# Patient Record
Sex: FEMALE | Race: BLACK OR AFRICAN AMERICAN | Employment: UNEMPLOYED | URBAN - METROPOLITAN AREA
[De-identification: names, ages, dates, MRNs, and addresses within clinical notes are randomized per-mention and may not be internally consistent; named-entity substitution may affect disease eponyms.]

---

## 2022-03-03 ENCOUNTER — HOSPITAL ENCOUNTER (EMERGENCY)
Age: 47
Discharge: ANOTHER HOSPITAL | End: 2022-03-04
Attending: EMERGENCY MEDICINE
Payer: MEDICAID

## 2022-03-03 ENCOUNTER — APPOINTMENT (OUTPATIENT)
Dept: CT IMAGING | Age: 47
End: 2022-03-03
Payer: MEDICAID

## 2022-03-03 DIAGNOSIS — N30.01 ACUTE CYSTITIS WITH HEMATURIA: ICD-10-CM

## 2022-03-03 DIAGNOSIS — C79.9 METASTATIC MALIGNANT NEOPLASM, UNSPECIFIED SITE (HCC): ICD-10-CM

## 2022-03-03 DIAGNOSIS — C50.919 MALIGNANT NEOPLASM OF FEMALE BREAST, UNSPECIFIED ESTROGEN RECEPTOR STATUS, UNSPECIFIED LATERALITY, UNSPECIFIED SITE OF BREAST (HCC): Primary | ICD-10-CM

## 2022-03-03 LAB
A/G RATIO: 1 (ref 1.1–2.2)
ALBUMIN SERPL-MCNC: 4 G/DL (ref 3.4–5)
ALP BLD-CCNC: 61 U/L (ref 40–129)
ALT SERPL-CCNC: 14 U/L (ref 10–40)
ANION GAP SERPL CALCULATED.3IONS-SCNC: 14 MMOL/L (ref 3–16)
AST SERPL-CCNC: 17 U/L (ref 15–37)
BASOPHILS ABSOLUTE: 0 K/UL (ref 0–0.2)
BASOPHILS RELATIVE PERCENT: 0.5 %
BILIRUB SERPL-MCNC: 0.3 MG/DL (ref 0–1)
BUN BLDV-MCNC: 12 MG/DL (ref 7–20)
CALCIUM SERPL-MCNC: 9.3 MG/DL (ref 8.3–10.6)
CHLORIDE BLD-SCNC: 103 MMOL/L (ref 99–110)
CO2: 20 MMOL/L (ref 21–32)
CREAT SERPL-MCNC: 0.7 MG/DL (ref 0.6–1.1)
EOSINOPHILS ABSOLUTE: 0 K/UL (ref 0–0.6)
EOSINOPHILS RELATIVE PERCENT: 0.4 %
GFR AFRICAN AMERICAN: >60
GFR NON-AFRICAN AMERICAN: >60
GLUCOSE BLD-MCNC: 102 MG/DL (ref 70–99)
GLUCOSE BLD-MCNC: 97 MG/DL (ref 70–99)
HCG QUALITATIVE: NEGATIVE
HCT VFR BLD CALC: 37.9 % (ref 36–48)
HEMOGLOBIN: 12.5 G/DL (ref 12–16)
LACTIC ACID: 1.1 MMOL/L (ref 0.4–2)
LYMPHOCYTES ABSOLUTE: 1 K/UL (ref 1–5.1)
LYMPHOCYTES RELATIVE PERCENT: 13.1 %
MCH RBC QN AUTO: 27.3 PG (ref 26–34)
MCHC RBC AUTO-ENTMCNC: 32.9 G/DL (ref 31–36)
MCV RBC AUTO: 82.9 FL (ref 80–100)
MONOCYTES ABSOLUTE: 0.5 K/UL (ref 0–1.3)
MONOCYTES RELATIVE PERCENT: 7 %
NEUTROPHILS ABSOLUTE: 6.1 K/UL (ref 1.7–7.7)
NEUTROPHILS RELATIVE PERCENT: 79 %
PDW BLD-RTO: 14.3 % (ref 12.4–15.4)
PERFORMED ON: NORMAL
PLATELET # BLD: 409 K/UL (ref 135–450)
PMV BLD AUTO: 7.1 FL (ref 5–10.5)
POTASSIUM REFLEX MAGNESIUM: 3.9 MMOL/L (ref 3.5–5.1)
RBC # BLD: 4.58 M/UL (ref 4–5.2)
SODIUM BLD-SCNC: 137 MMOL/L (ref 136–145)
TOTAL PROTEIN: 8 G/DL (ref 6.4–8.2)
TROPONIN: <0.01 NG/ML
WBC # BLD: 7.7 K/UL (ref 4–11)

## 2022-03-03 PROCEDURE — 96376 TX/PRO/DX INJ SAME DRUG ADON: CPT

## 2022-03-03 PROCEDURE — 6360000004 HC RX CONTRAST MEDICATION: Performed by: EMERGENCY MEDICINE

## 2022-03-03 PROCEDURE — 80053 COMPREHEN METABOLIC PANEL: CPT

## 2022-03-03 PROCEDURE — 96367 TX/PROPH/DG ADDL SEQ IV INF: CPT

## 2022-03-03 PROCEDURE — 6360000002 HC RX W HCPCS: Performed by: EMERGENCY MEDICINE

## 2022-03-03 PROCEDURE — 96365 THER/PROPH/DIAG IV INF INIT: CPT

## 2022-03-03 PROCEDURE — 84484 ASSAY OF TROPONIN QUANT: CPT

## 2022-03-03 PROCEDURE — 84703 CHORIONIC GONADOTROPIN ASSAY: CPT

## 2022-03-03 PROCEDURE — 96375 TX/PRO/DX INJ NEW DRUG ADDON: CPT

## 2022-03-03 PROCEDURE — 2500000003 HC RX 250 WO HCPCS: Performed by: EMERGENCY MEDICINE

## 2022-03-03 PROCEDURE — 85025 COMPLETE CBC W/AUTO DIFF WBC: CPT

## 2022-03-03 PROCEDURE — 83605 ASSAY OF LACTIC ACID: CPT

## 2022-03-03 PROCEDURE — 96366 THER/PROPH/DIAG IV INF ADDON: CPT

## 2022-03-03 PROCEDURE — 70496 CT ANGIOGRAPHY HEAD: CPT

## 2022-03-03 PROCEDURE — 70450 CT HEAD/BRAIN W/O DYE: CPT

## 2022-03-03 PROCEDURE — 93005 ELECTROCARDIOGRAM TRACING: CPT | Performed by: PHYSICIAN ASSISTANT

## 2022-03-03 PROCEDURE — 99285 EMERGENCY DEPT VISIT HI MDM: CPT

## 2022-03-03 RX ORDER — LEVETIRACETAM 10 MG/ML
1000 INJECTION INTRAVASCULAR EVERY 12 HOURS
Status: DISCONTINUED | OUTPATIENT
Start: 2022-03-04 | End: 2022-03-04 | Stop reason: HOSPADM

## 2022-03-03 RX ORDER — DEXAMETHASONE SODIUM PHOSPHATE 10 MG/ML
10 INJECTION, SOLUTION INTRAMUSCULAR; INTRAVENOUS ONCE
Status: COMPLETED | OUTPATIENT
Start: 2022-03-03 | End: 2022-03-03

## 2022-03-03 RX ORDER — DEXAMETHASONE SODIUM PHOSPHATE 10 MG/ML
6 INJECTION, SOLUTION INTRAMUSCULAR; INTRAVENOUS EVERY 6 HOURS
Status: DISCONTINUED | OUTPATIENT
Start: 2022-03-04 | End: 2022-03-04 | Stop reason: HOSPADM

## 2022-03-03 RX ORDER — MANNITOL 20 G/100ML
25 INJECTION, SOLUTION INTRAVENOUS ONCE
Status: COMPLETED | OUTPATIENT
Start: 2022-03-03 | End: 2022-03-04

## 2022-03-03 RX ADMIN — DEXAMETHASONE SODIUM PHOSPHATE 10 MG: 10 INJECTION INTRAMUSCULAR; INTRAVENOUS at 21:50

## 2022-03-03 RX ADMIN — MANNITOL 25 G: 20 INJECTION, SOLUTION INTRAVENOUS at 22:00

## 2022-03-03 RX ADMIN — IOPAMIDOL 75 ML: 755 INJECTION, SOLUTION INTRAVENOUS at 20:44

## 2022-03-03 ASSESSMENT — PAIN DESCRIPTION - DESCRIPTORS: DESCRIPTORS: ACHING

## 2022-03-03 ASSESSMENT — PAIN DESCRIPTION - FREQUENCY: FREQUENCY: CONTINUOUS

## 2022-03-03 ASSESSMENT — PAIN DESCRIPTION - PAIN TYPE: TYPE: ACUTE PAIN

## 2022-03-03 ASSESSMENT — PAIN DESCRIPTION - ONSET: ONSET: GRADUAL

## 2022-03-03 ASSESSMENT — PAIN - FUNCTIONAL ASSESSMENT: PAIN_FUNCTIONAL_ASSESSMENT: 0-10

## 2022-03-03 ASSESSMENT — PAIN DESCRIPTION - LOCATION: LOCATION: HEAD

## 2022-03-03 ASSESSMENT — PAIN SCALES - GENERAL: PAINLEVEL_OUTOF10: 10

## 2022-03-04 ENCOUNTER — HOSPITAL ENCOUNTER (INPATIENT)
Age: 47
LOS: 13 days | Discharge: NURSING, REHAB OR LONG-TERM CARE | DRG: 021 | End: 2022-03-17
Attending: RADIOLOGY | Admitting: INTERNAL MEDICINE
Payer: MEDICAID

## 2022-03-04 ENCOUNTER — APPOINTMENT (OUTPATIENT)
Dept: CT IMAGING | Age: 47
DRG: 021 | End: 2022-03-04
Attending: RADIOLOGY
Payer: MEDICAID

## 2022-03-04 VITALS
HEART RATE: 104 BPM | SYSTOLIC BLOOD PRESSURE: 113 MMHG | RESPIRATION RATE: 18 BRPM | OXYGEN SATURATION: 96 % | TEMPERATURE: 98.5 F | BODY MASS INDEX: 33.63 KG/M2 | WEIGHT: 189.82 LBS | HEIGHT: 63 IN | DIASTOLIC BLOOD PRESSURE: 76 MMHG

## 2022-03-04 DIAGNOSIS — C79.31 BRAIN METASTASES (HCC): Primary | ICD-10-CM

## 2022-03-04 DIAGNOSIS — G93.89 BRAIN MASS: ICD-10-CM

## 2022-03-04 LAB
BILIRUBIN URINE: NEGATIVE
BLOOD, URINE: ABNORMAL
CLARITY: ABNORMAL
COLOR: ABNORMAL
EKG ATRIAL RATE: 88 BPM
EKG DIAGNOSIS: NORMAL
EKG P AXIS: 30 DEGREES
EKG P-R INTERVAL: 144 MS
EKG Q-T INTERVAL: 348 MS
EKG QRS DURATION: 74 MS
EKG QTC CALCULATION (BAZETT): 421 MS
EKG R AXIS: 12 DEGREES
EKG T AXIS: 4 DEGREES
EKG VENTRICULAR RATE: 88 BPM
EPITHELIAL CELLS, UA: 4 /HPF (ref 0–5)
GLUCOSE BLD-MCNC: 129 MG/DL (ref 70–99)
GLUCOSE BLD-MCNC: 146 MG/DL (ref 70–99)
GLUCOSE BLD-MCNC: 172 MG/DL (ref 70–99)
GLUCOSE URINE: NEGATIVE MG/DL
HYALINE CASTS: 4 /LPF (ref 0–8)
KETONES, URINE: ABNORMAL MG/DL
LEUKOCYTE ESTERASE, URINE: NEGATIVE
MICROSCOPIC EXAMINATION: YES
NITRITE, URINE: NEGATIVE
PERFORMED ON: ABNORMAL
PH UA: 5.5 (ref 5–8)
PROTEIN UA: NEGATIVE MG/DL
RBC UA: >900 /HPF (ref 0–4)
SPECIFIC GRAVITY UA: <=1.005 (ref 1–1.03)
URINE CULTURE, ROUTINE: NORMAL
URINE REFLEX TO CULTURE: YES
URINE TYPE: ABNORMAL
UROBILINOGEN, URINE: 0.2 E.U./DL
WBC UA: 18 /HPF (ref 0–5)

## 2022-03-04 PROCEDURE — 6360000002 HC RX W HCPCS: Performed by: PHYSICIAN ASSISTANT

## 2022-03-04 PROCEDURE — 81003 URINALYSIS AUTO W/O SCOPE: CPT

## 2022-03-04 PROCEDURE — 2000000000 HC ICU R&B

## 2022-03-04 PROCEDURE — 2580000003 HC RX 258: Performed by: STUDENT IN AN ORGANIZED HEALTH CARE EDUCATION/TRAINING PROGRAM

## 2022-03-04 PROCEDURE — 71260 CT THORAX DX C+: CPT

## 2022-03-04 PROCEDURE — 2580000003 HC RX 258: Performed by: PHYSICIAN ASSISTANT

## 2022-03-04 PROCEDURE — 6360000004 HC RX CONTRAST MEDICATION: Performed by: INTERNAL MEDICINE

## 2022-03-04 PROCEDURE — 99222 1ST HOSP IP/OBS MODERATE 55: CPT | Performed by: INTERNAL MEDICINE

## 2022-03-04 PROCEDURE — 99222 1ST HOSP IP/OBS MODERATE 55: CPT | Performed by: NURSE PRACTITIONER

## 2022-03-04 PROCEDURE — 87086 URINE CULTURE/COLONY COUNT: CPT

## 2022-03-04 PROCEDURE — 6370000000 HC RX 637 (ALT 250 FOR IP): Performed by: STUDENT IN AN ORGANIZED HEALTH CARE EDUCATION/TRAINING PROGRAM

## 2022-03-04 PROCEDURE — 6360000002 HC RX W HCPCS: Performed by: STUDENT IN AN ORGANIZED HEALTH CARE EDUCATION/TRAINING PROGRAM

## 2022-03-04 PROCEDURE — 93010 ELECTROCARDIOGRAM REPORT: CPT | Performed by: INTERNAL MEDICINE

## 2022-03-04 RX ORDER — ACETAMINOPHEN 325 MG/1
650 TABLET ORAL EVERY 6 HOURS PRN
Status: DISCONTINUED | OUTPATIENT
Start: 2022-03-04 | End: 2022-03-12

## 2022-03-04 RX ORDER — ONDANSETRON 2 MG/ML
4 INJECTION INTRAMUSCULAR; INTRAVENOUS EVERY 6 HOURS PRN
Status: DISCONTINUED | OUTPATIENT
Start: 2022-03-04 | End: 2022-03-12

## 2022-03-04 RX ORDER — ONDANSETRON 4 MG/1
4 TABLET, ORALLY DISINTEGRATING ORAL EVERY 8 HOURS PRN
Status: DISCONTINUED | OUTPATIENT
Start: 2022-03-04 | End: 2022-03-12

## 2022-03-04 RX ORDER — ACETAMINOPHEN 650 MG/1
650 SUPPOSITORY RECTAL EVERY 6 HOURS PRN
Status: DISCONTINUED | OUTPATIENT
Start: 2022-03-04 | End: 2022-03-12

## 2022-03-04 RX ORDER — SODIUM CHLORIDE 0.9 % (FLUSH) 0.9 %
5-40 SYRINGE (ML) INJECTION PRN
Status: DISCONTINUED | OUTPATIENT
Start: 2022-03-04 | End: 2022-03-12

## 2022-03-04 RX ORDER — POLYETHYLENE GLYCOL 3350 17 G/17G
17 POWDER, FOR SOLUTION ORAL DAILY PRN
Status: DISCONTINUED | OUTPATIENT
Start: 2022-03-04 | End: 2022-03-12

## 2022-03-04 RX ORDER — SODIUM CHLORIDE 9 MG/ML
25 INJECTION, SOLUTION INTRAVENOUS PRN
Status: DISCONTINUED | OUTPATIENT
Start: 2022-03-04 | End: 2022-03-12

## 2022-03-04 RX ORDER — SODIUM CHLORIDE 0.9 % (FLUSH) 0.9 %
5-40 SYRINGE (ML) INJECTION EVERY 12 HOURS SCHEDULED
Status: DISCONTINUED | OUTPATIENT
Start: 2022-03-04 | End: 2022-03-12

## 2022-03-04 RX ORDER — DEXAMETHASONE SODIUM PHOSPHATE 4 MG/ML
6 INJECTION, SOLUTION INTRA-ARTICULAR; INTRALESIONAL; INTRAMUSCULAR; INTRAVENOUS; SOFT TISSUE EVERY 6 HOURS
Status: DISCONTINUED | OUTPATIENT
Start: 2022-03-04 | End: 2022-03-12

## 2022-03-04 RX ORDER — PANTOPRAZOLE SODIUM 40 MG/1
40 TABLET, DELAYED RELEASE ORAL
Status: DISCONTINUED | OUTPATIENT
Start: 2022-03-05 | End: 2022-03-17 | Stop reason: HOSPADM

## 2022-03-04 RX ORDER — FAMOTIDINE 20 MG/1
20 TABLET, FILM COATED ORAL 2 TIMES DAILY
Status: DISCONTINUED | OUTPATIENT
Start: 2022-03-04 | End: 2022-03-04

## 2022-03-04 RX ADMIN — CEFTRIAXONE 1000 MG: 1 INJECTION, POWDER, FOR SOLUTION INTRAMUSCULAR; INTRAVENOUS at 04:00

## 2022-03-04 RX ADMIN — FAMOTIDINE 20 MG: 20 TABLET ORAL at 09:58

## 2022-03-04 RX ADMIN — ACETAMINOPHEN 650 MG: 325 TABLET ORAL at 10:07

## 2022-03-04 RX ADMIN — LEVETIRACETAM 1000 MG: 100 INJECTION, SOLUTION INTRAVENOUS at 12:23

## 2022-03-04 RX ADMIN — LEVETIRACETAM 1000 MG: 1000 INJECTION, SOLUTION INTRAVENOUS at 00:25

## 2022-03-04 RX ADMIN — SODIUM CHLORIDE, PRESERVATIVE FREE 10 ML: 5 INJECTION INTRAVENOUS at 10:00

## 2022-03-04 RX ADMIN — IOPAMIDOL 70 ML: 755 INJECTION, SOLUTION INTRAVENOUS at 08:46

## 2022-03-04 RX ADMIN — SODIUM CHLORIDE, PRESERVATIVE FREE 10 ML: 5 INJECTION INTRAVENOUS at 20:27

## 2022-03-04 RX ADMIN — ENOXAPARIN SODIUM 40 MG: 100 INJECTION SUBCUTANEOUS at 09:59

## 2022-03-04 RX ADMIN — DEXAMETHASONE SODIUM PHOSPHATE 6 MG: 4 INJECTION, SOLUTION INTRA-ARTICULAR; INTRALESIONAL; INTRAMUSCULAR; INTRAVENOUS; SOFT TISSUE at 23:58

## 2022-03-04 RX ADMIN — DEXAMETHASONE SODIUM PHOSPHATE 6 MG: 10 INJECTION INTRAMUSCULAR; INTRAVENOUS at 00:22

## 2022-03-04 RX ADMIN — DEXAMETHASONE SODIUM PHOSPHATE 6 MG: 4 INJECTION, SOLUTION INTRA-ARTICULAR; INTRALESIONAL; INTRAMUSCULAR; INTRAVENOUS; SOFT TISSUE at 12:18

## 2022-03-04 RX ADMIN — DEXAMETHASONE SODIUM PHOSPHATE 6 MG: 4 INJECTION, SOLUTION INTRA-ARTICULAR; INTRALESIONAL; INTRAMUSCULAR; INTRAVENOUS; SOFT TISSUE at 17:44

## 2022-03-04 ASSESSMENT — PAIN SCALES - GENERAL
PAINLEVEL_OUTOF10: 0
PAINLEVEL_OUTOF10: 5

## 2022-03-04 ASSESSMENT — PAIN DESCRIPTION - ONSET: ONSET: GRADUAL

## 2022-03-04 ASSESSMENT — PAIN DESCRIPTION - PAIN TYPE: TYPE: CHRONIC PAIN

## 2022-03-04 ASSESSMENT — PAIN DESCRIPTION - LOCATION
LOCATION: BREAST
LOCATION: BREAST

## 2022-03-04 ASSESSMENT — ENCOUNTER SYMPTOMS
GASTROINTESTINAL NEGATIVE: 1
RESPIRATORY NEGATIVE: 1

## 2022-03-04 ASSESSMENT — PAIN DESCRIPTION - DESCRIPTORS: DESCRIPTORS: ACHING

## 2022-03-04 ASSESSMENT — PAIN DESCRIPTION - FREQUENCY: FREQUENCY: CONTINUOUS

## 2022-03-04 NOTE — ED NOTES
Pt resting in bed with no signs of acute distress. Blankets applied, call light in reach, bed in lowest position, updated on plan of care, all questions answered.           Suzette Post RN  03/04/22 1891

## 2022-03-04 NOTE — ED NOTES
Report given to transport team.  Pt to be transported to [facility], [facility] no signs of acute distress noted at this time.       Suzette Maddox RN  03/04/22 0259

## 2022-03-04 NOTE — ED PROVIDER NOTES
[address] [facility]        Pt Name: Misty Forrest  MRN: [id number]  [place] 1975  Date of evaluation: 3/3/2022  Provider: Will Viramontes PA-C  PCP: JUDITH Green - CNP  Note Started: 7:38 PM EST       I have seen and evaluated this patient with my supervising physician Kayleigh Eddy DO. Triage CHIEF COMPLAINT       Chief Complaint   Patient presents with    Memory Loss     pt family states memory issues for over a week. pt states she does not know name         HISTORY OF PRESENT ILLNESS   (Location/Symptom, Timing/Onset, Context/Setting, Quality, Duration, Modifying Factors, Severity)  Note limiting factors. Chief Complaint: difficulty speaking/thinking    Misty Forrest is a 48 y.o. female who presents to the ED with complaints of 1 week of altered mental status. Her mother is with her and gives most of her history as patient is having difficulty with thought process, including being unable to tell me her name. Mother states that about a week ago there was sitting reading the Bible when patient states that she felt as if she got hit in the back of the head, but the symptoms somewhat resolved. Later in the day, patient started to have difficulty reading, recognizing words and difficulty writing. Persisted for the past week, but they did not seek medical attention at that time. Patient does have a known breast cancer, that was recently diagnosed within the last year. However, patient refused treatment at the time per patient and mom. At this time patient is having difficulty with explaining if she is in any pain or having any other symptoms other than she is having difficulty with thought process. Nursing Notes were all reviewed and agreed with or any disagreements were addressed in the HPI.     REVIEW OF SYSTEMS    (2-9 systems for level 4, 10 or more for level 5)     Review of Systems Abused: Not on file    Physically Abused: Not on file    Sexually Abused: Not on file   Housing Stability:     Unable to Pay for Housing in the Last Year: Not on file    Number of Places Lived in the Last Year: Not on file    Unstable Housing in the Last Year: Not on file       SCREENINGS   NIH Stroke Scale  Interval: Baseline  Level of Consciousness (1a. ): Alert  LOC Questions (1b. ): Answers neither question correctly  LOC Commands (1c. ): Performs both tasks correctly  Best Gaze (2. ): Normal  Visual (3. ): No visual loss  Facial Palsy (4. ): Normal symmetrical movement  Motor Arm, Left (5a. ): No drift  Motor Arm, Right (5b. ): No drift  Motor Leg, Left (6a. ): No drift  Motor Leg, Right (6b. ): No drift  Limb Ataxia (7. ): Absent  Sensory (8. ): Normal  Best Language (9. ): Mild to moderate aphasia  Dysarthria (10. ): Normal  Extinction and Inattention (11): No abnormality  Total: 3Glasgow Coma Scale  Eye Opening: Spontaneous  Best Verbal Response: Oriented  Best Motor Response: Obeys commands  [place] Coma Scale Score: 15        PHYSICAL EXAM    (up to 7 for level 4, 8 or more for level 5)     ED Triage Vitals [03/03/22 1850]   BP Temp Temp Source Pulse Resp SpO2 Height Weight   (!) 212/78 97.8 °F (36.6 °C) Tympanic 99 16 100 % 5' 3\" (1.6 m) 189 lb 13.1 oz (86.1 kg)       Physical Exam  Constitutional:       General: She is not in acute distress. Appearance: Normal appearance. She is not ill-appearing, toxic-appearing or diaphoretic. HENT:      Head: Normocephalic and atraumatic. Right Ear: External ear normal.      Left Ear: External ear normal.      Nose: Nose normal.   Eyes:      General:         Right eye: No discharge. Left eye: No discharge. Pulmonary:      Effort: Pulmonary effort is normal. No respiratory distress. Musculoskeletal:         General: Normal range of motion. Cervical back: Normal range of motion. Skin:     General: Skin is warm and dry.    Neurological: Mental Status: She is alert. Cranial Nerves: No facial asymmetry. Comments: Cranial nerves intact  Patient has hard time with extraocular movement horizontally  PERRLA  Full active range of motion and strength against resistance in all 4 extremities  Patient denies visual change  Normal finger-to-nose test  Sensation in all 4 extremities intact  Normal truncal stability, normal gait  Patient is alert but she cannot tell me her name, where she is at, but she seems as if she knows these things, but she cannot say them  Patient is able to tell me the year, 2022, but unable to tell the month or the day         DIAGNOSTIC RESULTS   LABS:    Labs Reviewed   COMPREHENSIVE METABOLIC PANEL W/ REFLEX TO MG FOR LOW K - Abnormal; Notable for the following components:       Result Value    CO2 20 (*)     Glucose 102 (*)     Albumin/Globulin Ratio 1.0 (*)     All other components within normal limits   URINALYSIS WITH REFLEX TO CULTURE - Abnormal; Notable for the following components:    Color, UA RED (*)     Clarity, UA CLOUDY (*)     Ketones, Urine TRACE (*)     Blood, Urine LARGE (*)     All other components within normal limits   MICROSCOPIC URINALYSIS - Abnormal; Notable for the following components:    WBC, UA 18 (*)     RBC, UA >900 (*)     All other components within normal limits   CULTURE, URINE   CBC WITH AUTO DIFFERENTIAL   TROPONIN   HCG, SERUM, QUALITATIVE   LACTIC ACID   POCT GLUCOSE       When ordered, only abnormal lab results are displayed. All other labs were within normal range or not returned as of this dictation. EKG: When ordered, EKG's are interpreted by the Emergency Department Physician in the absence of a cardiologist.  Please see their note for interpretation of EKG.       RADIOLOGY:   Non-plain film images such as CT, Ultrasound and MRI are read by the radiologist. Plain radiographic images are visualized andpreliminarily interpreted by the  ED Provider with the below administration in time range)   iopamidol (ISOVUE-370) 76 % injection 75 mL (75 mLs IntraVENous Given 3/3/22 2044)   dexamethasone (PF) (DECADRON) injection 10 mg (10 mg IntraVENous Given 3/3/22 2150)   mannitol 20 % IVPB 25 g (25 g IntraVENous New Bag 3/3/22 2200)             This is a 18-year-old female with PMH of untreated breast cancer who presents ED with altered mental status for the past week. Symptoms started Thursday 02/24, and progressively worsened throughout the day. Vitals upon arrival show the patient is hypertensive, but otherwise within normal limits. Blood work was done and is within normal limits. Urinalysis shows red cloudy urine and positive leukocytes. At this time I did start her on Rocephin. CT head was reviewed and read by radiologist as above, and I personally discussed with Dr. Gregory Cummins. At this time I did consult neurosurgery, and he recommended that patient be started on Keppra as well as Decadron while here in [facility] ED, and this was initiated. I did speak with patient and patient's mom about further treatment, giving them options of being transferred to [facility] for Neurosurgery follow up or staying here at [facility] where we could make her [facility] and give her palliative care. Patient states that she is unsure of her next steps, as she is having some difficulty with thought process at this time. However, she agreed that she would like some sort of treatment--however, unsure of how much. At this time, I consulted [place] [facility] team, I spoke with Dr. Evangelina Betancourt. And she agreed to admit patient to [facility] ICU for further evaluation by neurosurgery team..   Patient and patient's mom are agreeable to transfer to [facility], [facility]. for further evaluation. FINAL IMPRESSION      1. Malignant neoplasm of female breast, unspecified estrogen receptor status, unspecified laterality, unspecified site of breast ([facility] [address]    2.  Metastatic malignant neoplasm, unspecified site ([facility] [address] 3. Acute cystitis with hematuria          DISPOSITION/PLAN   DISPOSITION Decision To Transfer 03/03/2022 11:44:13 PM      PATIENT REFERREDTO:  No follow-up provider specified.     DISCHARGE MEDICATIONS:  New Prescriptions    No medications on file       DISCONTINUED MEDICATIONS:  Discontinued Medications    No medications on file              (Please note that portions ofthis note were completed with a voice recognition program.  Efforts were made to edit the dictations but occasionally words are mis-transcribed.)    Zain Rico PA-C (electronically signed)             Zain Rico PA-C  03/04/22 [address], [place]  03/04/22 1053

## 2022-03-04 NOTE — CONSULTS
ICU HISTORY AND PHYSICAL       Hospital Day:   ICU Day:                                                          Code:Full Code  Admit Date: 3/4/2022  PCP: JUDITH Nelson - AMA                                  CC: AMS    HISTORY OF PRESENT ILLNESS:   Sharmin Coto is a 47 yo F with PMH stage IIB invasive ductal carcinoma diagnosed 3/30/21 who presented to [facility] with AMS for the past week. She ws reading a week ago and experienced sudden onset occipital HA which then resolved however pt then experienced difficulty reading, writing and memory issues which persisted. ROS positive for right breast mass. Of note, pt has a family history of breast cancer in her sister who  of this illness after receiving chemotherapy. Pt's last mammogram was 2018. She then noted a right breast mass/thickening approx 10/2020 on self-exam and thought it was likely a cyst/fibroadenoma thus did not pursue evaluation until it became bigger. was diagnosed with right invasive ductal carcinoma with extensive necrosis (Grade 3. ER-, OR-, HER2-) on 3/23/21. Pt and her mother went to see Dr. Jaren Green on 21 and despite their conversation, mother and pt did not feel like the lump was cancer and were not interested in treatments including chemotherapy at that time. On presentation to the ED, CTH with abnormal edema in left posterior cerebral hemisphere possibly related to underlying metastases vs less likely primary glioma with 1.4 cm of left to right midline shift with cisternal effacement and downward transtentorial herniation and findings of developing hydrocephalus. CTA head/neck negative. She was admitted to ICU for further management. PAST HISTORY:     Past Medical History:   Diagnosis Date    Thyroid nodule        No past surgical history on file.     SocialHistory:   The patient lives at    Alcohol:  Illicit drugs: no use  Tobacco:      Family History:  Family History   Problem Relation Age of Onset        BMP:   Recent Labs     03/03/22 1941      K 3.9      CO2 20*   BUN 12   CREATININE 0.7   GLUCOSE 102*     LFT's:   Recent Labs     03/03/22 1941   AST 17   ALT 14   BILITOT 0.3   ALKPHOS 61     Troponin:   Recent Labs     03/03/22 1941   TROPONINI <0.01     BNP:No results for input(s): BNP in the last 72 hours. ABGs: No results for input(s): PHART, TEY7LKX, PO2ART in the last 72 hours. INR: No results for input(s): INR in the last 72 hours. U/A:  Recent Labs     03/04/22  0015   COLORU RED*   PHUR 5.5   WBCUA 18*   RBCUA >900*   CLARITYU CLOUDY*   SPECGRAV <=1.005   LEUKOCYTESUR Negative   UROBILINOGEN 0.2   BILIRUBINUR Negative   BLOODU LARGE*   GLUCOSEU Negative       CT CHEST ABDOMEN PELVIS W CONTRAST   Final Result      Large, necrotic right breast mass. Right axillary lymphadenopathy. Uterine fibroids. High density urine in the bladder. MRI BRAIN W WO CONTRAST    (Results Pending)       EKG:   Echo:  Micro:     ASSESSMENT AND PLAN:   Becca Young is a 48 y.o. female, with PMH untreated invasive ductal carcinoma who presented with AMS and was found to have abnormal edema of left posterior cerebral hemisphere concerning for brain metastases. Altered mental status  Hx of breast cancer, now with edema of left posterior cerebral hemisphere concerning for brain metastases  - CT chest/abd/pelvis, MRI brain  - Decadron, keppra   - Oncology consulted  - NSGY consulted: no acute intervention  - Palliative care consulted  - q1h neuro checks  - PT/OT/SLP    Hematuria  Red urine, >900 RBCs, no infection. - CT abd/pelvis      Code Status:Full Code  FEN: Diet,   PPX: Lovenox, PPI  DISPO: ICU    This patient has been staffed and discussed with Dr. Chester Tena.   -----------------------------  Merlinda Mowers, MD, PGY-3  3/4/2022  9:54 AM    Patient seen, examined and discussed with the resident and I agree with the assessment and plan.   Briefly, this is a 48 y.o. female with untreated stage IIB  Breast cancer who didn't pursue treatment and presents with symptoms consistent stage IV disease. Patient having memory and ambulation issues and has a fungating mass in her right breast.  It's unclear why she didn't pursue a treatment for her breast cancer, she says she \"just wasn't going to worry about it\". This is very unfortunate as she's now gone from potentially curable to incurable. She's on decadron and keppra. Will await verdict from neurosurgery on whether she will need intracranial surgery or just radiation. oncology and palliative are also consulted.         Marco Monet MD

## 2022-03-04 NOTE — CONSULTS
spiritual care, which they were agreeable to. D/w Dr. John Chao, neurosurgery APRNs, and Portia Platt. 1. Goals of Care/Advanced Care planning/Code status: Full Code, discussed very briefly today. Moy Reyna and her mother would like to hear input from neurosurgery and oncology after her MRI. They are unsure whether Moy Reyna would want to seek aggressive treatment for her cancer. 2. Pain: pt c/o pain in right breast 5/10. RN gave her tylenol. She has not been taking pain meds at home prior to admission. Her mother says she's just been dealing with the pain and it hasn't been too bad. Will monitor. 3. SOB: pt denies sob and is breathing comfortably on room air. 4. Altered mental status: pt had a lot of trouble stating her name and , and was aware of her limitations. She laughed and said she can't believe she can't remember those things. She did have basic insight into her cancer and likely brain involvement, though her limited insight may have more to do with coping mechanisms than her altered mental status. Pt/mother report that she developed altered mental status including having trouble reading and writing about a week ago. Head CT showed: Abnormal edema left posterior cerebral hemisphere possibly related to underlying metastases versus less likely primary glioma. Causes 1.4 cm of left-to-right midline shift w/ cisternal effacement and downward transtentorial herniation and findings of developing hydropcephalus. Neurosurgery is on board and planning MRI today. 5. Breast mass: pt has had a breast mass for approximately a year and a half. Biopsy and work up 3/30/2021 revealed stage IIB invasive ductal carcinoma (ER-, WV-, and HER2-) diagnosed 3/30/2021. Pt declined treatment at that time. Haiele reports that her breast has starting hurting and \"weeping\" recently. Oncology consult is pending.    6. Disposition: TBD    Reason for Consult:         [x]  Goals of Care  []  Code Status Discussion/Advanced Care Planning   [x]  Psychosocial/Family Support  []  Symptom Management  []  Other (Specify)    Requesting Physician: Dr. Rodrigues Letters: Altered mental status    History Obtained From:  patient, mother, electronic medical record    History of Present Illness:         Betzy Sow is a 48 y.o. female with PMH of breast cancer who presented with altered mental status for the past 1 week. On presentation, the pt was unable to state her name. Her mother gave the history, and reported that she was having difficulty reading, writing, and recognizing words. She has known breast cancer diagnosed within the past year, which has not been treated due to pt's refusal. Her urinalysis showed red cloudy urine and positive leukocytes, and she was started on rocephin. Head CT showed edema possibly related to underlying metastases versus likely primary glioma, causing 1.4 cm midline shift with cisternal effacement and downward transtentorial herniation and findings of developing hydrocephalus. Neurosurgery was consulted and recommended Keppra and decadron. She was [facility] to [facility] for further neurosurgery evaluation. Subjective:         Past Medical History:        Diagnosis Date    Thyroid nodule        Past Surgical History:    No past surgical history on file. Current Medications:    No medications prior to admission. Allergies:  Patient has no known allergies. Social History:    · TOBACCO: reports that she has never smoked. She has never used smokeless tobacco.  · ETOH:   reports no history of alcohol use. · Patient currently lives with family - mother    Review of Systems -   Review of Systems   Constitutional: Negative. HENT: Negative. Respiratory: Negative. Cardiovascular: Negative. Gastrointestinal: Negative. Genitourinary: Negative. Musculoskeletal:        R breast mass   Neurological: Positive for speech difficulty.    Psychiatric/Behavioral: Positive for confusion.   :     Objective:        Physical Exam  Constitutional:       General: She is not in acute distress. HENT:      Head: Normocephalic and atraumatic. Cardiovascular:      Rate and Rhythm: Normal rate and regular rhythm. Pulmonary:      Effort: Pulmonary effort is normal.      Breath sounds: Normal breath sounds. Abdominal:      General: Bowel sounds are normal.      Palpations: Abdomen is soft. Musculoskeletal:         General: No swelling. Comments: Right breast mass   Skin:     General: Skin is warm and dry. Neurological:      Mental Status: She is alert and oriented to person, place, and time. Comments: Pt had a lot of difficulty recalling her name and . She was unable to tell me her mother's first name but could identify her as \"mom. \"           Palliative Performance Scale:  [] 60% Ambulation reduced; Significant disease; Can't do hobbies/housework; intake normal or reduced; occasional assist; LOC full/confusion  [] 50% Mainly sit/lie; Extensive disease; Can't do any work; Considerable assist; intake normal  Or reduced; LOC full/confusion  [] 40% Mainly in bed; Extensive disease; Mainly assist; intake normal or reduced; occasional assist; LOC full/confusion  [] 30% Bed Bound; Extensive disease; Total care; intake reduced; LOC full/confusion  [] 20% Bed Bound; Extensive disease; Total care; intake minimal; Drowsy/coma  [] 10% Bed Bound; Extensive disease;  Total care; Mouth care only; Drowsy/coma  [] 0% Death    PPS: 100%    Vitals:    BP (!) 125/90   Pulse 105   Temp 98.5 °F (36.9 °C) (Oral)   Resp 20   SpO2 98%     Labs:    BMP:   Recent Labs     22      K 3.9      CO2 20*   BUN 12   CREATININE 0.7   GLUCOSE 102*     CBC:   Recent Labs     22   WBC 7.7   HGB 12.5   HCT 37.9          LFT's:   Recent Labs     22   AST 17   ALT 14   BILITOT 0.3   ALKPHOS 61     Troponin:   Recent Labs     22   TROPONINI <0.01 BNP: No results for input(s): BNP in the last 72 hours. ABGs: No results for input(s): PHART, JLB3VDO, PO2ART in the last 72 hours. INR: No results for input(s): INR in the last 72 hours. U/A:  Recent Labs     03/04/22  0015   COLORU RED*   PHUR 5.5   WBCUA 18*   RBCUA >900*   CLARITYU CLOUDY*   SPECGRAV <=1.005   LEUKOCYTESUR Negative   UROBILINOGEN 0.2   BILIRUBINUR Negative   BLOODU LARGE*   GLUCOSEU Negative       MRI BRAIN W WO CONTRAST    (Results Pending)   CT CHEST ABDOMEN PELVIS W CONTRAST    (Results Pending)         Conclusion/Time spent:         Recommendations see above    Pt [id number]  Time spent with patient and/or family: 60 min  Time reviewing records: 10 min   Time communicating with staff: 10 min     A total of 80 minutes spent with the patient and family on unit greater than 50% in counseling regarding palliative care and in goals of care for the patient. Thank you to Dr. Sherie Bruce for this consultation. We will continue to follow Ms. Ramírez's care as needed.     Abdifatah Washington, LEONIE  [address]

## 2022-03-04 NOTE — FLOWSHEET NOTE
03/04/22 7180   Encounter Summary   Services provided to:   (Patient & her mother were sleeping when I tried to visit.)   Referral/Consult From: Palliative Care

## 2022-03-04 NOTE — H&P
ICU HISTORY AND PHYSICAL       Hospital Day: 1  ICU Day: 1   Code:Full Code  Admit Date: 3/4/2022  PCP: Carlotta ZAMARRIPA, APRN - AMA                                  CC: AMS, Headache    HISTORY OF PRESENT ILLNESS:   Mingo Beckett is a 47 y/o F w/ PMH of Stage IIB invasive ductal carcinoma (ER-, GA-, HER2 -) diagnosed 3/30 who presented to [facility] w/ one week of altered mental status. At presentation at [facility] patient was having trouble giving hx and presented w/ mother who helped providing hx. About one week ago, the patient was reading and had sudden occipital headache which which resolved. Subsequently, patient started having difficulty reading, writing, and with her memory. These symptoms persisted which caused the patient to present to ED. Of note the patient found a breast mass on self exam in 2021. Biopsy on 3/23 found Stage IIB invasive ductal carcinoma (Grade 3, ER-, GA-,HER2-). Patient was seen on  at [facility] by Dr. Dirk Badillo and denied that mass was cancer despite positive biopsy. She denied systemic therapy at that time. The patient's sister had recently  of breast cancer after treatment w/ chemotherapy. On presentation to [facility] patient was hemodynamically stable. CT head showed abnormal edema in left posterior cerebral hemisphere possibly related to underlying metastases versus less likely primary glioma w/ 1.4 cm of left to right midline shift w/ cisternal effacement and downward transtentorial herniation and findings of developing hydrocephalus. CTA negative for large vessel occlusion or hemodynamic stenosis. Patient denies chest pain, SOB, abdominal pain, fevers, chills, nausea, and vomiting. Patient reported bloody discharge from breast tumor and difficulty w/ memory and reading. Patient was transferred to [facility] for further workup and care. Patient was transferred w/ intention to seek further treatment.     PAST HISTORY:     Past Medical History:   Diagnosis Date  Thyroid nodule        No past surgical history on file. SocialHistory:   The patient lives at home    Alcohol: denies use  Illicit drugs: no use  Tobacco: denies use    Family History:  Family History   Problem Relation Age of Onset    Cancer Father        MEDICATIONS:     No current facility-administered medications on file prior to encounter. No current outpatient medications on file prior to encounter. Scheduled Meds:   dexamethasone  6 mg IntraVENous Q6H    levetiracetam  1,000 mg IntraVENous Q12H    sodium chloride flush  5-40 mL IntraVENous 2 times per day    enoxaparin  40 mg SubCUTAneous Daily    famotidine  20 mg Oral BID      Continuous Infusions:   sodium chloride       PRN Meds:sodium chloride flush, sodium chloride, ondansetron **OR** ondansetron, polyethylene glycol, acetaminophen **OR** acetaminophen    Allergies: No Known Allergies    REVIEW OF SYSTEMS:       ROS: Pertinent ROS in PMH    PHYSICAL EXAM:       Vitals: BP (!) 125/90   Pulse 105   Temp 98.5 °F (36.9 °C) (Oral)   Resp 20   SpO2 98%     I/O:  No intake or output data in the 24 hours ending 03/04/22 0928  No intake/output data recorded. No intake/output data recorded. Physical Examination:     Physical Exam  Constitutional: Alert, normal appearance, no acute distress  HENT: head normocephalic/atraumatic, no congestion or rhinorrhea, PERRLA, EOM intact  Cardio: normal rate, regular rhythm. No murmurs heard on auscultation  Pulmonary: clear to auscultation bilaterally, no wheezes, rales, or stridor  GI: abdomen soft, flat, and non-distended. No tenderness or guarding. Musculoskeletal: no deformity, tenderness, or injury. No lower extremity edema. Strength 5/5 in bilateral upper and lower extremities. Large mass extending from the R breast.   Skin: no lesion, rash, or erythema  Neuro: Difficulty w/ finger to nose. A&O X 4. Strength +5 in bilateral upper and lower extremities.  Sensation intact in bilateral upper and lower extremities. Difficulty w/ memory. Psych: mood normal, behavior normal    Access:                         -Peripheral Access Day#:1    No results for input(s): PHART, BBD1FNY, PO2ART in the last 72 hours. DATA:       Labs:  CBC:   Recent Labs     03/03/22 1942   WBC 7.7   HGB 12.5   HCT 37.9          BMP:   Recent Labs     03/03/22 1941      K 3.9      CO2 20*   BUN 12   CREATININE 0.7   GLUCOSE 102*     LFT's:   Recent Labs     03/03/22 1941   AST 17   ALT 14   BILITOT 0.3   ALKPHOS 61     Troponin:   Recent Labs     03/03/22 1941   TROPONINI <0.01     BNP:No results for input(s): BNP in the last 72 hours. ABGs: No results for input(s): PHART, EYR3EOM, PO2ART in the last 72 hours. INR: No results for input(s): INR in the last 72 hours. U/A:  Recent Labs     03/04/22  0015   COLORU RED*   PHUR 5.5   WBCUA 18*   RBCUA >900*   CLARITYU CLOUDY*   SPECGRAV <=1.005   LEUKOCYTESUR Negative   UROBILINOGEN 0.2   BILIRUBINUR Negative   BLOODU LARGE*   GLUCOSEU Negative       MRI BRAIN W WO CONTRAST    (Results Pending)   CT CHEST ABDOMEN PELVIS W CONTRAST    (Results Pending)       ASSESSMENT AND PLAN:   Jenny Jj is a 48 y.o. female w/ PMH of Stage IIB invasive ductal carcinoma (ER-, MD-, HER2 -) diagnosed 3/30/2021 presented to [facility] w/ one week of altered mental status. Encephalopathy  Edema left posterior cerebral hemisphere   Likely due to underlying metastasis. Hx of Stage IIB invasive ductal carcinoma (ER-, MD-, and HER2-) diagnosed 3/30/2021. Refused treatment at time. Presented w/ headache 1 wks ago w/ persistent memory, reading, and writing difficulties. No hx of seizures. -(3/4) CT Head w/o contrast: Abnormal edema left posterior cerebral hemisphere possibly related to underlying metastases versus less likely primary glioma.  Causes 1.4 cm of left-to-right midline shift w/ cisternal effacement and downward transtentorial herniation and findings of developing hydropcephalus. -(3/4) CTA: Negative for large vessel occlusion or hemodynamic stenosis. -F/U CT chest/abdomen/pelvis  -F/U MRI w/and w/o  -q1 hr neurochecks  -Decadron 6 mg q 6hrs  -Keppra 1 g BID  -No indication for NSGY at this time  -PT/OT/SLP  -Consults: NSGY, Oncology, Palliative Care    Hematuria  Red urine w/ greater than 900 Rbcs. No infection. Could be secondary to metastatic breast CA. Code Status:Full Code  FEN: ADULT DIET;  Regular  PPX: Lovenox, Pepcid  DISPO: ICU for now, depending of patient decisions may transfer to floor    This patient has been staffed and discussed with Dr. Palmer Rodriguez  -----------------------------  Shelly Knox MD, PGY-1  3/4/2022  9:28 AM

## 2022-03-04 NOTE — ED NOTES
Pt arrived to ED via private vehicle with c/o memory loss that began last week. Pt's mother states last Thursday the pt states she felt like something hit her in the head, but doesn't know what. States she isn't able to remember her name, important things she would usually remember, and she is also unable to read or write. Per pt's mom, she has a cancerous tumor in her breast that pt has not received treatment for.       Suzette Pérez RN  03/03/22 1921

## 2022-03-04 NOTE — ED NOTES
Large lump on R breast.  Pt states she has a tumor and has not received any treatment for it.       Suzette Levine RN  03/04/22 7259

## 2022-03-04 NOTE — PROGRESS NOTES
NEUROSURGERY CONSULT NOTE    Sonali Hill  [id number]   1975   3/4/2022    Requesting physician: Verito Buckner MD    Reason for consultation: Brain mass    History of present illness: Patient is a 48 y.o. female w/ PMH of invasive ductal cell carcinoma stage IIB (dx March 2021) who presented to [facility] on 3/3/22 with complaints of memory deficits and AMS over the last week. Patient mother was at the bedside who assisted in history taking. Last Thursday (2/24), the patient was sitting at the breakfast table with her mother when she experienced an abrupt onset of a severe, posterior headache, as if someone hit her in the back of the head. For the rest of the day, her mother states the patient was not acting herself. The patient went to bed that night and upon awakening he next morning, she was having difficulty reading, writing, speaking and recalling recent events. She did not experience any lateralizing weakness, gait disturbances, vision changes or falls. These symptoms progressed throughout the week, prompting her to finally seek medical evaluation. Upon arrival to the ED she was having significant difficulty explaining her symptoms. CT of the head exhibited abnormal edema in the left posterior cerebral hemisphere possibly indicating underlying metastases vs. primary glioma. There was a 1.4cm left to right midline shift with cisternal effacement and downward transtentorial herniation and findings suggestive of hydrocephalus. She was started on Keppra and Decadron and transferred to [place] [address] for further neurosurgical evaluation. Also started on IV rocephin for abnormal urinalysis. After arrival to [place] [address], CT of the chest abdomen and pelvis was obtained which was indicative of a large, necrotic right breast mass but no other acute findings. Currently, she is having difficulty recalling her name and recent events.  Otherwise she has no focal deficits on exam.     Of note, pt has infusion  25 mL IntraVENous PRN Tenzin Velázquez MD        enoxaparin (LOVENOX) injection 40 mg  40 mg SubCUTAneous Daily Tenzin Velázquez MD   40 mg at 03/04/22 0959    ondansetron (ZOFRAN-ODT) disintegrating tablet 4 mg  4 mg Oral Q8H PRN Tenzin Velázquez MD        Or    on[facility]ron [facility]) injection 4 mg  4 mg IntraVENous Q6H PRN Tenzin Velázquez MD        polyethylene glycol (GLYCOLAX) packet 17 g  17 g Oral Daily PRN Tenzin Velázquez MD        acetaminophen (TYLENOL) tablet 650 mg  650 mg Oral Q6H PRN Tenzin Velázquez MD   650 mg at 03/04/22 1007    Or    acetaminophen (TYLENOL) suppository 650 mg  650 mg Rectal Q6H PRN Tenzin Velázquez MD        famotidine (PEPCID) tablet 20 mg  20 mg Oral BID Tenzin Velázquez MD   20 mg at 03/04/22 0958        Objective:  BP (!) 125/91   Pulse 93   Temp 98.5 °F (36.9 °C) (Oral)   Resp 14   SpO2 96%     Physical Exam:   Patient seen and examined  GCS:  4 - Opens eyes on own  5 - Alert and oriented  6 - Follows simple motor commands  General: Well developed. Alert and cooperative in no acute distress. HENT: atraumatic, neck supple  Eyes: Optic discs: Not tested  Pulmonary: Unlabored respiratory effort  Cardiovascular:  Warm well perfused. No peripheral edema  Gastrointestinal: Abdomen soft, NT, ND     Neurological:  Mental Status: Awake, alert, speech clear. Difficulty recalling name and place. Oriented to time.    Attention: intact but with some short term memory loss   Language: Receptive aphasia   Sensation: Intact to all extremities to light touch  Coordination: Intact    Cranial Nerves:  II: Visual acuity not tested, denies new visual changes / diplopia  III, IV, VI: PERRL, 3 mm bilaterally, EOMI, no nystagmus noted  V: Facial sensation intact bilaterally to touch  VII: Face symmetric  VIII: Hearing intact bilaterally to spoken voice  IX: Palate movement equal bilaterally  XI: Shoulder shrug equal bilaterally  XII: Tongue midline    Musculoskeletal:   Gait: Not tested   Assist devices: None   Tone: strong in all four extremities   Motor strength:    Right  Left    Right  Left    Deltoid  5 5  Hip Flex  5 5   Biceps  5 5  Knee Extensors  5 5   Triceps  5 5  Knee Flexors  5 5   Wrist Ext  5 5  Ankle Dorsiflex. 5 5   Wrist Flex  5 5  Ankle Plantarflex. 5 5   Handgrip  5 5  Ext Jake Longus  5 5   Thumb Ext  5 5         Radiological Findings:  CT head without contrast: 03/03/2022; 0824  Abnormal edema left posterior cerebral hemisphere possibly related to   underlying metastases versus less likely primary glioma.  This causes 1.4 cm   of left-to-right midline shift with cisternal effacement and downward   transtentorial herniation and findings of developing hydrocephalus.       Hyperdensity left parasagittal occipital lobe could represent area of   calcification however small focus of blood products will not be totally   excluded. CTA head and neck with contrast: 03/03/2022; 0844  Negative for large vessel occlusion or hemodynamic stenosis. CT chest, abdomen and pelvis with contrast: 03/04/2022; 0847  Large, necrotic right breast mass. Right axillary lymphadenopathy.       Uterine fibroids.       High density urine in the bladder. Labs:  Recent Labs     03/03/22 1942   WBC 7.7   HGB 12.5   HCT 37.9          Recent Labs     03/03/22 1941      K 3.9      CO2 20*   BUN 12   CREATININE 0.7   GLUCOSE 102*   CALCIUM 9.3       No results for input(s): PROTIME, INR, APTT in the last 72 hours. Patient Active Problem List    Diagnosis Date Noted    Brain metastases ([facility] [address] 03/04/2022    Brain mass 03/04/2022    Thyroid nodule 06/23/2015       Assessment:  Patient is a 48 y.o. female w/ significant history of untreated invasive breast cancer who was found to have likely left brain metastases on CT scan. Further imaging required to determine plan of care going forward. Plan:  1. No emergent neurosurgical intervention indicated  2.  Neurologic exams frequency: Q1  3. For change in exam MUST contact neurosurgery team along with critical care or primary team  4. Brain Mass:  - MRI Brain w wo to assist with establish likely diagnosis  5. Cerebral edema:  - Keep Na within normal limits  - Decadron  6mg Q6H  - Keep HOB >30 degrees  - NO dextrose in IVF's or in IV drips  6. Seizure prophylaxis: Keppra 1000mg BID  7. GI Prophylaxis: PPI  8. DVT Prophylaxis: Lovenox, SCD's   9. Pain: Managed by medical team  10. Speech consulted for swallow eval, appreciate recs  11. PT/OT consulted, appreciate recs  12. Advance diet / activity per primary team  13. Palliative care  14. Appreciate critical care team assistance in management  15. Thank you for consult. Will follow inpatient. Please call with any questions or decline in neurological status    DISPO: Remain inpatient from neurosurgery standpoint. Patient was seen and examined with Dr. Neyda Liao who agrees with above assessment and plan. Electronically signed by:  Ashley Vernon, Nurse Practitioner Student, [facility], 3/4/2022 11:30 AM I have seen and personally examined the patient, I have verified all portions of the history and all objective data are accurately documented, and I agree with the assessment and plan documented by Will.  [phone]

## 2022-03-04 NOTE — ED NOTES
Report given to [facility] ICU RN Tanvir Briscoe to assume care. All questions answered, SBAR discussed.       Suzette Manzo RN  03/04/22 5214

## 2022-03-04 NOTE — ED PROVIDER NOTES
[address] [facility]      Pt Name: Francis Marina  MRN: [id number]  [place] 1975  Date of evaluation: 3/3/2022  Provider: Tania Maldonado DO    CHIEF COMPLAINT  Chief Complaint   Patient presents with    Memory Loss     pt family states memory issues for over a week. pt states she does not know name       I wore personal protective equipment when I was in the room the entire time. This includes gloves, N95 mask, face shield, and a glove over my stethoscope for protection. HPI  Francis Marina is a 48 y.o. female who presents with complaint of memory deficits from home. This started 1 week ago. She has had generalized weakness. She has a history of breast cancer and elected not to have it treated. Mother denies any lateralizing weakness and patient denies any paresthesias. Nothing makes it better or worse. She describes it as moderate. Patient can answer minimal questions because of her confusion and memory problems. She is unable to read or write due to her memory problems. ? REVIEW OF SYSTEMS  All systems negative except as noted in the HPI. Reviewed Nurses' notes and concur. History limited due to . No LMP recorded. PAST MEDICAL HISTORY  Past Medical History:   Diagnosis Date    Thyroid nodule        FAMILY HISTORY  Family History   Problem Relation Age of Onset    Cancer Father        SOCIAL HISTORY   reports that she has never smoked. She has never used smokeless tobacco. She reports that she does not drink alcohol and does not use drugs. SURGICAL HISTORY  History reviewed. No pertinent surgical history. CURRENT MEDICATIONS      ALLERGIES  No Known Allergies    Tetanus vaccination status reviewed: tetanus re-vaccination not indicated.     PHYSICAL EXAM  VITAL SIGNS: /76   Pulse 104   Temp 98.5 °F (36.9 °C) (Oral)   Resp 18   Ht 5' 3\" (1.6 m)   Wt 189 lb 13.1 oz (86.1 kg)   SpO2 96%   BMI 33.62 kg/m²   Constitutional: Well-developed, well-nourished, appears mildly confused but otherwise normal, nontoxic, activity: Resting comfortably on the cart, no obvious pain, appears confused and mildly angry. HENT: Normocephalic, Atraumatic, Bilateral external ears normal, TM's were normal, Mucus membranes are moist and oropharynx is patent and clear, No oral exudates, Nose normal, No lingual abrasions, no lingual lacerations, no lingual contusions. Eyes: PERRLA, EOMI, Conjunctiva normal, No discharge. No scleral icterus. Neck: Normal range of motion, No tenderness, Supple. Lymphatic: No lymphadenopathy noted. Cardiovascular: Normal heart rate, Normal rhythm, no murmurs, no gallops, no rubs. Thorax & Lungs: Normal breath sounds, no respiratory distress, no wheezing, no rales, no rhonchi  Abdomen: Soft, Nontender, No hepatosplenomegaly, No masses, No pulsatile masses, No distension, normal bowel sounds  Skin: Warm, Dry, No erythema, No rash. Extremities: No edema, No tenderness, No cyanosis, No clubbing. No amputations, capillary refill less than 2 seconds. Musculoskeletal:  No tenderness to palpation, no major deformities noted. Neurologic: Alert & oriented x 3, CN II through XII are intact, normal motor function, normal sensory function, no focal deficits noted, no clonus, no tremors.   Psychiatric: Affect normal, Mood normal.      LABORATORY  Labs Reviewed   COMPREHENSIVE METABOLIC PANEL W/ REFLEX TO MG FOR LOW K - Abnormal; Notable for the following components:       Result Value    CO2 20 (*)     Glucose 102 (*)     Albumin/Globulin Ratio 1.0 (*)     All other components within normal limits   URINALYSIS WITH REFLEX TO CULTURE - Abnormal; Notable for the following components:    Color, UA RED (*)     Clarity, UA CLOUDY (*)     Ketones, Urine TRACE (*)     Blood, Urine LARGE (*)     All other components within normal limits   MICROSCOPIC URINALYSIS - Abnormal; Notable for the following components: WBC, UA 18 (*)     RBC, UA >900 (*)     All other components within normal limits   CULTURE, URINE   CBC WITH AUTO DIFFERENTIAL   TROPONIN   HCG, SERUM, QUALITATIVE   LACTIC ACID   POCT GLUCOSE       RADIOLOGY/PROCEDURES  I personally reviewed the images for this case. CTA HEAD NECK W CONTRAST   Final Result   Negative for large vessel occlusion or hemodynamic stenosis. CT HEAD WO CONTRAST   Final Result   Abnormal edema left posterior cerebral hemisphere possibly related to   underlying metastases versus less likely primary glioma. This causes 1.4 cm   of left-to-right midline shift with cisternal effacement and downward   transtentorial herniation and findings of developing hydrocephalus. Hyperdensity left parasagittal occipital lobe could represent area of   calcification however small focus of blood products will not be totally   excluded. Neuro surgical consultation recommended      Critical results were called by Dr. Georges Cunningham to Latisha BOURNE on   3/3/2022 at 21:07. COURSE & MEDICAL DECISION MAKING  Pertinent Labs, EKG, & Imaging studies reviewed. (See chart for details)    Vitals:    03/04/22 0230 03/04/22 0300 03/04/22 0530 03/04/22 0600   BP: 131/75 (!) 128/95 99/66 113/76   Pulse: 95 102 94 104   Resp: 17 22 26 18   Temp:   98.5 °F (36.9 °C)    TempSrc:   Oral    SpO2: 96% 96% 95% 96%   Weight:       Height:           Medications   iopamidol (ISOVUE-370) 76 % injection 75 mL (75 mLs IntraVENous Given 3/3/22 2044)   dexamethasone (PF) (DECADRON) injection 10 mg (10 mg IntraVENous Given 3/3/22 2150)   mannitol 20 % IVPB 25 g (0 g IntraVENous Stopped 3/4/22 0636)   cefTRIAXone (ROCEPHIN) 1000 mg IVPB in 50 mL D5W minibag (0 mg IntraVENous Stopped 3/4/22 0602)       There are no discharge medications for this patient. SEP-1 CORE MEASURE DATA  Exclusion criteria: the patient is NOT to be included for sepsis due to:   Infection is not suspected    Patient remained stable in the ED. her CT scan revealed metastatic cancer to her brain with edema. Therefore, we spoke to the patient and family and they elected to be admitted and be treated. Therefore, patient was transferred to [facility] for further evaluation and treatment of her breast cancer and brain metastasis. Patient remained stable throughout her emergency department stay. She was able to ambulate to the restroom. The physician assistant notified the neurosurgeon and hospitalist at [facility].    The patient's blood pressure was found to be elevated according to CMS/Medicare and the Affordable Care Act/ObamaCare criteria. Elevated blood pressure could occur because of pain or anxiety or other reasons and does not mean that they need to have their blood pressure treated or medications otherwise adjusted. However, this could also be a sign that they will need to have their blood pressure treated or medications changed. The patient was instructed to follow up closely with their personal physician to have their blood pressure rechecked. The patient was instructed to take a list of recent blood pressure readings to their next visit with their personal physician. (This chart has been completed using [address]. Although attempts have been made to ensure accuracy, words and/or phrases may not be transcribed as intended.)    Patient refused pain medicines at the time of their exam.    IMPRESSION(S):  1. Malignant neoplasm of female breast, unspecified estrogen receptor status, unspecified laterality, unspecified site of breast ([facility] [address]    2. Metastatic malignant neoplasm, unspecified site ([facility] [address]    3.  Acute cystitis with hematuria          Diagnostic considerations include but are not limited to:    Hypoxemia/ischemic encephalopathy, hepatic encephalopathy   Seizure or postictal state   Alterations of glucose such as hypoglycemia and hyperglycemia    Alterations in perfusions such as hypotension and hypoperfusion    Alterations in electrolytes such as disturbances in sodium or calcium   Infectious processes such as sepsis from a pneumonia or urinary tract infection    Substance use or withdrawal, especially alcohol and drugs    Medication adverse event or interaction    Vitamin deficiencies such as Wernicke's encephalopathy    CNS lesion, injury, infection (CVA, subdural hematoma, meningitis, encephalitis)    Alterations in hormones such as thyroid or adrenal abnormalities    Alterations in cardiac functioning such as arrhythmia, MI or CHF    Alteration in temperature such as hyperthermia or hypothermia    Dehydration, sleep deprivation   Change in medical regimen    Alteration in lifestyle, environment, or personal relationships       Mignon Craft DO  03/04/22 0249

## 2022-03-04 NOTE — PROGRESS NOTES
Attempted to call patient's parent, Hailee Ramírez, unable to reach at this time.     Stephan Mendez MD PGY-1

## 2022-03-05 ENCOUNTER — APPOINTMENT (OUTPATIENT)
Dept: MRI IMAGING | Age: 47
DRG: 021 | End: 2022-03-05
Attending: RADIOLOGY
Payer: MEDICAID

## 2022-03-05 LAB
ANION GAP SERPL CALCULATED.3IONS-SCNC: 13 MMOL/L (ref 3–16)
BASOPHILS ABSOLUTE: 0.1 K/UL (ref 0–0.2)
BASOPHILS RELATIVE PERCENT: 0.4 %
BUN BLDV-MCNC: 16 MG/DL (ref 7–20)
CALCIUM SERPL-MCNC: 10.1 MG/DL (ref 8.3–10.6)
CHLORIDE BLD-SCNC: 103 MMOL/L (ref 99–110)
CO2: 20 MMOL/L (ref 21–32)
CREAT SERPL-MCNC: 0.7 MG/DL (ref 0.6–1.1)
EOSINOPHILS ABSOLUTE: 0 K/UL (ref 0–0.6)
EOSINOPHILS RELATIVE PERCENT: 0 %
GFR AFRICAN AMERICAN: >60
GFR NON-AFRICAN AMERICAN: >60
GLUCOSE BLD-MCNC: 114 MG/DL (ref 70–99)
GLUCOSE BLD-MCNC: 141 MG/DL (ref 70–99)
GLUCOSE BLD-MCNC: 144 MG/DL (ref 70–99)
GLUCOSE BLD-MCNC: 149 MG/DL (ref 70–99)
GLUCOSE BLD-MCNC: 156 MG/DL (ref 70–99)
HCT VFR BLD CALC: 36.2 % (ref 36–48)
HEMOGLOBIN: 12.3 G/DL (ref 12–16)
LYMPHOCYTES ABSOLUTE: 0.7 K/UL (ref 1–5.1)
LYMPHOCYTES RELATIVE PERCENT: 3.5 %
MCH RBC QN AUTO: 27.9 PG (ref 26–34)
MCHC RBC AUTO-ENTMCNC: 33.9 G/DL (ref 31–36)
MCV RBC AUTO: 82.5 FL (ref 80–100)
MONOCYTES ABSOLUTE: 0.4 K/UL (ref 0–1.3)
MONOCYTES RELATIVE PERCENT: 1.8 %
NEUTROPHILS ABSOLUTE: 19.3 K/UL (ref 1.7–7.7)
NEUTROPHILS RELATIVE PERCENT: 94.3 %
PDW BLD-RTO: 14.3 % (ref 12.4–15.4)
PERFORMED ON: ABNORMAL
PLATELET # BLD: 446 K/UL (ref 135–450)
PMV BLD AUTO: 7.6 FL (ref 5–10.5)
POTASSIUM REFLEX MAGNESIUM: 4.9 MMOL/L (ref 3.5–5.1)
RBC # BLD: 4.39 M/UL (ref 4–5.2)
SODIUM BLD-SCNC: 136 MMOL/L (ref 136–145)
WBC # BLD: 20.4 K/UL (ref 4–11)

## 2022-03-05 PROCEDURE — 2000000000 HC ICU R&B

## 2022-03-05 PROCEDURE — 36415 COLL VENOUS BLD VENIPUNCTURE: CPT

## 2022-03-05 PROCEDURE — 2580000003 HC RX 258: Performed by: STUDENT IN AN ORGANIZED HEALTH CARE EDUCATION/TRAINING PROGRAM

## 2022-03-05 PROCEDURE — 6360000002 HC RX W HCPCS: Performed by: STUDENT IN AN ORGANIZED HEALTH CARE EDUCATION/TRAINING PROGRAM

## 2022-03-05 PROCEDURE — 85025 COMPLETE CBC W/AUTO DIFF WBC: CPT

## 2022-03-05 PROCEDURE — 6370000000 HC RX 637 (ALT 250 FOR IP): Performed by: NURSE PRACTITIONER

## 2022-03-05 PROCEDURE — 80048 BASIC METABOLIC PNL TOTAL CA: CPT

## 2022-03-05 PROCEDURE — 6360000004 HC RX CONTRAST MEDICATION: Performed by: STUDENT IN AN ORGANIZED HEALTH CARE EDUCATION/TRAINING PROGRAM

## 2022-03-05 PROCEDURE — A9579 GAD-BASE MR CONTRAST NOS,1ML: HCPCS | Performed by: STUDENT IN AN ORGANIZED HEALTH CARE EDUCATION/TRAINING PROGRAM

## 2022-03-05 PROCEDURE — 6370000000 HC RX 637 (ALT 250 FOR IP): Performed by: STUDENT IN AN ORGANIZED HEALTH CARE EDUCATION/TRAINING PROGRAM

## 2022-03-05 PROCEDURE — 70553 MRI BRAIN STEM W/O & W/DYE: CPT

## 2022-03-05 PROCEDURE — 99233 SBSQ HOSP IP/OBS HIGH 50: CPT | Performed by: INTERNAL MEDICINE

## 2022-03-05 RX ADMIN — LEVETIRACETAM 1000 MG: 100 INJECTION, SOLUTION INTRAVENOUS at 12:44

## 2022-03-05 RX ADMIN — SODIUM CHLORIDE, PRESERVATIVE FREE 10 ML: 5 INJECTION INTRAVENOUS at 21:30

## 2022-03-05 RX ADMIN — SODIUM CHLORIDE, PRESERVATIVE FREE 10 ML: 5 INJECTION INTRAVENOUS at 08:19

## 2022-03-05 RX ADMIN — ENOXAPARIN SODIUM 40 MG: 100 INJECTION SUBCUTANEOUS at 08:20

## 2022-03-05 RX ADMIN — ACETAMINOPHEN 650 MG: 325 TABLET ORAL at 05:49

## 2022-03-05 RX ADMIN — GADOTERIDOL 18 ML: 279.3 INJECTION, SOLUTION INTRAVENOUS at 00:41

## 2022-03-05 RX ADMIN — DEXAMETHASONE SODIUM PHOSPHATE 6 MG: 4 INJECTION, SOLUTION INTRA-ARTICULAR; INTRALESIONAL; INTRAMUSCULAR; INTRAVENOUS; SOFT TISSUE at 17:27

## 2022-03-05 RX ADMIN — DEXAMETHASONE SODIUM PHOSPHATE 6 MG: 4 INJECTION, SOLUTION INTRA-ARTICULAR; INTRALESIONAL; INTRAMUSCULAR; INTRAVENOUS; SOFT TISSUE at 12:39

## 2022-03-05 RX ADMIN — LEVETIRACETAM 1000 MG: 100 INJECTION, SOLUTION INTRAVENOUS at 00:10

## 2022-03-05 RX ADMIN — PANTOPRAZOLE SODIUM 40 MG: 40 TABLET, DELAYED RELEASE ORAL at 06:45

## 2022-03-05 RX ADMIN — DEXAMETHASONE SODIUM PHOSPHATE 6 MG: 4 INJECTION, SOLUTION INTRA-ARTICULAR; INTRALESIONAL; INTRAMUSCULAR; INTRAVENOUS; SOFT TISSUE at 05:50

## 2022-03-05 ASSESSMENT — PAIN SCALES - GENERAL
PAINLEVEL_OUTOF10: 0
PAINLEVEL_OUTOF10: 3
PAINLEVEL_OUTOF10: 0

## 2022-03-05 NOTE — PROGRESS NOTES
ICU Progress Note    Admit Date: 3/4/2022  Day: 1  Vent Day: None  IV Access:Peripheral  IV Fluids:None  Vasopressors:None                Antibiotics: None  Diet: ADULT DIET; Regular    CC: AMS, Headache    Interval history:   NAONE. Patient was seen and examined this am. Afebrile, hemodynamically stable, on RA. AOx2. Reports she still feels confused. Denies any HA, weakness, numbness, CP, SOB, palpitations, N/V or urinary symptoms. HPI:   Ambrosio Ho is a 47 y/o F w/ PMH of Stage IIB invasive ductal carcinoma (ER-, OR-, HER2 -) diagnosed 3/30 who presented to [facility] w/ one week of altered mental status. At presentation at [facility] patient was having trouble giving hx and presented w/ mother who helped providing hx. About one week ago, the patient was reading and had sudden occipital headache which which resolved. Subsequently, patient started having difficulty reading, writing, and with her memory. These symptoms persisted which caused the patient to present to ED. Of note the patient found a breast mass on self exam in 2021. Biopsy on 3/23 found Stage IIB invasive ductal carcinoma (Grade 3, ER-, OR-,HER2-). [facility] by Dr. Praful Ridley and denied that mass was cancer despite positive biopsy. She denied systemic therapy at that time. The patient's sister had recently  of breast cancer after treatment w/ chemotherapy. On presentation to [facility] patient was hemodynamically stable. CT head showed abnormal edema in left posterior cerebral hemisphere possibly related to underlying metastases versus less likely primary glioma w/ 1.4 cm of left to right midline shift w/ cisternal effacement and downward transtentorial herniation and findings of developing hydrocephalus. CTA negative for large vessel occlusion or hemodynamic stenosis. Patient denies chest pain, SOB, abdominal pain, fevers, chills, nausea, and vomiting.  Patient reported bloody discharge from breast tumor and difficulty w/ memory and reading. Patient was transferred to [facility] for further workup and care. Patient was transferred w/ intention to seek further treatment. Medications:     Scheduled Meds:   dexamethasone  6 mg IntraVENous Q6H    levetiracetam  1,000 mg IntraVENous Q12H    sodium chloride flush  5-40 mL IntraVENous 2 times per day    enoxaparin  40 mg SubCUTAneous Daily    pantoprazole  40 mg Oral QAM AC     Continuous Infusions:   sodium chloride       PRN Meds:sodium chloride flush, sodium chloride, ondansetron **OR** ondansetron, polyethylene glycol, acetaminophen **OR** acetaminophen    Objective:   Vitals:   T-max:  Patient Vitals for the past 8 hrs:   BP Temp Temp src Pulse Resp SpO2 Weight   03/05/22 0600 110/77 -- -- 83 18 100 % --   03/05/22 0545 -- -- -- -- -- -- 188 lb 7.9 oz (85.5 kg)   03/05/22 0500 108/67 -- -- 88 16 95 % --   03/05/22 0400 98/66 98.7 °F (37.1 °C) Oral 81 16 97 % --   03/05/22 0300 99/61 -- -- 81 17 96 % --   03/05/22 0200 103/61 -- -- 86 18 95 % --   03/05/22 0000 -- -- -- 95 16 96 % --   03/04/22 2344 -- 98.3 °F (36.8 °C) Oral -- -- 98 % --       Intake/Output Summary (Last 24 hours) at 3/5/2022 0731  Last data filed at 3/4/2022 2000  Gross per 24 hour   Intake 580 ml   Output 0 ml   Net 580 ml       Review of Systems - as above    Physical Exam  Constitutional:       General: She is not in acute distress. HENT:      Head: Normocephalic and atraumatic. Cardiovascular:      Rate and Rhythm: Normal rate and regular rhythm. Heart sounds: No murmur heard. No gallop. Pulmonary:      Effort: No respiratory distress. Breath sounds: No wheezing or rales. Chest:      Comments: Large breast cancer mass  Abdominal:      General: There is no distension. Tenderness: There is no abdominal tenderness. There is no guarding. Musculoskeletal:      Right lower leg: No edema. Left lower leg: No edema.    Neurological:      General: No focal deficit present. Comments: AOx2. Follows simple commands. LABS:    CBC:   Recent Labs     03/03/22 1942 03/05/22 0519   WBC 7.7 20.4*   HGB 12.5 12.3   HCT 37.9 36.2    446   MCV 82.9 82.5     Renal:    Recent Labs     03/03/22 1941 03/05/22 0519    136   K 3.9 4.9    103   CO2 20* 20*   BUN 12 16   CREATININE 0.7 0.7   GLUCOSE 102* 144*   CALCIUM 9.3 10.1   ANIONGAP 14 13     Hepatic:   Recent Labs     03/03/22 1941   AST 17   ALT 14   BILITOT 0.3   PROT 8.0   LABALBU 4.0   ALKPHOS 61     Troponin:   Recent Labs     03/03/22 1941   TROPONINI <0.01     BNP: No results for input(s): BNP in the last 72 hours. Lipids: No results for input(s): CHOL, HDL in the last 72 hours. Invalid input(s): LDLCALCU, TRIGLYCERIDE  ABGs:  No results for input(s): PHART, QOS9AQM, PO2ART, LIH9LWN, BEART, THGBART, S1YDEMKH, FLJ1YBM in the last 72 hours. INR: No results for input(s): INR in the last 72 hours. Lactate: No results for input(s): LACTATE in the last 72 hours. Cultures:  -----------------------------------------------------------------  RAD:   CT CHEST ABDOMEN PELVIS W CONTRAST   Final Result      Large, necrotic right breast mass. Right axillary lymphadenopathy. Uterine fibroids. High density urine in the bladder. MRI BRAIN W WO CONTRAST    (Results Pending)         Assessment/Plan:     Zenobia Richardson is a 48 y.o. female w/ PMH of Stage IIB invasive ductal carcinoma (ER-, AR-, HER2 -) diagnosed 3/30/2021 presented to [facility] w/ one week of altered mental status.     Encephalopathy  Edema left posterior cerebral hemisphere   Likely due to underlying metastasis. Hx of Stage IIB invasive ductal carcinoma (ER-, AR-, and HER2-) diagnosed 3/30/2021. Refused treatment at time. Presented w/ headache 1 wks ago w/ persistent memory, reading, and writing difficulties.  No hx of seizures.  (3/4) CT Head w/o contrast: Abnormal edema left posterior cerebral hemisphere possibly related to underlying metastases versus less likely primary glioma. Causes 1.4 cm of left-to-right midline shift w/ cisternal effacement and downward transtentorial herniation and findings of developing hydropcephalus.   (3/4) CTA: Negative for large vessel occlusion or hemodynamic stenosis. CT chest/abd/pelvis with large, necrotic right breast mass. Right axillary lymphadenopathy. -MRI read pending  -q1 hr neurochecks  -Decadron 6 mg q 6hrs  -Keppra 1 g BID  -No indication for NSGY at this time  -PT/OT/SLP  -Consults: NSGY, Oncology, Palliative Care     Hematuria  Red urine w/ greater than 900 Rbcs. No infection. Could be secondary to metastatic breast CA. Code Status: Full Code  FEN: ADULT DIET; Regular  PPX: Lovenox, Pepcid  DISPO: ICU    Aziza Reynolds MD, PGY-1  03/05/22  7:31 AM    This patient has been staffed and discussed with Dr. Blanca Gaitan MD.     Patient seen, examined and discussed with the resident and I agree with the assessment and plan as edited above. Patient still having memory issues but is overall improved neurologically on the decadron. She remains remarkably blase about having terminal breast cancer with metastases to the brain. She has a giant fungating tumor on her right breast that has been getting gradually larger for the last year and did nothing about it, even it was diagnosed. She has no explanation as to why. Now she has two large tumors in her brain with significant edema and midline shift and she is undecided and seemingly ambivalent about whether to treat it or not. She did identify her mother as her surrogate decision maker. From my perspective I had a long talk with her about life support and whether or not she would want it if/when things go poorly. She wants to speak to her mother about it, but also indicated that she doesn't feel like her life is about to end. It's not that she can't believe it, she just doesn't believe it.   And either way, she's not afraid that it's true. She did say she wasn't interested in going to hospice. Some decisions need to be made by she and/or her mother. She shouldn't refuse treatment and remain full code. In truth it would make the most sense to accept at least palliative treatment and be a no code.        Keri Saavedra MD

## 2022-03-05 NOTE — PLAN OF CARE
Problem: Falls - Risk of:  Goal: Will remain free from falls  Description: Will remain free from falls  Outcome: Ongoing  Note: Pt is a fall risk. Fall risk protocol in place. [facility]. Pt bed is in low position, bed alarm is on, side rails up, fall risk bracelet applied. , non-skid footwear in use. Patient/family educated on fall risk protocol, instructed to call for assistance when needed and belongings are in reach. assistance. Will continue with hourly rounds for po intake, pain needs, toileting and repositioning as needed. Will continue to monitor for needs.      Problem: Falls - Risk of:  Goal: Absence of physical injury  Description: Absence of physical injury  Outcome: Ongoing

## 2022-03-05 NOTE — CONSULTS
NEUROSURGERY CONSULT NOTE    Pily Bae  [id number]   1975   3/4/2022    Requesting physician: Beba Orta MD    Reason for consultation: Brain mass    History of present illness: Patient is a 48 y.o. female w/ PMH of invasive ductal cell carcinoma stage IIB (dx March 2021) who presented to [facility] on 3/3/22 with complaints of memory deficits and AMS over the last week. Patient mother was at the bedside who assisted in history taking. Last Thursday (2/24), the patient was sitting at the breakfast table with her mother when she experienced an abrupt onset of a severe, posterior headache, as if someone hit her in the back of the head. For the rest of the day, her mother states the patient was not acting herself. The patient went to bed that night and upon awakening he next morning, she was having difficulty reading, writing, speaking and recalling recent events. She did not experience any lateralizing weakness, gait disturbances, vision changes or falls. These symptoms progressed throughout the week, prompting her to finally seek medical evaluation. Upon arrival to the ED she was having significant difficulty explaining her symptoms. CT of the head exhibited abnormal edema in the left posterior cerebral hemisphere possibly indicating underlying metastases vs. primary glioma. There was a 1.4cm left to right midline shift with cisternal effacement and downward transtentorial herniation and findings suggestive of hydrocephalus. She was started on Keppra and Decadron and transferred to [facility] for further neurosurgical evaluation. Also started on IV rocephin for abnormal urinalysis. After arrival to [facility], CT of the chest abdomen and pelvis was obtained which was indicative of a large, necrotic right breast mass but no other acute findings. Currently, she is having difficulty recalling her name and recent events.  Otherwise she has no focal deficits on exam.     Of note, pt has a family history of breast cancer in her sister who  of this illness after receiving chemotherapy at the age of 46. Patient has known about her own breast cancer diagnosis since march of last year and elected not to pursue treatment. ROS:   GENERAL:  Denies fever or recent illness. Denies weight changes +poor appetite   EYES:  Denies vision change or diplopia  EARS:  Denies hearing loss  CARDIAC:  Denies chest pain  RESPIRATORY:  Denies shortness of breath  SKIN:  + large, protruding right breast mass   HEM:  Denies excessive bruising  PSYCH:  Denies anxiety or depression  NEURO:  + headache. Denies numbness or tingling or lateralizing weakness   :  Denies urinary difficulty   GI: Denies nausea, vomiting, diarrhea. + constipation  MUSCULOSKELETAL:  No arthralgias    No Known Allergies    Past Medical History:   Diagnosis Date    Thyroid nodule         No past surgical history on file. Social History     Occupational History    Not on file   Tobacco Use    Smoking status: Never Smoker    Smokeless tobacco: Never Used   Substance and Sexual Activity    Alcohol use: Never     Alcohol/week: 0.0 standard drinks    Drug use: Never    Sexual activity: Not on file        Family History   Problem Relation Age of Onset    Cancer Father         No outpatient medications have been marked as taking for the 3/4/22 encounter [facility] Encounter).         Current Facility-Administered Medications   Medication Dose Route Frequency Provider Last Rate Last Admin    dexamethasone (DECADRON) injection 6 mg  6 mg IntraVENous Q6H Jelly Garcia MD   6 mg at 22 1744    levETIRAcetam (KEPPRA) 1,000 mg in sodium chloride 0.9 % 100 mL IVPB  1,000 mg IntraVENous Q12H Jelly Garcia MD   Stopped at 22 1252    sodium chloride flush 0.9 % injection 5-40 mL  5-40 mL IntraVENous 2 times per day Jelly Garcia MD   10 mL at 22 1000    sodium chloride flush 0.9 % injection 5-40 mL  5-40 mL IntraVENous PRN Negra Garcia Levon Tinajero MD        0.9 % sodium chloride infusion  25 mL IntraVENous PRN Salma Palencia MD        enoxaparin (LOVENOX) injection 40 mg  40 mg SubCUTAneous Daily Salma Palencia MD   40 mg at 03/04/22 0959    ondansetron (ZOFRAN-ODT) disintegrating tablet 4 mg  4 mg Oral Q8H PRN Salma Palencia MD        Or    ondansetron [facility]) injection 4 mg  4 mg IntraVENous Q6H PRN Salma Palencia MD        polyethylene glycol (GLYCOLAX) packet 17 g  17 g Oral Daily PRN Salma Palencia MD        acetaminophen (TYLENOL) tablet 650 mg  650 mg Oral Q6H PRN Salma Palencia MD   650 mg at 03/04/22 1007    Or    acetaminophen (TYLENOL) suppository 650 mg  650 mg Rectal Q6H PRN Salma Palencia MD        [START ON 3/5/2022] pantoprazole (PROTONIX) tablet 40 mg  40 mg Oral QAM AC Pooja Swan APRN - CNP            Objective:  /86   Pulse 106   Temp 98.4 °F (36.9 °C) (Oral)   Resp 24   SpO2 96%     Physical Exam:   Patient seen and examined  GCS:  4 - Opens eyes on own  5 - Alert and oriented  6 - Follows simple motor commands  General: Well developed. Alert and cooperative in no acute distress. HENT: atraumatic, neck supple  Eyes: Optic discs: Not tested  Pulmonary: Unlabored respiratory effort  Cardiovascular:  Warm well perfused. No peripheral edema  Gastrointestinal: Abdomen soft, NT, ND     Neurological:  Mental Status: Awake, alert, speech clear. Difficulty recalling name and place. Oriented to time.    Attention: intact but with some short term memory loss   Language: Receptive aphasia   Sensation: Intact to all extremities to light touch  Coordination: Intact    Cranial Nerves:  II: Visual acuity not tested, denies new visual changes / diplopia  III, IV, VI: PERRL, 3 mm bilaterally, EOMI, no nystagmus noted  V: Facial sensation intact bilaterally to touch  VII: Face symmetric  VIII: Hearing intact bilaterally to spoken voice  IX: Palate movement equal bilaterally  XI: Shoulder shrug equal bilaterally  XII: Tongue midline    Musculoskeletal:   Gait: Not tested   Assist devices: None   Tone: strong in all four extremities   Motor strength:    Right  Left    Right  Left    Deltoid  5 5  Hip Flex  5 5   Biceps  5 5  Knee Extensors  5 5   Triceps  5 5  Knee Flexors  5 5   Wrist Ext  5 5  Ankle Dorsiflex. 5 5   Wrist Flex  5 5  Ankle Plantarflex. 5 5   Handgrip  5 5  Ext Jake Longus  5 5   Thumb Ext  5 5         Radiological Findings:    I personally reviewed the patient's imaging which consisted of a CT of the head dated 3/3/2022. This demonstrated extensive cerebral edema and left hemisphere with multiple lesions in the parietal and occipital region causing significant midline shift and early transtentorial herniation. Labs:  Recent Labs     03/03/22 1942   WBC 7.7   HGB 12.5   HCT 37.9          Recent Labs     03/03/22 1941      K 3.9      CO2 20*   BUN 12   CREATININE 0.7   GLUCOSE 102*   CALCIUM 9.3       No results for input(s): PROTIME, INR, APTT in the last 72 hours. Patient Active Problem List    Diagnosis Date Noted    Brain metastases ([facility] [address] 03/04/2022    Brain mass 03/04/2022    Thyroid nodule 06/23/2015       Assessment:  Patient is a 48 y.o. female w/ significant history of untreated invasive breast cancer who was found to have likely left brain metastases on CT scan. Further imaging required to determine plan of care going forward. Plan:  1. Neurologic exams frequency: Q1  2. For change in exam MUST contact neurosurgery team along with critical care or primary team  3. Brain Mass:  - MRI Brain w wo to assist with establish likely diagnosis  - CT chest/abdomen/pelvis  4. Cerebral edema:  - Keep Na within normal limits  - Decadron  6mg Q6H  - PPI / SSI while on Decadron  - Keep HOB >30 degrees  - NO dextrose in IVF's or in IV drips  5. Seizure prophylaxis: Keppra 1000mg BID  6. DVT Prophylaxis: Lovenox, SCD's   7. PT/OT consulted, appreciate recs  8. Palliative care  9.  Thank you for consult. Will follow inpatient.   Please call with any questions or decline in neurological status    Francisco Manriquez MD, PhD  [address], [address], [address], [address] [phone] (c), [phone] (o)

## 2022-03-05 NOTE — CONSULTS
[facility] [address], [address]                                  CONSULTATION    PATIENT NAME: Chago Gardner                :        1975  MED REC NO:   [id number]                          ROOM:       4508  ACCOUNT NO:   [de-identified]                           ADMIT DATE: 2022  PROVIDER:     Oliverio Dickson MD    CONSULT DATE:  2022    ONCOLOGY CONSULTATION    REASON FOR CONSULTATION:  Metastatic breast cancer. CONSULTING PROVIDER:  Corby Sehriff MD    HISTORY OF PRESENT ILLNESS:  The patient is a 57-year-old female with  history of stage IIB triple negative right-sided breast cancer diagnosed  in 2021 at [facility] INGR[facility] GARC[facility], presented to the hospital with an one-week  history of altered mental status. She had been having intermittent  headaches, difficulty writing and reading, and memory issues and was  brought to the emergency room. She does not remember much of this. She  was seen at the [facility] ER and a CT of the head showed one midline  shift and left-sided cerebral edema. She was therefore transferred to  [facility] for neurosurgical support. An MRI done at [facility]. showed a left parietotemporal mass measuring 4.3 cm with  vasogenic edema. There was 10 mm of left-to-right midline shift in the  left lateral ventricle. There was also a separate left parietal  occipital mass measuring 4.2 cm. This has also had vasogenic edema with  mass effect. She is on steroids. Her mental status is improving. CT  scans done to look for metastatic disease also showed a large 10.2 cm  right-sided breast mass with associated axillary lymphadenopathy. There  was a 4 mm lesion on the liver that was undetermined. Oncology was  consulted for further workup and management.   Incidentally, she had a  breast biopsy last 2021 at [facility] INGR[facility] GARCIA and was recommended to start  systemic treatment followed by surgery. She was lost a followup. Her  sister went through a surgery and multiple treatments for her breast  cancer and ultimately  and she decided she did not want to go  through that and never followed up with her oncologist.    PAST MEDICAL HISTORY:  1. Triple negative breast cancer as above. 2.  Thyroid nodule. PAST SURGICAL HISTORY:  None. ALLERGIES:  SHE HAS NO KNOWN DRUG ALLERGIES. CURRENT MEDICATIONS:  She is on no home medications. SOCIAL HISTORY:  She is single. She does not drink or smoke. She has  no children. FAMILY HISTORY:  Her sister  of breast cancer. Her father had  prostate cancer. REVIEW OF SYSTEMS:  She denies any recent fever, chills, sweats, nausea,  vomiting, chest pain, dysphagia, odynophagia, diarrhea, constipation,  hemoptysis, hematemesis, change in vision/hearing/smell/taste, weakness,  neuropathy, skin rashes, productive cough, urinary or bowel prolapse or  incontinence, petechiae, purpura, skin rashes, vaginal bleeding,  pruritus, hallucinations, nasal congestion or drainage, seizures,  strokes, syncope, depression, anxiety, suicidal ideations, melena, or  hematochezia. She has mild amount of fatigue. Her confusion has  improved. Her 10-system review of systems is otherwise negative. PHYSICAL EXAMINATION:  VITAL SIGNS:  She is afebrile with normal vital signs. GENERAL:  She is in no acute distress. HEENT:  Her pupils are round and reactive to light and accommodation. Extraocular muscles are intact. NECK:  She has no jugular venous distention. No thyromegaly. Her  oropharynx is clear. She has no carotid bruits. She has no palpable  cervical, supraclavicular, or axillary lymphadenopathy. HEART:  Regular rate and rhythm. LUNGS:  Clear to auscultation bilaterally. ABDOMEN:  Nondistended, nontender with bowel sounds x4. No  hepatosplenomegaly. EXTREMITIES:  She has no peripheral clubbing, cyanosis, or edema.   NEUROLOGIC: Nonfocal.    LABORATORY DATA:  Her white blood cell count is 20.4, hemoglobin is  12.3, platelets of 612. ASSESSMENT AND PLAN:  History of triple-negative breast cancer, now with  locally advancing disease and two new brain lesions. The brain lesions  are likely metastatic breast cancer. I had a very long discussion with  the patient regarding options. In terms of brain disease, options  include surgical resection versus various radiation techniques. She  would then need systemic therapy for her other disease. I do not see a  significant chance of long-term survival since she likely has  micrometastatic disease elsewhere. However, there is a remote chance  of dealing with the brain disease followed by neoadjuvant chemotherapy  followed by surgery for the right-sided breast cancer. There would be a remote chance of long-term survival from that approach. However, she  could certainly live for potentially several months to years on palliative  chemotherapy after we deal with the brain lesions. She ultimately  decided that she likely will not go through any treatments because she  saw what her sister went through and just does not want to do that. She  is still waiting to make a final decision, however. Thank you for the consultation. We will follow closely.         Louis Reyes MD    D: 03/05/2022 11:36:29       T: 03/05/2022 12:35:55     [email or web address]  Job#: [id number]     Doc#: [id number]    CC:

## 2022-03-05 NOTE — PROGRESS NOTES
Neurosurgery Progress Note    Patient seen and examined on 03/05/22. No acute events overnight. Neurologically stable on exam. MRI with two large left posterior temporal and occipital lesions. A/P: 49 yo woman with triple negative breast ca and intracranial metastases    -Neuro stable  -HOB elevated  -Frequent neuro checks  -Decadron, PPI, SSI  -Keppra for seizure ppx  -Oncology/Rad onc following  -Discussed large intracranial lesions with patient. Management includes 1) observation, 2) surgical resection with adjuvant radiation, or 3) radiation. Given the large size of the lesion and the extent of MLS, recommend proceeding with left temporoparieto-occipital craniotomy for resection of both lesions. Explained to patient that without intervention, would recommend hospice care. Moreover, intervention would only make sense if she proceeded with chemoradiation treatment to breast mass and aimed for systemic control. The patient is aware of the potential benefits of the operation as well as the possible risks including (but not limited to): infection, bleeding, seizures, wound-healing problems, worsened neurologic outcome, need for additional procedures, incomplete resection of the mass, aphasia, hemiparesis, stroke, coma, or even death. The patient understands that untoward events during or after surgery could result in a permanent worsening of quality of life. She will consider her options but surgical resection currently planned for Tuesday 3/8.   -Will follow.     Muna Leon MD, PhD  [address], [address] [place], [place], [address] [phone] (c), [phone] (o)

## 2022-03-05 NOTE — PROGRESS NOTES
Hospitalist Progress Note      PCP: SHAHEED ZAMARRIPA, APRN - CNP    Date of Admission: 3/4/2022    Chief Complaint: short term memory loss    Hospital Course: Came to ED with memory loss. Diagnosed with metastatic breast cancer. Patient has not yet made her decision about treatment of the malignancy. Subjective: Short-term memory loss. No active complaints      Medications:  Reviewed    Infusion Medications    sodium chloride       Scheduled Medications    dexamethasone  6 mg IntraVENous Q6H    levetiracetam  1,000 mg IntraVENous Q12H    sodium chloride flush  5-40 mL IntraVENous 2 times per day    enoxaparin  40 mg SubCUTAneous Daily    pantoprazole  40 mg Oral QAM AC     PRN Meds: sodium chloride flush, sodium chloride, ondansetron **OR** ondansetron, polyethylene glycol, acetaminophen **OR** acetaminophen      Intake/Output Summary (Last 24 hours) at 3/5/2022 1200  Last data filed at 3/5/2022 1026  Gross per 24 hour   Intake 940 ml   Output 0 ml   Net 940 ml       Physical Exam Performed:    /62   Pulse 90   Temp 98.4 °F (36.9 °C) (Oral)   Resp 19   Wt 188 lb 7.9 oz (85.5 kg)   SpO2 99%   BMI 33.39 kg/m²     General appearance: No apparent distress, appears stated age and cooperative. HEENT: Pupils equal, round, and reactive to light. Conjunctivae/corneas clear. Neck: Supple, with full range of motion. No jugular venous distention. Trachea midline. Respiratory:  Normal respiratory effort. Clear to auscultation, bilaterally without Rales/Wheezes/Rhonchi. Cardiovascular: Regular rate and rhythm with normal S1/S2 without murmurs, rubs or gallops. Abdomen: Soft, non-tender, non-distended with normal bowel sounds. Musculoskeletal: No clubbing, cyanosis or edema bilaterally. Full range of motion without deformity. Skin: Right breast mass is known but not visualized today  Neurologic:  Neurovascularly intact without any focal sensory/motor deficits.  Cranial nerves: II-XII intact, grossly non-focal.  Psychiatric: Alert and oriented, thought content appropriate, normal insight  Capillary Refill: Brisk,3 seconds, normal   Peripheral Pulses: +2 palpable, equal bilaterally       Labs:   Recent Labs     03/03/22 1942 03/05/22 0519   WBC 7.7 20.4*   HGB 12.5 12.3   HCT 37.9 36.2    446     Recent Labs     03/03/22 1941 03/05/22 0519    136   K 3.9 4.9    103   CO2 20* 20*   BUN 12 16   CREATININE 0.7 0.7   CALCIUM 9.3 10.1     Recent Labs     03/03/22 1941   AST 17   ALT 14   BILITOT 0.3   ALKPHOS 61     No results for input(s): INR in the last 72 hours. Recent Labs     03/03/22 1941   TROPONINI <0.01       Urinalysis:      Lab Results   Component Value Date    NITRU Negative 03/04/2022    WBCUA 18 03/04/2022    RBCUA >900 03/04/2022    BLOODU LARGE 03/04/2022    SPECGRAV <=1.005 03/04/2022    GLUCOSEU Negative 03/04/2022       Radiology:  MRI BRAIN W WO CONTRAST   Final Result      Two large hemorrhagic partially necrotic heterogeneously enhancing mass in the left parietotemporal and left parieto-occipital region with surrounding vasogenic edema and mass effect. These are indeterminate, most likely relate to multicentric    glioblastoma. However, possibility of metastasis cannot be excluded given the history of breast cancer. 10 mm midline shift to right. CT CHEST ABDOMEN PELVIS W CONTRAST   Final Result      Large, necrotic right breast mass. Right axillary lymphadenopathy. Uterine fibroids. High density urine in the bladder. Assessment/Plan:    Active Hospital Problems    Diagnosis     Brain metastases ([facility] [address] [C79.31]     Brain mass [G93.89]      PLAN    Metastatic breast cancer  Previously diagnosed and treatment declined by the patient.   Oncology following  Palliative care consulted  Patient has not made her decision yet, but tends to prefer comfort care as she is saw her sister receiving aggressive care for breast cancer which did not help. Intracranial vasogenic edema  Secondary to metastatic masses. Symptomatic with short-term memory impairment. Continue prophylactic antiepileptic as well as steroids  Clinically stable    Intracranial metastases from breast cancer  Patient discussed with neurosurgery and oncology. Has not made her decision yet. DVT Prophylaxis: Lovenox  Diet: ADULT DIET; Regular  Code Status: Full Code    PT/OT Eval Status: Consider when patient makes her decision regarding treatment    Dispo -inpatient stay, currently in the ICU.     Oscar Monaco MD

## 2022-03-05 NOTE — CONSULTS
Oncology Consult    See dictation    A/P:  1. Triple negative breast cancer with locally advancing disease and new brain lesions. I reviewed options which would include first dealing with brain disease (surgery or radiation) and then chemotherapy. She is not sure that she wants to do anything at this point. Her sister  of breast cancer despite going through aggressive care and does not want to go through that. She has not made a final decision, however. Thanks for the consult. Will follow closely.     Virginia Reddy MD

## 2022-03-05 NOTE — PROGRESS NOTES
Patient night unremarkable. No base line changes in mentation per Q1 checks and protocol. Will continue to monitor.

## 2022-03-05 NOTE — PROGRESS NOTES
Spoke with patient and mother at bedside to discuss the plan moving forward. Patient continues to be ambivalent as to whether she wants treatment at this time. Mother states she would like to speak with the neurosurgery and oncology teams about treatment options/outcomes before making any decisions. Also discussed code status, patient states she thinks code status discussion is \"a bit dramatic\" at this time. She states she does not understand why the treatment team has mentioned hospice care as an option since she \"feels fine\". She reports she is not interested in going hospice. Decision was ultimately made to continue as full code, and I mentioned that they could change code status at any time.      Pierre Faustin MD, PGY-1

## 2022-03-05 NOTE — PROGRESS NOTES
Patient arterial line partially dislodged, unable to preserve patency, Removed without incident, Dr Davison Serum bedside and made aware, Verbal order to hold potasium IV for now, titrate pressors to cuff pressure, Will continue to monitor.

## 2022-03-06 LAB
ANION GAP SERPL CALCULATED.3IONS-SCNC: 13 MMOL/L (ref 3–16)
BASOPHILS ABSOLUTE: 0 K/UL (ref 0–0.2)
BASOPHILS RELATIVE PERCENT: 0.2 %
BUN BLDV-MCNC: 15 MG/DL (ref 7–20)
CALCIUM SERPL-MCNC: 9.5 MG/DL (ref 8.3–10.6)
CHLORIDE BLD-SCNC: 105 MMOL/L (ref 99–110)
CO2: 22 MMOL/L (ref 21–32)
CREAT SERPL-MCNC: 0.7 MG/DL (ref 0.6–1.1)
EOSINOPHILS ABSOLUTE: 0 K/UL (ref 0–0.6)
EOSINOPHILS RELATIVE PERCENT: 0 %
GFR AFRICAN AMERICAN: >60
GFR NON-AFRICAN AMERICAN: >60
GLUCOSE BLD-MCNC: 132 MG/DL (ref 70–99)
GLUCOSE BLD-MCNC: 139 MG/DL (ref 70–99)
GLUCOSE BLD-MCNC: 143 MG/DL (ref 70–99)
GLUCOSE BLD-MCNC: 152 MG/DL (ref 70–99)
GLUCOSE BLD-MCNC: 157 MG/DL (ref 70–99)
GLUCOSE BLD-MCNC: 175 MG/DL (ref 70–99)
GLUCOSE BLD-MCNC: 243 MG/DL (ref 70–99)
HCT VFR BLD CALC: 34.7 % (ref 36–48)
HEMOGLOBIN: 11.4 G/DL (ref 12–16)
LYMPHOCYTES ABSOLUTE: 0.7 K/UL (ref 1–5.1)
LYMPHOCYTES RELATIVE PERCENT: 3.6 %
MCH RBC QN AUTO: 27.1 PG (ref 26–34)
MCHC RBC AUTO-ENTMCNC: 32.7 G/DL (ref 31–36)
MCV RBC AUTO: 82.8 FL (ref 80–100)
MONOCYTES ABSOLUTE: 0.7 K/UL (ref 0–1.3)
MONOCYTES RELATIVE PERCENT: 3.3 %
NEUTROPHILS ABSOLUTE: 18.5 K/UL (ref 1.7–7.7)
NEUTROPHILS RELATIVE PERCENT: 92.9 %
PDW BLD-RTO: 14.5 % (ref 12.4–15.4)
PERFORMED ON: ABNORMAL
PLATELET # BLD: 465 K/UL (ref 135–450)
PMV BLD AUTO: 8 FL (ref 5–10.5)
POTASSIUM REFLEX MAGNESIUM: 4.3 MMOL/L (ref 3.5–5.1)
RBC # BLD: 4.2 M/UL (ref 4–5.2)
SODIUM BLD-SCNC: 140 MMOL/L (ref 136–145)
WBC # BLD: 19.9 K/UL (ref 4–11)

## 2022-03-06 PROCEDURE — 85025 COMPLETE CBC W/AUTO DIFF WBC: CPT

## 2022-03-06 PROCEDURE — 97161 PT EVAL LOW COMPLEX 20 MIN: CPT

## 2022-03-06 PROCEDURE — 36415 COLL VENOUS BLD VENIPUNCTURE: CPT

## 2022-03-06 PROCEDURE — 97165 OT EVAL LOW COMPLEX 30 MIN: CPT

## 2022-03-06 PROCEDURE — 80048 BASIC METABOLIC PNL TOTAL CA: CPT

## 2022-03-06 PROCEDURE — 2000000000 HC ICU R&B

## 2022-03-06 PROCEDURE — 6360000002 HC RX W HCPCS: Performed by: STUDENT IN AN ORGANIZED HEALTH CARE EDUCATION/TRAINING PROGRAM

## 2022-03-06 PROCEDURE — 6370000000 HC RX 637 (ALT 250 FOR IP): Performed by: NURSE PRACTITIONER

## 2022-03-06 PROCEDURE — 99232 SBSQ HOSP IP/OBS MODERATE 35: CPT | Performed by: INTERNAL MEDICINE

## 2022-03-06 PROCEDURE — 97535 SELF CARE MNGMENT TRAINING: CPT

## 2022-03-06 PROCEDURE — 2580000003 HC RX 258: Performed by: STUDENT IN AN ORGANIZED HEALTH CARE EDUCATION/TRAINING PROGRAM

## 2022-03-06 PROCEDURE — 97116 GAIT TRAINING THERAPY: CPT

## 2022-03-06 RX ADMIN — DEXAMETHASONE SODIUM PHOSPHATE 6 MG: 4 INJECTION, SOLUTION INTRA-ARTICULAR; INTRALESIONAL; INTRAMUSCULAR; INTRAVENOUS; SOFT TISSUE at 18:43

## 2022-03-06 RX ADMIN — DEXAMETHASONE SODIUM PHOSPHATE 6 MG: 4 INJECTION, SOLUTION INTRA-ARTICULAR; INTRALESIONAL; INTRAMUSCULAR; INTRAVENOUS; SOFT TISSUE at 06:41

## 2022-03-06 RX ADMIN — LEVETIRACETAM 1000 MG: 100 INJECTION, SOLUTION INTRAVENOUS at 13:29

## 2022-03-06 RX ADMIN — DEXAMETHASONE SODIUM PHOSPHATE 6 MG: 4 INJECTION, SOLUTION INTRA-ARTICULAR; INTRALESIONAL; INTRAMUSCULAR; INTRAVENOUS; SOFT TISSUE at 13:25

## 2022-03-06 RX ADMIN — LEVETIRACETAM 1000 MG: 100 INJECTION, SOLUTION INTRAVENOUS at 00:37

## 2022-03-06 RX ADMIN — PANTOPRAZOLE SODIUM 40 MG: 40 TABLET, DELAYED RELEASE ORAL at 06:41

## 2022-03-06 RX ADMIN — ENOXAPARIN SODIUM 40 MG: 100 INJECTION SUBCUTANEOUS at 08:29

## 2022-03-06 RX ADMIN — SODIUM CHLORIDE, PRESERVATIVE FREE 10 ML: 5 INJECTION INTRAVENOUS at 20:14

## 2022-03-06 RX ADMIN — SODIUM CHLORIDE, PRESERVATIVE FREE 10 ML: 5 INJECTION INTRAVENOUS at 08:29

## 2022-03-06 RX ADMIN — DEXAMETHASONE SODIUM PHOSPHATE 6 MG: 4 INJECTION, SOLUTION INTRA-ARTICULAR; INTRALESIONAL; INTRAMUSCULAR; INTRAVENOUS; SOFT TISSUE at 00:27

## 2022-03-06 ASSESSMENT — PAIN SCALES - GENERAL
PAINLEVEL_OUTOF10: 0

## 2022-03-06 NOTE — PROGRESS NOTES
Occupational Therapy   Occupational Therapy Initial Assessment/Treatment and Discharge  Date: 3/6/2022   Patient Name: Mckay Russo  MRN: [id number]     : 1975    Date of Service: 3/6/2022    Discharge Recommendations:    Mckay Russo scored a 20/24 on the AM-[facility] ADL Inpatient form. At this time, no further OT is recommended upon discharge. Recommend patient returns to prior setting with prior services. OT Equipment Recommendations  Equipment Needed: No    Assessment   Performance deficits / Impairments: Decreased cognition  Assessment: Pt presents near her baseline for ADLs and mobility. Pt has decreased cognition with decreased problem solving, attention and insight into deficits. Pt requires min VC for sequencing of ADLs. No other neurological deficits noted. Pt will have 24hr assist from mother at d/c. Will sign off OT at this time. Pt may have resection of brain mass during this admission- please re-order OT if indicated  Decision Making: Low Complexity  REQUIRES OT FOLLOW UP: No  Activity Tolerance  Activity Tolerance: Patient Tolerated treatment well  Safety Devices  Safety Devices in place: Yes  Type of devices: Nurse notified; Left in chair;Call light within reach; Chair alarm in place           Patient Diagnosis(es): There were no encounter diagnoses. has a past medical history of Thyroid nodule.   has no past surgical history on file. Restrictions  Position Activity Restriction  Other position/activity restrictions: up with assistance    Subjective   General  Chart Reviewed: Yes  Patient assessed for rehabilitation services?: Yes  Additional Pertinent Hx: Patient is a 49 y/o female admitted 3/3 with altered mental status. CT chest and abdomen (+) for Large, necrotic right breast mass. Right axillary lymphadenopathy.   CT head (+) for Two large hemorrhagic partially necrotic heterogeneously enhancing mass in the left parietotemporal and left parieto-occipital Coordinated: Yes        Bed mobility  Supine to Sit: Modified independent     Transfers  Sit to stand: Independent  Stand to sit: Independent        Cognition  Overall Cognitive Status: Exceptions  Arousal/Alertness: Appropriate responses to stimuli  Following Commands: Follows one step commands with increased time; Follows one step commands with repetition  Attention Span: Attends with cues to redirect  Safety Judgement: Decreased awareness of need for assistance;Decreased awareness of need for safety  Problem Solving: Decreased awareness of errors  Insights: Decreased awareness of deficits  Initiation: Requires cues for some  Sequencing: Requires cues for some  Cognition Comment: decreased working memory and attention to tasks- requires VC for sequencing and task completion during ADLs           Sensation  Overall Sensation Status: WFL         R UE AROM (R hand dominant): WFL  R UE Strength: WFL    L UE AROM: WFL  L UE Strength: WFL                  Treatment consisted of:  ADLs, transfer training, education on activity promotion and fall precautions.         Plan   Plan  Times per week: d/c      AM-PAC Score  AM-[facility] Inpatient Daily Activity Raw Score: 20 (03/06/22 1344)  AM-PAC Inpatient ADL T-Scale Score : 42.03 (03/06/22 1344)  ADL Inpatient CMS 0-100% Score: 38.32 (03/06/22 1344)  ADL Inpatient CMS G-Code Modifier : CJ (03/06/22 1344)      Therapy Time   Individual Concurrent Group Co-treatment   Time In 1135         Time Out 1205         Minutes 30         Timed Code Treatment Minutes: 15 Minutes +15 min katherine Sierra OT

## 2022-03-06 NOTE — PROGRESS NOTES
ICU Progress Note    Admit Date: 3/4/2022  Day: 2  Vent Day: None  IV Access:Peripheral  IV Fluids:None  Vasopressors:None                Antibiotics: None  Diet: ADULT DIET; Regular    CC: AMS, Headache    Interval history:   NAONE. Mother should be coming in today to speak with the patient. Patient was seen and examined this am. Afebrile, hemodynamically stable, on RA. AOx2. Reports she still feels confused. Denies any HA, weakness, numbness, CP, SOB, palpitations, N/V or urinary symptoms. HPI:   Kathleen Slater is a 47 y/o F w/ PMH of Stage IIB invasive ductal carcinoma (ER-, [place]-, HER2 -) diagnosed 3/30 who presented to [facility] w/ one week of altered mental status. At presentation at [facility] patient was having trouble giving hx and presented w/ mother who helped providing hx. About one week ago, the patient was reading and had sudden occipital headache which which resolved. Subsequently, patient started having difficulty reading, writing, and with her memory. These symptoms persisted which caused the patient to present to ED. Of note the patient found a breast mass on self exam in 2021. Biopsy on 3/23 found Stage IIB invasive ductal carcinoma (Grade 3, ER-, CO-,HER2-). Patient was seen on  at [facility] by Dr. Noman Cantu and denied that mass was cancer despite positive biopsy. She denied systemic therapy at that time. The patient's sister had recently  of breast cancer after treatment w/ chemotherapy. [facility] patient was hemodynamically stable. CT head showed abnormal edema in left posterior cerebral hemisphere possibly related to underlying metastases versus less likely primary glioma w/ 1.4 cm of left to right midline shift w/ cisternal effacement and downward transtentorial herniation and findings of developing hydrocephalus. CTA negative for large vessel occlusion or hemodynamic stenosis.  Patient denies chest pain, SOB, abdominal pain, fevers, chills, nausea, and vomiting. Patient reported bloody discharge from breast tumor and difficulty w/ memory and reading. Patient was transferred to [facility] for further workup and care. Patient was transferred w/ intention to seek further treatment. Medications:     Scheduled Meds:   dexamethasone  6 mg IntraVENous Q6H    levetiracetam  1,000 mg IntraVENous Q12H    sodium chloride flush  5-40 mL IntraVENous 2 times per day    enoxaparin  40 mg SubCUTAneous Daily    pantoprazole  40 mg Oral QAM AC     Continuous Infusions:   sodium chloride       PRN Meds:sodium chloride flush, sodium chloride, ondansetron **OR** ondansetron, polyethylene glycol, acetaminophen **OR** acetaminophen    Objective:   Vitals:   T-max:  Patient Vitals for the past 8 hrs:   BP Temp Temp src Pulse Resp SpO2   03/06/22 0700 (!) 106/59 -- -- 73 21 91 %   03/06/22 0600 105/64 -- -- 70 19 92 %   03/06/22 0500 113/71 -- -- 77 19 93 %   03/06/22 0400 107/73 98.4 °F (36.9 °C) Oral 73 19 91 %   03/06/22 0300 108/70 -- -- 78 20 91 %   03/06/22 0200 -- -- -- 76 20 95 %   03/06/22 0100 121/84 -- -- 84 18 100 %       Intake/Output Summary (Last 24 hours) at 3/6/2022 0824  Last data filed at 3/5/2022 2200  Gross per 24 hour   Intake 790 ml   Output 0 ml   Net 790 ml       Review of Systems - as above    Physical Exam  Constitutional:       General: She is not in acute distress. HENT:      Head: Normocephalic and atraumatic. Cardiovascular:      Rate and Rhythm: Normal rate and regular rhythm. Heart sounds: No murmur heard. No gallop. Pulmonary:      Effort: No respiratory distress. Breath sounds: No wheezing or rales. Chest:      Comments: Large breast cancer mass  Abdominal:      General: There is no distension. Tenderness: There is no abdominal tenderness. There is no guarding. Musculoskeletal:      Right lower leg: No edema. Left lower leg: No edema. Neurological:      General: No focal deficit present.       Comments: AOx2. Follows simple commands. LABS:    CBC:   Recent Labs     03/03/22 1942 03/05/22 0519 03/06/22 0625   WBC 7.7 20.4* 19.9*   HGB 12.5 12.3 11.4*   HCT 37.9 36.2 34.7*    446 465*   MCV 82.9 82.5 82.8     Renal:    Recent Labs     03/03/22 1941 03/05/22 0519 03/06/22 0625    136 140   K 3.9 4.9 4.3    103 105   CO2 20* 20* 22   BUN 12 16 15   CREATININE 0.7 0.7 0.7   GLUCOSE 102* 144* 143*   CALCIUM 9.3 10.1 9.5   ANIONGAP 14 13 13     Hepatic:   Recent Labs     03/03/22 1941   AST 17   ALT 14   BILITOT 0.3   PROT 8.0   LABALBU 4.0   ALKPHOS 61     Troponin:   Recent Labs     03/03/22 1941   TROPONINI <0.01     BNP: No results for input(s): BNP in the last 72 hours. Lipids: No results for input(s): CHOL, HDL in the last 72 hours. Invalid input(s): LDLCALCU, TRIGLYCERIDE  ABGs:  No results for input(s): PHART, WBV9BQL, PO2ART, RJN3VOO, BEART, THGBART, V0WJNBDK, FBQ6SJQ in the last 72 hours. INR: No results for input(s): INR in the last 72 hours. Lactate: No results for input(s): LACTATE in the last 72 hours. Cultures:  -----------------------------------------------------------------  RAD:   MRI BRAIN W WO CONTRAST   Final Result      Two large hemorrhagic partially necrotic heterogeneously enhancing mass in the left parietotemporal and left parieto-occipital region with surrounding vasogenic edema and mass effect. These are indeterminate, most likely relate to multicentric    glioblastoma. However, possibility of metastasis cannot be excluded given the history of breast cancer. 10 mm midline shift to right. CT CHEST ABDOMEN PELVIS W CONTRAST   Final Result      Large, necrotic right breast mass. Right axillary lymphadenopathy. Uterine fibroids. High density urine in the bladder.             Assessment/Plan:     Misty Forrest is a 48 y.o. female w/ PMH of Stage IIB invasive ductal carcinoma (ER-, [place]-, HER2 -) diagnosed 3/30/2021 presented to [facility] w/ one week of altered mental status.     Encephalopathy  Edema left posterior cerebral hemisphere   Likely due to underlying metastasis. Hx of Stage IIB invasive ductal carcinoma (ER-, [place]-, and HER2-) diagnosed 3/30/2021. Refused treatment at time. Presented w/ headache 1 wks ago w/ persistent memory, reading, and writing difficulties. No hx of seizures.  (3/4) CT Head w/o contrast: Abnormal edema left posterior cerebral hemisphere possibly related to underlying metastases versus less likely primary glioma. Causes 1.4 cm of left-to-right midline shift w/ cisternal effacement and downward transtentorial herniation and findings of developing hydropcephalus.   (3/4) CTA: Negative for large vessel occlusion or hemodynamic stenosis. CT chest/abd/pelvis with large, necrotic right breast mass. Right axillary lymphadenopathy. -MRI Brain, 2 large hemorragic partially necrotic heterogenously enhancing mass in the Left parietal lob.  -q2 hr neurochecks  -Decadron 6 mg q 6hrs  -Keppra 1 g BID  -No indication for NSGY at this time  -PT/OT/SLP  -Consults: NSGY, Oncology, Palliative Care     Hematuria  Red urine w/ greater than 900 Rbcs. No infection. Could be secondary to metastatic breast CA. Code Status: Full Code  FEN: ADULT DIET; Regular  PPX: Lovenox, Pepcid  DISPO: ICU    Val Mullen MD, PGY-1  03/06/22  8:24 AM    Patient seen, examined and discussed with the resident and I agree with the assessment and plan as edited above. Patient remains a bit disengaged in decision on whether to treat her stage IV breast cancer with brain mets or not. Clinically unchanged. Tentatively scheduled for resection early this week.     Deedee Rock MD

## 2022-03-06 NOTE — PROGRESS NOTES
Hematology Oncology Daily Progress Note    Admit Date: 3/4/2022  Hospital day several    Subjective:     Patient has complaints of improved MS---denies sob/cp/HA. Medication side effects: none    Scheduled Meds:   dexamethasone  6 mg IntraVENous Q6H    levetiracetam  1,000 mg IntraVENous Q12H    sodium chloride flush  5-40 mL IntraVENous 2 times per day    enoxaparin  40 mg SubCUTAneous Daily    pantoprazole  40 mg Oral QAM AC     Continuous Infusions:   sodium chloride       PRN Meds:sodium chloride flush, sodium chloride, ondansetron **OR** ondansetron, polyethylene glycol, acetaminophen **OR** acetaminophen    Review of Systems  Pertinent items are noted in HPI. REVIEW OF SYSTEMS:         · Constitutional: Denies fever, sweats, weight loss     · Eyes: No visual changes or diplopia. No scleral icterus. · ENT: No Headaches, hearing loss or vertigo. No mouth sores or sore throat. · Cardiovascular: No chest pain, dyspnea on exertion, palpitations or loss of consciousness. · Respiratory: No cough or wheezing, no sputum production. No hemoptysis. .    · Gastrointestinal: No abdominal pain, appetite loss, blood in stools. No change in bowel habits. · Genitourinary: No dysuria, trouble voiding, or hematuria. · Musculoskeletal:  Generalized weakness. No joint complaints. · Integumentary: No rash or pruritis. · Neurological: No headache, diplopia. No change in gait, balance, or coordination. No paresthesias. · Endocrine: No temperature intolerance. No excessive thirst, fluid intake, or urination. · Hematologic/Lymphatic: No abnormal bruising or ecchymoses, blood clots or swollen lymph nodes. · Allergic/Immunologic: No nasal congestion or hives.    ·     Objective:     Patient Vitals for the past 8 hrs:   BP Temp Temp src Pulse Resp SpO2   03/06/22 1100 110/68 -- -- 93 24 92 %   03/06/22 1045 -- -- -- -- -- 92 %   03/06/22 1000 116/78 -- -- 105 19 --   03/06/22 0900 126/75 -- -- 96 21 --   03/06/22 0800 120/81 98.1 °F (36.7 °C) Oral 91 19 94 %   03/06/22 0700 (!) 106/59 -- -- 73 21 91 %   03/06/22 0600 105/64 -- -- 70 19 92 %   03/06/22 0500 113/71 -- -- 77 19 93 %     I/O last 3 completed shifts: In: 790 [P.O.:780; I.V.:10]  Out: 0   I/O this shift:  In: 240 [P.O.:240]  Out: -     /68   Pulse 93   Temp 98.1 °F (36.7 °C) (Oral)   Resp 24   Wt 188 lb 7.9 oz (85.5 kg)   SpO2 92%   BMI 33.39 kg/m²     General Appearance:    Alert, cooperative, no distress, appears stated age   Head:    Normocephalic, without obvious abnormality, atraumatic   Eyes:    PERRL, conjunctiva/corneas clear, EOM's intact, fundi     benign, both eyes        Ears:    Normal TM's and external ear canals, both ears   Nose:   Nares normal, septum midline, mucosa normal, no drainage    or sinus tenderness   Throat:   Lips, mucosa, and tongue normal; teeth and gums normal   Neck:   Supple, symmetrical, trachea midline, no adenopathy;        thyroid:  No enlargement/tenderness/nodules; no carotid    bruit or JVD   Back:     Symmetric, no curvature, ROM normal, no CVA tenderness   Lungs:     Clear to auscultation bilaterally, respirations unlabored   Chest wall:    No tenderness or deformity   Heart:    Regular rate and rhythm, S1 and S2 normal, no murmur, rub   or gallop   Abdomen:     Soft, non-tender, bowel sounds active all four quadrants,     no masses, no organomegaly           Extremities:   Extremities normal, atraumatic, no cyanosis or edema   Pulses:   2+ and symmetric all extremities   Skin:   Skin color, texture, turgor normal, no rashes or lesions   Lymph nodes:   Cervical, supraclavicular, and axillary nodes normal   Neurologic:   Stable       Data Review  CBC:   Lab Results   Component Value Date    WBC 19.9 03/06/2022    RBC 4.20 03/06/2022       Assessment:     Principal Problem:    Brain metastases (HCC)  Active Problems:    Brain mass  Resolved Problems:    * No resolved hospital problems. *      Plan:     1.  Mets triple negative breast cancer/new brain mets. I agree with surgery followed by adjuvant radiation if she wants to be aggressive for brain disease. She will then need systemic chemotherapy. Pt/family still deciding what to do. I had long discussion with the patient and her mother today.         Electronically signed by Tevin Johnson MD on 3/6/2022 at 12:36 PM

## 2022-03-06 NOTE — PROGRESS NOTES
Hospitalist Progress Note      PCP: SHAHEED ZAMARRIPA, APRN - CNP    Date of Admission: 3/4/2022    Chief Complaint: short term memory loss    Hospital Course: Came to ED with memory loss. Diagnosed with metastatic breast cancer. Patient has not yet made her decision about treatment of the malignancy. Seen by neurosurgery. Surgical resection recommended. Patient and mother, who is at bedside, which to discuss with neurosurgery again. Subjective: Short-term memory loss. No active complaints. No chest pain, no shortness of breath, no nausea or vomiting      Medications:  Reviewed    Infusion Medications    sodium chloride       Scheduled Medications    dexamethasone  6 mg IntraVENous Q6H    levetiracetam  1,000 mg IntraVENous Q12H    sodium chloride flush  5-40 mL IntraVENous 2 times per day    enoxaparin  40 mg SubCUTAneous Daily    pantoprazole  40 mg Oral QAM AC     PRN Meds: sodium chloride flush, sodium chloride, ondansetron **OR** ondansetron, polyethylene glycol, acetaminophen **OR** acetaminophen      Intake/Output Summary (Last 24 hours) at 3/6/2022 1113  Last data filed at 3/6/2022 0923  Gross per 24 hour   Intake 670 ml   Output 0 ml   Net 670 ml       Physical Exam Performed:    /75   Pulse 96   Temp 98.1 °F (36.7 °C) (Oral)   Resp 21   Wt 188 lb 7.9 oz (85.5 kg)   SpO2 94%   BMI 33.39 kg/m²     General appearance: No apparent distress, appears stated age and cooperative. HEENT: Pupils equal, round, and reactive to light. Conjunctivae/corneas clear. Neck: Supple, with full range of motion. No jugular venous distention. Trachea midline. Respiratory:  Normal respiratory effort. Clear to auscultation, bilaterally without Rales/Wheezes/Rhonchi. Cardiovascular: Regular rate and rhythm with normal S1/S2 without murmurs, rubs or gallops. Abdomen: Soft, non-tender, non-distended with normal bowel sounds. Musculoskeletal: No clubbing, cyanosis or edema bilaterally.   Full range of motion without deformity. Skin: Right breast mass is known but not visualized today  Neurologic:  Neurovascularly intact without any focal sensory/motor deficits. Cranial nerves: II-XII intact, grossly non-focal.  Psychiatric: Alert and oriented, thought content appropriate, normal insight  Capillary Refill: Brisk,3 seconds, normal   Peripheral Pulses: +2 palpable, equal bilaterally     I examined the patient today (03/06/22). Physical exam is similar to yesterday (3/5)    Labs:   Recent Labs     03/03/22 1942 03/05/22 0519 03/06/22 0625   WBC 7.7 20.4* 19.9*   HGB 12.5 12.3 11.4*   HCT 37.9 36.2 34.7*    446 465*     Recent Labs     03/03/22 1941 03/05/22 0519 03/06/22 0625    136 140   K 3.9 4.9 4.3    103 105   CO2 20* 20* 22   BUN 12 16 15   CREATININE 0.7 0.7 0.7   CALCIUM 9.3 10.1 9.5     Recent Labs     03/03/22 1941   AST 17   ALT 14   BILITOT 0.3   ALKPHOS 61     No results for input(s): INR in the last 72 hours. Recent Labs     03/03/22 1941   TROPONINI <0.01       Urinalysis:      Lab Results   Component Value Date    NITRU Negative 03/04/2022    WBCUA 18 03/04/2022    RBCUA >900 03/04/2022    BLOODU LARGE 03/04/2022    SPECGRAV <=1.005 03/04/2022    GLUCOSEU Negative 03/04/2022       Radiology:  MRI BRAIN W WO CONTRAST   Final Result      Two large hemorrhagic partially necrotic heterogeneously enhancing mass in the left parietotemporal and left parieto-occipital region with surrounding vasogenic edema and mass effect. These are indeterminate, most likely relate to multicentric    glioblastoma. However, possibility of metastasis cannot be excluded given the history of breast cancer. 10 mm midline shift to right. CT CHEST ABDOMEN PELVIS W CONTRAST   Final Result      Large, necrotic right breast mass. Right axillary lymphadenopathy. Uterine fibroids. High density urine in the bladder.               Assessment/Plan:    Active Hospital Problems Diagnosis     Brain metastases ([facility] [address] [C79.31]     Brain mass [G93.89]      PLAN    Metastatic breast cancer  Previously diagnosed and treatment declined by the patient. Oncology following  Palliative care consulted  No clear decision regarding intracranial metastasis yet    Intracranial vasogenic edema  Secondary to metastatic masses. Symptomatic with short-term memory impairment. Continue prophylactic antiepileptic as well as steroids  Clinically stable  Today patient feels much better. Intracranial metastases from breast cancer  Patient discussed with neurosurgery and oncology. Has not made her decision yet. Surgical resection recommended by neurosurgery. Discussed with patient and her mother at bedside. DVT Prophylaxis: Lovenox  Diet: ADULT DIET; Regular  Code Status: Full Code    PT/OT Eval Status: Consider when patient makes her decision regarding treatment    Dispo -inpatient stay, currently in the ICU.     Thuy Mccormick MD

## 2022-03-06 NOTE — PROGRESS NOTES
Physical Therapy    Facility/Department: [facility] ICU  Initial Assessment and treatment/discharge    NAME: Melanie Chapa  : 1975  MRN: [id number]    Date of Service: 3/6/2022    Discharge Recommendations:    Melanie Chapa scored a 24/24 on the AM-PAC short mobility form. At this time, no further PT is recommended upon discharge due to patient independent with functional mobility. Recommend patient returns to prior setting with prior services. PT Equipment Recommendations  Equipment Needed: No    Assessment   Assessment: Patient tolerated session well, transferring and ambulating in room and hallways as above with steady gait without an assistive device. Patient with occasional expressive aphasia and word finding difficulties but appears generally oriented to conversation. Patient continues to express disbelief of current medical status. She reports plan to d/c home with assistance from mom; denies concerns regarding mobility. Patient with no acute PT needs; will sign off at this time. Treatment Diagnosis: impaired mobility associated with brain metastases  Prognosis: Guarded  Decision Making: Low Complexity  PT Education: PT Role;Functional Mobility Training;Plan of Care;General Safety;Gait Training  Patient Education: stair training; patient verbalized understanding. No Skilled PT: Independent with functional mobility   REQUIRES PT FOLLOW UP: No  Activity Tolerance  Activity Tolerance: Patient Tolerated treatment well       Patient Diagnosis(es): There were no encounter diagnoses. has a past medical history of Thyroid nodule.   has no past surgical history on file.     Restrictions  Position Activity Restriction  Other position/activity restrictions: up with assistance  Vision/Hearing  Vision: Within Functional Limits  Hearing: Within functional limits     Subjective  General  Chart Reviewed: Yes  Patient assessed for rehabilitation services?: Yes  Additional Pertinent Hx: Patient is a 47 y/o female admitted 3/3 with altered mental status. CT chest and abdomen (+) for Large, necrotic right breast mass. Right axillary lymphadenopathy. CT head (+) for Two large hemorrhagic partially necrotic heterogeneously enhancing mass in the left parietotemporal and left parieto-occipital region with surrounding vasogenic edema and mass effect. Neurosurgery recommending resection. PMH significant for thyroid nodule and breast cancer. Response To Previous Treatment: Not applicable  Family / Caregiver Present: Yes (multiple family members present)  Referring Practitioner: David Hammonds MD  Referral Date : 03/05/22  Diagnosis: brain metastases  Follows Commands: Within Functional Limits  General Comment  Comments: Patient supine in bed upon arrival.  Subjective  Subjective: Patient agreeable to PT evaluation. \"I still can't believe this was going on. I felt fine. \"  Pain Screening  Patient Currently in Pain: Denies  Vital Signs  Patient Currently in Pain: Denies       Orientation  Orientation  Overall Orientation Status: Within Functional Limits (to conversation)  Social/Functional History  Social/Functional History  Lives With: Family (mom)  Type of Home: House  Home Layout: Two level,Bed/Bath upstairs  Home Access: Stairs to enter with rails  Entrance Stairs - Number of Steps: 3  Entrance Stairs - Rails: Right  Bathroom Shower/Tub: Tub/Shower unit  Bathroom Toilet: Standard  Bathroom Equipment:  (none)  Home Equipment: Rolling walker  ADL Assistance: Independent  Homemaking Assistance: Independent  Homemaking Responsibilities: Yes  Ambulation Assistance: Independent  Transfer Assistance: Independent  Active : Yes  Occupation: Works at home  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, working out  Additional Comments: patient denies history of falls at home  Cognition   Cognition  Overall Cognitive Status: Exceptions  Arousal/Alertness: Appropriate responses to stimuli  Following Commands: Follows one step commands with increased time; Follows one step commands with repetition  Attention Span: Attends with cues to redirect  Safety Judgement: Decreased awareness of need for assistance;Decreased awareness of need for safety  Problem Solving: Decreased awareness of errors  Insights: Decreased awareness of deficits  Initiation: Requires cues for some  Sequencing: Requires cues for some    Objective          AROM RLE (degrees)  RLE AROM: WFL  AROM LLE (degrees)  LLE AROM : WFL  Strength RLE  Strength RLE: WFL  Strength LLE  Strength LLE: WFL        Bed mobility  Supine to Sit: Modified independent  Comment: patient sitting up in bedside chair at end of session  Transfers  Sit to Stand: Independent (from EOB and from toilet)  Stand to sit:  Independent (to toilet and to bedside chair)  Ambulation  Ambulation?: Yes  Ambulation 1  Surface: level tile  Device: No Device  Assistance: Independent  Quality of Gait: steady gait with no loss of balance noted, mildly decreased leelee  Distance: 10' into bathroom, 350' in room and hallways returning to bedside chair  Stairs/Curb  Stairs?: Yes  Stairs  # Steps : 11  Stairs Height: 6\"  Rails: Left ascending  Assistance: Modified independent   Comment: reciprocal pattern ascending and descending; steady gait on stairs     Balance  Sitting - Static: Good  Standing - Static: Good  Standing - Dynamic: Good        Plan   Plan  Times per week: d/c from acute care PT  Safety Devices  Type of devices: Chair alarm in place,Left in chair,Call light within reach,Nurse notified,Gait belt    OutComes Score                                                  AM-PAC Score  AM-PAC Inpatient Mobility Raw Score : 24 (03/06/22 1303)  AM-PAC Inpatient T-Scale Score : 61.14 (03/06/22 1303)  Mobility Inpatient CMS 0-100% Score: 0 (03/06/22 1303)  Mobility Inpatient CMS G-Code Modifier : CH (03/06/22 1303)          Goals  Short term goals  Time Frame for Short term goals: none; patient will be discharged from acute care PT  Patient Goals   Patient goals : to go home       Therapy Time   Individual Concurrent Group Co-treatment   Time In 1135         Time Out 1205         Minutes 30         Timed Code Treatment Minutes: 15 Minutes    Timed Code Treatment Minutes:  15 Minutes    Total Treatment Minutes:    15 minutes treatment + 15 minutes evaluation = 30 total treatment minutes    ROD, [place] [id number]

## 2022-03-06 NOTE — PROGRESS NOTES
Neurosurgery Progress Note    Patient seen and examined on 03/06/22. No acute events overnight. Neurologically stable on exam.     A/P: 47 yo woman with triple negative breast ca and intracranial metastases    -Neuro stable  -HOB elevated  -Frequent neuro checks  -Decadron, PPI, SSI  -Keppra for seizure ppx  -Oncology/Rad onc following  -Plan to OR on Tuesday for resection of intracranial masses. Discussed with patient and mother who agree to proceed. -Will follow.     Pranav Rodriguez MD, PhD  [address], [address] [facility], [address] [phone] (c), [phone] (o)

## 2022-03-07 LAB
ANION GAP SERPL CALCULATED.3IONS-SCNC: 11 MMOL/L (ref 3–16)
APTT: 31.5 SEC (ref 26.2–38.6)
BANDED NEUTROPHILS RELATIVE PERCENT: 2 % (ref 0–7)
BASOPHILS ABSOLUTE: 0 K/UL (ref 0–0.2)
BASOPHILS ABSOLUTE: 0.1 K/UL (ref 0–0.2)
BASOPHILS RELATIVE PERCENT: 0 %
BASOPHILS RELATIVE PERCENT: 0.4 %
BUN BLDV-MCNC: 14 MG/DL (ref 7–20)
CALCIUM SERPL-MCNC: 9.7 MG/DL (ref 8.3–10.6)
CHLORIDE BLD-SCNC: 105 MMOL/L (ref 99–110)
CO2: 22 MMOL/L (ref 21–32)
CREAT SERPL-MCNC: 0.7 MG/DL (ref 0.6–1.1)
EOSINOPHILS ABSOLUTE: 0 K/UL (ref 0–0.6)
EOSINOPHILS ABSOLUTE: 0 K/UL (ref 0–0.6)
EOSINOPHILS RELATIVE PERCENT: 0 %
EOSINOPHILS RELATIVE PERCENT: 0 %
GFR AFRICAN AMERICAN: >60
GFR NON-AFRICAN AMERICAN: >60
GLUCOSE BLD-MCNC: 191 MG/DL (ref 70–99)
HCG(URINE) PREGNANCY TEST: NEGATIVE
HCT VFR BLD CALC: 37.5 % (ref 36–48)
HCT VFR BLD CALC: 37.6 % (ref 36–48)
HEMOGLOBIN: 12.1 G/DL (ref 12–16)
HEMOGLOBIN: 12.7 G/DL (ref 12–16)
HYPOCHROMIA: ABNORMAL
INR BLD: 1.08 (ref 0.88–1.12)
LYMPHOCYTES ABSOLUTE: 0.7 K/UL (ref 1–5.1)
LYMPHOCYTES ABSOLUTE: 0.9 K/UL (ref 1–5.1)
LYMPHOCYTES RELATIVE PERCENT: 3.8 %
LYMPHOCYTES RELATIVE PERCENT: 5 %
MCH RBC QN AUTO: 27.2 PG (ref 26–34)
MCH RBC QN AUTO: 28.1 PG (ref 26–34)
MCHC RBC AUTO-ENTMCNC: 32.1 G/DL (ref 31–36)
MCHC RBC AUTO-ENTMCNC: 33.7 G/DL (ref 31–36)
MCV RBC AUTO: 83.2 FL (ref 80–100)
MCV RBC AUTO: 84.8 FL (ref 80–100)
MONOCYTES ABSOLUTE: 0.5 K/UL (ref 0–1.3)
MONOCYTES ABSOLUTE: 0.7 K/UL (ref 0–1.3)
MONOCYTES RELATIVE PERCENT: 3 %
MONOCYTES RELATIVE PERCENT: 3.4 %
NEUTROPHILS ABSOLUTE: 16.6 K/UL (ref 1.7–7.7)
NEUTROPHILS ABSOLUTE: 17.8 K/UL (ref 1.7–7.7)
NEUTROPHILS RELATIVE PERCENT: 90 %
NEUTROPHILS RELATIVE PERCENT: 92.4 %
PDW BLD-RTO: 14.6 % (ref 12.4–15.4)
PDW BLD-RTO: 14.7 % (ref 12.4–15.4)
PLATELET # BLD: 488 K/UL (ref 135–450)
PLATELET # BLD: 500 K/UL (ref 135–450)
PLATELET SLIDE REVIEW: ABNORMAL
PMV BLD AUTO: 7.6 FL (ref 5–10.5)
PMV BLD AUTO: 7.7 FL (ref 5–10.5)
POTASSIUM REFLEX MAGNESIUM: 4.5 MMOL/L (ref 3.5–5.1)
PROTHROMBIN TIME: 12.3 SEC (ref 9.9–12.7)
RBC # BLD: 4.43 M/UL (ref 4–5.2)
RBC # BLD: 4.51 M/UL (ref 4–5.2)
SLIDE REVIEW: ABNORMAL
SODIUM BLD-SCNC: 138 MMOL/L (ref 136–145)
TOXIC GRANULATION: PRESENT
WBC # BLD: 18 K/UL (ref 4–11)
WBC # BLD: 19.3 K/UL (ref 4–11)

## 2022-03-07 PROCEDURE — 2000000000 HC ICU R&B

## 2022-03-07 PROCEDURE — 85610 PROTHROMBIN TIME: CPT

## 2022-03-07 PROCEDURE — 85730 THROMBOPLASTIN TIME PARTIAL: CPT

## 2022-03-07 PROCEDURE — 2580000003 HC RX 258: Performed by: STUDENT IN AN ORGANIZED HEALTH CARE EDUCATION/TRAINING PROGRAM

## 2022-03-07 PROCEDURE — 6360000002 HC RX W HCPCS: Performed by: STUDENT IN AN ORGANIZED HEALTH CARE EDUCATION/TRAINING PROGRAM

## 2022-03-07 PROCEDURE — 80048 BASIC METABOLIC PNL TOTAL CA: CPT

## 2022-03-07 PROCEDURE — 85025 COMPLETE CBC W/AUTO DIFF WBC: CPT

## 2022-03-07 PROCEDURE — 84703 CHORIONIC GONADOTROPIN ASSAY: CPT

## 2022-03-07 PROCEDURE — 6370000000 HC RX 637 (ALT 250 FOR IP): Performed by: NURSE PRACTITIONER

## 2022-03-07 PROCEDURE — 36415 COLL VENOUS BLD VENIPUNCTURE: CPT

## 2022-03-07 PROCEDURE — 99232 SBSQ HOSP IP/OBS MODERATE 35: CPT | Performed by: INTERNAL MEDICINE

## 2022-03-07 PROCEDURE — 6370000000 HC RX 637 (ALT 250 FOR IP): Performed by: STUDENT IN AN ORGANIZED HEALTH CARE EDUCATION/TRAINING PROGRAM

## 2022-03-07 RX ORDER — SODIUM CHLORIDE 9 MG/ML
INJECTION, SOLUTION INTRAVENOUS CONTINUOUS
Status: DISCONTINUED | OUTPATIENT
Start: 2022-03-08 | End: 2022-03-10

## 2022-03-07 RX ADMIN — DEXAMETHASONE SODIUM PHOSPHATE 6 MG: 4 INJECTION, SOLUTION INTRA-ARTICULAR; INTRALESIONAL; INTRAMUSCULAR; INTRAVENOUS; SOFT TISSUE at 05:52

## 2022-03-07 RX ADMIN — LEVETIRACETAM 1000 MG: 100 INJECTION, SOLUTION INTRAVENOUS at 12:40

## 2022-03-07 RX ADMIN — LEVETIRACETAM 1000 MG: 100 INJECTION, SOLUTION INTRAVENOUS at 00:12

## 2022-03-07 RX ADMIN — DEXAMETHASONE SODIUM PHOSPHATE 6 MG: 4 INJECTION, SOLUTION INTRA-ARTICULAR; INTRALESIONAL; INTRAMUSCULAR; INTRAVENOUS; SOFT TISSUE at 00:11

## 2022-03-07 RX ADMIN — SODIUM CHLORIDE, PRESERVATIVE FREE 10 ML: 5 INJECTION INTRAVENOUS at 11:51

## 2022-03-07 RX ADMIN — ENOXAPARIN SODIUM 40 MG: 100 INJECTION SUBCUTANEOUS at 08:49

## 2022-03-07 RX ADMIN — DEXAMETHASONE SODIUM PHOSPHATE 6 MG: 4 INJECTION, SOLUTION INTRA-ARTICULAR; INTRALESIONAL; INTRAMUSCULAR; INTRAVENOUS; SOFT TISSUE at 12:40

## 2022-03-07 RX ADMIN — POLYETHYLENE GLYCOL 3350 17 G: 17 POWDER, FOR SOLUTION ORAL at 08:49

## 2022-03-07 RX ADMIN — SODIUM CHLORIDE, PRESERVATIVE FREE 10 ML: 5 INJECTION INTRAVENOUS at 21:23

## 2022-03-07 RX ADMIN — PANTOPRAZOLE SODIUM 40 MG: 40 TABLET, DELAYED RELEASE ORAL at 05:52

## 2022-03-07 ASSESSMENT — ENCOUNTER SYMPTOMS
RESPIRATORY NEGATIVE: 1
GASTROINTESTINAL NEGATIVE: 1

## 2022-03-07 ASSESSMENT — PAIN SCALES - GENERAL
PAINLEVEL_OUTOF10: 0
PAINLEVEL_OUTOF10: 4
PAINLEVEL_OUTOF10: 0

## 2022-03-07 NOTE — PROGRESS NOTES
ICU Progress Note    Admit Date: 3/4/2022  ICU admit 3/4/2022 Day#3  Vent Day: None  IV Access:Peripheral  IV Fluids:None   Vasopressors:None                Antibiotics: None  Diet: ADULT DIET; Regular    CC: AMS, Headache    Interval history:   Patient seen and examined overnight. No acute events overnight. Patient afebrile, hemodynamically stable, on RA. Alert and oriented X 2. Patient denies headache, weakness, numbness, chest pain, SOB, and abdominal pain. Medications:     Scheduled Meds:   dexamethasone  6 mg IntraVENous Q6H    levetiracetam  1,000 mg IntraVENous Q12H    sodium chloride flush  5-40 mL IntraVENous 2 times per day    enoxaparin  40 mg SubCUTAneous Daily    pantoprazole  40 mg Oral QAM AC     Continuous Infusions:   sodium chloride       PRN Meds:sodium chloride flush, sodium chloride, ondansetron **OR** ondansetron, polyethylene glycol, acetaminophen **OR** acetaminophen    Objective:   Vitals:   T-max:  Patient Vitals for the past 8 hrs:   BP Temp Temp src Pulse Resp SpO2   03/07/22 0400 94/77 98.4 °F (36.9 °C) Oral 94 18 95 %   03/07/22 0300 100/70 -- -- 94 -- 93 %   03/07/22 0200 109/69 -- -- 90 16 93 %   03/07/22 0100 110/67 -- -- 84 -- 92 %   03/07/22 0000 127/87 98.3 °F (36.8 °C) Oral 103 18 92 %   03/06/22 2300 114/80 -- -- 87 -- --       Intake/Output Summary (Last 24 hours) at 3/7/2022 2726  Last data filed at 3/6/2022 2000  Gross per 24 hour   Intake 730 ml   Output --   Net 730 ml     ROS: ROS in interval Hx    Physical Exam  Constitutional: Alert, normal appearance, no acute distress  HENT: head normocephalic/atraumatic, no congestion or rhinorrhea, PERRLA, EOM intact  Cardio: normal rate, regular rhythm. No murmurs heard on auscultation  Pulmonary: clear to auscultation bilaterally, no wheezes, rales, or stridor  GI: abdomen soft, flat, and non-distended. No tenderness or guarding. Musculoskeletal: no deformity, tenderness, or injury. No lower extremity edema.  Strength High density urine in the bladder. Assessment/Plan:   Becky Ramírez is a 48 y. o. female w/ PMH of Stage IIB invasive ductal carcinoma (ER-, NC-, HER2 -) diagnosed 3/30/2021 presented to [facility] w/ one week of altered mental status. Encephalopathy  Edema left posterior cerebral hemisphere   Likely due to underlying metastasis. Hx of Stage IIB invasive ductal carcinoma (ER-, NC-, and HER2-) diagnosed 3/30/2021. Refused treatment at time. Presented w/ headache 1 wks ago w/ persistent memory, reading, and writing difficulties. No hx of seizures.  (3/4) CT Head w/o contrast: Abnormal edema left posterior cerebral hemisphere possibly related to underlying metastases versus less likely primary glioma. Causes 1.4 cm of left-to-right midline shift w/ cisternal effacement and downward transtentorial herniation and findings of developing hydropcephalus.   (3/4) CTA: Negative for large vessel occlusion or hemodynamic stenosis. CT chest/abd/pelvis with large, necrotic right breast mass. Right axillary lymphadenopathy. -(3/5) MRI Brain: Two large hemorrhagic partially necrotic heterogeneously enhancing mass in the left parietotemporal and left parieto-occipital region with surrounding vasogenic edema and mass effect. These are indeterminate, most likely relate to multicentric glioblastoma. However, possibility of metastasis cannot be excluded given the history of breast cancer. 10 mm midline shift to right.   -q2 hr neurochecks  -Decadron 6 mg q 6hrs  -Keppra 1 g BID  -PT/OT/SLP  -Plan: Neurosurgery Tuesday 3/8  -Will require post-op Radiation and systemic therapy as outpatient  -Consults: NSGY, Oncology, Palliative Care     Hematuria  Red urine w/ greater than 900 Rbcs. No infection. Could be secondary to metastatic breast CA.     Code Status: Full Code  FEN: ADULT DIET;  Regular  PPX: Lovenox, pepcid  DISPO: ICU    Taj Mathews MD, PGY-1  03/07/22  6:08 AM    This patient has been staffed and discussed with Dr. Vel Nunez    Patient reviewed, findings as discussed with Dr Varsha Beaver. Agree with assessment and plan. Brain lesions best treated with resection, scheduled for 3/8.

## 2022-03-07 NOTE — PROGRESS NOTES
Alert and oriented to person. Vital signs stable, sinus rhythm. Denies pain. Significant expressive aphasia, but able to follow all commands appropriately. Denies SOB, chest pain, nausea, vomiting. Is constipated, miralax given. Skin intact aside from large protruding right breast mass. Mass is leaking, dry dressing applied. Up and ambulating without issue.

## 2022-03-07 NOTE — PROGRESS NOTES
NEUROSURGERY PROGRESS NOTE    Patient Name: Sharmin Coto YOB: 1975   Sex: Female Age: 48 yrs     Medical Record Number: [id number] Acct Number: [de-identified]   Room Number: 9641/0146-48 Hospital Day: Hospital Day: 4     Subjective:No acute events over night  Objective:    VITAL SIGNS   /84   Pulse 94   Temp 98.1 °F (36.7 °C) (Oral)   Resp 16   Ht 5' 2.99\" (1.6 m)   Wt 188 lb 7.9 oz (85.5 kg)   SpO2 91%   BMI 33.40 kg/m²    Height Height: 5' 2.99\" (160 cm)   Weight Weight: 188 lb 7.9 oz (85.5 kg)        Allergies No Known Allergies   NPO Status ADULT DIET; Regular   Isolation No active isolations     LABS   Basic Metabolic Profile Recent Labs     03/05/22  0519 03/06/22  0625 03/07/22  0444    140 138    105 105   CO2 20* 22 22   BUN 16 15 14   CREATININE 0.7 0.7 0.7   GLUCOSE 144* 143* 191*      Complete Blood Count Recent Labs     03/05/22  0519 03/06/22  0625 03/07/22  0444   WBC 20.4* 19.9* 19.3*   RBC 4.39 4.20 4.51      Coagulation Studies No results for input(s): PTT, INR in the last 72 hours. Invalid input(s): PLATELETS, PROA, PT, PTTA     MEDICATIONS   Inpatient Medications     dexamethasone, 6 mg, IntraVENous, Q6H    levetiracetam, 1,000 mg, IntraVENous, Q12H    sodium chloride flush, 5-40 mL, IntraVENous, 2 times per day    enoxaparin, 40 mg, SubCUTAneous, Daily    pantoprazole, 40 mg, Oral, QAM AC   Infusions    sodium chloride        Antibiotics   Recent Abx Admin      No antibiotic orders with administrations found.                  Neurologic Exam:  Mental status: awake and alert and oriented x 1, expressive aphasia but will follow commands    Cranial Nerves:  II: Visual acuity not tested, visual fields full, denies new visual changes / diplopia  III, IV, VI: PERRL, 3 mm bilaterally, EOMI, no nystagmus noted  V: Facial sensation intact bilaterally to touch  VII: Face symmetric  VIII: Hearing intact bilaterally to spoken voice  IX: Palate movement equal bilaterally  XI: Shoulder shrug equal bilaterally  XII: Tongue midline      Musculoskeletal:   Gait: Not tested   Tone: normal  Sensory: intact to all extremities  Motor strength:    Right  Left    Right  Left    Deltoid  5 5  Hip Flex  5 5   Biceps  5 5  Knee Extensors  5 5   Triceps  5 5  Knee Flexors  5 5   Wrist Ext  5 5  Ankle Dorsiflex. 5 5   Wrist Flex  5 5  Ankle Plantarflex. 5 5   Handgrip  5 5  Ext Jake Longus  5 5   Thumb Ext  5 5         Respiratory:  Unlabored respiratory pattern    Abdomen:   Soft, ND     Cardiovascular:  Warm, well perfused    Assessment   49 yo woman with triple negative breast ca and intracranial metastases     -Neuro stable  -HOB elevated  -Frequent neuro checks  -Decadron, PPI, SSI  -Keppra for seizure ppx  -Oncology/Rad onc following  -Plan to OR on Tuesday for resection of intracranial masses. -NPO tonight    Patient was seen with Dr. Eliceo Montalvo who agrees with above assessment and plan. Electronically signed by:  JUDITH Dia - CNP, 3/7/2022 9:34 AM   Neurosurgery Nurse Practitioner  [phone]

## 2022-03-07 NOTE — PLAN OF CARE
Problem: Falls - Risk of:  Goal: Will remain free from falls  Description: Will remain free from falls  Note: No attempts to get out of bed without assistance. Pt reminded to call for assist before ambulating. Nonskid socks on. Bed locked and in lowest position. Side rails up x3. Exit alarm in use. Call light in reach. Remains free from injury. Will cont to monitor safety.

## 2022-03-07 NOTE — PROGRESS NOTES
Hospitalist Progress Note      PCP: SHAHEED ZAMARRIPA, APRN - CNP    Date of Admission: 3/4/2022    Chief Complaint: short term memory loss    Hospital Course: Came to ED with memory loss. Diagnosed with metastatic breast cancer with 2 brain metastasis    Subjective: reports feeling better. No headache. Tolerating PO. Decided on mass resection and treatment. Medications:  Reviewed    Infusion Medications    [START ON 3/8/2022] sodium chloride      sodium chloride       Scheduled Medications    [START ON 3/8/2022] ceFAZolin (ANCEF) IVPB  2,000 mg IntraVENous On Call to OR    dexamethasone  6 mg IntraVENous Q6H    levetiracetam  1,000 mg IntraVENous Q12H    sodium chloride flush  5-40 mL IntraVENous 2 times per day    [Held by provider] enoxaparin  40 mg SubCUTAneous Daily    pantoprazole  40 mg Oral QAM AC     PRN Meds: sodium chloride flush, sodium chloride, ondansetron **OR** ondansetron, polyethylene glycol, acetaminophen **OR** acetaminophen      Intake/Output Summary (Last 24 hours) at 3/7/2022 1454  Last data filed at 3/6/2022 2000  Gross per 24 hour   Intake 250 ml   Output --   Net 250 ml       Physical Exam Performed:    /72   Pulse 99   Temp 97.5 °F (36.4 °C) (Oral)   Resp (!) 31   Ht 5' 2.99\" (1.6 m)   Wt 188 lb 7.9 oz (85.5 kg)   SpO2 91%   BMI 33.40 kg/m²     General appearance: No apparent distress, appears stated age and cooperative. Respiratory:  Normal respiratory effort. Clear to auscultation, bilaterally without Rales/Wheezes/Rhonchi. Chest wall:  Right breast mass covered in dressing  Cardiovascular: Regular rate and rhythm with normal S1/S2 without murmurs, rubs or gallops. Abdomen: Soft, non-tender, non-distended with normal bowel sounds. Musculoskeletal: No clubbing, cyanosis or edema bilaterally.     Skin: Right breast mass is known   Neurologic:  Neurovascularly intact  grossly non-focal.  Psychiatric: Alert and oriented, thought content appropriate, normal insight    Labs:   Recent Labs     03/06/22  0625 03/07/22  0444 03/07/22  1225   WBC 19.9* 19.3* 18.0*   HGB 11.4* 12.7 12.1   HCT 34.7* 37.5 37.6   * 500* 488*     Recent Labs     03/05/22  0519 03/06/22  0625 03/07/22  0444    140 138   K 4.9 4.3 4.5    105 105   CO2 20* 22 22   BUN 16 15 14   CREATININE 0.7 0.7 0.7   CALCIUM 10.1 9.5 9.7     No results for input(s): AST, ALT, BILIDIR, BILITOT, ALKPHOS in the last 72 hours. Recent Labs     03/07/22  1225   INR 1.08     No results for input(s): Christi Lim in the last 72 hours. Urinalysis:      Lab Results   Component Value Date    NITRU Negative 03/04/2022    WBCUA 18 03/04/2022    RBCUA >900 03/04/2022    BLOODU LARGE 03/04/2022    SPECGRAV <=1.005 03/04/2022    GLUCOSEU Negative 03/04/2022       Radiology:  MRI BRAIN W WO CONTRAST   Final Result      Two large hemorrhagic partially necrotic heterogeneously enhancing mass in the left parietotemporal and left parieto-occipital region with surrounding vasogenic edema and mass effect. These are indeterminate, most likely relate to multicentric    glioblastoma. However, possibility of metastasis cannot be excluded given the history of breast cancer. 10 mm midline shift to right. CT CHEST ABDOMEN PELVIS W CONTRAST   Final Result      Large, necrotic right breast mass. Right axillary lymphadenopathy. Uterine fibroids. High density urine in the bladder. Assessment/Plan:    Active Hospital Problems    Diagnosis     Brain metastases ([facility] [address] [C79.31]     Brain mass [G93.89]      PLAN    Metastatic breast cancer  Previously diagnosed in March 2021 and treatment declined by the patient. Oncology following  Patient decided on treatment    Intracranial vasogenic edema  Secondary to metastatic masses. Symptomatic with short-term memory impairment.   Continue prophylactic antiepileptic as well as steroids    Intracranial metastases from breast cancer  Decided on surgery and plan for crani with mass resection tomorrow    DVT Prophylaxis: Lovenox  Diet: ADULT DIET; Regular  Diet NPO  Diet NPO Exceptions are: Sips of Water with Meds  Code Status: Full Code    PT/OT Eval Status: Consider when patient makes her decision regarding treatment    Dispo -inpatient stay, currently in the ICU.     Brianna Elder MD

## 2022-03-07 NOTE — PROGRESS NOTES
Alert and oriented to person. Has had visitors most of the day. VS stable, sinus rhythm. Pain 0/10. Neuro exam unchanged. No SOB, no chest pain, denies N/V. Skin intact besides mass on R breast. Had bowel movement.

## 2022-03-07 NOTE — PLAN OF CARE
Problem: Falls - Risk of:  Goal: Will remain free from falls  Description: Will remain free from falls  Outcome: Ongoing  Note: Call light within reach, bed alarm on.      Problem: Verbal Communication - Impaired:  Goal: Functional communication will improve  Description: Functional communication will improve  Outcome: Ongoing  Note: Function communication will improve     Problem: Urinary Elimination:  Goal: Ability to reestablish a normal urinary elimination pattern will improve - after catheter removal  Description: Ability to reestablish a normal urinary elimination pattern will improve  Outcome: Ongoing

## 2022-03-07 NOTE — PROGRESS NOTES
[facility]  Progress Note    Subjective:     Denies headache or other neurologic symptoms this morning    Review of Systems:     Review of Systems   Constitutional: Positive for fatigue. Negative for fever. HENT: Negative. Respiratory: Negative. Cardiovascular: Negative. Gastrointestinal: Negative. Genitourinary: Negative. Musculoskeletal: Negative. Neurological: Negative. Objective:     Medications    Current Facility-Administered Medications: dexamethasone (DECADRON) injection 6 mg, 6 mg, IntraVENous, Q6H  levETIRAcetam (KEPPRA) 1,000 mg in sodium chloride 0.9 % 100 mL IVPB, 1,000 mg, IntraVENous, Q12H  sodium chloride flush 0.9 % injection 5-40 mL, 5-40 mL, IntraVENous, 2 times per day  sodium chloride flush 0.9 % injection 5-40 mL, 5-40 mL, IntraVENous, PRN  0.9 % sodium chloride infusion, 25 mL, IntraVENous, PRN  enoxaparin (LOVENOX) injection 40 mg, 40 mg, SubCUTAneous, Daily  ondansetron (ZOFRAN-ODT) disintegrating tablet 4 mg, 4 mg, Oral, Q8H PRN **OR** ondansetron (ZOFRAN) injection 4 mg, 4 mg, IntraVENous, Q6H PRN  polyethylene glycol (GLYCOLAX) packet 17 g, 17 g, Oral, Daily PRN  acetaminophen (TYLENOL) tablet 650 mg, 650 mg, Oral, Q6H PRN **OR** acetaminophen (TYLENOL) suppository 650 mg, 650 mg, Rectal, Q6H PRN  pantoprazole (PROTONIX) tablet 40 mg, 40 mg, Oral, QAM AC    Allergies  No Known Allergies    Physical Exam  VITALS:  /73   Pulse 92   Temp 98.4 °F (36.9 °C) (Oral)   Resp 18   Ht 5' 2.99\" (1.6 m)   Wt 188 lb 7.9 oz (85.5 kg)   SpO2 91%   BMI 33.40 kg/m²   TEMPERATURE:  Current - Temp: 98.4 °F (36.9 °C);  Max - Temp  Av.3 °F (36.8 °C)  Min: 98.1 °F (36.7 °C)  Max: 98.4 °F (36.9 °C)  PULSE OXIMETRY RANGE: SpO2  Av.5 %  Min: 91 %  Max: 100 %  24HR INTAKE/OUTPUT:    Intake/Output Summary (Last 24 hours) at 3/7/2022 0718  Last data filed at 3/6/2022 2000  Gross per 24 hour   Intake 730 ml   Output --   Net 730 ml       Physical Exam  Constitutional:       Appearance: She is not ill-appearing. Eyes:      General: No scleral icterus. Cardiovascular:      Rate and Rhythm: Normal rate and regular rhythm. Heart sounds: No murmur heard. No gallop. Comments: Dressing overlying right breast  Pulmonary:      Effort: Pulmonary effort is normal.      Breath sounds: Normal breath sounds. No wheezing, rhonchi or rales. Abdominal:      Palpations: Abdomen is soft. Tenderness: There is no abdominal tenderness. Musculoskeletal:         General: No swelling. Lymphadenopathy:      Cervical: No cervical adenopathy. Skin:     General: Skin is warm and dry. Findings: No rash. Neurological:      General: No focal deficit present. Mental Status: She is alert and oriented to person, place, and time. Labs  Recent Labs     03/05/22 0519 03/06/22 0625 03/07/22  0444   WBC 20.4* 19.9* 19.3*   HGB 12.3 11.4* 12.7   HCT 36.2 34.7* 37.5    465* 500*   MCV 82.5 82.8 83.2       Recent Labs     03/05/22 0519 03/06/22 0625 03/07/22  0444    140 138   K 4.9 4.3 4.5    105 105   CO2 20* 22 22   BUN 16 15 14   CREATININE 0.7 0.7 0.7       No results for input(s): AST, ALT, ALB, BILIDIR, BILITOT, ALKPHOS in the last 72 hours. No results for input(s): MG in the last 72 hours. Radiology  CT HEAD WO CONTRAST    Result Date: 3/3/2022  EXAMINATION: CT OF THE HEAD WITHOUT CONTRAST  3/3/2022 8:17 pm TECHNIQUE: CT of the head was performed without the administration of intravenous contrast. Dose modulation, iterative reconstruction, and/or weight based adjustment of the mA/kV was utilized to reduce the radiation dose to as low as reasonably achievable. COMPARISON: None. HISTORY: ORDERING SYSTEM PROVIDED HISTORY: ams TECHNOLOGIST PROVIDED HISTORY: Reason for exam:->ams Has a \"code stroke\" or \"stroke alert\" been called? ->No Decision Support Exception - unselect if not a suspected or confirmed emergency medical condition->Emergency Medical Condition (MA) Is the patient pregnant?->No Reason for Exam: c/o memory loss that began last week. Pt's mother states last Thursday the pt states she felt like something hit her in the head, but doesn't know what. States she isn't able to remember her name, important things she would usually remember, and she is also unable to read or write. FINDINGS: BRAIN/VENTRICLES: There is diffuse hypoattenuation involving much of the left temporoparietal and posterior frontal lobe. There is hypoattenuation involving the white matter involving left posterior hemisphere likely represent areas of vasogenic edema. There is at least 2 discrete of mass is identified measuring approximate 4 cm in size 1 which is central hypoattenuation which may be related to necrosis. These left-to-right midline shift 1.4 cm in size. There is entrapment of the right lateral ventricle and both temporal horns of both lateral ventricles suggestive areas of developing hydrocephalus. There is downward transtentorial herniation as well. With mass effect on the brainstem and the partial effacement of the suprasellar and ambient cisterns. Left uncal herniation. .  Focal density identified left parasagittal occipital lobe likely represents petechial calcification of the blood products. ORBITS: The visualized portion of the orbits demonstrate no acute abnormality. SINUSES: The visualized paranasal sinuses and mastoid air cells demonstrate no acute abnormality. SOFT TISSUES/SKULL:  No acute abnormality of the visualized skull or soft tissues. Abnormal edema left posterior cerebral hemisphere possibly related to underlying metastases versus less likely primary glioma. This causes 1.4 cm of left-to-right midline shift with cisternal effacement and downward transtentorial herniation and findings of developing hydrocephalus.  Hyperdensity left parasagittal occipital lobe could represent area of calcification however small focus of blood products will not be totally excluded. Neuro surgical consultation recommended Critical results were called by Dr. Airam Odell to Brandon BOURNE on 3/3/2022 at 21:07. CTA HEAD NECK W CONTRAST    Result Date: 3/3/2022  EXAMINATION: CTA OF THE HEAD AND NECK WITH CONTRAST 3/3/2022 8:17 pm: TECHNIQUE: CTA of the head and neck was performed with the administration of intravenous contrast. Multiplanar reformatted images are provided for review. MIP images are provided for review. Stenosis of the internal carotid arteries measured using NASCET criteria. Dose modulation, iterative reconstruction, and/or weight based adjustment of the mA/kV was utilized to reduce the radiation dose to as low as reasonably achievable. COMPARISON: CT head 03/03/2022 HISTORY: ORDERING SYSTEM PROVIDED HISTORY: ams x 1 week TECHNOLOGIST PROVIDED HISTORY: Reason for exam:->ams x 1 week Has a \"code stroke\" or \"stroke alert\" been called? ->No Decision Support Exception - unselect if not a suspected or confirmed emergency medical condition->Emergency Medical Condition (MA) Reason for Exam: ams x 1 week FINDINGS: CTA NECK: AORTIC ARCH/ARCH VESSELS: No dissection or arterial injury. No significant stenosis of the brachiocephalic or subclavian arteries. CAROTID ARTERIES: No dissection, arterial injury, or hemodynamically significant stenosis by NASCET criteria. VERTEBRAL ARTERIES: No dissection, arterial injury, or significant stenosis. SOFT TISSUES: The lung apices are clear. No cervical or superior mediastinal lymphadenopathy. The larynx and pharynx are unremarkable. No acute abnormality of the salivary and thyroid glands. Partial visualization of a large exophytic right breast mass. BONES: .  Vertebral heights are maintained. CTA HEAD: ANTERIOR CIRCULATION: No significant stenosis of the intracranial internal carotid, anterior cerebral, or middle cerebral arteries. No aneurysm.  POSTERIOR CIRCULATION: No significant stenosis of the vertebral, basilar, or posterior cerebral arteries. No aneurysm. OTHER: No dural venous sinus thrombosis on this non-dedicated study. BRAIN: Left-sided masses and edema with associated midline shift. . Ventriculomegaly worrisome for areas developing hydrocephalus related to the mass effect and downward transfer herniation. Negative for large vessel occlusion or hemodynamic stenosis. CT CHEST ABDOMEN PELVIS W CONTRAST    Result Date: 3/4/2022  CT of chest abdomen and pelvis with contrast HISTORY: Brain mass. Evaluate for primary neoplasm or metastatic disease. Staging. COMPARISON: none CONTRAST:  70 mL Isovue-370 intravenous  TECHNIQUE: Individualized dose optimization technique was used in order to meet ALARA standards for radiation dose reduction. In addition to vendor specific dose reduction algorithms, the dose reduction techniques vary based on the specific scanner utilized but frequently include automated exposure control, adjustment of the mA and/or kV according to patient size, and use of iterative reconstruction technique. COMMENTS: Chest: Mild degenerative changes in the spine. Scoliosis. There is a large, necrotic, peripherally enhancing right breast mass which tenths the anterior skin surface and measures 10.2 x 8.5 cm. There is right axillary lymphadenopathy; for example 2.1 x 1.5 cm on image [id number]. Small hiatal hernia. Mediastinal structures are unremarkable without lymphadenopathy. Mild linear subpleural atelectasis in the lungs. 2 mm perifissural nodule on the left image 601-51 is most likely benign. No evidence of pulmonary metastatic disease. Abdomen and pelvis: Degenerative changes in the spine. No lymphadenopathy. Gallbladder is within normal limits. Bowel is unremarkable. 4 mm hypodensity in the right lobe of the liver image 601-83 is too small to definitively characterize and most likely benign. Solid abdominal organs are otherwise unremarkable.  Multiple uterine fibroids are seen. Bladder is collapsed. There is high density fluid in the bladder which may represent prior contrast administration or hematuria. Large, necrotic right breast mass. Right axillary lymphadenopathy. Uterine fibroids. High density urine in the bladder. MRI BRAIN W WO CONTRAST    Result Date: 3/5/2022  EXAM: MRI BRAIN W WO CONTRAST INDICATION: Brain metastasis COMPARISON: None TECHNIQUE: Multiplanar, multisequence MR imaging of the head obtained without and with IV. IV contrast: 12 mL IV ProHance. FINDINGS: There is no diffusion restriction to suggest an acute infarct. There is a hemorrhagic partially necrotic heterogeneously enhancing mass in the left parietal occipital region likely intra-axial measuring approximately 3.5 x 4.2 cm (image 90 of series 13). Medially it abuts posterior falx. There is surrounding vasogenic edema with mass effect. There is another hemorrhagic partially necrotic heterogeneously enhancing mass in the left parietotemporal region measuring approximately 4.3 x 4.2 cm (image 85). It abuts the adjacent dura on the lateral aspect. There is surrounding vasogenic edema mass  effect. There is extensive surrounding vasogenic edema anteriorly extending into the left thalamus, left basal ganglia. There is mass effect with near complete effacement of left lateral ventricle and 10 mm midline shift to right. Major vascular flow voids are present. Sellar suprasellar region is unremarkable. Cervicomedullary junction is unremarkable. Visualized paranasal sinuses and mastoid air cells are clear. No suspicious calvarial abnormality. Two large hemorrhagic partially necrotic heterogeneously enhancing mass in the left parietotemporal and left parieto-occipital region with surrounding vasogenic edema and mass effect. These are indeterminate, most likely relate to multicentric glioblastoma. However, possibility of metastasis cannot be excluded given the history of breast cancer.  10 mm midline shift to right. Pathology  pend    Problem List  Patient Active Problem List   Diagnosis    Thyroid nodule    Brain metastases ([address]    Brain mass       Assessment and Plan:     Metastatic triple negative breast cancer  -Neglected Right breast primary  -Intracranial metastasis  - The patient and her mother have agreed to surgery to resect her intracranial metastasis and this  is planned for Tuesday.  -Subsequent to this, she will require radiation therapy. Typically this is administered 2 to 4 weeks postoperatively.  -Subsequent to radiation therapy she will require systemic chemotherapy. This will be in the outpatient setting.  -Recommend she follow-up with medical oncology at the [facility] office after this hospitalization to coordinate and implement systemic palliative chemotherapy once she completes radiation therapy.       Lili Leam MD  3/7/2022

## 2022-03-08 ENCOUNTER — ANESTHESIA (OUTPATIENT)
Dept: OPERATING ROOM | Age: 47
DRG: 021 | End: 2022-03-08
Payer: MEDICAID

## 2022-03-08 ENCOUNTER — ANESTHESIA EVENT (OUTPATIENT)
Dept: OPERATING ROOM | Age: 47
DRG: 021 | End: 2022-03-08
Payer: MEDICAID

## 2022-03-08 ENCOUNTER — APPOINTMENT (OUTPATIENT)
Dept: CT IMAGING | Age: 47
DRG: 021 | End: 2022-03-08
Attending: RADIOLOGY
Payer: MEDICAID

## 2022-03-08 VITALS — DIASTOLIC BLOOD PRESSURE: 51 MMHG | OXYGEN SATURATION: 97 % | SYSTOLIC BLOOD PRESSURE: 86 MMHG

## 2022-03-08 LAB
ANION GAP SERPL CALCULATED.3IONS-SCNC: 9 MMOL/L (ref 3–16)
BANDED NEUTROPHILS RELATIVE PERCENT: 2 % (ref 0–7)
BASOPHILS ABSOLUTE: 0 K/UL (ref 0–0.2)
BASOPHILS RELATIVE PERCENT: 0 %
BUN BLDV-MCNC: 18 MG/DL (ref 7–20)
CALCIUM SERPL-MCNC: 9 MG/DL (ref 8.3–10.6)
CHLORIDE BLD-SCNC: 104 MMOL/L (ref 99–110)
CO2: 25 MMOL/L (ref 21–32)
CREAT SERPL-MCNC: 0.7 MG/DL (ref 0.6–1.1)
EOSINOPHILS ABSOLUTE: 0 K/UL (ref 0–0.6)
EOSINOPHILS RELATIVE PERCENT: 0 %
GFR AFRICAN AMERICAN: >60
GFR NON-AFRICAN AMERICAN: >60
GLUCOSE BLD-MCNC: 150 MG/DL (ref 70–99)
HCT VFR BLD CALC: 36.4 % (ref 36–48)
HEMOGLOBIN: 12.1 G/DL (ref 12–16)
LYMPHOCYTES ABSOLUTE: 1.1 K/UL (ref 1–5.1)
LYMPHOCYTES RELATIVE PERCENT: 6 %
MCH RBC QN AUTO: 27.8 PG (ref 26–34)
MCHC RBC AUTO-ENTMCNC: 33.1 G/DL (ref 31–36)
MCV RBC AUTO: 83.8 FL (ref 80–100)
METAMYELOCYTES RELATIVE PERCENT: 1 %
MONOCYTES ABSOLUTE: 0.7 K/UL (ref 0–1.3)
MONOCYTES RELATIVE PERCENT: 4 %
MYELOCYTE PERCENT: 1 %
NEUTROPHILS ABSOLUTE: 16.7 K/UL (ref 1.7–7.7)
NEUTROPHILS RELATIVE PERCENT: 86 %
PDW BLD-RTO: 14.8 % (ref 12.4–15.4)
PLATELET # BLD: 448 K/UL (ref 135–450)
PLATELET SLIDE REVIEW: ADEQUATE
PMV BLD AUTO: 7.6 FL (ref 5–10.5)
POTASSIUM REFLEX MAGNESIUM: 4.5 MMOL/L (ref 3.5–5.1)
RBC # BLD: 4.34 M/UL (ref 4–5.2)
RBC # BLD: NORMAL 10*6/UL
SODIUM BLD-SCNC: 138 MMOL/L (ref 136–145)
WBC # BLD: 18.5 K/UL (ref 4–11)

## 2022-03-08 PROCEDURE — 2000000000 HC ICU R&B

## 2022-03-08 PROCEDURE — 6360000002 HC RX W HCPCS: Performed by: STUDENT IN AN ORGANIZED HEALTH CARE EDUCATION/TRAINING PROGRAM

## 2022-03-08 PROCEDURE — 3700000000 HC ANESTHESIA ATTENDED CARE: Performed by: NEUROLOGICAL SURGERY

## 2022-03-08 PROCEDURE — 3600000014 HC SURGERY LEVEL 4 ADDTL 15MIN: Performed by: NEUROLOGICAL SURGERY

## 2022-03-08 PROCEDURE — 6360000002 HC RX W HCPCS: Performed by: NEUROLOGICAL SURGERY

## 2022-03-08 PROCEDURE — 2700000000 HC OXYGEN THERAPY PER DAY

## 2022-03-08 PROCEDURE — 2580000003 HC RX 258: Performed by: NURSE PRACTITIONER

## 2022-03-08 PROCEDURE — 2720000010 HC SURG SUPPLY STERILE: Performed by: NEUROLOGICAL SURGERY

## 2022-03-08 PROCEDURE — 70450 CT HEAD/BRAIN W/O DYE: CPT

## 2022-03-08 PROCEDURE — 6360000002 HC RX W HCPCS: Performed by: NURSE PRACTITIONER

## 2022-03-08 PROCEDURE — C9254 INJECTION, LACOSAMIDE: HCPCS | Performed by: NURSE PRACTITIONER

## 2022-03-08 PROCEDURE — 3700000001 HC ADD 15 MINUTES (ANESTHESIA): Performed by: NEUROLOGICAL SURGERY

## 2022-03-08 PROCEDURE — 2580000003 HC RX 258: Performed by: STUDENT IN AN ORGANIZED HEALTH CARE EDUCATION/TRAINING PROGRAM

## 2022-03-08 PROCEDURE — 2580000003 HC RX 258: Performed by: NEUROLOGICAL SURGERY

## 2022-03-08 PROCEDURE — 6360000002 HC RX W HCPCS: Performed by: FAMILY MEDICINE

## 2022-03-08 PROCEDURE — 6370000000 HC RX 637 (ALT 250 FOR IP): Performed by: NURSE PRACTITIONER

## 2022-03-08 PROCEDURE — 95813 EEG EXTND MNTR 61-119 MIN: CPT

## 2022-03-08 PROCEDURE — 85025 COMPLETE CBC W/AUTO DIFF WBC: CPT

## 2022-03-08 PROCEDURE — 2709999900 HC NON-CHARGEABLE SUPPLY: Performed by: NEUROLOGICAL SURGERY

## 2022-03-08 PROCEDURE — 51702 INSERT TEMP BLADDER CATH: CPT

## 2022-03-08 PROCEDURE — 94002 VENT MGMT INPAT INIT DAY: CPT

## 2022-03-08 PROCEDURE — 94761 N-INVAS EAR/PLS OXIMETRY MLT: CPT

## 2022-03-08 PROCEDURE — 3600000004 HC SURGERY LEVEL 4 BASE: Performed by: NEUROLOGICAL SURGERY

## 2022-03-08 PROCEDURE — 80048 BASIC METABOLIC PNL TOTAL CA: CPT

## 2022-03-08 PROCEDURE — 2580000003 HC RX 258: Performed by: NURSE ANESTHETIST, CERTIFIED REGISTERED

## 2022-03-08 PROCEDURE — 6360000002 HC RX W HCPCS: Performed by: NURSE ANESTHETIST, CERTIFIED REGISTERED

## 2022-03-08 PROCEDURE — 36415 COLL VENOUS BLD VENIPUNCTURE: CPT

## 2022-03-08 RX ORDER — LABETALOL HYDROCHLORIDE 5 MG/ML
10 INJECTION, SOLUTION INTRAVENOUS
Status: DISCONTINUED | OUTPATIENT
Start: 2022-03-08 | End: 2022-03-08 | Stop reason: HOSPADM

## 2022-03-08 RX ORDER — PROPOFOL 10 MG/ML
INJECTION, EMULSION INTRAVENOUS PRN
Status: DISCONTINUED | OUTPATIENT
Start: 2022-03-08 | End: 2022-03-08 | Stop reason: SDUPTHER

## 2022-03-08 RX ORDER — LACOSAMIDE 10 MG/ML
200 INJECTION, SOLUTION INTRAVENOUS EVERY 12 HOURS
Status: DISCONTINUED | OUTPATIENT
Start: 2022-03-08 | End: 2022-03-08

## 2022-03-08 RX ORDER — SODIUM CHLORIDE 9 MG/ML
INJECTION, SOLUTION INTRAVENOUS CONTINUOUS PRN
Status: DISCONTINUED | OUTPATIENT
Start: 2022-03-08 | End: 2022-03-08 | Stop reason: SDUPTHER

## 2022-03-08 RX ORDER — ONDANSETRON 2 MG/ML
4 INJECTION INTRAMUSCULAR; INTRAVENOUS
Status: DISCONTINUED | OUTPATIENT
Start: 2022-03-08 | End: 2022-03-08 | Stop reason: HOSPADM

## 2022-03-08 RX ORDER — LORAZEPAM 2 MG/ML
0.5 INJECTION INTRAMUSCULAR
Status: DISCONTINUED | OUTPATIENT
Start: 2022-03-08 | End: 2022-03-08 | Stop reason: HOSPADM

## 2022-03-08 RX ORDER — REMIFENTANIL HYDROCHLORIDE 1 MG/ML
INJECTION, POWDER, LYOPHILIZED, FOR SOLUTION INTRAVENOUS PRN
Status: DISCONTINUED | OUTPATIENT
Start: 2022-03-08 | End: 2022-03-08 | Stop reason: SDUPTHER

## 2022-03-08 RX ORDER — SODIUM CHLORIDE 0.9 % (FLUSH) 0.9 %
5-40 SYRINGE (ML) INJECTION EVERY 12 HOURS SCHEDULED
Status: DISCONTINUED | OUTPATIENT
Start: 2022-03-08 | End: 2022-03-08 | Stop reason: HOSPADM

## 2022-03-08 RX ORDER — SODIUM CHLORIDE, SODIUM LACTATE, POTASSIUM CHLORIDE, CALCIUM CHLORIDE 600; 310; 30; 20 MG/100ML; MG/100ML; MG/100ML; MG/100ML
INJECTION, SOLUTION INTRAVENOUS CONTINUOUS PRN
Status: DISCONTINUED | OUTPATIENT
Start: 2022-03-08 | End: 2022-03-08 | Stop reason: SDUPTHER

## 2022-03-08 RX ORDER — LORAZEPAM 2 MG/ML
3 INJECTION INTRAMUSCULAR ONCE
Status: COMPLETED | OUTPATIENT
Start: 2022-03-08 | End: 2022-03-08

## 2022-03-08 RX ORDER — PROPOFOL 10 MG/ML
5-50 INJECTION, EMULSION INTRAVENOUS
Status: DISCONTINUED | OUTPATIENT
Start: 2022-03-08 | End: 2022-03-09

## 2022-03-08 RX ORDER — MIDAZOLAM HYDROCHLORIDE 1 MG/ML
INJECTION INTRAMUSCULAR; INTRAVENOUS PRN
Status: DISCONTINUED | OUTPATIENT
Start: 2022-03-08 | End: 2022-03-08 | Stop reason: SDUPTHER

## 2022-03-08 RX ORDER — OXYCODONE HYDROCHLORIDE 5 MG/1
10 TABLET ORAL PRN
Status: DISCONTINUED | OUTPATIENT
Start: 2022-03-08 | End: 2022-03-08 | Stop reason: HOSPADM

## 2022-03-08 RX ORDER — SODIUM CHLORIDE 9 MG/ML
25 INJECTION, SOLUTION INTRAVENOUS PRN
Status: DISCONTINUED | OUTPATIENT
Start: 2022-03-08 | End: 2022-03-08 | Stop reason: HOSPADM

## 2022-03-08 RX ORDER — DIPHENHYDRAMINE HYDROCHLORIDE 50 MG/ML
12.5 INJECTION INTRAMUSCULAR; INTRAVENOUS
Status: DISCONTINUED | OUTPATIENT
Start: 2022-03-08 | End: 2022-03-08 | Stop reason: HOSPADM

## 2022-03-08 RX ORDER — FENTANYL CITRATE 50 UG/ML
25 INJECTION, SOLUTION INTRAMUSCULAR; INTRAVENOUS EVERY 5 MIN PRN
Status: DISCONTINUED | OUTPATIENT
Start: 2022-03-08 | End: 2022-03-08 | Stop reason: HOSPADM

## 2022-03-08 RX ORDER — 0.9 % SODIUM CHLORIDE 0.9 %
1000 INTRAVENOUS SOLUTION INTRAVENOUS ONCE
Status: COMPLETED | OUTPATIENT
Start: 2022-03-08 | End: 2022-03-08

## 2022-03-08 RX ORDER — OXYCODONE HYDROCHLORIDE 5 MG/1
5 TABLET ORAL PRN
Status: DISCONTINUED | OUTPATIENT
Start: 2022-03-08 | End: 2022-03-08 | Stop reason: HOSPADM

## 2022-03-08 RX ORDER — MEPERIDINE HYDROCHLORIDE 25 MG/ML
12.5 INJECTION INTRAMUSCULAR; INTRAVENOUS; SUBCUTANEOUS EVERY 5 MIN PRN
Status: DISCONTINUED | OUTPATIENT
Start: 2022-03-08 | End: 2022-03-08 | Stop reason: HOSPADM

## 2022-03-08 RX ORDER — SODIUM CHLORIDE 0.9 % (FLUSH) 0.9 %
5-40 SYRINGE (ML) INJECTION PRN
Status: DISCONTINUED | OUTPATIENT
Start: 2022-03-08 | End: 2022-03-08 | Stop reason: HOSPADM

## 2022-03-08 RX ORDER — FENTANYL CITRATE 50 UG/ML
INJECTION, SOLUTION INTRAMUSCULAR; INTRAVENOUS PRN
Status: DISCONTINUED | OUTPATIENT
Start: 2022-03-08 | End: 2022-03-08 | Stop reason: SDUPTHER

## 2022-03-08 RX ORDER — SODIUM CHLORIDE 9 MG/ML
INJECTION, SOLUTION INTRAVENOUS CONTINUOUS
Status: DISCONTINUED | OUTPATIENT
Start: 2022-03-08 | End: 2022-03-09

## 2022-03-08 RX ORDER — PROCHLORPERAZINE EDISYLATE 5 MG/ML
5 INJECTION INTRAMUSCULAR; INTRAVENOUS
Status: DISCONTINUED | OUTPATIENT
Start: 2022-03-08 | End: 2022-03-08 | Stop reason: HOSPADM

## 2022-03-08 RX ADMIN — DEXAMETHASONE SODIUM PHOSPHATE 6 MG: 4 INJECTION, SOLUTION INTRA-ARTICULAR; INTRALESIONAL; INTRAMUSCULAR; INTRAVENOUS; SOFT TISSUE at 12:09

## 2022-03-08 RX ADMIN — SODIUM CHLORIDE: 9 INJECTION, SOLUTION INTRAVENOUS at 10:08

## 2022-03-08 RX ADMIN — DEXAMETHASONE SODIUM PHOSPHATE 6 MG: 4 INJECTION, SOLUTION INTRA-ARTICULAR; INTRALESIONAL; INTRAMUSCULAR; INTRAVENOUS; SOFT TISSUE at 23:48

## 2022-03-08 RX ADMIN — LORAZEPAM 1 MG: 2 INJECTION INTRAMUSCULAR; INTRAVENOUS at 15:40

## 2022-03-08 RX ADMIN — SODIUM CHLORIDE 1000 ML: 0.9 INJECTION, SOLUTION INTRAVENOUS at 17:05

## 2022-03-08 RX ADMIN — PROPOFOL 50 MCG/KG/MIN: 10 INJECTION, EMULSION INTRAVENOUS at 17:39

## 2022-03-08 RX ADMIN — FENTANYL CITRATE 100 MCG: 50 INJECTION, SOLUTION INTRAMUSCULAR; INTRAVENOUS at 14:03

## 2022-03-08 RX ADMIN — LEVETIRACETAM 1000 MG: 100 INJECTION, SOLUTION INTRAVENOUS at 23:46

## 2022-03-08 RX ADMIN — LEVETIRACETAM 1000 MG: 100 INJECTION, SOLUTION INTRAVENOUS at 00:12

## 2022-03-08 RX ADMIN — PANTOPRAZOLE SODIUM 40 MG: 40 TABLET, DELAYED RELEASE ORAL at 05:57

## 2022-03-08 RX ADMIN — REMIFENTANIL HYDROCHLORIDE 160 MCG: 1 INJECTION, POWDER, LYOPHILIZED, FOR SOLUTION INTRAVENOUS at 14:09

## 2022-03-08 RX ADMIN — DEXTROSE MONOHYDRATE 1330 MG PE: 50 INJECTION, SOLUTION INTRAVENOUS at 16:00

## 2022-03-08 RX ADMIN — SODIUM CHLORIDE, PRESERVATIVE FREE 10 ML: 5 INJECTION INTRAVENOUS at 22:07

## 2022-03-08 RX ADMIN — MIDAZOLAM HYDROCHLORIDE 2 MG: 2 INJECTION, SOLUTION INTRAMUSCULAR; INTRAVENOUS at 15:29

## 2022-03-08 RX ADMIN — SODIUM CHLORIDE: 9 INJECTION, SOLUTION INTRAVENOUS at 00:32

## 2022-03-08 RX ADMIN — DEXAMETHASONE SODIUM PHOSPHATE 6 MG: 4 INJECTION, SOLUTION INTRA-ARTICULAR; INTRALESIONAL; INTRAMUSCULAR; INTRAVENOUS; SOFT TISSUE at 05:57

## 2022-03-08 RX ADMIN — MIDAZOLAM HYDROCHLORIDE 2 MG: 2 INJECTION, SOLUTION INTRAMUSCULAR; INTRAVENOUS at 14:03

## 2022-03-08 RX ADMIN — PROPOFOL 100 MG: 10 INJECTION, EMULSION INTRAVENOUS at 15:36

## 2022-03-08 RX ADMIN — LORAZEPAM 1 MG: 2 INJECTION INTRAMUSCULAR; INTRAVENOUS at 15:51

## 2022-03-08 RX ADMIN — LORAZEPAM 1 MG: 2 INJECTION INTRAMUSCULAR; INTRAVENOUS at 16:05

## 2022-03-08 RX ADMIN — REMIFENTANIL HYDROCHLORIDE 0.12 MCG/KG/MIN: 1 INJECTION, POWDER, LYOPHILIZED, FOR SOLUTION INTRAVENOUS at 14:12

## 2022-03-08 RX ADMIN — CEFAZOLIN SODIUM 2000 MG: 10 INJECTION, POWDER, FOR SOLUTION INTRAVENOUS at 14:12

## 2022-03-08 RX ADMIN — DEXAMETHASONE SODIUM PHOSPHATE 6 MG: 4 INJECTION, SOLUTION INTRA-ARTICULAR; INTRALESIONAL; INTRAMUSCULAR; INTRAVENOUS; SOFT TISSUE at 00:11

## 2022-03-08 RX ADMIN — MIDAZOLAM 1 MG/HR: 5 INJECTION INTRAMUSCULAR; INTRAVENOUS at 17:03

## 2022-03-08 RX ADMIN — SODIUM CHLORIDE, SODIUM LACTATE, POTASSIUM CHLORIDE, AND CALCIUM CHLORIDE: .6; .31; .03; .02 INJECTION, SOLUTION INTRAVENOUS at 13:57

## 2022-03-08 RX ADMIN — DEXAMETHASONE SODIUM PHOSPHATE 6 MG: 4 INJECTION, SOLUTION INTRA-ARTICULAR; INTRALESIONAL; INTRAMUSCULAR; INTRAVENOUS; SOFT TISSUE at 17:52

## 2022-03-08 RX ADMIN — PROPOFOL 125 MCG/KG/MIN: 10 INJECTION, EMULSION INTRAVENOUS at 14:12

## 2022-03-08 RX ADMIN — SODIUM CHLORIDE: 900 INJECTION, SOLUTION INTRAVENOUS at 13:57

## 2022-03-08 RX ADMIN — LEVETIRACETAM 1000 MG: 100 INJECTION, SOLUTION INTRAVENOUS at 12:17

## 2022-03-08 RX ADMIN — PROPOFOL 10 MCG/KG/MIN: 10 INJECTION, EMULSION INTRAVENOUS at 22:11

## 2022-03-08 RX ADMIN — DEXTROSE MONOHYDRATE 200 MG: 50 INJECTION, SOLUTION INTRAVENOUS at 17:36

## 2022-03-08 RX ADMIN — LORAZEPAM 1 MG: 2 INJECTION INTRAMUSCULAR; INTRAVENOUS at 15:33

## 2022-03-08 ASSESSMENT — PULMONARY FUNCTION TESTS
PIF_VALUE: 20
PIF_VALUE: 18
PIF_VALUE: 19
PIF_VALUE: 20
PIF_VALUE: 25
PIF_VALUE: 18
PIF_VALUE: 20
PIF_VALUE: 18
PIF_VALUE: 20
PIF_VALUE: 21
PIF_VALUE: 18
PIF_VALUE: 17
PIF_VALUE: 17
PIF_VALUE: 18
PIF_VALUE: 19
PIF_VALUE: 19
PIF_VALUE: 18
PIF_VALUE: 20
PIF_VALUE: 25
PIF_VALUE: 21
PIF_VALUE: 16
PIF_VALUE: 17
PIF_VALUE: 18
PIF_VALUE: 9
PIF_VALUE: 17
PIF_VALUE: 19
PIF_VALUE: 18
PIF_VALUE: 20
PIF_VALUE: 17
PIF_VALUE: 25
PIF_VALUE: 31
PIF_VALUE: 23
PIF_VALUE: 18
PIF_VALUE: 18
PIF_VALUE: 17
PIF_VALUE: 18
PIF_VALUE: 27
PIF_VALUE: 19
PIF_VALUE: 20
PIF_VALUE: 20
PIF_VALUE: 18
PIF_VALUE: 21
PIF_VALUE: 6
PIF_VALUE: 25
PIF_VALUE: 20
PIF_VALUE: 19
PIF_VALUE: 17
PIF_VALUE: 18
PIF_VALUE: 19
PIF_VALUE: 16
PIF_VALUE: 1
PIF_VALUE: 17
PIF_VALUE: 13
PIF_VALUE: 22
PIF_VALUE: 18
PIF_VALUE: 21
PIF_VALUE: 18
PIF_VALUE: 18
PIF_VALUE: 17
PIF_VALUE: 19
PIF_VALUE: 18
PIF_VALUE: 17
PIF_VALUE: 24
PIF_VALUE: 18
PIF_VALUE: 19
PIF_VALUE: 18
PIF_VALUE: 20
PIF_VALUE: 17
PIF_VALUE: 18
PIF_VALUE: 17
PIF_VALUE: 20
PIF_VALUE: 19
PIF_VALUE: 20
PIF_VALUE: 16
PIF_VALUE: 17
PIF_VALUE: 13
PIF_VALUE: 18
PIF_VALUE: 1
PIF_VALUE: 18
PIF_VALUE: 20
PIF_VALUE: 18
PIF_VALUE: 18
PIF_VALUE: 24
PIF_VALUE: 18
PIF_VALUE: 21
PIF_VALUE: 20
PIF_VALUE: 25
PIF_VALUE: 18
PIF_VALUE: 20
PIF_VALUE: 20
PIF_VALUE: 18
PIF_VALUE: 17
PIF_VALUE: 17
PIF_VALUE: 19
PIF_VALUE: 20
PIF_VALUE: 21
PIF_VALUE: 17
PIF_VALUE: 1
PIF_VALUE: 25
PIF_VALUE: 19
PIF_VALUE: 19
PIF_VALUE: 20
PIF_VALUE: 20
PIF_VALUE: 17
PIF_VALUE: 17
PIF_VALUE: 23
PIF_VALUE: 1
PIF_VALUE: 19
PIF_VALUE: 18
PIF_VALUE: 22
PIF_VALUE: 18
PIF_VALUE: 18
PIF_VALUE: 22
PIF_VALUE: 18
PIF_VALUE: 21
PIF_VALUE: 18
PIF_VALUE: 22
PIF_VALUE: 20
PIF_VALUE: 18
PIF_VALUE: 1
PIF_VALUE: 17
PIF_VALUE: 18
PIF_VALUE: 25
PIF_VALUE: 17
PIF_VALUE: 20
PIF_VALUE: 17
PIF_VALUE: 18
PIF_VALUE: 25
PIF_VALUE: 1
PIF_VALUE: 17
PIF_VALUE: 18
PIF_VALUE: 20
PIF_VALUE: 22
PIF_VALUE: 18
PIF_VALUE: 20
PIF_VALUE: 22
PIF_VALUE: 18
PIF_VALUE: 18
PIF_VALUE: 26
PIF_VALUE: 20
PIF_VALUE: 17
PIF_VALUE: 17
PIF_VALUE: 20
PIF_VALUE: 22
PIF_VALUE: 17
PIF_VALUE: 18
PIF_VALUE: 18
PIF_VALUE: 2
PIF_VALUE: 18
PIF_VALUE: 17
PIF_VALUE: 33
PIF_VALUE: 18
PIF_VALUE: 2
PIF_VALUE: 18
PIF_VALUE: 18
PIF_VALUE: 22
PIF_VALUE: 19
PIF_VALUE: 20
PIF_VALUE: 18
PIF_VALUE: 18
PIF_VALUE: 20
PIF_VALUE: 17
PIF_VALUE: 17
PIF_VALUE: 20
PIF_VALUE: 19

## 2022-03-08 ASSESSMENT — PAIN SCALES - GENERAL
PAINLEVEL_OUTOF10: 0

## 2022-03-08 NOTE — PROGRESS NOTES
Patient returned from OR and CT scan. She is intubated and sedated on propofol. RT and neurosurgery NP at bedside. Patient hooked back up to monitor.

## 2022-03-08 NOTE — ANESTHESIA PRE PROCEDURE
Department of Anesthesiology  Preprocedure Note       Name:  Mckay Russo   Age:  48 y.o.  :  1975                                          MRN:  [id number]         Date:  3/8/2022      Surgeon: Yasmani Mackay):  Neel Singh MD    Procedure: Procedure(s):  LEFT TEMPORAL PARIETAL OCCIPITAL CRANIOTOMY FOR TUMOR RESECTION    Medications prior to admission:   Prior to Admission medications    Not on File       Current medications:    Current Facility-Administered Medications   Medication Dose Route Frequency Provider Last Rate Last Admin    0.9 % sodium chloride infusion   IntraVENous Continuous Agueda Edge MD        sodium chloride flush 0.9 % injection 5-40 mL  5-40 mL IntraVENous 2 times per day Agueda Edge MD        sodium chloride flush 0.9 % injection 5-40 mL  5-40 mL IntraVENous PRN Agueda Edge MD        0.9 % sodium chloride infusion  25 mL IntraVENous PRN Agueda Edge MD        meperidine (DEMEROL) injection 12.5 mg  12.5 mg IntraVENous Q5 Min PRN Agueda Edge MD        fentaNYL (SUBLIMAZE) injection 25 mcg  25 mcg IntraVENous Q5 Min PRN Agueda Edge MD        HYDROmorphone (DILAUDID) injection 0.5 mg  0.5 mg IntraVENous Q5 Min PRN Agueda Edge MD        oxyCODONE (ROXICODONE) immediate release tablet 5 mg  5 mg Oral PRN Agueda Edge MD        Or    oxyCODONE (ROXICODONE) immediate release tablet 10 mg  10 mg Oral PRN Agueda Edge MD        [facility] injection 4 mg  4 mg IntraVENous Once PRN Agueda Edge MD        prochlorperazine (COMPAZINE) injection 5 mg  5 mg IntraVENous Once PRN Agueda Edge MD        LORazepam (ATIVAN) injection 0.5 mg  0.5 mg IntraVENous Once PRN Agueda Edge MD        diphenhydrAMINE (BENADRYL) injection 12.5 mg  12.5 mg IntraVENous Once PRN Agueda Edge MD        labetalol (NORMODYNE;TRANDATE) injection 10 mg  10 mg IntraVENous Q15 Min PRN Agueda Edge MD        ceFAZolin (ANCEF) 2000 mg in dextrose 5 % 50 mL IVPB  2,000 mg IntraVENous On Call to [facility], APRN - CNP        0.9 % sodium chloride infusion   IntraVENous Continuous Merril Marus, APRN -  mL/hr at 03/08/22 1008 New Bag at 03/08/22 1008    dexamethasone (DECADRON) injection 6 mg  6 mg IntraVENous Q6H Dez Camargo MD   6 mg at 03/08/22 1209    levETIRAcetam (KEPPRA) 1,000 mg in sodium chloride 0.9 % 100 mL IVPB  1,000 mg IntraVENous Q12H Dez Camargo  mL/hr at 03/08/22 1217 1,000 mg at 03/08/22 1217    sodium chloride flush 0.9 % injection 5-40 mL  5-40 mL IntraVENous 2 times per day Dez Camargo MD   10 mL at 03/07/22 2123    sodium chloride flush 0.9 % injection 5-40 mL  5-40 mL IntraVENous PRN Dez Camargo MD        0.9 % sodium chloride infusion  25 mL IntraVENous PRN Dez Camargo MD       [facility] [place] by provider] enoxaparin (LOVENOX) injection 40 mg  40 mg SubCUTAneous Daily Dez Camargo MD   40 mg at 03/07/22 0849    ondansetron (ZOFRAN-ODT) disintegrating tablet 4 mg  4 mg Oral Q8H PRN Dez Camargo MD        Or    ondansetron [facility]) injection 4 mg  4 mg IntraVENous Q6H PRN Dez Camargo MD        polyethylene glycol (GLYCOLAX) packet 17 g  17 g Oral Daily PRN Dez Camargo MD   17 g at 03/07/22 0849    acetaminophen (TYLENOL) tablet 650 mg  650 mg Oral Q6H PRN Dez Camargo MD   650 mg at 03/05/22 0549    Or    acetaminophen (TYLENOL) suppository 650 mg  650 mg Rectal Q6H PRN Dez Camargo MD        pantoprazole (PROTONIX) tablet 40 mg  40 mg Oral QAM AC Merril Marus, APRN - CNP   40 mg at 03/08/22 6322       Allergies:  No Known Allergies    Problem List:    Patient Active Problem List   Diagnosis Code    Thyroid nodule E04.1    Brain metastases (HCC) C79.31    Brain mass G93.89       Past Medical History:        Diagnosis Date    Thyroid nodule        Past Surgical History:  History reviewed. No pertinent surgical history.     Social History:    Social History     Tobacco Use    Smoking status: Never Smoker    Smokeless tobacco: Never Used   Substance Use Topics    Alcohol use: Never     Alcohol/week: 0.0 standard drinks                                Counseling given: Not Answered      Vital Signs (Current):   Vitals:    03/08/22 0600 03/08/22 0620 03/08/22 0800 03/08/22 1200   BP:  108/72 123/85 121/89   Pulse:  91 82 82   Resp:  18 19 12   Temp:   97.5 °F (36.4 °C) 97.7 °F (36.5 °C)   TempSrc:   Oral Oral   SpO2:  93% 93%    Weight: 195 lb 8.8 oz (88.7 kg)      Height:                                                  BP Readings from Last 3 Encounters:   03/08/22 121/89   03/04/22 113/76   06/23/15 100/66       NPO Status: Time of last liquid consumption: 2200                        Time of last solid consumption: 2200                        Date of last liquid consumption: 03/07/22                        Date of last solid food consumption: 03/07/22    BMI:   Wt Readings from Last 3 Encounters:   03/08/22 195 lb 8.8 oz (88.7 kg)   03/03/22 189 lb 13.1 oz (86.1 kg)   06/23/15 169 lb (76.7 kg)     Body mass index is 34.65 kg/m².     CBC:   Lab Results   Component Value Date    WBC 18.5 03/08/2022    RBC 4.34 03/08/2022    HGB 12.1 03/08/2022    HCT 36.4 03/08/2022    MCV 83.8 03/08/2022    RDW 14.8 03/08/2022     03/08/2022       CMP:   Lab Results   Component Value Date     03/08/2022    K 4.5 03/08/2022     03/08/2022    CO2 25 03/08/2022    BUN 18 03/08/2022    CREATININE 0.7 03/08/2022    GFRAA >60 03/08/2022    AGRATIO 1.0 03/03/2022    LABGLOM >60 03/08/2022    GLUCOSE 150 03/08/2022    PROT 8.0 03/03/2022    CALCIUM 9.0 03/08/2022    BILITOT 0.3 03/03/2022    ALKPHOS 61 03/03/2022    AST 17 03/03/2022    ALT 14 03/03/2022       POC Tests:   Recent Labs     03/06/22 2030   POCGLU 243*       Coags:   Lab Results   Component Value Date    PROTIME 12.3 03/07/2022    INR 1.08 03/07/2022    APTT 31.5 03/07/2022       HCG (If Applicable):   Lab Results   Component Value Date PREGTESTUR Negative 03/07/2022        ABGs: No results found for: PHART, PO2ART, KIX5ILF, EQR0ESQ, BEART, S9OAMQKA     Type & Screen (If Applicable):  No results found for: LABABO, LABRH    Drug/Infectious Status (If Applicable):  No results found for: HIV, HEPCAB    COVID-19 Screening (If Applicable): No results found for: COVID19        Anesthesia Evaluation    Airway: Mallampati: II  TM distance: >3 FB   Neck ROM: full  Mouth opening: > = 3 FB Dental:          Pulmonary:                              Cardiovascular:            Rhythm: regular  Rate: normal                    Neuro/Psych:               GI/Hepatic/Renal:             Endo/Other:                     Abdominal:             Vascular: Other Findings:             Anesthesia Plan      general     ASA 4       Induction: intravenous. arterial line    Anesthetic plan and risks discussed with patient. Plan discussed with CRNA.                   Alek Vila MD   3/8/2022

## 2022-03-08 NOTE — PROGRESS NOTES
Versed drip started, propofol drip being titrated down. Currently, patient is unresponsive, being hooked up to cvEEG. Blood pressure are on the soft side(80's/70's), saline bolus started as ordered.

## 2022-03-08 NOTE — PLAN OF CARE
Problem: Sensory:  Goal: General experience of comfort will improve  Description: General experience of comfort will improve  3/8/2022 1058 by Court Sanchez RN  Note: Up to restroom as needed and repositioned as requested. Problem: Urinary Elimination:  Goal: Ability to reestablish a normal urinary elimination pattern will improve - after catheter removal  Description: Ability to reestablish a normal urinary elimination pattern will improve  3/8/2022 1058 by Court Sanchez RN  Note: Up to bathroom regularly to maintain normal elimination pattern     Problem: Falls - Risk of:  Goal: Will remain free from falls  Description: Will remain free from falls  3/8/2022 1058 by Court Sanchez RN  Note: Patient will remain free of falls throughout the duration of the shift. Bed locked in lowest position. Non skid socks on. Bed and chair alarm activated. Call light and belongings within reach. Patient calls out approprietly. Room door open at all times. Patient rounded on frequently. DISPLAY PLAN FREE TEXT

## 2022-03-08 NOTE — PLAN OF CARE
Problem: Falls - Risk of:  Goal: Will remain free from falls  Description: Will remain free from falls  Outcome: Ongoing  Goal: Absence of physical injury  Description: Absence of physical injury  Outcome: Ongoing     Problem: Verbal Communication - Impaired:  Goal: Functional communication will improve  Description: Functional communication will improve  Outcome: Ongoing  Goal: Ability to interact with others will improve  Description: Ability to interact with others will improve  Outcome: Ongoing  Goal: Ability to establish a method of communication will improve  Description: Ability to establish a method of communication will improve  Outcome: Ongoing     Problem: Sensory:  Goal: General experience of comfort will improve  Description: General experience of comfort will improve  Outcome: Ongoing

## 2022-03-08 NOTE — PROGRESS NOTES
ICU Progress Note    Admit Date: 3/4/2022  ICU admit 3/4/2022   Day#4  Vent Day: None  IV Access:Peripheral  IV Fluids:None   Vasopressors:None                Antibiotics: None  Diet: Diet NPO Exceptions are: Sips of Water with Meds    CC: AMS, Headache    Interval history:   Patient seen at bedside this morning. No acute events noted overnight. Patient is NPO for surgery planned for this afternoon at 2 PM. She is alert and oriented x2. She denies any nausea, vomiting, abdominal pain, SOB and is resting comfortably.      Medications:     Scheduled Meds:   ceFAZolin (ANCEF) IVPB  2,000 mg IntraVENous On Call to OR    dexamethasone  6 mg IntraVENous Q6H    levetiracetam  1,000 mg IntraVENous Q12H    sodium chloride flush  5-40 mL IntraVENous 2 times per day    [Held by provider] enoxaparin  40 mg SubCUTAneous Daily    pantoprazole  40 mg Oral QAM AC     Continuous Infusions:   sodium chloride 100 mL/hr at 03/08/22 0620    sodium chloride       PRN Meds:sodium chloride flush, sodium chloride, ondansetron **OR** ondansetron, polyethylene glycol, acetaminophen **OR** acetaminophen    Objective:   Vitals:   T-max:  Patient Vitals for the past 8 hrs:   BP Temp Temp src Pulse Resp SpO2 Weight   03/08/22 0620 108/72 -- -- 91 18 93 % --   03/08/22 0600 -- -- -- -- -- -- 195 lb 8.8 oz (88.7 kg)   03/08/22 0500 -- -- -- 102 -- 96 % --   03/08/22 0400 110/70 97.4 °F (36.3 °C) Oral 84 18 96 % --   03/08/22 0300 114/76 -- -- 78 -- 96 % --   03/08/22 0200 105/72 -- -- 82 18 95 % --   03/08/22 0100 124/71 -- -- 79 -- 97 % --   03/08/22 0000 128/82 97.3 °F (36.3 °C) Oral 82 20 95 % --       Intake/Output Summary (Last 24 hours) at 3/8/2022 0750  Last data filed at 3/8/2022 0741  Gross per 24 hour   Intake 674.47 ml   Output 400 ml   Net 274.47 ml     ROS: ROS in interval Hx    Physical Exam  Constitutional: Alert, normal appearance, no acute distress  HENT: head normocephalic/atraumatic, no congestion or rhinorrhea, PERRLA, EOM intact  Cardio: normal rate, regular rhythm. No murmurs heard on auscultation  Pulmonary: clear to auscultation bilaterally, no wheezes, rales, or stridor  GI: abdomen soft, flat, and non-distended. No tenderness or guarding. Musculoskeletal: no deformity, tenderness, or injury. No lower extremity edema. Strength 5/5 in bilateral upper and lower extremities  Skin: no lesion, rash, or erythema. Large breast cancer mass on R breast.  Neuro: no focal neurological deficits, alert and oriented X 2. Follows simple commands. Psych: mood normal, behavior normal    LABS:    CBC:   Recent Labs     03/07/22  0444 03/07/22  1225 03/08/22  0550   WBC 19.3* 18.0* 18.5*   HGB 12.7 12.1 12.1   HCT 37.5 37.6 36.4   * 488* 448   MCV 83.2 84.8 83.8     Renal:    Recent Labs     03/06/22  0625 03/07/22  0444 03/08/22  0550    138 138   K 4.3 4.5 4.5    105 104   CO2 22 22 25   BUN 15 14 18   CREATININE 0.7 0.7 0.7   GLUCOSE 143* 191* 150*   CALCIUM 9.5 9.7 9.0   ANIONGAP 13 11 9     Hepatic: No results for input(s): AST, ALT, BILITOT, BILIDIR, PROT, LABALBU, ALKPHOS in the last 72 hours. Troponin: No results for input(s): TROPONINI in the last 72 hours. BNP: No results for input(s): BNP in the last 72 hours. Lipids: No results for input(s): CHOL, HDL in the last 72 hours. Invalid input(s): LDLCALCU, TRIGLYCERIDE  ABGs:  No results for input(s): PHART, YUO2KHL, PO2ART, SDI7MZS, BEART, THGBART, S1BFMBGP, VXJ4FDD in the last 72 hours. INR:   Recent Labs     03/07/22  1225   INR 1.08     Lactate: No results for input(s): LACTATE in the last 72 hours. Cultures:  -----------------------------------------------------------------  RAD:   MRI BRAIN W WO CONTRAST   Final Result      Two large hemorrhagic partially necrotic heterogeneously enhancing mass in the left parietotemporal and left parieto-occipital region with surrounding vasogenic edema and mass effect.  These are indeterminate, most likely relate to Oncology, Palliative Care     Hematuria  Red urine w/ greater than 900 Rbcs. No infection.    Could be secondary to metastatic breast CA.     Code Status: Full Code  FEN: Diet NPO Exceptions are: Sips of Water with Meds  PPX: Lovenox, pepcid  DISPO: ICU    Debby Sapp MD, PGY-1  03/08/22  7:50 AM    This patient has been staffed and discussed with Dr. Fisher Null

## 2022-03-08 NOTE — PROGRESS NOTES
NEUROSURGERY POST-OP PROGRESS NOTE    Patient Name: Kishor Shea YOB: 1975   Sex: Female Age: 48 yrs     Medical Record Number: [id number] Acct Number: [de-identified]   Room Number: 0860/8489-75 Hospital Day: Hospital Day: 5     Subjective: No acute events overnight. Pt still with expressive aphasia. Objective:    VITAL SIGNS   /85   Pulse 82   Temp 97.5 °F (36.4 °C) (Oral)   Resp 19   Ht 5' 2.99\" (1.6 m)   Wt 195 lb 8.8 oz (88.7 kg)   SpO2 93%   BMI 34.65 kg/m²    Height Height: 5' 2.99\" (160 cm)   Weight Weight: 195 lb 8.8 oz (88.7 kg)        Allergies No Known Allergies   NPO Status Diet NPO Exceptions are: Sips of Water with Meds   Isolation No active isolations     LABS   Basic Metabolic Profile Recent Labs     03/06/22  0625 03/07/22  0444 03/08/22  0550    138 138    105 104   CO2 22 22 25   BUN 15 14 18   CREATININE 0.7 0.7 0.7   GLUCOSE 143* 191* 150*      Complete Blood Count Recent Labs     03/07/22  0444 03/07/22  1225 03/08/22  0550   WBC 19.3* 18.0* 18.5*   RBC 4.51 4.43 4.34      Coagulation Studies Recent Labs     03/07/22  1225   INR 1.08        MEDICATIONS   Inpatient Medications     ceFAZolin (ANCEF) IVPB, 2,000 mg, IntraVENous, On Call to OR    dexamethasone, 6 mg, IntraVENous, Q6H    levetiracetam, 1,000 mg, IntraVENous, Q12H    sodium chloride flush, 5-40 mL, IntraVENous, 2 times per day    [Held by provider] enoxaparin, 40 mg, SubCUTAneous, Daily    pantoprazole, 40 mg, Oral, QAM AC   Infusions    sodium chloride 100 mL/hr at 03/08/22 0620    sodium chloride        Antibiotics   Recent Abx Admin      No antibiotic orders with administrations found.                  Neurologic Exam:  Mental status: awake and alert and oriented x 2, pt has expressive aphasia    Cranial Nerves:  II: Visual acuity not tested, visual fields full, denies new visual changes / diplopia  III, IV, VI: PERRL, 3 mm bilaterally, EOMI, no nystagmus noted  V: Facial sensation intact bilaterally to touch  VII: Face symmetric  VIII: Hearing intact bilaterally to spoken voice  IX: Palate movement equal bilaterally  XI: Shoulder shrug equal bilaterally  XII: Tongue midline      Musculoskeletal:   Gait: Not tested   Tone: normal  Sensory: intact to all extremities  Motor strength:    Right  Left    Right  Left    Deltoid  5 5  Hip Flex  5 5   Biceps  5 5  Knee Extensors  5 5   Triceps  5 5  Knee Flexors  5 5   Wrist Ext  5 5  Ankle Dorsiflex. 5 5   Wrist Flex  5 5  Ankle Plantarflex. 5 5   Handgrip  5 5  Ext Jake Longus  5 5   Thumb Ext  5 5             Respiratory:  Unlabored respiratory pattern    Abdomen:   Soft, ND     Cardiovascular:  Warm, well perfused    Assessment 47 yo woman with triple negative breast ca and intracranial metastases. Educated family and patient about tumor using your guide to neurosurgery, p 5-7    Plan:  -Neuro stable  -HOB elevated  -Frequent neuro checks  -Decadron, PPI, SSI  -Keppra for seizure ppx  -Oncology/Rad onc following  -Plan to OR today for resection of intracranial masses. Patient was seen with Dr. Cesar Kramer who agrees with above assessment and plan. Electronically signed by:  JUDITH Vaz - CNP, 3/8/2022 9:36 AM   Neurosurgery Nurse Practitioner  [phone]

## 2022-03-08 NOTE — PROGRESS NOTES
Neurosurgery Progress Note    Patient seen and examined on 03/07/22. No acute events overnight. Neurologically stable on exam.     A/P: 47 yo woman with triple negative breast ca and intracranial metastases    -Neuro stable  -HOB elevated  -Frequent neuro checks  -Decadron, PPI, SSI  -Keppra for seizure ppx  -Oncology/Rad onc following  -Plan to OR on tomorrow for resection of intracranial masses.   -Will follow.     Tala Wilburn MD, PhD  [address], [address] [place] [address], [address] [phone] (c), [phone] (o)

## 2022-03-08 NOTE — PLAN OF CARE
Patient is being wheeled out of the operating room for craniotomy of the brain mass resection. We will follow up tomorrow.   Continue postoperative care as per surgical team.   Naeem Villatoro MD

## 2022-03-08 NOTE — PROGRESS NOTES
Alert and oriented to person and place. Understands she is having surgery on brain today. Vital signs stable, sinus rhythm. No SOB, chest pain, denies N/V. Skin intact besides R breast mass. Up to restroom and has been resting.

## 2022-03-09 PROBLEM — G93.6 CEREBRAL EDEMA (HCC): Status: ACTIVE | Noted: 2022-03-09

## 2022-03-09 PROBLEM — C80.1: Status: ACTIVE | Noted: 2022-03-09

## 2022-03-09 PROBLEM — G40.901 STATUS EPILEPTICUS (HCC): Status: ACTIVE | Noted: 2022-03-09

## 2022-03-09 LAB
ANION GAP SERPL CALCULATED.3IONS-SCNC: 9 MMOL/L (ref 3–16)
BASOPHILS ABSOLUTE: 0 K/UL (ref 0–0.2)
BASOPHILS RELATIVE PERCENT: 0 %
BUN BLDV-MCNC: 17 MG/DL (ref 7–20)
CALCIUM SERPL-MCNC: 8.9 MG/DL (ref 8.3–10.6)
CHLORIDE BLD-SCNC: 105 MMOL/L (ref 99–110)
CO2: 23 MMOL/L (ref 21–32)
CREAT SERPL-MCNC: 0.7 MG/DL (ref 0.6–1.1)
EOSINOPHILS ABSOLUTE: 0 K/UL (ref 0–0.6)
EOSINOPHILS RELATIVE PERCENT: 0 %
GFR AFRICAN AMERICAN: >60
GFR NON-AFRICAN AMERICAN: >60
GLUCOSE BLD-MCNC: 106 MG/DL (ref 70–99)
GLUCOSE BLD-MCNC: 111 MG/DL (ref 70–99)
GLUCOSE BLD-MCNC: 132 MG/DL (ref 70–99)
GLUCOSE BLD-MCNC: 159 MG/DL (ref 70–99)
HCT VFR BLD CALC: 33.9 % (ref 36–48)
HEMOGLOBIN: 11.3 G/DL (ref 12–16)
LYMPHOCYTES ABSOLUTE: 0.5 K/UL (ref 1–5.1)
LYMPHOCYTES RELATIVE PERCENT: 3.4 %
MCH RBC QN AUTO: 27.6 PG (ref 26–34)
MCHC RBC AUTO-ENTMCNC: 33.2 G/DL (ref 31–36)
MCV RBC AUTO: 83 FL (ref 80–100)
MONOCYTES ABSOLUTE: 0.9 K/UL (ref 0–1.3)
MONOCYTES RELATIVE PERCENT: 6.2 %
NEUTROPHILS ABSOLUTE: 13.1 K/UL (ref 1.7–7.7)
NEUTROPHILS RELATIVE PERCENT: 90.4 %
PDW BLD-RTO: 14.5 % (ref 12.4–15.4)
PERFORMED ON: ABNORMAL
PLATELET # BLD: 413 K/UL (ref 135–450)
PMV BLD AUTO: 7.6 FL (ref 5–10.5)
POTASSIUM REFLEX MAGNESIUM: 4.5 MMOL/L (ref 3.5–5.1)
RBC # BLD: 4.08 M/UL (ref 4–5.2)
SODIUM BLD-SCNC: 137 MMOL/L (ref 136–145)
WBC # BLD: 14.5 K/UL (ref 4–11)

## 2022-03-09 PROCEDURE — 2000000000 HC ICU R&B

## 2022-03-09 PROCEDURE — 6360000002 HC RX W HCPCS: Performed by: STUDENT IN AN ORGANIZED HEALTH CARE EDUCATION/TRAINING PROGRAM

## 2022-03-09 PROCEDURE — 36415 COLL VENOUS BLD VENIPUNCTURE: CPT

## 2022-03-09 PROCEDURE — 80048 BASIC METABOLIC PNL TOTAL CA: CPT

## 2022-03-09 PROCEDURE — 2700000000 HC OXYGEN THERAPY PER DAY

## 2022-03-09 PROCEDURE — 2580000003 HC RX 258: Performed by: NURSE PRACTITIONER

## 2022-03-09 PROCEDURE — C9254 INJECTION, LACOSAMIDE: HCPCS | Performed by: NURSE PRACTITIONER

## 2022-03-09 PROCEDURE — 6360000002 HC RX W HCPCS: Performed by: NURSE PRACTITIONER

## 2022-03-09 PROCEDURE — 94003 VENT MGMT INPAT SUBQ DAY: CPT

## 2022-03-09 PROCEDURE — APPNB60 APP NON BILLABLE TIME 46-60 MINS

## 2022-03-09 PROCEDURE — 99233 SBSQ HOSP IP/OBS HIGH 50: CPT | Performed by: INTERNAL MEDICINE

## 2022-03-09 PROCEDURE — 94761 N-INVAS EAR/PLS OXIMETRY MLT: CPT

## 2022-03-09 PROCEDURE — 99223 1ST HOSP IP/OBS HIGH 75: CPT | Performed by: PSYCHIATRY & NEUROLOGY

## 2022-03-09 PROCEDURE — 2580000003 HC RX 258: Performed by: STUDENT IN AN ORGANIZED HEALTH CARE EDUCATION/TRAINING PROGRAM

## 2022-03-09 PROCEDURE — 95813 EEG EXTND MNTR 61-119 MIN: CPT

## 2022-03-09 PROCEDURE — 6360000002 HC RX W HCPCS

## 2022-03-09 PROCEDURE — 85025 COMPLETE CBC W/AUTO DIFF WBC: CPT

## 2022-03-09 PROCEDURE — 2580000003 HC RX 258

## 2022-03-09 RX ORDER — HEPARIN SODIUM 5000 [USP'U]/ML
5000 INJECTION, SOLUTION INTRAVENOUS; SUBCUTANEOUS EVERY 8 HOURS SCHEDULED
Status: DISCONTINUED | OUTPATIENT
Start: 2022-03-09 | End: 2022-03-11

## 2022-03-09 RX ADMIN — SODIUM CHLORIDE, PRESERVATIVE FREE 20 ML: 5 INJECTION INTRAVENOUS at 08:51

## 2022-03-09 RX ADMIN — LEVETIRACETAM 2000 MG: 100 INJECTION, SOLUTION INTRAVENOUS at 23:38

## 2022-03-09 RX ADMIN — HEPARIN SODIUM 5000 UNITS: 5000 INJECTION INTRAVENOUS; SUBCUTANEOUS at 15:18

## 2022-03-09 RX ADMIN — SODIUM CHLORIDE, PRESERVATIVE FREE 10 ML: 5 INJECTION INTRAVENOUS at 21:08

## 2022-03-09 RX ADMIN — DEXAMETHASONE SODIUM PHOSPHATE 6 MG: 4 INJECTION, SOLUTION INTRA-ARTICULAR; INTRALESIONAL; INTRAMUSCULAR; INTRAVENOUS; SOFT TISSUE at 18:38

## 2022-03-09 RX ADMIN — DEXTROSE MONOHYDRATE 200 MG: 50 INJECTION, SOLUTION INTRAVENOUS at 16:54

## 2022-03-09 RX ADMIN — HEPARIN SODIUM 5000 UNITS: 5000 INJECTION INTRAVENOUS; SUBCUTANEOUS at 21:53

## 2022-03-09 RX ADMIN — DEXTROSE MONOHYDRATE 200 MG: 50 INJECTION, SOLUTION INTRAVENOUS at 05:31

## 2022-03-09 RX ADMIN — DEXAMETHASONE SODIUM PHOSPHATE 6 MG: 4 INJECTION, SOLUTION INTRA-ARTICULAR; INTRALESIONAL; INTRAMUSCULAR; INTRAVENOUS; SOFT TISSUE at 05:51

## 2022-03-09 RX ADMIN — SODIUM CHLORIDE: 9 INJECTION, SOLUTION INTRAVENOUS at 04:31

## 2022-03-09 RX ADMIN — DEXAMETHASONE SODIUM PHOSPHATE 6 MG: 4 INJECTION, SOLUTION INTRA-ARTICULAR; INTRALESIONAL; INTRAMUSCULAR; INTRAVENOUS; SOFT TISSUE at 12:36

## 2022-03-09 RX ADMIN — LEVETIRACETAM 2000 MG: 100 INJECTION, SOLUTION INTRAVENOUS at 11:53

## 2022-03-09 ASSESSMENT — PULMONARY FUNCTION TESTS
PIF_VALUE: 14
PIF_VALUE: 20
PIF_VALUE: 19
PIF_VALUE: 20
PIF_VALUE: 13
PIF_VALUE: 19
PIF_VALUE: 20
PIF_VALUE: 23
PIF_VALUE: 16
PIF_VALUE: 19
PIF_VALUE: 20
PIF_VALUE: 22
PIF_VALUE: 25
PIF_VALUE: 20
PIF_VALUE: 17
PIF_VALUE: 19
PIF_VALUE: 20
PIF_VALUE: 20
PIF_VALUE: 21
PIF_VALUE: 14
PIF_VALUE: 17
PIF_VALUE: 19
PIF_VALUE: 32
PIF_VALUE: 19
PIF_VALUE: 15
PIF_VALUE: 20
PIF_VALUE: 17
PIF_VALUE: 20
PIF_VALUE: 23
PIF_VALUE: 23
PIF_VALUE: 20
PIF_VALUE: 14
PIF_VALUE: 15
PIF_VALUE: 19
PIF_VALUE: 22
PIF_VALUE: 16
PIF_VALUE: 18
PIF_VALUE: 20
PIF_VALUE: 20
PIF_VALUE: 28
PIF_VALUE: 20
PIF_VALUE: 16
PIF_VALUE: 25
PIF_VALUE: 23
PIF_VALUE: 18

## 2022-03-09 ASSESSMENT — ENCOUNTER SYMPTOMS
RESPIRATORY NEGATIVE: 1
GASTROINTESTINAL NEGATIVE: 1

## 2022-03-09 ASSESSMENT — PAIN SCALES - GENERAL
PAINLEVEL_OUTOF10: 0

## 2022-03-09 NOTE — PROGRESS NOTES
CONTINUOUS EEG    Name:  Cora Duran Record Number:  [id number]  Age: 48 y.o. Gender: female  : 1975  Today's Date:  3/9/2022  Room:  [address] [place] [address]  Vital Signs   BP (!) 143/92   Pulse 110   Temp 98 °F (36.7 °C) (Temporal)   Resp 18   Ht 5' 2.99\" (1.6 m)   Wt 189 lb 9.5 oz (86 kg)   SpO2 100%   BMI 33.60 kg/m²       Patient currently on continuous EEG monitoring. All EEG leads are currently in place with no current issues. Verified [facility] connection via team viewer. Checked in with patient RN for current plan of care. Comments: Continue monitoring. All leads within 10K limit.     Electronically signed by Darshan Mariscal on 3/9/2022 at 10:04 AM

## 2022-03-09 NOTE — PROGRESS NOTES
[address] [facility]   [id number]   1975   3/9/2022    Interval History:  Hospital Day #5    Subjective: Denies pain. Wants the the endotracheal tube to be removed. Objective: She is awake on the vent, appears mildly uncomfortable in relation to the ventilator. /81   Pulse 93   Temp 98.4 °F (36.9 °C) (Axillary)   Resp 18   Ht 5' 2.99\" (1.6 m)   Wt 189 lb 9.5 oz (86 kg)   SpO2 97%   BMI 33.60 kg/m²     Labs:  Recent Labs     03/07/22  0444 03/08/22  0550 03/09/22  0500    138 137    104 105   CO2 22 25 23   BUN 14 18 17   CREATININE 0.7 0.7 0.7   GLUCOSE 191* 150* 159*     Recent Labs     03/07/22  1225 03/08/22  0550 03/09/22  0500   WBC 18.0* 18.5* 14.5*   RBC 4.43 4.34 4.08   INR 1.08  --   --        Neurologic Exam:  GCS:  4 - Opens eyes on own  1 - Makes no noise - ETT in place   6 - Follows simple motor commands    Mental Status: Intubated and sedated but awake and able to follow commands  Language: ETT in place  Sensation: Intact to all extremities to light touch  Coordination: Not tested     Cranial Nerves:  II: Visual acuity not tested, denies new visual changes / diplopia  III, IV, VI: PERRL, 3 mm bilaterally, EOMI, no nystagmus noted  V: Facial sensation intact bilaterally to touch  VII: Face symmetric  VIII: Hearing intact bilaterally to spoken voice  IX: Palate movement equal bilaterally  XI: Shoulder shrug weak on right side  XII: Tongue midline    Musculoskeletal:   Gait: Not tested   Tone: normal   Motor strength:    Right  Left    Right  Left    Deltoid  1 5  Hip Flex  1 5   Biceps  1 5  Knee Extensors  1 5   Triceps  1 5  Knee Flexors  1 5   Wrist Ext  1 5  Ankle Dorsiflex. 1 5   Wrist Flex  1 5  Ankle Plantarflex. 1 5   Handgrip  1 5  Ext Jake Longus  1 5   Thumb Ext  1 5         Assessment   49 yo woman with triple negative breast ca and intracranial metastases.  She was taken to the OR yesterday for mass resection however, procedure was aborted before incision was made due to new onset seizure and status epilepticus. 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 Plan:  -Neurologic exams Q2H until otherwise directed  -Continue to wean propofol and versed drip if no seizing on cvEEG   -Okay to extubate today from neurosurgery standpoint   -CT head stable. No further imaging at this time.   -Consult neuro critical care for assistance with seizure mangement   -IV Decadron 6mg q6 hours, SSI  -Keep HOB >30 degrees    -SubQ Heparin for DVT prophylaxis. SCD's. -GI Prophylaxis: PPI  -Oncology following    -Return to OR later this week, likely Saturday, for second attempt at mass resection    DISPO- Remain in ICU     Will FRANCIS HaN, RN, Nurse Practitioner Student, [address] [place] [address] of [address] [place] [address]  I have seen and personally examined the patient, I have verified all portions of the history and all objective data are accurately documented, and I agree with the assessment and plan documented byRyan. Patient was seen and examined with Dr. Nayely Salinas who agrees with above assessment and plan. Electronically signed by:  Gemma Wing, 3/9/2022 7:22 AM

## 2022-03-09 NOTE — PROGRESS NOTES
ICU Progress Note    Admit Date: 3/4/2022  ICU admit 3/4/2022   Day# 5  Vent Day: None  IV Access:Peripheral  IV Fluids:None   Vasopressors:None                Antibiotics: None  Diet: No diet orders on file    CC: AMS, Headache    Interval history:   Patient was taken to the OR yesterday, she started having seizure activity due to which operation was cancelled and was transferred to the ICU after having a detailed discussion with the family. She was continued on versed drip and propofol. She is intubated with vent settings of FiO2 30, rate 16, Vt 500 and PEEP 5. Vitals are stable and she remained afebrile overnight. Patient was seen at bedside this morning, she was put on continuous EEG yesterday which shows generalized background abnormalities indicative of diffuse encephalopathy with no seizures noted.      Medications:     Scheduled Meds:   lacosamide (VIMPAT) IVPB  200 mg IntraVENous Q12H    dexamethasone  6 mg IntraVENous Q6H    levetiracetam  1,000 mg IntraVENous Q12H    sodium chloride flush  5-40 mL IntraVENous 2 times per day    [Held by provider] enoxaparin  40 mg SubCUTAneous Daily    pantoprazole  40 mg Oral QAM AC     Continuous Infusions:   sodium chloride      midazolam 1 mg/hr (03/09/22 0600)    propofol 10 mcg/kg/min (03/09/22 0600)    sodium chloride 100 mL/hr at 03/09/22 0600    sodium chloride       PRN Meds:sodium chloride flush, sodium chloride, ondansetron **OR** ondansetron, polyethylene glycol, acetaminophen **OR** acetaminophen    Objective:   Vitals:   T-max:  Patient Vitals for the past 8 hrs:   BP Temp Temp src Pulse Resp SpO2 Height Weight   03/09/22 0615 -- -- -- 93 18 97 % -- --   03/09/22 0600 123/81 -- -- 101 19 98 % -- 189 lb 9.5 oz (86 kg)   03/09/22 0545 133/86 -- -- 108 20 98 % -- --   03/09/22 0530 (!) 127/96 -- -- 117 19 97 % -- --   03/09/22 0515 (!) 155/103 -- -- 121 27 100 % -- --   03/09/22 0500 (!) 137/92 -- -- 115 24 98 % -- --   03/09/22 0445 120/76 -- -- 93 18 97 % -- --   03/09/22 0430 123/81 -- -- 104 20 97 % -- --   03/09/22 0415 122/81 -- -- 98 21 98 % -- --   03/09/22 0404 -- -- -- 100 -- -- 5' 2.99\" (1.6 m) --   03/09/22 0400 120/87 98.4 °F (36.9 °C) Axillary 103 20 98 % -- --   03/09/22 0345 124/82 -- -- 101 19 97 % -- --   03/09/22 0330 126/85 -- -- 101 21 98 % -- --   03/09/22 0315 124/82 -- -- 104 21 99 % -- --   03/09/22 0300 122/81 -- -- 105 22 98 % -- --   03/09/22 0245 127/78 -- -- 97 19 98 % -- --   03/09/22 0230 115/69 -- -- 89 18 98 % -- --   03/09/22 0215 116/69 -- -- 89 18 97 % -- --   03/09/22 0200 111/70 98.3 °F (36.8 °C) Axillary 90 18 98 % -- --   03/09/22 0145 118/76 -- -- 98 19 99 % -- --   03/09/22 0130 115/74 -- -- 90 17 97 % -- --   03/09/22 0115 114/74 -- -- 92 19 98 % -- --   03/09/22 0100 111/72 -- -- 92 19 97 % -- --   03/09/22 0045 108/72 -- -- 92 18 98 % -- --   03/09/22 0030 109/80 -- -- 92 19 97 % -- --   03/09/22 0025 -- -- -- 94 16 -- 5' 2.99\" (1.6 m) --   03/09/22 0015 106/73 -- -- 92 19 97 % -- --   03/09/22 0000 120/76 97.8 °F (36.6 °C) Axillary 96 20 98 % -- --   03/08/22 2345 113/77 -- -- 93 21 98 % -- --   03/08/22 2330 (!) 139/96 97.8 °F (36.6 °C) Axillary 101 17 98 % -- --   03/08/22 2315 119/74 -- -- 93 21 96 % -- --   03/08/22 2300 115/78 -- -- 91 21 96 % -- --       Intake/Output Summary (Last 24 hours) at 3/9/2022 0659  Last data filed at 3/9/2022 0600  Gross per 24 hour   Intake 5685.86 ml   Output 2125 ml   Net 3560.86 ml     ROS: ROS in interval Hx    Physical Exam  Constitutional: Alert, normal appearance, no acute distress  HENT: head normocephalic/atraumatic, no congestion or rhinorrhea, PERRLA, EOM intact  Cardio: normal rate, regular rhythm. No murmurs heard on auscultation  Pulmonary: clear to auscultation bilaterally, no wheezes, rales, or stridor  GI: abdomen soft, flat, and non-distended. No tenderness or guarding. Musculoskeletal: no deformity, tenderness, or injury. No lower extremity edema.  Strength 5/5 in bilateral upper and lower extremities  Skin: no lesion, rash, or erythema. Large breast cancer mass on R breast.  Neuro: intubated and sedated, opens her eyes and tracks   Psych: mood normal, behavior normal    LABS:    CBC:   Recent Labs     03/07/22  1225 03/08/22  0550 03/09/22  0500   WBC 18.0* 18.5* 14.5*   HGB 12.1 12.1 11.3*   HCT 37.6 36.4 33.9*   * 448 413   MCV 84.8 83.8 83.0     Renal:    Recent Labs     03/07/22  0444 03/08/22  0550 03/09/22  0500    138 137   K 4.5 4.5 4.5    104 105   CO2 22 25 23   BUN 14 18 17   CREATININE 0.7 0.7 0.7   GLUCOSE 191* 150* 159*   CALCIUM 9.7 9.0 8.9   ANIONGAP 11 9 9     Hepatic: No results for input(s): AST, ALT, BILITOT, BILIDIR, PROT, LABALBU, ALKPHOS in the last 72 hours. Troponin: No results for input(s): TROPONINI in the last 72 hours. BNP: No results for input(s): BNP in the last 72 hours. Lipids: No results for input(s): CHOL, HDL in the last 72 hours. Invalid input(s): LDLCALCU, TRIGLYCERIDE  ABGs:  No results for input(s): PHART, RNN6PCY, PO2ART, PMR6VJX, BEART, THGBART, C2FQWBUJ, AQC6TIO in the last 72 hours. INR:   Recent Labs     03/07/22  1225   INR 1.08     Lactate: No results for input(s): LACTATE in the last 72 hours. Cultures:  -----------------------------------------------------------------  RAD:   CT HEAD WO CONTRAST   Final Result      1.  2 large hemorrhagic partially necrotic masses in the left parieto-temporal and left parieto-occipital regions with marked surrounding edema and mass effect. These demonstrate little change when compared to previous MRI study of 3/5/2022. Associated    prominent mass effect and surrounding edema resulting 11 mm midline shift to the right. MRI BRAIN W WO CONTRAST   Final Result      Two large hemorrhagic partially necrotic heterogeneously enhancing mass in the left parietotemporal and left parieto-occipital region with surrounding vasogenic edema and mass effect.  These are indeterminate, most likely relate to multicentric    glioblastoma. However, possibility of metastasis cannot be excluded given the history of breast cancer. 10 mm midline shift to right. CT CHEST ABDOMEN PELVIS W CONTRAST   Final Result      Large, necrotic right breast mass. Right axillary lymphadenopathy. Uterine fibroids. High density urine in the bladder. Assessment/Plan:   Breanna Maury Ramírez is a 48 y. o. female w/ PMH of Stage IIB invasive ductal carcinoma (ER-, MI-, HER2 -) diagnosed 3/30/2021 presented to [facility] w/ one week of altered mental status. Encephalopathy  Edema left posterior cerebral hemisphere   Likely due to underlying metastasis. Hx of Stage IIB invasive ductal carcinoma (ER-, MI-, and HER2-) diagnosed 3/30/2021. Refused treatment at time. Presented w/ headache 1 wks ago w/ persistent memory, reading, and writing difficulties. No hx of seizures.  (3/4) CT Head w/o contrast: Abnormal edema left posterior cerebral hemisphere possibly related to underlying metastases versus less likely primary glioma. Causes 1.4 cm of left-to-right midline shift w/ cisternal effacement and downward transtentorial herniation and findings of developing hydropcephalus.   (3/4) CTA: Negative for large vessel occlusion or hemodynamic stenosis. CT chest/abd/pelvis with large, necrotic right breast mass. Right axillary lymphadenopathy. -(3/5) MRI Brain: Two large hemorrhagic partially necrotic heterogeneously enhancing mass in the left parietotemporal and left parieto-occipital region with surrounding vasogenic edema and mass effect. These are indeterminate, most likely relate to multicentric glioblastoma. However, possibility of metastasis cannot be excluded given the history of breast cancer.  10 mm midline shift to right.   -q2 hr neurochecks  -Decadron 6 mg q 6hrs  -Keppra 1 g BID  -PT/OT/SLP  -on I/V fluids @100ml/hr   -Plan: take OR after the patient seizures are better controlled   -Will require post-op Radiation and systemic therapy as outpatient  -Consults: NSGY, Oncology, Palliative Care    Status epilepticus   Patient was taken to the OR yesterday, she started having seizure activity s/p 2 mg versed x2, 500 mg keppra, 1 mg Ativan x4, 15 mg/kg fosphenytoin, vimpat and versed gtt was given and operation was cancelled and was transferred to the ICU after having a detailed discussion with the family  -remains on continous EEG, which showed generalized background abnormalities indicative of diffuse encephalopathy with no seizures noted  -on Keppra 1g BID   -Vimpat 200 mg I/V BID   -on Versed gtt   Plan: will stop versed and propofol and try SBT followed by extubation if tolerated      Hematuria  Red urine w/ greater than 900 Rbcs. No infection. Could be secondary to metastatic breast CA.     Code Status: Full Code  FEN: No diet orders on file  PPX: Lovenox, pepcid  DISPO: ICU    Linette Moeller MD, PGY-1  03/09/22  6:59 AM    This patient has been staffed and discussed with Dr. Víctor Conn    Patient examined, findings as discussed with Dr. Jovanni Elkins. Agree with assessment and plan. Craniotomy to excise intracranial tumors delayed because of onset of seizures. Seizure activity now controlled, Versed infusion discontinued. She has excellent ventilatory drive and is easily extubated this morning. Continue current ACT, plan for return to the OR in 3 days. Discussed with Dr. Peter Wise.

## 2022-03-09 NOTE — FLOWSHEET NOTE
03/09/22 1610   Encounter Summary   Services provided to: Patient and family together   Referral/Consult From: [address] Family members   Continue Visiting   (3/9/22, [place]. )   Complexity of Encounter Moderate   Length of Encounter 15 minutes   Routine   Type Initial   Spiritual/Pentecostalism   Type Spiritual support  (The patient was aware intermittently. )   Assessment Calm; Approachable  (Ministered to the patient's mother & godmother. )   Intervention Active listening;Nurtured hope;Prayer   Outcome Expressed gratitude;Engaged in conversation

## 2022-03-09 NOTE — PLAN OF CARE
Problem: Falls - Risk of:  Goal: Will remain free from falls  Description: Will remain free from falls  Outcome: Ongoing  Goal: Absence of physical injury  Description: Absence of physical injury  Outcome: Ongoing     Problem: Verbal Communication - Impaired:  Goal: Functional communication will improve  Description: Functional communication will improve  Outcome: Ongoing  Goal: Ability to interact with others will improve  Description: Ability to interact with others will improve  Outcome: Ongoing  Goal: Ability to establish a method of communication will improve  Description: Ability to establish a method of communication will improve  Outcome: Ongoing     Problem: Sensory:  Goal: General experience of comfort will improve  Description: General experience of comfort will improve  Outcome: Ongoing     Problem: Urinary Elimination:  Goal: Signs and symptoms of infection will decrease  Description: Signs and symptoms of infection will decrease  Outcome: Ongoing  Goal: Ability to reestablish a normal urinary elimination pattern will improve - after catheter removal  Description: Ability to reestablish a normal urinary elimination pattern will improve  Outcome: Ongoing  Goal: Complications related to the disease process, condition or treatment will be avoided or minimized  Description: Complications related to the disease process, condition or treatment will be avoided or minimized  Outcome: Ongoing     Problem: Skin Integrity:  Goal: Will show no infection signs and symptoms  Description: Will show no infection signs and symptoms  Outcome: Ongoing  Goal: Absence of new skin breakdown  Description: Absence of new skin breakdown  Outcome: Ongoing

## 2022-03-09 NOTE — PROGRESS NOTES
[facility]  Progress Note    Subjective:     Craniotomy canceled yesterday due to seizure and status epilepticus. Intubated. On continuous EEG. Review of Systems:     Review of Systems   Constitutional: Positive for fatigue. Negative for fever. HENT: Negative. Respiratory: Negative. Cardiovascular: Negative. Gastrointestinal: Negative. Genitourinary: Negative. Musculoskeletal: Negative. Neurological: Negative.         Objective:     Medications    Current Facility-Administered Medications: 0.9 % sodium chloride infusion, , IntraVENous, Continuous  midazolam (VERSED) 100 mg in dextrose 5 % 100 mL infusion, 1-10 mg/hr, IntraVENous, Continuous  lacosamide (VIMPAT) 200 mg in dextrose 5 % 70 mL IVPB, 200 mg, IntraVENous, Q12H  propofol injection, 5-50 mcg/kg/min, IntraVENous, Titrated  0.9 % sodium chloride infusion, , IntraVENous, Continuous  dexamethasone (DECADRON) injection 6 mg, 6 mg, IntraVENous, Q6H  levETIRAcetam (KEPPRA) 1,000 mg in sodium chloride 0.9 % 100 mL IVPB, 1,000 mg, IntraVENous, Q12H  sodium chloride flush 0.9 % injection 5-40 mL, 5-40 mL, IntraVENous, 2 times per day  sodium chloride flush 0.9 % injection 5-40 mL, 5-40 mL, IntraVENous, PRN  0.9 % sodium chloride infusion, 25 mL, IntraVENous, PRN  [Held by provider] enoxaparin (LOVENOX) injection 40 mg, 40 mg, SubCUTAneous, Daily  ondansetron (ZOFRAN-ODT) disintegrating tablet 4 mg, 4 mg, Oral, Q8H PRN **OR** ondansetron (ZOFRAN) injection 4 mg, 4 mg, IntraVENous, Q6H PRN  polyethylene glycol (GLYCOLAX) packet 17 g, 17 g, Oral, Daily PRN  acetaminophen (TYLENOL) tablet 650 mg, 650 mg, Oral, Q6H PRN **OR** acetaminophen (TYLENOL) suppository 650 mg, 650 mg, Rectal, Q6H PRN  pantoprazole (PROTONIX) tablet 40 mg, 40 mg, Oral, QAM AC    Allergies  No Known Allergies    Physical Exam  VITALS:  /81   Pulse 93   Temp 98.4 °F (36.9 °C) (Axillary)   Resp 18   Ht 5' 2.99\" (1.6 m)   Wt 189 lb 9.5 oz (86 kg) SpO2 97%   BMI 33.60 kg/m²   TEMPERATURE:  Current - Temp: 98.4 °F (36.9 °C); Max - Temp  Av.5 °F (36.4 °C)  Min: 96 °F (35.6 °C)  Max: 98.4 °F (36.9 °C)  PULSE OXIMETRY RANGE: SpO2  Av.3 %  Min: 86 %  Max: 100 %  24HR INTAKE/OUTPUT:      Intake/Output Summary (Last 24 hours) at 3/9/2022 0710  Last data filed at 3/9/2022 0600  Gross per 24 hour   Intake 5685.86 ml   Output 2125 ml   Net 3560.86 ml       Physical Exam  Constitutional:       Appearance: She is not ill-appearing. HENT:      Head:      Comments: Dressing in place  Eyes:      General: No scleral icterus. Cardiovascular:      Rate and Rhythm: Normal rate and regular rhythm. Heart sounds: No murmur heard. No gallop. Comments: Dressing overlying right breast  Pulmonary:      Effort: Pulmonary effort is normal.      Breath sounds: Normal breath sounds. No wheezing, rhonchi or rales. Abdominal:      Palpations: Abdomen is soft. Tenderness: There is no abdominal tenderness. Musculoskeletal:         General: No swelling. Lymphadenopathy:      Cervical: No cervical adenopathy. Skin:     General: Skin is warm and dry. Findings: No rash. Neurological:      General: No focal deficit present. Mental Status: She is alert and oriented to person, place, and time. Labs  Recent Labs     22  1225 22  0550 22  0500   WBC 18.0* 18.5* 14.5*   HGB 12.1 12.1 11.3*   HCT 37.6 36.4 33.9*   * 448 413   MCV 84.8 83.8 83.0       Recent Labs     22  0444 22  0550 22  0500    138 137   K 4.5 4.5 4.5    104 105   CO2 22 25 23   BUN 14 18 17   CREATININE 0.7 0.7 0.7       No results for input(s): AST, ALT, ALB, BILIDIR, BILITOT, ALKPHOS in the last 72 hours. No results for input(s): MG in the last 72 hours.     Radiology  CT HEAD WO CONTRAST    Result Date: 3/3/2022  EXAMINATION: CT OF THE HEAD WITHOUT CONTRAST  3/3/2022 8:17 pm TECHNIQUE: CT of the head was performed without the administration of intravenous contrast. Dose modulation, iterative reconstruction, and/or weight based adjustment of the mA/kV was utilized to reduce the radiation dose to as low as reasonably achievable. COMPARISON: None. HISTORY: ORDERING SYSTEM PROVIDED HISTORY: ams TECHNOLOGIST PROVIDED HISTORY: Reason for exam:->ams Has a \"code stroke\" or \"stroke alert\" been called? ->No Decision Support Exception - unselect if not a suspected or confirmed emergency medical condition->Emergency Medical Condition (MA) Is the patient pregnant?->No Reason for Exam: c/o memory loss that began last week. Pt's mother states last Thursday the pt states she felt like something hit her in the head, but doesn't know what. States she isn't able to remember her name, important things she would usually remember, and she is also unable to read or write. FINDINGS: BRAIN/VENTRICLES: There is diffuse hypoattenuation involving much of the left temporoparietal and posterior frontal lobe. There is hypoattenuation involving the white matter involving left posterior hemisphere likely represent areas of vasogenic edema. There is at least 2 discrete of mass is identified measuring approximate 4 cm in size 1 which is central hypoattenuation which may be related to necrosis. These left-to-right midline shift 1.4 cm in size. There is entrapment of the right lateral ventricle and both temporal horns of both lateral ventricles suggestive areas of developing hydrocephalus. There is downward transtentorial herniation as well. With mass effect on the brainstem and the partial effacement of the suprasellar and ambient cisterns. Left uncal herniation. .  Focal density identified left parasagittal occipital lobe likely represents petechial calcification of the blood products. ORBITS: The visualized portion of the orbits demonstrate no acute abnormality.  SINUSES: The visualized paranasal sinuses and mastoid air cells demonstrate no acute abnormality. SOFT TISSUES/SKULL:  No acute abnormality of the visualized skull or soft tissues. Abnormal edema left posterior cerebral hemisphere possibly related to underlying metastases versus less likely primary glioma. This causes 1.4 cm of left-to-right midline shift with cisternal effacement and downward transtentorial herniation and findings of developing hydrocephalus. Hyperdensity left parasagittal occipital lobe could represent area of calcification however small focus of blood products will not be totally excluded. Neuro surgical consultation recommended Critical results were called by Dr. Lenny Kramer to Geoff BOURNE on 3/3/2022 at 21:07. CTA HEAD NECK W CONTRAST    Result Date: 3/3/2022  EXAMINATION: CTA OF THE HEAD AND NECK WITH CONTRAST 3/3/2022 8:17 pm: TECHNIQUE: CTA of the head and neck was performed with the administration of intravenous contrast. Multiplanar reformatted images are provided for review. MIP images are provided for review. Stenosis of the internal carotid arteries measured using NASCET criteria. Dose modulation, iterative reconstruction, and/or weight based adjustment of the mA/kV was utilized to reduce the radiation dose to as low as reasonably achievable. COMPARISON: CT head 03/03/2022 HISTORY: ORDERING SYSTEM PROVIDED HISTORY: ams x 1 week TECHNOLOGIST PROVIDED HISTORY: Reason for exam:->ams x 1 week Has a \"code stroke\" or \"stroke alert\" been called? ->No Decision Support Exception - unselect if not a suspected or confirmed emergency medical condition->Emergency Medical Condition (MA) Reason for Exam: ams x 1 week FINDINGS: CTA NECK: AORTIC ARCH/ARCH VESSELS: No dissection or arterial injury. No significant stenosis of the brachiocephalic or subclavian arteries. CAROTID ARTERIES: No dissection, arterial injury, or hemodynamically significant stenosis by NASCET criteria. VERTEBRAL ARTERIES: No dissection, arterial injury, or significant stenosis.  SOFT TISSUES: The lung apices are clear. No cervical or superior mediastinal lymphadenopathy. The larynx and pharynx are unremarkable. No acute abnormality of the salivary and thyroid glands. Partial visualization of a large exophytic right breast mass. BONES: .  Vertebral heights are maintained. CTA HEAD: ANTERIOR CIRCULATION: No significant stenosis of the intracranial internal carotid, anterior cerebral, or middle cerebral arteries. No aneurysm. POSTERIOR CIRCULATION: No significant stenosis of the vertebral, basilar, or posterior cerebral arteries. No aneurysm. OTHER: No dural venous sinus thrombosis on this non-dedicated study. BRAIN: Left-sided masses and edema with associated midline shift. . Ventriculomegaly worrisome for areas developing hydrocephalus related to the mass effect and downward transfer herniation. Negative for large vessel occlusion or hemodynamic stenosis. CT CHEST ABDOMEN PELVIS W CONTRAST    Result Date: 3/4/2022  CT of chest abdomen and pelvis with contrast HISTORY: Brain mass. Evaluate for primary neoplasm or metastatic disease. Staging. COMPARISON: none CONTRAST:  70 mL Isovue-370 intravenous  TECHNIQUE: Individualized dose optimization technique was used in order to meet ALARA standards for radiation dose reduction. In addition to vendor specific dose reduction algorithms, the dose reduction techniques vary based on the specific scanner utilized but frequently include automated exposure control, adjustment of the mA and/or kV according to patient size, and use of iterative reconstruction technique. COMMENTS: Chest: Mild degenerative changes in the spine. Scoliosis. There is a large, necrotic, peripherally enhancing right breast mass which tenths the anterior skin surface and measures 10.2 x 8.5 cm. There is right axillary lymphadenopathy; for example 2.1 x 1.5 cm on image 601-22. Small hiatal hernia. Mediastinal structures are unremarkable without lymphadenopathy.  Mild linear subpleural atelectasis in the lungs. 2 mm perifissural nodule on the left image 601-51 is most likely benign. No evidence of pulmonary metastatic disease. Abdomen and pelvis: Degenerative changes in the spine. No lymphadenopathy. Gallbladder is within normal limits. Bowel is unremarkable. 4 mm hypodensity in the right lobe of the liver image 601-83 is too small to definitively characterize and most likely benign. Solid abdominal organs are otherwise unremarkable. Multiple uterine fibroids are seen. Bladder is collapsed. There is high density fluid in the bladder which may represent prior contrast administration or hematuria. Large, necrotic right breast mass. Right axillary lymphadenopathy. Uterine fibroids. High density urine in the bladder. MRI BRAIN W WO CONTRAST    Result Date: 3/5/2022  EXAM: MRI BRAIN W WO CONTRAST INDICATION: Brain metastasis COMPARISON: None TECHNIQUE: Multiplanar, multisequence MR imaging of the head obtained without and with IV. IV contrast: 12 mL IV ProHance. FINDINGS: There is no diffusion restriction to suggest an acute infarct. There is a hemorrhagic partially necrotic heterogeneously enhancing mass in the left parietal occipital region likely intra-axial measuring approximately 3.5 x 4.2 cm (image 90 of series 13). Medially it abuts posterior falx. There is surrounding vasogenic edema with mass effect. There is another hemorrhagic partially necrotic heterogeneously enhancing mass in the left parietotemporal region measuring approximately 4.3 x 4.2 cm (image 85). It abuts the adjacent dura on the lateral aspect. There is surrounding vasogenic edema mass  effect. There is extensive surrounding vasogenic edema anteriorly extending into the left thalamus, left basal ganglia. There is mass effect with near complete effacement of left lateral ventricle and 10 mm midline shift to right. Major vascular flow voids are present. Sellar suprasellar region is unremarkable.  Cervicomedullary junction is unremarkable. Visualized paranasal sinuses and mastoid air cells are clear. No suspicious calvarial abnormality. Two large hemorrhagic partially necrotic heterogeneously enhancing mass in the left parietotemporal and left parieto-occipital region with surrounding vasogenic edema and mass effect. These are indeterminate, most likely relate to multicentric glioblastoma. However, possibility of metastasis cannot be excluded given the history of breast cancer. 10 mm midline shift to right. Pathology  pend    Problem List  Patient Active Problem List   Diagnosis    Thyroid nodule    Brain metastases ([address]    Brain mass       Assessment and Plan:     Metastatic triple negative breast cancer  - Neglected Right breast primary  - Intracranial metastasis  - The patient and her mother have agreed to surgery to resect her intracranial metastasis. - Craniotomy had to be canceled due to seizures & status epilepticus.  - Plan is to return to the OR for craniotomy and resection once seizures controled. - Subsequent to this, she will require radiation therapy. Typically this is administered 2 to 4 weeks postoperatively. - Subsequent to radiation therapy she will require systemic chemotherapy. This will be in the outpatient setting.  - Recommend she follow-up with medical oncology at the [facility] office after this hospitalization to coordinate and implement systemic palliative chemotherapy once she completes radiation therapy.       Joanna Dunn MD, PhD  3/9/2022

## 2022-03-09 NOTE — ANESTHESIA POSTPROCEDURE EVALUATION
Department of Anesthesiology  Postprocedure Note    Patient: Otto Ch  MRN: [id number]  YOB: 1975  Date of evaluation: 3/8/2022  Time:  7:43 PM     Procedure Summary     Date: 03/08/22 Room / Location: [facility]    Anesthesia Start: 9305 Anesthesia Stop: 2070    Procedure: LEFT TEMPORAL PARIETAL OCCIPITAL CRANIOTOMY FOR TUMOR RESECTION (Left ) Diagnosis:       Brain mass      (Brain mass [G93.89])    Surgeons: Harini Rice MD Responsible Provider: Hansa Pulliam MD    Anesthesia Type: general ASA Status: 4          Anesthesia Type: general    Laura Phase I:      Laura Phase II:      Last vitals: Reviewed and per EMR flowsheets.        Anesthesia Post Evaluation    Patient location during evaluation: PACU  Level of consciousness: responsive to verbal stimuli  Complications: no  Multimodal analgesia pain management approach

## 2022-03-09 NOTE — PROGRESS NOTES
CONTINUOUS VIDEO EEG MONITORING    DATE OF SERVICE: 3/9/2022 18:31 TO 3/10/2022 16:05    NAME: Becca Young   YOB: 1975  SEX: female  MEDICAL RECORD [id number]    EEG-CCTV study:   The patient is a 48 y.o.y old female monitored due to concern for seizures. EEG-VIDEO MONITORING METHODOLOGY:   Time-locked EEG-video monitoring was performed     Analyses of the monitoring data were performed using the following techniques:   1. Review of the relevant EEG-video data. 2. Review of events detected by the computer system in detail. 3. Review of clinical seizures, with both detailed review of EEG and video and playback using multiple montages. A variety of referential and bipolar montages were used. CLINICAL AND EEG ANALYSIS:  Video EEG recording was reviewed in real time by a technologist, and then reviewed at least twice a day. Results of the monitoring were relayed to the treating team as needed throughout the study, via verbal or written documentation on the patient chart. 3/9/2022-3/10/2022    Seizures - none  Push buttons - none  Background -  EEG background characterized by continuous low to moderate amplitude mixed frequencies. Intermittent generalized rhythmic delta, max bifrontal, with superimposed faster frequencies. CLINICAL INTERPRETATION: This is an abnormal video EEG study  1. Generalized background abnormalities indicative of an underlying diffuse encephalopathy of non-specific etiology, improving in severity over the course of this recording. 2. No focal or epileptiform abnormalities. No seizures. No push button events.

## 2022-03-09 NOTE — PROGRESS NOTES
Neurosurgery Progress Note    Patient seen and examined on 03/09/22. No seizures overnight. Awake and alert this AM. Remains intubated. Hemiparetic on right upper and lower extremities. A/P: 47 yo woman with triple negative breast ca and intracranial metastases. Was in status epilepticus yesterday which has broken. No further seizures. -HOB elevated  -Frequent neuro checks  -Decadron, PPI, SSI  -NCC for seizure management. On cvEEG. Vimpat and Keppra for seizure ppx.   -Oncology/Rad onc following  -Extubate when able  -Plan to OR Saturday for resection of intracranial masses assuming control of seizures prior. All questions answered.   -Will follow.     Angel Figueroa MD, PhD  [address], [address] [place], [place], [address] [phone] (c), [phone] (o)

## 2022-03-09 NOTE — PROGRESS NOTES
Palliative Care Chart Review  and Check in Note:     NAME:  [address] Date: 3/4/2022  Hospital Day:  Hospital Day: 6   Current Code status: Full Code    Palliative care is continuing to following Ms. Ramírez for symptom management,  and goals of care discussion as needed. Patient's chart reviewed today 3/9/22. Met with pt's mother prior to extubation today. Offered emotional support and asked whether Hailee is agreeable a  visit, which she was. D/w ICU resident and Maria E Andujar. The following are the currently established goals/code status, and Symptom management. Goals of care: Pt/family want to proceed with surgery and aggressive treatment of her cancer.     Code status: Full Code, did not discuss today    Discharge plan: VALERIA Gonzales NP  [address] [facility]  [phone]

## 2022-03-09 NOTE — PROGRESS NOTES
Patient has been successfully weaned from Mechanical Ventilation. RSBI before extubation was 50 with EtCO2 of 28 and SpO2 of 99 on 30% FiO2. Patient extubated and placed on 2 liters/min via nasal cannula. Post extubation SpO2 is 96% with HR  125 bpm and RR 30 breaths/min. Patient had strong cough that was non-productive. Extubation Well tolerated by patient. Mir Davilakaraceli

## 2022-03-09 NOTE — PROGRESS NOTES
Neurosurgery Progress Note    Patient seen and examined on 03/08/22. No acute events overnight. Neurologically stable on exam. Anxious about surgery. A/P: 49 yo woman with triple negative breast ca and intracranial metastases    -Neuro stable  -HOB elevated  -Frequent neuro checks  -Decadron, PPI, SSI  -Keppra for seizure ppx  -Oncology/Rad onc following  -NPO for procedure  -Plan to OR today for resection of intracranial masses. All questions answered.   -Will follow.     Gustabo Conteh MD, PhD  [address], [address] [place] [address], [address], [address] [phone] (c), [phone] (o)

## 2022-03-09 NOTE — PROGRESS NOTES
CONTINUOUS VIDEO EEG MONITORING    DATE OF SERVICE: 3/8/2022 18:31 TO 3/9/2022 18:31    NAME: Francis Marina   YOB: 1975  SEX: female  MEDICAL RECORD [id number]    EEG-CCTV study:   The patient is a 48 y.o.y old female monitored due to concern for seizures. EEG-VIDEO MONITORING METHODOLOGY:   Time-locked EEG-video monitoring was performed     Analyses of the monitoring data were performed using the following techniques:   1. Review of the relevant EEG-video data. 2. Review of events detected by the computer system in detail. 3. Review of clinical seizures, with both detailed review of EEG and video and playback using multiple montages. A variety of referential and bipolar montages were used. CLINICAL AND EEG ANALYSIS:  Video EEG recording was reviewed in real time by a technologist, and then reviewed at least twice a day. Results of the monitoring were relayed to the treating team as needed throughout the study, via verbal or written documentation on the patient chart. 3/8/2022-3/9/2022    Seizures - none  Push buttons - none  Background - Initially discontinuous with periods of background suppression 1-3 seconds. Periods of suppression  by bursts of up to 7 seconds of moderate amplitude fast frequencies, maximal in amplitude bifrontal. Improving background over the course of this recording with background becoming more continuous with low to moderate amplitude mixed frequencies. Intermittent generalized rhythmic delta, max bifrontal, with superimposed faster frequencies. CLINICAL INTERPRETATION: This is an abnormal video EEG study  1. Generalized background abnormalities indicative of an underlying diffuse encephalopathy of non-specific etiology, improving in severity over the course of this recording. 2. No focal or epileptiform abnormalities. No seizures. No push button events.

## 2022-03-09 NOTE — PROGRESS NOTES
Hospitalist Progress Note      PCP: SHAHEED ZAMARRIPA, APRN - CNP    Date of Admission: 3/4/2022    Chief Complaint: short term memory loss    Hospital Course: Came to ED with memory loss. Diagnosed with metastatic breast cancer with 2 brain metastasis  Planned for surgical resection of her left posterior temporal and left occipital masses 03/08 but prior to incision it was noted that the patient began to shake, concerning for a seizure, this was confirmed with EEG in the OR, Onset of the seizures may have coincided with obtaining baseline transcranial MEPs. Patient was immediately given 2mg Versed and an additional 500 mg of Keppra (in addition to the 1000 mg given during timeout). A second dose of Versed was then given due to persistent seizure activity, followed by 1mg Ativan x 4 in 5 min intervals. During this time, the patient was loaded with 15 mg/kg fosphenytoin and bolused several times with propofol to induce burst suppression. She continued to have persistent breakthrough non-convulsive seizure activity on EEG consistent with status epilepticus.    CT head did not demonstrate acute changes  Procedure was held and transferred to ICU for monitoring on cEEG    Subjective:   Extubated, follows commands, denies any specific complains    Medications:  Reviewed    Infusion Medications    midazolam 1 mg/hr (03/09/22 0600)    propofol 10 mcg/kg/min (03/09/22 0600)    sodium chloride 100 mL/hr at 03/09/22 0600    sodium chloride       Scheduled Medications    levetiracetam  2,000 mg IntraVENous Q12H    lacosamide (VIMPAT) IVPB  200 mg IntraVENous Q12H    dexamethasone  6 mg IntraVENous Q6H    sodium chloride flush  5-40 mL IntraVENous 2 times per day    [Held by provider] enoxaparin  40 mg SubCUTAneous Daily    pantoprazole  40 mg Oral QAM AC     PRN Meds: sodium chloride flush, sodium chloride, ondansetron **OR** ondansetron, polyethylene glycol, acetaminophen **OR** acetaminophen      Intake/Output Summary (Last 24 hours) at 3/9/2022 0829  Last data filed at 3/9/2022 0600  Gross per 24 hour   Intake 5685.86 ml   Output 1725 ml   Net 3960.86 ml       Physical Exam Performed:    BP (!) 139/91   Pulse 114   Temp 98.4 °F (36.9 °C) (Axillary)   Resp 23   Ht 5' 2.99\" (1.6 m)   Wt 189 lb 9.5 oz (86 kg)   SpO2 99%   BMI 33.60 kg/m²     General appearance: No apparent distress, appears stated age and cooperative. Respiratory:  Normal respiratory effort. Clear to auscultation, bilaterally without Rales/Wheezes/Rhonchi. Chest wall:  Right breast mass covered in dressing  Cardiovascular: Regular rate and rhythm with normal S1/S2 without murmurs, rubs or gallops. Abdomen: Soft, non-tender, non-distended with normal bowel sounds. Musculoskeletal: No clubbing, cyanosis or edema bilaterally. Skin: Right breast mass is known   Neurologic:  Neurovascularly intact  grossly non-focal.  Psychiatric: Alert and oriented to self  Labs:   Recent Labs     03/07/22  1225 03/08/22  0550 03/09/22  0500   WBC 18.0* 18.5* 14.5*   HGB 12.1 12.1 11.3*   HCT 37.6 36.4 33.9*   * 448 413     Recent Labs     03/07/22  0444 03/08/22  0550 03/09/22  0500    138 137   K 4.5 4.5 4.5    104 105   CO2 22 25 23   BUN 14 18 17   CREATININE 0.7 0.7 0.7   CALCIUM 9.7 9.0 8.9     No results for input(s): AST, ALT, BILIDIR, BILITOT, ALKPHOS in the last 72 hours. Recent Labs     03/07/22  1225   INR 1.08     No results for input(s): Armond Julius in the last 72 hours. Urinalysis:      Lab Results   Component Value Date    NITRU Negative 03/04/2022    WBCUA 18 03/04/2022    RBCUA >900 03/04/2022    BLOODU LARGE 03/04/2022    SPECGRAV <=1.005 03/04/2022    GLUCOSEU Negative 03/04/2022       Radiology:  CT HEAD WO CONTRAST   Final Result      1.  2 large hemorrhagic partially necrotic masses in the left parieto-temporal and left parieto-occipital regions with marked surrounding edema and mass effect.   These demonstrate little change when compared to previous MRI study of 3/5/2022. Associated    prominent mass effect and surrounding edema resulting 11 mm midline shift to the right. MRI BRAIN W WO CONTRAST   Final Result      Two large hemorrhagic partially necrotic heterogeneously enhancing mass in the left parietotemporal and left parieto-occipital region with surrounding vasogenic edema and mass effect. These are indeterminate, most likely relate to multicentric    glioblastoma. However, possibility of metastasis cannot be excluded given the history of breast cancer. 10 mm midline shift to right. CT CHEST ABDOMEN PELVIS W CONTRAST   Final Result      Large, necrotic right breast mass. Right axillary lymphadenopathy. Uterine fibroids. High density urine in the bladder. Assessment/Plan:    Active Hospital Problems    Diagnosis     Brain metastases ([facility] [address] [C79.31]     Brain mass [G93.89]      Metastatic triple negative breast cancer, now with Intracranial metastasis, previously diagnosed in March 2021 and treatment declined by the patient. Oncology following, patient decided on treatment    Intracranial vasogenic edema secondary to metastatic masses ; Symptomatic with short-term memory impairment. Continue decadron  Decided on surgery and plan for crani with mass resection once status controlled.  Return to OR later this week, likely Saturday, for second attempt at mass resection    Status epilepticus; Continue cEEG, Continue AEDs; being managed by neurology  Off propofol and versed    Acute resp failure due to Status epilepticus: unable to protect airway and was intubated, extubated to 2L NC    GI Px: protonix  DVT Prophylaxis: heparin sq  Diet: bedside swallow, ok for sips for now if passes swallow eval  Code Status: Full Code    PT/OT Eval Status: Consider when patient makes her decision regarding treatment    Dispo -inpatient stay, currently in the ICU  Will need surgical management later this week, possibly Saturday    Harry El MD

## 2022-03-09 NOTE — PROGRESS NOTES
Ween from vent started at 1237 and propofol and versed stopped 30 min prior. Possible extubation. Sinus Tachy 115 to 130s after sedation stopped, Resident MD aware. Neuro- Alert to voice, MAEx4 to commands. Right side weaker than left. BUE restraints until extubation. Family at bedside and educated on plan of care, all questions answered.

## 2022-03-09 NOTE — PLAN OF CARE
Neurosurgery Plan of Care    Patient taken to the operating room on 3/8/2022 for resection of her left posterior temporal and left occipital masses. Patient positioned, prepped, and draped in the usual manner. However, prior to incision it was noted that the patient began to shake, concerning for a seizure. This was confirmed via EEG provided by neuromonitoring technician. Onset of the seizures may have coincided with obtaining baseline transcranial MEPs. Patient was immediately given 2mg Versed and an additional 500 mg of Keppra (in addition to the 1000 mg given during timeout). A second dose of Versed was then given due to persistent seizure activity, followed by 1mg Ativan x 4 in 5 min intervals. During this time, the patient was loaded with 15 mg/kg fosphenytoin and bolused several times with propofol to induce burst suppression. She continued to have persistent breakthrough non-convulsive seizure activity on EEG consistent with status epilepticus. Patient was discussed with Neurocritical Care then treated with Vimpat and versed gtt to induce sustained burst suppression. At this point, the decision was made to cancel the operation to ensure patient's status epilepticus was controlled. Discussed with patient's family explaining that the plan was to return for surgical resection of the tumors once the seizures were better controlled or, if uncontrolled, to proceed in burst suppression. They were in agreement with this plan. Patient taken to CT which was personally reviewed and demonstrated no acute changes in comparison to previous imaging. Taken to ICU where she was started on cvEEG. Neurology consulted for seizure management.      Rivas Will MD, PhD  [address], [address] [place], [place], [address] [phone] (c), [phone] (o)

## 2022-03-09 NOTE — PROGRESS NOTES
MECHANICAL VENTILATION WEANING PROTOCOL    PRE-TRIAL PATIENT ASSESSMENT - COMPLETED AT 1230    Ventilatory Assessment:    PARAMETER CRITERIA FOR WEANING   Spontaneous Cough:  Yes    Sputum Characteristics:  · Sputum Amount: None  · Tenacity: None  · Sputum Color: None SPONTANEOUS COUGH With small to moderate  Amount of secretions   FiO2 : 30 % FIO2 less than or equal to 50%     PEEP less than or equal to 8   Progressive Mobility Protocol  Yes     ABG:No results found for: PHART, MUH3JTS, PO2ART, M9VEZTHX, CNK2FBK, BEART  HGB/WBC:  Lab Results   Component Value Date    HGB 11.3 03/09/2022    WBC 14.5 03/09/2022        Vital Signs:    PARAMETER CRITERIA FOR WEANING Meets Criteria   Pulse: 120 Within patient's normal limits / stable Yes   Resp: 22 Less than or equal to 30 Yes   BP: (!) 138/99 Within patient's normal limits / minimal pressors (Hemodynamically Stable) Yes   SpO2: 98 % Greater than or equal to 90% Yes   End Tidal CO2: 28 (%) Within patient's normal limits Yes   Temp: 98.7 °F (37.1 °C) Less than 38. 5oC / 101. 3oF Yes     [x]    Based on this assessment and the [facility] Ventilator Weaning Protocol, this patient  IS being placed on a Spontaneous Breathing Trial (SBT) at this time.  []    Based on this assessment and the [facility] Ventilator Weaning Protocol, this patient  IS NOT being placed on a Spontaneous Breathing Trial (SBT) at this time. []    Patient  IS NOT being placed on a Spontaneous Breathing Trial (SBT) at this time because of factors not previously addressed.   Those factors include          SBT - Initiated at  1230    Ventilator Settings:  CPAP - 5 cmH2O, PS - 8 cmH2O(if using settings other than CPAP 5/PS 8, please explain reasons for settings here):       1 Minute SBT Respiratory Parameters:   VE: 9.5 L   RR: 22 b/m   VT: 431 mL (average VT = VE/RR)   RSBI: 51 (RR/VT in liters)   ETCO2: 28 cmH2O   SPO2: 100 %   If on sedation, amount and type none    [x]   RR is less than 35, RSBI is less than 100,

## 2022-03-09 NOTE — PROGRESS NOTES
CONTINUOUS EEG    Name:  Cora Duran Record Number:  [id number]  Age: 48 y.o. Gender: female  : 1975  Today's Date:  3/8/2022  Room:  [address] [place] [address]  Vital Signs   BP 90/63   Pulse 62   Temp 97.7 °F (36.5 °C) (Oral)   Resp 16   Ht 5' 2.99\" (1.6 m)   Wt 195 lb 8.8 oz (88.7 kg)   SpO2 100%   BMI 34.65 kg/m²           Continuous EEG Testing Start Time:      Continuous EEG Testing End Time:       Comments: Kyle Villalba at [facility] contacted 1915 to begin monitoring. Impedence on all leads are within normal limits. Plan of Care: Begin monitoring.     Electronically signed by Elena Doss on 3/8/2022 at 7:23 PM

## 2022-03-09 NOTE — PROGRESS NOTES
Patient weaned from Mechanical Ventilation. Post extubation SpO2 of 96%,  on 2L via nasal cannula. Patient calm and cooperative, and restraints removed post extubation.   HR now 98 to 115 bpm.

## 2022-03-09 NOTE — CONSULTS
Neurology / Neurocritical Care Consult Note    Jace Rowland MD is requesting this consult. Reason for Consult: status epilepticus  Admission Chief Complaint: altered mental status    History of Present Illness     Zenobia Richardson is a 48 y.o. y/o female with PMH significant for invasive ductal cell carcinoma. Patient intubated, hx obtained per chart review. She presented to [facility] on 3/3/22 with complaints of memory deficits and AMS over the last week. On 2/24, she was sitting at the table with her mother and reported an abrupt onset of severe posterior headache as if someone hit her in the back of the head. She went to bed that night and when she woke up in the morning she realized she was having difficulty reading, writing, speaking, and recalling recent events. Her symptoms progressed throughout the week, which prompted her to come to the hospital. She did not report any focal weakness, difficulty walking, vision changes, or falls. CT head completed in the ER demonstrated abnormal edema in the L posterior cerebral hemisphere concerning for underlying metastases vs primary glioma and  1.4 cm left to R midline shift with cisternal effacement and downward transtentorial herniation and findings suggestive of hydrocephalus. She was given Keppra and decadron and transferred to [facility]. for further management. CT of chest, abdomen, and pelvis revealed a larger, necrotic R breast mass. She had been diagnosed with breast cancer in March of 2021 but had elected not to pursue treatment. She was in the OR yesterday for resection of the mass, but prior to any incision she was noted to have convulsive seizure activity. She received Versed 2mg x 2, an extra 500 mg of Keppra, 4mg total of ativan (given 1mg every 5 minutes), a loading dose of 15mg/kg of PHT, and several doses of propofol.  Intraoperative EEG still showed intermittent epileptic discharges though she no longer had any apparent seizure activity. The procedure was aborted and she was taken back to the ICU, started on Vimpat 200 mg BID and versed gtt per Neurology recommendations. She had a repeat head CT that was unchanged. Overnight, she did no have any seizure activity on cvEEG. She had been weaned down on her sedation, and she was responding well though she did have some RUE weakness. She is on Keppra 1000 mg BID and Vimpat 200 mg BID. REVIEW OF SYSTEMS:   Patient intubated, sedated    Past Medical, Surgical, Family, and Social History   PAST MEDICAL HISTORY:  Past Medical History:   Diagnosis Date    Thyroid nodule      SURGICAL HISTORY:  Past Surgical History:   Procedure Laterality Date    CRANIOTOMY Left 3/8/2022    LEFT TEMPORAL PARIETAL OCCIPITAL CRANIOTOMY FOR TUMOR RESECTION performed by Harini Rice MD at [address]:  Family history non-contributory  Family History   Problem Relation Age of Onset    Cancer Father      Social History     Tobacco History     Smoking Status  Never Smoker    Smokeless Tobacco Use  Never Used          Alcohol History     Alcohol Use Status  Never          Drug Use     Drug Use Status  Never          Sexual Activity     Sexually Active  Not Asked                Allergies & Outpatient Medications   ALLERGIES:  No Known Allergies  HOME MEDICATIONS:  There are no discharge medications for this patient.         Physical Exam   PHYSICAL EXAM:  Vitals:    03/09/22 0645 03/09/22 0700 03/09/22 0715 03/09/22 0730   BP: (!) 142/87 134/89 (!) 139/91    Pulse: 117 109 115 114   Resp: 22 24 23 23   Temp:       TempSrc:       SpO2: 98% 98% 99% 99%   Weight:       Height:             General: drowsy, no distress, well-nourished  Neurologic  Mental status:   orientation eyes open to name being called, patient intubated and unable to answer orientation questions   Attention intact as able to attend well to the exam     Language Will occassionally nod head yes and no appropriately, but exam limited due to ETT   Comprehension intact; follows simple commands    Cranial nerves:   CN2: Blinks to visual threat bilaterally  CN 3,4,6: Pupils equal and reactive to light, extraocular muscles intact  CN5: Facial sensation symmetric   CN7: Face symmetric, but exam limited by ETT barbosa  CN8: Hearing symmetric to spoken voice  CN9: Exam limited by ETT  CN11: Traps full strength on shoulder shrug  CN12: Exam limited by ETT    Motor Exam:  RUE: Moving a bit less than the L, but will spontaneously lift antigravity for short periods,  to command, strength 4/5  LUE: Moving spontaneously an purposefully, reaching for ETT,  to command 5/5  RLE: Moving across bed, wiggles feet to command  LLE: Moving across bed, wiggles feet to command      Sensory: Nods head yes to feeling light touch in all extremities  Tone: normal in all 4 extremities  Gait: held for patient safety, intubated/sedated      OTHER SYSTEMS:  Cardiovascular: Warm, appears well perfused   Respiratory: Breathing over set rate on ventilator  Abdominal: Abdomen is without distention   Extremities: Upper and lower extremities are atraumatic in appearance without deformity. No swelling or erythema. Diagnostic Testing Results   IMAGES:  Images personally reviewed and agree w/ radiology interpretation. Head CT w/o Contrast 3/9:  Impression       1.  2 large hemorrhagic partially necrotic masses in the left parieto-temporal and left parieto-occipital regions with marked surrounding edema and mass effect.  These demonstrate little change when compared to previous MRI study of 3/5/2022.  Associated    prominent mass effect and surrounding edema resulting 11 mm midline shift to the right. CTA of Head / Neck w/ Contrast:  Impression   Negative for large vessel occlusion or hemodynamic stenosis.        MRI Brain w & w/o Contrast:  Impression       Two large hemorrhagic partially necrotic heterogeneously enhancing mass in the left parietotemporal and left parieto-occipital region with surrounding vasogenic edema and mass effect. These are indeterminate, most likely relate to multicentric    glioblastoma. However, possibility of metastasis cannot be excluded given the history of breast cancer.       10 mm midline shift to right. CVEEG:  3/8/2022-3/9/2022     Review 06:40     Seizures - none  Push buttons - none  Background - Discontinuous with periods of background suppression 1-3 seconds. Periods of suppression  by bursts of up to 7 seconds of moderate amplitude fast frequencies, maximal in amplitude bifrontal        CLINICAL INTERPRETATION: This is an abnormal video EEG study  1. Generalized background abnormalities indicative of an underlying severe, diffuse encephalopathy of non-specific etiology   2. No focal or epileptiform abnormalities. No seizures. No push button events. LABS:  All results below personally reviewed. Pertinent positives & negatives are addressed in Impression & Recommendations below. LABS   Metabolic Panel Recent Labs     03/07/22  0444 03/08/22  0550 03/09/22  0500    138 137   K 4.5 4.5 4.5    104 105   CO2 22 25 23   BUN 14 18 17   CREATININE 0.7 0.7 0.7   GLUCOSE 191* 150* 159*   CALCIUM 9.7 9.0 8.9      CBC / Coags Recent Labs     03/07/22  1225 03/08/22  0550 03/09/22  0500   WBC 18.0* 18.5* 14.5*   RBC 4.43 4.34 4.08   HGB 12.1 12.1 11.3*   HCT 37.6 36.4 33.9*   * 448 413   INR 1.08  --   --       Other No results for input(s): LABA1C, LDLCALC, TRIG, TSH, TUTRBVSX36, FOLATE, LABSALI, COVID19 in the last 72 hours. No results for input(s): PHENYTOIN, KEPPRA, LACOSA, LAMO, VALPROATE, LACTSEPSIS, LACTA in the last 72 hours.        CURRENT SCHEDULED MEDICATIONS   Inpatient Medications     levetiracetam, 2,000 mg, IntraVENous, Q12H    lacosamide (VIMPAT) IVPB, 200 mg, IntraVENous, Q12H    dexamethasone, 6 mg, IntraVENous, Q6H    sodium chloride flush, 5-40 mL, IntraVENous, 2 times per day    [Held by provider] enoxaparin, 40 mg, SubCUTAneous, Daily    pantoprazole, 40 mg, Oral, QAM AC   Infusions    midazolam 1 mg/hr (03/09/22 0600)    propofol 10 mcg/kg/min (03/09/22 0600)    sodium chloride 100 mL/hr at 03/09/22 0600    sodium chloride        Antibiotics   Recent Abx Admin                   ceFAZolin (ANCEF) 2000 mg in dextrose 5 % 50 mL IVPB (mg) 2,000 mg Given 03/08/22 1412                   IMPRESSION & RECOMMENDATIONS     IMPRESSION:  Hugo Weiss is a 48 y.o. y/o female with PMH significant for invasive ductal cell carcinoma who presented after having increasing confusion for a week at home and was found to have intracranial metastases. Had seizure activity in the OR yesterday. Shes had some RUE weakness noted today, but this has been improving thorughout the morning and could be consistent with a Que's paralysis. RECOMMENDATIONS:  - Q2 hour neurologic exams  - Ok to wean propofol and versed and extubate, given improved neuro exam and no seizures on cvEEG  - Continue cvEEG for now, if no seizures seen on cvEEG will plan to discontinue tomorrow AM  - Continue Vimpat 200 mg BID  - Keppra increased to 2000 mg BID  - Neurosurgery following appreciate recs, decadron per neurosurgery  - Keep HOB > 30 degress  - Seizure precautions  - Please call neurocritical care with any seizures or exam changes      JUDITH Rosenbaum - CNP   Neurology & Neurocritical Care   Neurology Line: [phone]  [employer]: [facility] Neurology & Neuro Critical Care NPs  3/9/2022 8:39 AM    I spent 50 minutes in the care of this patient. Over 50% of that time was in face-to-face counseling regarding disease process, diagnostic testing, preventative measures, and answering patient and family questions.

## 2022-03-10 ENCOUNTER — APPOINTMENT (OUTPATIENT)
Dept: VASCULAR LAB | Age: 47
DRG: 021 | End: 2022-03-10
Attending: RADIOLOGY
Payer: MEDICAID

## 2022-03-10 LAB
ANION GAP SERPL CALCULATED.3IONS-SCNC: 9 MMOL/L (ref 3–16)
BASOPHILS ABSOLUTE: 0.1 K/UL (ref 0–0.2)
BASOPHILS RELATIVE PERCENT: 0.5 %
BUN BLDV-MCNC: 17 MG/DL (ref 7–20)
CALCIUM SERPL-MCNC: 8.9 MG/DL (ref 8.3–10.6)
CHLORIDE BLD-SCNC: 100 MMOL/L (ref 99–110)
CO2: 26 MMOL/L (ref 21–32)
CREAT SERPL-MCNC: 0.7 MG/DL (ref 0.6–1.1)
EOSINOPHILS ABSOLUTE: 0.1 K/UL (ref 0–0.6)
EOSINOPHILS RELATIVE PERCENT: 0.7 %
GFR AFRICAN AMERICAN: >60
GFR NON-AFRICAN AMERICAN: >60
GLUCOSE BLD-MCNC: 128 MG/DL (ref 70–99)
GLUCOSE BLD-MCNC: 133 MG/DL (ref 70–99)
GLUCOSE BLD-MCNC: 168 MG/DL (ref 70–99)
GLUCOSE BLD-MCNC: 88 MG/DL (ref 70–99)
HCT VFR BLD CALC: 36 % (ref 36–48)
HEMOGLOBIN: 11.9 G/DL (ref 12–16)
LYMPHOCYTES ABSOLUTE: 0.8 K/UL (ref 1–5.1)
LYMPHOCYTES RELATIVE PERCENT: 4.8 %
MCH RBC QN AUTO: 26.9 PG (ref 26–34)
MCHC RBC AUTO-ENTMCNC: 33 G/DL (ref 31–36)
MCV RBC AUTO: 81.7 FL (ref 80–100)
MONOCYTES ABSOLUTE: 1.2 K/UL (ref 0–1.3)
MONOCYTES RELATIVE PERCENT: 7 %
NEUTROPHILS ABSOLUTE: 14.9 K/UL (ref 1.7–7.7)
NEUTROPHILS RELATIVE PERCENT: 87 %
PDW BLD-RTO: 14.3 % (ref 12.4–15.4)
PERFORMED ON: ABNORMAL
PERFORMED ON: ABNORMAL
PERFORMED ON: NORMAL
PLATELET # BLD: 373 K/UL (ref 135–450)
PMV BLD AUTO: 7.3 FL (ref 5–10.5)
POTASSIUM REFLEX MAGNESIUM: 4.3 MMOL/L (ref 3.5–5.1)
RBC # BLD: 4.41 M/UL (ref 4–5.2)
SODIUM BLD-SCNC: 135 MMOL/L (ref 136–145)
WBC # BLD: 17.1 K/UL (ref 4–11)

## 2022-03-10 PROCEDURE — 2000000000 HC ICU R&B

## 2022-03-10 PROCEDURE — 99232 SBSQ HOSP IP/OBS MODERATE 35: CPT

## 2022-03-10 PROCEDURE — 6360000002 HC RX W HCPCS: Performed by: STUDENT IN AN ORGANIZED HEALTH CARE EDUCATION/TRAINING PROGRAM

## 2022-03-10 PROCEDURE — 6360000002 HC RX W HCPCS

## 2022-03-10 PROCEDURE — 2580000003 HC RX 258

## 2022-03-10 PROCEDURE — 2580000003 HC RX 258: Performed by: STUDENT IN AN ORGANIZED HEALTH CARE EDUCATION/TRAINING PROGRAM

## 2022-03-10 PROCEDURE — 80048 BASIC METABOLIC PNL TOTAL CA: CPT

## 2022-03-10 PROCEDURE — 6360000002 HC RX W HCPCS: Performed by: NURSE PRACTITIONER

## 2022-03-10 PROCEDURE — 93970 EXTREMITY STUDY: CPT

## 2022-03-10 PROCEDURE — 2580000003 HC RX 258: Performed by: NURSE PRACTITIONER

## 2022-03-10 PROCEDURE — 95813 EEG EXTND MNTR 61-119 MIN: CPT

## 2022-03-10 PROCEDURE — 36415 COLL VENOUS BLD VENIPUNCTURE: CPT

## 2022-03-10 PROCEDURE — 6370000000 HC RX 637 (ALT 250 FOR IP): Performed by: STUDENT IN AN ORGANIZED HEALTH CARE EDUCATION/TRAINING PROGRAM

## 2022-03-10 PROCEDURE — C9254 INJECTION, LACOSAMIDE: HCPCS | Performed by: NURSE PRACTITIONER

## 2022-03-10 PROCEDURE — 92610 EVALUATE SWALLOWING FUNCTION: CPT

## 2022-03-10 PROCEDURE — 85025 COMPLETE CBC W/AUTO DIFF WBC: CPT

## 2022-03-10 RX ADMIN — LEVETIRACETAM 2000 MG: 100 INJECTION, SOLUTION INTRAVENOUS at 12:13

## 2022-03-10 RX ADMIN — DEXTROSE MONOHYDRATE 200 MG: 50 INJECTION, SOLUTION INTRAVENOUS at 05:33

## 2022-03-10 RX ADMIN — HEPARIN SODIUM 5000 UNITS: 5000 INJECTION INTRAVENOUS; SUBCUTANEOUS at 05:38

## 2022-03-10 RX ADMIN — DEXAMETHASONE SODIUM PHOSPHATE 6 MG: 4 INJECTION, SOLUTION INTRA-ARTICULAR; INTRALESIONAL; INTRAMUSCULAR; INTRAVENOUS; SOFT TISSUE at 05:40

## 2022-03-10 RX ADMIN — DEXAMETHASONE SODIUM PHOSPHATE 6 MG: 4 INJECTION, SOLUTION INTRA-ARTICULAR; INTRALESIONAL; INTRAMUSCULAR; INTRAVENOUS; SOFT TISSUE at 12:11

## 2022-03-10 RX ADMIN — HEPARIN SODIUM 5000 UNITS: 5000 INJECTION INTRAVENOUS; SUBCUTANEOUS at 14:16

## 2022-03-10 RX ADMIN — HEPARIN SODIUM 5000 UNITS: 5000 INJECTION INTRAVENOUS; SUBCUTANEOUS at 21:31

## 2022-03-10 RX ADMIN — DEXAMETHASONE SODIUM PHOSPHATE 6 MG: 4 INJECTION, SOLUTION INTRA-ARTICULAR; INTRALESIONAL; INTRAMUSCULAR; INTRAVENOUS; SOFT TISSUE at 17:18

## 2022-03-10 RX ADMIN — DEXAMETHASONE SODIUM PHOSPHATE 6 MG: 4 INJECTION, SOLUTION INTRA-ARTICULAR; INTRALESIONAL; INTRAMUSCULAR; INTRAVENOUS; SOFT TISSUE at 00:14

## 2022-03-10 RX ADMIN — DEXTROSE MONOHYDRATE 200 MG: 50 INJECTION, SOLUTION INTRAVENOUS at 17:18

## 2022-03-10 RX ADMIN — SODIUM CHLORIDE: 9 INJECTION, SOLUTION INTRAVENOUS at 00:21

## 2022-03-10 RX ADMIN — ACETAMINOPHEN 650 MG: 325 TABLET ORAL at 10:21

## 2022-03-10 RX ADMIN — ACETAMINOPHEN 650 MG: 325 TABLET ORAL at 17:01

## 2022-03-10 RX ADMIN — SODIUM CHLORIDE, PRESERVATIVE FREE 10 ML: 5 INJECTION INTRAVENOUS at 21:28

## 2022-03-10 ASSESSMENT — PAIN SCALES - GENERAL
PAINLEVEL_OUTOF10: 0
PAINLEVEL_OUTOF10: 3
PAINLEVEL_OUTOF10: 0
PAINLEVEL_OUTOF10: 0
PAINLEVEL_OUTOF10: 3

## 2022-03-10 NOTE — PROGRESS NOTES
NEUROSURGERY PROGRESS NOTE    Patient Name: Ryan Xiao YOB: 1975   Sex: Female Age: 48 yrs     Medical Record Number: [id number] Acct Number: [de-identified]   Room Number: 7600/2990-68 Hospital Day: Hospital Day: 7   Subjective: Pt is awake but having trouble answering questions. She knows it is hospital when given choices. She does not know the date. R arm weakness    Objective:    VITAL SIGNS   BP (!) 129/90   Pulse 98   Temp 98.1 °F (36.7 °C)   Resp 19   Ht 5' 2.99\" (1.6 m)   Wt 196 lb 6.9 oz (89.1 kg)   SpO2 96%   BMI 34.81 kg/m²    Height Height: 5' 2.99\" (160 cm)   Weight Weight: 196 lb 6.9 oz (89.1 kg)        Allergies No Known Allergies   NPO Status No diet orders on file   Isolation No active isolations     LABS   Basic Metabolic Profile Recent Labs     03/08/22  0550 03/09/22  0500 03/10/22  0446    137 135*    105 100   CO2 25 23 26   BUN 18 17 17   CREATININE 0.7 0.7 0.7   GLUCOSE 150* 159* 128*      Complete Blood Count Recent Labs     03/08/22  0550 03/09/22  0500 03/10/22  0446   WBC 18.5* 14.5* 17.1*   RBC 4.34 4.08 4.41      Coagulation Studies Recent Labs     03/07/22  1225   INR 1.08        MEDICATIONS   Inpatient Medications     levetiracetam, 2,000 mg, IntraVENous, Q12H    heparin (porcine), 5,000 Units, SubCUTAneous, 3 times per day    lacosamide (VIMPAT) IVPB, 200 mg, IntraVENous, Q12H    dexamethasone, 6 mg, IntraVENous, Q6H    sodium chloride flush, 5-40 mL, IntraVENous, 2 times per day    pantoprazole, 40 mg, Oral, QAM AC   Infusions    sodium chloride 100 mL/hr at 03/10/22 0655    sodium chloride        Antibiotics   Recent Abx Admin      No antibiotic orders with administrations found.                  Neurologic Exam:  Mental status: awake and alert     Cranial Nerves:  II: Blinks to threat bilat  III, IV, VI: PERRL, 3 mm bilaterally, EOMI, no nystagmus noted  V: Facial sensation intact bilaterally to touch  VII: Face symmetric at rest  VIII: Hearing intact bilaterally to spoken voice  IX: Palate movement equal bilaterally  XI: Shoulder shrug equal bilaterally  XII: Will not stick out tongue    Musculoskeletal:   Gait: Not tested   Tone: normal  Sensory: intact to all extremities  Motor strength:    Right  Left    Right  Left    Deltoid  3 5  Hip Flex  5 5   Biceps  4 5  Knee Extensors  5 5   Triceps  3 5  Knee Flexors  5 5   Wrist Ext  4 5  Ankle Dorsiflex. 5 5   Wrist Flex  4 5  Ankle Plantarflex. 5 5   Handgrip  4 5  Ext Jake Longus  5 5   Thumb Ext  4 5           Respiratory:  Unlabored respiratory pattern    Abdomen:   Soft, ND     Cardiovascular:  Warm, well perfused    Assessment      Pt is a 47 yo woman with triple negative breast ca and intracranial metastases. Educated family about tumor using your guide to neurosurgery, p 5-7. No further seizures. Plan:  -HOB elevated  -neuro checks q2  -Decadron, PPI, SSI  -NCC for seizure management. On cvEEG. Vimpat and Keppra for seizure ppx.   -Oncology/Rad onc following  LE dopplers today bilat  -Plan to OR Saturday for resection of intracranial masses assuming control of seizures prior. Patient was seen with Dr. Rose Harry who agrees with above assessment and plan. Electronically signed by:  JUDITH Mcfarland - CNP, 3/10/2022 8:42 AM   Neurosurgery Nurse Practitioner  [phone]

## 2022-03-10 NOTE — PROGRESS NOTES
ICU Progress Note    Admit Date: 3/4/2022  ICU admit 3/4/2022   Day# 6  Vent Day: None  IV Access:Peripheral  IV Fluids:None   Vasopressors:None                Antibiotics: None  Diet: No diet orders on file    CC: AMS, Headache    Interval history:   Versed drip and propofol was stopped yesterday. She was extubated, which she tolerated well. Vitals are stable and she remained afebrile overnight. Patient was seen at bedside this morning, she remains on continuous EEG which shows generalized background abnormalities indicative of diffuse encephalopathy with no seizures noted.      Medications:     Scheduled Meds:   levetiracetam  2,000 mg IntraVENous Q12H    heparin (porcine)  5,000 Units SubCUTAneous 3 times per day    lacosamide (VIMPAT) IVPB  200 mg IntraVENous Q12H    dexamethasone  6 mg IntraVENous Q6H    sodium chloride flush  5-40 mL IntraVENous 2 times per day    pantoprazole  40 mg Oral QAM AC     Continuous Infusions:   sodium chloride 100 mL/hr at 03/10/22 0655    sodium chloride       PRN Meds:sodium chloride flush, sodium chloride, ondansetron **OR** ondansetron, polyethylene glycol, acetaminophen **OR** acetaminophen    Objective:   Vitals:   T-max:  Patient Vitals for the past 8 hrs:   BP Temp Temp src Pulse Resp SpO2 Weight   03/10/22 0600 (!) 124/91 -- -- 78 18 -- 196 lb 6.9 oz (89.1 kg)   03/10/22 0500 (!) 128/93 -- -- 80 18 -- --   03/10/22 0400 (!) 125/92 98.8 °F (37.1 °C) Axillary 92 24 96 % --   03/10/22 0300 119/82 -- -- 84 17 -- --   03/10/22 0200 124/83 -- -- 78 18 -- --   03/10/22 0100 114/86 -- -- 82 17 -- --   03/10/22 0000 122/89 98.5 °F (36.9 °C) Axillary 86 19 96 % --       Intake/Output Summary (Last 24 hours) at 3/10/2022 0718  Last data filed at 3/10/2022 1123  Gross per 24 hour   Intake 2985.28 ml   Output 6000 ml   Net -3014.72 ml     ROS: ROS in interval Hx    Physical Exam  Constitutional: Alert, normal appearance, no acute distress  HENT: head normocephalic/atraumatic, no congestion or rhinorrhea, PERRLA, EOM intact  Cardio: normal rate, regular rhythm. No murmurs heard on auscultation  Pulmonary: clear to auscultation bilaterally, no wheezes, rales, or stridor  GI: abdomen soft, flat, and non-distended. No tenderness or guarding. Musculoskeletal: no deformity, tenderness, or injury. No lower extremity edema. Strength 5/5 in bilateral upper and lower extremities  Skin: no lesion, rash, or erythema. Large breast cancer mass on R breast.  Neuro: intubated and sedated, opens her eyes and tracks   Psych: mood normal, behavior normal    LABS:    CBC:   Recent Labs     03/08/22  0550 03/09/22  0500 03/10/22  0446   WBC 18.5* 14.5* 17.1*   HGB 12.1 11.3* 11.9*   HCT 36.4 33.9* 36.0    413 373   MCV 83.8 83.0 81.7     Renal:    Recent Labs     03/08/22  0550 03/09/22  0500 03/10/22  0446    137 135*   K 4.5 4.5 4.3    105 100   CO2 25 23 26   BUN 18 17 17   CREATININE 0.7 0.7 0.7   GLUCOSE 150* 159* 128*   CALCIUM 9.0 8.9 8.9   ANIONGAP 9 9 9     Hepatic: No results for input(s): AST, ALT, BILITOT, BILIDIR, PROT, LABALBU, ALKPHOS in the last 72 hours. Troponin: No results for input(s): TROPONINI in the last 72 hours. BNP: No results for input(s): BNP in the last 72 hours. Lipids: No results for input(s): CHOL, HDL in the last 72 hours. Invalid input(s): LDLCALCU, TRIGLYCERIDE  ABGs:  No results for input(s): PHART, JPK8DRD, PO2ART, YEY6KAM, BEART, THGBART, T8EHGSMA, VPD0WFB in the last 72 hours. INR:   Recent Labs     03/07/22  1225   INR 1.08     Lactate: No results for input(s): LACTATE in the last 72 hours. Cultures:  -----------------------------------------------------------------  RAD:   CT HEAD WO CONTRAST   Final Result      1.  2 large hemorrhagic partially necrotic masses in the left parieto-temporal and left parieto-occipital regions with marked surrounding edema and mass effect.   These demonstrate little change when compared to previous MRI study of 3/5/2022. Associated    prominent mass effect and surrounding edema resulting 11 mm midline shift to the right. MRI BRAIN W WO CONTRAST   Final Result      Two large hemorrhagic partially necrotic heterogeneously enhancing mass in the left parietotemporal and left parieto-occipital region with surrounding vasogenic edema and mass effect. These are indeterminate, most likely relate to multicentric    glioblastoma. However, possibility of metastasis cannot be excluded given the history of breast cancer. 10 mm midline shift to right. CT CHEST ABDOMEN PELVIS W CONTRAST   Final Result      Large, necrotic right breast mass. Right axillary lymphadenopathy. Uterine fibroids. High density urine in the bladder. Assessment/Plan:   Tong Ramírez is a 48 y. o. female w/ PMH of Stage IIB invasive ductal carcinoma (ER-, NV-, HER2 -) diagnosed 3/30/2021 presented to [facility] w/ one week of altered mental status. Encephalopathy  Edema left posterior cerebral hemisphere   Likely due to underlying metastasis. Hx of Stage IIB invasive ductal carcinoma (ER-, NV-, and HER2-) diagnosed 3/30/2021. Refused treatment at time. Presented w/ headache 1 wks ago w/ persistent memory, reading, and writing difficulties. No hx of seizures.  (3/4) CT Head w/o contrast: Abnormal edema left posterior cerebral hemisphere possibly related to underlying metastases versus less likely primary glioma. Causes 1.4 cm of left-to-right midline shift w/ cisternal effacement and downward transtentorial herniation and findings of developing hydropcephalus.   (3/4) CTA: Negative for large vessel occlusion or hemodynamic stenosis. CT chest/abd/pelvis with large, necrotic right breast mass. Right axillary lymphadenopathy.   -(3/5) MRI Brain: Two large hemorrhagic partially necrotic heterogeneously enhancing mass in the left parietotemporal and left parieto-occipital region with surrounding vasogenic edema and mass effect. These are indeterminate, most likely relate to multicentric glioblastoma. However, possibility of metastasis cannot be excluded given the history of breast cancer. 10 mm midline shift to right.   -q2 hr neurochecks  -Decadron 6 mg q 6hrs  -Keppra 1 g BID  -PT/OT/SLP  -on I/V fluids @100ml/hr   -Plan: Plan to go to the OR likely on saturday   -Will require post-op Radiation and systemic therapy as outpatient  -Consults: NSGY, Oncology, Palliative Care    Status epilepticus   Patient was taken to the OR yesterday, she started having seizure activity s/p 2 mg versed x2, 500 mg keppra, 1 mg Ativan x4, 15 mg/kg fosphenytoin, vimpat and versed gtt was given and operation was cancelled and was transferred to the ICU after having a detailed discussion with the family  -remains on continous EEG, which showed generalized background abnormalities indicative of diffuse encephalopathy with no seizures noted  -on Keppra 1g BID   -Vimpat 200 mg I/V BID   -s/p Versed gtt and propofol  -Neurology following, appreciate recs       Hematuria  Red urine w/ greater than 900 Rbcs. No infection.    Could be secondary to metastatic breast CA.     Code Status: Full Code  FEN: No diet orders on file  PPX: Lovenox, pepcid  DISPO: ICU    Latrell Malone MD, PGY-1  03/10/22  7:18 AM    This patient has been staffed and discussed with Dr. Tammy Garcia

## 2022-03-10 NOTE — PROGRESS NOTES
NEUROLOGY / NEUROCRITICAL CARE PROGRESS NOTE       Patient Name: Sharmin Coto YOB: 1975   Sex: Female Age: 48 yrs     CC / Reason for Consult: status epilepticus    Interval Hx / Changes over last 24 hours:   Extubated. She is drowsy this morning and having some difficulty answering orientation questions. Reports she is having difficulty moving her R arm. ROS: Endorses headache 4/10 pain. RUE weakness. HISTORY   Admission HPI:   Sharmin Coto is a 48 y.o. y/o female with PMH significant for invasive ductal cell carcinoma. Patient intubated, hx obtained per chart review. She presented to [facility] on 3/3/22 with complaints of memory deficits and AMS over the last week. On 2/24, she was sitting at the table with her mother and reported an abrupt onset of severe posterior headache as if someone hit her in the back of the head. She went to bed that night and when she woke up in the morning she realized she was having difficulty reading, writing, speaking, and recalling recent events. Her symptoms progressed throughout the week, which prompted her to come to the hospital. She did not report any focal weakness, difficulty walking, vision changes, or falls. CT head completed in the ER demonstrated abnormal edema in the L posterior cerebral hemisphere concerning for underlying metastases vs primary glioma and  1.4 cm left to R midline shift with cisternal effacement and downward transtentorial herniation and findings suggestive of hydrocephalus. She was given Keppra and decadron and transferred to [facility]. for further management. CT of chest, abdomen, and pelvis revealed a larger, necrotic R breast mass. She had been diagnosed with breast cancer in March of 2021 but had elected not to pursue treatment. She was in the OR yesterday for resection of the mass, but prior to any incision she was noted to have convulsive seizure activity.  She received Versed 2mg x 2, an extra 500 mg of Keppra, 4mg total of ativan (given 1mg every 5 minutes), a loading dose of 15mg/kg of PHT, and several doses of propofol. Intraoperative EEG still showed intermittent epileptic discharges though she no longer had any apparent seizure activity. The procedure was aborted and she was taken back to the ICU, started on Vimpat 200 mg BID and versed gtt per Neurology recommendations. She had a repeat head CT that was unchanged. Overnight, she did no have any seizure activity on cvEEG. She had been weaned down on her sedation, and she was responding well though she did have some RUE weakness. She is on Keppra 1000 mg BID and Vimpat 200 mg BID. PMH Past Medical History:   Diagnosis Date    Thyroid nodule       Allergies No Known Allergies   Diet No diet orders on file   Isolation No active isolations     LABS   Metabolic Panel Recent Labs     03/08/22  0550 03/09/22  0500 03/10/22  0446    137 135*   K 4.5 4.5 4.3    105 100   CO2 25 23 26   BUN 18 17 17   CREATININE 0.7 0.7 0.7   GLUCOSE 150* 159* 128*   CALCIUM 9.0 8.9 8.9      CBC / Coags Recent Labs     03/07/22  1225 03/07/22  1225 03/08/22  0550 03/09/22  0500 03/10/22  0446   WBC 18.0*   < > 18.5* 14.5* 17.1*   RBC 4.43   < > 4.34 4.08 4.41   HGB 12.1   < > 12.1 11.3* 11.9*   HCT 37.6   < > 36.4 33.9* 36.0   *   < > 448 413 373   INR 1.08  --   --   --   --     < > = values in this interval not displayed. Other No results for input(s): LABA1C, LDLCALC, TRIG, TSH, [id number], FOLATE, LABSALI, COVID19 in the last 72 hours. No results for input(s): PHENYTOIN, KEPPRA, LACOSA, LAMO, VALPROATE, LACTSEPSIS, LACTA in the last 72 hours.        CURRENT SCHEDULED MEDICATIONS   Inpatient Medications     levetiracetam, 2,000 mg, IntraVENous, Q12H    heparin (porcine), 5,000 Units, SubCUTAneous, 3 times per day    lacosamide (VIMPAT) IVPB, 200 mg, IntraVENous, Q12H    dexamethasone, 6 mg, IntraVENous, Q6H    sodium chloride flush, 5-40 mL, IntraVENous, 2 times per day    pantoprazole, 40 mg, Oral, QAM AC   Infusions    sodium chloride 100 mL/hr at 03/10/22 0655    sodium chloride        Antibiotics   Recent Abx Admin      No antibiotic orders with administrations found. DIAGNOSTICS   IMAGES:  Images personally reviewed and agree w/ radiology interpretation. CVEEG:  3/9/2022-3/10/2022     Review 18:50     Seizures - none  Push buttons - none  Background -  EEG background characterized by continuous low to moderate amplitude mixed frequencies. Intermittent generalized rhythmic delta, max bifrontal, with superimposed faster frequencies.         CLINICAL INTERPRETATION: This is an abnormal video EEG study  1. Generalized background abnormalities indicative of an underlying diffuse encephalopathy of non-specific etiology, improving in severity over the course of this recording. 2. No focal or epileptiform abnormalities. No seizures. No push button events. Previous Imaging:  Head CT w/o Contrast 3/9:  Impression       1.  2 large hemorrhagic partially necrotic masses in the left parieto-temporal and left parieto-occipital regions with marked surrounding edema and mass effect.  These demonstrate little change when compared to previous MRI study of 3/5/2022.  Associated    prominent mass effect and surrounding edema resulting 11 mm midline shift to the right.      CTA of Head / Neck w/ Contrast:  Impression   Negative for large vessel occlusion or hemodynamic stenosis.         MRI Brain w & w/o Contrast:  Impression       Two large hemorrhagic partially necrotic heterogeneously enhancing mass in the left parietotemporal and left parieto-occipital region with surrounding vasogenic edema and mass effect. These are indeterminate, most likely relate to multicentric    glioblastoma. However, possibility of metastasis cannot be excluded given the history of breast cancer.       10 mm midline shift to right. PHYSICAL EXAMINATION     PHYSICAL EXAM:  Vitals:    03/10/22 0745 03/10/22 0800 03/10/22 0830 03/10/22 0900   BP:  128/86  124/86   Pulse:  93 87 85   Resp:  23 19 16   Temp: 98.1 °F (36.7 °C) 98.1 °F (36.7 °C)     TempSrc:  Temporal     SpO2:  98%     Weight:       Height:           General: drowsy, no distress, well-nourished  Neurologic  Mental status:   orientation states her name and that she is [place]. When given options she says yes to being in the hospital. She states she does not know the month and states the year is 2021              Attention intact as able to attend well to the exam                Language Minimal speech, speaks softly, identifies objects              Comprehension follows some simple commands, but has trouble with a few commands     Cranial nerves:   CN2: Blinks to visual threat bilaterally  CN 3,4,6: Pupils equal and reactive to light, extraocular muscles intact  CN5: Facial sensation symmetric   CN7: Face symmetric at rest and activation  CN8: Hearing symmetric to spoken voice  CN9: Palate elevates symmetrically  CN11: Traps full strength on shoulder shrug  CN12: Does no stick out tongue to command     Motor Exam: Had difficulty understanding command to bend knees   R  L    Deltoid -3  5   Biceps 4 5   Triceps +3 5   Wrist extension  4 5   Interossei 4 5      R  L    Hip flexion  5  5   Hip extension  5 5   Knee flexion  AKASH AKASH   Knee extension  AKASH AKASH   Ankle dorsiflexion  5 5   Ankle plantar flexion  5 5         Sensory: Light touch intact and equal in all extremities. Tone: normal in all 4 extremities  Gait: held for patient safety        OTHER SYSTEMS:  Cardiovascular: Warm, appears well perfused   Respiratory: Breathing over set rate on ventilator  Abdominal: Abdomen is without distention   Extremities: Upper and lower extremities are atraumatic in appearance without deformity. No swelling or erythema.          ASSESSMENT & RECOMMENDATIONS   Assessment:  Marce OSORIO Kade Pena is a 48 y.o. y/o female with PMH significant for invasive ductal cell carcinoma who presented after having increasing confusion for a week at home and was found to have intracranial metastases. Had seizure activity in the OR yesterday. Shes had some RUE weakness noted 3/9, but this has been improving thorughout the morning and could be consistent with a Que's paralysis. She continues to have RUE weakness. Plan:  - Continue Keppra 2000 mg BID and Vimpat 200 mg BID  - Neurosurgery following appreciate recs, decadron per neurosurgery  - Discussed with Neurosurgery about the possibility of giving the patient fosphenytoin prior to her procedure, this is reasonable and will order loading dose to be given prior to procedure on Saturday  - Fosphenytoin 20mg/kg loading dose ordered for Saturday morning, to be given prior to procedure at 0800.  - Q2 hour neurologic exams  - Ok to discontinue cvEEG  - Keep HOB > 30 degress  - Seizure precautions  - Please call neurocritical care with any seizures or exam changes      JUDITH Muse - CNP   Neurology & Neurocritical Care   Neurology Line: [phone]  [employer]: [facility] NPs  3/10/2022 9:24 AM    I spent 25 minutes in the care of this patient. Over 50% of that time was in face-to-face counseling regarding disease process, diagnostic testing, preventative measures, and answering patient and family questions.

## 2022-03-10 NOTE — PROGRESS NOTES
Hospitalist Progress Note      PCP: SHAHEED ZAMARRIPA, APRN - CNP    Date of Admission: 3/4/2022    Chief Complaint: short term memory loss    Hospital Course: Came to ED with memory loss. Diagnosed with metastatic breast cancer with 2 brain metastasis  Planned for surgical resection of her left posterior temporal and left occipital masses 03/08 but prior to incision it was noted that the patient began to shake, concerning for a seizure, this was confirmed with EEG in the OR, Onset of the seizures may have coincided with obtaining baseline transcranial MEPs. Patient was immediately given 2mg Versed and an additional 500 mg of Keppra (in addition to the 1000 mg given during timeout). A second dose of Versed was then given due to persistent seizure activity, followed by 1mg Ativan x 4 in 5 min intervals. During this time, the patient was loaded with 15 mg/kg fosphenytoin and bolused several times with propofol to induce burst suppression. She continued to have persistent breakthrough non-convulsive seizure activity on EEG consistent with status epilepticus.    CT head did not demonstrate acute changes  Procedure was held and transferred to ICU for monitoring on cEEG    Subjective:   Awake, slow to verbally responds, says she has no complains  Mother present at bedside wants to be there early morning on Saturday prior to surgery    Medications:  Reviewed    Infusion Medications    sodium chloride 100 mL/hr at 03/10/22 0655    sodium chloride       Scheduled Medications    levetiracetam  2,000 mg IntraVENous Q12H    heparin (porcine)  5,000 Units SubCUTAneous 3 times per day    lacosamide (VIMPAT) IVPB  200 mg IntraVENous Q12H    dexamethasone  6 mg IntraVENous Q6H    sodium chloride flush  5-40 mL IntraVENous 2 times per day    pantoprazole  40 mg Oral QAM AC     PRN Meds: sodium chloride flush, sodium chloride, ondansetron **OR** ondansetron, polyethylene glycol, acetaminophen **OR** acetaminophen      Intake/Output Summary (Last 24 hours) at 3/10/2022 0815  Last data filed at 3/10/2022 0655  Gross per 24 hour   Intake 2985.28 ml   Output 6000 ml   Net -3014.72 ml       Physical Exam Performed:    BP (!) 129/90   Pulse 98   Temp 98.1 °F (36.7 °C)   Resp 19   Ht 5' 2.99\" (1.6 m)   Wt 196 lb 6.9 oz (89.1 kg)   SpO2 96%   BMI 34.81 kg/m²     General appearance: No apparent distress, appears stated age and cooperative. Respiratory:  Normal respiratory effort. Clear to auscultation, bilaterally without Rales/Wheezes/Rhonchi. Chest wall:  Right breast mass covered in dressing  Cardiovascular: Regular rate and rhythm with normal S1/S2 without murmurs, rubs or gallops. Abdomen: Soft, non-tender, non-distended with normal bowel sounds. Musculoskeletal: No clubbing, cyanosis or edema bilaterally. Skin: Right breast mass is known   Neurologic:  Neurovascularly intact  grossly non-focal.  Psychiatric: Alert and oriented, slow to verbally respond    Labs:   Recent Labs     03/08/22  0550 03/09/22  0500 03/10/22  0446   WBC 18.5* 14.5* 17.1*   HGB 12.1 11.3* 11.9*   HCT 36.4 33.9* 36.0    413 373     Recent Labs     03/08/22  0550 03/09/22  0500 03/10/22  0446    137 135*   K 4.5 4.5 4.3    105 100   CO2 25 23 26   BUN 18 17 17   CREATININE 0.7 0.7 0.7   CALCIUM 9.0 8.9 8.9     No results for input(s): AST, ALT, BILIDIR, BILITOT, ALKPHOS in the last 72 hours. Recent Labs     03/07/22  1225   INR 1.08     No results for input(s): Skippy Gault in the last 72 hours.     Urinalysis:      Lab Results   Component Value Date    NITRU Negative 03/04/2022    WBCUA 18 03/04/2022    RBCUA >900 03/04/2022    BLOODU LARGE 03/04/2022    SPECGRAV <=1.005 03/04/2022    GLUCOSEU Negative 03/04/2022       Radiology:  CT HEAD WO CONTRAST   Final Result      1.  2 large hemorrhagic partially necrotic masses in the left parieto-temporal and left parieto-occipital regions with marked surrounding edema and mass effect. These demonstrate little change when compared to previous MRI study of 3/5/2022. Associated    prominent mass effect and surrounding edema resulting 11 mm midline shift to the right. MRI BRAIN W WO CONTRAST   Final Result      Two large hemorrhagic partially necrotic heterogeneously enhancing mass in the left parietotemporal and left parieto-occipital region with surrounding vasogenic edema and mass effect. These are indeterminate, most likely relate to multicentric    glioblastoma. However, possibility of metastasis cannot be excluded given the history of breast cancer. 10 mm midline shift to right. CT CHEST ABDOMEN PELVIS W CONTRAST   Final Result      Large, necrotic right breast mass. Right axillary lymphadenopathy. Uterine fibroids. High density urine in the bladder. Assessment/Plan:    Active Hospital Problems    Diagnosis     Status epilepticus ([address] [G40.901]     Duct cell carcinoma ([address] [C80.1]     Cerebral edema (HCC) [G93.6]     Brain metastases (HCC) [C79.31]     Brain mass [G93.89]      Metastatic triple negative breast cancer, now with Intracranial metastasis, previously diagnosed in March 2021 and treatment declined by the patient. Oncology following, patient decided on treatment    Intracranial vasogenic edema secondary to metastatic masses ; Symptomatic with short-term memory impairment. Continue decadron  Decided on surgery and plan for crani with mass resection once status controlled. Return to OR later this week, likely Saturday, for second attempt at mass resection    Status epilepticus; Continue cEEG, Continue AEDs; being managed by neurology    Acute resp failure due to Status epilepticus: unable to protect airway and was intubated, extubated to 2L NC    GI Px: protonix  DVT Prophylaxis: heparin sq  Diet: ADULT DIET;  Dysphagia - Soft and Bite Sized   Make NPO if concern for aspiration  Code Status: Full Code    PT/OT Eval Status: Consider when patient makes her decision regarding treatment    Dispo -inpatient stay, currently in the ICU  Will need surgical management later this week, possibly Saturday    Ashwini Reynolds MD

## 2022-03-10 NOTE — PROGRESS NOTES
Speech Language Pathology  Facility/Department: [facility]   CLINICAL BEDSIDE SWALLOW EVALUATION    NAME: Atlas Epley  : 1975  MRN: [id number]    ADMISSION DATE: 3/4/2022  ADMITTING DIAGNOSIS: has Thyroid nodule; Brain metastases ([address] Brain mass; Status epilepticus ([address] Duct cell carcinoma ([address] and Cerebral edema ([address] on their problem list.  ONSET DATE: 3/4/22    Recent Chest Xray not completed    CT of head 3/5/22  1.  2 large hemorrhagic partially necrotic masses in the left parieto-temporal and left parieto-occipital regions with marked surrounding edema and mass effect.  These demonstrate little change when compared to previous MRI study of 3/5/2022.  Associated    prominent mass effect and surrounding edema resulting 11 mm midline shift to the right.         MRI brain 3/5/22  Two large hemorrhagic partially necrotic heterogeneously enhancing mass in the left parietotemporal and left parieto-occipital region with surrounding vasogenic edema and mass effect. These are indeterminate, most likely relate to multicentric    glioblastoma. However, possibility of metastasis cannot be excluded given the history of breast cancer.       10 mm midline shift to right. Date of Eval: 3/10/2022  Evaluating Therapist: PASQUALE Estevez    Current Diet level:  Current Diet : NPO  Current Liquid Diet : NPO    Primary Complaint  Patient Complaint: pt indicates she is thirsty    Pain:  Pain Assessment  Pain Assessment: moaning at times, but not able to indicate if related to pain    Reason for Referral  Atlas Epley was referred for a bedside swallow evaluation to assess the efficiency of her swallow function, identify signs and symptoms of aspiration and make recommendations regarding safe dietary consistencies, effective compensatory strategies, and safe eating environment. Hospital Course:  Per MD notes:  \" Came to ED with memory loss.  Diagnosed with metastatic breast cancer with 2 brain metastasis  Planned for surgical resection of her left posterior temporal and left occipital masses 03/08 but prior to incision it was noted that the patient began to shake, concerning for a seizure, this was confirmed with EEG in the OR, Onset of the seizures may have coincided with obtaining baseline transcranial MEPs. Patient was immediately given 2mg Versed and an additional 500 mg of Keppra (in addition to the 1000 mg given during timeout). A second dose of Versed was then given due to persistent seizure activity, followed by 1mg Ativan x 4 in 5 min intervals. During this time, the patient was loaded with 15 mg/kg fosphenytoin and bolused several times with propofol to induce burst suppression. She continued to have persistent breakthrough non-convulsive seizure activity on EEG consistent with status epilepticus. CT head did not demonstrate acute changes  Procedure was held and transferred to ICU for monitoring on cEEG\"    Impression  RN states okay to assess pt. Pt was intubated 3/8-3/9, voice is weak, not able to produce volitional cough. Pt followed directions inconsistently. Intelligibility of speech is reduced partially due to very low volume, however appears to exhibit aphasia as well. Oral motor skills grossly intact, no asymmetry noted. Pt with full dentition, good oral /dental hygiene. Pt accepting of PO trials, including water via tsp/cup and straw, puree and solid texture. Pt demonstrated adequate mastication with solid texture, no anterior loss or pocketing noted. No cough/throat clear or change in voice with liquids, including sequential swallows (did not follow direction for 3 ounce water test). Swallow movement felt upon palpation of anterior neck.     Recommend soft and bite sized (due to intermittent lethargy and overall weakness) /thin liquids  Dysphagia Diagnosis: Swallow function appears grossly intact  Dysphagia Outcome Severity Scale: Level 6: Within functional limits/Modified independence     Treatment Plan  Requires SLP Intervention: Yes  Duration/Frequency of Treatment: 2-3 x  D/C Recommendations:  (pt will benefit from Sp/cog eval when able to fully participate)     Recommended Diet and Intervention  Diet Solids Recommendation: Dysphagia Soft and Bite-Sized (Dysphagia III)  Liquid Consistency Recommendation: Thin - if any s/s of aspiration emerge, or there is respiratory decline,  make NPO until further evaluated by SLP    Recommended Form of Meds: Whole with water (if difficulty place in puree)  Therapeutic Interventions: Diet tolerance monitoring;Oral care; Patient/Family education    Compensatory Swallowing Strategies  Assist with feeding  Upright as possible for all oral intake  Eat/Feed slowly  Check for pocketing of food/oral residue  Small bites/sips    Treatment/Goals  1-The patient will tolerate recommended diet without observed clinical signs of aspiration  2-The patient/caregiver will demonstrate understanding of compensatory strategies for improved swallowing safety. 3/10: Educated pt to purpose of visit, s/s of aspiration, concern if aspiration occurs, rationale for diet recommendation/strategies to reduce risk for aspiration and instruction to notify staff if any signs emerge. Pt will benefit from cont education  Cont goal    General  Chart Reviewed: Yes  Behavior/Cognition: Cooperative  Respiratory Status: Room air  Communication Observation:  (intelligibility reduced, vocal quality very weak)  Follows Directions:  (inconsistent)  Dentition: Adequate  Patient Positioning: Upright in bed  Baseline Vocal Quality: Weak  Volitional Cough:  (not able to produce volitional cough)  Prior Dysphagia History: none  Consistencies Administered: Reg solid; Dysphagia Pureed (Dysphagia I); Thin - cup; Thin - straw; Thin - teaspoon; Ice Chips    Vision/Hearing  Vision  Vision: Within Functional Limits  Hearing  Hearing: Within functional limits    Oral Motor Deficits  Oral/Motor  Oral Motor:  (pt exhibited difficulty following instructions for formal assessment)    Prognosis  Prognosis  Prognosis for safe diet advancement: good  Individuals consulted  Consulted and agree with results and recommendations: Patient;RN    Education  Patient Education: Pt educated to purpose of visit  Patient Education Response: Needs reinforcement  Safety Devices in place: Yes  Type of devices: Call light within reach       Therapy Time  SLP Individual Minutes  Time In: 0820  Time Out: 2776  Minutes: 15     Plan:  Recommended diet: soft and bite sized/thin liquids - if any s/s of aspiration emerge, or there is respiratory decline,  make NPO until further evaluated by SLP  Dc recommendation: will complete speech/langage eval as pt able to participate fully and schedule allows  Pt therapy goal: not able to state  Pt dc goal: not able to state  Darin Tello M.S./[facility]SLP #[id number]  Pg.  # [phone]  Needs met prior to leaving room, call light within reach, d/w SILVIA Wilson  This document will serve as a dc summary if pt dc prior to next visit  3/10/2022 9:07 AM

## 2022-03-10 NOTE — PLAN OF CARE
Problem: Falls - Risk of:  Goal: Will remain free from falls  Description: Will remain free from falls  Outcome: Ongoing  Note: Pt is free of falls at this time. Bed wheels are locked, bed alarm is on, bed is in lowest position. Call light is within reach. Pt is aware of the risk for falls. Will continue hourly rounding to monitor. Problem: Urinary Elimination:  Goal: Complications related to the disease process, condition or treatment will be avoided or minimized  Description: Complications related to the disease process, condition or treatment will be avoided or minimized  Outcome: Ongoing  Note: No signs of infection at this time. Urine is clear, yellow. Figueroa care is completed Qshift. Will cont to monitor. Problem: Skin Integrity:  Goal: Absence of new skin breakdown  Description: Absence of new skin breakdown  Outcome: Ongoing  Note: Pt at risk for skin breakdown. See Ike score. Pt remains on bedrest. Unable to reposition self in bed. Heels elevated off bed. Sacral heart mepilex intact to protect,  site inspected and intact underneath. Will continue to turn and reposition patient every two hours and as needed. Will continue to keep patient clean and dry, applying skin care cream as needed. Pillows used for repositioning q2hs. Will continue to monitor and assess for skin breakdown.        Problem: Non-Violent Restraints  Goal: Removal from restraints as soon as assessed to be safe  Outcome: Completed  Goal: No harm/injury to patient while restraints in use  Outcome: Completed  Goal: Patient's dignity will be maintained  Outcome: Completed

## 2022-03-10 NOTE — PROGRESS NOTES
Pt bathed and linens were changed. Figueroa care completed with soap and water. Pt's mother was able to feed her some lunch, but pt has minimal appetite. Neuro surgery at bedside to evaluate the pt. She remains slightly weaker on the R side and minimal verbal responses when asked direct questions. Will cont to monitor.

## 2022-03-10 NOTE — PROGRESS NOTES
CONTINUOUS EEG    Name:  Cora Duran Record Number:  [id number]  Age: 48 y.o. Gender: female  : 1975  Today's Date:  3/10/2022  Room:  [address] [place] [address]  Vital Signs   /79   Pulse 110   Temp 98.6 °F (37 °C) (Temporal)   Resp 28   Ht 5' 2.99\" (1.6 m)   Wt 196 lb 6.9 oz (89.1 kg)   SpO2 98%   BMI 34.81 kg/m²           Continuous EEG Testing Start Time:      Continuous EEG Testing End Time:   14:02PM    Comments: Per Yasmani Day pt's test has completed. [facility]. Plan of Care: Removed all leads from pt's scalp and cleansed.     Electronically signed by Dennis Ramos on 3/10/2022 at 4:31 PM

## 2022-03-11 ENCOUNTER — ANESTHESIA EVENT (OUTPATIENT)
Dept: OPERATING ROOM | Age: 47
DRG: 021 | End: 2022-03-11
Payer: MEDICAID

## 2022-03-11 LAB
ANION GAP SERPL CALCULATED.3IONS-SCNC: 9 MMOL/L (ref 3–16)
BANDED NEUTROPHILS RELATIVE PERCENT: 1 % (ref 0–7)
BASOPHILS ABSOLUTE: 0 K/UL (ref 0–0.2)
BASOPHILS RELATIVE PERCENT: 0 %
BUN BLDV-MCNC: 18 MG/DL (ref 7–20)
CALCIUM SERPL-MCNC: 8.7 MG/DL (ref 8.3–10.6)
CHLORIDE BLD-SCNC: 102 MMOL/L (ref 99–110)
CO2: 23 MMOL/L (ref 21–32)
CREAT SERPL-MCNC: 0.5 MG/DL (ref 0.6–1.1)
EOSINOPHILS ABSOLUTE: 0 K/UL (ref 0–0.6)
EOSINOPHILS RELATIVE PERCENT: 0 %
GFR AFRICAN AMERICAN: >60
GFR NON-AFRICAN AMERICAN: >60
GLUCOSE BLD-MCNC: 114 MG/DL (ref 70–99)
GLUCOSE BLD-MCNC: 132 MG/DL (ref 70–99)
GLUCOSE BLD-MCNC: 138 MG/DL (ref 70–99)
GLUCOSE BLD-MCNC: 161 MG/DL (ref 70–99)
HCG(URINE) PREGNANCY TEST: NEGATIVE
HCT VFR BLD CALC: 35.7 % (ref 36–48)
HEMOGLOBIN: 11.7 G/DL (ref 12–16)
INR BLD: 1.05 (ref 0.88–1.12)
LYMPHOCYTES ABSOLUTE: 0.9 K/UL (ref 1–5.1)
LYMPHOCYTES RELATIVE PERCENT: 6 %
MCH RBC QN AUTO: 27 PG (ref 26–34)
MCHC RBC AUTO-ENTMCNC: 32.8 G/DL (ref 31–36)
MCV RBC AUTO: 82.3 FL (ref 80–100)
METAMYELOCYTES RELATIVE PERCENT: 1 %
MONOCYTES ABSOLUTE: 0.9 K/UL (ref 0–1.3)
MONOCYTES RELATIVE PERCENT: 6 %
NEUTROPHILS ABSOLUTE: 13.9 K/UL (ref 1.7–7.7)
NEUTROPHILS RELATIVE PERCENT: 86 %
PDW BLD-RTO: 14.4 % (ref 12.4–15.4)
PERFORMED ON: ABNORMAL
PLATELET # BLD: 349 K/UL (ref 135–450)
PMV BLD AUTO: 7.3 FL (ref 5–10.5)
POTASSIUM REFLEX MAGNESIUM: 4.5 MMOL/L (ref 3.5–5.1)
PROTHROMBIN TIME: 11.9 SEC (ref 9.9–12.7)
RBC # BLD: 4.34 M/UL (ref 4–5.2)
SODIUM BLD-SCNC: 134 MMOL/L (ref 136–145)
WBC # BLD: 15.8 K/UL (ref 4–11)

## 2022-03-11 PROCEDURE — 51702 INSERT TEMP BLADDER CATH: CPT

## 2022-03-11 PROCEDURE — 84703 CHORIONIC GONADOTROPIN ASSAY: CPT

## 2022-03-11 PROCEDURE — 6360000002 HC RX W HCPCS: Performed by: NURSE PRACTITIONER

## 2022-03-11 PROCEDURE — 2580000003 HC RX 258

## 2022-03-11 PROCEDURE — 99232 SBSQ HOSP IP/OBS MODERATE 35: CPT | Performed by: NURSE PRACTITIONER

## 2022-03-11 PROCEDURE — 6370000000 HC RX 637 (ALT 250 FOR IP): Performed by: NURSE PRACTITIONER

## 2022-03-11 PROCEDURE — 2580000003 HC RX 258: Performed by: STUDENT IN AN ORGANIZED HEALTH CARE EDUCATION/TRAINING PROGRAM

## 2022-03-11 PROCEDURE — 2000000000 HC ICU R&B

## 2022-03-11 PROCEDURE — 92526 ORAL FUNCTION THERAPY: CPT

## 2022-03-11 PROCEDURE — C9254 INJECTION, LACOSAMIDE: HCPCS | Performed by: NURSE PRACTITIONER

## 2022-03-11 PROCEDURE — 6370000000 HC RX 637 (ALT 250 FOR IP)

## 2022-03-11 PROCEDURE — 6370000000 HC RX 637 (ALT 250 FOR IP): Performed by: STUDENT IN AN ORGANIZED HEALTH CARE EDUCATION/TRAINING PROGRAM

## 2022-03-11 PROCEDURE — 85025 COMPLETE CBC W/AUTO DIFF WBC: CPT

## 2022-03-11 PROCEDURE — 6360000002 HC RX W HCPCS: Performed by: STUDENT IN AN ORGANIZED HEALTH CARE EDUCATION/TRAINING PROGRAM

## 2022-03-11 PROCEDURE — 6360000002 HC RX W HCPCS

## 2022-03-11 PROCEDURE — 80048 BASIC METABOLIC PNL TOTAL CA: CPT

## 2022-03-11 PROCEDURE — 94761 N-INVAS EAR/PLS OXIMETRY MLT: CPT

## 2022-03-11 PROCEDURE — 2580000003 HC RX 258: Performed by: NURSE PRACTITIONER

## 2022-03-11 PROCEDURE — 85610 PROTHROMBIN TIME: CPT

## 2022-03-11 PROCEDURE — 36415 COLL VENOUS BLD VENIPUNCTURE: CPT

## 2022-03-11 RX ORDER — HEPARIN SODIUM 5000 [USP'U]/ML
5000 INJECTION, SOLUTION INTRAVENOUS; SUBCUTANEOUS EVERY 8 HOURS SCHEDULED
Status: DISCONTINUED | OUTPATIENT
Start: 2022-03-11 | End: 2022-03-12

## 2022-03-11 RX ORDER — OXYCODONE HYDROCHLORIDE AND ACETAMINOPHEN 5; 325 MG/1; MG/1
1 TABLET ORAL EVERY 6 HOURS PRN
Status: DISCONTINUED | OUTPATIENT
Start: 2022-03-11 | End: 2022-03-12

## 2022-03-11 RX ORDER — SODIUM CHLORIDE 9 MG/ML
INJECTION, SOLUTION INTRAVENOUS CONTINUOUS
Status: DISCONTINUED | OUTPATIENT
Start: 2022-03-12 | End: 2022-03-12

## 2022-03-11 RX ADMIN — DEXAMETHASONE SODIUM PHOSPHATE 6 MG: 4 INJECTION, SOLUTION INTRA-ARTICULAR; INTRALESIONAL; INTRAMUSCULAR; INTRAVENOUS; SOFT TISSUE at 13:14

## 2022-03-11 RX ADMIN — ACETAMINOPHEN 650 MG: 325 TABLET ORAL at 18:15

## 2022-03-11 RX ADMIN — DEXAMETHASONE SODIUM PHOSPHATE 6 MG: 4 INJECTION, SOLUTION INTRA-ARTICULAR; INTRALESIONAL; INTRAMUSCULAR; INTRAVENOUS; SOFT TISSUE at 06:22

## 2022-03-11 RX ADMIN — LEVETIRACETAM 2000 MG: 100 INJECTION, SOLUTION INTRAVENOUS at 13:23

## 2022-03-11 RX ADMIN — LEVETIRACETAM 2000 MG: 100 INJECTION, SOLUTION INTRAVENOUS at 00:22

## 2022-03-11 RX ADMIN — DEXTROSE MONOHYDRATE 200 MG: 50 INJECTION, SOLUTION INTRAVENOUS at 17:41

## 2022-03-11 RX ADMIN — HEPARIN SODIUM 5000 UNITS: 5000 INJECTION INTRAVENOUS; SUBCUTANEOUS at 15:10

## 2022-03-11 RX ADMIN — POLYETHYLENE GLYCOL 3350 17 G: 17 POWDER, FOR SOLUTION ORAL at 17:45

## 2022-03-11 RX ADMIN — DEXTROSE MONOHYDRATE 200 MG: 50 INJECTION, SOLUTION INTRAVENOUS at 04:47

## 2022-03-11 RX ADMIN — PANTOPRAZOLE SODIUM 40 MG: 40 TABLET, DELAYED RELEASE ORAL at 06:22

## 2022-03-11 RX ADMIN — OXYCODONE HYDROCHLORIDE AND ACETAMINOPHEN 1 TABLET: 5; 325 TABLET ORAL at 16:00

## 2022-03-11 RX ADMIN — SODIUM CHLORIDE, PRESERVATIVE FREE 10 ML: 5 INJECTION INTRAVENOUS at 18:18

## 2022-03-11 RX ADMIN — SODIUM CHLORIDE, PRESERVATIVE FREE 10 ML: 5 INJECTION INTRAVENOUS at 13:29

## 2022-03-11 RX ADMIN — HEPARIN SODIUM 5000 UNITS: 5000 INJECTION INTRAVENOUS; SUBCUTANEOUS at 06:22

## 2022-03-11 RX ADMIN — SODIUM CHLORIDE, PRESERVATIVE FREE 10 ML: 5 INJECTION INTRAVENOUS at 21:28

## 2022-03-11 RX ADMIN — DEXAMETHASONE SODIUM PHOSPHATE 6 MG: 4 INJECTION, SOLUTION INTRA-ARTICULAR; INTRALESIONAL; INTRAMUSCULAR; INTRAVENOUS; SOFT TISSUE at 18:15

## 2022-03-11 RX ADMIN — DEXAMETHASONE SODIUM PHOSPHATE 6 MG: 4 INJECTION, SOLUTION INTRA-ARTICULAR; INTRALESIONAL; INTRAMUSCULAR; INTRAVENOUS; SOFT TISSUE at 00:24

## 2022-03-11 RX ADMIN — ACETAMINOPHEN 650 MG: 325 TABLET ORAL at 09:32

## 2022-03-11 ASSESSMENT — PAIN DESCRIPTION - LOCATION
LOCATION: BREAST

## 2022-03-11 ASSESSMENT — PAIN SCALES - GENERAL
PAINLEVEL_OUTOF10: 2
PAINLEVEL_OUTOF10: 2
PAINLEVEL_OUTOF10: 3
PAINLEVEL_OUTOF10: 0
PAINLEVEL_OUTOF10: 3
PAINLEVEL_OUTOF10: 0
PAINLEVEL_OUTOF10: 2
PAINLEVEL_OUTOF10: 5
PAINLEVEL_OUTOF10: 2

## 2022-03-11 ASSESSMENT — PAIN DESCRIPTION - ORIENTATION
ORIENTATION: RIGHT
ORIENTATION: RIGHT

## 2022-03-11 ASSESSMENT — PAIN DESCRIPTION - DESCRIPTORS
DESCRIPTORS: CONSTANT
DESCRIPTORS: CONSTANT
DESCRIPTORS: ACHING
DESCRIPTORS: ACHING

## 2022-03-11 ASSESSMENT — PAIN DESCRIPTION - PAIN TYPE
TYPE: CHRONIC PAIN

## 2022-03-11 ASSESSMENT — PAIN DESCRIPTION - FREQUENCY
FREQUENCY: CONTINUOUS

## 2022-03-11 ASSESSMENT — ENCOUNTER SYMPTOMS
GASTROINTESTINAL NEGATIVE: 1
RESPIRATORY NEGATIVE: 1

## 2022-03-11 ASSESSMENT — PAIN DESCRIPTION - PROGRESSION
CLINICAL_PROGRESSION: NOT CHANGED

## 2022-03-11 ASSESSMENT — PAIN DESCRIPTION - ONSET
ONSET: ON-GOING

## 2022-03-11 NOTE — PROGRESS NOTES
Physician Progress Note      Lisa Gil  CSN #:                  [id number]  :                       1975  ADMIT DATE:       3/4/2022 7:23 AM  [address] [place] DATE:  RESPONDING  PROVIDER #:        Jaclyn Rodrigez          QUERY TEXT:    Patient admitted with brain mets. Noted documentation of airway protection and   acute respiratory failure in PN 3/9. Please indicate one of the following and   document in the medical record: The medical record reflects the following:  Risk Factors: 49 yo w metastases to the brain, seizures. Sent to OR but   started seizing so surgery was cancelled with no incision. Clinical Indicators: Per PN 3/9: Acute resp failure due to Status epilepticus:   unable to protect airway and was intubated, extubated to 2L NC. Treatment: As Above  Options provided:  -- Intubated for airway protection only, Acute Respiratory Failure ruled out   after study  -- Intubated for Acute Respiratory Failure as evidenced by, Please document   evidence. -- Other - I will add my own diagnosis  -- Disagree - Not applicable / Not valid  -- Disagree - Clinically unable to determine / Unknown  -- Refer to Clinical Documentation Reviewer    PROVIDER RESPONSE TEXT:    This patient was intubated for airway protection only, Acute Respiratory   Failure has been ruled out after study.     Query created by: Damion Bautista on 3/11/2022 5:26 AM      Electronically signed by:  Jaclyn Rodrigez 3/11/2022 8:52 AM

## 2022-03-11 NOTE — PROGRESS NOTES
Patient having increased pain this afternoon and states that the tylenol did not work this morning. ICU residents made aware. Oxycodone ordered PRN. Will monitor.

## 2022-03-11 NOTE — CARE COORDINATION
[address] [place] [address] has accepted and will pull docs for DC when DC is noted.            Xavier Hicks  Work mobile: [phone]  [facility] office: [phone]

## 2022-03-11 NOTE — PROGRESS NOTES
Neurosurgery Progress Note    Patient seen and examined on 03/10/22. No seizures overnight. Awake and alert this AM. Extubated yesterday. GCS E-4, V-1, M-6. Hemiparetic on right upper extremities. Follows briskly but no verbal output. A/P: 47 yo woman with triple negative breast ca and intracranial metastases. Was in status epilepticus yesterday which has broken. No further seizures. -HOB elevated  -Frequent neuro checks  -Decadron, PPI, SSI  -NCC for seizure management. On cvEEG. Vimpat and Keppra for seizure ppx. Will discuss regimen for surgery with team.   -Oncology/Rad onc following  -Extubate when able  -Plan to OR Saturday for resection of intracranial masses assuming control of seizures prior. All questions answered.   -Will follow.     Breezy Mo MD, PhD  [address], [address], [address] [phone] (c), [phone] (o)

## 2022-03-11 NOTE — PROGRESS NOTES
Speech Language Pathology  Facility/Department: [facility] ICU  Dysphagia Daily Treatment Note  Late entry for ~ 915    NAME: Melanie Chapa  : 1975  MRN: [id number]    Patient Diagnosis(es):   Patient Active Problem List    Diagnosis Date Noted    Status epilepticus ([facility] 2022    Duct cell carcinoma ([facility] 2022    Cerebral edema ([facility] 2022    Brain metastases ([facility] 2022    Brain mass 2022    Thyroid nodule 2015     Allergies: No Known Allergies    Recent Chest Xray not completed     CT of head 3/5/22  1.  2 large hemorrhagic partially necrotic masses in the left parieto-temporal and left parieto-occipital regions with marked surrounding edema and mass effect.  These demonstrate little change when compared to previous MRI study of 3/5/2022.  Associated    prominent mass effect and surrounding edema resulting 11 mm midline shift to the right.          MRI brain 3/5/22  Two large hemorrhagic partially necrotic heterogeneously enhancing mass in the left parietotemporal and left parieto-occipital region with surrounding vasogenic edema and mass effect. These are indeterminate, most likely relate to multicentric    glioblastoma. However, possibility of metastasis cannot be excluded given the history of breast cancer.       10 mm midline shift to right. Previous MBS - n/a    Chart reviewed. Medical Diagnosis: Brain metastases ([facility] [C79.31]  Brain mass [G93.89]   Treatment Diagnosis: dysphagia    BSE Impression 3/10/22  RN states okay to assess pt. Pt was intubated 3/8-3/9, voice is weak, not able to produce volitional cough. Pt followed directions inconsistently. Intelligibility of speech is reduced partially due to very low volume, however appears to exhibit aphasia as well. Oral motor skills grossly intact, no asymmetry noted. Pt with full dentition, good oral /dental hygiene.  Pt accepting of PO trials, including water via tsp/cup and straw, puree and solid texture. Pt demonstrated adequate mastication with solid texture, no anterior loss or pocketing noted. No cough/throat clear or change in voice with liquids, including sequential swallows (did not follow direction for 3 ounce water test). Swallow movement felt upon palpation of anterior neck. Recommend soft and bite sized (due to intermittent lethargy and overall weakness) /thin liquids  Dysphagia Diagnosis: Swallow function appears grossly intact    MBS results - not indicated at this time, will cont to monitor for indication s/p surgery    Pain: none    Current Diet : ADULT DIET; Dysphagia - Soft and Bite Sized  Diet NPO Exceptions are: Sips of Water with Meds   Recommended Form of Meds: Whole with water (if difficulty place in puree)     Treatment:  Pt seen bedside to address the following goals:  1-The patient will tolerate recommended diet without observed clinical signs of aspiration  3/11: pt more alert today, sitting up in bed, assisted with breakfast by RN. Vocal quality somewhat stronger, verbalizations were minimal.   Pt consuming pancakes and sausage with prolonged but adequate mastication- cleared oral cavity completely. No cough/throat clear or change in voice with liquids, swallow movement noted upon visualization of anterior neck. No respiratory decline per chart review  Pt scheduled to go to OR tomorrow for resection of intracranial masses. Recommend cont current diet and will plan to reassess s/p surgery  Cont goal     2-The patient/caregiver will demonstrate understanding of compensatory strategies for improved swallowing safety. 3/10: Educated pt to purpose of visit, s/s of aspiration, concern if aspiration occurs, rationale for diet recommendation/strategies to reduce risk for aspiration and instruction to notify staff if any signs emerge. Pt will benefit from cont education  Cont goal  3/11: pt educated to purpose of visit.   Explained plan to cont current diet and plan to re-assess swallowing after surgery and to evaluate speech/language. Pt shook head in comprehension  Cont goal    Patient/Family/Caregiver Education:  As above    Compensatory Strategies:  Upright as possible for all oral intake  Eat/Feed slowly  Assist feed  Check for pocketing of food   Small bites/sips      Plan:  Continue dysphagia treatment with goals per plan of care. Diet recommendations: cont Dysphagia III soft and bite sized/thin liquids  Re-assessment of swallowing s/p surgery. Assessment of speech/language s/p surgery  DC recommendation: ongoing treatment indicated  Treatment: 15  D/W nursing  Needs met prior to leaving room, call button in reach. Lee Roe M.S./[facility]-SLP #[id number]  Pg.  # [phone]  If patient is discharged prior to next treatment, this note will serve as the discharge summary

## 2022-03-11 NOTE — CARE COORDINATION
Case Management Assessment           Initial Evaluation                Date / Time of Evaluation: 3/11/2022 3:33 PM                 Assessment Completed by: Marni Lugo RN    Patient Name: Clair Ferguson     YOB: 1975  Diagnosis: Brain metastases ([facility] [C79.31]  Brain mass [G93.89]     Date / Time: 3/4/2022  7:23 AM    Patient Admission Status: Inpatient    If patient is discharged prior to next notation, then this note serves as note for discharge by case management. Current PCP: SHAHEED ZAMARRIPA, JUDITH - CNP  Clinic Patient: No    Chart Reviewed: Yes  Patient/ Family Interviewed: Yes    Initial assessment completed at bedside with: Patient    Hospitalization in the last 30 days: No    Emergency Contacts:  Extended Emergency Contact Information  Primary Emergency Contact: Hailee Ramírez  Address: [address] [place] [address] Phone: [phone]  Relation: Parent    Advance Directives:   Code Status: Full [address]: No  Agent: NA  Contact Number: NA    Copy present: No     In paper Chart: No    Scanned into EMR No    Financial  Payor: Asad Melton / Plan: Asad Melton / Product Type: *No Product type* /     Pre-cert required for SNF: Yes    Pharmacy    [place] [phone] [facility] [phone] [address] [address]  Phone: [phone] Fax: [phone]      Potential assistance Purchasing Medications: Potential Assistance Purchasing Medications: No  Does Patient want to participate in local refill/ meds to beds program?:      Meds To Beds General Rules:  1. Can ONLY be done Monday- Friday between 8:30am-5pm  2. Prescription(s) must be in pharmacy by 3pm to be filled same day  3. Copy of patient's insurance/ prescription drug card and patient face sheet must be sent along with the with basic dialogue that supports the patient's individualized plan of care/goals and shares the quality data associated with the providers.  Yes    Care Transition patient: No    Jt Palacio RN  [facility]  Case Management Department  Ph: [phone]

## 2022-03-11 NOTE — PLAN OF CARE
Problem: Falls - Risk of:  Goal: Will remain free from falls  Description: Will remain free from falls  Outcome: Ongoing  Goal: Absence of physical injury  Description: Absence of physical injury  Outcome: Ongoing     Problem: Verbal Communication - Impaired:  Goal: Functional communication will improve  Description: Functional communication will improve  Outcome: Ongoing  Goal: Ability to interact with others will improve  Description: Ability to interact with others will improve  Outcome: Ongoing  Goal: Ability to establish a method of communication will improve  Description: Ability to establish a method of communication will improve  Outcome: Ongoing     Problem: Sensory:  Goal: General experience of comfort will improve  Description: General experience of comfort will improve  Outcome: Ongoing     Problem: Urinary Elimination:  Goal: Signs and symptoms of infection will decrease  Description: Signs and symptoms of infection will decrease  Outcome: Ongoing  Goal: Ability to reestablish a normal urinary elimination pattern will improve - after catheter removal  Description: Ability to reestablish a normal urinary elimination pattern will improve  Outcome: Ongoing  Goal: Complications related to the disease process, condition or treatment will be avoided or minimized  Description: Complications related to the disease process, condition or treatment will be avoided or minimized  Outcome: Ongoing     Problem: Skin Integrity:  Goal: Will show no infection signs and symptoms  Description: Will show no infection signs and symptoms  Outcome: Ongoing  Goal: Absence of new skin breakdown  Description: Absence of new skin breakdown  Outcome: Ongoing     Problem: Pain:  Goal: Pain level will decrease  Description: Pain level will decrease  Outcome: Ongoing  Goal: Control of acute pain  Description: Control of acute pain  Outcome: Ongoing  Goal: Control of chronic pain  Description: Control of chronic pain  Outcome: Ongoing

## 2022-03-11 NOTE — PROGRESS NOTES
ICU Progress Note    Admit Date: 3/4/2022  ICU admit 3/4/2022   Day# 7  Vent Day: None  IV Access:Peripheral  IV Fluids:None   Vasopressors:None                Antibiotics: None  Diet: ADULT DIET; Dysphagia - Soft and Bite Sized    CC: AMS, Headache    Interval history:   Patient seen at bedside this morning, vitals are stable and she remained afebrile overnight. Patient was seen at bedside this morning, she is off continuous EEG. She is more alert today. Medications:     Scheduled Meds:   [START ON 3/12/2022] fosphenytoin (CEREBYX) loading dose IVPB  20 mg PE/kg IntraVENous Once    levetiracetam  2,000 mg IntraVENous Q12H    heparin (porcine)  5,000 Units SubCUTAneous 3 times per day    lacosamide (VIMPAT) IVPB  200 mg IntraVENous Q12H    dexamethasone  6 mg IntraVENous Q6H    sodium chloride flush  5-40 mL IntraVENous 2 times per day    pantoprazole  40 mg Oral QAM AC     Continuous Infusions:   sodium chloride       PRN Meds:sodium chloride flush, sodium chloride, ondansetron **OR** ondansetron, polyethylene glycol, acetaminophen **OR** acetaminophen    Objective:   Vitals:   T-max:  Patient Vitals for the past 8 hrs:   BP Temp Temp src Pulse Resp SpO2 Weight   03/11/22 0600 107/88 -- -- 90 15 -- 189 lb 2.5 oz (85.8 kg)   03/11/22 0500 112/86 -- -- 74 14 -- --   03/11/22 0400 106/80 98.1 °F (36.7 °C) Temporal 78 14 97 % --   03/11/22 0300 117/86 -- -- 84 19 -- --   03/11/22 0200 116/87 -- -- 76 16 -- --   03/11/22 0100 108/78 -- -- 84 19 -- --   03/11/22 0000 123/78 97.2 °F (36.2 °C) Temporal 88 16 95 % --       Intake/Output Summary (Last 24 hours) at 3/11/2022 0711  Last data filed at 3/11/2022 6785  Gross per 24 hour   Intake 1419.32 ml   Output 3075 ml   Net -1655.68 ml     ROS: ROS in interval Hx    Physical Exam  Constitutional: Alert, normal appearance, no acute distress  HENT: head normocephalic/atraumatic, no congestion or rhinorrhea, PERRLA, EOM intact  Cardio: normal rate, regular rhythm. No murmurs heard on auscultation  Pulmonary: clear to auscultation bilaterally, no wheezes, rales, or stridor  GI: abdomen soft, flat, and non-distended. No tenderness or guarding. Musculoskeletal: no deformity, tenderness, or injury. No lower extremity edema. Strength 5/5 in bilateral upper and lower extremities  Skin: no lesion, rash, or erythema. Large breast cancer mass on R breast.  Neuro: intubated and sedated, opens her eyes and tracks   Psych: mood normal, behavior normal    LABS:    CBC:   Recent Labs     03/09/22  0500 03/10/22  0446 03/11/22  0440   WBC 14.5* 17.1* 15.8*   HGB 11.3* 11.9* 11.7*   HCT 33.9* 36.0 35.7*    373 349   MCV 83.0 81.7 82.3     Renal:    Recent Labs     03/09/22  0500 03/10/22  0446 03/11/22  0440    135* 134*   K 4.5 4.3 4.5    100 102   CO2 23 26 23   BUN 17 17 18   CREATININE 0.7 0.7 0.5*   GLUCOSE 159* 128* 138*   CALCIUM 8.9 8.9 8.7   ANIONGAP 9 9 9     Hepatic: No results for input(s): AST, ALT, BILITOT, BILIDIR, PROT, LABALBU, ALKPHOS in the last 72 hours. Troponin: No results for input(s): TROPONINI in the last 72 hours. BNP: No results for input(s): BNP in the last 72 hours. Lipids: No results for input(s): CHOL, HDL in the last 72 hours. Invalid input(s): LDLCALCU, TRIGLYCERIDE  ABGs:  No results for input(s): PHART, LNL9QCV, PO2ART, QFU8XVX, BEART, THGBART, O9MFDAVE, ORZ4EDT in the last 72 hours. INR:   No results for input(s): INR in the last 72 hours. Lactate: No results for input(s): LACTATE in the last 72 hours. Cultures:  -----------------------------------------------------------------  RAD:   VL Extremity Venous Bilateral         CT HEAD WO CONTRAST   Final Result      1.  2 large hemorrhagic partially necrotic masses in the left parieto-temporal and left parieto-occipital regions with marked surrounding edema and mass effect. These demonstrate little change when compared to previous MRI study of 3/5/2022.   Associated    prominent mass effect and surrounding edema resulting 11 mm midline shift to the right. MRI BRAIN W WO CONTRAST   Final Result      Two large hemorrhagic partially necrotic heterogeneously enhancing mass in the left parietotemporal and left parieto-occipital region with surrounding vasogenic edema and mass effect. These are indeterminate, most likely relate to multicentric    glioblastoma. However, possibility of metastasis cannot be excluded given the history of breast cancer. 10 mm midline shift to right. CT CHEST ABDOMEN PELVIS W CONTRAST   Final Result      Large, necrotic right breast mass. Right axillary lymphadenopathy. Uterine fibroids. High density urine in the bladder. Assessment/Plan:   Geovani Ramírez is a 48 y. o. female w/ PMH of Stage IIB invasive ductal carcinoma (ER-, NH-, HER2 -) diagnosed 3/30/2021 presented to [facility] w/ one week of altered mental status. Encephalopathy  Edema left posterior cerebral hemisphere   Likely due to underlying metastasis. Hx of Stage IIB invasive ductal carcinoma (ER-, NH-, and HER2-) diagnosed 3/30/2021. Refused treatment at time. Presented w/ headache 1 wks ago w/ persistent memory, reading, and writing difficulties. No hx of seizures.  (3/4) CT Head w/o contrast: Abnormal edema left posterior cerebral hemisphere possibly related to underlying metastases versus less likely primary glioma. Causes 1.4 cm of left-to-right midline shift w/ cisternal effacement and downward transtentorial herniation and findings of developing hydropcephalus.   (3/4) CTA: Negative for large vessel occlusion or hemodynamic stenosis. CT chest/abd/pelvis with large, necrotic right breast mass. Right axillary lymphadenopathy. -(3/5) MRI Brain: Two large hemorrhagic partially necrotic heterogeneously enhancing mass in the left parietotemporal and left parieto-occipital region with surrounding vasogenic edema and mass effect.  These are indeterminate, most likely relate to multicentric glioblastoma. However, possibility of metastasis cannot be excluded given the history of breast cancer. 10 mm midline shift to right.   -q2 hr neurochecks  -Decadron 6 mg q 6hrs  -Keppra 1 g BID  -PT/OT/SLP  -s/p I/V fluids   -Plan: Plan to go to the OR likely on saturday   -Will require post-op Radiation and systemic therapy as outpatient  -Consults: NSGY, Oncology, Palliative Care    Status epilepticus   Patient was taken to the OR yesterday, she started having seizure activity s/p 2 mg versed x2, 500 mg keppra, 1 mg Ativan x4, 15 mg/kg fosphenytoin, vimpat and versed gtt was given and operation was cancelled and was transferred to the ICU after having a detailed discussion with the family  -remains on continous EEG, which showed generalized background abnormalities indicative of diffuse encephalopathy with no seizures noted  -on Keppra 1g BID   -Vimpat 200 mg I/V BID   -s/p Versed gtt and propofol  -will load with fosphenytoin prior to surgery as per neurology  -Neurology following, appreciate recs       Hematuria  Red urine w/ greater than 900 Rbcs. No infection. Could be secondary to metastatic breast CA.     Code Status: Full Code  FEN: ADULT DIET;  Dysphagia - Soft and Bite Sized  PPX: Lovenox, pepcid  DISPO: ICU    Raffy Meeks MD, PGY-1  03/11/22  7:11 AM    This patient has been staffed and discussed with Dr. Jesse Damian

## 2022-03-11 NOTE — PROGRESS NOTES
Comprehensive Nutrition Assessment    RECOMMENDATIONS:  1. PO Diet: Continue dysphagia soft & bite sized diet w/ thin liquids per SLP recs  2. ONS: Start high calorie, high protein supplement QD    NUTRITION ASSESSMENT:   Nutritional summary & status: Nutrition eval for LOS. Pt reports okay appetite, consuming good oral intakes >76% PO per meal documentation. Tolerating soft & bite sized diet well. Pt unsure of any significant wt changes, limited wt hx per EMR. Noted pt w/ metastatic breast cancer with brain metastasis; plans for surgical management this week per MD. Will send ONS to help promote additional nutrient intakes noting increased needs. Continue to encourage pt to consume adequate PO intakes. Will monitor nutritional status throughout adm    Admission/PMH: Admit for memory loss, metastatic breast cancer with 2 brain metastasis; PMH: thyroid nodule, Metastatic triple negative breast cancer previously diagnosed in March 2021 and treatment originally declined by the patient    MALNUTRITION ASSESSMENT  Context of Malnutrition: Acute Illness   Malnutrition Status: Insufficient data    NUTRITION DIAGNOSIS   Increased nutrient needs related to catabolic illness as evidenced by wounds (metastatic breast cancer)    [address] and/or Nutrient Delivery:  Continue Current Diet,Start Oral Nutrition Supplement  Nutrition Education/Counseling:  No recommendation at this time   Goals:  pt will consistently consume >50% PO intake through adm       Nutrition Monitoring and Evaluation:   Food/Nutrient Intake Outcomes:  Food and Nutrient Intake  Physical Signs/Symptoms Outcomes:  Biochemical Data,Weight     OBJECTIVE DATA: Significant to nutrition assessment  · Nutrition-Focused Physical Findings: lbm 3/7, trace RLE/LLE edema  · Labs: Reviewed;   · Meds: Reviewed; decadron  · Wounds:  (breast wound)       CURRENT NUTRITION THERAPIES  ADULT DIET;  Dysphagia - Soft and Bite Sized  Diet NPO Exceptions are: Sips of Water with Meds     PO Intake: %   PO Supplement Intake:None Ordered  Additional Sources of Calories/IVF:NS @ 100ml/hr     ANTHROPOMETRICS  Current Height: 5' 2.99\" (160 cm)  Current Weight: 189 lb 2.5 oz (85.8 kg)    Admission weight: 188 lb 7.9 oz (85.5 kg)  Ideal Body Weight (IBW): 115 lbs  (52 kg)    Usual Bodyweight  (AKASH-no wt hx)   Weight Changes: AKASH-limited wt hx      BMI: 33.6    Wt Readings from Last 50 Encounters:   03/11/22 189 lb 2.5 oz (85.8 kg)   03/03/22 189 lb 13.1 oz (86.1 kg)     COMPARATIVE STANDARDS  Energy (kcal):  7960-9985     Protein (g):  62-73       Fluid (ml/day):  >1500ml min    The patient will still be monitored per nutrition standards of care. Consult dietitian if nutrition interventions essential to patient care is needed.      Ana Tan, [address] [place] [address]  Terry:  [phone]  Office:  [phone]

## 2022-03-11 NOTE — PROGRESS NOTES
[facility]  Progress Note    Subjective:     Craniotomy cancelled Wednesday due to seizure and status epilepticus. No longer having seizures  Plan for craniotomy tomorrow    Review of Systems:     Review of Systems   Constitutional: Positive for fatigue. Negative for fever. HENT: Negative. Respiratory: Negative. Cardiovascular: Negative. Gastrointestinal: Negative. Genitourinary: Negative. Musculoskeletal: Negative. Neurological: Negative. Objective:     Medications    Current Facility-Administered Medications: [START ON 3/12/2022] fosphenytoin (CEREBYX) 1,780 mg PE in dextrose 5 % 100 mL IVPB (loading dose), 20 mg PE/kg, IntraVENous, Once  levETIRAcetam (KEPPRA) 2,000 mg in sodium chloride 0.9 % 250 mL IVPB, 2,000 mg, IntraVENous, Q12H  heparin (porcine) injection 5,000 Units, 5,000 Units, SubCUTAneous, 3 times per day  lacosamide (VIMPAT) 200 mg in dextrose 5 % 70 mL IVPB, 200 mg, IntraVENous, Q12H  dexamethasone (DECADRON) injection 6 mg, 6 mg, IntraVENous, Q6H  sodium chloride flush 0.9 % injection 5-40 mL, 5-40 mL, IntraVENous, 2 times per day  sodium chloride flush 0.9 % injection 5-40 mL, 5-40 mL, IntraVENous, PRN  0.9 % sodium chloride infusion, 25 mL, IntraVENous, PRN  ondansetron (ZOFRAN-ODT) disintegrating tablet 4 mg, 4 mg, Oral, Q8H PRN **OR** ondansetron (ZOFRAN) injection 4 mg, 4 mg, IntraVENous, Q6H PRN  polyethylene glycol (GLYCOLAX) packet 17 g, 17 g, Oral, Daily PRN  acetaminophen (TYLENOL) tablet 650 mg, 650 mg, Oral, Q6H PRN **OR** acetaminophen (TYLENOL) suppository 650 mg, 650 mg, Rectal, Q6H PRN  pantoprazole (PROTONIX) tablet 40 mg, 40 mg, Oral, QAM AC    Allergies  No Known Allergies    Physical Exam  VITALS:  /88   Pulse 90   Temp 98.1 °F (36.7 °C) (Temporal)   Resp 15   Ht 5' 2.99\" (1.6 m)   Wt 189 lb 2.5 oz (85.8 kg)   SpO2 97%   BMI 33.52 kg/m²   TEMPERATURE:  Current - Temp: 98.1 °F (36.7 °C);  Max - Temp  Av.1 °F (36.7 °C) Min: 97.2 °F (36.2 °C)  Max: 98.6 °F (37 °C)  PULSE OXIMETRY RANGE: SpO2  Av.2 %  Min: 95 %  Max: 98 %  24HR INTAKE/OUTPUT:      Intake/Output Summary (Last 24 hours) at 3/11/2022 0838  Last data filed at 3/11/2022 7377  Gross per 24 hour   Intake 1419.32 ml   Output 3075 ml   Net -1655.68 ml       Physical Exam  Constitutional:       Appearance: She is not ill-appearing. HENT:      Head:      Comments: Dressing in place  Eyes:      General: No scleral icterus. Cardiovascular:      Rate and Rhythm: Normal rate and regular rhythm. Heart sounds: No murmur heard. No gallop. Comments: Dressing overlying right breast  Pulmonary:      Effort: Pulmonary effort is normal.      Breath sounds: Normal breath sounds. No wheezing, rhonchi or rales. Abdominal:      Palpations: Abdomen is soft. Tenderness: There is no abdominal tenderness. Musculoskeletal:         General: No swelling. Lymphadenopathy:      Cervical: No cervical adenopathy. Skin:     General: Skin is warm and dry. Findings: No rash. Neurological:      General: No focal deficit present. Mental Status: She is alert and oriented to person, place, and time. Labs  Recent Labs     22  0500 03/10/22  0446 22  0440   WBC 14.5* 17.1* 15.8*   HGB 11.3* 11.9* 11.7*   HCT 33.9* 36.0 35.7*    373 349   MCV 83.0 81.7 82.3       Recent Labs     22  0500 03/10/22  0446 22  0440    135* 134*   K 4.5 4.3 4.5    100 102   CO2 23 26 23   BUN 17 17 18   CREATININE 0.7 0.7 0.5*       No results for input(s): AST, ALT, ALB, BILIDIR, BILITOT, ALKPHOS in the last 72 hours. No results for input(s): MG in the last 72 hours.     Radiology  CT HEAD WO CONTRAST    Result Date: 3/3/2022  EXAMINATION: CT OF THE HEAD WITHOUT CONTRAST  3/3/2022 8:17 pm TECHNIQUE: CT of the head was performed without the administration of intravenous contrast. Dose modulation, iterative reconstruction, and/or weight based adjustment of the mA/kV was utilized to reduce the radiation dose to as low as reasonably achievable. COMPARISON: None. HISTORY: ORDERING SYSTEM PROVIDED HISTORY: ams TECHNOLOGIST PROVIDED HISTORY: Reason for exam:->ams Has a \"code stroke\" or \"stroke alert\" been called? ->No Decision Support Exception - unselect if not a suspected or confirmed emergency medical condition->Emergency Medical Condition (MA) Is the patient pregnant?->No Reason for Exam: c/o memory loss that began last week. Pt's mother states last Thursday the pt states she felt like something hit her in the head, but doesn't know what. States she isn't able to remember her name, important things she would usually remember, and she is also unable to read or write. FINDINGS: BRAIN/VENTRICLES: There is diffuse hypoattenuation involving much of the left temporoparietal and posterior frontal lobe. There is hypoattenuation involving the white matter involving left posterior hemisphere likely represent areas of vasogenic edema. There is at least 2 discrete of mass is identified measuring approximate 4 cm in size 1 which is central hypoattenuation which may be related to necrosis. These left-to-right midline shift 1.4 cm in size. There is entrapment of the right lateral ventricle and both temporal horns of both lateral ventricles suggestive areas of developing hydrocephalus. There is downward transtentorial herniation as well. With mass effect on the brainstem and the partial effacement of the suprasellar and ambient cisterns. Left uncal herniation. .  Focal density identified left parasagittal occipital lobe likely represents petechial calcification of the blood products. ORBITS: The visualized portion of the orbits demonstrate no acute abnormality. SINUSES: The visualized paranasal sinuses and mastoid air cells demonstrate no acute abnormality. SOFT TISSUES/SKULL:  No acute abnormality of the visualized skull or soft tissues.      Abnormal edema left posterior cerebral hemisphere possibly related to underlying metastases versus less likely primary glioma. This causes 1.4 cm of left-to-right midline shift with cisternal effacement and downward transtentorial herniation and findings of developing hydrocephalus. Hyperdensity left parasagittal occipital lobe could represent area of calcification however small focus of blood products will not be totally excluded. Neuro surgical consultation recommended Critical results were called by Dr. Kae Short to Cristiana Naranjo PA on 3/3/2022 at 21:07. CTA HEAD NECK W CONTRAST    Result Date: 3/3/2022  EXAMINATION: CTA OF THE HEAD AND NECK WITH CONTRAST 3/3/2022 8:17 pm: TECHNIQUE: CTA of the head and neck was performed with the administration of intravenous contrast. Multiplanar reformatted images are provided for review. MIP images are provided for review. Stenosis of the internal carotid arteries measured using NASCET criteria. Dose modulation, iterative reconstruction, and/or weight based adjustment of the mA/kV was utilized to reduce the radiation dose to as low as reasonably achievable. COMPARISON: CT head 03/03/2022 HISTORY: ORDERING SYSTEM PROVIDED HISTORY: ams x 1 week TECHNOLOGIST PROVIDED HISTORY: Reason for exam:->ams x 1 week Has a \"code stroke\" or \"stroke alert\" been called? ->No Decision Support Exception - unselect if not a suspected or confirmed emergency medical condition->Emergency Medical Condition (MA) Reason for Exam: ams x 1 week FINDINGS: CTA NECK: AORTIC ARCH/ARCH VESSELS: No dissection or arterial injury. No significant stenosis of the brachiocephalic or subclavian arteries. CAROTID ARTERIES: No dissection, arterial injury, or hemodynamically significant stenosis by NASCET criteria. VERTEBRAL ARTERIES: No dissection, arterial injury, or significant stenosis. SOFT TISSUES: The lung apices are clear. No cervical or superior mediastinal lymphadenopathy.   The larynx and pharynx are unremarkable. No acute abnormality of the salivary and thyroid glands. Partial visualization of a large exophytic right breast mass. BONES: .  Vertebral heights are maintained. CTA HEAD: ANTERIOR CIRCULATION: No significant stenosis of the intracranial internal carotid, anterior cerebral, or middle cerebral arteries. No aneurysm. POSTERIOR CIRCULATION: No significant stenosis of the vertebral, basilar, or posterior cerebral arteries. No aneurysm. OTHER: No dural venous sinus thrombosis on this non-dedicated study. BRAIN: Left-sided masses and edema with associated midline shift. . Ventriculomegaly worrisome for areas developing hydrocephalus related to the mass effect and downward transfer herniation. Negative for large vessel occlusion or hemodynamic stenosis. CT CHEST ABDOMEN PELVIS W CONTRAST    Result Date: 3/4/2022  CT of chest abdomen and pelvis with contrast HISTORY: Brain mass. Evaluate for primary neoplasm or metastatic disease. Staging. COMPARISON: none CONTRAST:  70 mL Isovue-370 intravenous  TECHNIQUE: Individualized dose optimization technique was used in order to meet ALARA standards for radiation dose reduction. In addition to vendor specific dose reduction algorithms, the dose reduction techniques vary based on the specific scanner utilized but frequently include automated exposure control, adjustment of the mA and/or kV according to patient size, and use of iterative reconstruction technique. COMMENTS: Chest: Mild degenerative changes in the spine. Scoliosis. There is a large, necrotic, peripherally enhancing right breast mass which tenths the anterior skin surface and measures 10.2 x 8.5 cm. There is right axillary lymphadenopathy; for example 2.1 x 1.5 cm on image [id number]. Small hiatal hernia. Mediastinal structures are unremarkable without lymphadenopathy. Mild linear subpleural atelectasis in the lungs. 2 mm perifissural nodule on the left image 601-51 is most likely benign.  No evidence of pulmonary metastatic disease. Abdomen and pelvis: Degenerative changes in the spine. No lymphadenopathy. Gallbladder is within normal limits. Bowel is unremarkable. 4 mm hypodensity in the right lobe of the liver image [id number] is too small to definitively characterize and most likely benign. Solid abdominal organs are otherwise unremarkable. Multiple uterine fibroids are seen. Bladder is collapsed. There is high density fluid in the bladder which may represent prior contrast administration or hematuria. Large, necrotic right breast mass. Right axillary lymphadenopathy. Uterine fibroids. High density urine in the bladder. MRI BRAIN W WO CONTRAST    Result Date: 3/5/2022  EXAM: MRI BRAIN W WO CONTRAST INDICATION: Brain metastasis COMPARISON: None TECHNIQUE: Multiplanar, multisequence MR imaging of the head obtained without and with IV. IV contrast: 12 mL IV ProHance. FINDINGS: There is no diffusion restriction to suggest an acute infarct. There is a hemorrhagic partially necrotic heterogeneously enhancing mass in the left parietal occipital region likely intra-axial measuring approximately 3.5 x 4.2 cm (image 90 of series 13). Medially it abuts posterior falx. There is surrounding vasogenic edema with mass effect. There is another hemorrhagic partially necrotic heterogeneously enhancing mass in the left parietotemporal region measuring approximately 4.3 x 4.2 cm (image 85). It abuts the adjacent dura on the lateral aspect. There is surrounding vasogenic edema mass  effect. There is extensive surrounding vasogenic edema anteriorly extending into the left thalamus, left basal ganglia. There is mass effect with near complete effacement of left lateral ventricle and 10 mm midline shift to right. Major vascular flow voids are present. Sellar suprasellar region is unremarkable. Cervicomedullary junction is unremarkable. Visualized paranasal sinuses and mastoid air cells are clear.  No suspicious calvarial abnormality. Two large hemorrhagic partially necrotic heterogeneously enhancing mass in the left parietotemporal and left parieto-occipital region with surrounding vasogenic edema and mass effect. These are indeterminate, most likely relate to multicentric glioblastoma. However, possibility of metastasis cannot be excluded given the history of breast cancer. 10 mm midline shift to right. Pathology  pend    Problem List  Patient Active Problem List   Diagnosis    Thyroid nodule    Brain metastases ([address]    Brain mass    Status epilepticus ([address]    Duct cell carcinoma (HCC)    Cerebral edema (HCC)       Assessment and Plan:     Metastatic triple negative breast cancer  - Neglected Right breast primary  - Intracranial metastasis  - The patient and her mother have agreed to surgery to resect her intracranial metastasis. - Craniotomy had to be canceled due to seizures & status epilepticus.  - Plan is to return to the OR Saturday for craniotomy and tumor resection now that seizures controlled. - Subsequent to this, she will require radiation therapy. Typically this is administered 2 to 4 weeks postoperatively. - Subsequent to radiation therapy she will require systemic chemotherapy. This will be in the outpatient setting.  - Recommend she follow-up with medical oncology at the [facility] office after this hospitalization to coordinate and implement systemic palliative chemotherapy once she completes radiation therapy.       Freddie Palomino MD, PhD  3/11/2022

## 2022-03-11 NOTE — PROGRESS NOTES
NEUROLOGY / NEUROCRITICAL CARE PROGRESS NOTE       Patient Name: Teagan Bower YOB: 1975   Sex: Female Age: 48 yrs     CC / Reason for Consult: status epilepticus    Interval Hx / Changes over last 24 hours:   No new changes overnight, planning for OR tomorrow w/ Dr. Flo Mauricio     ROS: Endorses headache 4/10 pain. RUE weakness. HISTORY   Admission HPI:   Teagan Bower is a 48 y.o. y/o female with PMH significant for invasive ductal cell carcinoma. Patient intubated, hx obtained per chart review. She presented to [facility] on 3/3/22 with complaints of memory deficits and AMS over the last week. On 2/24, she was sitting at the table with her mother and reported an abrupt onset of severe posterior headache as if someone hit her in the back of the head. She went to bed that night and when she woke up in the morning she realized she was having difficulty reading, writing, speaking, and recalling recent events. Her symptoms progressed throughout the week, which prompted her to come to the hospital. She did not report any focal weakness, difficulty walking, vision changes, or falls. CT head completed in the ER demonstrated abnormal edema in the L posterior cerebral hemisphere concerning for underlying metastases vs primary glioma and  1.4 cm left to R midline shift with cisternal effacement and downward transtentorial herniation and findings suggestive of hydrocephalus. She was given Keppra and decadron and transferred to [facility]. for further management. CT of chest, abdomen, and pelvis revealed a larger, necrotic R breast mass. She had been diagnosed with breast cancer in March of 2021 but had elected not to pursue treatment. She was in the OR yesterday for resection of the mass, but prior to any incision she was noted to have convulsive seizure activity.  She received Versed 2mg x 2, an extra 500 mg of Keppra, 4mg total of ativan (given 1mg every 5 minutes), a loading dose of 15mg/kg of PHT, and several doses of propofol. Intraoperative EEG still showed intermittent epileptic discharges though she no longer had any apparent seizure activity. The procedure was aborted and she was taken back to the ICU, started on Vimpat 200 mg BID and versed gtt per Neurology recommendations. She had a repeat head CT that was unchanged. Overnight, she did no have any seizure activity on cvEEG. She had been weaned down on her sedation, and she was responding well though she did have some RUE weakness. She is on Keppra 1000 mg BID and Vimpat 200 mg BID. PMH Past Medical History:   Diagnosis Date    Thyroid nodule       Allergies No Known Allergies   Diet ADULT DIET; Dysphagia - Soft and Bite Sized   Isolation No active isolations     LABS   Metabolic Panel Recent Labs     03/09/22  0500 03/10/22  0446 03/11/22  0440    135* 134*   K 4.5 4.3 4.5    100 102   CO2 23 26 23   BUN 17 17 18   CREATININE 0.7 0.7 0.5*   GLUCOSE 159* 128* 138*   CALCIUM 8.9 8.9 8.7      CBC / Coags Recent Labs     03/09/22  0500 03/10/22  0446 03/11/22  0440   WBC 14.5* 17.1* 15.8*   RBC 4.08 4.41 4.34   HGB 11.3* 11.9* 11.7*   HCT 33.9* 36.0 35.7*    373 349      Other No results for input(s): LABA1C, LDLCALC, TRIG, TSH, CMYTYROD94, FOLATE, LABSALI, COVID19 in the last 72 hours. No results for input(s): PHENYTOIN, KEPPRA, LACOSA, LAMO, VALPROATE, LACTSEPSIS, LACTA in the last 72 hours.        CURRENT SCHEDULED MEDICATIONS   Inpatient Medications     [START ON 3/12/2022] fosphenytoin (CEREBYX) loading dose IVPB, 20 mg PE/kg, IntraVENous, Once    levetiracetam, 2,000 mg, IntraVENous, Q12H    heparin (porcine), 5,000 Units, SubCUTAneous, 3 times per day    lacosamide (VIMPAT) IVPB, 200 mg, IntraVENous, Q12H    dexamethasone, 6 mg, IntraVENous, Q6H    sodium chloride flush, 5-40 mL, IntraVENous, 2 times per day    pantoprazole, 40 mg, Oral, QAM AC   Infusions    sodium chloride Antibiotics   Recent Abx Admin      No antibiotic orders with administrations found. DIAGNOSTICS   IMAGES:  No new imaging today     PHYSICAL EXAMINATION     PHYSICAL EXAM:  Vitals:    03/11/22 0300 03/11/22 0400 03/11/22 0500 03/11/22 0600   BP: 117/86 106/80 112/86 107/88   Pulse: 84 78 74 90   Resp: 19 14 14 15   Temp:  98.1 °F (36.7 °C)     TempSrc:  Temporal     SpO2:  97%     Weight:    189 lb 2.5 oz (85.8 kg)   Height:           General: alert, no distress, well-nourished  Neurologic  Mental status:   orientation Did not attempt to speak today, nods appropriately, follows commands               Attention intact as able to attend well to the exam                Language Minimal speech, speaks softly, identifies objects              Comprehension follows some simple commands, but has trouble with a few commands     Cranial nerves:   CN2: Blinks to visual threat bilaterally  CN 3,4,6: Pupils equal and reactive to light, extraocular muscles intact  CN5: Facial sensation symmetric   CN7: Face symmetric at rest and activation  CN8: Hearing symmetric to spoken voice  CN9: Palate elevates symmetrically  CN11: Traps full strength on shoulder shrug  CN12: Does no stick out tongue to command     Motor Exam: Had difficulty understanding command to bend knees   R  L    Deltoid -3  5   Biceps 4 5   Triceps +3 5   Wrist extension  4 5   Interossei 4 5      R  L    Hip flexion  5  5   Hip extension  5 5   Knee flexion  AKASH AKASH   Knee extension  AKASH AKASH   Ankle dorsiflexion  5 5   Ankle plantar flexion  5 5         Sensory: Light touch intact and equal in all extremities. Tone: normal in all 4 extremities  Gait: held for patient safety        OTHER SYSTEMS:  Cardiovascular: Warm, appears well perfused   Respiratory: Breathing over set rate on ventilator  Abdominal: Abdomen is without distention   Extremities: Upper and lower extremities are atraumatic in appearance without deformity.  No swelling or erythema. ASSESSMENT & RECOMMENDATIONS   Assessment:  Vin Wilson is a 48 y.o. y/o female with PMH significant for invasive ductal cell carcinoma who presented after having increasing confusion for a week at home and was found to have intracranial metastases. Had seizure activity in the OR yesterday. Shes had some RUE weakness noted 3/9, but this has been improving thorughout the morning and could be consistent with a Que's paralysis. She continues to have RUE weakness.     Plan:  - Continue Keppra 2000 mg BID and Vimpat 200 mg BID  - Neurosurgery following appreciate recs, decadron per neurosurgery  - Fosphenytoin 20mg/kg loading dose ordered for Saturday morning, to be given prior to procedure at 0800.  - Q2 hour neurologic exams  - Keep HOB > 30 degress  - Seizure precautions  - Please call neurocritical care with any seizures or exam changes      JUDITH Castro - CNP   Neurology & Neurocritical Care   Neurology Line: [phone]  [employer]: [facility] Neurology & Neuro Critical Care NPs  3/11/2022 8:49 AM

## 2022-03-11 NOTE — PROGRESS NOTES
NEUROSURGERY  PROGRESS NOTE    Patient Name: Mckay Russo YOB: 1975   Sex: Female Age: 48 yrs     Medical Record Number: [id number] Acct Number: [de-identified]   Room Number: 4150/8675-72 Hospital Day: Hospital Day: 8         Subjective: Pt nods approp to questions and follows commands. But unable to get any words out this am. She nods approp when asked if she is in hospital  Objective:    VITAL SIGNS   /88   Pulse 90   Temp 98.1 °F (36.7 °C) (Temporal)   Resp 15   Ht 5' 2.99\" (1.6 m)   Wt 189 lb 2.5 oz (85.8 kg)   SpO2 97%   BMI 33.52 kg/m²    Height Height: 5' 2.99\" (160 cm)   Weight Weight: 189 lb 2.5 oz (85.8 kg)        Allergies No Known Allergies   NPO Status ADULT DIET; Dysphagia - Soft and Bite Sized   Isolation No active isolations     LABS   Basic Metabolic Profile Recent Labs     03/09/22  0500 03/10/22  0446 03/11/22  0440    135* 134*    100 102   CO2 23 26 23   BUN 17 17 18   CREATININE 0.7 0.7 0.5*   GLUCOSE 159* 128* 138*      Complete Blood Count Recent Labs     03/09/22  0500 03/10/22  0446 03/11/22  0440   WBC 14.5* 17.1* 15.8*   RBC 4.08 4.41 4.34      Coagulation Studies No results for input(s): PTT, INR in the last 72 hours.     Invalid input(s): PLATELETS, PROA, PT, PTTA     MEDICATIONS   Inpatient Medications     [START ON 3/12/2022] fosphenytoin (CEREBYX) loading dose IVPB, 20 mg PE/kg, IntraVENous, Once    levetiracetam, 2,000 mg, IntraVENous, Q12H    heparin (porcine), 5,000 Units, SubCUTAneous, 3 times per day    lacosamide (VIMPAT) IVPB, 200 mg, IntraVENous, Q12H    dexamethasone, 6 mg, IntraVENous, Q6H    sodium chloride flush, 5-40 mL, IntraVENous, 2 times per day    pantoprazole, 40 mg, Oral, QAM AC   Infusions    sodium chloride        Antibiotics   Recent Abx Admin      No antibiotic orders with administrations found. Neurologic Exam:  Mental status: awake and alert     Cranial Nerves:  II: Visual acuity not tested, blinks to threat bilat  III, IV, VI: PERRL, 3 mm bilaterally, EOMI, no nystagmus noted  V: Facial sensation intact bilaterally to touch  VII: Face symmetric  VIII: Hearing intact bilaterally to spoken voice  IX: Palate movement equal bilaterally  XI: Shoulder shrug equal bilaterally  XII: Tongue midline      Musculoskeletal:   Gait: Not tested   Tone: normal  Sensory: intact to all extremities  Motor strength:    Right  Left    Right  Left    Deltoid  3 5  Hip Flex  5 5   Biceps  4 5  Knee Extensors  5 5   Triceps  3 5  Knee Flexors  5 5   Wrist Ext  5 5  Ankle Dorsiflex. 5 5   Wrist Flex  5 5  Ankle Plantarflex. 5 5   Handgrip  5 5  Ext Jake Longus  5 5   Thumb Ext  5 5           Respiratory:  Unlabored respiratory pattern    Abdomen:   Soft, ND       Cardiovascular:  Warm, well perfused    Assessment    Pt is a 49 yo woman with triple negative breast ca and intracranial metastases. Educated family about tumor using your guide to neurosurgery, p 5-7. No further seizures.     Plan:  -HOB elevated  -neuro checks q2  -Decadron, PPI, SSI  -NCC for seizure management. On cvEEG. Vimpat and Keppra for seizure ppx.   -NCC ordered dose of fosphytoin prior to OR on Sat   -Oncology/Rad onc following  LE dopplers negative  -Plan to [facility] resection of intracranial masses assuming control of seizures prior. -NPO after MN      Patient was seen with Dr. Bernardo Finnegan who agrees with above assessment and plan. Electronically signed by:  JUDITH Braun - CNP, 3/11/2022 6:55 AM   Neurosurgery Nurse Practitioner  [phone]

## 2022-03-11 NOTE — PROGRESS NOTES
Hospitalist Progress Note      PCP: SHAHEED ZAMARRIPA, APRN - CNP    Date of Admission: 3/4/2022    Chief Complaint: short term memory loss    Hospital Course: Came to ED with memory loss. Diagnosed with metastatic breast cancer with 2 brain metastasis  Planned for surgical resection of her left posterior temporal and left occipital masses 03/08 but prior to incision it was noted that the patient began to shake, concerning for a seizure, this was confirmed with EEG in the OR, Onset of the seizures may have coincided with obtaining baseline transcranial MEPs. Patient was immediately given 2mg Versed and an additional 500 mg of Keppra (in addition to the 1000 mg given during timeout). A second dose of Versed was then given due to persistent seizure activity, followed by 1mg Ativan x 4 in 5 min intervals. During this time, the patient was loaded with 15 mg/kg fosphenytoin and bolused several times with propofol to induce burst suppression. She continued to have persistent breakthrough non-convulsive seizure activity on EEG consistent with status epilepticus.    CT head did not demonstrate acute changes  Procedure was held and transferred to ICU for monitoring on cEEG    Subjective:   More awake and alert, no specific complains    Medications:  Reviewed    Infusion Medications    sodium chloride       Scheduled Medications    [START ON 3/12/2022] fosphenytoin (CEREBYX) loading dose IVPB  20 mg PE/kg IntraVENous Once    levetiracetam  2,000 mg IntraVENous Q12H    heparin (porcine)  5,000 Units SubCUTAneous 3 times per day    lacosamide (VIMPAT) IVPB  200 mg IntraVENous Q12H    dexamethasone  6 mg IntraVENous Q6H    sodium chloride flush  5-40 mL IntraVENous 2 times per day    pantoprazole  40 mg Oral QAM AC     PRN Meds: sodium chloride flush, sodium chloride, ondansetron **OR** ondansetron, polyethylene glycol, acetaminophen **OR** acetaminophen      Intake/Output Summary (Last 24 hours) at 3/11/2022 8557  Last data filed at 3/11/2022 9911  Gross per 24 hour   Intake 1419.32 ml   Output 3075 ml   Net -1655.68 ml       Physical Exam Performed:    /88   Pulse 90   Temp 98.1 °F (36.7 °C) (Temporal)   Resp 15   Ht 5' 2.99\" (1.6 m)   Wt 189 lb 2.5 oz (85.8 kg)   SpO2 97%   BMI 33.52 kg/m²     General appearance: No apparent distress, appears stated age and cooperative. Respiratory:  Normal respiratory effort. Clear to auscultation, bilaterally without Rales/Wheezes/Rhonchi. Chest wall:  Right breast mass covered in dressing  Cardiovascular: Regular rate and rhythm with normal S1/S2 without murmurs, rubs or gallops. Abdomen: Soft, non-tender, non-distended with normal bowel sounds. Musculoskeletal: No clubbing, cyanosis or edema bilaterally. Skin: Right breast mass is known   Neurologic:  Neurovascularly intact  grossly non-focal.  Psychiatric: Alert and oriented, slow to verbally respond    Labs:   Recent Labs     03/09/22  0500 03/10/22  0446 03/11/22 0440   WBC 14.5* 17.1* 15.8*   HGB 11.3* 11.9* 11.7*   HCT 33.9* 36.0 35.7*    373 349     Recent Labs     03/09/22  0500 03/10/22  0446 03/11/22  0440    135* 134*   K 4.5 4.3 4.5    100 102   CO2 23 26 23   BUN 17 17 18   CREATININE 0.7 0.7 0.5*   CALCIUM 8.9 8.9 8.7     No results for input(s): AST, ALT, BILIDIR, BILITOT, ALKPHOS in the last 72 hours. No results for input(s): INR in the last 72 hours. No results for input(s): [place] Snuffer in the last 72 hours.     Urinalysis:      Lab Results   Component Value Date    NITRU Negative 03/04/2022    WBCUA 18 03/04/2022    RBCUA >900 03/04/2022    BLOODU LARGE 03/04/2022    SPECGRAV <=1.005 03/04/2022    GLUCOSEU Negative 03/04/2022       Radiology:  VL Extremity Venous Bilateral         CT HEAD WO CONTRAST   Final Result      1.  2 large hemorrhagic partially necrotic masses in the left parieto-temporal and left parieto-occipital regions with marked surrounding edema and mass effect. These demonstrate little change when compared to previous MRI study of 3/5/2022. Associated    prominent mass effect and surrounding edema resulting 11 mm midline shift to the right. MRI BRAIN W WO CONTRAST   Final Result      Two large hemorrhagic partially necrotic heterogeneously enhancing mass in the left parietotemporal and left parieto-occipital region with surrounding vasogenic edema and mass effect. These are indeterminate, most likely relate to multicentric    glioblastoma. However, possibility of metastasis cannot be excluded given the history of breast cancer. 10 mm midline shift to right. CT CHEST ABDOMEN PELVIS W CONTRAST   Final Result      Large, necrotic right breast mass. Right axillary lymphadenopathy. Uterine fibroids. High density urine in the bladder. Assessment/Plan:    Active Hospital Problems    Diagnosis     Status epilepticus ([address] [G40.901]     Duct cell carcinoma ([address] [C80.1]     Cerebral edema (HCC) [G93.6]     Brain metastases (HCC) [C79.31]     Brain mass [G93.89]      Metastatic triple negative breast cancer, now with Intracranial metastasis, previously diagnosed in March 2021 and treatment declined by the patient. Oncology following, patient decided on treatment    Intracranial vasogenic edema secondary to metastatic masses ; Symptomatic with short-term memory impairment. Continue decadron  Decided on surgery and plan for crani with mass resection once status controlled.  Return to OR later this week, likely Saturday, for second attempt at mass resection    Status epilepticus; Continue cEEG, Continue AEDs; being managed by neurology  -will load with fosphenytoin prior to surgery as per neurology  - continue keppra, vimpat    Acute resp failure due to Status epilepticus: unable to protect airway and was intubated, extubated to 2L NC    GI Px: protonix  DVT Prophylaxis: heparin sq; hold after tonight's dose  Diet: ADULT DIET;  Dysphagia - Soft and Bite Sized  Diet NPO Exceptions are: Sips of Water with Meds  ADULT ORAL NUTRITION SUPPLEMENT; Dinner; Standard High Calorie/High Protein Oral Supplement   Code Status: Full Code    PT/OT Eval Status: Consider when patient makes her decision regarding treatment    Dispo -inpatient stay, currently in the ICU  Will need surgical management later this week, possibly Saturday    Chuy Kevin MD

## 2022-03-12 ENCOUNTER — ANESTHESIA (OUTPATIENT)
Dept: OPERATING ROOM | Age: 47
DRG: 021 | End: 2022-03-12
Payer: MEDICAID

## 2022-03-12 ENCOUNTER — APPOINTMENT (OUTPATIENT)
Dept: CT IMAGING | Age: 47
DRG: 021 | End: 2022-03-12
Attending: RADIOLOGY
Payer: MEDICAID

## 2022-03-12 VITALS — DIASTOLIC BLOOD PRESSURE: 59 MMHG | OXYGEN SATURATION: 100 % | TEMPERATURE: 99.1 F | SYSTOLIC BLOOD PRESSURE: 110 MMHG

## 2022-03-12 LAB
ANION GAP SERPL CALCULATED.3IONS-SCNC: 8 MMOL/L (ref 3–16)
BASOPHILS ABSOLUTE: 0 K/UL (ref 0–0.2)
BASOPHILS RELATIVE PERCENT: 0 %
BUN BLDV-MCNC: 16 MG/DL (ref 7–20)
CALCIUM SERPL-MCNC: 9 MG/DL (ref 8.3–10.6)
CHLORIDE BLD-SCNC: 103 MMOL/L (ref 99–110)
CO2: 24 MMOL/L (ref 21–32)
CREAT SERPL-MCNC: 0.6 MG/DL (ref 0.6–1.1)
EOSINOPHILS ABSOLUTE: 0 K/UL (ref 0–0.6)
EOSINOPHILS RELATIVE PERCENT: 0 %
GFR AFRICAN AMERICAN: >60
GFR NON-AFRICAN AMERICAN: >60
GLUCOSE BLD-MCNC: 113 MG/DL (ref 70–99)
GLUCOSE BLD-MCNC: 137 MG/DL (ref 70–99)
GLUCOSE BLD-MCNC: 158 MG/DL (ref 70–99)
HCT VFR BLD CALC: 34.8 % (ref 36–48)
HEMOGLOBIN: 11.5 G/DL (ref 12–16)
LYMPHOCYTES ABSOLUTE: 1.7 K/UL (ref 1–5.1)
LYMPHOCYTES RELATIVE PERCENT: 11 %
MCH RBC QN AUTO: 27.3 PG (ref 26–34)
MCHC RBC AUTO-ENTMCNC: 33.2 G/DL (ref 31–36)
MCV RBC AUTO: 82.3 FL (ref 80–100)
METAMYELOCYTES RELATIVE PERCENT: 2 %
MONOCYTES ABSOLUTE: 0.6 K/UL (ref 0–1.3)
MONOCYTES RELATIVE PERCENT: 4 %
NEUTROPHILS ABSOLUTE: 13.3 K/UL (ref 1.7–7.7)
NEUTROPHILS RELATIVE PERCENT: 83 %
PDW BLD-RTO: 14.7 % (ref 12.4–15.4)
PERFORMED ON: ABNORMAL
PERFORMED ON: ABNORMAL
PLATELET # BLD: 345 K/UL (ref 135–450)
PMV BLD AUTO: 7.3 FL (ref 5–10.5)
POTASSIUM REFLEX MAGNESIUM: 4.6 MMOL/L (ref 3.5–5.1)
RBC # BLD: 4.22 M/UL (ref 4–5.2)
RBC # BLD: NORMAL 10*6/UL
SODIUM BLD-SCNC: 135 MMOL/L (ref 136–145)
WBC # BLD: 15.7 K/UL (ref 4–11)

## 2022-03-12 PROCEDURE — 2580000003 HC RX 258: Performed by: NEUROLOGICAL SURGERY

## 2022-03-12 PROCEDURE — 85025 COMPLETE CBC W/AUTO DIFF WBC: CPT

## 2022-03-12 PROCEDURE — 00B70ZZ EXCISION OF CEREBRAL HEMISPHERE, OPEN APPROACH: ICD-10-PCS | Performed by: NEUROLOGICAL SURGERY

## 2022-03-12 PROCEDURE — 2000000000 HC ICU R&B

## 2022-03-12 PROCEDURE — 2580000003 HC RX 258: Performed by: ANESTHESIOLOGY

## 2022-03-12 PROCEDURE — 2720000010 HC SURG SUPPLY STERILE: Performed by: NEUROLOGICAL SURGERY

## 2022-03-12 PROCEDURE — 94761 N-INVAS EAR/PLS OXIMETRY MLT: CPT

## 2022-03-12 PROCEDURE — 3700000000 HC ANESTHESIA ATTENDED CARE: Performed by: NEUROLOGICAL SURGERY

## 2022-03-12 PROCEDURE — 2709999900 HC NON-CHARGEABLE SUPPLY: Performed by: NEUROLOGICAL SURGERY

## 2022-03-12 PROCEDURE — 6370000000 HC RX 637 (ALT 250 FOR IP): Performed by: NEUROLOGICAL SURGERY

## 2022-03-12 PROCEDURE — 2780000010 HC IMPLANT OTHER: Performed by: NEUROLOGICAL SURGERY

## 2022-03-12 PROCEDURE — C9254 INJECTION, LACOSAMIDE: HCPCS | Performed by: NEUROLOGICAL SURGERY

## 2022-03-12 PROCEDURE — 2700000000 HC OXYGEN THERAPY PER DAY

## 2022-03-12 PROCEDURE — 2580000003 HC RX 258: Performed by: NURSE PRACTITIONER

## 2022-03-12 PROCEDURE — 3700000001 HC ADD 15 MINUTES (ANESTHESIA): Performed by: NEUROLOGICAL SURGERY

## 2022-03-12 PROCEDURE — 88341 IMHCHEM/IMCYTCHM EA ADD ANTB: CPT

## 2022-03-12 PROCEDURE — 70450 CT HEAD/BRAIN W/O DYE: CPT

## 2022-03-12 PROCEDURE — 88360 TUMOR IMMUNOHISTOCHEM/MANUAL: CPT

## 2022-03-12 PROCEDURE — C9254 INJECTION, LACOSAMIDE: HCPCS | Performed by: NURSE PRACTITIONER

## 2022-03-12 PROCEDURE — 6360000002 HC RX W HCPCS: Performed by: NURSE PRACTITIONER

## 2022-03-12 PROCEDURE — 6360000002 HC RX W HCPCS: Performed by: NEUROLOGICAL SURGERY

## 2022-03-12 PROCEDURE — 2500000003 HC RX 250 WO HCPCS: Performed by: NEUROLOGICAL SURGERY

## 2022-03-12 PROCEDURE — 80048 BASIC METABOLIC PNL TOTAL CA: CPT

## 2022-03-12 PROCEDURE — 2580000003 HC RX 258

## 2022-03-12 PROCEDURE — 36415 COLL VENOUS BLD VENIPUNCTURE: CPT

## 2022-03-12 PROCEDURE — 3600000004 HC SURGERY LEVEL 4 BASE: Performed by: NEUROLOGICAL SURGERY

## 2022-03-12 PROCEDURE — 6360000002 HC RX W HCPCS: Performed by: STUDENT IN AN ORGANIZED HEALTH CARE EDUCATION/TRAINING PROGRAM

## 2022-03-12 PROCEDURE — C1713 ANCHOR/SCREW BN/BN,TIS/BN: HCPCS | Performed by: NEUROLOGICAL SURGERY

## 2022-03-12 PROCEDURE — 88342 IMHCHEM/IMCYTCHM 1ST ANTB: CPT

## 2022-03-12 PROCEDURE — 6360000002 HC RX W HCPCS: Performed by: ANESTHESIOLOGY

## 2022-03-12 PROCEDURE — 3600000014 HC SURGERY LEVEL 4 ADDTL 15MIN: Performed by: NEUROLOGICAL SURGERY

## 2022-03-12 PROCEDURE — 6360000002 HC RX W HCPCS

## 2022-03-12 PROCEDURE — 2500000003 HC RX 250 WO HCPCS: Performed by: ANESTHESIOLOGY

## 2022-03-12 PROCEDURE — 88307 TISSUE EXAM BY PATHOLOGIST: CPT

## 2022-03-12 DEVICE — DURA 62110 SUBSTITUTE DUREPAIR 4X5IN NCE
Type: IMPLANTABLE DEVICE | Site: BRAIN | Status: FUNCTIONAL
Brand: DUREPAIR®

## 2022-03-12 DEVICE — LOW PROFILE BURR HOLE COVER, W/TAB, 14MM
Type: IMPLANTABLE DEVICE | Site: CRANIAL | Status: FUNCTIONAL
Brand: UNIVERSAL NEURO 2

## 2022-03-12 DEVICE — STRAIGHT PLATE, 12MM BAR, WITH TAB
Type: IMPLANTABLE DEVICE | Site: CRANIAL | Status: FUNCTIONAL
Brand: UNIVERSAL NEURO 3

## 2022-03-12 DEVICE — SCREW UN3 SLFTP 1.5X4MM: Type: IMPLANTABLE DEVICE | Site: CRANIAL | Status: FUNCTIONAL

## 2022-03-12 DEVICE — LOW PROFILE BURR HOLE COVER, W/TAB, 20MM
Type: IMPLANTABLE DEVICE | Site: CRANIAL | Status: FUNCTIONAL
Brand: UNIVERSAL NEURO 2

## 2022-03-12 RX ORDER — LEVETIRACETAM 500 MG/5ML
INJECTION, SOLUTION, CONCENTRATE INTRAVENOUS PRN
Status: DISCONTINUED | OUTPATIENT
Start: 2022-03-12 | End: 2022-03-12 | Stop reason: SDUPTHER

## 2022-03-12 RX ORDER — MAGNESIUM HYDROXIDE/ALUMINUM HYDROXICE/SIMETHICONE 120; 1200; 1200 MG/30ML; MG/30ML; MG/30ML
15 SUSPENSION ORAL EVERY 6 HOURS PRN
Status: DISCONTINUED | OUTPATIENT
Start: 2022-03-12 | End: 2022-03-17 | Stop reason: HOSPADM

## 2022-03-12 RX ORDER — LIDOCAINE HYDROCHLORIDE 20 MG/ML
INJECTION, SOLUTION EPIDURAL; INFILTRATION; INTRACAUDAL; PERINEURAL CONTINUOUS PRN
Status: DISCONTINUED | OUTPATIENT
Start: 2022-03-12 | End: 2022-03-12 | Stop reason: SDUPTHER

## 2022-03-12 RX ORDER — SENNA AND DOCUSATE SODIUM 50; 8.6 MG/1; MG/1
1 TABLET, FILM COATED ORAL 2 TIMES DAILY
Status: DISCONTINUED | OUTPATIENT
Start: 2022-03-12 | End: 2022-03-17 | Stop reason: HOSPADM

## 2022-03-12 RX ORDER — DEXAMETHASONE 4 MG/1
4 TABLET ORAL EVERY 12 HOURS SCHEDULED
Status: DISCONTINUED | OUTPATIENT
Start: 2022-03-18 | End: 2022-03-17 | Stop reason: HOSPADM

## 2022-03-12 RX ORDER — DEXTROSE MONOHYDRATE 50 MG/ML
100 INJECTION, SOLUTION INTRAVENOUS PRN
Status: DISCONTINUED | OUTPATIENT
Start: 2022-03-12 | End: 2022-03-17 | Stop reason: HOSPADM

## 2022-03-12 RX ORDER — OXYCODONE HYDROCHLORIDE 5 MG/1
5 TABLET ORAL EVERY 4 HOURS PRN
Status: DISCONTINUED | OUTPATIENT
Start: 2022-03-12 | End: 2022-03-17 | Stop reason: HOSPADM

## 2022-03-12 RX ORDER — DEXAMETHASONE 4 MG/1
4 TABLET ORAL EVERY 6 HOURS SCHEDULED
Status: DISPENSED | OUTPATIENT
Start: 2022-03-12 | End: 2022-03-15

## 2022-03-12 RX ORDER — SODIUM CHLORIDE 9 MG/ML
INJECTION, SOLUTION INTRAVENOUS CONTINUOUS PRN
Status: DISCONTINUED | OUTPATIENT
Start: 2022-03-12 | End: 2022-03-12 | Stop reason: SDUPTHER

## 2022-03-12 RX ORDER — MIDAZOLAM HYDROCHLORIDE 1 MG/ML
INJECTION INTRAMUSCULAR; INTRAVENOUS PRN
Status: DISCONTINUED | OUTPATIENT
Start: 2022-03-12 | End: 2022-03-12 | Stop reason: SDUPTHER

## 2022-03-12 RX ORDER — BACITRACIN ZINC AND POLYMYXIN B SULFATE 500; 1000 [USP'U]/G; [USP'U]/G
OINTMENT TOPICAL PRN
Status: DISCONTINUED | OUTPATIENT
Start: 2022-03-12 | End: 2022-03-12 | Stop reason: ALTCHOICE

## 2022-03-12 RX ORDER — SODIUM CHLORIDE 9 MG/ML
INJECTION, SOLUTION INTRAVENOUS CONTINUOUS
Status: DISCONTINUED | OUTPATIENT
Start: 2022-03-12 | End: 2022-03-14

## 2022-03-12 RX ORDER — REMIFENTANIL HYDROCHLORIDE 1 MG/ML
INJECTION, POWDER, LYOPHILIZED, FOR SOLUTION INTRAVENOUS CONTINUOUS PRN
Status: DISCONTINUED | OUTPATIENT
Start: 2022-03-12 | End: 2022-03-12 | Stop reason: SDUPTHER

## 2022-03-12 RX ORDER — LIDOCAINE HYDROCHLORIDE 20 MG/ML
INJECTION, SOLUTION EPIDURAL; INFILTRATION; INTRACAUDAL; PERINEURAL PRN
Status: DISCONTINUED | OUTPATIENT
Start: 2022-03-12 | End: 2022-03-12 | Stop reason: SDUPTHER

## 2022-03-12 RX ORDER — ACETAMINOPHEN 325 MG/1
650 TABLET ORAL EVERY 4 HOURS PRN
Status: DISCONTINUED | OUTPATIENT
Start: 2022-03-12 | End: 2022-03-17 | Stop reason: HOSPADM

## 2022-03-12 RX ORDER — PROPOFOL 10 MG/ML
INJECTION, EMULSION INTRAVENOUS PRN
Status: DISCONTINUED | OUTPATIENT
Start: 2022-03-12 | End: 2022-03-12 | Stop reason: SDUPTHER

## 2022-03-12 RX ORDER — DEXAMETHASONE SODIUM PHOSPHATE 10 MG/ML
INJECTION, SOLUTION INTRAMUSCULAR; INTRAVENOUS PRN
Status: DISCONTINUED | OUTPATIENT
Start: 2022-03-12 | End: 2022-03-12 | Stop reason: SDUPTHER

## 2022-03-12 RX ORDER — SODIUM CHLORIDE, SODIUM LACTATE, POTASSIUM CHLORIDE, AND CALCIUM CHLORIDE .6; .31; .03; .02 G/100ML; G/100ML; G/100ML; G/100ML
IRRIGANT IRRIGATION PRN
Status: DISCONTINUED | OUTPATIENT
Start: 2022-03-12 | End: 2022-03-12 | Stop reason: ALTCHOICE

## 2022-03-12 RX ORDER — SUCCINYLCHOLINE/SOD CL,ISO/PF 100 MG/5ML
SYRINGE (ML) INTRAVENOUS PRN
Status: DISCONTINUED | OUTPATIENT
Start: 2022-03-12 | End: 2022-03-12 | Stop reason: SDUPTHER

## 2022-03-12 RX ORDER — SODIUM CHLORIDE 9 MG/ML
25 INJECTION, SOLUTION INTRAVENOUS PRN
Status: DISCONTINUED | OUTPATIENT
Start: 2022-03-12 | End: 2022-03-17 | Stop reason: HOSPADM

## 2022-03-12 RX ORDER — FENTANYL CITRATE 50 UG/ML
INJECTION, SOLUTION INTRAMUSCULAR; INTRAVENOUS PRN
Status: DISCONTINUED | OUTPATIENT
Start: 2022-03-12 | End: 2022-03-12 | Stop reason: SDUPTHER

## 2022-03-12 RX ORDER — ONDANSETRON 2 MG/ML
4 INJECTION INTRAMUSCULAR; INTRAVENOUS EVERY 6 HOURS PRN
Status: DISCONTINUED | OUTPATIENT
Start: 2022-03-12 | End: 2022-03-17 | Stop reason: HOSPADM

## 2022-03-12 RX ORDER — BISACODYL 10 MG
10 SUPPOSITORY, RECTAL RECTAL DAILY PRN
Status: DISCONTINUED | OUTPATIENT
Start: 2022-03-12 | End: 2022-03-17 | Stop reason: HOSPADM

## 2022-03-12 RX ORDER — BUPIVACAINE HYDROCHLORIDE 5 MG/ML
INJECTION, SOLUTION EPIDURAL; INTRACAUDAL PRN
Status: DISCONTINUED | OUTPATIENT
Start: 2022-03-12 | End: 2022-03-12 | Stop reason: ALTCHOICE

## 2022-03-12 RX ORDER — MANNITOL 20 G/100ML
INJECTION, SOLUTION INTRAVENOUS PRN
Status: DISCONTINUED | OUTPATIENT
Start: 2022-03-12 | End: 2022-03-12 | Stop reason: SDUPTHER

## 2022-03-12 RX ORDER — OXYCODONE HYDROCHLORIDE 5 MG/1
10 TABLET ORAL EVERY 4 HOURS PRN
Status: DISCONTINUED | OUTPATIENT
Start: 2022-03-12 | End: 2022-03-17 | Stop reason: HOSPADM

## 2022-03-12 RX ORDER — INSULIN LISPRO 100 [IU]/ML
0-6 INJECTION, SOLUTION INTRAVENOUS; SUBCUTANEOUS NIGHTLY
Status: DISCONTINUED | OUTPATIENT
Start: 2022-03-12 | End: 2022-03-17 | Stop reason: HOSPADM

## 2022-03-12 RX ORDER — NALOXONE HYDROCHLORIDE 0.4 MG/ML
0.2 INJECTION, SOLUTION INTRAMUSCULAR; INTRAVENOUS; SUBCUTANEOUS PRN
Status: DISCONTINUED | OUTPATIENT
Start: 2022-03-12 | End: 2022-03-17 | Stop reason: HOSPADM

## 2022-03-12 RX ORDER — DEXAMETHASONE 4 MG/1
4 TABLET ORAL EVERY 8 HOURS SCHEDULED
Status: DISCONTINUED | OUTPATIENT
Start: 2022-03-15 | End: 2022-03-17 | Stop reason: HOSPADM

## 2022-03-12 RX ORDER — POLYETHYLENE GLYCOL 3350 17 G/17G
17 POWDER, FOR SOLUTION ORAL DAILY
Status: DISCONTINUED | OUTPATIENT
Start: 2022-03-12 | End: 2022-03-16

## 2022-03-12 RX ORDER — SODIUM CHLORIDE 0.9 % (FLUSH) 0.9 %
10 SYRINGE (ML) INJECTION EVERY 12 HOURS SCHEDULED
Status: DISCONTINUED | OUTPATIENT
Start: 2022-03-12 | End: 2022-03-17 | Stop reason: HOSPADM

## 2022-03-12 RX ORDER — PROMETHAZINE HYDROCHLORIDE 25 MG/1
12.5 TABLET ORAL EVERY 6 HOURS PRN
Status: DISCONTINUED | OUTPATIENT
Start: 2022-03-12 | End: 2022-03-17 | Stop reason: HOSPADM

## 2022-03-12 RX ORDER — DEXAMETHASONE 4 MG/1
4 TABLET ORAL DAILY
Status: DISCONTINUED | OUTPATIENT
Start: 2022-03-22 | End: 2022-03-17 | Stop reason: HOSPADM

## 2022-03-12 RX ORDER — SODIUM CHLORIDE 0.9 % (FLUSH) 0.9 %
10 SYRINGE (ML) INJECTION PRN
Status: DISCONTINUED | OUTPATIENT
Start: 2022-03-12 | End: 2022-03-17 | Stop reason: HOSPADM

## 2022-03-12 RX ORDER — LIDOCAINE HYDROCHLORIDE AND EPINEPHRINE BITARTRATE 20; .01 MG/ML; MG/ML
INJECTION, SOLUTION SUBCUTANEOUS PRN
Status: DISCONTINUED | OUTPATIENT
Start: 2022-03-12 | End: 2022-03-12 | Stop reason: ALTCHOICE

## 2022-03-12 RX ORDER — INSULIN LISPRO 100 [IU]/ML
0-12 INJECTION, SOLUTION INTRAVENOUS; SUBCUTANEOUS
Status: DISCONTINUED | OUTPATIENT
Start: 2022-03-12 | End: 2022-03-17 | Stop reason: HOSPADM

## 2022-03-12 RX ORDER — HEPARIN SODIUM 5000 [USP'U]/ML
5000 INJECTION, SOLUTION INTRAVENOUS; SUBCUTANEOUS EVERY 8 HOURS
Status: DISCONTINUED | OUTPATIENT
Start: 2022-03-13 | End: 2022-03-15

## 2022-03-12 RX ADMIN — PROPOFOL 140 MG: 10 INJECTION, EMULSION INTRAVENOUS at 08:40

## 2022-03-12 RX ADMIN — SODIUM CHLORIDE: 9 INJECTION, SOLUTION INTRAVENOUS at 08:18

## 2022-03-12 RX ADMIN — FENTANYL CITRATE 50 MCG: 50 INJECTION, SOLUTION INTRAMUSCULAR; INTRAVENOUS at 08:37

## 2022-03-12 RX ADMIN — FENTANYL CITRATE 50 MCG: 50 INJECTION, SOLUTION INTRAMUSCULAR; INTRAVENOUS at 08:40

## 2022-03-12 RX ADMIN — LIDOCAINE HYDROCHLORIDE 30 ML: 20 INJECTION, SOLUTION EPIDURAL; INFILTRATION; INTRACAUDAL; PERINEURAL at 08:45

## 2022-03-12 RX ADMIN — DEXTROSE MONOHYDRATE 1780 MG PE: 50 INJECTION, SOLUTION INTRAVENOUS at 07:15

## 2022-03-12 RX ADMIN — HYDROMORPHONE HYDROCHLORIDE 1 MG: 1 INJECTION, SOLUTION INTRAMUSCULAR; INTRAVENOUS; SUBCUTANEOUS at 19:54

## 2022-03-12 RX ADMIN — MANNITOL 50 G: 20 INJECTION, SOLUTION INTRAVENOUS at 09:50

## 2022-03-12 RX ADMIN — SODIUM CHLORIDE: 9 INJECTION, SOLUTION INTRAVENOUS at 00:18

## 2022-03-12 RX ADMIN — LEVETIRACETAM 1000 MG: 100 INJECTION, SOLUTION INTRAVENOUS at 09:25

## 2022-03-12 RX ADMIN — REMIFENTANIL HYDROCHLORIDE 0.15 MCG/KG/MIN: 1 INJECTION, POWDER, LYOPHILIZED, FOR SOLUTION INTRAVENOUS at 08:43

## 2022-03-12 RX ADMIN — LEVETIRACETAM 2000 MG: 100 INJECTION, SOLUTION INTRAVENOUS at 00:24

## 2022-03-12 RX ADMIN — PHENYLEPHRINE HYDROCHLORIDE 15 MCG/MIN: 10 INJECTION, SOLUTION INTRAMUSCULAR; INTRAVENOUS; SUBCUTANEOUS at 10:55

## 2022-03-12 RX ADMIN — DEXAMETHASONE SODIUM PHOSPHATE 6 MG: 4 INJECTION, SOLUTION INTRA-ARTICULAR; INTRALESIONAL; INTRAMUSCULAR; INTRAVENOUS; SOFT TISSUE at 06:39

## 2022-03-12 RX ADMIN — LIDOCAINE HYDROCHLORIDE 40 MG: 20 INJECTION, SOLUTION EPIDURAL; INFILTRATION; INTRACAUDAL; PERINEURAL at 08:40

## 2022-03-12 RX ADMIN — CEFAZOLIN 2000 MG: 10 INJECTION, POWDER, FOR SOLUTION INTRAVENOUS at 08:33

## 2022-03-12 RX ADMIN — CEFAZOLIN 2000 MG: 10 INJECTION, POWDER, FOR SOLUTION INTRAVENOUS at 12:33

## 2022-03-12 RX ADMIN — DEXAMETHASONE SODIUM PHOSPHATE 10 MG: 10 INJECTION, SOLUTION INTRAMUSCULAR; INTRAVENOUS at 09:24

## 2022-03-12 RX ADMIN — PHENYLEPHRINE HYDROCHLORIDE 100 MCG: 10 INJECTION, SOLUTION INTRAMUSCULAR; INTRAVENOUS; SUBCUTANEOUS at 08:55

## 2022-03-12 RX ADMIN — HYDROMORPHONE HYDROCHLORIDE 1 MG: 1 INJECTION, SOLUTION INTRAMUSCULAR; INTRAVENOUS; SUBCUTANEOUS at 16:50

## 2022-03-12 RX ADMIN — Medication 120 MG: at 08:40

## 2022-03-12 RX ADMIN — DEXAMETHASONE SODIUM PHOSPHATE 6 MG: 4 INJECTION, SOLUTION INTRA-ARTICULAR; INTRALESIONAL; INTRAMUSCULAR; INTRAVENOUS; SOFT TISSUE at 00:26

## 2022-03-12 RX ADMIN — FENTANYL CITRATE 50 MCG: 50 INJECTION, SOLUTION INTRAMUSCULAR; INTRAVENOUS at 16:21

## 2022-03-12 RX ADMIN — SODIUM CHLORIDE: 9 INJECTION, SOLUTION INTRAVENOUS at 16:59

## 2022-03-12 RX ADMIN — CEFAZOLIN SODIUM 2000 MG: 10 INJECTION, POWDER, FOR SOLUTION INTRAVENOUS at 20:24

## 2022-03-12 RX ADMIN — PROPOFOL 150 MCG/KG/MIN: 10 INJECTION, EMULSION INTRAVENOUS at 08:45

## 2022-03-12 RX ADMIN — DEXTROSE MONOHYDRATE 200 MG: 50 INJECTION, SOLUTION INTRAVENOUS at 05:03

## 2022-03-12 RX ADMIN — FENTANYL CITRATE 50 MCG: 50 INJECTION, SOLUTION INTRAMUSCULAR; INTRAVENOUS at 16:10

## 2022-03-12 RX ADMIN — OXYCODONE 10 MG: 5 TABLET ORAL at 22:18

## 2022-03-12 RX ADMIN — MIDAZOLAM HYDROCHLORIDE 2 MG: 2 INJECTION, SOLUTION INTRAMUSCULAR; INTRAVENOUS at 08:22

## 2022-03-12 RX ADMIN — DEXTROSE MONOHYDRATE 200 MG: 50 INJECTION, SOLUTION INTRAVENOUS at 17:34

## 2022-03-12 ASSESSMENT — PULMONARY FUNCTION TESTS
PIF_VALUE: 4
PIF_VALUE: 19
PIF_VALUE: 19
PIF_VALUE: 3
PIF_VALUE: 19
PIF_VALUE: 19
PIF_VALUE: 17
PIF_VALUE: 19
PIF_VALUE: 6
PIF_VALUE: 2
PIF_VALUE: 19
PIF_VALUE: 19
PIF_VALUE: 18
PIF_VALUE: 19
PIF_VALUE: 18
PIF_VALUE: 19
PIF_VALUE: 16
PIF_VALUE: 19
PIF_VALUE: 18
PIF_VALUE: 19
PIF_VALUE: 4
PIF_VALUE: 18
PIF_VALUE: 19
PIF_VALUE: 17
PIF_VALUE: 19
PIF_VALUE: 18
PIF_VALUE: 19
PIF_VALUE: 18
PIF_VALUE: 19
PIF_VALUE: 17
PIF_VALUE: 19
PIF_VALUE: 17
PIF_VALUE: 19
PIF_VALUE: 17
PIF_VALUE: 1
PIF_VALUE: 19
PIF_VALUE: 19
PIF_VALUE: 9
PIF_VALUE: 19
PIF_VALUE: 18
PIF_VALUE: 17
PIF_VALUE: 19
PIF_VALUE: 16
PIF_VALUE: 19
PIF_VALUE: 18
PIF_VALUE: 19
PIF_VALUE: 19
PIF_VALUE: 20
PIF_VALUE: 19
PIF_VALUE: 19
PIF_VALUE: 4
PIF_VALUE: 19
PIF_VALUE: 19
PIF_VALUE: 2
PIF_VALUE: 9
PIF_VALUE: 19
PIF_VALUE: 2
PIF_VALUE: 21
PIF_VALUE: 17
PIF_VALUE: 19
PIF_VALUE: 1
PIF_VALUE: 19
PIF_VALUE: 18
PIF_VALUE: 19
PIF_VALUE: 18
PIF_VALUE: 19
PIF_VALUE: 21
PIF_VALUE: 19
PIF_VALUE: 18
PIF_VALUE: 1
PIF_VALUE: 31
PIF_VALUE: 1
PIF_VALUE: 19
PIF_VALUE: 18
PIF_VALUE: 18
PIF_VALUE: 19
PIF_VALUE: 17
PIF_VALUE: 19
PIF_VALUE: 19
PIF_VALUE: 17
PIF_VALUE: 19
PIF_VALUE: 19
PIF_VALUE: 1
PIF_VALUE: 3
PIF_VALUE: 19
PIF_VALUE: 1
PIF_VALUE: 20
PIF_VALUE: 3
PIF_VALUE: 19
PIF_VALUE: 1
PIF_VALUE: 19
PIF_VALUE: 18
PIF_VALUE: 19
PIF_VALUE: 1
PIF_VALUE: 17
PIF_VALUE: 19
PIF_VALUE: 18
PIF_VALUE: 17
PIF_VALUE: 19
PIF_VALUE: 20
PIF_VALUE: 3
PIF_VALUE: 19
PIF_VALUE: 9
PIF_VALUE: 19
PIF_VALUE: 19
PIF_VALUE: 1
PIF_VALUE: 19
PIF_VALUE: 19
PIF_VALUE: 4
PIF_VALUE: 18
PIF_VALUE: 19
PIF_VALUE: 17
PIF_VALUE: 1
PIF_VALUE: 19
PIF_VALUE: 17
PIF_VALUE: 19
PIF_VALUE: 20
PIF_VALUE: 20
PIF_VALUE: 19
PIF_VALUE: 17
PIF_VALUE: 19
PIF_VALUE: 18
PIF_VALUE: 19
PIF_VALUE: 19
PIF_VALUE: 18
PIF_VALUE: 19
PIF_VALUE: 2
PIF_VALUE: 16
PIF_VALUE: 16
PIF_VALUE: 18
PIF_VALUE: 1
PIF_VALUE: 16
PIF_VALUE: 19
PIF_VALUE: 19
PIF_VALUE: 1
PIF_VALUE: 19
PIF_VALUE: 16
PIF_VALUE: 3
PIF_VALUE: 19
PIF_VALUE: 18
PIF_VALUE: 19
PIF_VALUE: 1
PIF_VALUE: 19
PIF_VALUE: 18
PIF_VALUE: 19
PIF_VALUE: 18
PIF_VALUE: 16
PIF_VALUE: 19
PIF_VALUE: 19
PIF_VALUE: 20
PIF_VALUE: 19
PIF_VALUE: 19
PIF_VALUE: 17
PIF_VALUE: 19
PIF_VALUE: 18
PIF_VALUE: 18
PIF_VALUE: 19
PIF_VALUE: 17
PIF_VALUE: 19
PIF_VALUE: 19
PIF_VALUE: 1
PIF_VALUE: 17
PIF_VALUE: 19
PIF_VALUE: 19
PIF_VALUE: 18
PIF_VALUE: 17
PIF_VALUE: 17
PIF_VALUE: 19
PIF_VALUE: 19
PIF_VALUE: 3
PIF_VALUE: 19
PIF_VALUE: 18
PIF_VALUE: 19
PIF_VALUE: 3
PIF_VALUE: 18
PIF_VALUE: 19
PIF_VALUE: 18
PIF_VALUE: 19
PIF_VALUE: 19
PIF_VALUE: 18
PIF_VALUE: 19
PIF_VALUE: 20
PIF_VALUE: 3
PIF_VALUE: 19
PIF_VALUE: 18
PIF_VALUE: 19
PIF_VALUE: 3
PIF_VALUE: 19
PIF_VALUE: 19
PIF_VALUE: 16
PIF_VALUE: 19
PIF_VALUE: 19
PIF_VALUE: 18
PIF_VALUE: 19
PIF_VALUE: 18
PIF_VALUE: 19
PIF_VALUE: 16
PIF_VALUE: 1
PIF_VALUE: 19
PIF_VALUE: 19
PIF_VALUE: 18
PIF_VALUE: 19
PIF_VALUE: 19
PIF_VALUE: 17
PIF_VALUE: 17
PIF_VALUE: 19
PIF_VALUE: 18
PIF_VALUE: 19
PIF_VALUE: 5
PIF_VALUE: 19
PIF_VALUE: 18
PIF_VALUE: 19
PIF_VALUE: 18
PIF_VALUE: 19
PIF_VALUE: 19
PIF_VALUE: 2
PIF_VALUE: 19
PIF_VALUE: 17
PIF_VALUE: 18
PIF_VALUE: 19
PIF_VALUE: 18
PIF_VALUE: 19
PIF_VALUE: 3
PIF_VALUE: 19
PIF_VALUE: 7
PIF_VALUE: 16
PIF_VALUE: 19
PIF_VALUE: 20
PIF_VALUE: 19
PIF_VALUE: 19
PIF_VALUE: 4
PIF_VALUE: 19
PIF_VALUE: 17
PIF_VALUE: 19
PIF_VALUE: 19
PIF_VALUE: 4
PIF_VALUE: 18
PIF_VALUE: 18
PIF_VALUE: 19
PIF_VALUE: 18
PIF_VALUE: 19
PIF_VALUE: 19
PIF_VALUE: 20
PIF_VALUE: 19
PIF_VALUE: 17
PIF_VALUE: 19
PIF_VALUE: 16
PIF_VALUE: 17
PIF_VALUE: 18
PIF_VALUE: 19
PIF_VALUE: 18
PIF_VALUE: 19
PIF_VALUE: 1
PIF_VALUE: 19
PIF_VALUE: 19
PIF_VALUE: 18
PIF_VALUE: 19

## 2022-03-12 ASSESSMENT — PAIN DESCRIPTION - PAIN TYPE
TYPE: CHRONIC PAIN
TYPE: CHRONIC PAIN

## 2022-03-12 ASSESSMENT — PAIN DESCRIPTION - DESCRIPTORS: DESCRIPTORS: CONSTANT

## 2022-03-12 ASSESSMENT — VISUAL ACUITY: OU: 1

## 2022-03-12 ASSESSMENT — PAIN SCALES - GENERAL
PAINLEVEL_OUTOF10: 10
PAINLEVEL_OUTOF10: 0
PAINLEVEL_OUTOF10: 10
PAINLEVEL_OUTOF10: 0
PAINLEVEL_OUTOF10: 8
PAINLEVEL_OUTOF10: 0

## 2022-03-12 ASSESSMENT — PAIN DESCRIPTION - ORIENTATION
ORIENTATION: LEFT;RIGHT
ORIENTATION: LEFT

## 2022-03-12 ASSESSMENT — ENCOUNTER SYMPTOMS
BACK PAIN: 0
COLOR CHANGE: 0
DIARRHEA: 0
COUGH: 0
EYE PAIN: 0
SHORTNESS OF BREATH: 0
CONSTIPATION: 0
ABDOMINAL PAIN: 0
SINUS PAIN: 0

## 2022-03-12 ASSESSMENT — PAIN DESCRIPTION - LOCATION
LOCATION: HEAD;BREAST
LOCATION: HEAD

## 2022-03-12 ASSESSMENT — PAIN DESCRIPTION - PROGRESSION: CLINICAL_PROGRESSION: GRADUALLY IMPROVING

## 2022-03-12 ASSESSMENT — PAIN DESCRIPTION - FREQUENCY
FREQUENCY: CONTINUOUS
FREQUENCY: CONTINUOUS

## 2022-03-12 ASSESSMENT — LIFESTYLE VARIABLES: SMOKING_STATUS: 0

## 2022-03-12 ASSESSMENT — PAIN DESCRIPTION - ONSET
ONSET: ON-GOING
ONSET: ON-GOING

## 2022-03-12 NOTE — BRIEF OP NOTE
Brief Postoperative Note      Patient: Alejandro Orr  YOB: 1975  MRN: [id number]    Date of Procedure: 3/12/2022    Pre-Op Diagnosis: Brain mass [G93.89]    Post-Op Diagnosis: Same       Procedure(s):  LEFT TEMPORAL PARIETAL OCCIPITAL CRANIOTOMY FOR TUMOR RESECTION    Surgeon(s):  Sulaiman Adames MD    Assistant:  Surgical Assistant: Ludin Munoz RN    Anesthesia: General    Estimated Blood Loss (mL): 489    Complications: None    Specimens:   ID Type Source Tests Collected by Time Destination   A : LEFT TEMPORAL MASS Tissue Tissue SURGICAL PATHOLOGY Sulaiman Adames MD 3/12/2022 1108    B : B) Left temporal dura Tissue Tissue SURGICAL PATHOLOGY Sulaiman Adames MD 3/12/2022 1147    C : LEFT OCCIPITAL MASS Tissue Tissue SURGICAL PATHOLOGY Sulaiman Adames MD 3/12/2022 1506        Implants:  Implant Name Type Inv.  Item Serial No.  Lot No. LRB No. Used Action   GRAFT DURA SUB [id number] CLLGN REGEN MTRX OFF THE SHELL - [id number]  GRAFT DURA SUB [id number] CLLGN REGEN MTRX OFF THE SHELL  [facility]- [id number] Left 1 Implanted         Drains:   Closed/Suction Drain Left Scalp Bulb  (Active)       Urethral Catheter (Active)   $ Urethral catheter insertion $ Not inserted for procedure 03/11/22 0800   Catheter Indications Need for fluid volume management of the critically ill patient in a critical care setting 03/12/22 0400   Site Assessment No urethral drainage 03/12/22 0400   Urine Color Yellow 03/12/22 0400   Urine Appearance Clear 03/12/22 0400   Output (mL) 435 mL 03/11/22 2052       Findings: Large hemorrhagic masses    Electronically signed by Sulaiman Adames MD on 3/12/2022 at 3:49 PM

## 2022-03-12 NOTE — PROGRESS NOTES
Patient received from OR. Crying out in pain. Anesthesia at the bedside Fentanyl 25 mcg given per Anesthesia. Report received from Anesthesia. Dr. Zeny Zhang  Updated family, Mother  At the bedside.  Patient able to follow commands

## 2022-03-12 NOTE — PROGRESS NOTES
ICU Progress Note    Admit Date: 3/4/2022  Vent Day: None  IV Access:Peripheral  IV Fluids:None  Vasopressors:None                Antibiotics: None  Diet: Diet NPO Exceptions are: Sips of Water with Meds    CC: AMS, Headache    Interval history: No overnight events. Vitals stable and WNL. 1.4 L urine output in last 24 hours. WBC elevated (15.7) on decadron. She is oriented to person, place and time although is not able to state her age. Denies pain. Plan for OR today for mass resection. Medications:     Scheduled Meds:   levetiracetam  2,000 mg IntraVENous Q12H    lacosamide (VIMPAT) IVPB  200 mg IntraVENous Q12H    dexamethasone  6 mg IntraVENous Q6H    sodium chloride flush  5-40 mL IntraVENous 2 times per day    pantoprazole  40 mg Oral QAM AC     Continuous Infusions:   sodium chloride 100 mL/hr at 03/12/22 0018    sodium chloride       PRN Meds:lactated ringers, thrombin, gelatin adsorbable, oxidized cellulose, surgiflo hemostatic matrix, bacitracin-polymyxin b, lidocaine-EPINEPHrine, oxyCODONE-acetaminophen, sodium chloride flush, sodium chloride, ondansetron **OR** ondansetron, polyethylene glycol, acetaminophen **OR** acetaminophen    Objective:   Vitals:   T-max:  No data found. Intake/Output Summary (Last 24 hours) at 3/12/2022 1321  Last data filed at 3/12/2022 1229  Gross per 24 hour   Intake 415 ml   Output 3915 ml   Net -3500 ml     Review of Systems   Constitutional: Negative for fatigue and fever. HENT: Negative for ear pain and sinus pain. Eyes: Negative for pain. Respiratory: Negative for cough and shortness of breath. Cardiovascular: Negative for chest pain. Gastrointestinal: Negative for abdominal pain, constipation and diarrhea. Endocrine: Negative for polyuria. Genitourinary: Negative for flank pain and pelvic pain. Musculoskeletal: Negative for back pain. Skin: Negative for color change. Neurological: Negative for dizziness and headaches. Psychiatric/Behavioral: Negative for agitation, behavioral problems and confusion. Physical Exam  Constitutional:       General: She is awake. She is not in acute distress. Appearance: Normal appearance. HENT:      Head: Normocephalic and atraumatic. Nose: Nose normal.   Eyes:      General: Vision grossly intact. Gaze aligned appropriately. Right eye: No discharge. Left eye: No discharge. Extraocular Movements: Extraocular movements intact. Conjunctiva/sclera: Conjunctivae normal.   Cardiovascular:      Rate and Rhythm: Normal rate and regular rhythm. Pulses: Normal pulses. Heart sounds: Normal heart sounds. Pulmonary:      Effort: Pulmonary effort is normal.      Breath sounds: Normal breath sounds. Abdominal:      General: There is no distension. There are no signs of injury. Palpations: Abdomen is soft. Tenderness: There is no abdominal tenderness. Musculoskeletal:         General: No deformity or signs of injury. Normal range of motion. Cervical back: Full passive range of motion without pain, normal range of motion and neck supple. Skin:     General: Skin is warm. Neurological:      General: No focal deficit present. Mental Status: She is alert, oriented to person, place, and time and easily aroused. Mental status is at baseline. Motor: Motor function is intact. Coordination: Coordination is intact. Gait: Gait is intact. Comments: She is oriented to person, place and time although is not able to state her age. Psychiatric:         Attention and Perception: Attention and perception normal.         Mood and Affect: Mood and affect normal.         Speech: Speech normal.         Behavior: Behavior normal. Behavior is cooperative. Thought Content:  Thought content normal.         Cognition and Memory: Cognition normal.         Judgment: Judgment normal.       LABS:    CBC:   Recent Labs     03/10/22  0446 03/11/22 0440 03/12/22  0352   WBC 17.1* 15.8* 15.7*   HGB 11.9* 11.7* 11.5*   HCT 36.0 35.7* 34.8*    349 345   MCV 81.7 82.3 82.3     Renal:    Recent Labs     03/10/22  0446 03/11/22  0440 03/12/22  0352   * 134* 135*   K 4.3 4.5 4.6    102 103   CO2 26 23 24   BUN 17 18 16   CREATININE 0.7 0.5* 0.6   GLUCOSE 128* 138* 137*   CALCIUM 8.9 8.7 9.0   ANIONGAP 9 9 8     INR:   Recent Labs     03/11/22  1405   INR 1.05     -----------------------------------------------------------------  RAD:   VL Extremity Venous Bilateral   Final Result      CT HEAD WO CONTRAST   Final Result      1.  2 large hemorrhagic partially necrotic masses in the left parieto-temporal and left parieto-occipital regions with marked surrounding edema and mass effect. These demonstrate little change when compared to previous MRI study of 3/5/2022. Associated    prominent mass effect and surrounding edema resulting 11 mm midline shift to the right. MRI BRAIN W WO CONTRAST   Final Result      Two large hemorrhagic partially necrotic heterogeneously enhancing mass in the left parietotemporal and left parieto-occipital region with surrounding vasogenic edema and mass effect. These are indeterminate, most likely relate to multicentric    glioblastoma. However, possibility of metastasis cannot be excluded given the history of breast cancer. 10 mm midline shift to right. CT CHEST ABDOMEN PELVIS W CONTRAST   Final Result      Large, necrotic right breast mass. Right axillary lymphadenopathy. Uterine fibroids. High density urine in the bladder. Assessment/Plan:     47F PMH ICD breast cancer, thyroid nodule, fibroids presented 03/04/22 w/ AMS x 1 week. 1. Brain mass  - Suspect metastasis (vs glioblastoma primary). - MRI-brain (03/05/22): Two large masses (left parietotemporal/parieto-occipital regions). 10 mm midline shift.  - CT-head (03/08/22): Masses stable.  11 mm midline shift.  - Plan: Patient will go to OR today (Dr. Luigi Dominguez, mass resection). Decadron 6 mg q6h. HOB elevated. Neurochecks q2h.     2. Status epilepticus  - Likely 2/2 to brain mass. - Prior mass resection cancelled due to seizure in OR (03/09). - Plan: Keppra 2000 mg BID. Vimpat 200 mg BID. 3. 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 - CT-chest (03/04/22): Rt breast mass (10.2 x 8.5 cm). Rt axillary lymphadenopathy.  - Plan: Will follow-up with Oncology as outpatient. Will need post-op radiation (2-4 weeks post-op) followed by systemic chemotherapy. 4. GI PPX  - Protonix 40 mg QD. 5. DVT PPX  - Holding (surgery today). 6. Diet  - NPO. 7. Disposition  - Pre-admit: Lives at home w/ mother.  - Plan for home health care at discharge. - Remain in ICU pending OR today, stability, mobilization. Gideon Youssef DO, PGY-3  03/12/22  1:21 PM    This patient has been staffed and discussed with Dr. Mickie Aj.

## 2022-03-12 NOTE — ANESTHESIA POSTPROCEDURE EVALUATION
Department of Anesthesiology  Postprocedure Note    Patient: Melanie Chapa  MRN: [id number]  YOB: 1975  Date of evaluation: 3/12/2022  Time:  4:38 PM     Procedure Summary     Date: 03/12/22 Room / Location: [facility]    Anesthesia Start: 0818 Anesthesia Stop: 1628    Procedure: LEFT TEMPORAL PARIETAL OCCIPITAL CRANIOTOMY FOR TUMOR RESECTION (Left Head) Diagnosis:       Brain mass      (Brain mass [G93.89])    Surgeons: Leif Briones MD Responsible Provider: Theresa Tristan DO    Anesthesia Type: General ASA Status: 4 - Emergent          Anesthesia Type: General    Laura Phase I:      Laura Phase II:      Last vitals: Reviewed and per EMR flowsheets.        Anesthesia Post Evaluation    Patient location during evaluation: ICU  Patient participation: complete - patient cannot participate (not comprehending   opens eyes   )  Level of consciousness: confused  Pain score: 2  Airway patency: patent  Nausea & Vomiting: no nausea and no vomiting  Complications: no  Cardiovascular status: hemodynamically stable  Respiratory status: acceptable  Hydration status: stable

## 2022-03-12 NOTE — ANESTHESIA PRE PROCEDURE
Department of Anesthesiology  Preprocedure Note       Name:  Teagan Bower   Age:  48 y.o.  :  1975                                          MRN:  [id number]         Date:  3/12/2022      Surgeon: Segundo Mckeon):  Ezequiel Marsh MD    Procedure: Procedure(s):  LEFT TEMPORAL PARIETAL OCCIPITAL CRANIOTOMY FOR TUMOR RESECTION    Medications prior to admission:   Prior to Admission medications    Not on File       Current medications:    Current Facility-Administered Medications   Medication Dose Route Frequency Provider Last Rate Last Admin    lactated ringers irrigation solution    PRN Ezequiel Marsh MD   2,000 mL at 22 0948    thrombin spray    PRN Ezequiel Marsh MD   10,000 Units at 22 0951    gelatin adsorbable (GELFOAM) sponge    PRN Ezequiel Marsh MD   1 each at 22 0952    oxidized cellulose (SURGICEL ORIGINAL)    PRN Ezequiel Marsh MD   1 each at 22 0952    surgiflo hemostatic matrix    PRN Ezequiel Marsh MD   1 each at 22 0952    0.9 % sodium chloride infusion   IntraVENous Continuous JUDITH Mcfarland -  mL/hr at 22 0018 New Bag at 22 0018    oxyCODONE-acetaminophen (PERCOCET) 5-325 MG per tablet 1 tablet  1 tablet Oral Q6H PRN Estrada Delcid MD   1 tablet at 22 1600    levETIRAcetam (KEPPRA) 2,000 mg in sodium chloride 0.9 % 250 mL IVPB  2,000 mg IntraVENous Q12H JUDITH Abdul CNP   Stopped at 22 0039    lacosamide (VIMPAT) 200 mg in dextrose 5 % 70 mL IVPB  200 mg IntraVENous Q12H JUDITH Trejo - CNP   Stopped at 22 0533    dexamethasone (DECADRON) injection 6 mg  6 mg IntraVENous Q6H Luis Fernando Hinkle MD   6 mg at 22 0926    sodium chloride flush 0.9 % injection 5-40 mL  5-40 mL IntraVENous 2 times per day Luis Fernando Hinkle MD   10 mL at 22 2128    sodium chloride flush 0.9 % injection 5-40 mL  5-40 mL IntraVENous PRN Luis Fernando Hinkle MD   10 mL at 22 1818    0.9 % sodium chloride infusion  25 mL IntraVENous PRN Kaycee Phelps MD        ondansetron (ZOFRAN-ODT) disintegrating tablet 4 mg  4 mg Oral Q8H PRN Kaycee Phelps MD        Or    [facility]dansetron [facility]) injection 4 mg  4 mg IntraVENous Q6H PRN Kaycee Phelps MD        polyethylene glycol (GLYCOLAX) packet 17 g  17 g Oral Daily PRN Kaycee Phelps MD   17 g at 03/11/22 1745    acetaminophen (TYLENOL) tablet 650 mg  650 mg Oral Q6H PRN Kaycee Phelps MD   650 mg at 03/11/22 1815    Or    acetaminophen (TYLENOL) suppository 650 mg  650 mg Rectal Q6H PRN Kaycee Phelps MD        pantoprazole (PROTONIX) tablet 40 mg  40 mg Oral QAM AC Jackie Adas, APRN - CNP   40 mg at 03/11/22 3843     Facility-Administered Medications Ordered in Other Encounters   Medication Dose Route Frequency Provider Last Rate Last Admin    dexamethasone (PF) (DECADRON) injection   IntraVENous PRN Ping Kirk, DO   10 mg at 03/12/22 0692    levETIRAcetam (KEPPRA) injection   Buccal PRN Ping Kirk, DO   1,000 mg at 03/12/22 5608    midazolam (VERSED) injection   IntraVENous PRN Ping Kirk, DO   2 mg at 03/12/22 9459    fentaNYL (SUBLIMAZE) injection   IntraVENous PRN Ping Kirk, DO   50 mcg at 03/12/22 0840    remifentanil (ULTIVA) injection   IntraVENous Continuous PRN Ping Kirk, DO   0.15 mcg/kg/min at 03/12/22 9605    propofol injection   IntraVENous PRN Ping Kirk, DO 77.22 mL/hr at 03/12/22 0845 150 mcg/kg/min at 03/12/22 0845    lidocaine PF 2 % injection   IntraVENous PRN Ping Kirk, DO   40 mg at 03/12/22 0840    propofol injection   IntraVENous PRN Ping Kirk, DO   140 mg at 03/12/22 0840    succinylcholine chloride (ANECTINE) injection   IntraVENous PRN Ping Kirk, DO   120 mg at 03/12/22 0840    lidocaine PF 2 % injection   IntraVENous Continuous PRN Ping Kirk, DO   30 mL at 03/12/22 0845    mannitol 20 % IVPB   IntraVENous PRN Ping Kirk DO   50 g at 03/12/22 0950    phenylephrine (EAGLE-SYNEPHRINE) injection   IntraVENous PRN Laith Distance, DO   100 mcg at 03/12/22 0855    0.9 % sodium chloride infusion   IntraVENous Continuous PRN Robert Distance, DO   New Bag at 03/12/22 0818       Allergies:  No Known Allergies    Problem List:    Patient Active Problem List   Diagnosis Code    Thyroid nodule E04.1    Brain metastases (HCC) C79.31    Brain mass G93.89    Status epilepticus (HCC) G40.901    Duct cell carcinoma (HCC) C80.1    Cerebral edema (HCC) G93.6       Past Medical History:        Diagnosis Date    Thyroid nodule        Past Surgical History:  History reviewed. No pertinent surgical history. Social History:    Social History     Tobacco Use    Smoking status: Never Smoker    Smokeless tobacco: Never Used   Substance Use Topics    Alcohol use: Never     Alcohol/week: 0.0 standard drinks                                Counseling given: Not Answered      Vital Signs (Current):   Vitals:    03/11/22 2300 03/12/22 0000 03/12/22 0100 03/12/22 0400   BP: 111/76 108/71 117/78    Pulse: 74 73 74    Resp: 15 18 15    Temp:    97.9 °F (36.6 °C)   TempSrc:    Axillary   SpO2:    96%   Weight:       Height:                                                  BP Readings from Last 3 Encounters:   03/12/22 117/78   03/08/22 (!) 86/51   03/04/22 113/76       NPO Status: Time of last liquid consumption: 2200                        Time of last solid consumption: 2200                        Date of last liquid consumption: 03/07/22                        Date of last solid food consumption: 03/07/22    BMI:   Wt Readings from Last 3 Encounters:   03/11/22 189 lb 2.5 oz (85.8 kg)   03/03/22 189 lb 13.1 oz (86.1 kg)   06/23/15 169 lb (76.7 kg)     Body mass index is 33.52 kg/m².     CBC:   Lab Results   Component Value Date    WBC 15.7 03/12/2022    RBC 4.22 03/12/2022    HGB 11.5 03/12/2022    HCT 34.8 03/12/2022    MCV 82.3 03/12/2022    RDW 14.7 03/12/2022     03/12/2022       CMP: Lab Results   Component Value Date     03/12/2022    K 4.6 03/12/2022     03/12/2022    CO2 24 03/12/2022    BUN 16 03/12/2022    CREATININE 0.6 03/12/2022    GFRAA >60 03/12/2022    AGRATIO 1.0 03/03/2022    LABGLOM >60 03/12/2022    GLUCOSE 137 03/12/2022    PROT 8.0 03/03/2022    CALCIUM 9.0 03/12/2022    BILITOT 0.3 03/03/2022    ALKPHOS 61 03/03/2022    AST 17 03/03/2022    ALT 14 03/03/2022       POC Tests:   Recent Labs     03/11/22  1728   POCGLU 161*       Coags:   Lab Results   Component Value Date    PROTIME 11.9 03/11/2022    INR 1.05 03/11/2022    APTT 31.5 03/07/2022       HCG (If Applicable):   Lab Results   Component Value Date    PREGTESTUR Negative 03/11/2022        ABGs: No results found for: PHART, PO2ART, VCP7RVP, BEV2OFZ, BEART, C0OIQOPI     Type & Screen (If Applicable):  No results found for: LABABO, LABRH    Drug/Infectious Status (If Applicable):  No results found for: HIV, HEPCAB    COVID-19 Screening (If Applicable): No results found for: COVID19        Anesthesia Evaluation  Patient summary reviewed and Nursing notes reviewed no history of anesthetic complications:   Airway: Mallampati: II  TM distance: >3 FB   Neck ROM: full  Mouth opening: > = 3 FB Dental:      Comment: PROMINENT UPPER INCISORS     Pulmonary: breath sounds clear to auscultation      (-) not a current smoker                           Cardiovascular:  Exercise tolerance: good (>4 METS),       (-) past MI    NYHA Classification: I    Rhythm: regular  Rate: normal           Beta Blocker:  Not on Beta Blocker         Neuro/Psych:   (+) seizures (3/6/22 UNDERWENT PREP FOR CRAINIOTOMY AND DEVELOPED SZ BEFORE INCISION :  CASE CX    TODAY SHE HAS BEEN SZ FREE X 24 HOURS   SHE HAS SOME SPEECH BACK ):,             GI/Hepatic/Renal:        (-) GERD       Endo/Other:    (+) malignancy/cancer (BREAST CA RIGHT ULCERATING:  WITH BRAIN METS SEVERAL SPOTS ).                  Abdominal:             Vascular: negative vascular ROS. Other Findings:             Anesthesia Plan      general     ASA 4 - emergent       Induction: intravenous. MIPS: Prophylactic antiemetics administered. Anesthetic plan and risks discussed with mother and healthcare power of . Plan discussed with CRNA.     Attending anesthesiologist reviewed and agrees with Preprocedure content              Vik Tompkins DO   3/12/2022

## 2022-03-12 NOTE — PROGRESS NOTES
Attempted to see patient, patient in order for procedure  Nonbillable rounding  Please call with questions or concerns

## 2022-03-12 NOTE — PROGRESS NOTES
Neurosurgery Progress Note    Patient seen and examined on 03/11/22. No seizures overnight. Awake and alert this AM. GCS E-4, V-1, M-6. Hemiparetic on right upper extremity but able to lift arm, squeeze hand. Follows briskly but no verbal output. A/P: 47 yo woman with triple negative breast ca and intracranial metastases. Was in status epilepticus yesterday which has broken. No further seizures. -HOB elevated  -Frequent neuro checks  -Decadron, PPI, SSI  -NCC for seizure management. On cvEEG. Vimpat and Keppra for seizure ppx. Plan for preoperative loading with fosphenytoin  -Oncology/Rad onc following  -Plan to OR tomorrow for resection of intracranial masses  -NPO at midnight  -Will follow.     Ese Eubanks MD, PhD  [address], [address], [address], [address] [phone] (c), [phone] (o)

## 2022-03-12 NOTE — PROGRESS NOTES
Neurosurgery Progress Note    Patient seen and examined on 03/12/22. No seizures overnight. Awake and alert this AM. GCS E-4, V-4 M-6. States name. Hemiparesis improved 4-/5. Follows briskly. A/P: 47 yo woman with triple negative breast ca and intracranial metastases. Was in status epilepticus yesterday which has broken. No further seizures. -HOB elevated  -Frequent neuro checks  -Decadron, PPI, SSI  -NCC for seizure management.   -Oncology/Rad onc following  -Plan to OR today for resection of intracranial masses  -NPO for procedure  -Will follow.     Hortencia Mcmullen MD, PhD  [facility]  [address] [place] [address], [address], [address] [phone] (c), [phone] (o)

## 2022-03-12 NOTE — OP NOTE
OPERATIVE REPORT    Patient Name: Keila Troy  MRN: [id number]  : 1975  DOS: 2022    PRE-OPERATIVE DIAGNOSIS: left parieto-occipital and posterior temporal breast metastases    POST-OPERATIVE DIAGNOSIS: left parieto-occipital and posterior temporal breast metastases    OPERATIVE PROCEDURES PERFORMED:  1. Stereotactic left temporal craniotomy for resection of tumor  2. Stereotactic left parieto-occipital craniotomy for resection of tumor  3. Intraoperative neuromonitoring (MEP, SSEP, EEG)  4. Intraoperative Brainlab neuronavigation    SURGEON: Can Jones M.D., Ph.D.    [place]:     ANESTHESIA: General endotracheal    ESTIMATED BLOOD LOSS:  400 mL. DRAINS:   1. 7 Nigerien flat SANTIAGO drain    IMPLANTS:   1. Sobia cranial plating system    SPECIMEN:  1. Left temporal tumor for permanent histology  2. Left temporal dura for permanent histology  3. Left occipital tumor for permanent histology       DISPOSITION:  ICU in stable condition. INDICATIONS:  Ms Tien Hodgson is a 48year old woman with known breast cancer who presented with confusion, memory impairment, and headaches. Imaging demonstrated two large left-sided metastatic lesions with extensive midline shift and surrounding vasogenic edema. She was taken to the operating room on 3/8/2022 but developed status epilepticus while under anesthesia and the case was canceled. After gaining control of her seizures, the patient now returned to the operating room for left temporal and parieto-occipital craniotomies for resection of the two lesions. The patient and her mother understand the risks and benefits of the procedure including but not limited to bleeding, infection, loss of vision, aphasia, cognitive dysfunction, worsened neurologic injury, vascular injury, cerebral spinal fluid leak, inability to completely remove the mass, anesthesia risks, and death.  Notably, the patient was not a candidate for awake resection due to baseline aphasia and hemiparesis. PROCEDURES IN DETAIL:      Under universal protocol and informed consent, the patient was brought to the operating room. General anesthesia was induced and the patient was intubated. She was positioned in a lateral position with the left side up. A medium axillary roll was placed under the right side and all pressure points were appropriately padded. The left shoulder was pulled down and away using silk tape and the left arm was positioned on pillows in front of the patient. The patient was then placed in [address] fixation with the head turned to the right. Brainlab Image Guidance was registered and verified. A horseshoe shaped scalp flap extending from the midline over the inion to the vertex and down in front of the left ear was planned and shaved using neuronavigation to establish the optimum trajectories to the lesions. A Figueroa catheter and a radial arterial line were  placed. Neuromonitoring leads for SSEPs, MEPs, and EEG monitoring were placed. Intravenous antibiotics (Ancef), Decadron, Keppra, Fosphenytoin, and Mannitol were given by anesthesia. A time out was completed and the surgical site was prepped and draped in the usual sterile fashion. After infiltration of the skin with 0.5% Lidocaine with 1:100,000 Epinephrine, the cranial incision was made with a #15 scalpel. Bovie electrocautery was then used to complete the dissection down to the temporal, occipital, and parietal bones. The scalp flap was reflected inferiorly and secured with Lonestar hooks. After verifying the position of the tumors as well as the transverse and sagittal sinuses, a 4 mm cutter ball bit on a [employer] drill was used to drill two bur holes to approach each tumor. For the temporal craniotomy, bur holes were drilled at the anteroinferior and posteroinferior aspects of the tumor.  For the occipital craniotomy, bur holes were drilled at the inferomedial and inferolateral aspects of the self-tapping 4mm screws. The wound was copiously irrigated with antibiotic solution. A 7 [place] SANTIAGO drain was then laid overlying the skull. It was tunneled away from the incision line and secured to the skin with a stitch. The incision was then closed in layers using 2-0 Vicryl sutures in the temporalis muscle, galeal layer, and subcutaneous tissues. The skin was closed with staples. The wound was then dressed with antibiotic ointment, Telfa, and Medipore tape. All needle, sponge, and instrument counts were correct. Notably, there was no change in neuromonitoring for the duration of the case. The patient was taken out of [place] 3-pin fixation, turned onto a hospital bed, extubated, and taken to the postoperative care unit in stable condition. Given the size of the masses, their location, the need for a large exposure and two separate craniotomies, and the involvement of numerous critical surrounding structures, about 100% more time was used to perform this procedure. Usually the procedure requires 3 hours to complete, but due to the above, the procedure required 7 hours. I affirm in accordance with CMS regulations that I was present and scrubbed for all critical portions of the case.     COMPLICATIONS: None

## 2022-03-13 ENCOUNTER — APPOINTMENT (OUTPATIENT)
Dept: MRI IMAGING | Age: 47
DRG: 021 | End: 2022-03-13
Attending: RADIOLOGY
Payer: MEDICAID

## 2022-03-13 LAB
ANION GAP SERPL CALCULATED.3IONS-SCNC: 8 MMOL/L (ref 3–16)
BUN BLDV-MCNC: 19 MG/DL (ref 7–20)
CALCIUM SERPL-MCNC: 8.8 MG/DL (ref 8.3–10.6)
CHLORIDE BLD-SCNC: 105 MMOL/L (ref 99–110)
CO2: 24 MMOL/L (ref 21–32)
CREAT SERPL-MCNC: 0.7 MG/DL (ref 0.6–1.1)
GFR AFRICAN AMERICAN: >60
GFR NON-AFRICAN AMERICAN: >60
GLUCOSE BLD-MCNC: 101 MG/DL (ref 70–99)
GLUCOSE BLD-MCNC: 116 MG/DL (ref 70–99)
GLUCOSE BLD-MCNC: 139 MG/DL (ref 70–99)
GLUCOSE BLD-MCNC: 83 MG/DL (ref 70–99)
GLUCOSE BLD-MCNC: 98 MG/DL (ref 70–99)
HCT VFR BLD CALC: 34.7 % (ref 36–48)
HEMOGLOBIN: 11.4 G/DL (ref 12–16)
MCH RBC QN AUTO: 27.2 PG (ref 26–34)
MCHC RBC AUTO-ENTMCNC: 32.9 G/DL (ref 31–36)
MCV RBC AUTO: 82.6 FL (ref 80–100)
PDW BLD-RTO: 14.5 % (ref 12.4–15.4)
PERFORMED ON: ABNORMAL
PERFORMED ON: ABNORMAL
PERFORMED ON: NORMAL
PERFORMED ON: NORMAL
PLATELET # BLD: 316 K/UL (ref 135–450)
PMV BLD AUTO: 7.4 FL (ref 5–10.5)
POTASSIUM SERPL-SCNC: 4.6 MMOL/L (ref 3.5–5.1)
RBC # BLD: 4.2 M/UL (ref 4–5.2)
SODIUM BLD-SCNC: 137 MMOL/L (ref 136–145)
WBC # BLD: 18.5 K/UL (ref 4–11)

## 2022-03-13 PROCEDURE — 2000000000 HC ICU R&B

## 2022-03-13 PROCEDURE — 97168 OT RE-EVAL EST PLAN CARE: CPT

## 2022-03-13 PROCEDURE — 94150 VITAL CAPACITY TEST: CPT

## 2022-03-13 PROCEDURE — 70553 MRI BRAIN STEM W/O & W/DYE: CPT

## 2022-03-13 PROCEDURE — 6370000000 HC RX 637 (ALT 250 FOR IP): Performed by: NEUROLOGICAL SURGERY

## 2022-03-13 PROCEDURE — 2700000000 HC OXYGEN THERAPY PER DAY

## 2022-03-13 PROCEDURE — 6360000002 HC RX W HCPCS: Performed by: NEUROLOGICAL SURGERY

## 2022-03-13 PROCEDURE — 97530 THERAPEUTIC ACTIVITIES: CPT

## 2022-03-13 PROCEDURE — 2580000003 HC RX 258: Performed by: NEUROLOGICAL SURGERY

## 2022-03-13 PROCEDURE — 36415 COLL VENOUS BLD VENIPUNCTURE: CPT

## 2022-03-13 PROCEDURE — 80048 BASIC METABOLIC PNL TOTAL CA: CPT

## 2022-03-13 PROCEDURE — A9576 INJ PROHANCE MULTIPACK: HCPCS | Performed by: NEUROLOGICAL SURGERY

## 2022-03-13 PROCEDURE — 97112 NEUROMUSCULAR REEDUCATION: CPT

## 2022-03-13 PROCEDURE — 97535 SELF CARE MNGMENT TRAINING: CPT

## 2022-03-13 PROCEDURE — 85027 COMPLETE CBC AUTOMATED: CPT

## 2022-03-13 PROCEDURE — 6360000004 HC RX CONTRAST MEDICATION: Performed by: NEUROLOGICAL SURGERY

## 2022-03-13 PROCEDURE — 99232 SBSQ HOSP IP/OBS MODERATE 35: CPT | Performed by: NURSE PRACTITIONER

## 2022-03-13 PROCEDURE — 97164 PT RE-EVAL EST PLAN CARE: CPT

## 2022-03-13 PROCEDURE — 94761 N-INVAS EAR/PLS OXIMETRY MLT: CPT

## 2022-03-13 PROCEDURE — C9254 INJECTION, LACOSAMIDE: HCPCS | Performed by: NEUROLOGICAL SURGERY

## 2022-03-13 RX ADMIN — CEFAZOLIN SODIUM 2000 MG: 10 INJECTION, POWDER, FOR SOLUTION INTRAVENOUS at 20:14

## 2022-03-13 RX ADMIN — SODIUM CHLORIDE, PRESERVATIVE FREE 10 ML: 5 INJECTION INTRAVENOUS at 20:13

## 2022-03-13 RX ADMIN — OXYCODONE HYDROCHLORIDE 5 MG: 5 TABLET ORAL at 10:31

## 2022-03-13 RX ADMIN — DEXAMETHASONE 4 MG: 4 TABLET ORAL at 00:35

## 2022-03-13 RX ADMIN — DEXAMETHASONE 4 MG: 4 TABLET ORAL at 18:29

## 2022-03-13 RX ADMIN — GADOTERIDOL 17 ML: 279.3 INJECTION, SOLUTION INTRAVENOUS at 11:24

## 2022-03-13 RX ADMIN — HEPARIN SODIUM 5000 UNITS: 5000 INJECTION INTRAVENOUS; SUBCUTANEOUS at 20:11

## 2022-03-13 RX ADMIN — DEXAMETHASONE 4 MG: 4 TABLET ORAL at 06:58

## 2022-03-13 RX ADMIN — DEXTROSE MONOHYDRATE 200 MG: 50 INJECTION, SOLUTION INTRAVENOUS at 17:10

## 2022-03-13 RX ADMIN — CEFAZOLIN SODIUM 2000 MG: 10 INJECTION, POWDER, FOR SOLUTION INTRAVENOUS at 12:37

## 2022-03-13 RX ADMIN — LEVETIRACETAM 2000 MG: 100 INJECTION, SOLUTION INTRAVENOUS at 00:34

## 2022-03-13 RX ADMIN — PANTOPRAZOLE SODIUM 40 MG: 40 TABLET, DELAYED RELEASE ORAL at 10:29

## 2022-03-13 RX ADMIN — DEXTROSE MONOHYDRATE 200 MG: 50 INJECTION, SOLUTION INTRAVENOUS at 06:30

## 2022-03-13 RX ADMIN — OXYCODONE HYDROCHLORIDE 5 MG: 5 TABLET ORAL at 18:28

## 2022-03-13 RX ADMIN — POLYETHYLENE GLYCOL 3350 17 G: 17 POWDER, FOR SOLUTION ORAL at 12:32

## 2022-03-13 RX ADMIN — CEFAZOLIN SODIUM 2000 MG: 10 INJECTION, POWDER, FOR SOLUTION INTRAVENOUS at 05:49

## 2022-03-13 RX ADMIN — LEVETIRACETAM 2000 MG: 100 INJECTION, SOLUTION INTRAVENOUS at 13:52

## 2022-03-13 RX ADMIN — SENNOSIDES AND DOCUSATE SODIUM 1 TABLET: 50; 8.6 TABLET ORAL at 10:29

## 2022-03-13 RX ADMIN — SENNOSIDES AND DOCUSATE SODIUM 1 TABLET: 50; 8.6 TABLET ORAL at 20:11

## 2022-03-13 RX ADMIN — OXYCODONE HYDROCHLORIDE 5 MG: 5 TABLET ORAL at 14:27

## 2022-03-13 RX ADMIN — DEXAMETHASONE 4 MG: 4 TABLET ORAL at 14:26

## 2022-03-13 ASSESSMENT — PAIN DESCRIPTION - FREQUENCY: FREQUENCY: INTERMITTENT

## 2022-03-13 ASSESSMENT — PAIN - FUNCTIONAL ASSESSMENT: PAIN_FUNCTIONAL_ASSESSMENT: PREVENTS OR INTERFERES SOME ACTIVE ACTIVITIES AND ADLS

## 2022-03-13 ASSESSMENT — PAIN DESCRIPTION - LOCATION: LOCATION: HEAD

## 2022-03-13 ASSESSMENT — PAIN DESCRIPTION - ONSET: ONSET: ON-GOING

## 2022-03-13 ASSESSMENT — PAIN DESCRIPTION - PAIN TYPE: TYPE: SURGICAL PAIN

## 2022-03-13 ASSESSMENT — PAIN SCALES - GENERAL
PAINLEVEL_OUTOF10: 5
PAINLEVEL_OUTOF10: 0
PAINLEVEL_OUTOF10: 5

## 2022-03-13 ASSESSMENT — VISUAL ACUITY: OU: 1

## 2022-03-13 ASSESSMENT — PAIN DESCRIPTION - DESCRIPTORS: DESCRIPTORS: HEADACHE

## 2022-03-13 ASSESSMENT — PAIN DESCRIPTION - ORIENTATION: ORIENTATION: DISTAL

## 2022-03-13 ASSESSMENT — PAIN DESCRIPTION - PROGRESSION: CLINICAL_PROGRESSION: GRADUALLY WORSENING

## 2022-03-13 NOTE — PROGRESS NOTES
ICU Progress Note    Admit Date: 3/4/2022  Vent Day: None  IV Access:Peripheral  IV Fluids: NS @ 75cc/h  Vasopressors:None                Antibiotics: Ancef  Diet: No diet orders on file    CC: AMS, Headache    Interval history: No overnight events. Vitals stable and WNL. Saturating 97% on 2L NC. 7 L urine output in last 24 hours. Net -4.5L. WBC elevated (18.5) on decadron, afebrile overnight. She responds mainly with \"uh-huh\" to questions, can follow commands and move her extremities freely. Appears fatigued.     Medications:     Scheduled Meds:   insulin lispro  0-12 Units SubCUTAneous TID WC    insulin lispro  0-6 Units SubCUTAneous Nightly    sodium chloride flush  10 mL IntraVENous 2 times per day    polyethylene glycol  17 g Oral Daily    sennosides-docusate sodium  1 tablet Oral BID    dexamethasone  4 mg Oral 4 times per day    Followed by   Nadege Lewis ON 3/15/2022] dexamethasone  4 mg Oral 3 times per day    Followed by   Nadege Lewis ON 3/18/2022] dexamethasone  4 mg Oral 2 times per day    Followed by   Nadege Lewis ON 3/22/2022] dexamethasone  4 mg Oral Daily    heparin (porcine)  5,000 Units SubCUTAneous Q8H    ceFAZolin  2,000 mg IntraVENous Q8H    levetiracetam  2,000 mg IntraVENous Q12H    lacosamide (VIMPAT) IVPB  200 mg IntraVENous Q12H    pantoprazole  40 mg Oral QAM AC     Continuous Infusions:   sodium chloride 75 mL/hr at 03/12/22 1659    sodium chloride      dextrose       PRN Meds:sodium chloride flush, sodium chloride, acetaminophen, oxyCODONE **OR** oxyCODONE, HYDROmorphone **OR** HYDROmorphone, naloxone, promethazine **OR** ondansetron, aluminum & magnesium hydroxide-simethicone, bisacodyl, glucose, dextrose bolus (hypoglycemia), glucagon (rDNA), dextrose    Objective:   Vitals:   T-max:  Patient Vitals for the past 8 hrs:   BP Temp Temp src Pulse Resp SpO2   03/13/22 0600 120/85 -- -- 96 19 --   03/13/22 0500 119/80 -- -- 96 19 97 %   03/13/22 0400 114/81 98 °F (36.7 °C) Oral 95 17 96 %   03/13/22 0300 115/84 -- -- 96 22 97 %   03/13/22 0100 125/88 -- -- 101 16 97 %   03/13/22 0000 116/82 98.5 °F (36.9 °C) Oral 95 15 97 %       Intake/Output Summary (Last 24 hours) at 3/13/2022 0748  Last data filed at 3/13/2022 0700  Gross per 24 hour   Intake 2200 ml   Output 7950 ml   Net -5750 ml     Review of Systems   Unable to perform ROS: Acuity of condition     Physical Exam  Constitutional:       General: She is awake. She is not in acute distress. Appearance: Normal appearance. HENT:      Head: Normocephalic and atraumatic. Nose: Nose normal.   Eyes:      General: Vision grossly intact. Gaze aligned appropriately. Right eye: No discharge. Left eye: No discharge. Extraocular Movements: Extraocular movements intact. Conjunctiva/sclera: Conjunctivae normal.   Cardiovascular:      Rate and Rhythm: Normal rate and regular rhythm. Pulses: Normal pulses. Heart sounds: Normal heart sounds. Pulmonary:      Effort: Pulmonary effort is normal.      Breath sounds: Normal breath sounds. Abdominal:      General: There is no distension. There are no signs of injury. Palpations: Abdomen is soft. Tenderness: There is no abdominal tenderness. Musculoskeletal:         General: No deformity or signs of injury. Normal range of motion. Cervical back: Full passive range of motion without pain, normal range of motion and neck supple. Skin:     General: Skin is warm. Neurological:      General: No focal deficit present. Mental Status: She is alert, oriented to person, place, and time and easily aroused. Mental status is at baseline. Motor: Motor function is intact. Coordination: Coordination is intact. Gait: Gait is intact. Comments: Global weakness present, follows commands, responds mainly with \"uh-huh\" to questions.    Psychiatric:         Attention and Perception: Attention and perception normal.         Mood and Affect: Affect normal.         Speech: Speech normal.         Behavior: Behavior is cooperative. Cognition and Memory: Cognition normal.      Comments: Full assessment not possible given acuity of condition. LABS:    CBC:   Recent Labs     03/11/22 0440 03/12/22 0352 03/13/22 0513   WBC 15.8* 15.7* 18.5*   HGB 11.7* 11.5* 11.4*   HCT 35.7* 34.8* 34.7*    345 316   MCV 82.3 82.3 82.6     Renal:    Recent Labs     03/11/22 0440 03/12/22 0352 03/13/22 0513   * 135* 137   K 4.5 4.6 4.6    103 105   CO2 23 24 24   BUN 18 16 19   CREATININE 0.5* 0.6 0.7   GLUCOSE 138* 137* 139*   CALCIUM 8.7 9.0 8.8   ANIONGAP 9 8 8     INR:   Recent Labs     03/11/22  1405   INR 1.05     -----------------------------------------------------------------  RAD:   CT Head wo Contrast   Final Result   1. Resection cavity is at the locations of the previous masses with persistent adjacent vasogenic edema and persistent midline shift. No progressive mass effect or acute surgical complication otherwise identified. VL Extremity Venous Bilateral   Final Result      CT HEAD WO CONTRAST   Final Result      1.  2 large hemorrhagic partially necrotic masses in the left parieto-temporal and left parieto-occipital regions with marked surrounding edema and mass effect. These demonstrate little change when compared to previous MRI study of 3/5/2022. Associated    prominent mass effect and surrounding edema resulting 11 mm midline shift to the right. MRI BRAIN W WO CONTRAST   Final Result      Two large hemorrhagic partially necrotic heterogeneously enhancing mass in the left parietotemporal and left parieto-occipital region with surrounding vasogenic edema and mass effect. These are indeterminate, most likely relate to multicentric    glioblastoma. However, possibility of metastasis cannot be excluded given the history of breast cancer. 10 mm midline shift to right.          CT CHEST ABDOMEN PELVIS W CONTRAST Final Result      Large, necrotic right breast mass. Right axillary lymphadenopathy. Uterine fibroids. High density urine in the bladder. MRI brain with and without contrast    (Results Pending)     Assessment/Plan:   46F PMH ICD breast cancer, thyroid nodule, fibroids presented 03/04/22 w/ AMS x 1 week. Brain mass  Suspect metastasis (vs glioblastoma primary). MRI-brain (03/05/22): Two large masses (left parietotemporal/parieto-occipital regions). 10 mm midline shift. CT-head (03/08/22): Masses stable. 11 mm midline shift. S/p craniotomy yesterday. - Decadron 6 mg q6h  - HOB elevated  - Neurochecks q2h  - MRI of the brain with and without contrast today    Status epilepticus  Likely 2/2 to brain mass. Prior mass resection cancelled due to seizure in OR (03/09). 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 CT-chest (03/04/22): Rt breast mass (10.2 x 8.5 cm). Rt axillary lymphadenopathy.  - Will follow-up with Oncology as outpatient. Will need post-op radiation (2-4 weeks post-op) followed by systemic chemotherapy.     GI PPX  - Protonix 40 mg QD    DVT PPX  - SQH    Diet  - NPO    Disposition  - Pre-admit: Lives at home w/ mother  - Plan for home health care at discharge  - Remain in ICU, stability, mobilization    Alen Mccarthy MD, PGY-1  03/13/22  7:48 AM    This patient has been staffed and discussed with Dr. Emilie Reilly MD.

## 2022-03-13 NOTE — PROGRESS NOTES
Occupational Therapy   Occupational Therapy Initial Assessment and Treatment    Date: 3/13/2022   Patient Name: Sharmin Coto  MRN: [id number]     : 1975    Date of Service: 3/13/2022    Discharge Recommendations:  Sharmin Coto scored a 9/24 on the AM-PAC ADL Inpatient form. Current research shows that an AM-PAC score of 17 or less is typically not associated with a discharge to the patient's home setting. Based on the patient's AM-PAC score and their current ADL deficits, it is recommended that the patient have 5-7 sessions per week of Occupational Therapy at d/c to increase the patient's independence. At this time, this patient demonstrates the endurance, and/or tolerance for 3 hours of therapy each day, with a treatment frequency of 5-7x/wk. Please see assessment section for further patient specific details. If patient discharges prior to next session this note will serve as a discharge summary. Please see below for the latest assessment towards goals. OT Equipment Recommendations  Equipment Needed:  (continue to assess for needs)    Assessment   Performance deficits / Impairments: Decreased cognition;Decreased functional mobility ; Decreased ADL status; Decreased ROM; Decreased strength;Decreased endurance;Decreased balance;Decreased coordination;Decreased high-level IADLs  Assessment: Seen POD#1 s/p craniotomy for tumor resection. Pt presenting as lethargic and presenting w/ R-sided weakness, ? vision deficits (vs. lethargy). Treatment limited to EOB sitting as pt was feeling lightheaded (BP WFL). Anticipate need for furhter IP OT upon discharge. Pt states her mother runs an in home  (can be there for her but not be readily available to assist her). Continue per POC and progress as tolerated.   Treatment Diagnosis: impaired ADLs/transfers s/p craniotomy surgery  OT Education: OT Role;Plan of Care  Patient Education: continue to teach and reinforce  REQUIRES OT FOLLOW UP: Yes  Activity Tolerance  Activity Tolerance: Patient limited by fatigue  Activity Tolerance: and c/o dizziness (BP WFL and O2 sats on RA 90s)  Safety Devices  Safety Devices in place: Yes  Type of devices: Nurse notified;Call light within reach; Left in bed;Bed alarm in place (positioned into L-sidelying position)           Patient Diagnosis(es): The encounter diagnosis was Brain mass. has a past medical history of Thyroid nodule.   has no past surgical history on file. Treatment Diagnosis: impaired ADLs/transfers s/p craniotomy surgery      Restrictions  Position Activity Restriction  Other position/activity restrictions: up with assistance    Subjective   General  Chart Reviewed: Yes  Patient assessed for rehabilitation services?: Yes  Additional Pertinent Hx: Patient is a 49 y/o female admitted 3/3 with altered mental status. CT chest and abdomen (+) for Large, necrotic right breast mass. Right axillary lymphadenopathy. CT head (+) for Two large hemorrhagic partially necrotic heterogeneously enhancing mass in the left parietotemporal and left parieto-occipital region with surrounding vasogenic edema and mass effect. taken to OR 3/8 but developed epilepticus - OR cancelled; intub; ext 3/9; OR 3/12 for LEFT TEMPORAL PARIETAL OCCIPITAL CRANIOTOMY FOR TUMOR RESECTION. Family / Caregiver Present: No  Referring Practitioner: Dr. Miles Palma  Diagnosis: brain mets    Subjective  Subjective: In bed on arrival. Agreeable to work with therapy. \"Am I going to get better? \" Very soft spoken.     Patient Currently in Pain: Yes (pt states that she is achy all over)      Social/Functional History  Social/Functional History  Lives With: Family (mom)  Type of Home: House  Home Layout: Two level,Bed/Bath upstairs  Home Access: Stairs to enter with rails  Entrance Stairs - Number of Steps: 3  Entrance Stairs - Rails: Right  Bathroom Shower/Tub: Tub/Shower unit  Bathroom Toilet: Standard  Bathroom Equipment:  (none)  Home commands with repetition; Follows one step commands with increased time  Attention Span: Attends with cues to redirect  Memory: Decreased recall of recent events  Safety Judgement: Decreased awareness of need for assistance;Decreased awareness of need for safety  Problem Solving: Decreased awareness of errors  Insights: Decreased awareness of deficits  Initiation: Requires cues for some  Sequencing: Requires cues for some  Cognition Comment: decreased working memory and attention to tasks; aware at hospital and aware she had brain surgery                    LUE AROM (degrees)  LUE General AROM: Shoulder flex ~75% of range AROM, elbow flex ~50% of full AROM  Left Hand AROM (degrees)  Left Hand AROM: WFL  RUE AROM (degrees)  RUE General AROM: AAROM WFL; grossly decreased AROM (shoulder 0 AROM, elbow ~50% of range)  Right Hand AROM (degrees)  Right Hand AROM: WFL  RUE Strength  RUE Strength Comment: grossly decreased <3/5        Hand Dominance  Hand Dominance: Right         Pt seen by OT for eval and treat.  Treatment included: bed mobility, unsupported sitting, ADL, pt education         Plan   Plan  Times per week: 5-7  Times per day: Daily  Current Treatment Recommendations: Strengthening,ROM,Balance Training,Functional Mobility Training,Endurance Training,Safety Education & Training,Cognitive Reorientation,Neuromuscular Re-education,Equipment Evaluation, Education, & procurement,Self-Care / ADL,Patient/Caregiver Education & Training                                                      AM-PAC Score        AM-[facility] Inpatient Daily Activity Raw Score: 9 (03/13/22 1014)  AM-PAC Inpatient ADL T-Scale Score : 25.33 (03/13/22 1014)  ADL Inpatient CMS 0-100% Score: 79.59 (03/13/22 1014)  ADL Inpatient CMS G-Code Modifier : CL (03/13/22 1014)    Goals  Short term goals  Time Frame for Short term goals: Discharge  Short term goal 1: grooming task w/ setup, Min A using R hand  Short term goal 2: tolerate 1 set of 10 A/AROM exercises RUE to increase strength/ROM for ADLs  Short term goal 3: unsupported sitting x 5 minutes w/ CGA  Patient Goals   Patient goals : to increase independence       Therapy Time   Individual Concurrent Group Co-treatment   Time In 0915         Time Out 0955         Minutes 40           Timed Code Treatment Minutes:  25  Total Treatment Minutes:  [place] OTR/L #[id number]

## 2022-03-13 NOTE — PROGRESS NOTES
Hospitalist Progress Note      PCP: SHAHEED ZAMARRIPA, APRN - CNP    Date of Admission: 3/4/2022    Chief Complaint: short term memory loss    Hospital Course: Came to ED with memory loss. Diagnosed with metastatic breast cancer with 2 brain metastasis  Planned for surgical resection of her left posterior temporal and left occipital masses 03/08 but prior to incision it was noted that the patient began to shake, concerning for a seizure, this was confirmed with EEG in the OR, Onset of the seizures may have coincided with obtaining baseline transcranial MEPs. Patient was immediately given 2mg Versed and an additional 500 mg of Keppra (in addition to the 1000 mg given during timeout). A second dose of Versed was then given due to persistent seizure activity, followed by 1mg Ativan x 4 in 5 min intervals. During this time, the patient was loaded with 15 mg/kg fosphenytoin and bolused several times with propofol to induce burst suppression. She continued to have persistent breakthrough non-convulsive seizure activity on EEG consistent with status epilepticus. CT head did not demonstrate acute changes  Procedure was held and transferred to ICU for monitoring on cEEG  S.p OPERATIVE PROCEDURES PERFORMED:  1. Stereotactic left temporal craniotomy for resection of tumor  2. Stereotactic left parieto-occipital craniotomy for resection of tumor  3.  Intraoperative neuromonitoring (MEP, SSEP, EEG)  4.   Intraoperative Brainlab neuronavigation    Subjective:   Sleeping but arrousable, no specific complains  Moves all extremities, follows commands    Medications:  Reviewed    Infusion Medications    sodium chloride 75 mL/hr at 03/12/22 1137    sodium chloride      dextrose       Scheduled Medications    insulin lispro  0-12 Units SubCUTAneous TID WC    insulin lispro  0-6 Units SubCUTAneous Nightly    sodium chloride flush  10 mL IntraVENous 2 times per day    polyethylene glycol  17 g Oral Daily    sennosides-docusate sodium  1 tablet Oral BID    dexamethasone  4 mg Oral 4 times per day    Followed by   Kelli Guest ON 3/15/2022] dexamethasone  4 mg Oral 3 times per day    Followed by   Kelli Guest ON 3/18/2022] dexamethasone  4 mg Oral 2 times per day    Followed by   Kelli Guest ON 3/22/2022] dexamethasone  4 mg Oral Daily    heparin (porcine)  5,000 Units SubCUTAneous Q8H    ceFAZolin  2,000 mg IntraVENous Q8H    levetiracetam  2,000 mg IntraVENous Q12H    lacosamide (VIMPAT) IVPB  200 mg IntraVENous Q12H    pantoprazole  40 mg Oral QAM AC     PRN Meds: sodium chloride flush, sodium chloride, acetaminophen, oxyCODONE **OR** oxyCODONE, HYDROmorphone **OR** HYDROmorphone, naloxone, promethazine **OR** ondansetron, aluminum & magnesium hydroxide-simethicone, bisacodyl, glucose, dextrose bolus (hypoglycemia), glucagon (rDNA), dextrose      Intake/Output Summary (Last 24 hours) at 3/13/2022 0952  Last data filed at 3/13/2022 0700  Gross per 24 hour   Intake 2200 ml   Output 7950 ml   Net -5750 ml       Physical Exam Performed:    /85   Pulse 96   Temp 98 °F (36.7 °C) (Oral)   Resp 19   Ht 5' 2.99\" (1.6 m)   Wt 189 lb 2.5 oz (85.8 kg)   SpO2 97%   BMI 33.52 kg/m²     General appearance: No apparent distress, appears stated age and cooperative. Respiratory:  Normal respiratory effort. Clear to auscultation, bilaterally without Rales/Wheezes/Rhonchi. Chest wall:  Right breast mass covered in dressing  Cardiovascular: Regular rate and rhythm with normal S1/S2 without murmurs, rubs or gallops. Abdomen: Soft, non-tender, non-distended with normal bowel sounds. Musculoskeletal: No clubbing, cyanosis or edema bilaterally.     Skin: Right breast mass is known   Neurologic:  Neurovascularly intact  grossly non-focal. Sleepy but follows all commands  Psychiatric: Alert and oriented, slow to verbally respond    Labs:   Recent Labs     03/11/22  0440 03/12/22  0352 03/13/22  0513   WBC 15.8* 15.7* 18.5*   HGB 11.7* 11.5* 11.4*   HCT 35.7* 34.8* 34.7*    345 316     Recent Labs     03/11/22  0440 03/12/22  0352 03/13/22  0513   * 135* 137   K 4.5 4.6 4.6    103 105   CO2 23 24 24   BUN 18 16 19   CREATININE 0.5* 0.6 0.7   CALCIUM 8.7 9.0 8.8     No results for input(s): AST, ALT, BILIDIR, BILITOT, ALKPHOS in the last 72 hours. Recent Labs     03/11/22  1405   INR 1.05     No results for input(s): [employer] in the last 72 hours. Urinalysis:      Lab Results   Component Value Date    NITRU Negative 03/04/2022    WBCUA 18 03/04/2022    RBCUA >900 03/04/2022    BLOODU LARGE 03/04/2022    SPECGRAV <=1.005 03/04/2022    GLUCOSEU Negative 03/04/2022       Radiology:  CT Head wo Contrast   Final Result   1. Resection cavity is at the locations of the previous masses with persistent adjacent vasogenic edema and persistent midline shift. No progressive mass effect or acute surgical complication otherwise identified. VL Extremity Venous Bilateral   Final Result      CT HEAD WO CONTRAST   Final Result      1.  2 large hemorrhagic partially necrotic masses in the left parieto-temporal and left parieto-occipital regions with marked surrounding edema and mass effect. These demonstrate little change when compared to previous MRI study of 3/5/2022. Associated    prominent mass effect and surrounding edema resulting 11 mm midline shift to the right. MRI BRAIN W WO CONTRAST   Final Result      Two large hemorrhagic partially necrotic heterogeneously enhancing mass in the left parietotemporal and left parieto-occipital region with surrounding vasogenic edema and mass effect. These are indeterminate, most likely relate to multicentric    glioblastoma. However, possibility of metastasis cannot be excluded given the history of breast cancer. 10 mm midline shift to right. CT CHEST ABDOMEN PELVIS W CONTRAST   Final Result      Large, necrotic right breast mass.  Right axillary lymphadenopathy. Uterine fibroids. High density urine in the bladder. MRI brain with and without contrast    (Results Pending)           Assessment/Plan:    Active Hospital Problems    Diagnosis     Status epilepticus ([address] [G40.901]     Duct cell carcinoma ([address] [C80.1]     Cerebral edema (HCC) [G93.6]     Brain metastases (HCC) [C79.31]     Brain mass [G93.89]      Metastatic triple negative breast cancer, now with Intracranial metastasis, previously diagnosed in March 2021 and treatment declined by the patient. Oncology following, patient decided on treatment    Intracranial vasogenic edema secondary to metastatic masses ; Symptomatic with short-term memory impairment. Continue decadron  S/p OPERATIVE PROCEDURES PERFORMED:  1. Stereotactic left temporal craniotomy for resection of tumor  2. Stereotactic left parieto-occipital craniotomy for resection of tumor  3.  Intraoperative neuromonitoring (MEP, SSEP, EEG)  4.   Intraoperative Brainlab neuronavigation    Status epilepticus; Continue cEEG, Continue AEDs; being managed by neurology  -will load with fosphenytoin prior to surgery as per neurology  - continue keppra, vimpat    Acute resp failure due to Status epilepticus: unable to protect airway and was intubated, extubated to 2L NC    GI Px: protonix  DVT Prophylaxis: heparin sq; hold after tonight's dose  Diet: No diet orders on file   Code Status: Full Code    PT/OT Eval Status: Consider when patient makes her decision regarding treatment    Dispo -inpatient stay, currently in the ICU    Segun Martinez MD   Internal Medicine hospitalist

## 2022-03-13 NOTE — PROGRESS NOTES
Physical Therapy    Facility/Department: [address] [place] [address] ICU  Initial Assessment and treatment    NAME: Shiv Ross  : 1975  MRN: [id number]    Date of Service: 3/13/2022    Discharge Recommendations:Nadja Ramírez scored a  on the AM-PAC short mobility form. Current research shows that an AM-PAC score of 17 or less is typically not associated with a discharge to the patient's home setting. Based on the patient's AM-PAC score and their current functional mobility deficits, it is recommended that the patient have 5-7 sessions per week of Physical Therapy at d/c to increase the patient's independence. At this time, this patient demonstrates the endurance, and/or tolerance for 3 hours of therapy each day, with a treatment frequency of 5-7x/wk. Please see assessment section for further patient specific details. If patient discharges prior to next session this note will serve as a discharge summary. Please see below for the latest assessment towards goals. PT Equipment Recommendations  Equipment Needed:  (continue to assess; defer at this time)    Assessment   Assessment: Pt lethargic during re-eval s/p crani on 3/12. Pt noting dizziness when sitting up and moving and required heavy assist x 2 for mobility. Pt only able to sit EOB ~5 min before requesting to lie down due to dizziness. Her vitals were stable. She was able to wipe her face some but otherwise needed assist for other ADL tasks. Pt very lethargic throughout eval and with notable R sided weakness with ROM testing. Pt cooperative with working with therapy and is far below her independent baseline at this time. She would benefit from intensive IP PT at discharge to improve her independence with functional mobility. PT to follow in the acute setting.   Treatment Diagnosis: impaired mobility associated with brain metastases  Prognosis: Guarded (due to medical status)  Decision Making: Medium Complexity  PT Education: PT Role;Functional Mobility Training;Plan of Care;General Safety;Goals  Patient Education: pt will require reinforcement  Barriers to Learning: lethargy  REQUIRES PT FOLLOW UP: Yes  Activity Tolerance  Activity Tolerance: Patient limited by fatigue;Patient limited by endurance; Patient limited by cognitive status;Treatment limited secondary to medical complications (free text)  Activity Tolerance: pt limited by lethargy, fatigue, dizziness       Patient Diagnosis(es): The encounter diagnosis was Brain mass. has a past medical history of Thyroid nodule.   has no past surgical history on file. Restrictions  Position Activity Restriction  Other position/activity restrictions: up with assistance  Vision/Hearing  Vision: Within Functional Limits  Hearing: Within functional limits     Subjective  General  Chart Reviewed: Yes  Patient assessed for rehabilitation services?: Yes  Additional Pertinent Hx: Patient is a 49 y/o female admitted 3/3 with altered mental status. CT chest and abdomen (+) for Large, necrotic right breast mass. Right axillary lymphadenopathy. CT head (+) for Two large hemorrhagic partially necrotic heterogeneously enhancing mass in the left parietotemporal and left parieto-occipital region with surrounding vasogenic edema and mass effect. Neurosurgery recommending resection. Taken to OR on 3/8 but pt having status epilepticus and case had to be cancelled. Taken to OR on 3/12 for crani to resect brain masses. PMH significant for thyroid nodule and breast cancer. Response To Previous Treatment: Not applicable  Family / Caregiver Present: No  Referring Practitioner: Marshall Cunha MD  Diagnosis: brain metastases  Follows Commands: Impaired (lethargic)  General Comment  Comments: Patient supine in bed upon arrival.  Subjective  Subjective: Patient agreeable to PT re-evaluation though very lethargic with delayed command following. Able to state name and year but not month; cueing to open eyes.   Pain Screening  Patient Currently in Pain: Yes (pt states that she is achy all over)  Vital Signs  Patient Currently in Pain: Yes (pt states that she is achy all over)       Orientation  Orientation  Overall Orientation Status: Impaired  Orientation Level: Oriented to situation;Oriented to person;Disoriented to time;Oriented to place  Social/Functional History  Social/Functional History  Lives With: Family (mom)  Type of Home: House  Home Layout: Two level,Bed/Bath upstairs  Home Access: Stairs to enter with rails  Entrance Stairs - Number of Steps: 3  Entrance Stairs - Rails: Right  Bathroom Shower/Tub: Tub/Shower unit  Bathroom Toilet: Standard  Bathroom Equipment:  (none)  Home Equipment: Rolling walker  ADL Assistance: Independent  Homemaking Assistance: Independent  Homemaking Responsibilities: Yes  Ambulation Assistance: Independent  Transfer Assistance: Independent  Active : Yes  Occupation: Works at home  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, working out  Additional Comments: patient denies history of falls at home  Cognition   Cognition  Overall Cognitive Status: Exceptions  Arousal/Alertness: Delayed responses to stimuli  Following Commands: Follows one step commands with repetition; Follows one step commands with increased time  Attention Span: Attends with cues to redirect  Memory: Decreased recall of recent events  Safety Judgement: Decreased awareness of need for assistance;Decreased awareness of need for safety  Problem Solving: Decreased awareness of errors  Insights: Decreased awareness of deficits  Initiation: Requires cues for some  Sequencing: Requires cues for some  Cognition Comment: decreased working memory and attention to tasks; aware at hospital and aware she had brain surgery    Objective        Strength RLE  Strength RLE: Exception  Comment: weakness ~3-/5 grossly.   Able to move limb through partial ROM against gravity in bed  Strength LLE  Strength LLE: WFL        Bed mobility  Rolling to Left: Maximum assistance  Rolling to Right: Maximum assistance  Supine to Sit: 2 Person assistance (max A x 2 with HOB elevated, use of rail and max cueing for sequencing; ot with little initiation)  Sit to Supine: 2 Person assistance  Scootin Person assistance (up in bed)  Transfers  Comment: pt dizzy sitting EOB and unable to transfer to chair; BP and vitals stable throughout  Ambulation  Ambulation?: No     Balance  Comments: mod A sitting balance posterior and to the R, flexed posture, head flexed down. Pt noting dizziness and when asked about vision she stated that the room was swimming. BP WNL and O2 93% on room air. Pt requested to lie back down after ~5 min sitting EOB. Assist for wiping down armpits EOB. Treatment:  Functional mobility training and pt education    Plan   Plan  Times per week: 5-7  Times per day: Daily  Current Treatment Recommendations: Chika Kate Re-education,Cognitive Reorientation,Patient/Caregiver Education & Training,Balance Training,Gait Training,Functional Mobility Training,Stair training,Safety Education & Training,Home Exercise Program  Safety Devices  Type of devices: Call light within reach,Nurse notified,Gait belt,Left in bed,Bed alarm in place    AM-PAC Score  AM-PAC Inpatient Mobility Raw Score : 7 (22 1028)  AM-PAC Inpatient T-Scale Score : 26.42 (22 102)  Mobility Inpatient CMS 0-100% Score: 92.36 (22 1028)  Mobility Inpatient CMS G-Code Modifier : CM (22)          Goals  Short term goals  Time Frame for Short term goals: By discharge  Short term goal 1: The pt will perform supine to sit with SBA  Short term goal 2: The pt will sit EOB x 5 min with SBA  Short term goal 3: The pt will perform sit to stand with CGA.   Short term goal 4: The pt will be able to ambulate with min A and LRAD x 15'  Patient Goals   Patient goals : to go home       Therapy Time   Individual Concurrent Group Co-treatment   Time In 0915         Time Out 0956         Minutes 41         Timed Code Treatment Minutes: 26 Minutes    Timed Code Treatment Minutes:  26 Minutes    Total Treatment Minutes:  41 minutes      Khushi Velasquez, PT

## 2022-03-13 NOTE — PROGRESS NOTES
NEUROLOGY / NEUROCRITICAL CARE PROGRESS NOTE       Patient Name: Mckay Russo YOB: 1975   Sex: Female Age: 48 yrs     CC / Reason for Consult: Metastatic brain mass w/ seizures prior to resection     Interval Hx / Changes over last 24 hours:   Now s/p craniotomy for resection yesterday by Dr. Nayely Salinas.     ROS: Endorses headache 4/10 pain. RUE weakness. HISTORY   Admission HPI:   Mckay Russo is a 48 y.o. y/o female with PMH significant for invasive ductal cell carcinoma. Patient intubated, hx obtained per chart review. She presented to [facility] on 3/3/22 with complaints of memory deficits and AMS over the last week. On 2/24, she was sitting at the table with her mother and reported an abrupt onset of severe posterior headache as if someone hit her in the back of the head. She went to bed that night and when she woke up in the morning she realized she was having difficulty reading, writing, speaking, and recalling recent events. Her symptoms progressed throughout the week, which prompted her to come to the hospital. She did not report any focal weakness, difficulty walking, vision changes, or falls. CT head completed in the ER demonstrated abnormal edema in the L posterior cerebral hemisphere concerning for underlying metastases vs primary glioma and  1.4 cm left to R midline shift with cisternal effacement and downward transtentorial herniation and findings suggestive of hydrocephalus. She was given Keppra and decadron and transferred to [facility]. for further management. CT of chest, abdomen, and pelvis revealed a larger, necrotic R breast mass. She had been diagnosed with breast cancer in March of 2021 but had elected not to pursue treatment. She was in the OR yesterday for resection of the mass, but prior to any incision she was noted to have convulsive seizure activity.  She received Versed 2mg x 2, an extra 500 mg of Keppra, 4mg total of ativan (given 1mg every 5 minutes), a loading dose of 15mg/kg of PHT, and several doses of propofol. Intraoperative EEG still showed intermittent epileptic discharges though she no longer had any apparent seizure activity. The procedure was aborted and she was taken back to the ICU, started on Vimpat 200 mg BID and versed gtt per Neurology recommendations. She had a repeat head CT that was unchanged. Overnight, she did no have any seizure activity on cvEEG. She had been weaned down on her sedation, and she was responding well though she did have some RUE weakness. She is on Keppra 1000 mg BID and Vimpat 200 mg BID. PMH Past Medical History:   Diagnosis Date    Thyroid nodule       Allergies No Known Allergies   Diet No diet orders on file   Isolation No active isolations     LABS   Metabolic Panel Recent Labs     03/11/22 0440 03/12/22 0352 03/13/22  0513   * 135* 137   K 4.5 4.6 4.6    103 105   CO2 23 24 24   BUN 18 16 19   CREATININE 0.5* 0.6 0.7   GLUCOSE 138* 137* 139*   CALCIUM 8.7 9.0 8.8      CBC / Coags Recent Labs     03/11/22  0440 03/11/22  1405 03/12/22  0352 03/13/22  0513   WBC 15.8*  --  15.7* 18.5*   RBC 4.34  --  4.22 4.20   HGB 11.7*  --  11.5* 11.4*   HCT 35.7*  --  34.8* 34.7*     --  345 316   INR  --  1.05  --   --       Other No results for input(s): LABA1C, LDLCALC, TRIG, TSH, IQONTZJO49, FOLATE, LABSALI, COVID19 in the last 72 hours. No results for input(s): PHENYTOIN, KEPPRA, LACOSA, LAMO, VALPROATE, LACTSEPSIS, LACTA in the last 72 hours.        CURRENT SCHEDULED MEDICATIONS   Inpatient Medications     insulin lispro, 0-12 Units, SubCUTAneous, TID WC    insulin lispro, 0-6 Units, SubCUTAneous, Nightly    sodium chloride flush, 10 mL, IntraVENous, 2 times per day    polyethylene glycol, 17 g, Oral, Daily    sennosides-docusate sodium, 1 tablet, Oral, BID    dexamethasone, 4 mg, Oral, 4 times per day **FOLLOWED BY** [START ON 3/15/2022] dexamethasone, 4 mg, Oral, 3 times per day **FOLLOWED BY** [START ON 3/18/2022] dexamethasone, 4 mg, Oral, 2 times per day **FOLLOWED BY** [START ON 3/22/2022] dexamethasone, 4 mg, Oral, Daily    heparin (porcine), 5,000 Units, SubCUTAneous, Q8H    ceFAZolin, 2,000 mg, IntraVENous, Q8H    levetiracetam, 2,000 mg, IntraVENous, Q12H    lacosamide (VIMPAT) IVPB, 200 mg, IntraVENous, Q12H    pantoprazole, 40 mg, Oral, QAM AC   Infusions    sodium chloride 75 mL/hr at 03/12/22 1659    sodium chloride      dextrose        Antibiotics   Recent Abx Admin                   ceFAZolin (ANCEF) 2000 mg in dextrose 5 % 50 mL IVPB (mg) 2,000 mg New Bag 03/13/22 0549     2,000 mg New Bag 03/12/22 2024    ceFAZolin (ANCEF) 2000 mg in dextrose 5 % 50 mL IVPB (mg) 2,000 mg Given 03/12/22 1233                  DIAGNOSTICS   IMAGES:  MRI pending for today      PHYSICAL EXAMINATION     PHYSICAL EXAM:  Vitals:    03/13/22 0500 03/13/22 0600 03/13/22 1000 03/13/22 1003   BP: 119/80 120/85     Pulse: 96 96     Resp: 19 19 20 21   Temp:       TempSrc:       SpO2: 97%  (!) 88% 94%   Weight:       Height:           General: alert, no distress, well-nourished  Neurologic  Mental status:   orientation Did not attempt to speak today, nods appropriately, follows commands               Attention intact as able to attend well to the exam                Language Minimal speech, speaks softly              Comprehension follows some simple commands, some expressive aphasia     Cranial nerves:   CN2: Blinks to visual threat bilaterally  CN 3,4,6: Pupils equal and reactive to light, extraocular muscles intact  CN5: Facial sensation symmetric   CN7: Face symmetric at rest and activation  CN8: Hearing symmetric to spoken voice  CN9: Palate elevates symmetrically  CN11: Traps full strength on shoulder shrug  CN12: Does no stick out tongue to command     Motor Exam: Had difficulty understanding command to bend knees   R  L    Deltoid -3  5   Biceps 4 5   Triceps +3 5   Wrist extension 4 5   Interossei 4 5      R  L    Hip flexion  5  5   Hip extension  5 5   Knee flexion  AKASH AKASH   Knee extension  AKASH AKASH   Ankle dorsiflexion  5 5   Ankle plantar flexion  5 5         Sensory: Light touch intact and equal in all extremities. Tone: normal in all 4 extremities  Gait: held for patient safety        OTHER SYSTEMS:  Cardiovascular: Warm, appears well perfused   Respiratory: Breathing over set rate on ventilator  Abdominal: Abdomen is without distention   Extremities: Upper and lower extremities are atraumatic in appearance without deformity. No swelling or erythema. ASSESSMENT & RECOMMENDATIONS   Assessment:  Soren Davila is a 48 y.o. y/o female with PMH significant for invasive ductal cell carcinoma who presented after having increasing confusion for a week at home and was found to have intracranial metastases. Had seizure activity in the OR & case was aborted, seizures controled with Bart Velásquez / William Gonzalez. She was taken back to OR 3/12 for resection.   Plan:  - Continue Keppra 2000 mg BID and Vimpat 200 mg BID  - Neurosurgery following - decadron per neurosurgery  - Neurochecks per neurosurgery orders  - Keep HOB > 30 degress  - Seizure precautions  - Please call neurocritical care with any seizures or exam changes      [place] JUDITH Ga - CNP   Neurology & Neurocritical Care   Neurology Line: [phone]  PerfectServe: [facility] Neurology & Neuro Critical Care NPs  3/13/2022 11:40 AM

## 2022-03-14 LAB
GLUCOSE BLD-MCNC: 122 MG/DL (ref 70–99)
GLUCOSE BLD-MCNC: 136 MG/DL (ref 70–99)
GLUCOSE BLD-MCNC: 173 MG/DL (ref 70–99)
GLUCOSE BLD-MCNC: 195 MG/DL (ref 70–99)
PERFORMED ON: ABNORMAL

## 2022-03-14 PROCEDURE — C9254 INJECTION, LACOSAMIDE: HCPCS | Performed by: STUDENT IN AN ORGANIZED HEALTH CARE EDUCATION/TRAINING PROGRAM

## 2022-03-14 PROCEDURE — 92526 ORAL FUNCTION THERAPY: CPT

## 2022-03-14 PROCEDURE — 2580000003 HC RX 258: Performed by: NEUROLOGICAL SURGERY

## 2022-03-14 PROCEDURE — 6370000000 HC RX 637 (ALT 250 FOR IP): Performed by: NEUROLOGICAL SURGERY

## 2022-03-14 PROCEDURE — 6360000002 HC RX W HCPCS: Performed by: NEUROLOGICAL SURGERY

## 2022-03-14 PROCEDURE — 99232 SBSQ HOSP IP/OBS MODERATE 35: CPT | Performed by: NURSE PRACTITIONER

## 2022-03-14 PROCEDURE — 92523 SPEECH SOUND LANG COMPREHEN: CPT

## 2022-03-14 PROCEDURE — 6360000002 HC RX W HCPCS: Performed by: STUDENT IN AN ORGANIZED HEALTH CARE EDUCATION/TRAINING PROGRAM

## 2022-03-14 PROCEDURE — 2060000000 HC ICU INTERMEDIATE R&B

## 2022-03-14 PROCEDURE — 51701 INSERT BLADDER CATHETER: CPT

## 2022-03-14 PROCEDURE — 99233 SBSQ HOSP IP/OBS HIGH 50: CPT | Performed by: INTERNAL MEDICINE

## 2022-03-14 PROCEDURE — 51798 US URINE CAPACITY MEASURE: CPT

## 2022-03-14 PROCEDURE — 2580000003 HC RX 258: Performed by: STUDENT IN AN ORGANIZED HEALTH CARE EDUCATION/TRAINING PROGRAM

## 2022-03-14 PROCEDURE — 6370000000 HC RX 637 (ALT 250 FOR IP): Performed by: STUDENT IN AN ORGANIZED HEALTH CARE EDUCATION/TRAINING PROGRAM

## 2022-03-14 PROCEDURE — C9254 INJECTION, LACOSAMIDE: HCPCS | Performed by: NEUROLOGICAL SURGERY

## 2022-03-14 RX ADMIN — ACETAMINOPHEN 650 MG: 325 TABLET ORAL at 09:49

## 2022-03-14 RX ADMIN — LEVETIRACETAM 2000 MG: 100 INJECTION, SOLUTION INTRAVENOUS at 12:46

## 2022-03-14 RX ADMIN — DEXAMETHASONE 4 MG: 4 TABLET ORAL at 17:48

## 2022-03-14 RX ADMIN — DEXAMETHASONE 4 MG: 4 TABLET ORAL at 00:31

## 2022-03-14 RX ADMIN — DEXAMETHASONE 4 MG: 4 TABLET ORAL at 05:07

## 2022-03-14 RX ADMIN — SODIUM CHLORIDE, PRESERVATIVE FREE 10 ML: 5 INJECTION INTRAVENOUS at 20:21

## 2022-03-14 RX ADMIN — ACETAMINOPHEN 650 MG: 325 TABLET ORAL at 18:32

## 2022-03-14 RX ADMIN — CEFAZOLIN SODIUM 2000 MG: 10 INJECTION, POWDER, FOR SOLUTION INTRAVENOUS at 04:24

## 2022-03-14 RX ADMIN — POLYETHYLENE GLYCOL 3350 17 G: 17 POWDER, FOR SOLUTION ORAL at 09:58

## 2022-03-14 RX ADMIN — HEPARIN SODIUM 5000 UNITS: 5000 INJECTION INTRAVENOUS; SUBCUTANEOUS at 12:16

## 2022-03-14 RX ADMIN — SENNOSIDES AND DOCUSATE SODIUM 1 TABLET: 50; 8.6 TABLET ORAL at 09:59

## 2022-03-14 RX ADMIN — DEXTROSE MONOHYDRATE 200 MG: 50 INJECTION, SOLUTION INTRAVENOUS at 18:36

## 2022-03-14 RX ADMIN — INSULIN LISPRO 2 UNITS: 100 INJECTION, SOLUTION INTRAVENOUS; SUBCUTANEOUS at 09:53

## 2022-03-14 RX ADMIN — LEVETIRACETAM 2000 MG: 100 INJECTION, SOLUTION INTRAVENOUS at 00:30

## 2022-03-14 RX ADMIN — PANTOPRAZOLE SODIUM 40 MG: 40 TABLET, DELAYED RELEASE ORAL at 05:06

## 2022-03-14 RX ADMIN — CEFAZOLIN SODIUM 2000 MG: 10 INJECTION, POWDER, FOR SOLUTION INTRAVENOUS at 12:11

## 2022-03-14 RX ADMIN — HEPARIN SODIUM 5000 UNITS: 5000 INJECTION INTRAVENOUS; SUBCUTANEOUS at 05:06

## 2022-03-14 RX ADMIN — INSULIN LISPRO 2 UNITS: 100 INJECTION, SOLUTION INTRAVENOUS; SUBCUTANEOUS at 18:10

## 2022-03-14 RX ADMIN — SENNOSIDES AND DOCUSATE SODIUM 1 TABLET: 50; 8.6 TABLET ORAL at 20:21

## 2022-03-14 RX ADMIN — DEXTROSE MONOHYDRATE 200 MG: 50 INJECTION, SOLUTION INTRAVENOUS at 05:06

## 2022-03-14 RX ADMIN — HEPARIN SODIUM 5000 UNITS: 5000 INJECTION INTRAVENOUS; SUBCUTANEOUS at 20:20

## 2022-03-14 RX ADMIN — DEXAMETHASONE 4 MG: 4 TABLET ORAL at 12:18

## 2022-03-14 ASSESSMENT — PAIN SCALES - GENERAL
PAINLEVEL_OUTOF10: 0
PAINLEVEL_OUTOF10: 3
PAINLEVEL_OUTOF10: 0
PAINLEVEL_OUTOF10: 2
PAINLEVEL_OUTOF10: 5
PAINLEVEL_OUTOF10: 0

## 2022-03-14 ASSESSMENT — PAIN DESCRIPTION - PAIN TYPE
TYPE: SURGICAL PAIN
TYPE: SURGICAL PAIN

## 2022-03-14 ASSESSMENT — PAIN DESCRIPTION - PROGRESSION: CLINICAL_PROGRESSION: GRADUALLY IMPROVING

## 2022-03-14 ASSESSMENT — PAIN DESCRIPTION - ONSET: ONSET: ON-GOING

## 2022-03-14 ASSESSMENT — PAIN DESCRIPTION - LOCATION
LOCATION: HEAD
LOCATION: HEAD

## 2022-03-14 ASSESSMENT — PAIN DESCRIPTION - DESCRIPTORS: DESCRIPTORS: DULL

## 2022-03-14 ASSESSMENT — VISUAL ACUITY: OU: 1

## 2022-03-14 NOTE — CARE COORDINATION
ADDENDUM: [facility] can accept Pt, they are checking if Pt will need pre-cert or not. [facility] denied Pt, SW sent referral to [address] [place] [address] [phone] and [facility] - spoke to [place] she will review. Case Management Assessment           Daily Note                 Date/ Time of Note: 3/14/2022 11:04 AM         Note completed by: Sarahi Sanabria, MSW, LSW    Patient Name: Belle Hall  YOB: 1975    Diagnosis:Brain metastases ([address] [C79.31]  Brain mass [G93.89]  Patient Admission Status: Inpatient    Date of Admission:3/4/2022  7:23 AM Length of Stay: 10 GLOS:      Current Plan of Care:  expected transfer to 5T, removed drain today, post-op chemo in 2-4weeks, keppra, diet, MRI today, advance diet  ________________________________________________________________________________________  PT AM-PAC: 7 / 24 per last evaluation on: 3/13    OT AM-PAC: 9 / 24 per last evaluation on: 3/13    DME Needs for discharge: defer  ________________________________________________________________________________________  Discharge Plan: To Be Determined DUE TO: home w/[facility] vs. ARU    Tentative discharge date: TBD    Current barriers to discharge: expected transfer to 5T, removed drain today, post-op chemo in 2-4weeks, keppra, diet, MRI today, advance diet,     Referrals completed: Not Applicable    Resources/ information provided: Not indicated at this time  ________________________________________________________________________________________  Case Management Notes:  SW met w/Pt and Pt's sister at bedside. SW discussed DCP options; home w/HHC vs. SNF vs. ARU. Pt doesn't want SNF , SW is in agreement that Pt may be more appropriate for ARU. SW explained what the ARU is, answered Pt's questions, and informed her the process of the assessment for acceptance by Dr. Sirena Kamara where they will also provide more information regarding ARU.  Pt and pt's sister are still undecided, but are open, and will make a decision based on what is recommended. Pt's ultimate goal is to return home w/mom and [facility] and would like to keep that as the plan if possible. Pt is expected to be transferred to [facility]. SW perfect served resident regarding ARU candidacy. CATHERINE following for DCP. Marie Neumann and her family were provided with choice of provider; she and her family are in agreement with the discharge plan.     Care Transition Patient: No Herlinda Buerger, MSW, [facility]  Case Management Department  Ph: [phone]  Fax: [phone]

## 2022-03-14 NOTE — PROGRESS NOTES
Patient transferred to [address] [place] [address] with all belongings. Mother at bedside. Report called to RN.

## 2022-03-14 NOTE — PROGRESS NOTES
NEUROLOGY / NEUROCRITICAL CARE PROGRESS NOTE       Patient Name: Krystal Vargas YOB: 1975   Sex: Female Age: 48 yrs     CC / Reason for Consult: Metastatic brain mass w/ seizures prior to resection     Interval Hx / Changes over last 24 hours:   Much more conversant today  Clinically stable to improved  No interval seizures    ROS: Endorses headache 4/10 pain. RUE weakness. HISTORY   Admission HPI:   Krystal Vargas is a 48 y.o. y/o female with PMH significant for invasive ductal cell carcinoma. Patient intubated, hx obtained per chart review. She presented to [facility] on 3/3/22 with complaints of memory deficits and AMS over the last week. On 2/24, she was sitting at the table with her mother and reported an abrupt onset of severe posterior headache as if someone hit her in the back of the head. She went to bed that night and when she woke up in the morning she realized she was having difficulty reading, writing, speaking, and recalling recent events. Her symptoms progressed throughout the week, which prompted her to come to the hospital. She did not report any focal weakness, difficulty walking, vision changes, or falls. CT head completed in the ER demonstrated abnormal edema in the L posterior cerebral hemisphere concerning for underlying metastases vs primary glioma and  1.4 cm left to R midline shift with cisternal effacement and downward transtentorial herniation and findings suggestive of hydrocephalus. She was given Keppra and decadron and transferred to [place] [address] for further management. CT of chest, abdomen, and pelvis revealed a larger, necrotic R breast mass. She had been diagnosed with breast cancer in March of 2021 but had elected not to pursue treatment. She was in the OR yesterday for resection of the mass, but prior to any incision she was noted to have convulsive seizure activity.  She received Versed 2mg x 2, an extra 500 mg of Keppra, 4mg total of ativan (given 1mg every 5 minutes), a loading dose of 15mg/kg of PHT, and several doses of propofol. Intraoperative EEG still showed intermittent epileptic discharges though she no longer had any apparent seizure activity. The procedure was aborted and she was taken back to the ICU, started on Vimpat 200 mg BID and versed gtt per Neurology recommendations. She had a repeat head CT that was unchanged. Overnight, she did no have any seizure activity on cvEEG. She had been weaned down on her sedation, and she was responding well though she did have some RUE weakness. She is on Keppra 1000 mg BID and Vimpat 200 mg BID. PMH Past Medical History:   Diagnosis Date    Thyroid nodule       Allergies No Known Allergies   Diet ADULT DIET;  Dysphagia - Soft and Bite Sized   Isolation No active isolations     LABS   Metabolic Panel Recent Labs     03/12/22  0352 03/13/22  0513   * 137   K 4.6 4.6    105   CO2 24 24   BUN 16 19   CREATININE 0.6 0.7   GLUCOSE 137* 139*   CALCIUM 9.0 8.8      CBC / Coags Recent Labs     03/11/22  1405 03/12/22  0352 03/13/22  0513   WBC  --  15.7* 18.5*   RBC  --  4.22 4.20   HGB  --  11.5* 11.4*   HCT  --  34.8* 34.7*   PLT  --  345 316   INR 1.05  --   --           CURRENT SCHEDULED MEDICATIONS   Inpatient Medications     insulin lispro, 0-12 Units, SubCUTAneous, TID WC    insulin lispro, 0-6 Units, SubCUTAneous, Nightly    sodium chloride flush, 10 mL, IntraVENous, 2 times per day    polyethylene glycol, 17 g, Oral, Daily    sennosides-docusate sodium, 1 tablet, Oral, BID    dexamethasone, 4 mg, Oral, 4 times per day **FOLLOWED BY** [START ON 3/15/2022] dexamethasone, 4 mg, Oral, 3 times per day **FOLLOWED BY** [START ON 3/18/2022] dexamethasone, 4 mg, Oral, 2 times per day **FOLLOWED BY** [START ON 3/22/2022] dexamethasone, 4 mg, Oral, Daily    heparin (porcine), 5,000 Units, SubCUTAneous, Q8H    ceFAZolin, 2,000 mg, IntraVENous, Q8H    levetiracetam, 2,000 mg, Hearing symmetric to spoken voice     Motor Exam:   Right upper limb at least antigravity for 10 seconds; LUE 5/5. Both lower limbs with 4/5 strength   Sensory: Light touch intact and equal in all extremities. Tone: normal in all 4 extremities  Gait: held for patient safety        OTHER SYSTEMS:  Cardiovascular: Warm, appears well perfused   Respiratory: Breathing over set rate on ventilator  Abdominal: Abdomen is without distention   Extremities: Upper and lower extremities are atraumatic in appearance without deformity. No swelling or erythema. ASSESSMENT & RECOMMENDATIONS   Assessment:  Alejandro Orr is a 48 y.o. y/o female with metastatic invasive ductal cell carcinoma who presented after having increasing confusion for a week at home and was found to have intracranial metastases. Neurology following for seizure in the OR which led to aborting procedure, seizures controlled with Nathan Bunting / Flo Bread. She was taken back to OR 3/12 for resection. Neurologically stable today.      Plan:  - Continue Keppra 2000 mg BID and Vimpat 200 mg BID  - Neurosurgery following - decadron per neurosurgery  - Q4h neuro checks   - Keep HOB > 30 degress  - Seizure precautions  - Please call neurocritical care with any seizures or exam changes    JUDITH Ferrer - CNP   Neurology & Neurocritical Care   Neurology Line: [phone]  PerfectServe: [facility] Neurology & Neurocritical Care NPs  3/14/2022 9:45 AM

## 2022-03-14 NOTE — PROGRESS NOTES
ICU Progress Note    Admit Date: 3/4/2022  Vent Day: None  IV Access:Peripheral  IV Fluids: NS @ 75cc/h  Vasopressors:None                Antibiotics: Ancef  Diet: ADULT DIET; Dysphagia - Soft and Bite Sized    CC: AMS, Headache    Interval history: No acute overnight events. Patient seen at bedside this morning, saturating well on room air. 3L of urine output in last 24 hrs. WBC elevated on decadron, afebrile overnight. Patient follows all commands and moves her extremities freely.     Medications:     Scheduled Meds:   insulin lispro  0-12 Units SubCUTAneous TID WC    insulin lispro  0-6 Units SubCUTAneous Nightly    sodium chloride flush  10 mL IntraVENous 2 times per day    polyethylene glycol  17 g Oral Daily    sennosides-docusate sodium  1 tablet Oral BID    dexamethasone  4 mg Oral 4 times per day    Followed by   Ryan Blanchard ON 3/15/2022] dexamethasone  4 mg Oral 3 times per day    Followed by   Ryan Blanchard ON 3/18/2022] dexamethasone  4 mg Oral 2 times per day    Followed by   Ryan Blanchard ON 3/22/2022] dexamethasone  4 mg Oral Daily    heparin (porcine)  5,000 Units SubCUTAneous Q8H    ceFAZolin  2,000 mg IntraVENous Q8H    levetiracetam  2,000 mg IntraVENous Q12H    lacosamide (VIMPAT) IVPB  200 mg IntraVENous Q12H    pantoprazole  40 mg Oral QAM AC     Continuous Infusions:   sodium chloride 75 mL/hr at 03/12/22 1659    sodium chloride      dextrose       PRN Meds:sodium chloride flush, sodium chloride, acetaminophen, oxyCODONE **OR** oxyCODONE, HYDROmorphone **OR** HYDROmorphone, naloxone, promethazine **OR** ondansetron, aluminum & magnesium hydroxide-simethicone, bisacodyl, glucose, dextrose bolus (hypoglycemia), glucagon (rDNA), dextrose    Objective:   Vitals:   T-max:  Patient Vitals for the past 8 hrs:   BP Temp Temp src Pulse Resp SpO2 Weight   03/14/22 0600 (!) 130/95 -- -- 109 23 94 % 183 lb 10.3 oz (83.3 kg)   03/14/22 0500 (!) 132/94 -- -- 113 21 94 % --   03/14/22 0400 (!) 148/90 99 °F (37.2 °C) Oral 125 21 96 % --   03/14/22 0300 (!) 131/90 -- -- 113 22 93 % --   03/14/22 0200 133/86 -- -- 112 12 93 % --   03/14/22 0100 137/85 -- -- 113 22 92 % --   03/14/22 0000 134/88 98.8 °F (37.1 °C) Oral 115 22 93 % --       Intake/Output Summary (Last 24 hours) at 3/14/2022 0029  Last data filed at 3/14/2022 0400  Gross per 24 hour   Intake 1000 ml   Output 2845 ml   Net -1845 ml     Review of Systems   Unable to perform ROS: Acuity of condition     Physical Exam  Constitutional:       General: She is awake. She is not in acute distress. Appearance: Normal appearance. HENT:      Head: Normocephalic and atraumatic. Nose: Nose normal.   Eyes:      General: Vision grossly intact. Gaze aligned appropriately. Right eye: No discharge. Left eye: No discharge. Extraocular Movements: Extraocular movements intact. Conjunctiva/sclera: Conjunctivae normal.   Cardiovascular:      Rate and Rhythm: Normal rate and regular rhythm. Pulses: Normal pulses. Heart sounds: Normal heart sounds. Pulmonary:      Effort: Pulmonary effort is normal.      Breath sounds: Normal breath sounds. Abdominal:      General: There is no distension. There are no signs of injury. Palpations: Abdomen is soft. Tenderness: There is no abdominal tenderness. Musculoskeletal:         General: No deformity or signs of injury. Normal range of motion. Cervical back: Full passive range of motion without pain, normal range of motion and neck supple. Skin:     General: Skin is warm. Neurological:      General: No focal deficit present. Mental Status: She is alert, oriented to person, place, and time and easily aroused. Mental status is at baseline. Motor: Motor function is intact. Coordination: Coordination is intact. Gait: Gait is intact. Comments: Global weakness present, follows commands, responds mainly with \"uh-huh\" to questions.    Psychiatric: Attention and Perception: Attention and perception normal.         Mood and Affect: Affect normal.         Speech: Speech normal.         Behavior: Behavior is cooperative. Cognition and Memory: Cognition normal.      Comments: Full assessment not possible given acuity of condition. LABS:    CBC:   Recent Labs     03/12/22 0352 03/13/22 0513   WBC 15.7* 18.5*   HGB 11.5* 11.4*   HCT 34.8* 34.7*    316   MCV 82.3 82.6     Renal:    Recent Labs     03/12/22 0352 03/13/22  0513   * 137   K 4.6 4.6    105   CO2 24 24   BUN 16 19   CREATININE 0.6 0.7   GLUCOSE 137* 139*   CALCIUM 9.0 8.8   ANIONGAP 8 8     INR:   Recent Labs     03/11/22  1405   INR 1.05     -----------------------------------------------------------------  RAD:   CT Head wo Contrast   Final Result   1. Resection cavity is at the locations of the previous masses with persistent adjacent vasogenic edema and persistent midline shift. No progressive mass effect or acute surgical complication otherwise identified. VL Extremity Venous Bilateral   Final Result      CT HEAD WO CONTRAST   Final Result      1.  2 large hemorrhagic partially necrotic masses in the left parieto-temporal and left parieto-occipital regions with marked surrounding edema and mass effect. These demonstrate little change when compared to previous MRI study of 3/5/2022. Associated    prominent mass effect and surrounding edema resulting 11 mm midline shift to the right. MRI BRAIN W WO CONTRAST   Final Result      Two large hemorrhagic partially necrotic heterogeneously enhancing mass in the left parietotemporal and left parieto-occipital region with surrounding vasogenic edema and mass effect. These are indeterminate, most likely relate to multicentric    glioblastoma. However, possibility of metastasis cannot be excluded given the history of breast cancer. 10 mm midline shift to right.          CT CHEST ABDOMEN PELVIS W CONTRAST Final Result      Large, necrotic right breast mass. Right axillary lymphadenopathy. Uterine fibroids. High density urine in the bladder. MRI brain with and without contrast    (Results Pending)     Assessment/Plan:   46F PMH ICD breast cancer, thyroid nodule, fibroids presented 03/04/22 w/ AMS x 1 week. Brain mass  Suspect metastasis (vs glioblastoma primary). MRI-brain (03/05/22): Two large masses (left parietotemporal/parieto-occipital regions). 10 mm midline shift. CT-head (03/08/22): Masses stable. 11 mm midline shift. S/p craniotomy yesterday. - Decadron 6 mg q6h  - HOB elevated  - Neurochecks q2h  - MRI of the brain with and without contrast today    Status epilepticus  Likely 2/2 to brain mass. Prior mass resection cancelled due to seizure in OR (03/09). 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 CT-chest (03/04/22): Rt breast mass (10.2 x 8.5 cm). Rt axillary lymphadenopathy.  - Will follow-up with Oncology as outpatient. Will need post-op radiation (2-4 weeks post-op) followed by systemic chemotherapy. GI PPX  - Protonix 40 mg QD    DVT PPX  - SQH    Diet  - NPO    Disposition  - Pre-admit: Lives at home w/ mother  - Plan for home health care at discharge  - Remain in ICU, stability, mobilization    Christy Shepherd MD, PGY-1  03/14/22  7:02 AM    This patient has been staffed and discussed with Dr. Tomas Coto MD.    Patient was seen, examined and discussed with Dr. Jack Jade. I agree with the interval history. My physical exam confirms the findings listed below  Chart was reviewed including labs and medical records confirm the findings noted    Breast cancer  Brain metastasis s/p resection   Seizure disorder. On keppra and vimpat     She is alert. F/u CT is stable. MRI reviewed  She has right sided weakness.     Poor insight   Agree on the A/P noted above

## 2022-03-14 NOTE — PROGRESS NOTES
4 Eyes Admission Assessment     I agree as the admission nurse that 2 RN's have performed a thorough Head to Toe Skin Assessment on the patient. ALL assessment sites listed below have been assessed on admission. Areas assessed by both nurses: yes  [x]   Head, Face, and Ears   [x]   Shoulders, Back, and Chest  [x]   Arms, Elbows, and Hands   [x]   Coccyx, Sacrum, and Ischium  [x]   Legs, Feet, and Heels        Does the Patient have Skin Breakdown?   Yes a wound was noted on the Admission Assessment and an LDA was Initiated documentation include the Lety-wound, Wound Assessment, Measurements, Dressing Treatment, Drainage, and Color\",       Pt has mass on right breast, Incision to head   Ike Prevention initiated:  No   Wound Care Orders initiated:  No      WOC nurse consulted for Pressure Injury (Stage 3,4, Unstageable, DTI, NWPT, and Complex wounds) or Ike score 18 or lower:  No      Nurse 1 eSignature: Electronically signed by Froy Arana RN on 3/14/22 at 7:17 PM EDT    **SHARE this note so that the co-signing nurse is able to place an eSignature**    Nurse 2 eSignature: Electronically signed by Alphonse Rios RN on 3/14/22 at 7:54 PM EDT

## 2022-03-14 NOTE — PROGRESS NOTES
Pt bladder scanned for 400 mL. Due to Pt inability to void Pt was bladder scanned for 425mL. Pt tolerated well. Urine was dark yellow in color. Pt has purewick back in place and hooked up to suction.

## 2022-03-14 NOTE — PROGRESS NOTES
TRANSFER ACCEPTANCE NOTE:  S: The patient was seen and examined. The patient's only acute complaint at this time is oral pain. Patient has improved alertness after being reportedly lethargic yesterday. Alert and oriented, with incision site pain well controlled on medications. Patient is hemodynamically stable without breakthrough seizures on modified anti-epileptic regimen (Keppra 2 g BID, Vimpat 200 mg BID). 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 Good urine output. WBC elevated on decadron. S/p craniotomy on 3/13. Follow-up brain MRI showed stable vasogenic edema around the resection cavities. Mass effect with effacement of left lateral ventricle and 9 mm midline shift to right, stable shift from prior imaging.     Vitals:    03/14/22 0800   BP: 123/76   Pulse: 106   Resp: 25   Temp: 99.2 °F (37.3 °C)   SpO2:      Scheduled Meds:   insulin lispro  0-12 Units SubCUTAneous TID WC    insulin lispro  0-6 Units SubCUTAneous Nightly    sodium chloride flush  10 mL IntraVENous 2 times per day    polyethylene glycol  17 g Oral Daily    sennosides-docusate sodium  1 tablet Oral BID    dexamethasone  4 mg Oral 4 times per day    Followed by   Ludy Conde ON 3/15/2022] dexamethasone  4 mg Oral 3 times per day    Followed by   Ludy Conde ON 3/18/2022] dexamethasone  4 mg Oral 2 times per day    Followed by   Ludy Conde ON 3/22/2022] dexamethasone  4 mg Oral Daily    heparin (porcine)  5,000 Units SubCUTAneous Q8H    levetiracetam  2,000 mg IntraVENous Q12H    lacosamide (VIMPAT) IVPB  200 mg IntraVENous Q12H    pantoprazole  40 mg Oral QAM AC     Continuous Infusions:   sodium chloride      dextrose       PRN Meds:phenol, sodium chloride flush, sodium chloride, acetaminophen, oxyCODONE **OR** oxyCODONE, naloxone, promethazine **OR** ondansetron, aluminum & magnesium hydroxide-simethicone, bisacodyl, glucose, dextrose bolus (hypoglycemia), glucagon (rDNA), dextrose    Gen: supine in bed; no apparent distress  HEENT: Cranial surgical resection site clean without obvious bleeding, erythema, or discharge. Drain in place draining bloody fluid. Cardiovascular: +S1, +S2; RRR; no murmurs, rubs or gallops  Lungs:CTAB  Abdomen: soft, non-tender, non-distended, bowel sounds present throughout  Extremities: warm, well-perfused; no cyanosis, no edema, no erythema  Neuro: AAO x3; no evidence of aphasia or neglect. Patient has reduced strength 4/5 in RUE and RLE. Responds well to questions. The objective and subjective findings as well as the ICU course of treatment have been reviewed with the ICU team. The treatment plan has been reviewed with the ICU team. The patient is being transferred to [address] [place] [address] in stable condition with the head drain in place. PT/OT will see and assess patient. Started phenol spray for oral pain after intubation for resection surgery.     Nathan Bradshaw MD  Internal Medicine Resident, PGY-1  3/14/2022, 1:28 PM

## 2022-03-14 NOTE — PROGRESS NOTES
Pt admitted to Rm [id number]. Pt alert but having difficulty answering questions due to expressive aphasia. Pt head incision is open to air and CDI. Pt able to follow commands, VSS. Pt mas was pulled this AM , Pt yet to void since. Pt was bladder scanned for 400mL. Purewick in place. Pt oriented to room.

## 2022-03-14 NOTE — PROGRESS NOTES
Vital signs stable, sinus tachy. Lung sounds clear. Surgical dressing on head removed. Complained of surgical head pain 5/10, tylenol given and pain went down to a 3. Patient ate breakfast and has transfer orders. Sister in room.

## 2022-03-14 NOTE — PROGRESS NOTES
NEUROSURGERY PROGRESS NOTE    3/14/2022 8:26 AM                               Nadja Ramírez                      LOS: 10 days   POD# 2 s/p Procedure(s) (LRB):  LEFT TEMPORAL PARIETAL OCCIPITAL CRANIOTOMY FOR TUMOR RESECTION (Left)    Subjective: Patient sitting up in bed upon entering the room. No acute events overnight. Patient continuing to have some right side neglect. Physical Exam:  Patient seen and examined    Vitals:    03/14/22 0800   BP: 123/76   Pulse: 106   Resp: 25   Temp: 99.2 °F (37.3 °C)   SpO2:      GCS:  4 - Opens eyes on own  4 - Seems confused, disoriented  6 - Follows simple motor commands    General: Well developed. Alert and cooperative in no acute distress. HENT: Left hemisphere surgical incision w/staples  Eyes: Optic discs: Not tested  Pulmonary: unlabored respiratory effort  Cardiovascular:  Warm well perfused. No peripheral edema  Gastrointestinal: abdomen soft, NT, ND    Neurological:  Mental Status: Awake, alert, oriented to person and place only, speech clear and appropriate  Attention: Intact  Language: No aphasia or dysarthria noted  Sensation: Intact to all extremities to light touch  Coordination: Intact    Cranial Nerves:  II: Visual acuity not tested, denies new visual changes / diplopia  III, IV, VI: PERRL, 3 mm bilaterally, EOMI, no nystagmus noted  V: Facial sensation intact bilaterally to touch  VII: Face symmetric  VIII: Hearing intact bilaterally to spoken voice  IX: Palate movement equal bilaterally  XI: Shoulder shrug equal bilaterally  XII: Tongue midline    Musculoskeletal:   Gait: Not tested   Assist devices: None   Tone: Normal  Motor strength: Exam limited 2/2 neglect of right side   Right  Left    Right  Left    Deltoid  2 5  Hip Flex  3 5   Biceps  3 5  Knee Extensors  3 5   Triceps  3 5  Knee Flexors  3 5   Wrist Ext  3 5  Ankle Dorsiflex. 3 5   Wrist Flex  3 5  Ankle Plantarflex.   3 5   Handgrip  3 5  Ext Jake Longus  3 5   Thumb Ext  3 5 Incision: CDI    Drain: 50 mL in past 24 hours    Radiological Findings:  CT Head wo contrast  Result Date: 3/12/2022  Resection cavity is at the locations of the previous masses with persistent adjacent vasogenic edema and persistent midline shift. No progressive mass effect or acute surgical complication otherwise identified. MRI brain with and without contrast  Result Date: 3/14/2022  Postoperative changes of left-sided craniotomy with interval resection of left temporal and occipital lobe masses. Small nodular areas of enhancement along the margins of the surgical cavity as detailed above are indeterminate. Stable extensive T2 hyperintense/vasogenic edema in the left parietotemporal occipital region surrounding the resection cavities. Mass effect with effacement of left lateral ventricle and 9 mm midline shift to right. Recommended continued follow-up. Labs:  Recent Labs     03/13/22  0513   WBC 18.5*   HGB 11.4*   HCT 34.7*          Recent Labs     03/13/22 0513      K 4.6      CO2 24   BUN 19   CREATININE 0.7   GLUCOSE 139*   CALCIUM 8.8       Recent Labs     03/11/22  1405   PROTIME 11.9   INR 1.05       Patient Active Problem List    Diagnosis Date Noted    Status epilepticus ([facility] [facility] 03/09/2022    Duct cell carcinoma ([facility] 03/09/2022    Cerebral edema ([facility] [facility] 03/09/2022    Brain metastases ([facility] [address] [facility] 03/04/2022    Brain mass 03/04/2022    Thyroid nodule 06/23/2015       Assessment:  Patient is a 48 y.o. female s/p Procedure(s) (LRB):  LEFT TEMPORAL PARIETAL OCCIPITAL CRANIOTOMY FOR TUMOR RESECTION 2/2 brain mets. Patient educated from [email or web address] to Neurosurgery\" book pp. 5-7, 25-27, 33-38    Plan:  1. Neurologically stable  2. Neurologic exams frequency: Q4H  3. For change in exam MUST contact neurosurgery team along with critical care or primary team  4.  Brain mets:  - s/p left temporoparietal occipital c/r of brain mets  - CT Head shows expected post-op changes  - MRI Brain shows expected post-op changes  - Antibiotics: Cefazolin x 6 doses post op  - Remove drain today  - AED's managed by Neurology, appreciate recs  - PRN Tylenol and Roxicodone for pain control  5. Cerebral edema:  - Keep Na within normal limits  - Decadron 4 mg taper; PPI & SSI while on steroids  - Keep HOB >30 degrees  - If central venous access is needed please use subclavian vs femoral - No IJ as this can decrease venous return and worsen cerebral edema  6. Bowel Regimen: Glycolax and Senokot-S  7. Mobility:  - Advance as tolerates  - PT/OT consulted, appreciate recs  8. Diet: Advance as tolerates  9. DVT Prophylaxis: SCD's & SQ Heparin in AM  10. Appreciate critical care team assistance in management  11. Will follow inpatient. Please call with any questions or decline in neurological status    DISPO: OK to transfer to . Remain inpatient until tolerating PO, pain controlled with PO meds and evaluated by PT/OT from neurosurgery standpoint. Dispo timing to be determined by primary team once patient is medically stable for discharge. Patient was discussed and images reviewed with Dr. Zeny Zhang who agrees with above assessment and plan.      Electronically signed by: JUDITH Morel CNP, APRN-CNP, 3/14/2022 8:26 AM  [phone]

## 2022-03-14 NOTE — PROGRESS NOTES
Hospitalist Progress Note      PCP: SHAHEED ZAMARRIPA, APRN - CNP    Date of Admission: 3/4/2022    Chief Complaint: short term memory loss    Hospital Course: Came to ED with memory loss. Diagnosed with metastatic breast cancer with 2 brain metastasis  Planned for surgical resection of her left posterior temporal and left occipital masses 03/08 but prior to incision it was noted that the patient began to shake, concerning for a seizure, this was confirmed with EEG in the OR, Onset of the seizures may have coincided with obtaining baseline transcranial MEPs. Patient was immediately given 2mg Versed and an additional 500 mg of Keppra (in addition to the 1000 mg given during timeout). A second dose of Versed was then given due to persistent seizure activity, followed by 1mg Ativan x 4 in 5 min intervals. During this time, the patient was loaded with 15 mg/kg fosphenytoin and bolused several times with propofol to induce burst suppression. She continued to have persistent breakthrough non-convulsive seizure activity on EEG consistent with status epilepticus. CT head did not demonstrate acute changes  Procedure was held and transferred to ICU for monitoring on cEEG  S.p OPERATIVE PROCEDURES PERFORMED:  1. Stereotactic left temporal craniotomy for resection of tumor  2. Stereotactic left parieto-occipital craniotomy for resection of tumor  3.  Intraoperative neuromonitoring (MEP, SSEP, EEG)  4.   Intraoperative Brainlab neuronavigation    Subjective:   More awake and alert today  Able to tolerate PO intake  Complains of soreness of mouth    Medications:  Reviewed    Infusion Medications    sodium chloride      dextrose       Scheduled Medications    insulin lispro  0-12 Units SubCUTAneous TID WC    insulin lispro  0-6 Units SubCUTAneous Nightly    sodium chloride flush  10 mL IntraVENous 2 times per day    polyethylene glycol  17 g Oral Daily    sennosides-docusate sodium  1 tablet Oral BID    dexamethasone  4 mg Oral 4 times per day    Followed by   Constance Cap ON 3/15/2022] dexamethasone  4 mg Oral 3 times per day    Followed by   Constance Cap ON 3/18/2022] dexamethasone  4 mg Oral 2 times per day    Followed by   Constance Cap ON 3/22/2022] dexamethasone  4 mg Oral Daily    heparin (porcine)  5,000 Units SubCUTAneous Q8H    levetiracetam  2,000 mg IntraVENous Q12H    lacosamide (VIMPAT) IVPB  200 mg IntraVENous Q12H    pantoprazole  40 mg Oral QAM AC     PRN Meds: sodium chloride flush, sodium chloride, acetaminophen, oxyCODONE **OR** oxyCODONE, naloxone, promethazine **OR** ondansetron, aluminum & magnesium hydroxide-simethicone, bisacodyl, glucose, dextrose bolus (hypoglycemia), glucagon (rDNA), dextrose      Intake/Output Summary (Last 24 hours) at 3/14/2022 1257  Last data filed at 3/14/2022 0400  Gross per 24 hour   Intake 700 ml   Output 2845 ml   Net -2145 ml       Physical Exam Performed:    /76   Pulse 106   Temp 99.2 °F (37.3 °C) (Oral)   Resp 25   Ht 5' 2.99\" (1.6 m)   Wt 183 lb 10.3 oz (83.3 kg)   SpO2 94%   BMI 32.54 kg/m²     General appearance: No apparent distress, appears stated age and cooperative. Drain present  Respiratory:  Normal respiratory effort. Clear to auscultation, bilaterally without Rales/Wheezes/Rhonchi. Chest wall:  Right breast mass covered in dressing  Cardiovascular: Regular rate and rhythm with normal S1/S2 without murmurs, rubs or gallops. Abdomen: Soft, non-tender, non-distended with normal bowel sounds. Musculoskeletal: No clubbing, cyanosis or edema bilaterally.     Skin: Right breast mass is known   Neurologic:  Neurovascularly intact  grossly non-focal. Sleepy but follows all commands  Psychiatric: Alert and oriented, slow to verbally respond    Labs:   Recent Labs     03/12/22 0352 03/13/22 0513   WBC 15.7* 18.5*   HGB 11.5* 11.4*   HCT 34.8* 34.7*    316     Recent Labs     03/12/22 0352 03/13/22  0513   * 137   K 4.6 4.6    105 CO2 24 24   BUN 16 19   CREATININE 0.6 0.7   CALCIUM 9.0 8.8     No results for input(s): AST, ALT, BILIDIR, BILITOT, ALKPHOS in the last 72 hours. Recent Labs     03/11/22  1405   INR 1.05     No results for input(s): Oma Dukes in the last 72 hours. Urinalysis:      Lab Results   Component Value Date    NITRU Negative 03/04/2022    WBCUA 18 03/04/2022    RBCUA >900 03/04/2022    BLOODU LARGE 03/04/2022    SPECGRAV <=1.005 03/04/2022    GLUCOSEU Negative 03/04/2022       Radiology:  MRI brain with and without contrast   Final Result      Postoperative changes of left-sided craniotomy with interval resection of left temporal and occipital lobe masses. Small nodular areas of enhancement along the margins of the surgical cavity as detailed above are indeterminate. Stable extensive T2    hyperintense/vasogenic edema in the left parietotemporal occipital region surrounding the resection cavities. Mass effect with effacement of left lateral ventricle and 9 mm midline shift to right. Recommended continued follow-up. CT Head wo Contrast   Final Result   1. Resection cavity is at the locations of the previous masses with persistent adjacent vasogenic edema and persistent midline shift. No progressive mass effect or acute surgical complication otherwise identified. VL Extremity Venous Bilateral   Final Result      CT HEAD WO CONTRAST   Final Result      1.  2 large hemorrhagic partially necrotic masses in the left parieto-temporal and left parieto-occipital regions with marked surrounding edema and mass effect. These demonstrate little change when compared to previous MRI study of 3/5/2022. Associated    prominent mass effect and surrounding edema resulting 11 mm midline shift to the right.             MRI BRAIN W WO CONTRAST   Final Result      Two large hemorrhagic partially necrotic heterogeneously enhancing mass in the left parietotemporal and left parieto-occipital region with surrounding vasogenic edema and mass effect. These are indeterminate, most likely relate to multicentric    glioblastoma. However, possibility of metastasis cannot be excluded given the history of breast cancer. 10 mm midline shift to right. CT CHEST ABDOMEN PELVIS W CONTRAST   Final Result      Large, necrotic right breast mass. Right axillary lymphadenopathy. Uterine fibroids. High density urine in the bladder. Assessment/Plan:    Active Hospital Problems    Diagnosis     Status epilepticus ([address] [G40.901]     Duct cell carcinoma ([address] [C80.1]     Cerebral edema (HCC) [G93.6]     Brain metastases (HCC) [C79.31]     Brain mass [G93.89]      Metastatic triple negative breast cancer, now with Intracranial metastasis, previously diagnosed in March 2021 and treatment declined by the patient. Oncology following, patient decided on treatment    Intracranial vasogenic edema secondary to metastatic masses ; Symptomatic with short-term memory impairment. Continue decadron  S/p OPERATIVE PROCEDURES PERFORMED:  1. Stereotactic left temporal craniotomy for resection of tumor  2. Stereotactic left parieto-occipital craniotomy for resection of tumor  3.  Intraoperative neuromonitoring (MEP, SSEP, EEG)  4. Intraoperative Brainlab neuro navigation.    - Continue decadron taper  - Drain management per NS  - Phenol spray for mouth soreness    Status epilepticus; Continue cEEG, Continue AEDs; being managed by neurology  - Received fosphenytoin load prior to surgery as per neurology  - continue keppra, vimpat    Acute resp failure due to Status epilepticus: unable to protect airway and was intubated, now on RA sating well. GI Px: protonix  DVT Prophylaxis: heparin sq; hold after tonight's dose  Diet: ADULT DIET;  Dysphagia - Soft and Bite Sized   Code Status: Full Code    PT/OT Eval Status: ongoing    Dispo -inpatient stay, currently in the ICU, ok to transfer to , ARU consulted for evaluation    [facility] Maribel Boyer MD   Internal Medicine hospitalist

## 2022-03-14 NOTE — PROGRESS NOTES
Speech Language Pathology  Facility/Department: [facility] 5T ORTHO/NEURO  Initial Speech/Language/Cognitive Assessment    NAME: Francis Marina  : 1975   MRN: [id number]  ADMISSION DATE: 3/4/2022  ADMITTING DIAGNOSIS: has Thyroid nodule; Brain metastases ([address] Brain mass; Status epilepticus ([address] Duct cell carcinoma ([address] and Cerebral edema ([address] on their problem list.  DATE ONSET: 3/4/22    Date of Eval: 3/14/2022   Evaluating Therapist: Sarah Reece SLP      MRI brain 3/5/22  Two large hemorrhagic partially necrotic heterogeneously enhancing mass in the left parietotemporal and left parieto-occipital region with surrounding vasogenic edema and mass effect. These are indeterminate, most likely relate to multicentric    glioblastoma. However, possibility of metastasis cannot be excluded given the history of breast cancer.       10 mm midline shift to right. Repeat MRI post op 3/13/22  Postoperative changes of left-sided craniotomy with interval resection of left temporal and occipital lobe masses. Small nodular areas of enhancement along the margins of the surgical cavity as detailed above are indeterminate. Stable extensive T2    hyperintense/vasogenic edema in the left parietotemporal occipital region surrounding the resection cavities. Mass effect with effacement of left lateral ventricle and 9 mm midline shift to right. Recommended continued follow-up. Primary Complaint:  \" I was always healthy, I can't believe this is happening to me. \"    Pain:  Pain Assessment  Pain Assessment: left facial pain only when chewing, rated 4, notified Rn    Assessment:  Pt exhibiting mod receptive/expressive language impairment accompanied by mild apraxia. Comprehension reduced for material of increased length and complexity. Speech characterized by paraphasias, perseveration especially in structured tasks. Pt does demonstrate error awareness, however not able to self correct.   Spontaneous speech was improved with decreased paraphasias/perseveration. Pt demonstrated significant difficulty when attempting to read single words that were large and bold. Pt was not able to  pen with right dominant hand for writing tasks. Cognitive pt was oriented to this being a hospital and maintained attention to tasks presented. Will cont to assess cognition during treatment sessions, as appropriate    Recommendations:  Requires SLP Intervention: Yes     Plan:   Goals:  1- Pt will answer 2 unit yes/no questions with 80% accuracy  2- Pt will follow 2 step commands with 80% accuracy  3- Pt will complete graded naming tasks with 50% accuracy and min cues  4- Pt will use strategies for word recall with mod cues provided in structured tasks  5- Pt will read single words with 50% accuracy      Patient/family involved in developing goals and treatment plan:yes    Subjective:  Previous level of function and limitations: independent  General  Chart Reviewed: Yes  Family / Caregiver Present: No  Social/Functional History  Lives With: Family  Type of Home: House  Type of occupation: [employer]  Vision  Vision: Impaired  Vision Exceptions: Visual field cut  Hearing  Hearing: Within functional limits     Objective:     Oral/Motor  Oral Motor: Within functional limits    Auditory Comprehension  Comprehension: Exceptions  Complex Questions: Moderate  Two Step Basic Commands: Moderate    Reading Comprehension  Reading Status: Exceptions to Buffer  Words Impairment Severity: Mild   Phrases Impairment Severity: Moderate    Verbal Expression  Verbal Expression: Exceptions to functional limits  Repetition: Moderate  Confrontation: Moderate  Divergent: Moderate  Responsive: Moderate  Conversation: Moderate  Interfering Components: Paraphasia; Perseverations    Written Expression  Dominant Hand: Right (right UE weakness)  Written Expression: Exceptions to [employer]    Motor Speech  Motor Speech:  Within Functional Limits    Pragmatics/Social Functioning  Pragmatics: Within functional limits    Cognition:   Oriented to place, maintained attention to tasks presented. Difficult to fully assess further at this time due to aphasia     Prognosis:  Speech Therapy Prognosis  Prognosis: Fair    Education:  Pt educated to purpose of visit and recommendations. Pt stated comprehension     Therapy Time:   Individual Concurrent Group Co-treatment   Time In  900         Time Out 920         Minutes 20            Plan:  Speech/language tx 2-4 x/week  Dc recommendation: ongoing treatment indicated  tx goal:  I am going to get better  Dc goal: to get better  Corina Connor M.S./[facility]-SLP #[id number]  Pg.  # [phone]  Needs met prior to leaving room, call light within reach, d/w RN  This document will serve as a dc summary if pt dc prior to next visit    3/14/2022 11:17 AM

## 2022-03-14 NOTE — PLAN OF CARE
Problem: Falls - Risk of:  Goal: Will remain free from falls  Description: Will remain free from falls  Note: Patient will remain free of falls throughout the duration of the shift. Bed locked in lowest position. Non skid socks on. Bed and chair alarm activated. Call light and belongings within reach. Patient calls out approprietly. Room door open at all times. Patient rounded on frequently. 3/4 side rails up. Problem: Skin Integrity:  Goal: Absence of new skin breakdown  Description: Absence of new skin breakdown  Note: Absence of new skin breakdown. Patient turned routinely with pillow support. Heels and arms elevated off of bed. Skin assessed. Prophylactic sacral heart in place. Patient on special air mattress. Patient educated on risks associated with prolonged bed rest. Patient checked routinely for incontinence, cleansed thoroughly in its presence. Lines off loaded from skin. Problem: Pain:  Goal: Pain level will decrease  Description: Pain level will decrease  Note: Patient's pain will remain at a manageable level throughout the duration of the shift. Patient understands prescribed pain medication regimen for its uses and side effects. Patient understands how to rate pain appropriately and when to call out if experiencing breakthrough pain. Patient understands how to utilize non pharmacological pain management techniques.

## 2022-03-14 NOTE — PROGRESS NOTES
Speech Language Pathology  Facility/Department: [facility]  Dysphagia Daily Treatment Note  Late entry for 9:14    NAME: Melanie Chapa  : 1975  MRN: [id number]    Patient Diagnosis(es):   Patient Active Problem List    Diagnosis Date Noted    Status epilepticus ([facility] 2022    Duct cell carcinoma ([facility] 2022    Cerebral edema ([facility] 2022    Brain metastases ([facility] 2022    Brain mass 2022    Thyroid nodule 2015     Allergies: No Known Allergies    Recent Chest Xray not completed     MRI brain 3/5/22  Two large hemorrhagic partially necrotic heterogeneously enhancing mass in the left parietotemporal and left parieto-occipital region with surrounding vasogenic edema and mass effect. These are indeterminate, most likely relate to multicentric    glioblastoma. However, possibility of metastasis cannot be excluded given the history of breast cancer.       10 mm midline shift to right. repeat MRI post op 3/13/22  Postoperative changes of left-sided craniotomy with interval resection of left temporal and occipital lobe masses. Small nodular areas of enhancement along the margins of the surgical cavity as detailed above are indeterminate. Stable extensive T2    hyperintense/vasogenic edema in the left parietotemporal occipital region surrounding the resection cavities. Mass effect with effacement of left lateral ventricle and 9 mm midline shift to right. Recommended continued follow-up. Previous MBS - n/a  Chart reviewed. Medical Diagnosis: Brain metastases ([facility] [C79.31]  Brain mass [G93.89]   Treatment Diagnosis: dysphagia    BSE Impression 3/10/22  RN states okay to assess pt. Pt was intubated 3/8-3/9, voice is weak, not able to produce volitional cough. Pt followed directions inconsistently. Intelligibility of speech is reduced partially due to very low volume, however appears to exhibit aphasia as well.  Oral motor skills grossly intact, no asymmetry noted. Pt with full dentition, good oral /dental hygiene. Pt accepting of PO trials, including water via tsp/cup and straw, puree and solid texture. Pt demonstrated adequate mastication with solid texture, no anterior loss or pocketing noted. No cough/throat clear or change in voice with liquids, including sequential swallows (did not follow direction for 3 ounce water test). Swallow movement felt upon palpation of anterior neck. Recommend soft and bite sized (due to intermittent lethargy and overall weakness) /thin liquids  Dysphagia Diagnosis: Swallow function appears grossly intact    MBS results - not indicated at this time, will cont to monitor for indication s/p surgery    Pain: none    Current Diet : ADULT DIET; Dysphagia - Soft and Bite Sized   Recommended Form of Meds: Whole with water (if difficulty place in puree)     Treatment:  Pt seen bedside to address the following goals:  1-The patient will tolerate recommended diet without observed clinical signs of aspiration  3/11: pt more alert today, sitting up in bed, assisted with breakfast by RN. Vocal quality somewhat stronger, verbalizations were minimal.   Pt consuming pancakes and sausage with prolonged but adequate mastication- cleared oral cavity completely. No cough/throat clear or change in voice with liquids, swallow movement noted upon visualization of anterior neck. No respiratory decline per chart review  Pt scheduled to go to OR tomorrow for resection of intracranial masses. Recommend cont current diet and will plan to reassess s/p surgery  Cont goal  3/14: POD# 2 s/p Procedure(s) (LRB):  LEFT TEMPORAL PARIETAL OCCIPITAL CRANIOTOMY FOR TUMOR RESECTION (Left)  Pt sitting up in bed, cleared for re-assessment per RN. Pt alert and accepting of PO trials. Pt consumed thin liquids with no overt signs of aspiration, no cough/throat clearing occurred, voice remained clear.  Pt c/o left facial pain when chewing, reports no pain at rest (RN notified). Pt cleared oral cavity completely, though mastication slow due to facial pain. RN notified of pain, rated \"4\"  Recommend cont current diet  Cont goal    2-The patient/caregiver will demonstrate understanding of compensatory strategies for improved swallowing safety. 3/10: Educated pt to purpose of visit, s/s of aspiration, concern if aspiration occurs, rationale for diet recommendation/strategies to reduce risk for aspiration and instruction to notify staff if any signs emerge. Pt will benefit from cont education  Cont goal  3/11: pt educated to purpose of visit. Explained plan to cont current diet and plan to re-assess swallowing after surgery and to evaluate speech/language. Pt shook head in comprehension  Cont goal  3/14:  Pt educated to purpose of visit, reviewed s/s of aspiration, and instructed to notify staff if any signs emerge. Also recommended selecting soft items at this time due to facial pain. Pt stated comprehension   Cont goal    Patient/Family/Caregiver Education:  As above    Compensatory Strategies:  · Upright as possible for all oral intake  · Eat/Feed slowly  · Assist feed  · Check for pocketing of food   · Small bites/sips     Plan:  Continue dysphagia treatment with goals per plan of care. Diet recommendations: cont Dysphagia III soft and bite sized/thin liquids  DC recommendation: ongoing treatment indicated  Treatment: 15  D/W nursing Will  Needs met prior to leaving room, call button in reach. Aram Rodrigues M.S./[facility]-SLP #[id number]  Pg.  # [phone]  If patient is discharged prior to next treatment, this note will serve as the discharge summary

## 2022-03-15 ENCOUNTER — APPOINTMENT (OUTPATIENT)
Dept: VASCULAR LAB | Age: 47
DRG: 021 | End: 2022-03-15
Attending: RADIOLOGY
Payer: MEDICAID

## 2022-03-15 LAB
GLUCOSE BLD-MCNC: 116 MG/DL (ref 70–99)
GLUCOSE BLD-MCNC: 136 MG/DL (ref 70–99)
GLUCOSE BLD-MCNC: 142 MG/DL (ref 70–99)
GLUCOSE BLD-MCNC: 154 MG/DL (ref 70–99)
PERFORMED ON: ABNORMAL

## 2022-03-15 PROCEDURE — 6370000000 HC RX 637 (ALT 250 FOR IP): Performed by: NURSE PRACTITIONER

## 2022-03-15 PROCEDURE — 6360000002 HC RX W HCPCS: Performed by: STUDENT IN AN ORGANIZED HEALTH CARE EDUCATION/TRAINING PROGRAM

## 2022-03-15 PROCEDURE — 99232 SBSQ HOSP IP/OBS MODERATE 35: CPT

## 2022-03-15 PROCEDURE — 6370000000 HC RX 637 (ALT 250 FOR IP): Performed by: STUDENT IN AN ORGANIZED HEALTH CARE EDUCATION/TRAINING PROGRAM

## 2022-03-15 PROCEDURE — 93970 EXTREMITY STUDY: CPT

## 2022-03-15 PROCEDURE — 97116 GAIT TRAINING THERAPY: CPT

## 2022-03-15 PROCEDURE — 2060000000 HC ICU INTERMEDIATE R&B

## 2022-03-15 PROCEDURE — 99254 IP/OBS CNSLTJ NEW/EST MOD 60: CPT | Performed by: PHYSICAL MEDICINE & REHABILITATION

## 2022-03-15 PROCEDURE — C9254 INJECTION, LACOSAMIDE: HCPCS | Performed by: STUDENT IN AN ORGANIZED HEALTH CARE EDUCATION/TRAINING PROGRAM

## 2022-03-15 PROCEDURE — 97530 THERAPEUTIC ACTIVITIES: CPT

## 2022-03-15 PROCEDURE — 2580000003 HC RX 258: Performed by: STUDENT IN AN ORGANIZED HEALTH CARE EDUCATION/TRAINING PROGRAM

## 2022-03-15 PROCEDURE — 97535 SELF CARE MNGMENT TRAINING: CPT

## 2022-03-15 RX ORDER — LACOSAMIDE 50 MG/1
200 TABLET ORAL 2 TIMES DAILY
Status: DISCONTINUED | OUTPATIENT
Start: 2022-03-15 | End: 2022-03-17 | Stop reason: HOSPADM

## 2022-03-15 RX ADMIN — HEPARIN SODIUM 5000 UNITS: 5000 INJECTION INTRAVENOUS; SUBCUTANEOUS at 12:20

## 2022-03-15 RX ADMIN — DEXAMETHASONE 4 MG: 4 TABLET ORAL at 06:14

## 2022-03-15 RX ADMIN — SODIUM CHLORIDE 25 ML: 9 INJECTION, SOLUTION INTRAVENOUS at 12:33

## 2022-03-15 RX ADMIN — ENOXAPARIN SODIUM 80 MG: 100 INJECTION SUBCUTANEOUS at 20:55

## 2022-03-15 RX ADMIN — SODIUM CHLORIDE, PRESERVATIVE FREE 10 ML: 5 INJECTION INTRAVENOUS at 08:31

## 2022-03-15 RX ADMIN — LEVETIRACETAM 2000 MG: 500 TABLET, FILM COATED ORAL at 13:24

## 2022-03-15 RX ADMIN — LEVETIRACETAM 2000 MG: 500 TABLET, FILM COATED ORAL at 20:54

## 2022-03-15 RX ADMIN — PANTOPRAZOLE SODIUM 40 MG: 40 TABLET, DELAYED RELEASE ORAL at 06:14

## 2022-03-15 RX ADMIN — LACOSAMIDE 200 MG: 50 TABLET, FILM COATED ORAL at 18:21

## 2022-03-15 RX ADMIN — SODIUM CHLORIDE, PRESERVATIVE FREE 10 ML: 5 INJECTION INTRAVENOUS at 21:07

## 2022-03-15 RX ADMIN — DEXAMETHASONE 4 MG: 4 TABLET ORAL at 18:22

## 2022-03-15 RX ADMIN — SENNOSIDES AND DOCUSATE SODIUM 1 TABLET: 50; 8.6 TABLET ORAL at 20:54

## 2022-03-15 RX ADMIN — DEXAMETHASONE 4 MG: 4 TABLET ORAL at 12:21

## 2022-03-15 RX ADMIN — DEXTROSE MONOHYDRATE 200 MG: 50 INJECTION, SOLUTION INTRAVENOUS at 06:17

## 2022-03-15 RX ADMIN — OXYCODONE HYDROCHLORIDE 5 MG: 5 TABLET ORAL at 18:52

## 2022-03-15 RX ADMIN — HEPARIN SODIUM 5000 UNITS: 5000 INJECTION INTRAVENOUS; SUBCUTANEOUS at 06:14

## 2022-03-15 RX ADMIN — INSULIN LISPRO 2 UNITS: 100 INJECTION, SOLUTION INTRAVENOUS; SUBCUTANEOUS at 17:53

## 2022-03-15 RX ADMIN — SENNOSIDES AND DOCUSATE SODIUM 1 TABLET: 50; 8.6 TABLET ORAL at 08:30

## 2022-03-15 RX ADMIN — OXYCODONE HYDROCHLORIDE 5 MG: 5 TABLET ORAL at 00:13

## 2022-03-15 RX ADMIN — INSULIN LISPRO 2 UNITS: 100 INJECTION, SOLUTION INTRAVENOUS; SUBCUTANEOUS at 12:16

## 2022-03-15 RX ADMIN — DEXAMETHASONE 4 MG: 4 TABLET ORAL at 00:13

## 2022-03-15 RX ADMIN — LEVETIRACETAM 2000 MG: 100 INJECTION, SOLUTION INTRAVENOUS at 00:14

## 2022-03-15 ASSESSMENT — PAIN DESCRIPTION - FREQUENCY
FREQUENCY: CONTINUOUS
FREQUENCY: INTERMITTENT

## 2022-03-15 ASSESSMENT — PAIN DESCRIPTION - ONSET
ONSET: ON-GOING
ONSET: GRADUAL

## 2022-03-15 ASSESSMENT — PAIN DESCRIPTION - LOCATION
LOCATION: HEAD
LOCATION: HEAD

## 2022-03-15 ASSESSMENT — PAIN SCALES - GENERAL
PAINLEVEL_OUTOF10: 5
PAINLEVEL_OUTOF10: 0
PAINLEVEL_OUTOF10: 0
PAINLEVEL_OUTOF10: 5

## 2022-03-15 ASSESSMENT — PAIN DESCRIPTION - PAIN TYPE
TYPE: SURGICAL PAIN
TYPE: ACUTE PAIN

## 2022-03-15 ASSESSMENT — PAIN DESCRIPTION - ORIENTATION: ORIENTATION: LEFT

## 2022-03-15 ASSESSMENT — PAIN DESCRIPTION - DESCRIPTORS
DESCRIPTORS: SORE
DESCRIPTORS: ACHING

## 2022-03-15 ASSESSMENT — PAIN - FUNCTIONAL ASSESSMENT: PAIN_FUNCTIONAL_ASSESSMENT: PREVENTS OR INTERFERES SOME ACTIVE ACTIVITIES AND ADLS

## 2022-03-15 ASSESSMENT — PAIN DESCRIPTION - PROGRESSION: CLINICAL_PROGRESSION: GRADUALLY IMPROVING

## 2022-03-15 NOTE — CARE COORDINATION
[facility] liaison came to see patient today to discuss options. Patient was off the floor for dopplers when I stopped by.      Electronically signed by Lynsey Wayne RN on 3/15/2022 at 5:01 PM  [phone]

## 2022-03-15 NOTE — PROGRESS NOTES
Occupational Therapy  Facility/Department: [facility] 5T ORTHO/NEURO  Daily Treatment Note  NAME: Jenny Jj  : 1975  MRN: [id number]    Date of Service: 3/15/2022    Discharge Recommendations: Jenny Jj scored a 14/24 on the AM-PAC ADL Inpatient form. Current research shows that an AM-PAC score of 17 or less is typically not associated with a discharge to the patient's home setting. Based on the patient's AM-PAC score and their current ADL deficits, it is recommended that the patient have 5-7 sessions per week of Occupational Therapy at d/c to increase the patient's independence. At this time, this patient demonstrates the endurance, and/or tolerance for 3 hours of therapy each day, with a treatment frequency of 5-7x/wk. Please see assessment section for further patient specific details. If patient discharges prior to next session this note will serve as a discharge summary. Please see below for the latest assessment towards goals. Assessment        Assessment: Pt limited by dizziness this session, but able to tolerate session well. Showing progress with needing less A for transfers and funct mob. Pt needing consistent cues for hand placements and safety with RW due to lethargy. Anticipate cont progress, but would benefit from cont OT to work on ADLs, funct mob and transfers to maximize independence. Cont with POC      OT Education: OT Role;Plan of Care;Transfer Training  Patient Education: continue to teach and reinforce  Activity Tolerance  Activity Tolerance: Treatment limited secondary to medical complications (free text)  Activity Tolerance: c/o of dizziness upon getting up (BP normal)  Safety Devices  Safety Devices in place: Yes  Type of devices: Left in chair;Nurse notified;Call light within reach; Chair alarm in place         Patient Diagnosis(es): The encounter diagnosis was Brain mass. has a past medical history of Thyroid nodule.    has a past surgical history that includes craniotomy (Left, 3/12/2022). Restrictions  Position Activity Restriction  Other position/activity restrictions: up with assistance  Subjective   General  Chart Reviewed: Yes  Patient assessed for rehabilitation services?: Yes  Additional Pertinent Hx: Patient is a 47 y/o female admitted 3/3 with altered mental status. CT chest and abdomen (+) for Large, necrotic right breast mass. Right axillary lymphadenopathy. CT head (+) for Two large hemorrhagic partially necrotic heterogeneously enhancing mass in the left parietotemporal and left parieto-occipital region with surrounding vasogenic edema and mass effect. taken to OR 3/8 but developed epilepticus - OR cancelled; intub; ext 3/9; OR 3/12 for LEFT TEMPORAL PARIETAL OCCIPITAL CRANIOTOMY FOR TUMOR RESECTION. Family / Caregiver Present: No  Referring Practitioner: Dr. Mamta Lehman  Diagnosis: brain mets  Subjective  Subjective: Pt in bed upon arrival. Pleasant and agreeable to OT/PT treat. Complaint of dizziness upon getting up, but BP is normal.  Vital Signs  Patient Currently in Pain: Denies   Orientation    Objective          Balance  Sitting Balance: Stand by assistance  Standing Balance: Contact guard assistance (CGA to SBA)        Functional Mobility  Functional - Mobility Device: Rolling Walker  Activity: To/from bathroom  Assist Level: Contact guard assistance  Functional Mobility Comments: Cues for safety and with walker mgmt        Toilet Transfers  Toilet - Technique: Ambulating  Equipment Used: Standard toilet  Toilet Transfer: Contact guard assistance  Toilet Transfers Comments: use of grab bar        Bed mobility  Supine to Sit: Stand by assistance         Transfers  Sit to stand: Contact guard assistance (but progressed to SBA)  Stand to sit: Contact guard assistance      Plan  If pt discharges prior to next treatment session, this note will serve as discharge summary.      Plan  Times per week: 5-7  Times per day: Daily  Current Treatment Recommendations: Strengthening,ROM,Balance Training,Functional Mobility Training,Endurance Training,Safety Education & Training,Cognitive Reorientation,Neuromuscular Re-education,Equipment Evaluation, Education, & procurement,Self-Care / ADL,Patient/Caregiver Education & Training    AM-PAC Score        AM-PAC Inpatient Daily Activity Raw Score: 14 (03/15/22 1440)  AM-PAC Inpatient ADL T-Scale Score : 33.39 (03/15/22 1440)  ADL Inpatient CMS 0-100% Score: 59.67 (03/15/22 1440)  ADL Inpatient CMS G-Code Modifier : CK (03/15/22 1440)    Goals (as determined and assessed by primary OT)    Short term goals  Time Frame for Short term goals: Discharge  Short term goal 1: grooming task w/ setup, Min A using R hand- not met  Short term goal 2: tolerate 1 set of 10 A/AROM exercises RUE to increase strength/ROM for ADLs- not met  Short term goal 3: unsupported sitting x 5 minutes w/ CGA- met for assist level 3/15  Patient Goals   Patient goals : to increase independence       Therapy Time   Individual Concurrent Group Co-treatment   Time In 1055         Time Out 1120         Minutes 25         Timed Code Treatment Minutes: 25 Minutes       STAN Monroe/BERE

## 2022-03-15 NOTE — CONSULTS
children: None    Years of education: None    Highest education level: None   Occupational History    None   Tobacco Use    Smoking status: Never Smoker    Smokeless tobacco: Never Used   Substance and Sexual Activity    Alcohol use: Never     Alcohol/week: 0.0 standard drinks    Drug use: Never    Sexual activity: None   Other Topics Concern    None   Social History Narrative    None     Social Determinants of Health     Financial Resource Strain:     Difficulty of Paying Living Expenses: Not on file   Food Insecurity:     Worried About Running Out of Food in the Last Year: Not on file    Rosa of Food in the Last Year: Not on file   Transportation Needs:     Lack of Transportation (Medical): Not on file    Lack of Transportation (Non-Medical):  Not on file   Physical Activity:     Days of Exercise per Week: Not on file    Minutes of Exercise per Session: Not on file   Stress:     Feeling of Stress : Not on file   Social Connections:     Frequency of Communication with Friends and Family: Not on file    Frequency of Social Gatherings with Friends and Family: Not on file    Attends Spiritism Services: Not on file    Active Member of [address] [place] [address] or Organizations: Not on file    Attends Club or Organization Meetings: Not on file    Marital Status: Not on file   Intimate Partner Violence:     Fear of Current or Ex-Partner: Not on file    Emotionally Abused: Not on file    Physically Abused: Not on file    Sexually Abused: Not on file   Housing Stability:     Unable to Pay for Housing in the Last Year: Not on file    Number of [place] in the Last Year: Not on file    Unstable Housing in the Last Year: Not on file           REVIEW OF SYSTEMS:   CONSTITUTIONAL: negative for fevers, chills, diaphoresis, appetite change, night sweats, unexpected weight change, fatigue  EYES: negative for blurred vision, eye discharge, visual disturbance and icterus  HEENT: negative for hearing loss, tinnitus, ear drainage, sinus pressure, nasal congestion, epistaxis and snoring  RESPIRATORY: Negative for hemoptysis, cough, sputum production  CARDIOVASCULAR: negative for chest pain, palpitations, exertional chest pressure/discomfort, syncope, edema  GASTROINTESTINAL: negative for nausea, vomiting, diarrhea, blood in stool, abdominal pain, constipation  GENITOURINARY: negative for frequency, dysuria, urinary incontinence, decreased urine volume, and hematuria  HEMATOLOGIC/LYMPHATIC: negative for easy bruising, bleeding and lymphadenopathy  ALLERGIC/IMMUNOLOGIC: negative for recurrent infections, angioedema, anaphylaxis and drug reactions  ENDOCRINE: negative for weight changes and diabetic symptoms including polyuria, polydipsia and polyphagia  MUSCULOSKELETAL: negative for pain, joint swelling, decreased range of motion  NEUROLOGICAL: negative for headaches, slurred speech, + unilateral weakness  PSYCHIATRIC/BEHAVIORAL: negative for hallucinations, behavioral problems, confusion and agitation. Physical Examination:  Vitals:   Patient Vitals for the past 24 hrs:   BP Temp Temp src Pulse Resp SpO2 Weight   03/15/22 0700 -- -- -- -- -- -- 186 lb 4.6 oz (84.5 kg)   03/15/22 0621 111/76 98.7 °F (37.1 °C) Oral 105 16 92 % --   03/15/22 0330 106/69 98.9 °F (37.2 °C) Oral 110 16 92 % --   03/15/22 0006 112/81 99.9 °F (37.7 °C) Oral 112 16 94 % --   03/14/22 1815 113/77 100.2 °F (37.9 °C) Oral 118 16 94 % --   03/14/22 1535 124/76 98.7 °F (37.1 °C) Oral 118 16 94 % --   03/14/22 1200 (!) 116/91 98.5 °F (36.9 °C) Oral 113 15 -- --     Const: Alert. WDWN. No distress  Eyes: Conjunctiva noninjected, no icterus noted; pupils equal, round, and reactive to light. HENT: post op surgical changes clean and dry, normocephalic; Oral mucosa moist  Neck: Trachea midline, neck supple. No thyromegaly noted.   CV: Regular rate and rhythm, no murmur rub or gallop noted  Resp: Lungs clear to auscultation bilaterally, no rales wheezes or ronchi, no retractions. Respirations unlabored. GI: Soft, nontender, nondistended. Normal bowel sounds. No palpable masses. Skin: Normal temperature and turgor. Large mass on R breast covered in bandage   Ext: No significant edema appreciated. No varicosities. MSK: No joint tenderness, erythema, warmth noted. AROM intact. Neuro: Alert, oriented, appropriate. No cranial nerve deficits appreciated. Sensation intact to light touch. RLE weakness +. No abnormalities with finger/nose or heel/shin noted. Reflexes normal and symmetric. Psych: Stable mood, normal judgement, normal affect, poor insight    Lab Results   Component Value Date    WBC 18.5 (H) 03/13/2022    HGB 11.4 (L) 03/13/2022    HCT 34.7 (L) 03/13/2022    MCV 82.6 03/13/2022     03/13/2022     Lab Results   Component Value Date    INR 1.05 03/11/2022    INR 1.08 03/07/2022    PROTIME 11.9 03/11/2022    PROTIME 12.3 03/07/2022     Lab Results   Component Value Date    CREATININE 0.7 03/13/2022    BUN 19 03/13/2022     03/13/2022    K 4.6 03/13/2022     03/13/2022    CO2 24 03/13/2022     Lab Results   Component Value Date    ALT 14 03/03/2022    AST 17 03/03/2022    ALKPHOS 61 03/03/2022    BILITOT 0.3 03/03/2022     Most recent EKG revealed:  NSR    Most recent imaging studies revealed:  3/14 Brain MRI  Postoperative changes of left-sided craniotomy with interval resection of left temporal and occipital lobe masses. Small nodular areas of enhancement along the margins of the surgical cavity as detailed above are indeterminate. Stable extensive T2   hyperintense/vasogenic edema in the left parietotemporal occipital region surrounding the resection cavities. Mass effect with effacement of left lateral ventricle and 9 mm midline shift to right. Recommended continued follow-up.        MRI brain with and without contrast   Final Result      Postoperative changes of left-sided craniotomy with interval resection of left temporal and occipital lobe masses. Small nodular areas of enhancement along the margins of the surgical cavity as detailed above are indeterminate. Stable extensive T2    hyperintense/vasogenic edema in the left parietotemporal occipital region surrounding the resection cavities. Mass effect with effacement of left lateral ventricle and 9 mm midline shift to right. Recommended continued follow-up. CT Head wo Contrast   Final Result   1. Resection cavity is at the locations of the previous masses with persistent adjacent vasogenic edema and persistent midline shift. No progressive mass effect or acute surgical complication otherwise identified. VL Extremity Venous Bilateral   Final Result      CT HEAD WO CONTRAST   Final Result      1.  2 large hemorrhagic partially necrotic masses in the left parieto-temporal and left parieto-occipital regions with marked surrounding edema and mass effect. These demonstrate little change when compared to previous MRI study of 3/5/2022. Associated    prominent mass effect and surrounding edema resulting 11 mm midline shift to the right. MRI BRAIN W WO CONTRAST   Final Result      Two large hemorrhagic partially necrotic heterogeneously enhancing mass in the left parietotemporal and left parieto-occipital region with surrounding vasogenic edema and mass effect. These are indeterminate, most likely relate to multicentric    glioblastoma. However, possibility of metastasis cannot be excluded given the history of breast cancer. 10 mm midline shift to right. CT CHEST ABDOMEN PELVIS W CONTRAST   Final Result      Large, necrotic right breast mass. Right axillary lymphadenopathy. Uterine fibroids. High density urine in the bladder. Assessment:  1.  Metastatic triple negative breast cancer, now with Intracranial metastasis  (s/p brain met resection)  - Will need post-op radiation (2-4 weeks post-op) followed by systemic chemotherapy.  - PT/OT  - poor insight      2. Intracranial vasogenic edema secondary to metastatic masses  - Continue decadron taper  - drain out  - PT/OT     3. Status epilepticus - resolved   - continue keppra, vimpat     4. Acute resp failure due to Status epilepticus - resolved   unable to protect airway and was intubated, now on RA sating well. Impairments- Decreased functional mobility, Decreased ADLs    PPI and SQH ppx    Recommendations:  Admit to [facility] with precert      Thank you for this consult. Please contact me with any questions or concerns. Alex Becerra MD PGY1  3/15/2022  8:04 AM      This patient has been seen, examined, and discussed with the resident. This note has been altered to reflect my own examination findings, impression, and recommendations.      Jayda San, OMAR.O. M.P.H  PM&R  3/15/2022  12:35 PM

## 2022-03-15 NOTE — PLAN OF CARE
Problem: Falls - Risk of:  Goal: Will remain free from falls  Description: Will remain free from falls  Outcome: Ongoing  Note: Bed in lowest position. Bed alarm and gripper socks on. Seizure precautions in place for safety. Call light in reach of pt. Problem: Pain:  Goal: Control of acute pain  Description: Control of acute pain  Outcome: Ongoing  Note: Assess pt's pain using 0-10 scale throughout shift. Administer pain medications per MAR. Assist pt in repositioning throughout shift as needed.

## 2022-03-15 NOTE — PLAN OF CARE
Problem: Falls - Risk of:  Goal: Will remain free from falls  Description: Will remain free from falls  3/15/2022 1254 by Enid Canseco RN  Outcome: Ongoing     Problem: Falls - Risk of:  Goal: Absence of physical injury  Description: Absence of physical injury  Outcome: Ongoing     Problem: Skin Integrity:  Goal: Will show no infection signs and symptoms  Description: Will show no infection signs and symptoms  Outcome: Ongoing     Problem: Skin Integrity:  Goal: Absence of new skin breakdown  Description: Absence of new skin breakdown  Outcome: Ongoing     Problem: Pain:  Description: Pain management should include both nonpharmacologic and pharmacologic interventions.   Goal: Control of chronic pain  Description: Control of chronic pain  Outcome: Ongoing     Problem: Verbal Communication - Impaired:  Goal: Functional communication will improve  Description: Functional communication will improve  Outcome: Completed  Goal: Ability to interact with others will improve  Description: Ability to interact with others will improve  Outcome: Completed  Goal: Ability to establish a method of communication will improve  Description: Ability to establish a method of communication will improve  Outcome: Completed     Problem: Sensory:  Goal: General experience of comfort will improve  Description: General experience of comfort will improve  Outcome: Completed     Problem: Urinary Elimination:  Goal: Signs and symptoms of infection will decrease  Description: Signs and symptoms of infection will decrease  Outcome: Completed  Goal: Ability to reestablish a normal urinary elimination pattern will improve - after catheter removal  Description: Ability to reestablish a normal urinary elimination pattern will improve  Outcome: Completed  Goal: Complications related to the disease process, condition or treatment will be avoided or minimized  Description: Complications related to the disease process, condition or treatment will be avoided or minimized  Outcome: Completed     Problem: Pain:  Description: Pain management should include both nonpharmacologic and pharmacologic interventions.   Goal: Pain level will decrease  Description: Pain level will decrease  Outcome: Completed  Goal: Control of acute pain  Description: Control of acute pain  3/15/2022 1254 by Mook Hussein RN  Outcome: Completed

## 2022-03-15 NOTE — DISCHARGE SUMMARY
INTERNAL MEDICINE DEPARTMENT AT [address] [place] [address]  DISCHARGE SUMMARY    Patient ID: Alejandro Orr                                             Discharge Date: 3/17/2022   Patient's PCP: JUDITH Spain - AMA                                          Discharge Physician: Sharath Machuca MD MD  Admit Date: 3/4/2022   Admitting Physician: Wanda Sandoval MD    PROBLEMS DURING HOSPITALIZATION:  Patient Active Problem List   Diagnosis    Thyroid nodule    Brain metastases ([address]    Brain mass    Status epile[facility]us [facility] [facility]    Duct cell carcinoma ([facility] [address] [facility]    Cerebral edema [facility])       Hospital Course:     HPI:  Vimal Mireles is a 49 y/o F w/ PMH of Stage IIB invasive ductal carcinoma (ER-, UT-, HER2 -) diagnosed 3/30 who presented to [facility] w/ one week of altered mental status. At presentation at [facility] patient was having trouble giving hx and presented w/ mother who helped providing hx. About one week ago, the patient was reading and had sudden occipital headache which which resolved. Subsequently, patient started having difficulty reading, writing, and with her memory. These symptoms persisted which caused the patient to present to ED. Of note the patient found a breast mass on self exam in 2021. Biopsy on 3/23 found Stage IIB invasive ductal carcinoma (Grade 3, ER-, UT-,HER2-). Patient was seen on  at [place] [id number] by Dr. Winifred Saleh and denied that mass was cancer despite positive biopsy. She denied systemic therapy at that time. The patient's sister had recently  of breast cancer after treatment w/ chemotherapy. On presentation to [facility] patient was hemodynamically stable. CT head showed abnormal edema in left posterior cerebral hemisphere possibly related to underlying metastases versus less likely primary glioma w/ 1.4 cm of left to right midline shift w/ cisternal effacement and downward transtentorial herniation and findings of developing hydrocephalus.  CTA negative for large vessel occlusion or hemodynamic stenosis. Patient denies chest pain, SOB, abdominal pain, fevers, chills, nausea, and vomiting. Patient reported bloody discharge from breast tumor and difficulty w/ memory and reading. Patient was transferred to [facility] for further workup and care. Patient was transferred w/ intention to seek further treatment. Problems addressed during this admission:    Intracranial vasogenic edema secondary to metastatic masses ; Symptomatic with short-term memory impairment. S/p OPERATIVE PROCEDURES PERFORMED:  1.  Stereotactic left temporal craniotomy for resection of tumor  2.  Stereotactic left parieto-occipital craniotomy for resection of tumor  3.  Intraoperative neuromonitoring (MEP, SSEP, EEG)  4.  Intraoperative Brainlab neuro navigation. Patient underwent a decadron taper with improvement in her neurological status and strength s/p craniotomy. She was initially lethargic with R sided weakness believed to be Que's paralysis after patient had a seizure prior to her initial neurosurgery slot. Likely 2/2 brain mets. R sided weakness improved but still present on discharge. ARU was consulted for rehab. Miralax and senna scheduled for post-op constipation. At time of discharge patient was HDS, good I/O, pain well controlled, and with no acute complaints. Status epilepticus; Continue cEEG, Continue AEDs; being managed by neurology. Likely 2/2 brain mets. - Started on Jeoffrey Her and will continue as outpatient as directed by neurology     Acute resp failure due to Status epilepticus: unable to protect airway and was intubated, now on RA sating well. DVT  R sided DVT post-op  S/p IVC filter  Patient can start taking anticoagulation medications starting 14 days post-op on March 26 (likely Eliquis), S/P IVC filter.  This can be retrieved once she in therapeutically anticoagulated    Physical Exam:  /66   Pulse 93   Temp 98.4 °F (36.9 °C) (Oral)   Resp 16   Ht 5' 2.99\" (1.6 m)   Wt 186 lb 4.6 oz (84.5 kg)   SpO2 93%   BMI 33.01 kg/m²   Constitutional:       General: She is not in acute distress. Appearance: She is well appearing  HENT:      Head: Normocephalic. Comments: Cranial surgical resection site clean without obvious bleeding, erythema, or discharge. Drain removed. IVC access site clean, dressed     Right Ear: External ear normal.      Left Ear: External ear normal.      Nose: Nose normal.      Mouth/Throat:      Mouth: Mucous membranes are moist.      Pharynx: Oropharynx is clear. Eyes:      Extraocular Movements: Extraocular movements intact. Pupils: Pupils are equal, round, and reactive to light. Cardiovascular:      Rate and Rhythm: Normal rate and regular rhythm. Pulses: Normal pulses. Heart sounds: Normal heart sounds. No murmur heard. No gallop. Pulmonary:      Effort: Pulmonary effort is normal. No respiratory distress. Breath sounds: Normal breath sounds. No stridor. No wheezing, rhonchi or rales. Abdominal:      General: Bowel sounds are normal. There is no distension. Palpations: Abdomen is soft. Tenderness: There is no abdominal tenderness. There is no guarding or rebound. Musculoskeletal:      Right lower leg: No edema. Left lower leg: No edema. Skin:     General: Skin is warm. Coloration: Skin is not jaundiced. Findings: No bruising. Neurological:      Mental Status: She is alert and oriented to person, place, and time. Cranial Nerves: No cranial nerve deficit. Motor: Weakness present. Comments: AAO x3; no evidence of aphasia or neglect. Patient has reduced strength 4/5 in RLE, otherwise 5/5. Responds well to questions.     Psychiatric:         Mood and Affect: Mood normal.         Behavior: Behavior normal.     Consults: Neurosurgery  Significant Diagnostic Studies: CT and MRI head, LE doppler  Treatments: Surgical resection, IVC filter placement, pain management  Disposition: ARU  Discharged Condition: Stable  Follow Up: Primary Care Physician in one week    DISCHARGE MEDICATION:     Medication List      START taking these medications    * dexamethasone 4 MG tablet  Commonly known as: DECADRON  Take 1 tablet by mouth every 8 hours for 1 day     * dexamethasone 4 MG tablet  Commonly known as: DECADRON  Take 1 tablet by mouth every 12 hours for 6 doses  Start taking on: March 18, 2022     * dexamethasone 4 MG tablet  Commonly known as: DECADRON  Take 1 tablet by mouth daily for 3 doses  Start taking on: March 22, 2022     lacosamide 200 MG tablet  Commonly known as: VIMPAT  Take 1 tablet by mouth 2 times daily for 30 days. levETIRAcetam 1000 MG tablet  Commonly known as: KEPPRA  Take 2 tablets by mouth 2 times daily     pantoprazole 40 MG tablet  Commonly known as: PROTONIX  Take 1 tablet by mouth every morning (before breakfast)  Start taking on: March 18, 2022         * This list has 3 medication(s) that are the same as other medications prescribed for you. Read the directions carefully, and ask your doctor or other care provider to review them with you.                Where to Get Your Medications      These medications were sent to [address] [phone] [facility] [phone]  [address]    Phone: [phone]   · dexamethasone 4 MG tablet  · dexamethasone 4 MG tablet  · dexamethasone 4 MG tablet  · lacosamide 200 MG tablet  · levETIRAcetam 1000 MG tablet  · pantoprazole 40 MG tablet       Activity: activity as tolerated  Diet: regular diet  Wound Care: as directed    Time Spent on discharge is more than 30 minutes    Signed:  Baljit Pena MD  3/17/2022

## 2022-03-15 NOTE — PROGRESS NOTES
NEUROSURGERY PROGRESS NOTE    3/15/2022 3:53 PM                               Nadja Ramírez                      LOS: 11 days   POD#3  s/p Procedure(s) (LRB):  LEFT TEMPORAL PARIETAL OCCIPITAL CRANIOTOMY FOR TUMOR RESECTION (Left)    Subjective:  No acute events overnight. Patient has no specific complaints this am. Sitting up in chair. Physical Exam:  Patient seen and examined    Vitals:    03/15/22 1515   BP: 105/64   Pulse: 113   Resp: 18   Temp: 98.3 °F (36.8 °C)   SpO2: 92%     GCS:  4 - Opens eyes on own  5 - Alert and oriented  6 - Follows simple motor commands  General: Well developed. Alert and cooperative in no acute distress. HENT: surgical incision left side of scalp, neck supple  Eyes: Optic discs: Not tested  Pulmonary: unlabored respiratory effort  Cardiovascular:  Warm well perfused. No peripheral edema  Gastrointestinal: abdomen soft, NT, ND    Neurological:  Mental Status: Awake, alert, oriented x 4, speech clear and appropriate  Attention: Intact  Language: Expressive aphasia present, no dysarthria   Sensation: Intact to all extremities to light touch  Coordination: Intact    Cranial Nerves:  II: Visual acuity not tested, denies new visual changes / diplopia, RLQ field cut present  III, IV, VI: PERRL, 3 mm bilaterally, EOMI, no nystagmus noted  V: Facial sensation intact bilaterally to touch  VII: Face symmetric  VIII: Hearing intact bilaterally to spoken voice  IX: Palate movement equal bilaterally  XI: Shoulder shrug equal bilaterally  XII: Tongue midline    Musculoskeletal:   Gait: Not tested   Assist devices: None   Tone: Normal  Motor strength:    Right  Left    Right  Left    Deltoid  4 5  Hip Flex  4 5   Biceps  4 5  Knee Extensors  4 5   Triceps  4 5  Knee Flexors  4 5   Wrist Ext  4 5  Ankle Dorsiflex. 4 5   Wrist Flex  4 5  Ankle Plantarflex.   4 5   Handgrip  4 5  Ext Jake Longus  4 5   Thumb Ext  4 5         Incision: CDI      Radiological Findings:  CT Head wo contrast  Result Date: 3/12/2022  Resection cavity is at the locations of the previous masses with persistent adjacent vasogenic edema and persistent midline shift. No progressive mass effect or acute surgical complication otherwise identified.     MRI brain with and without contrast  Result Date: 3/14/2022  Postoperative changes of left-sided craniotomy with interval resection of left temporal and occipital lobe masses. Small nodular areas of enhancement along the margins of the surgical cavity as detailed above are indeterminate. Stable extensive T2 hyperintense/vasogenic edema in the left parietotemporal occipital region surrounding the resection cavities. Mass effect with effacement of left lateral ventricle and 9 mm midline shift to right. Recommended continued follow-up. Labs:  Recent Labs     03/13/22 0513   WBC 18.5*   HGB 11.4*   HCT 34.7*          Recent Labs     03/13/22 0513      K 4.6      CO2 24   BUN 19   CREATININE 0.7   GLUCOSE 139*   CALCIUM 8.8       No results for input(s): PROTIME, INR, APTT in the last 72 hours. Patient Active Problem List    Diagnosis Date Noted    Status epilepticus ([facility] [address] 03/09/2022    Duct cell carcinoma ([facility] [address] 03/09/2022    Cerebral edema ([facility] [address] [facility] 03/09/2022    Brain metastases ([facility] [address] [facility] 03/04/2022    Brain mass 03/04/2022    Thyroid nodule 06/23/2015       Assessment:  Patient is a 48 y.o. female s/p Procedure(s) (LRB):  LEFT TEMPORAL PARIETAL OCCIPITAL CRANIOTOMY FOR TUMOR RESECTION 2/2 brain mets. Patient educated from [employer] Neurosurgery\" book pp. 5-7, 25-27, 33-38    Plan:  1. Neurologically stable  2. Neurologic exams frequency: Q4H  3. For change in exam MUST contact neurosurgery team along with critical care or primary team  4.  Brain mets:  - s/p left temporoparietal occipital c/r of brain mets  - CT Head shows expected post-op changes  - MRI Brain shows expected post-op changes  - Antibiotics: Cefazolin x 6 doses post op  - AED's managed by Neurology, appreciate recs  - PRN Tylenol and Roxicodone for pain control  - Oncology consulted, appreciate recs for primary tumor  5. Cerebral edema:  - Keep Na within normal limits  - Decadron 4 mg taper; PPI & SSI while on steroids  - Keep HOB >30 degrees  - If central venous access is needed please use subclavian vs femoral - No IJ as this can decrease venous return and worsen cerebral edema  6. Bowel Regimen: Glycolax and Senokot-S  7. Mobility:  - Advance as tolerates  - PT/OT consulted, appreciate recs  8. Diet: Advance as tolerates  9. DVT Prophylaxis: SCD's & SQ Heparin in AM  10. Will follow inpatient.  Please call with any questions or decline in neurological status    DISPO: Remain inpatient until tolerating PO, pain controlled with PO meds and evaluated by PT/OT from neurosurgery standpoint. Dispo timing to be determined by primary team once patient is medically stable for discharge. Patient was seen and examined with Dr. Christian Lennon who agrees with above assessment and plan. Electronically signed by: JUDITH Hurley - CNP, JUDITH-CNP, 3/15/2022 3:53 PM  [phone]    __________________________________________________________________    I saw and examined Mili Liriano on 03/15/22. I agree with the assessment and plan as detailed above.      Pranav Rodriguez MD, PhD  [address] [place] [address], Suite [facility] [address], [address] [phone] (c), [phone] (o)

## 2022-03-15 NOTE — PROGRESS NOTES
Physical Therapy  Facility/Department: [facility] 5T ORTHO/NEURO  Daily Treatment Note  NAME: Sandra Cunningham  : 1975  MRN: [id number]    Date of Service: 3/15/2022    Discharge Recommendations: Sandra Cunningham scored a 14/24 on the AM-PAC short mobility form. Current research shows that an AM-PAC score of 17 or less is typically not associated with a discharge to the patient's home setting. Based on the patient's AM-PAC score and their current functional mobility deficits, it is recommended that the patient have 5-7 sessions per week of Physical Therapy at d/c to increase the patient's independence.  At this time, this patient demonstrates the endurance, and/or tolerance for 3 hours of therapy each day, with a treatment frequency of 5-7x/wk. Please see assessment section for further patient specific details. PT Equipment Recommendations  Equipment Needed:  (defer)    Assessment   Assessment: Pt tolerated session well but limited by dizziness (non BP related). Presented with some short term memory issues and required regular navigational cues. Demonstrated fair trunk and LE control during bed mobility requiring extra time to perform. Transfers and gait training were mildly unsteady requiring sequencing cues throughout; no LOB noted. Treatment Diagnosis: impaired mobility associated with brain metastases  Prognosis: Guarded  Decision Making: Medium Complexity  PT Education: PT Role;Functional Mobility Training;Plan of Care;General Safety;Goals  Patient Education: pt will require reinforcement  Barriers to Learning: lethargy  No Skilled PT: Independent with functional mobility   REQUIRES PT FOLLOW UP: Yes     Patient Diagnosis(es): The encounter diagnosis was Brain mass. has a past medical history of Thyroid nodule. has a past surgical history that includes craniotomy (Left, 3/12/2022).     Restrictions  Position Activity Restriction  Other position/activity restrictions: up with assistance  Subjective   General  Chart Reviewed: Yes  Additional Pertinent Hx: Patient is a 47 y/o female admitted 3/3 with altered mental status. CT chest and abdomen (+) for Large, necrotic right breast mass. Right axillary lymphadenopathy. CT head (+) for Two large hemorrhagic partially necrotic heterogeneously enhancing mass in the left parietotemporal and left parieto-occipital region with surrounding vasogenic edema and mass effect. Neurosurgery recommending resection. Taken to OR on 3/8 but pt having status epilepticus and case had to be cancelled. Taken to OR on 3/12 for crani to resect brain masses. PMH significant for thyroid nodule and breast cancer. Subjective  Subjective: Pt was in bed willing to participate; c/o dizziness when up but not orthostatic. Vital Signs  BP: 120/80  Orientation  Orientation  Overall Orientation Status: Within Functional Limits  Objective   Bed mobility  Supine to Sit: Stand by assistance  Transfers  Sit to Stand: Stand by assistance  Stand to sit: Stand by assistance  Ambulation  Ambulation?: Yes  Ambulation 1  Device: Rolling Walker  Assistance: Contact guard assistance  Quality of Gait: slow unsteady reciprocal pattern with decreased stride length; stagger; cues for walker positioning,  Distance: 10' +25ft (distance limited by c/o dizziness).      Balance  Sitting - Static: Good  Standing - Static: Good  Standing - Dynamic: Fair;-  Exercises  Comments: static standing for approx 4 mins to assess orthostatic hypotension     AM-PAC Score  AM-PAC Inpatient Mobility Raw Score : 14 (03/15/22 1137)  AM-PAC Inpatient T-Scale Score : 38.1 (03/15/22 1137)  Mobility Inpatient CMS 0-100% Score: 61.29 (03/15/22 1137)  Mobility Inpatient CMS G-Code Modifier : CL (03/15/22 1137)    Goals  Short term goals  Time Frame for Short term goals: By discharge  Short term goal 1: The pt will perform supine to sit with SBA  Short term goal 2: The pt will sit EOB x 5 min with SBA  Short term goal 3: The pt will perform sit to stand with CGA.   Short term goal 4: The pt will be able to ambulate with min A and LRAD x 15'  Patient Goals   Patient goals : to go home    Plan    Plan  Times per week: 5-7  Times per day: Daily  Current Treatment Recommendations: Liliam Nelson Re-education,Cognitive Reorientation,Patient/Caregiver Education & Training,Balance Training,Gait Training,Functional Mobility Training,Stair training,Safety Education & Training,Home Exercise Program  Safety Devices  Type of devices: Call light within reach,Nurse notified,Gait belt,Left in bed,Chair alarm in place,Left in chair     Therapy Time   Individual Concurrent Group Co-treatment   Time In       1055   Time Out       1120   Minutes       25   Timed Code Treatment Minutes: [address], PTA

## 2022-03-15 NOTE — PROGRESS NOTES
NEUROLOGY / NEUROCRITICAL CARE PROGRESS NOTE       Patient Name: Pooja Esposito YOB: 1975   Sex: Female Age: 48 yrs     CC / Reason for Consult: Metastatic brain mass w/ seizures prior to resection    Interval Hx / Changes over last 24 hours:   Very conversant today, worked with therapy, states she feels like her R sided weakness has improved though she feels its not quite back to baseline      ROS: + R sided weakness. No sensory changes. No vision changes    HISTORY   Admission HPI:   Pooja Esposito is a 48 y.o. y/o female with PMH significant for invasive ductal cell carcinoma. Patient intubated, hx obtained per chart review. She presented to [facility] on 3/3/22 with complaints of memory deficits and AMS over the last week. On 2/24, she was sitting at the table with her mother and reported an abrupt onset of severe posterior headache as if someone hit her in the back of the head. She went to bed that night and when she woke up in the morning she realized she was having difficulty reading, writing, speaking, and recalling recent events. Her symptoms progressed throughout the week, which prompted her to come to the hospital. She did not report any focal weakness, difficulty walking, vision changes, or falls. CT head completed in the ER demonstrated abnormal edema in the L posterior cerebral hemisphere concerning for underlying metastases vs primary glioma and  1.4 cm left to R midline shift with cisternal effacement and downward transtentorial herniation and findings suggestive of hydrocephalus. She was given Keppra and decadron and transferred to [facility]. for further management. CT of chest, abdomen, and pelvis revealed a larger, necrotic R breast mass. She had been diagnosed with breast cancer in March of 2021 but had elected not to pursue treatment.       She was in the OR yesterday for resection of the mass, but prior to any incision she was noted to have convulsive Oral, 3 times per day **FOLLOWED BY** [START ON 3/18/2022] dexamethasone, 4 mg, Oral, 2 times per day **FOLLOWED BY** [START ON 3/22/2022] dexamethasone, 4 mg, Oral, Daily    heparin (porcine), 5,000 Units, SubCUTAneous, Q8H    levetiracetam, 2,000 mg, IntraVENous, Q12H    lacosamide (VIMPAT) IVPB, 200 mg, IntraVENous, Q12H    pantoprazole, 40 mg, Oral, QAM AC   Infusions    sodium chloride      dextrose        Antibiotics   Recent Abx Admin                   ceFAZolin (ANCEF) 2000 mg in dextrose 5 % 50 mL IVPB (mg) 2,000 mg New Bag 03/14/22 1211                  DIAGNOSTICS   IMAGES:  Images personally reviewed and agree w/ radiology interpretation. Prior Imaging:  MRI brain w/wo jairo 3/13/22  Postoperative changes of left-sided craniotomy with interval resection of left temporal and occipital lobe masses. Small nodular areas of enhancement along the margins of the surgical cavity as detailed above are indeterminate. Stable extensive T2    hyperintense/vasogenic edema in the left parietotemporal occipital region surrounding the resection cavities. Mass effect with effacement of left lateral ventricle and 9 mm midline shift to right. Recommended continued follow-up. PHYSICAL EXAMINATION     PHYSICAL EXAM:  Vitals:    03/15/22 0006 03/15/22 0330 03/15/22 0621 03/15/22 0700   BP: 112/81 106/69 111/76    Pulse: 112 110 105    Resp: 16 16 16    Temp: 99.9 °F (37.7 °C) 98.9 °F (37.2 °C) 98.7 °F (37.1 °C)    TempSrc: Oral Oral Oral    SpO2: 94% 92% 92%    Weight:    186 lb 4.6 oz (84.5 kg)   Height:             General: Alert, no distress, well-nourished  Neurologic  Mental status:   orientation to person, place, time, situation   Attention intact as able to attend well to the exam     Language says \"I can talk! \" much more conversant today   Comprehension intact; follows simple commands but difficulty with two step commands    Cranial nerves:   CN2: Blinks to visual threat bilaterally  CN 3,4,6: Pupils equal and reactive to light, extraocular muscles intact  CN5: Facial sensation symmetric   CN7: Face symmetric at rest and activation  CN8: Hearing symmetric to spoken voice  CN9: Palate elevated symmetrically  CN11: Traps full strength on shoulder shrug  CN12: Tongue midline with protrusion    Motor Exam:   R  L    Deltoid +4  5   Biceps 5 5   Triceps 5 5   Wrist extension  +4 5   Interossei +4 5      R  L    Hip flexion  +4  5   Hip extension  +4 5   Knee flexion  5 5   Knee extension  5 5   Ankle dorsiflexion  +4 5   Ankle plantar flexion  +4 5       Sensory: light touch intact and symmetric in all 4 extremities. No sensory extinction on bilateral simultaneous stimulation  Cerebellar/coordination: difficulty with finger to nose on both sides but slightly better on the L  Tone: normal in all 4 extremities  Gait: held for patient safety      OTHER SYSTEMS:  Cardiovascular: Warm, appears well perfused  Respiratory: Easy, non-labored respiratory pattern   Abdominal: Abdomen is without distention   Extremities: Upper and lower extremities are atraumatic in appearance without deformity. No swelling or erythema. ASSESSMENT & RECOMMENDATIONS   Assessment:  Atlas Epley is a 48 y.o. y/o female with metastatic invasive ductal cell carcinoma who presented after having increasing confusion for a week at home and was found to have intracranial metastases. Neurology following for seizure in the OR which led to aborting procedure, seizures controlled with Eleanora Fent / Lacinda Yousif. She was taken back to OR 3/12 for resection. Neurologically stable today. Plan:  - Continue Keppra 2000 mg BID and Vimpat 200 mg BID  - Neurosurgery following - decadron per neurosurgery  - Q4h neuro checks   - Keep HOB > 30 degress  - Seizure precautions  - Discussed safety data for spells Driving Risk: Having a spell while driving puts that patient at risk for injuring themselves, or others.  If a patient drives while having uncontrolled spells, they may be held liable for injuries to others. Do not drive until they have been spell free for at least 3 months. Injury Risk: Please avoid working from [employer], swimming alone or taking baths in a bathtub, use showers only. - Will need to follow up with Neurology as an outpatient for AED management  - Call Neurology with any exam changes, will follow peripherally while inpatient    JUDITH Magana - CNP   Neurology & Neurocritical Care   Neurology Line: [phone]  PerfectServe: [facility] Neurology & Neuro Critical Care NPs  3/15/2022 10:57 AM    I spent 25 minutes in the care of this patient. Over 50% of that time was in face-to-face counseling regarding disease process, diagnostic testing, preventative measures, and answering patient and family questions.

## 2022-03-15 NOTE — PROGRESS NOTES
Progress Note    Admit Date: 3/4/2022  Diet: ADULT DIET; Dysphagia - Soft and Bite Sized    CC: AMS, headache    Interval history:  NAEO  Seen and assessed at bedside  Patient transferred from ICU to  yesterday afternoon  Patient had some urinary retention with bladder scan showing 400 ml/  Some expressive aphasia, but largely able to answer questions. Noted later to be voiding adequately with Purewick  Post surgical drain removed yesterday  Pt still complains of R sided weakness but was able to ambulate to the bathroom, though did experience dizziness that improved when lying back down  Pt denies fevers, chills, SOB, diarrhea. She has not had a BM in several days but has also had decreased PO intake. She reports her headache is improved and well-controlled      HPI:  Madeline Portillo is a 49 y/o F w/ PMH of Stage IIB invasive ductal carcinoma (ER-, NJ-, HER2 -) diagnosed 3/30 who presented to [facility] w/ one week of altered mental status. At presentation at [facility] patient was having trouble giving hx and presented w/ mother who helped providing hx. About one week ago, the patient was reading and had sudden occipital headache which which resolved. Subsequently, patient started having difficulty reading, writing, and with her memory. These symptoms persisted which caused the patient to present to ED. Of note the patient found a breast mass on self exam in 2021. Biopsy on 3/23 found Stage IIB invasive ductal carcinoma (Grade 3, ER-, NJ-,HER2-). Patient was seen on  at [facility] [address] by Dr. Dang Solano and denied that mass was cancer despite positive biopsy. She denied systemic therapy at that time. The patient's sister had recently  of breast cancer after treatment w/ chemotherapy. On presentation to [facility] patient was hemodynamically stable.  CT head showed abnormal edema in left posterior cerebral hemisphere possibly related to underlying metastases versus less likely primary glioma w/ 1.4 cm of left to right midline shift w/ cisternal effacement and downward transtentorial herniation and findings of developing hydrocephalus. CTA negative for large vessel occlusion or hemodynamic stenosis. Patient denies chest pain, SOB, abdominal pain, fevers, chills, nausea, and vomiting. Patient reported bloody discharge from breast tumor and difficulty w/ memory and reading. Patient was transferred to [facility] for further workup and care. Patient was transferred w/ intention to seek further treatment.     Medications:     Scheduled Meds:   insulin lispro  0-12 Units SubCUTAneous TID WC    insulin lispro  0-6 Units SubCUTAneous Nightly    sodium chloride flush  10 mL IntraVENous 2 times per day    polyethylene glycol  17 g Oral Daily    sennosides-docusate sodium  1 tablet Oral BID    dexamethasone  4 mg Oral 4 times per day    Followed by    dexamethasone  4 mg Oral 3 times per day    Followed by   Yuinocenciae Friend ON 3/18/2022] dexamethasone  4 mg Oral 2 times per day    Followed by   Uzma Friend ON 3/22/2022] dexamethasone  4 mg Oral Daily    heparin (porcine)  5,000 Units SubCUTAneous Q8H    levetiracetam  2,000 mg IntraVENous Q12H    lacosamide (VIMPAT) IVPB  200 mg IntraVENous Q12H    pantoprazole  40 mg Oral QAM AC     Continuous Infusions:   sodium chloride      dextrose       PRN Meds:phenol, sodium chloride flush, sodium chloride, acetaminophen, oxyCODONE **OR** oxyCODONE, naloxone, promethazine **OR** ondansetron, aluminum & magnesium hydroxide-simethicone, bisacodyl, glucose, dextrose bolus (hypoglycemia), glucagon (rDNA), dextrose    Objective:   Vitals:   T-max:  Patient Vitals for the past 8 hrs:   BP Temp Temp src Pulse Resp SpO2   03/15/22 0621 111/76 98.7 °F (37.1 °C) Oral 105 16 92 %   03/15/22 0330 106/69 98.9 °F (37.2 °C) Oral 110 16 92 %   03/15/22 0006 112/81 99.9 °F (37.7 °C) Oral 112 16 94 %       Intake/Output Summary (Last 24 hours) at 3/15/2022 0640  Last data filed at 3/15/2022 0014  Gross per 24 hour   Intake 1011 ml   Output 525 ml   Net 486 ml       Review of Systems  A full 10 point ROS was performed with pertinent + and - listed above    Physical Exam  Constitutional:       General: She is not in acute distress. Appearance: She is ill-appearing. HENT:      Head: Normocephalic. Comments: Cranial surgical resection site clean without obvious bleeding, erythema, or discharge. Drain removed     Right Ear: External ear normal.      Left Ear: External ear normal.      Nose: Nose normal.      Mouth/Throat:      Mouth: Mucous membranes are moist.      Pharynx: Oropharynx is clear. Eyes:      Extraocular Movements: Extraocular movements intact. Pupils: Pupils are equal, round, and reactive to light. Cardiovascular:      Rate and Rhythm: Normal rate and regular rhythm. Pulses: Normal pulses. Heart sounds: Normal heart sounds. No murmur heard. No gallop. Pulmonary:      Effort: Pulmonary effort is normal. No respiratory distress. Breath sounds: Normal breath sounds. No stridor. No wheezing, rhonchi or rales. Abdominal:      General: Bowel sounds are normal. There is no distension. Palpations: Abdomen is soft. Tenderness: There is no abdominal tenderness. There is no guarding or rebound. Musculoskeletal:      Right lower leg: No edema. Left lower leg: No edema. Skin:     General: Skin is warm. Coloration: Skin is not jaundiced. Findings: No bruising. Neurological:      Mental Status: She is alert and oriented to person, place, and time. Cranial Nerves: No cranial nerve deficit. Motor: Weakness present. Comments: AAO x3; no evidence of aphasia or neglect. Patient has reduced strength 4/5 in RUE and RLE. Responds well to questions.     Psychiatric:         Mood and Affect: Mood normal.         Behavior: Behavior normal.         LABS:    CBC:   Recent Labs     03/13/22  0513   WBC 18.5*   HGB 11.4*   HCT 34.7*      MCV 82.6     Renal:    Recent Labs     03/13/22  0513      K 4.6      CO2 24   BUN 19   CREATININE 0.7   GLUCOSE 139*   CALCIUM 8.8   ANIONGAP 8     Hepatic: No results for input(s): AST, ALT, BILITOT, BILIDIR, PROT, LABALBU, ALKPHOS in the last 72 hours. Troponin: No results for input(s): TROPONINI in the last 72 hours. BNP: No results for input(s): BNP in the last 72 hours. Lipids: No results for input(s): CHOL, HDL in the last 72 hours. Invalid input(s): LDLCALCU, TRIGLYCERIDE  ABGs:  No results for input(s): PHART, MEV5MAG, PO2ART, RJE0QLE, BEART, THGBART, P3YBCJJO, CIS5SRE in the last 72 hours. INR: No results for input(s): INR in the last 72 hours. Lactate: No results for input(s): LACTATE in the last 72 hours. Cultures:  -----------------------------------------------------------------  RAD:   MRI brain with and without contrast   Final Result      Postoperative changes of left-sided craniotomy with interval resection of left temporal and occipital lobe masses. Small nodular areas of enhancement along the margins of the surgical cavity as detailed above are indeterminate. Stable extensive T2    hyperintense/vasogenic edema in the left parietotemporal occipital region surrounding the resection cavities. Mass effect with effacement of left lateral ventricle and 9 mm midline shift to right. Recommended continued follow-up. CT Head wo Contrast   Final Result   1. Resection cavity is at the locations of the previous masses with persistent adjacent vasogenic edema and persistent midline shift. No progressive mass effect or acute surgical complication otherwise identified. VL Extremity Venous Bilateral   Final Result      CT HEAD WO CONTRAST   Final Result      1.  2 large hemorrhagic partially necrotic masses in the left parieto-temporal and left parieto-occipital regions with marked surrounding edema and mass effect.   These demonstrate little change when compared to previous MRI study of 3/5/2022. Associated    prominent mass effect and surrounding edema resulting 11 mm midline shift to the right. MRI BRAIN W WO CONTRAST   Final Result      Two large hemorrhagic partially necrotic heterogeneously enhancing mass in the left parietotemporal and left parieto-occipital region with surrounding vasogenic edema and mass effect. These are indeterminate, most likely relate to multicentric    glioblastoma. However, possibility of metastasis cannot be excluded given the history of breast cancer. 10 mm midline shift to right. CT CHEST ABDOMEN PELVIS W CONTRAST   Final Result      Large, necrotic right breast mass. Right axillary lymphadenopathy. Uterine fibroids. High density urine in the bladder. Assessment/Plan:     Metastatic triple negative breast cancer, now with Intracranial metastasis, Previously diagnosed in March 2021 and treatment declined by the patient. Oncology following, patient decided on treatment. - Will need post-op radiation (2-4 weeks post-op) followed by systemic chemotherapy.     Intracranial vasogenic edema secondary to metastatic masses ; Symptomatic with short-term memory impairment. Continue decadron  S/p OPERATIVE PROCEDURES PERFORMED:  1.  Stereotactic left temporal craniotomy for resection of tumor  2.  Stereotactic left parieto-occipital craniotomy for resection of tumor  3.  Intraoperative neuromonitoring (MEP, SSEP, EEG)  4.  Intraoperative Brainlab neuro navigation.  - Continue decadron taper  - Dispo per neurosurgery  - Phenol spray for mouth soreness  - Patient wishes to go to ARU, PM&R consulted  - PT/OT will reassess  - Has miralax and senna scheduled for post-op constipation  - Pt may require IVF if no able to take good PO  - Neurochecks q4h     Status epilepticus; Continue cEEG, Continue AEDs; being managed by neurology. Likely 2/2 brain mets.   - continue keppra, vimpat     Acute resp failure due to Status epilepticus: unable to protect airway and was intubated, now on RA sating well. GI Px: protonix  DVT Prophylaxis: heparin sq  Diet: ADULT DIET;  Dysphagia - Soft and Bite Sized   Code Status: Full Code    Desmond Davenport MD, PGY-1  03/15/22  6:40 AM    This patient has been staffed and discussed with Dr. Marie Corbin

## 2022-03-16 ENCOUNTER — APPOINTMENT (OUTPATIENT)
Dept: INTERVENTIONAL RADIOLOGY/VASCULAR | Age: 47
DRG: 021 | End: 2022-03-16
Attending: RADIOLOGY
Payer: MEDICAID

## 2022-03-16 LAB
ALBUMIN SERPL-MCNC: 3.4 G/DL (ref 3.4–5)
ANION GAP SERPL CALCULATED.3IONS-SCNC: 10 MMOL/L (ref 3–16)
BUN BLDV-MCNC: 17 MG/DL (ref 7–20)
CALCIUM SERPL-MCNC: 9.1 MG/DL (ref 8.3–10.6)
CHLORIDE BLD-SCNC: 101 MMOL/L (ref 99–110)
CO2: 26 MMOL/L (ref 21–32)
CREAT SERPL-MCNC: 0.5 MG/DL (ref 0.6–1.1)
GFR AFRICAN AMERICAN: >60
GFR NON-AFRICAN AMERICAN: >60
GLUCOSE BLD-MCNC: 115 MG/DL (ref 70–99)
GLUCOSE BLD-MCNC: 127 MG/DL (ref 70–99)
GLUCOSE BLD-MCNC: 88 MG/DL (ref 70–99)
GLUCOSE BLD-MCNC: 92 MG/DL (ref 70–99)
GLUCOSE BLD-MCNC: 95 MG/DL (ref 70–99)
HCT VFR BLD CALC: 31.8 % (ref 36–48)
HEMOGLOBIN: 10.4 G/DL (ref 12–16)
MCH RBC QN AUTO: 27 PG (ref 26–34)
MCHC RBC AUTO-ENTMCNC: 32.7 G/DL (ref 31–36)
MCV RBC AUTO: 82.7 FL (ref 80–100)
PDW BLD-RTO: 15 % (ref 12.4–15.4)
PERFORMED ON: ABNORMAL
PERFORMED ON: NORMAL
PHOSPHORUS: 3.2 MG/DL (ref 2.5–4.9)
PLATELET # BLD: 314 K/UL (ref 135–450)
PMV BLD AUTO: 7.9 FL (ref 5–10.5)
POTASSIUM SERPL-SCNC: 4.2 MMOL/L (ref 3.5–5.1)
RBC # BLD: 3.85 M/UL (ref 4–5.2)
SODIUM BLD-SCNC: 137 MMOL/L (ref 136–145)
WBC # BLD: 17.2 K/UL (ref 4–11)

## 2022-03-16 PROCEDURE — 94761 N-INVAS EAR/PLS OXIMETRY MLT: CPT

## 2022-03-16 PROCEDURE — 2709999900 HC NON-CHARGEABLE SUPPLY

## 2022-03-16 PROCEDURE — 85027 COMPLETE CBC AUTOMATED: CPT

## 2022-03-16 PROCEDURE — 2500000003 HC RX 250 WO HCPCS

## 2022-03-16 PROCEDURE — 6360000002 HC RX W HCPCS: Performed by: STUDENT IN AN ORGANIZED HEALTH CARE EDUCATION/TRAINING PROGRAM

## 2022-03-16 PROCEDURE — 6370000000 HC RX 637 (ALT 250 FOR IP): Performed by: STUDENT IN AN ORGANIZED HEALTH CARE EDUCATION/TRAINING PROGRAM

## 2022-03-16 PROCEDURE — 2580000003 HC RX 258

## 2022-03-16 PROCEDURE — 37191 INS ENDOVAS VENA CAVA FILTR: CPT

## 2022-03-16 PROCEDURE — 06H03DZ INSERTION OF INTRALUMINAL DEVICE INTO INFERIOR VENA CAVA, PERCUTANEOUS APPROACH: ICD-10-PCS | Performed by: RADIOLOGY

## 2022-03-16 PROCEDURE — 6360000002 HC RX W HCPCS

## 2022-03-16 PROCEDURE — C1769 GUIDE WIRE: HCPCS

## 2022-03-16 PROCEDURE — C1894 INTRO/SHEATH, NON-LASER: HCPCS

## 2022-03-16 PROCEDURE — 6360000002 HC RX W HCPCS: Performed by: NURSE PRACTITIONER

## 2022-03-16 PROCEDURE — 92526 ORAL FUNCTION THERAPY: CPT

## 2022-03-16 PROCEDURE — 6360000004 HC RX CONTRAST MEDICATION: Performed by: RADIOLOGY

## 2022-03-16 PROCEDURE — 36415 COLL VENOUS BLD VENIPUNCTURE: CPT

## 2022-03-16 PROCEDURE — 92507 TX SP LANG VOICE COMM INDIV: CPT

## 2022-03-16 PROCEDURE — 2060000000 HC ICU INTERMEDIATE R&B

## 2022-03-16 PROCEDURE — 2580000003 HC RX 258: Performed by: STUDENT IN AN ORGANIZED HEALTH CARE EDUCATION/TRAINING PROGRAM

## 2022-03-16 PROCEDURE — 6370000000 HC RX 637 (ALT 250 FOR IP): Performed by: NURSE PRACTITIONER

## 2022-03-16 PROCEDURE — 80069 RENAL FUNCTION PANEL: CPT

## 2022-03-16 PROCEDURE — 99231 SBSQ HOSP IP/OBS SF/LOW 25: CPT | Performed by: PHYSICAL MEDICINE & REHABILITATION

## 2022-03-16 PROCEDURE — C1880 VENA CAVA FILTER: HCPCS

## 2022-03-16 PROCEDURE — 99152 MOD SED SAME PHYS/QHP 5/>YRS: CPT

## 2022-03-16 RX ORDER — HEPARIN SODIUM 5000 [USP'U]/ML
5000 INJECTION, SOLUTION INTRAVENOUS; SUBCUTANEOUS EVERY 8 HOURS SCHEDULED
Status: DISCONTINUED | OUTPATIENT
Start: 2022-03-16 | End: 2022-03-17 | Stop reason: HOSPADM

## 2022-03-16 RX ORDER — POLYETHYLENE GLYCOL 3350 17 G/17G
17 POWDER, FOR SOLUTION ORAL 2 TIMES DAILY
Status: DISCONTINUED | OUTPATIENT
Start: 2022-03-16 | End: 2022-03-17 | Stop reason: HOSPADM

## 2022-03-16 RX ADMIN — SODIUM CHLORIDE, PRESERVATIVE FREE 10 ML: 5 INJECTION INTRAVENOUS at 09:12

## 2022-03-16 RX ADMIN — DEXAMETHASONE 4 MG: 4 TABLET ORAL at 21:45

## 2022-03-16 RX ADMIN — POLYETHYLENE GLYCOL 3350 17 G: 17 POWDER, FOR SOLUTION ORAL at 21:42

## 2022-03-16 RX ADMIN — LACOSAMIDE 200 MG: 50 TABLET, FILM COATED ORAL at 09:22

## 2022-03-16 RX ADMIN — LEVETIRACETAM 2000 MG: 500 TABLET, FILM COATED ORAL at 09:12

## 2022-03-16 RX ADMIN — DEXAMETHASONE 4 MG: 4 TABLET ORAL at 06:21

## 2022-03-16 RX ADMIN — LEVETIRACETAM 2000 MG: 500 TABLET, FILM COATED ORAL at 21:40

## 2022-03-16 RX ADMIN — SENNOSIDES AND DOCUSATE SODIUM 1 TABLET: 50; 8.6 TABLET ORAL at 21:41

## 2022-03-16 RX ADMIN — HEPARIN SODIUM 5000 UNITS: 5000 INJECTION INTRAVENOUS; SUBCUTANEOUS at 21:40

## 2022-03-16 RX ADMIN — LACOSAMIDE 200 MG: 50 TABLET, FILM COATED ORAL at 21:39

## 2022-03-16 RX ADMIN — POLYETHYLENE GLYCOL 3350 17 G: 17 POWDER, FOR SOLUTION ORAL at 09:12

## 2022-03-16 RX ADMIN — DEXAMETHASONE 4 MG: 4 TABLET ORAL at 14:37

## 2022-03-16 RX ADMIN — SENNOSIDES AND DOCUSATE SODIUM 1 TABLET: 50; 8.6 TABLET ORAL at 09:12

## 2022-03-16 RX ADMIN — IOHEXOL 100 ML: 350 INJECTION, SOLUTION INTRAVENOUS at 08:24

## 2022-03-16 RX ADMIN — HEPARIN SODIUM 5000 UNITS: 5000 INJECTION INTRAVENOUS; SUBCUTANEOUS at 14:35

## 2022-03-16 RX ADMIN — PANTOPRAZOLE SODIUM 40 MG: 40 TABLET, DELAYED RELEASE ORAL at 06:21

## 2022-03-16 ASSESSMENT — PAIN DESCRIPTION - ORIENTATION: ORIENTATION: LEFT

## 2022-03-16 ASSESSMENT — ENCOUNTER SYMPTOMS
RESPIRATORY NEGATIVE: 1
GASTROINTESTINAL NEGATIVE: 1

## 2022-03-16 ASSESSMENT — PAIN SCALES - GENERAL
PAINLEVEL_OUTOF10: 0
PAINLEVEL_OUTOF10: 3
PAINLEVEL_OUTOF10: 0
PAINLEVEL_OUTOF10: 3

## 2022-03-16 ASSESSMENT — PAIN DESCRIPTION - LOCATION
LOCATION: ABDOMEN
LOCATION: HEAD

## 2022-03-16 ASSESSMENT — PAIN DESCRIPTION - PAIN TYPE: TYPE: ACUTE PAIN

## 2022-03-16 NOTE — PROGRESS NOTES
Pt is A&O, VSS, denies any pain. Incision site CDI. Pt self voided. All fall precaution is in place. Call light is within the reach.

## 2022-03-16 NOTE — PROGRESS NOTES
Pt A&O x4, VSS. No acute changes in neuro status thus far. Surgical incision CDI. Voiding adequately this shift. Tolerating diet and fluids with no issues. Fall precautions in place. Pt in bed with call light in reach.

## 2022-03-16 NOTE — PLAN OF CARE
Problem: Falls - Risk of:  Goal: Will remain free from falls  Description: Will remain free from falls  3/15/2022 2342 by Jovita Salomon RN  Outcome: Ongoing  Note: Bed in lowest position. Bed alarm and gripper socks on. Gait belt used for all transfers. Call light in reach of pt. Problem: Pain:  Goal: Control of chronic pain  Description: Control of chronic pain  3/15/2022 2342 by Jovita Salomon RN  Outcome: Ongoing  Note: Assess pt's pain using 0-10 scale throughout shift. Administer pain medications per MAR. Offer non-pharmacological interventions to help control pt's pain.

## 2022-03-16 NOTE — PROGRESS NOTES
Speech Language Pathology  Facility/Department: [facility] 5T ORTHO/NEURO  Daily Treatment Note  NAME: Merly Harris  : 1975  MRN: [id number]    Date of Eval: 3/16/2022  Evaluating Therapist: Bhavya Garcia, SLP    Patient Diagnosis(es): has Thyroid nodule; Brain metastases ([address] Brain mass; Status epilepticus ([address] Duct cell carcinoma ([address] and Cerebral edema ([address] on their problem list.  Onset Date: 3/14/2022    MRI brain 3/5/22  Two large hemorrhagic partially necrotic heterogeneously enhancing mass in the left parietotemporal and left parieto-occipital region with surrounding vasogenic edema and mass effect. These are indeterminate, most likely relate to multicentric    glioblastoma. However, possibility of metastasis cannot be excluded given the history of breast cancer.       10 mm midline shift to right.      Repeat MRI post op 3/13/22  Postoperative changes of left-sided craniotomy with interval resection of left temporal and occipital lobe masses. Small nodular areas of enhancement along the margins of the surgical cavity as detailed above are indeterminate. Stable extensive T2    hyperintense/vasogenic edema in the left parietotemporal occipital region surrounding the resection cavities. Mass effect with effacement of left lateral ventricle and 9 mm midline shift to right. Recommended continued follow-up.          Chart reviewed. Pain:  With jaw \"2\" when chewing. Initial Assessment: 3/14/2022  Pt exhibiting mod receptive/expressive language impairment accompanied by mild apraxia. Comprehension reduced for material of increased length and complexity. Speech characterized by paraphasias, perseveration especially in structured tasks. Pt does demonstrate error awareness, however not able to self correct. Spontaneous speech was improved with decreased paraphasias/perseveration. Pt demonstrated significant difficulty when attempting to read single words that were large and bold. Pt was not able to  pen with right dominant hand for writing tasks. Cognitive pt was oriented to this being a hospital and maintained attention to tasks presented. Will cont to assess cognition during treatment sessions, as appropriate   Diagnosis: Pt exhibiting mod receptive/expressive language impairment. Comprehension reduced for material of increased length and complexity. Speech characterized by paraphasias, perseveration especially in structured tasks. Pt does demonstrate error awareness, however not able to self correct. Medical diagnosis: Brain metastases ([facility] [address] [C79.31]  Brain mass [G93.89]  Treatment diagnosis: Expressive/receptive language impairment    Treatment:  Pt seen to address the following goals:  Goals:  1- Pt will answer 2 unit yes/no questions with 80% accuracy  3/16:  Pt answered without difficulty 2 unit yes/ no questions. Cont goal for consistency  2- Pt will follow 2 step commands with 80% accuracy  3/16: Pt followed 2 step limb and verbal commands, including before/after questions, 5/5 correct. Min-mod cues for 3 step commands. 3- Pt will complete graded naming tasks with 50% accuracy and min cues  3/16:  Pt able to name simple-moderate level pictures of objects 16/20 correct with some occasional delays noted. Cues needed for use of gesture as a strategy, to describe item in greater detail. Occasional apraxic/phonemic substitution error. Some perseverative type responses at times to questions/tasks. Pt did ask some good questions to doctor prior to session \"when can I wash my hair\". Pt able to recall doctor in room prior to session and that they discussed \"blood clot\" in leg. 4- Pt will use strategies for word recall with mod cues provided in structured tasks  3/16: Mod-max cues at times of anomia. Reviewed strategies (description, gesturing, association) with pt and family present.    5- Pt will read single words with 50% accuracy  3/16: Pt did not have glasses for reading. Held paper up close. Able to read most of 2 sentences with mild errors (either due vision/language deficit). Cont goal  Education: Pt and godmother educated to role of speech therapy, reviewed strategies for word recall (description, gesturing, association). Pt would benefit from reinforcement. Plan:  Continue speech/language therapy to address above goals, 3-5 x/week x LOS  DC recommendations: F/U speech therapy recommended at this time    D/W nursing, [place] and [place]  Needs met prior to leaving room, call button in reach.   Treatment time:15    Courtney Kelly MA CCC/SLP [id number]  Speech Language Pathologist  Pager [phone]  If patient is discharged prior to next treatment, this note will serve as the discharge summary

## 2022-03-16 NOTE — CARE COORDINATION
SW spoke to Kaylin Evangelista, Pt is able to come whenever she is medically ready, no pre-cert needed. SW spoke to Pt's mom, they are wanting the al locations for tom. SW following for DCP.     Marco Grimm, ISAIAH, LSW  Social Work/Case Management   ICU/PCU - [facility]   Ph: [phone]

## 2022-03-16 NOTE — PROGRESS NOTES
Speech Language Pathology  Facility/Department: [facility]  Dysphagia Daily Treatment Note      NAME: Wen Moreno  : 1975  MRN: [id number]    Patient Diagnosis(es):   Patient Active Problem List    Diagnosis Date Noted    Status epilepticus ([facility] 2022    Duct cell carcinoma ([facility] 2022    Cerebral edema ([facility] 2022    [facility]    Brain mass 2022    Thyroid nodule 2015     Allergies: No Known Allergies    Recent Chest Xray not completed     MRI brain 3/5/22  Two large hemorrhagic partially necrotic heterogeneously enhancing mass in the left parietotemporal and left parieto-occipital region with surrounding vasogenic edema and mass effect. These are indeterminate, most likely relate to multicentric    glioblastoma. However, possibility of metastasis cannot be excluded given the history of breast cancer.       10 mm midline shift to right. repeat MRI post op 3/13/22  Postoperative changes of left-sided craniotomy with interval resection of left temporal and occipital lobe masses. Small nodular areas of enhancement along the margins of the surgical cavity as detailed above are indeterminate. Stable extensive T2    hyperintense/vasogenic edema in the left parietotemporal occipital region surrounding the resection cavities. Mass effect with effacement of left lateral ventricle and 9 mm midline shift to right. Recommended continued follow-up. Previous MBS - n/a  Chart reviewed. Medical Diagnosis: Brain metastases ([facility] [C79.31]  Brain mass [G93.89]   Treatment Diagnosis: dysphagia    BSE Impression 3/10/22  RN states okay to assess pt. Pt was intubated 3/8-3/9, voice is weak, not able to produce volitional cough. Pt followed directions inconsistently. Intelligibility of speech is reduced partially due to very low volume, however appears to exhibit aphasia as well. Oral motor skills grossly intact, no asymmetry noted.   Pt with full dentition, good oral /dental hygiene. Pt accepting of PO trials, including water via tsp/cup and straw, puree and solid texture. Pt demonstrated adequate mastication with solid texture, no anterior loss or pocketing noted. No cough/throat clear or change in voice with liquids, including sequential swallows (did not follow direction for 3 ounce water test). Swallow movement felt upon palpation of anterior neck. Recommend soft and bite sized (due to intermittent lethargy and overall weakness) /thin liquids  Dysphagia Diagnosis: Swallow function appears grossly intact    MBS results - not indicated at this time, will cont to monitor for indication s/p surgery    Pain: \"2\" with jaw on left when chewing. Current Diet : ADULT DIET; Dysphagia - Soft and Bite Sized   Recommended Form of Meds: Whole with water (if difficulty place in puree)     Treatment:  Pt seen bedside to address the following goals:  1-The patient will tolerate recommended diet without observed clinical signs of aspiration  3/11: pt more alert today, sitting up in bed, assisted with breakfast by RN. Vocal quality somewhat stronger, verbalizations were minimal.   Pt consuming pancakes and sausage with prolonged but adequate mastication- cleared oral cavity completely. No cough/throat clear or change in voice with liquids, swallow movement noted upon visualization of anterior neck. No respiratory decline per chart review  Pt scheduled to go to OR tomorrow for resection of intracranial masses. Recommend cont current diet and will plan to reassess s/p surgery  Cont goal  3/14: POD# 2 s/p Procedure(s) (LRB):  LEFT TEMPORAL PARIETAL OCCIPITAL CRANIOTOMY FOR TUMOR RESECTION (Left)  Pt sitting up in bed, cleared for re-assessment per RN. Pt alert and accepting of PO trials. Pt consumed thin liquids with no overt signs of aspiration, no cough/throat clearing occurred, voice remained clear.  Pt c/o left facial pain when chewing, reports no pain at rest (RN notified). Pt cleared oral cavity completely, though mastication slow due to facial pain. RN notified of pain, rated \"4\"  Recommend cont current diet  Cont goal  3/16: Pt up in bed. [id number] [address] for po per nursing. Pt analyzed with thins via straw including consecutive swallows, cracker trials x4 with no cough. Pt indicated pain a \"2\" on the left when chewing with no anterior spillage or oral residue and no cough/wet vocal quality/throat clearing with any po trials. Lungs clear per physician. Pt would like to stay on current diet until jaw pain is reduced. Cont goal    2-The patient/caregiver will demonstrate understanding of compensatory strategies for improved swallowing safety. 3/10: Educated pt to purpose of visit, s/s of aspiration, concern if aspiration occurs, rationale for diet recommendation/strategies to reduce risk for aspiration and instruction to notify staff if any signs emerge. Pt will benefit from cont education  Cont goal  3/11: pt educated to purpose of visit. Explained plan to cont current diet and plan to re-assess swallowing after surgery and to evaluate speech/language. Pt shook head in comprehension  Cont goal  3/14:  Pt educated to purpose of visit, reviewed s/s of aspiration, and instructed to notify staff if any signs emerge. Also recommended selecting soft items at this time due to facial pain. Pt stated comprehension   Cont goal  3/16: Pt did not recall swallowing assessments from earlier this week. Pt and godmother educated to role of dysphagia, what aspiration is and the s/s of it and to notify nursing should it emerge, anatomy/physiology of swallow reviewed, and diet and strategy recommendations. Pt wishes to continue with soft diet until jaw pain improves. Both verbalized good understanding.   Cont goal    Patient/Family/Caregiver Education:  As above    Compensatory Strategies:  · Upright as possible for all oral intake  · Eat/Feed slowly  · Assist feed  · Check for pocketing of food   · Small bites/sips     Plan:  Continue dysphagia treatment with goals per plan of care. Diet recommendations: cont Dysphagia III soft and bite sized/thin liquids (consider upgrade when jaw pain improves)  DC recommendation: ongoing treatment indicated  Treatment: 10  D/W nursing  [place] [facility] [place]  Needs met prior to leaving room, call button in reach. Reena Rubio MA CCC/SLP [id number]  Speech Language Pathologist  Pager [phone]  Pg.  # [phone]  If patient is discharged prior to next treatment, this note will serve as the discharge summary

## 2022-03-16 NOTE — PROGRESS NOTES
Consult Note  Physical Medicine and Rehabilitation    Patient: Shiv Ross  [id number]  Date: 3/16/2022      Chief Complaint: Dylanlydia Meza tumor s/p resection    History of Present Illness/Hospital Course:  Shiv Ross is 48 y.o. female with pmhx Stage IIB invasive ductal carcinoma (ER-, CA-, HER2 -) diagnosed 3/30 who presented to [facility] w/ one week of altered mental status. Pt had persistent issues with reading and an occipital headache so went to ED. Pt not on systemic therapy for breast mass. CT head showed abnormal edema in left posterior cerebral hemisphere possibly related to underlying metastases versus less likely primary glioma w/ 1.4 cm of left to right midline shift w/ cisternal effacement and downward transtentorial herniation and findings of developing hydrocephalus. Pt was transferred to [facility] ICU for further management. Pt was subsequently taken to OR for L Temporal Parietal Occipital Craniotomy and tumor resection with NSGY on 3/12. After post op stability she was then transferred to the floor and we were consulted for ARU evaluation. Pt still endorsing RLE weakness without sensory loss. She is able to move her leg some. No headaches or vision changes. Also large breast mass on exam.     Interval Hx  Reports she got back from cath lab after IVC filter placement. Denies SOB, CP, Nausea, vomiting, diarrhea, dizziness, weakness. Prior Level of Function:  Independent for mobility, ADLs, and IADLs    Current Level of Function:  limited by dizziness (non BP related). Presented with some short term memory issues and required regular navigational cues. Demonstrated fair trunk and LE control during bed mobility requiring extra time to perform. Transfers and gait training were mildly unsteady requiring sequencing cues throughout; no LOB noted.      Pertinent Social History:  Support: her Mom lives at home with her  Home set-up: she does have 10 steps to get into her home    Past Medical History: Diagnosis Date    Thyroid nodule        Past Surgical History:   Procedure Laterality Date    CRANIOTOMY Left 3/12/2022    LEFT TEMPORAL PARIETAL OCCIPITAL CRANIOTOMY FOR TUMOR RESECTION performed by Parris Curling, MD at [address] [facility] IR IVC FILTER PLACEMENT W IMAGING N/A 03/16/2022    [facility]       Family History   Problem Relation Age of Onset    Cancer Father        Social History     Socioeconomic History    Marital status: Single     Spouse name: None    Number of children: None    Years of education: None    Highest education level: None   Occupational History    None   Tobacco Use    Smoking status: Never Smoker    Smokeless tobacco: Never Used   Substance and Sexual Activity    Alcohol use: Never     Alcohol/week: 0.0 standard drinks    Drug use: Never    Sexual activity: None   Other Topics Concern    None   Social History Narrative    None     Social Determinants of Health     Financial Resource Strain:     Difficulty of Paying Living Expenses: Not on file   Food Insecurity:     Worried About Running Out of Food in the Last Year: Not on file    Rosa of Food in the Last Year: Not on file   Transportation Needs:     Lack of Transportation (Medical): Not on file    Lack of Transportation (Non-Medical):  Not on file   Physical Activity:     Days of Exercise per Week: Not on file    Minutes of Exercise per Session: Not on file   Stress:     Feeling of Stress : Not on file   Social Connections:     Frequency of Communication with Friends and Family: Not on file    Frequency of Social Gatherings with Friends and Family: Not on file    Attends Congregational Services: Not on file    Active Member of Clubs or Organizations: Not on file    Attends Club or Organization Meetings: Not on file    Marital Status: Not on file   Intimate Partner Violence:     Fear of Current or Ex-Partner: Not on file    Emotionally Abused: Not on file    Physically Abused: Not on file likely relate to multicentric    glioblastoma. However, possibility of metastasis cannot be excluded given the history of breast cancer. 10 mm midline shift to right. CT CHEST ABDOMEN PELVIS W CONTRAST   Final Result      Large, necrotic right breast mass. Right axillary lymphadenopathy. Uterine fibroids. High density urine in the bladder. IR GUIDED IVC FILTER PLACEMENT    (Results Pending)         Assessment:  1. Metastatic triple negative breast cancer, now with Intracranial metastasis  (s/p brain met resection)  - Will need post-op radiation (2-4 weeks post-op) followed by systemic chemotherapy.  - PT/OT      2. Intracranial vasogenic edema secondary to metastatic masses  - Continue decadron taper  - drain out  - PT/OT     3. Status epilepticus - resolved   - continue keppra, vimpat     4. Acute resp failure due to Status epilepticus - resolved   unable to protect airway and was intubated, now on RA sating well. 5.RLE DVT  -s/p IVC filter placement    Impairments- Decreased functional mobility, Decreased ADLs    PPI and SQH ppx    Recommendations:  Discharge to [facility]. Thank you for this consult. Please contact me with any questions or concerns. Danii Meza MD PGY-3  3/16/2022  8:46 AM          This patient has been seen, examined, and discussed with the resident. This note has been altered to reflect my own examination findings, impression, and recommendations.      Nabor Montalvo D.O. M.P.H  PM&R  3/16/2022  11:59 AM

## 2022-03-16 NOTE — H&P
Patient:  Iveth Cisneros   :   1975      Relevant clinical history, particularly as it involves the pending procedure, was reviewed and discussed. The procedure including risks and benefits was discussed at length with the patient (or designated family member) and all questions were answered. Informed consent to proceed with the procedure was given. Vital signs were monitored and documented by the Radiology nurse. Targeted physical examination  Heart : regular rate and rhythm  Lungs : clear, breathing easily  Condition : stable    [facility] nurses notes reviewed and agreed. Past Medical History:        Diagnosis Date    Thyroid nodule        Past Surgical History:           Procedure Laterality Date    CRANIOTOMY Left 3/12/2022    LEFT TEMPORAL PARIETAL OCCIPITAL CRANIOTOMY FOR TUMOR RESECTION performed by Jody Rojas MD at [address]:  Patient has no known allergies. Medications:   Home Meds  No current facility-administered medications on file prior to encounter. No current outpatient medications on file prior to encounter.        Current Meds  lacosamide (VIMPAT) tablet 200 mg, BID  levETIRAcetam (KEPPRA) tablet 2,000 mg, BID  phenol 1.4 % mouth spray 1 spray, Q2H PRN  insulin lispro (1 Unit Dial) 0-12 Units, TID WC  insulin lispro (1 Unit Dial) 0-6 Units, Nightly  sodium chloride flush 0.9 % injection 10 mL, 2 times per day  sodium chloride flush 0.9 % injection 10 mL, PRN  0.9 % sodium chloride infusion, PRN  acetaminophen (TYLENOL) tablet 650 mg, Q4H PRN  oxyCODONE (ROXICODONE) immediate release tablet 5 mg, Q4H PRN   Or  oxyCODONE (ROXICODONE) immediate release tablet 10 mg, Q4H PRN  naloxone (NARCAN) injection 0.2 mg, PRN  promethazine (PHENERGAN) tablet 12.5 mg, Q6H PRN   Or  ondansetron (ZOFRAN) injection 4 mg, Q6H PRN  aluminum & magnesium hydroxide-simethicone (MAALOX) 200-200-20 MG/5ML suspension 15 mL, Q6H PRN  polyethylene glycol (GLYCOLAX) packet 17 g, Daily  sennosides-docusate sodium (SENOKOT-S) 8.6-50 MG tablet 1 tablet, BID  bisacodyl (DULCOLAX) suppository 10 mg, Daily PRN  dexamethasone (DECADRON) tablet 4 mg, 3 times per day   Followed by  [START ON 3/18/2022] dexamethasone (DECADRON) tablet 4 mg, 2 times per day   Followed by  Jose Dyson ON 3/22/2022] dexamethasone (DECADRON) tablet 4 mg, Daily  glucose chewable tablet 4 each, PRN  dextrose bolus (hypoglycemia) 10% 125 mL, PRN  glucagon (rDNA) injection 1 mg, PRN  dextrose 5 % solution, PRN  pantoprazole (PROTONIX) tablet 40 mg, QAM AC          ASA 2 - Patient with mild systemic disease with no functional limitations    II (soft palate, uvula, fauces visible)    Activity:  2 - Able to move 4 extremities voluntarily on command  Respiration:  2 - Able to breathe deeply and cough freely  Circulation:  2 - BP+/- 20mmHg of normal  Consciousness:  2 - Fully awake  Oxygen Saturation (color):  2 - Able to maintain oxygen saturation >92% on room air    Sedation : Moderate sedation planned

## 2022-03-16 NOTE — FLOWSHEET NOTE
03/16/22 1528   Encounter Summary   Services provided to: Patient  (busy with staff. )   Referral/Consult From: Rounding   Continue Visiting   (3/16, christiana )   Complexity of Encounter Low   Length of Encounter 15 minutes   Routine   Type Follow up   Staff Florin mo MA

## 2022-03-16 NOTE — PROCEDURES
IR Brief Postoperative Note    General Listen  YOB: 1975  [id number]    Pre-operative Diagnosis: dvt, recent crainotomy    Post-operative Diagnosis: Same    Procedure: IVC filter placement    Anesthesia: moderate    Surgeons/Assistants: kirk    Estimated Blood Loss: Minimal    Complications: none    Specimens: were not obtained    See full procedure dictation to follow      Ciarra Schmidt MD MD  3/16/2022

## 2022-03-16 NOTE — PROGRESS NOTES
Progress Note    Admit Date: 3/4/2022  Diet: ADULT DIET; Dysphagia - Soft and Bite Sized    CC: AMS, headache    Interval history:  NAEO  Seen and assessed at bedside  Doppler showed R sided DVT, patient undergoing IVC filter placement today in the context of hemorrhagic brain mets s/p resection  Pt still complains of R sided weakness on RLE, but RUE has improved  Pt denies fevers, chills, SOB, diarrhea, pain. She has not had a BM in several days but has also had decreased PO intake. She reports her headache is improved and well-controlled      HPI:  Ozzie Amado is a 47 y/o F w/ PMH of Stage IIB invasive ductal carcinoma (ER-, OK-, HER2 -) diagnosed 3/30 who presented to [facility] w/ one week of altered mental status. At presentation at [facility] patient was having trouble giving hx and presented w/ mother who helped providing hx. About one week ago, the patient was reading and had sudden occipital headache which which resolved. Subsequently, patient started having difficulty reading, writing, and with her memory. These symptoms persisted which caused the patient to present to ED. Of note the patient found a breast mass on self exam in 2021. Biopsy on 3/23 found Stage IIB invasive ductal carcinoma (Grade 3, ER-, OK-,HER2-). Patient was seen on  at [facility] by Dr. Keshawn Steward and denied that mass was cancer despite positive biopsy. She denied systemic therapy at that time. The patient's sister had recently  of breast cancer after treatment w/ chemotherapy. On presentation to [facility] patient was hemodynamically stable. CT head showed abnormal edema in left posterior cerebral hemisphere possibly related to underlying metastases versus less likely primary glioma w/ 1.4 cm of left to right midline shift w/ cisternal effacement and downward transtentorial herniation and findings of developing hydrocephalus. CTA negative for large vessel occlusion or hemodynamic stenosis.  Patient denies chest pain, SOB, abdominal pain, fevers, chills, nausea, and vomiting. Patient reported bloody discharge from breast tumor and difficulty w/ memory and reading. Patient was transferred to [facility] for further workup and care. Patient was transferred w/ intention to seek further treatment. Medications:     Scheduled Meds:   lacosamide  200 mg Oral BID    levETIRAcetam  2,000 mg Oral BID    enoxaparin  1 mg/kg SubCUTAneous BID    insulin lispro  0-12 Units SubCUTAneous TID WC    insulin lispro  0-6 Units SubCUTAneous Nightly    sodium chloride flush  10 mL IntraVENous 2 times per day    polyethylene glycol  17 g Oral Daily    sennosides-docusate sodium  1 tablet Oral BID    dexamethasone  4 mg Oral 3 times per day    Followed by   Kelli Guest ON 3/18/2022] dexamethasone  4 mg Oral 2 times per day    Followed by   Kelli Guest ON 3/22/2022] dexamethasone  4 mg Oral Daily    pantoprazole  40 mg Oral QAM AC     Continuous Infusions:   sodium chloride 25 mL (03/15/22 1233)    dextrose       PRN Meds:phenol, sodium chloride flush, sodium chloride, acetaminophen, oxyCODONE **OR** oxyCODONE, naloxone, promethazine **OR** ondansetron, aluminum & magnesium hydroxide-simethicone, bisacodyl, glucose, dextrose bolus (hypoglycemia), glucagon (rDNA), dextrose    Objective:   Vitals:   T-max:  Patient Vitals for the past 8 hrs:   BP Temp Temp src Pulse Resp SpO2   03/16/22 0620 108/73 97.9 °F (36.6 °C) Oral 95 16 93 %   03/16/22 0306 102/68 98.2 °F (36.8 °C) Oral 99 16 93 %   03/15/22 2336 104/71 98.2 °F (36.8 °C) Oral 108 18 93 %       Intake/Output Summary (Last 24 hours) at 3/16/2022 0644  Last data filed at 3/15/2022 0716  Gross per 24 hour   Intake --   Output 0 ml   Net 0 ml       Review of Systems  A full 10 point ROS was performed with pertinent + and - listed above    Physical Exam  Constitutional:       General: She is not in acute distress. Appearance: She is ill-appearing. HENT:      Head: Normocephalic. input(s): TROPONINI in the last 72 hours. BNP: No results for input(s): BNP in the last 72 hours. Lipids: No results for input(s): CHOL, HDL in the last 72 hours. Invalid input(s): LDLCALCU, TRIGLYCERIDE  ABGs:  No results for input(s): PHART, PGY8YYT, PO2ART, GDS7RTM, BEART, THGBART, F9HLVZUH, KAB7AWH in the last 72 hours. INR: No results for input(s): INR in the last 72 hours. Lactate: No results for input(s): LACTATE in the last 72 hours. -----------------------------------------------------------------  RAD:   VL Extremity Venous Bilateral   Final Result      MRI brain with and without contrast   Final Result      Postoperative changes of left-sided craniotomy with interval resection of left temporal and occipital lobe masses. Small nodular areas of enhancement along the margins of the surgical cavity as detailed above are indeterminate. Stable extensive T2    hyperintense/vasogenic edema in the left parietotemporal occipital region surrounding the resection cavities. Mass effect with effacement of left lateral ventricle and 9 mm midline shift to right. Recommended continued follow-up. CT Head wo Contrast   Final Result   1. Resection cavity is at the locations of the previous masses with persistent adjacent vasogenic edema and persistent midline shift. No progressive mass effect or acute surgical complication otherwise identified. VL Extremity Venous Bilateral   Final Result      CT HEAD WO CONTRAST   Final Result      1.  2 large hemorrhagic partially necrotic masses in the left parieto-temporal and left parieto-occipital regions with marked surrounding edema and mass effect. These demonstrate little change when compared to previous MRI study of 3/5/2022. Associated    prominent mass effect and surrounding edema resulting 11 mm midline shift to the right.             MRI BRAIN W WO CONTRAST   Final Result      Two large hemorrhagic partially necrotic heterogeneously enhancing mass in the left parietotemporal and left parieto-occipital region with surrounding vasogenic edema and mass effect. These are indeterminate, most likely relate to multicentric    glioblastoma. However, possibility of metastasis cannot be excluded given the history of breast cancer. 10 mm midline shift to right. CT CHEST ABDOMEN PELVIS W CONTRAST   Final Result      Large, necrotic right breast mass. Right axillary lymphadenopathy. Uterine fibroids. High density urine in the bladder. Assessment/Plan:     Metastatic triple negative breast cancer, now with Intracranial metastasis, Previously diagnosed in March 2021 and treatment declined by the patient. Oncology following, patient decided on treatment. - Will need post-op radiation (2-4 weeks post-op) followed by systemic chemotherapy. - Heme onc consulted, appreciate recs     Intracranial vasogenic edema secondary to metastatic masses ; Symptomatic with short-term memory impairment. Continue decadron  S/p OPERATIVE PROCEDURES PERFORMED:  1.  Stereotactic left temporal craniotomy for resection of tumor  2.  Stereotactic left parieto-occipital craniotomy for resection of tumor  3.  Intraoperative neuromonitoring (MEP, SSEP, EEG)  4.  Intraoperative Brainlab neuro navigation.  - Continue decadron taper  - Awaiting ARU precert, PMR on board  - Phenol spray for mouth soreness  - PT/OT  - Has miralax and senna scheduled for post-op constipation, still no BM recorded, will increase frequency of Miralax  - Neurochecks q4h     Acute DVT in RLE  Seen on Doppler. Likely 2/2 being off anticoag for procedure, cancer, and recent surgery. Pt is not a candidate for oral anticoagulation given hemorrhagic brain masses s/p resection. - s/p IVC filter placement today  - stay off oral anticoag for two weeks post-op    Status epilepticus; Continue cEEG, Continue AEDs; being managed by neurology. Likely 2/2 brain mets.   - continue keppra, vimpat     Acute resp failure due to Status epilepticus: unable to protect airway and was intubated, now on RA sating well. GI Px: protonix  DVT Prophylaxis: SCD, s/p IVC filter placement  Diet: ADULT DIET;  Dysphagia - Soft and Bite Sized   Code Status: Full Code    Marcos Zhou MD, PGY-1  03/16/22  6:44 AM    This patient has been staffed and discussed with Dr. Valentina Rivers

## 2022-03-16 NOTE — PROGRESS NOTES
NEUROSURGERY PROGRESS NOTE    3/16/2022 10:23 AM                               Nadja Ramírez                      LOS: 12 days   POD# 4 s/p Procedure(s) (LRB):  LEFT TEMPORAL PARIETAL OCCIPITAL CRANIOTOMY FOR TUMOR RESECTION (Left)    Subjective: Patient sitting up in bed upon entering the room. No acute events overnight, but patient was found to have acute DVT in RLE yesterday. Patient will have IVC Filter placed today as patient cannot have therapeutic anticoagulation until POD 14. Patient has no specific complaints this morning. Physical Exam:  Patient seen and examined    Vitals:    03/16/22 0913   BP:    Pulse:    Resp:    Temp:    SpO2: 95%     GCS:  4 - Opens eyes on own  5 - Alert and oriented  6 - Follows simple motor commands  General: Well developed. Alert and cooperative in no acute distress. HENT: surgical incision left side of scalp, neck supple  Eyes: Optic discs: Not tested  Pulmonary: unlabored respiratory effort  Cardiovascular:  Warm well perfused.  No peripheral edema  Gastrointestinal: abdomen soft, NT, ND    Neurological:  Mental Status: Awake, alert, oriented x 4, speech clear and appropriate  Attention: Intact  Language: Expressive aphasia present but improving  Sensation: Intact to all extremities to light touch  Coordination: Intact    Cranial Nerves:  II: Visual acuity not tested, denies new visual changes / diplopia, RLQ field cut present  III, IV, VI: PERRL, 3 mm bilaterally, EOMI, no nystagmus noted  V: Facial sensation intact bilaterally to touch  VII: Face symmetric  VIII: Hearing intact bilaterally to spoken voice  IX: Palate movement equal bilaterally  XI: Shoulder shrug equal bilaterally  XII: Tongue midline    Musculoskeletal:   Gait: Not tested   Assist devices: None   Tone: Normal  Motor strength:    Right  Left    Right  Left    Deltoid  4+ 5  Hip Flex  4+ 5   Biceps  4+ 5  Knee Extensors  4+ 5   Triceps  4+ 5  Knee Flexors  4+ 5   Wrist Ext  4+ 5  Ankle Dorsiflex. 4+ 5   Wrist Flex  4+ 5  Ankle Plantarflex. 4+ 5   Handgrip  4+ 5  Ext Jake Longus  4+ 5   Thumb Ext  4+ 5         Incision: CDI      Radiological Findings:  CT Head wo contrast  Result Date: 3/12/2022  Resection cavity is at the locations of the previous masses with persistent adjacent vasogenic edema and persistent midline shift. No progressive mass effect or acute surgical complication otherwise identified.     MRI brain with and without contrast  Result Date: 3/14/2022  Postoperative changes of left-sided craniotomy with interval resection of left temporal and occipital lobe masses. Small nodular areas of enhancement along the margins of the surgical cavity as detailed above are indeterminate. Stable extensive T2 hyperintense/vasogenic edema in the left parietotemporal occipital region surrounding the resection cavities. Mass effect with effacement of left lateral ventricle and 9 mm midline shift to right. Recommended continued follow-up. Labs:  Recent Labs     03/16/22  0610   WBC 17.2*   HGB 10.4*   HCT 31.8*          Recent Labs     03/16/22  0609      K 4.2      CO2 26   BUN 17   CREATININE 0.5*   GLUCOSE 127*   CALCIUM 9.1   PHOS 3.2       No results for input(s): PROTIME, INR, APTT in the last 72 hours. Patient Active Problem List    Diagnosis Date Noted    Status epilepticus ([facility] 03/09/2022    Duct cell carcinoma ([facility] 03/09/2022    Cerebral edema ([facility] 03/09/2022    Brain metastases ([facility] 03/04/2022    Brain mass 03/04/2022    Thyroid nodule 06/23/2015       Assessment:  Patient is a 48 y.o. female s/p Procedure(s) (LRB):  LEFT TEMPORAL PARIETAL OCCIPITAL CRANIOTOMY FOR TUMOR RESECTION 2/2 brain mets. Patient educated from [employer] Neurosurgery\" book pp. 5-7, 25-27, 33-38    Plan:  1. Neurologic exams frequency: Q4H  2. For change in exam MUST contact neurosurgery team along with critical care or primary team  3.  Brain mets:  - s/p left temporoparietal occipital c/r of brain mets  - CT Head shows expected post-op changes  - MRI Brain shows expected post-op changes  - Antibiotics: Cefazolin x 6 doses post op  - AED's managed by Neurology, appreciate recs  - PRN Tylenol and Roxicodone for pain control  - Oncology following, appreciate recs for primary tumor  4. Cerebral edema:  - Keep Na within normal limits  - Decadron 4 mg taper; PPI & SSI while on steroids  - Keep HOB >30 degrees  - If central venous access is needed please use subclavian vs femoral - No IJ as this can decrease venous return and worsen cerebral edema  5. Acute DVT:  - BLE Venous Doppler revealed evidence of acute DVT involving the right common femoral vein, deep femoral vein, and soleal veins  - IVC Filter ordered  - Therapeutic anticoagulation cannot start until POD 14  6. Bowel Regimen: Glycolax and Senokot-S  7. Mobility:  - Advance as tolerates  - PT/OT consulted, appreciate recs  8. Diet: Advance as tolerates  9. DVT Prophylaxis: SCD's & SQ Heparin  10. Will follow inpatient.  Please call with any questions or decline in neurological status    DISPO: OK to DC to ARU when pre-cert approved    Patient was seen and examined with Dr. Luigi Dominguez who agrees with above assessment and plan. Electronically signed by: Katelin Jerez, APRN - CNP, APRN-CNP, 3/16/2022 10:23 AM  [phone]    __________________________________________________________________    I saw and examined Franklyn Hays on 03/16/22. I agree with the assessment and plan as detailed above.      Erich Dimas MD, PhD  [address], [address], [address], [address] [phone] (c), [phone] (o)

## 2022-03-16 NOTE — DISCHARGE INSTR - COC
Continuity of Care Form    Patient Name: Krystal Vargas   :  1975  MRN:  [id number]    Admit date:  3/4/2022  Discharge date:  2022    Code Status Order: Full Code   Advance Directives:   [address] [facility] Documentation       Date/Time Healthcare Directive Type of Healthcare Directive Copy in [address] Agent's Name Healthcare Agent's Phone Number    22 [id number] No, patient does not have an advance directive for healthcare treatment -- -- -- -- --    22 0043 No, patient does not have an advance directive for healthcare treatment -- -- -- -- --            Admitting Physician:  Dariusz Estrada MD  PCP: JUDITH Kim CNP    Discharging Nurse: RAINE [facility] Unit/Room#: 7219/6681-68  Discharging Unit Phone Number: [phone]    Emergency Contact:   Extended Emergency Contact Information  Primary Emergency Contact: Hailee Ramírez  Address: [address] [place] [address] Phone: [phone]  Relation: Parent    Past Surgical History:  Past Surgical History:   Procedure Laterality Date    CRANIOTOMY Left 3/12/2022    LEFT TEMPORAL PARIETAL OCCIPITAL CRANIOTOMY FOR TUMOR RESECTION performed by Jazmyne Sumner MD at [address]    IR IVC FILTER PLACEMENT W IMAGING N/A 2022    [facility]       Immunization History: There is no immunization history on file for this patient.     Active Problems:  Patient Active Problem List   Diagnosis Code    Thyroid nodule E04.1    Brain metastases (HCC) C79.31    Brain mass G93.89    Status epilepticus (HCC) G40.901    Duct cell carcinoma (HCC) C80.1    Cerebral edema (HCC) G93.6       Isolation/Infection:   Isolation            No Isolation          Patient Infection Status       None to display            Nurse Assessment:  Last Vital Signs: /71   Pulse 99   Temp 97.7 °F (36.5 °C) (Oral)   Resp 18   Ht 5' 2.99\" (1.6 m)   Wt 186 lb 4.6 oz (84.5 kg)   SpO2 93%   BMI 33.01 kg/m²     Last documented pain score (0-10 scale): Pain Level: 0  Last Weight:   Wt Readings from Last 1 Encounters:   03/15/22 186 lb 4.6 oz (84.5 kg)     Mental Status:  oriented and alert    IV Access:  - None    Nursing Mobility/ADLs:  Walking   Assisted  Transfer  Assisted  Bathing  Assisted  Dressing  Assisted  Toileting  Assisted  Feeding  Independent  Med Admin  Assisted  Med Delivery   whole    Wound Care Documentation and Therapy:  Wound 03/04/22 Breast Lower;Right Large mass (Active)   Wound Etiology Other 03/14/22 1200   Dressing Status Clean;Dry; Intact 03/16/22 0730   Wound Cleansed Not Cleansed 03/14/22 1200   Dressing/Treatment Dry dressing 03/16/22 0730   Drainage Amount Small 03/16/22 0700   Drainage Description Serosanguinous 03/16/22 0700   Odor None 03/16/22 0700   Number of days: 12        Elimination:  Continence: Bowel: Yes  Bladder: Yes  Urinary Catheter:  straight cath overnight    Colostomy/Ileostomy/Ileal Conduit: No       Date of Last BM: ***  No intake or output data in the 24 hours ending 03/16/22 1116  I/O last 3 completed shifts:  In: -   Out: 100 [Urine:100]    Safety Concerns: At Risk for Falls    Impairments/Disabilities:      None    Nutrition Therapy:  Current Nutrition Therapy:   - Oral Diet:  Dysphagia 2 mechanically altered/soft and bite-sized    Routes of Feeding: Oral  Liquids: Thin Liquids  Daily Fluid Restriction: no  Last Modified Barium Swallow with Video (Video Swallowing Test): not done    Treatments at the Time of Hospital Discharge:   Respiratory Treatments: RA  Oxygen Therapy:  is not on home oxygen therapy.   Ventilator:    - No ventilator support    Rehab Therapies: Physical Therapy, Occupational Therapy, and Speech/Language Therapy  Weight Bearing Status/Restrictions: No weight bearing restrictions  Other Medical Equipment (for information only, NOT a DME order):  walker  Other Treatments: NA    Patient's personal belongings (please select all that are sent with patient):  {CHP DME Belongings:[id number]}    RN SIGNATURE:  {Esignature:[id number]}    CASE MANAGEMENT/SOCIAL WORK SECTION    Inpatient Status Date: 03/04/2022    Readmission Risk Assessment Score:  Readmission Risk              Risk of Unplanned Readmission:  18           Discharging to Facility/ Agency   Name: [facility] [address] [phone]  [facility]:[phone]    Dialysis Facility (if applicable)   Name:  Address:  Dialysis Schedule:  Phone:  Fax:    / signature: Electronically signed by Mindy Renee RN on 3/17/22 at 10:37 AM EDT    PHYSICIAN SECTION    Prognosis: Fair    Condition at Discharge: Stable    Rehab Potential (if transferring to Rehab): Good    Recommended Labs or Other Treatments After Discharge:   PT/OT    Incisional Care: Open to air. Wash incision daily with warm soapy water or shower daily, and pat dry with clean dry towel. Patsie Knack will be removed at follow up appointment. If patient unable to make follow up appointment, then remove staples on 3/28/2022    Follow up w/Dr. Edy Reinoso on 3/28/2022 @11:45 AM. Please call [phone] with any questions    Schedule outpatient follow-up with primary care physician in one week and Oncology appointment as directed    Ensure patient is taking keppra and vimpat antiseizure meds as directed, with recent seizure patient cannot operate a motor vehicle    Patient can start taking anticoagulation medications starting 14 days post-op on March 26 (likely Eliquis), S/P IVC filter. This can be retrieved once she in therapeutically anticoagulated    Monitor for signs of urinary retention, can cause her abdominal pain.  Bladder scan and insert catheter if needed    Physician Certification: I certify the above information and transfer of Becca Young  is necessary for the continuing treatment of the diagnosis listed and that she requires Acute Rehab for greater 30 days.      Update Admission H&P: No change in H&P    PHYSICIAN SIGNATURE:  Electronically signed by Joey Aguilar MD on 3/16/22 at 1:41 PM EDT, overseen by Dr. Jesica Guthrie

## 2022-03-16 NOTE — PROGRESS NOTES
[facility]  Progress Note    Subjective:     Craniotomy cancelled March 9 due to seizure and status epilepticus. No longer having seizures  Craniotomy done Saturday March, 12; POD #4  No HA  She developed a DVT & had an IVC filter placed as therapeutic anticoagulation cannot begin until POD#14    Review of Systems:     Review of Systems   Constitutional: Positive for fatigue. Negative for fever. HENT: Negative. Respiratory: Negative. Cardiovascular: Negative. Gastrointestinal: Negative. Genitourinary: Negative. Musculoskeletal: Negative. Neurological: Negative.         Objective:     Medications    Current Facility-Administered Medications: lacosamide (VIMPAT) tablet 200 mg, 200 mg, Oral, BID  levETIRAcetam (KEPPRA) tablet 2,000 mg, 2,000 mg, Oral, BID  phenol 1.4 % mouth spray 1 spray, 1 spray, Mouth/Throat, Q2H PRN  insulin lispro (1 Unit Dial) 0-12 Units, 0-12 Units, SubCUTAneous, TID WC  insulin lispro (1 Unit Dial) 0-6 Units, 0-6 Units, SubCUTAneous, Nightly  sodium chloride flush 0.9 % injection 10 mL, 10 mL, IntraVENous, 2 times per day  sodium chloride flush 0.9 % injection 10 mL, 10 mL, IntraVENous, PRN  0.9 % sodium chloride infusion, 25 mL, IntraVENous, PRN  acetaminophen (TYLENOL) tablet 650 mg, 650 mg, Oral, Q4H PRN  oxyCODONE (ROXICODONE) immediate release tablet 5 mg, 5 mg, Oral, Q4H PRN **OR** oxyCODONE (ROXICODONE) immediate release tablet 10 mg, 10 mg, Oral, Q4H PRN  naloxone (NARCAN) injection 0.2 mg, 0.2 mg, IntraVENous, PRN  promethazine (PHENERGAN) tablet 12.5 mg, 12.5 mg, Oral, Q6H PRN **OR** ondansetron (ZOFRAN) injection 4 mg, 4 mg, IntraVENous, Q6H PRN  aluminum & magnesium hydroxide-simethicone (MAALOX) 200-200-20 MG/5ML suspension 15 mL, 15 mL, Oral, Q6H PRN  polyethylene glycol (GLYCOLAX) packet 17 g, 17 g, Oral, Daily  sennosides-docusate sodium (SENOKOT-S) 8.6-50 MG tablet 1 tablet, 1 tablet, Oral, BID  bisacodyl (DULCOLAX) suppository 10 mg, 10 mg, Rectal, Daily PRN  [] dexamethasone (DECADRON) tablet 4 mg, 4 mg, Oral, 4 times per day **FOLLOWED BY** dexamethasone (DECADRON) tablet 4 mg, 4 mg, Oral, 3 times per day **FOLLOWED BY** [START ON 3/18/2022] dexamethasone (DECADRON) tablet 4 mg, 4 mg, Oral, 2 times per day **FOLLOWED BY** [START ON 3/22/2022] dexamethasone (DECADRON) tablet 4 mg, 4 mg, Oral, Daily  glucose chewable tablet 4 each, 4 tablet, Oral, PRN  dextrose bolus (hypoglycemia) 10% 125 mL, 125 mL, IntraVENous, PRN  glucagon (rDNA) injection 1 mg, 1 mg, IntraMUSCular, PRN  dextrose 5 % solution, 100 mL/hr, IntraVENous, PRN  pantoprazole (PROTONIX) tablet 40 mg, 40 mg, Oral, QAM AC    Allergies  No Known Allergies    Physical Exam  VITALS:  /73   Pulse 95   Temp 97.9 °F (36.6 °C) (Oral)   Resp 16   Ht 5' 2.99\" (1.6 m)   Wt 186 lb 4.6 oz (84.5 kg)   SpO2 93%   BMI 33.01 kg/m²   TEMPERATURE:  Current - Temp: 97.9 °F (36.6 °C); Max - Temp  Av.2 °F (36.8 °C)  Min: 97.9 °F (36.6 °C)  Max: 98.5 °F (36.9 °C)  PULSE OXIMETRY RANGE: SpO2  Av.8 %  Min: 92 %  Max: 97 %  24HR INTAKE/OUTPUT:    No intake or output data in the 24 hours ending 22 0729    Physical Exam  Constitutional:       Appearance: She is not ill-appearing. HENT:      Head:      Comments: Wound is C/D/I  Eyes:      General: No scleral icterus. Cardiovascular:      Rate and Rhythm: Normal rate and regular rhythm. Heart sounds: No murmur heard. No gallop. Comments: Dressing overlying right breast  Pulmonary:      Effort: Pulmonary effort is normal.      Breath sounds: Normal breath sounds. No wheezing, rhonchi or rales. Abdominal:      Palpations: Abdomen is soft. Tenderness: There is no abdominal tenderness. Musculoskeletal:         General: No swelling. Lymphadenopathy:      Cervical: No cervical adenopathy. Skin:     General: Skin is warm and dry. Findings: No rash. Neurological:      General: No focal deficit present. Mental Status: She is alert and oriented to person, place, and time. Labs  Recent Labs     03/16/22  0610   WBC 17.2*   HGB 10.4*   HCT 31.8*      MCV 82.7       Recent Labs     03/16/22  0609      K 4.2      CO2 26   PHOS 3.2   BUN 17   CREATININE 0.5*       No results for input(s): AST, ALT, ALB, BILIDIR, BILITOT, ALKPHOS in the last 72 hours. No results for input(s): MG in the last 72 hours. Radiology  CT HEAD WO CONTRAST    Result Date: 3/3/2022  EXAMINATION: CT OF THE HEAD WITHOUT CONTRAST  3/3/2022 8:17 pm TECHNIQUE: CT of the head was performed without the administration of intravenous contrast. Dose modulation, iterative reconstruction, and/or weight based adjustment of the mA/kV was utilized to reduce the radiation dose to as low as reasonably achievable. COMPARISON: None. HISTORY: ORDERING SYSTEM PROVIDED HISTORY: ams TECHNOLOGIST PROVIDED HISTORY: Reason for exam:->ams Has a \"code stroke\" or \"stroke alert\" been called? ->No Decision Support Exception - unselect if not a suspected or confirmed emergency medical condition->Emergency Medical Condition (MA) Is the patient pregnant?->No Reason for Exam: c/o memory loss that began last week. Pt's mother states last Thursday the pt states she felt like something hit her in the head, but doesn't know what. States she isn't able to remember her name, important things she would usually remember, and she is also unable to read or write. FINDINGS: BRAIN/VENTRICLES: There is diffuse hypoattenuation involving much of the left temporoparietal and posterior frontal lobe. There is hypoattenuation involving the white matter involving left posterior hemisphere likely represent areas of vasogenic edema. There is at least 2 discrete of mass is identified measuring approximate 4 cm in size 1 which is central hypoattenuation which may be related to necrosis. These left-to-right midline shift 1.4 cm in size.   There is entrapment of the right lateral ventricle and both temporal horns of both lateral ventricles suggestive areas of developing hydrocephalus. There is downward transtentorial herniation as well. With mass effect on the brainstem and the partial effacement of the suprasellar and ambient cisterns. Left uncal herniation. .  Focal density identified left parasagittal occipital lobe likely represents petechial calcification of the blood products. ORBITS: The visualized portion of the orbits demonstrate no acute abnormality. SINUSES: The visualized paranasal sinuses and mastoid air cells demonstrate no acute abnormality. SOFT TISSUES/SKULL:  No acute abnormality of the visualized skull or soft tissues. Abnormal edema left posterior cerebral hemisphere possibly related to underlying metastases versus less likely primary glioma. This causes 1.4 cm of left-to-right midline shift with cisternal effacement and downward transtentorial herniation and findings of developing hydrocephalus. Hyperdensity left parasagittal occipital lobe could represent area of calcification however small focus of blood products will not be totally excluded. Neuro surgical consultation recommended Critical results were called by Dr. Jas Mac to Serena BOURNE on 3/3/2022 at 21:07. CTA HEAD NECK W CONTRAST    Result Date: 3/3/2022  EXAMINATION: CTA OF THE HEAD AND NECK WITH CONTRAST 3/3/2022 8:17 pm: TECHNIQUE: CTA of the head and neck was performed with the administration of intravenous contrast. Multiplanar reformatted images are provided for review. MIP images are provided for review. Stenosis of the internal carotid arteries measured using NASCET criteria. Dose modulation, iterative reconstruction, and/or weight based adjustment of the mA/kV was utilized to reduce the radiation dose to as low as reasonably achievable.  COMPARISON: CT head 03/03/2022 HISTORY: ORDERING SYSTEM PROVIDED HISTORY: [facility] x 1 week TECHNOLOGIST PROVIDED HISTORY: Reason for exam:->ams x 1 week Has a \"code stroke\" or \"stroke alert\" been called? ->No Decision Support Exception - unselect if not a suspected or confirmed emergency medical condition->Emergency Medical Condition (MA) Reason for Exam: ams x 1 week FINDINGS: CTA NECK: AORTIC ARCH/ARCH VESSELS: No dissection or arterial injury. No significant stenosis of the brachiocephalic or subclavian arteries. CAROTID ARTERIES: No dissection, arterial injury, or hemodynamically significant stenosis by NASCET criteria. VERTEBRAL ARTERIES: No dissection, arterial injury, or significant stenosis. SOFT TISSUES: The lung apices are clear. No cervical or superior mediastinal lymphadenopathy. The larynx and pharynx are unremarkable. No acute abnormality of the salivary and thyroid glands. Partial visualization of a large exophytic right breast mass. BONES: .  Vertebral heights are maintained. CTA HEAD: ANTERIOR CIRCULATION: No significant stenosis of the intracranial internal carotid, anterior cerebral, or middle cerebral arteries. No aneurysm. POSTERIOR CIRCULATION: No significant stenosis of the vertebral, basilar, or posterior cerebral arteries. No aneurysm. OTHER: No dural venous sinus thrombosis on this non-dedicated study. BRAIN: Left-sided masses and edema with associated midline shift. . Ventriculomegaly worrisome for areas developing hydrocephalus related to the mass effect and downward transfer herniation. Negative for large vessel occlusion or hemodynamic stenosis. CT CHEST ABDOMEN PELVIS W CONTRAST    Result Date: 3/4/2022  CT of chest abdomen and pelvis with contrast HISTORY: Brain mass. Evaluate for primary neoplasm or metastatic disease. Staging. COMPARISON: none CONTRAST:  70 mL Isovue-370 intravenous  TECHNIQUE: Individualized dose optimization technique was used in order to meet ALARA standards for radiation dose reduction.  In addition to vendor specific dose reduction algorithms, the dose reduction techniques vary based on the specific scanner utilized but frequently include automated exposure control, adjustment of the mA and/or kV according to patient size, and use of iterative reconstruction technique. COMMENTS: Chest: Mild degenerative changes in the spine. Scoliosis. There is a large, necrotic, peripherally enhancing right breast mass which tenths the anterior skin surface and measures 10.2 x 8.5 cm. There is right axillary lymphadenopathy; for example 2.1 x 1.5 cm on image [id number]. Small hiatal hernia. Mediastinal structures are unremarkable without lymphadenopathy. Mild linear subpleural atelectasis in the lungs. 2 mm perifissural nodule on the left image [id number] is most likely benign. No evidence of pulmonary metastatic disease. Abdomen and pelvis: Degenerative changes in the spine. No lymphadenopathy. Gallbladder is within normal limits. Bowel is unremarkable. 4 mm hypodensity in the right lobe of the liver image [id number] is too small to definitively characterize and most likely benign. Solid abdominal organs are otherwise unremarkable. Multiple uterine fibroids are seen. Bladder is collapsed. There is high density fluid in the bladder which may represent prior contrast administration or hematuria. Large, necrotic right breast mass. Right axillary lymphadenopathy. Uterine fibroids. High density urine in the bladder. MRI BRAIN W WO CONTRAST    Result Date: 3/5/2022  EXAM: MRI BRAIN W WO CONTRAST INDICATION: Brain metastasis COMPARISON: None TECHNIQUE: Multiplanar, multisequence MR imaging of the head obtained without and with IV. IV contrast: 12 mL IV ProHance. FINDINGS: There is no diffusion restriction to suggest an acute infarct. There is a hemorrhagic partially necrotic heterogeneously enhancing mass in the left parietal occipital region likely intra-axial measuring approximately 3.5 x 4.2 cm (image 90 of series 13). Medially it abuts posterior falx. There is surrounding vasogenic edema with mass effect.  There is another hemorrhagic partially necrotic heterogeneously enhancing mass in the left parietotemporal region measuring approximately 4.3 x 4.2 cm (image 85). It abuts the adjacent dura on the lateral aspect. There is surrounding vasogenic edema mass  effect. There is extensive surrounding vasogenic edema anteriorly extending into the left thalamus, left basal ganglia. There is mass effect with near complete effacement of left lateral ventricle and 10 mm midline shift to right. Major vascular flow voids are present. Sellar suprasellar region is unremarkable. Cervicomedullary junction is unremarkable. Visualized paranasal sinuses and mastoid air cells are clear. No suspicious calvarial abnormality. Two large hemorrhagic partially necrotic heterogeneously enhancing mass in the left parietotemporal and left parieto-occipital region with surrounding vasogenic edema and mass effect. These are indeterminate, most likely relate to multicentric glioblastoma. However, possibility of metastasis cannot be excluded given the history of breast cancer. 10 mm midline shift to right. Pathology  Pend from craniotomy    Problem List  Patient Active Problem List   Diagnosis    Thyroid nodule    Brain metastases ([address]    Brain mass    Status epilepticus ([address]    Duct cell carcinoma (HCC)    Cerebral edema (HCC)       Assessment and Plan:     Metastatic triple negative breast cancer  - Neglected Right breast primary  - Intracranial metastasis  - The patient and her mother agreed to surgery to resect her intracranial metastasis. - March 9 Craniotomy had to be canceled due to seizures & status epilepticus.  - Saturday March 12 craniotomy and tumor resection. Recovering.  - Subsequent to this, she will require radiation therapy. Typically this is administered 2 to 4 weeks postoperatively. - Subsequent to radiation therapy she will require systemic chemotherapy.  This will be in the outpatient setting.  - Recommend she follow-up with medical and radiation oncology at the [facility], where she was seen previously, after this hospitalization to coordinate and implement systemic palliative chemotherapy once she completes radiation therapy. Instructions placed in D/C instructions section. DVT  - Per neurosurgery, she can receive therapeutic anticoagulation on POD #14 - March 26.  - Rec Eliquis  - S/P IVC filter.  This can be retrieved once she in therapeutically anticoagulated    Ritesh Jose MD, PhD  3/16/2022

## 2022-03-16 NOTE — PLAN OF CARE
Problem: Falls - Risk of:  Goal: Will remain free from falls  Description: Will remain free from falls  3/16/2022 0722 by Gsu Yadav RN  Outcome: Ongoing    Fall risk precaution in place. Bed is locked and in lowest position. 2/4 side rails are up. Call light with in reach. Fall risk bracelet in place, non slip socks on.frequent check on patient. free from falls at this time. will continue to monitor.        Problem: Skin Integrity:  Goal: Will show no infection signs and symptoms  Description: Will show no infection signs and symptoms  Outcome: Ongoing     Problem: Pain:  Goal: Control of chronic pain  Description: Control of chronic pain  3/16/2022 0722 by Gus Yadav RN  Outcome: Ongoing

## 2022-03-17 ENCOUNTER — APPOINTMENT (OUTPATIENT)
Dept: CT IMAGING | Age: 47
DRG: 053 | End: 2022-03-17
Payer: MEDICAID

## 2022-03-17 ENCOUNTER — HOSPITAL ENCOUNTER (INPATIENT)
Age: 47
LOS: 1 days | Discharge: NURSING, REHAB OR LONG-TERM CARE | DRG: 053 | End: 2022-03-20
Attending: EMERGENCY MEDICINE | Admitting: INTERNAL MEDICINE
Payer: MEDICAID

## 2022-03-17 VITALS
SYSTOLIC BLOOD PRESSURE: 111 MMHG | TEMPERATURE: 98.1 F | RESPIRATION RATE: 16 BRPM | HEART RATE: 103 BPM | OXYGEN SATURATION: 95 % | HEIGHT: 63 IN | BODY MASS INDEX: 33.01 KG/M2 | DIASTOLIC BLOOD PRESSURE: 72 MMHG | WEIGHT: 186.29 LBS

## 2022-03-17 DIAGNOSIS — R56.9 SEIZURE (HCC): Primary | ICD-10-CM

## 2022-03-17 DIAGNOSIS — C79.31 BRAIN METASTASES (HCC): ICD-10-CM

## 2022-03-17 LAB
ALBUMIN SERPL-MCNC: 3.6 G/DL (ref 3.4–5)
ANION GAP SERPL CALCULATED.3IONS-SCNC: 10 MMOL/L (ref 3–16)
BASE EXCESS VENOUS: 2.2 MMOL/L (ref -2–3)
BASOPHILS ABSOLUTE: 0.1 K/UL (ref 0–0.2)
BASOPHILS RELATIVE PERCENT: 0.4 %
BUN BLDV-MCNC: 15 MG/DL (ref 7–20)
CALCIUM SERPL-MCNC: 9.1 MG/DL (ref 8.3–10.6)
CARBOXYHEMOGLOBIN: 1.3 % (ref 0–1.5)
CHLORIDE BLD-SCNC: 103 MMOL/L (ref 99–110)
CO2: 24 MMOL/L (ref 21–32)
CREAT SERPL-MCNC: 0.6 MG/DL (ref 0.6–1.1)
EOSINOPHILS ABSOLUTE: 0.1 K/UL (ref 0–0.6)
EOSINOPHILS RELATIVE PERCENT: 0.3 %
GFR AFRICAN AMERICAN: >60
GFR NON-AFRICAN AMERICAN: >60
GLUCOSE BLD-MCNC: 122 MG/DL (ref 70–99)
GLUCOSE BLD-MCNC: 128 MG/DL (ref 70–99)
GLUCOSE BLD-MCNC: 139 MG/DL (ref 70–99)
HCO3 VENOUS: 26.5 MMOL/L (ref 24–28)
HCT VFR BLD CALC: 31.9 % (ref 36–48)
HCT VFR BLD CALC: 32.3 % (ref 36–48)
HEMOGLOBIN, VEN, REDUCED: 19.5 %
HEMOGLOBIN: 10.5 G/DL (ref 12–16)
HEMOGLOBIN: 10.8 G/DL (ref 12–16)
LYMPHOCYTES ABSOLUTE: 0.6 K/UL (ref 1–5.1)
LYMPHOCYTES RELATIVE PERCENT: 4.1 %
MCH RBC QN AUTO: 27.5 PG (ref 26–34)
MCH RBC QN AUTO: 27.8 PG (ref 26–34)
MCHC RBC AUTO-ENTMCNC: 32.9 G/DL (ref 31–36)
MCHC RBC AUTO-ENTMCNC: 33.4 G/DL (ref 31–36)
MCV RBC AUTO: 83.1 FL (ref 80–100)
MCV RBC AUTO: 83.5 FL (ref 80–100)
METHEMOGLOBIN VENOUS: 0.3 % (ref 0–1.5)
MONOCYTES ABSOLUTE: 1.4 K/UL (ref 0–1.3)
MONOCYTES RELATIVE PERCENT: 8.8 %
NEUTROPHILS ABSOLUTE: 13.4 K/UL (ref 1.7–7.7)
NEUTROPHILS RELATIVE PERCENT: 86.4 %
O2 SAT, VEN: 80 %
PCO2, VEN: 38.9 MMHG (ref 41–51)
PDW BLD-RTO: 15 % (ref 12.4–15.4)
PDW BLD-RTO: 15 % (ref 12.4–15.4)
PERFORMED ON: ABNORMAL
PERFORMED ON: ABNORMAL
PH VENOUS: 7.44 (ref 7.35–7.45)
PHOSPHORUS: 3.4 MG/DL (ref 2.5–4.9)
PLATELET # BLD: 314 K/UL (ref 135–450)
PLATELET # BLD: 346 K/UL (ref 135–450)
PMV BLD AUTO: 7.2 FL (ref 5–10.5)
PMV BLD AUTO: 7.7 FL (ref 5–10.5)
PO2, VEN: 46.4 MMHG (ref 25–40)
POTASSIUM SERPL-SCNC: 4.9 MMOL/L (ref 3.5–5.1)
RBC # BLD: 3.82 M/UL (ref 4–5.2)
RBC # BLD: 3.89 M/UL (ref 4–5.2)
SODIUM BLD-SCNC: 137 MMOL/L (ref 136–145)
TCO2 CALC VENOUS: 28 MMOL/L
WBC # BLD: 14.4 K/UL (ref 4–11)
WBC # BLD: 15.5 K/UL (ref 4–11)

## 2022-03-17 PROCEDURE — 85025 COMPLETE CBC W/AUTO DIFF WBC: CPT

## 2022-03-17 PROCEDURE — 36415 COLL VENOUS BLD VENIPUNCTURE: CPT

## 2022-03-17 PROCEDURE — 97116 GAIT TRAINING THERAPY: CPT

## 2022-03-17 PROCEDURE — 94761 N-INVAS EAR/PLS OXIMETRY MLT: CPT

## 2022-03-17 PROCEDURE — 6370000000 HC RX 637 (ALT 250 FOR IP): Performed by: STUDENT IN AN ORGANIZED HEALTH CARE EDUCATION/TRAINING PROGRAM

## 2022-03-17 PROCEDURE — 51798 US URINE CAPACITY MEASURE: CPT

## 2022-03-17 PROCEDURE — 99232 SBSQ HOSP IP/OBS MODERATE 35: CPT | Performed by: PHYSICAL MEDICINE & REHABILITATION

## 2022-03-17 PROCEDURE — 97530 THERAPEUTIC ACTIVITIES: CPT

## 2022-03-17 PROCEDURE — 97535 SELF CARE MNGMENT TRAINING: CPT

## 2022-03-17 PROCEDURE — 6360000002 HC RX W HCPCS: Performed by: STUDENT IN AN ORGANIZED HEALTH CARE EDUCATION/TRAINING PROGRAM

## 2022-03-17 PROCEDURE — 99284 EMERGENCY DEPT VISIT MOD MDM: CPT

## 2022-03-17 PROCEDURE — 6370000000 HC RX 637 (ALT 250 FOR IP): Performed by: NURSE PRACTITIONER

## 2022-03-17 PROCEDURE — 70450 CT HEAD/BRAIN W/O DYE: CPT

## 2022-03-17 PROCEDURE — 97110 THERAPEUTIC EXERCISES: CPT

## 2022-03-17 PROCEDURE — 2580000003 HC RX 258: Performed by: STUDENT IN AN ORGANIZED HEALTH CARE EDUCATION/TRAINING PROGRAM

## 2022-03-17 PROCEDURE — 82803 BLOOD GASES ANY COMBINATION: CPT

## 2022-03-17 PROCEDURE — 85027 COMPLETE CBC AUTOMATED: CPT

## 2022-03-17 PROCEDURE — 6360000002 HC RX W HCPCS: Performed by: NURSE PRACTITIONER

## 2022-03-17 PROCEDURE — 51701 INSERT BLADDER CATHETER: CPT

## 2022-03-17 PROCEDURE — 80069 RENAL FUNCTION PANEL: CPT

## 2022-03-17 PROCEDURE — 83605 ASSAY OF LACTIC ACID: CPT

## 2022-03-17 RX ORDER — LACTULOSE 10 G/15ML
20 SOLUTION ORAL 3 TIMES DAILY
Status: DISCONTINUED | OUTPATIENT
Start: 2022-03-17 | End: 2022-03-17 | Stop reason: HOSPADM

## 2022-03-17 RX ORDER — LEVETIRACETAM 1000 MG/1
2000 TABLET ORAL 2 TIMES DAILY
Qty: 60 TABLET | Refills: 3 | Status: ON HOLD | OUTPATIENT
Start: 2022-03-17 | End: 2022-03-20 | Stop reason: HOSPADM

## 2022-03-17 RX ORDER — PANTOPRAZOLE SODIUM 40 MG/1
40 TABLET, DELAYED RELEASE ORAL
Qty: 30 TABLET | Refills: 3 | Status: SHIPPED | OUTPATIENT
Start: 2022-03-18

## 2022-03-17 RX ORDER — DEXAMETHASONE 4 MG/1
4 TABLET ORAL DAILY
Qty: 3 TABLET | Refills: 0 | Status: ON HOLD | OUTPATIENT
Start: 2022-03-22 | End: 2022-03-20 | Stop reason: SDUPTHER

## 2022-03-17 RX ORDER — LACOSAMIDE 200 MG/1
200 TABLET ORAL 2 TIMES DAILY
Qty: 60 TABLET | Refills: 0 | Status: SHIPPED | OUTPATIENT
Start: 2022-03-17 | End: 2022-04-16

## 2022-03-17 RX ORDER — DEXAMETHASONE 4 MG/1
4 TABLET ORAL EVERY 8 HOURS SCHEDULED
Qty: 3 TABLET | Refills: 0 | Status: ON HOLD | OUTPATIENT
Start: 2022-03-17 | End: 2022-03-20 | Stop reason: HOSPADM

## 2022-03-17 RX ORDER — DEXAMETHASONE 4 MG/1
4 TABLET ORAL EVERY 12 HOURS SCHEDULED
Qty: 6 TABLET | Refills: 0 | Status: ON HOLD | OUTPATIENT
Start: 2022-03-18 | End: 2022-03-20 | Stop reason: SDUPTHER

## 2022-03-17 RX ADMIN — SODIUM CHLORIDE, PRESERVATIVE FREE 10 ML: 5 INJECTION INTRAVENOUS at 08:58

## 2022-03-17 RX ADMIN — DEXAMETHASONE 4 MG: 4 TABLET ORAL at 06:15

## 2022-03-17 RX ADMIN — MAGNESIUM CITRATE 296 ML: 1.75 LIQUID ORAL at 11:25

## 2022-03-17 RX ADMIN — HEPARIN SODIUM 5000 UNITS: 5000 INJECTION INTRAVENOUS; SUBCUTANEOUS at 06:16

## 2022-03-17 RX ADMIN — PANTOPRAZOLE SODIUM 40 MG: 40 TABLET, DELAYED RELEASE ORAL at 06:16

## 2022-03-17 RX ADMIN — LACOSAMIDE 200 MG: 50 TABLET, FILM COATED ORAL at 08:57

## 2022-03-17 RX ADMIN — LACTULOSE 20 G: 20 SOLUTION ORAL at 11:25

## 2022-03-17 RX ADMIN — LEVETIRACETAM 2000 MG: 500 TABLET, FILM COATED ORAL at 08:58

## 2022-03-17 RX ADMIN — SENNOSIDES AND DOCUSATE SODIUM 1 TABLET: 50; 8.6 TABLET ORAL at 08:58

## 2022-03-17 RX ADMIN — POLYETHYLENE GLYCOL 3350 17 G: 17 POWDER, FOR SOLUTION ORAL at 08:58

## 2022-03-17 NOTE — PROGRESS NOTES
Patient A&Ox4, VSS. Neuro checks unchanged. Surgical incision clean, dry, and intact. Pain minimal, managed with oral medications. Patient ambulating with walker, tolerating well. Patient anticipating discharge to SNF this afternoon. Will continue to monitor.

## 2022-03-17 NOTE — PROGRESS NOTES
her home    Past Medical History:   Diagnosis Date    Thyroid nodule        Past Surgical History:   Procedure Laterality Date    CRANIOTOMY Left 3/12/2022    LEFT TEMPORAL PARIETAL OCCIPITAL CRANIOTOMY FOR TUMOR RESECTION performed by Winifred Mari MD at [address] IR IVC FILTER PLACEMENT W IMAGING N/A 03/16/2022    [facility]    IR IVC FILTER PLACEMENT W IMAGING  3/16/2022    IR IVC FILTER PLACEMENT W IMAGING 3/16/2022 [facility]       Family History   Problem Relation Age of Onset    Cancer Father        Social History     Socioeconomic History    Marital status: Single     Spouse name: None    Number of children: None    Years of education: None    Highest education level: None   Occupational History    None   Tobacco Use    Smoking status: Never Smoker    Smokeless tobacco: Never Used   Substance and Sexual Activity    Alcohol use: Never     Alcohol/week: 0.0 standard drinks    Drug use: Never    Sexual activity: None   Other Topics Concern    None   Social History Narrative    None     Social Determinants of Health     Financial Resource Strain:     Difficulty of Paying Living Expenses: Not on file   Food Insecurity:     Worried About Running Out of Food in the Last Year: Not on file    Rosa of Food in the Last Year: Not on file   Transportation Needs:     Lack of Transportation (Medical): Not on file    Lack of Transportation (Non-Medical):  Not on file   Physical Activity:     Days of Exercise per Week: Not on file    Minutes of Exercise per Session: Not on file   Stress:     Feeling of Stress : Not on file   Social Connections:     Frequency of Communication with Friends and Family: Not on file    Frequency of Social Gatherings with Friends and Family: Not on file    Attends Restorationism Services: Not on file    Active Member of Clubs or Organizations: Not on file    Attends Club or Organization Meetings: Not on file    Marital Status: Not on file   Intimate Partner Violence:     Fear of Current or Ex-Partner: Not on file    Emotionally Abused: Not on file    Physically Abused: Not on file    Sexually Abused: Not on file   Housing Stability:     Unable to Pay for Housing in the Last Year: Not on file    Number of [place] in the Last Year: Not on file    Unstable Housing in the Last Year: Not on file           REVIEW OF SYSTEMS:   CONSTITUTIONAL: negative for fevers, chills, diaphoresis, appetite change, night sweats, unexpected weight change, fatigue  EYES: negative for blurred vision, eye discharge, visual disturbance and icterus  HEENT: negative for hearing loss, tinnitus, ear drainage, sinus pressure, nasal congestion, epistaxis and snoring  RESPIRATORY: Negative for hemoptysis, cough, sputum production  CARDIOVASCULAR: negative for chest pain, palpitations, exertional chest pressure/discomfort, syncope, edema  GASTROINTESTINAL: negative for nausea, vomiting, diarrhea, blood in stool, abdominal pain, constipation  GENITOURINARY: negative for frequency, dysuria, urinary incontinence, decreased urine volume, and hematuria  HEMATOLOGIC/LYMPHATIC: negative for easy bruising, bleeding and lymphadenopathy  ALLERGIC/IMMUNOLOGIC: negative for recurrent infections, angioedema, anaphylaxis and drug reactions  ENDOCRINE: negative for weight changes and diabetic symptoms including polyuria, polydipsia and polyphagia  MUSCULOSKELETAL: negative for pain, joint swelling, decreased range of motion  NEUROLOGICAL: negative for headaches, slurred speech, + unilateral weakness  PSYCHIATRIC/BEHAVIORAL: negative for hallucinations, behavioral problems, confusion and agitation.      Physical Examination:  Vitals:   Patient Vitals for the past 24 hrs:   BP Temp Temp src Pulse Resp SpO2   03/17/22 0730 114/66 98.4 °F (36.9 °C) Oral 93 16 93 %   03/17/22 0217 109/72 98.5 °F (36.9 °C) Oral 100 17 94 %   03/16/22 2214 104/67 98.6 °F (37 °C) Oral 105 16 93 %   03/16/22 1815 124/78 99.2 °F (37.3 °C) Oral 113 17 93 %   03/16/22 1415 122/70 97.8 °F (36.6 °C) Oral 115 18 94 %   03/16/22 1045 118/71 97.7 °F (36.5 °C) Oral 99 18 93 %   03/16/22 0913 -- -- -- -- -- 95 %   03/16/22 0900 114/76 97.9 °F (36.6 °C) Oral 94 18 95 %     Const: Alert. WDWN. No distress  Eyes: Conjunctiva noninjected, no icterus noted; pupils equal, round, and reactive to light. HENT: post op surgical changes clean and dry, normocephalic; Oral mucosa moist  Neck: Trachea midline, neck supple. No thyromegaly noted. CV: Regular rate and rhythm, no murmur rub or gallop noted  Resp: Lungs clear to auscultation bilaterally, no rales wheezes or ronchi, no retractions. Respirations unlabored. GI: Soft, nontender, nondistended. Normal bowel sounds. No palpable masses. Skin: Normal temperature and turgor. Large mass on R breast covered in bandage   Ext: No significant edema appreciated. No varicosities. MSK: No joint tenderness, erythema, warmth noted. AROM intact. Neuro: Alert, oriented, appropriate. No cranial nerve deficits appreciated. Sensation intact to light touch. Strength equal and 5/5 in all four extremeties. No abnormalities with finger/nose or heel/shin noted. Reflexes normal and symmetric.   Psych: Stable mood, normal judgement, normal affect, poor insight    Lab Results   Component Value Date    WBC 14.4 (H) 03/17/2022    HGB 10.8 (L) 03/17/2022    HCT 32.3 (L) 03/17/2022    MCV 83.1 03/17/2022     03/17/2022     Lab Results   Component Value Date    INR 1.05 03/11/2022    INR 1.08 03/07/2022    PROTIME 11.9 03/11/2022    PROTIME 12.3 03/07/2022     Lab Results   Component Value Date    CREATININE 0.6 03/17/2022    BUN 15 03/17/2022     03/17/2022    K 4.9 03/17/2022     03/17/2022    CO2 24 03/17/2022     Lab Results   Component Value Date    ALT 14 03/03/2022    AST 17 03/03/2022    ALKPHOS 61 03/03/2022    BILITOT 0.3 03/03/2022     Most recent EKG revealed:  NSR    Most recent imaging studies revealed:  3/14 Brain MRI  Postoperative changes of left-sided craniotomy with interval resection of left temporal and occipital lobe masses. Small nodular areas of enhancement along the margins of the surgical cavity as detailed above are indeterminate. Stable extensive T2   hyperintense/vasogenic edema in the left parietotemporal occipital region surrounding the resection cavities. Mass effect with effacement of left lateral ventricle and 9 mm midline shift to right. Recommended continued follow-up. IR GUIDED IVC FILTER PLACEMENT   Final Result   IMPRESSION :      Normal IVC anatomy. Normal, patent jugular vein. Patent inferior vena cava. Placement of filter in the infra-renal IVC. Please note that this is a retrievable filter. Fluoroscopy time : 1.8 minutes   Number of exposures obtained : 5         Moderate sedation was performed by the physician including the presence of an independent trained observers that assisted in monitoring the patient's level of consciousness and physiological status. Following the administration of Midazolam and Fentanyl    the physician spent continuous face-to-face time with the patient. Sedation start time: 0810   Sedation end time: 0826      Estimated blood loss: Less than 5 mL. VL Extremity Venous Bilateral   Final Result      MRI brain with and without contrast   Final Result      Postoperative changes of left-sided craniotomy with interval resection of left temporal and occipital lobe masses. Small nodular areas of enhancement along the margins of the surgical cavity as detailed above are indeterminate. Stable extensive T2    hyperintense/vasogenic edema in the left parietotemporal occipital region surrounding the resection cavities. Mass effect with effacement of left lateral ventricle and 9 mm midline shift to right. Recommended continued follow-up. CT Head wo Contrast   Final Result   1.  Resection cavity is at the locations of the previous masses with persistent adjacent vasogenic edema and persistent midline shift. No progressive mass effect or acute surgical complication otherwise identified. VL Extremity Venous Bilateral   Final Result      CT HEAD WO CONTRAST   Final Result      1.  2 large hemorrhagic partially necrotic masses in the left parieto-temporal and left parieto-occipital regions with marked surrounding edema and mass effect. These demonstrate little change when compared to previous MRI study of 3/5/2022. Associated    prominent mass effect and surrounding edema resulting 11 mm midline shift to the right. MRI BRAIN W WO CONTRAST   Final Result      Two large hemorrhagic partially necrotic heterogeneously enhancing mass in the left parietotemporal and left parieto-occipital region with surrounding vasogenic edema and mass effect. These are indeterminate, most likely relate to multicentric    glioblastoma. However, possibility of metastasis cannot be excluded given the history of breast cancer. 10 mm midline shift to right. CT CHEST ABDOMEN PELVIS W CONTRAST   Final Result      Large, necrotic right breast mass. Right axillary lymphadenopathy. Uterine fibroids. High density urine in the bladder. Assessment:  1. Metastatic triple negative breast cancer, now with Intracranial metastasis  (s/p brain met resection)  - Will need post-op radiation (2-4 weeks post-op) followed by systemic chemotherapy.  - PT/OT      2. Intracranial vasogenic edema secondary to metastatic masses  - Continue decadron taper  - drain out  - PT/OT     3. Status epilepticus - resolved   - continue keppra, vimpat     4. Acute resp failure due to Status epilepticus - resolved   unable to protect airway and was intubated, now on RA sating well.     5.RLE DVT  -s/p IVC filter placement    Impairments- Decreased functional mobility, Decreased ADLs    PPI and SQH ppx    Recommendations:    Possible discharge to [facility]. Thank you for this consult. Please contact me with any questions or concerns. Funmilayo Patel MD PGY-3  3/17/2022  8:34 AM        This patient has been seen, examined, and discussed with the resident. This note has been altered to reflect my own examination findings, impression, and recommendations.      GIOVANNI EasonP.H  PM&R  3/17/2022  11:56 AM

## 2022-03-17 NOTE — PROGRESS NOTES
Report called to [facility], receiving nurse at [facility]. Transportation anticipated at 1300.

## 2022-03-17 NOTE — PROGRESS NOTES
Occupational Therapy  Facility/Department: [facility] 5T ORTHO/NEURO  Daily Treatment Note  NAME: Wen Moreno  : 1975  MRN: [id number]    Date of Service: 3/17/2022    Discharge Recommendations: Wen Moreno scored a 17/24 on the AM-PAC ADL Inpatient form. Current research shows that an AM-PAC score of 17 or less is typically not associated with a discharge to the patient's home setting. Based on the patient's AM-PAC score and their current ADL deficits, it is recommended that the patient have 5-7 sessions per week of Occupational Therapy at d/c to increase the patient's independence. At this time, this patient demonstrates the endurance, and/or tolerance for 3 hours of therapy each day, with a treatment frequency of 5-7x/wk. Please see assessment section for further patient specific details. If patient discharges prior to next session this note will serve as a discharge summary. Please see below for the latest assessment towards goals. Assessment        Assessment: Pt showing progress this session. Needing less A for ADLs, transfers and funct mob. Able to tolerate longer ambulation this session. Still requires some cues for RW positioning. Pt verb that she is going to d/c to [facility] today, anticipate continued progress there. OT Education: OT Role;Plan of Care;Transfer Training  Patient Education: demo understanding  Activity Tolerance  Activity Tolerance: Patient Tolerated treatment well  Safety Devices  Safety Devices in place: Yes  Type of devices: Left in chair;Nurse notified;Call light within reach; Chair alarm in place         Patient Diagnosis(es): The primary encounter diagnosis was Brain metastases ([facility] [address] A diagnosis of Brain mass was also pertinent to this visit. has a past medical history of Thyroid nodule. has a past surgical history that includes craniotomy (Left, 3/12/2022);  IR GUIDED IVC FILTER PLACEMENT (N/A, 2022); and IR GUIDED IVC FILTER PLACEMENT (3/16/2022). Restrictions  Position Activity Restriction  Other position/activity restrictions: up with assistance  Subjective   General  Chart Reviewed: Yes  Patient assessed for rehabilitation services?: Yes  Additional Pertinent Hx: Patient is a 49 y/o female admitted 3/3 with altered mental status. CT chest and abdomen (+) for Large, necrotic right breast mass. Right axillary lymphadenopathy. CT head (+) for Two large hemorrhagic partially necrotic heterogeneously enhancing mass in the left parietotemporal and left parieto-occipital region with surrounding vasogenic edema and mass effect. taken to OR 3/8 but developed epilepticus - OR cancelled; intub; ext 3/9; OR 3/12 for LEFT TEMPORAL PARIETAL OCCIPITAL CRANIOTOMY FOR TUMOR RESECTION. Family / Caregiver Present: No  Referring Practitioner: Dr. Zeny Zhang  Diagnosis: brain mets  Subjective  Subjective: Pt in bed upon arrival. Pleasant and agreeable to OT/PT treat, finishing lunch  Vital Signs  Patient Currently in Pain: Denies   Orientation    Objective    ADL  Feeding: Independent  Grooming: Stand by assistance; Increased time to complete (Oral care and washing face at sink, SBA for stance at sink with RW)  UE Dressing: Independent  LE Dressing: Stand by assistance (SBA for stance; no A needed for other parts)        Balance  Sitting Balance: Supervision  Standing Balance: Stand by assistance (SBA to CGA)        Functional Mobility  Functional - Mobility Device: Rolling Walker  Activity: To/from bathroom; Other (in hallway)  Assist Level: Contact guard assistance  Functional Mobility Comments: Slow, and guarded. Cues for RW positioning.  Slight LOB when turning, but able to self correct with CGA        Bed mobility  Supine to Sit: Stand by assistance  Scooting: Stand by assistance        Transfers  Sit to stand: Stand by assistance  Stand to sit: Stand by assistance         Plan  If pt discharges prior to next treatment session, this note will serve as discharge summary.      Plan  Times per week: 5-7  Times per day: Daily  Current Treatment Recommendations: Girma Spence Mobility Training,Endurance Training,Safety Education & Training,Cognitive Reorientation,Neuromuscular Re-education,Equipment Evaluation, Education, & procurement,Self-Care / ADL,Patient/Caregiver Education & Training    AM-PAC Score        AM-PAC Inpatient Daily Activity Raw Score: 17 (03/17/22 1312)  AM-PAC Inpatient ADL T-Scale Score : 37.26 (03/17/22 1312)  ADL Inpatient CMS 0-100% Score: 50.11 (03/17/22 1312)  ADL Inpatient CMS G-Code Modifier : CK (03/17/22 1312)    Goals (as determined and assessed by primary OT)    Short term goals  Time Frame for Short term goals: Discharge  Short term goal 1: grooming task w/ setup, Min A using R hand- met 3/17  Short term goal 2: tolerate 1 set of 10 A/AROM exercises RUE to increase strength/ROM for ADLs- not met  Short term goal 3: unsupported sitting x 5 minutes w/ CGA- met for assist level 3/15  Patient Goals   Patient goals : to increase independence       Therapy Time   Individual Concurrent Group Co-treatment   Time In 1220         Time Out 1315         Minutes 55         Timed Code Treatment Minutes: [facility] [facility], PIERCE/L

## 2022-03-17 NOTE — CARE COORDINATION
Case Management Assessment            Discharge Note                    Date / Time of Note: 3/17/2022 11:50 AM                  Discharge Note Completed by: Danya Moreno RN     Discharge order noted and pt will d/c to [facility] today at 1300. Transport arranged via [address] [employer]. She called her mother to let her know of d/c plans. Orders faxed and transport form on chart. Patient Name: Mili Liriano   YOB: 1975  Diagnosis: Brain metastases ([facility] [address] [C79.31]  Brain mass [G93.89]   Date / Time: 3/4/2022  7:23 AM    Current PCP: JUDITH KIMBROUGH CNP  Clinic patient: No    Hospitalization in the last 30 days: No    Advance Directives:  Code Status: Full Code  [place] DNR form completed and on chart: Not Indicated    Financial:  Payor: Veronica Urias / Plan: Veronica Urias / Product Type: *No Product type* /      Pharmacy:    [facility] [address] [place] [address], [address] [place] [address] [facility] [phone] - [address] [place] [id number]  Phone: [phone] Fax: [phone]      Assistance purchasing medications?: Potential Assistance Purchasing Medications: No  Assistance provided by Case Management: None at this time    Does patient want to participate in local refill/ meds to beds program?:      Meds To Beds General Rules:  1. Can ONLY be done Monday- Friday between 8:30am-5pm  2. Prescription(s) must be in pharmacy by 3pm to be filled same day  3. Copy of patient's insurance/ prescription drug card and patient face sheet must be sent along with the prescription(s)  4. Cost of Rx cannot be added to hospital bill. If financial assistance is needed, please contact unit  or ;  or  CANNOT provide pharmacy voucher for patients co-pays  5.  Patients can then  the prescription on their way out of the hospital at discharge, or pharmacy can deliver to the bedside if staff is available. (payment due at time of pick-up or delivery - cash, check, or card accepted)     Able to afford home medications/ co-pay costs: Yes    ADLS:  Current PT AM-PAC Score: 17 /24  Current OT AM-PAC Score: 14 /24      DISCHARGE Disposition: Inpatient Rehab: [facility] Phone: [phone] Fax: [phone]    LOC at discharge: Not Applicable  STEPHANIE Completed: Yes    Notification completed in HENS/PAS?:  Not Applicable    IMM Completed:   Not Indicated    Transportation:  Transportation PLAN for discharge: EMS transportation   Mode of Transport: Ambulance stretcher - BLS  Reason for medical transport: Severe muscular weakness and de-conditioned state due to craniotomy and requires ambulance transport due to assist x 1 for safe transfers  Name of Transport Company: [employer]  Phone: [phone]  Time of Transport: 1300    Transport form completed: Yes      The Plan for Transition of Care is related to the following treatment goals of Brain metastases ([facility] [address] [C79.31]  Brain mass [G93.89]    The Patient and/or patient representative Lisha Reynolds and her family were provided with a choice of provider and agrees with the discharge plan Yes    Freedom of choice list was provided with basic dialogue that supports the patient's individualized plan of care/goals and shares the quality data associated with the providers.  Yes    Care Transitions patient: No    Dominick Sahni RN  [facility] [employer]  Case Management Department  Ph: [phone]  Fax: [phone]

## 2022-03-17 NOTE — PROGRESS NOTES
NEUROSURGERY PROGRESS NOTE    3/17/2022 11:45 AM                               Nadja Ramírez                      LOS: 13 days   POD# 5 s/p Procedure(s) (LRB):  LEFT TEMPORAL PARIETAL OCCIPITAL CRANIOTOMY FOR TUMOR RESECTION (Left)    Subjective: Patient sitting up in chair upon entering the room. No acute events overnight. Patient has no specific complaints this morning. Physical Exam:  Patient seen and examined    Vitals:    03/17/22 1111   BP: 111/72   Pulse: 103   Resp: 16   Temp: 98.1 °F (36.7 °C)   SpO2: 95%     GCS:  4 - Opens eyes on own  5 - Alert and oriented  6 - Follows simple motor commands  General: Well developed. Alert and cooperative in no acute distress. HENT: surgical incision left side of scalp, neck supple  Eyes: Optic discs: Not tested  Pulmonary: unlabored respiratory effort  Cardiovascular:  Warm well perfused. No peripheral edema  Gastrointestinal: abdomen soft, NT, ND    Neurological:  Mental Status: Awake, alert, oriented x 4, speech clear and appropriate  Attention: Intact  Language: Expressive aphasia present but improving  Sensation: Intact to all extremities to light touch  Coordination: Intact    Cranial Nerves:  II: Visual acuity not tested, denies new visual changes / diplopia, RLQ field cut present  III, IV, VI: PERRL, 3 mm bilaterally, EOMI, no nystagmus noted  V: Facial sensation intact bilaterally to touch  VII: Face symmetric  VIII: Hearing intact bilaterally to spoken voice  IX: Palate movement equal bilaterally  XI: Shoulder shrug equal bilaterally  XII: Tongue midline    Musculoskeletal:   Gait: Not tested   Assist devices: None   Tone: Normal  Motor strength:    Right  Left    Right  Left    Deltoid  4+ 5  Hip Flex  4+ 5   Biceps  4+ 5  Knee Extensors  4+ 5   Triceps  4+ 5  Knee Flexors  4+ 5   Wrist Ext  4+ 5  Ankle Dorsiflex. 4+ 5   Wrist Flex  4+ 5  Ankle Plantarflex.   4+ 5   Handgrip  4+ 5  Ext Jake Longus  4+ 5   Thumb Ext  4+ 5         Incision: CDI      Radiological Findings:  CT Head wo contrast  Result Date: 3/12/2022  Resection cavity is at the locations of the previous masses with persistent adjacent vasogenic edema and persistent midline shift. No progressive mass effect or acute surgical complication otherwise identified.     MRI brain with and without contrast  Result Date: 3/14/2022  Postoperative changes of left-sided craniotomy with interval resection of left temporal and occipital lobe masses. Small nodular areas of enhancement along the margins of the surgical cavity as detailed above are indeterminate. Stable extensive T2 hyperintense/vasogenic edema in the left parietotemporal occipital region surrounding the resection cavities. Mass effect with effacement of left lateral ventricle and 9 mm midline shift to right. Recommended continued follow-up. Labs:  Recent Labs     03/17/22  0610   WBC 14.4*   HGB 10.8*   HCT 32.3*          Recent Labs     03/17/22  0611      K 4.9      CO2 24   BUN 15   CREATININE 0.6   GLUCOSE 122*   CALCIUM 9.1   PHOS 3.4       No results for input(s): PROTIME, INR, APTT in the last 72 hours. Patient Active Problem List    Diagnosis Date Noted    Status epilepticus ([facility] [address] 03/09/2022    Duct cell carcinoma ([facility] [address] 03/09/2022    Cerebral edema ([facility] [address] 03/09/2022    Brain metastases ([facility] [address] 03/04/2022    Brain mass 03/04/2022    Thyroid nodule 06/23/2015       Assessment:  Patient is a 48 y.o. female s/p Procedure(s) (LRB):  LEFT TEMPORAL PARIETAL OCCIPITAL CRANIOTOMY FOR TUMOR RESECTION 2/2 brain mets. Patient educated from [employer] to Neurosurgery\" book pp. 5-7, 25-27, 33-38    Plan:  1. Neurologic exams frequency: Q4H  2. For change in exam MUST contact neurosurgery team along with critical care or primary team  3.  Brain mets:  - s/p left temporoparietal occipital c/r of brain mets  - CT Head shows expected post-op changes  - MRI Brain shows expected post-op changes  - AED's managed by Neurology, appreciate recs  - PRN Tylenol and Roxicodone for pain control  - Incisional Care: Open to air. Wash incision daily with warm soapy water or shower daily, and pat dry with clean dry towel. - Follow up appointment 3/28/22. - If patient unable to make f/u appointment, then remove staples on 3/28/22 at ARU  - Oncology following, appreciate recs for primary tumor  4. Cerebral edema:  - Keep Na within normal limits  - Decadron 4 mg taper; PPI & SSI while on steroids  - Keep HOB >30 degrees  - If central venous access is needed please use subclavian vs femoral - No IJ as this can decrease venous return and worsen cerebral edema  5. Acute DVT:  - BLE Venous Doppler revealed evidence of acute DVT involving the right common femoral vein, deep femoral vein, and soleal veins  - IVC Filter ordered  - Therapeutic anticoagulation cannot start until POD 14  6. Bowel Regimen: Glycolax and Senokot-S  7. Mobility:  - Advance as tolerates  - PT/OT consulted, appreciate recs  8. Diet: Advance as tolerates  9. DVT Prophylaxis: SCD's & SQ Heparin  10. Will follow inpatient.  Please call with any questions or decline in neurological status    DISPO: OK to DC to ARU when pre-cert approved    Patient was seen and examined with Dr. Aziza Szymanski who agrees with above assessment and plan. Electronically signed by: JUDITH Padgett - CNP, JUDITH-CNP, 3/17/2022 11:45 AM  [phone]    __________________________________________________________________    I saw and examined Lucy Mccord on 03/17/22. I agree with the assessment and plan as detailed above.      Josefina Platt MD, PhD  [address] [place] [address], [phone] (c), [phone] (o)

## 2022-03-17 NOTE — PROGRESS NOTES
Progress Note    Admit Date: 3/4/2022  Diet: ADULT DIET; Dysphagia - Soft and Bite Sized    CC: AMS, headache    Interval history:  Has abdominal pain associated with urinary retention, improved with straight cath x2. Seen and assessed at bedside  S/p IVC filter for RLE DVT  Concerns for urinary retention yesterday with 570 ml on bladder scan, patient self-voided but then retained another 742 requiring straight cath  Pt denies fevers, chills, SOB, diarrhea, pain. She has not had a BM in over three days but has also had decreased PO intake. She reports her headache is improved and well-controlled  Appropriate candidate for ARU  Neurosurg approves for dispo to ARU      HPI:  Dahlia Pardo is a 47 y/o F w/ PMH of Stage IIB invasive ductal carcinoma (ER-, CT-, HER2 -) diagnosed 3/30 who presented to [facility] w/ one week of altered mental status. At presentation at [facility] patient was having trouble giving hx and presented w/ mother who helped providing hx. About one week ago, the patient was reading and had sudden occipital headache which which resolved. Subsequently, patient started having difficulty reading, writing, and with her memory. These symptoms persisted which caused the patient to present to ED. Of note the patient found a breast mass on self exam in 2021. Biopsy on 3/23 found Stage IIB invasive ductal carcinoma (Grade 3, ER-, CT-,HER2-). Patient was seen on  at [facility] [address] by Dr. Griffin Escalante and denied that mass was cancer despite positive biopsy. She denied systemic therapy at that time. The patient's sister had recently  of breast cancer after treatment w/ chemotherapy. On presentation to [facility] patient was hemodynamically stable.  CT head showed abnormal edema in left posterior cerebral hemisphere possibly related to underlying metastases versus less likely primary glioma w/ 1.4 cm of left to right midline shift w/ cisternal effacement and downward transtentorial herniation and findings of developing hydrocephalus. CTA negative for large vessel occlusion or hemodynamic stenosis. Patient denies chest pain, SOB, abdominal pain, fevers, chills, nausea, and vomiting. Patient reported bloody discharge from breast tumor and difficulty w/ memory and reading. Patient was transferred to [facility] for further workup and care. Patient was transferred w/ intention to seek further treatment. Medications:     Scheduled Meds:   polyethylene glycol  17 g Oral BID    heparin (porcine)  5,000 Units SubCUTAneous 3 times per day    lacosamide  200 mg Oral BID    levETIRAcetam  2,000 mg Oral BID    insulin lispro  0-12 Units SubCUTAneous TID WC    insulin lispro  0-6 Units SubCUTAneous Nightly    sodium chloride flush  10 mL IntraVENous 2 times per day    sennosides-docusate sodium  1 tablet Oral BID    dexamethasone  4 mg Oral 3 times per day    Followed by   Mima Smith ON 3/18/2022] dexamethasone  4 mg Oral 2 times per day    Followed by   Mima Smith ON 3/22/2022] dexamethasone  4 mg Oral Daily    pantoprazole  40 mg Oral QAM AC     Continuous Infusions:   sodium chloride 25 mL (03/15/22 1233)    dextrose       PRN Meds:phenol, sodium chloride flush, sodium chloride, acetaminophen, oxyCODONE **OR** oxyCODONE, naloxone, promethazine **OR** ondansetron, aluminum & magnesium hydroxide-simethicone, bisacodyl, glucose, dextrose bolus (hypoglycemia), glucagon (rDNA), dextrose    Objective:   Vitals:   T-max:  Patient Vitals for the past 8 hrs:   BP Temp Temp src Pulse Resp SpO2   03/17/22 0217 109/72 98.5 °F (36.9 °C) Oral 100 17 94 %       Intake/Output Summary (Last 24 hours) at 3/17/2022 0641  Last data filed at 3/17/2022 0153  Gross per 24 hour   Intake 400 ml   Output 600 ml   Net -200 ml       Review of Systems  A full 10 point ROS was performed with pertinent + and - listed above    Physical Exam  Constitutional:       General: She is not in acute distress. Appearance: She is ill-appearing. HENT:      Head: Normocephalic. Comments: Cranial surgical resection site clean without obvious bleeding, erythema, or discharge. Drain removed. IVC access site clean, dressed     Right Ear: External ear normal.      Left Ear: External ear normal.      Nose: Nose normal.      Mouth/Throat:      Mouth: Mucous membranes are moist.      Pharynx: Oropharynx is clear. Eyes:      Extraocular Movements: Extraocular movements intact. Pupils: Pupils are equal, round, and reactive to light. Cardiovascular:      Rate and Rhythm: Normal rate and regular rhythm. Pulses: Normal pulses. Heart sounds: Normal heart sounds. No murmur heard. No gallop. Pulmonary:      Effort: Pulmonary effort is normal. No respiratory distress. Breath sounds: Normal breath sounds. No stridor. No wheezing, rhonchi or rales. Abdominal:      General: Bowel sounds are normal. There is no distension. Palpations: Abdomen is soft. Tenderness: There is no abdominal tenderness. There is no guarding or rebound. Musculoskeletal:      Right lower leg: No edema. Left lower leg: No edema. Skin:     General: Skin is warm. Coloration: Skin is not jaundiced. Findings: No bruising. Neurological:      Mental Status: She is alert and oriented to person, place, and time. Cranial Nerves: No cranial nerve deficit. Motor: Weakness present. Comments: AAO x3; no evidence of aphasia or neglect. Patient has reduced strength 4/5 in RLE, otherwise 5/5. Responds well to questions.     Psychiatric:         Mood and Affect: Mood normal.         Behavior: Behavior normal.         LABS:    CBC:   Recent Labs     03/16/22  0610   WBC 17.2*   HGB 10.4*   HCT 31.8*      MCV 82.7     Renal:    Recent Labs     03/16/22  0609      K 4.2      CO2 26   BUN 17   CREATININE 0.5*   GLUCOSE 127*   CALCIUM 9.1   PHOS 3.2   ANIONGAP 10     Hepatic:   Recent Labs     03/16/22  0609   LABALBU 3.4 Troponin: No results for input(s): TROPONINI in the last 72 hours. BNP: No results for input(s): BNP in the last 72 hours. Lipids: No results for input(s): CHOL, HDL in the last 72 hours. Invalid input(s): LDLCALCU, TRIGLYCERIDE  ABGs:  No results for input(s): PHART, AIH1SIH, PO2ART, HGU4DMG, BEART, THGBART, G7IDUJTK, DCS3KVT in the last 72 hours. INR: No results for input(s): INR in the last 72 hours. Lactate: No results for input(s): LACTATE in the last 72 hours. -----------------------------------------------------------------  RAD:   IR GUIDED IVC FILTER PLACEMENT   Final Result   IMPRESSION :      Normal IVC anatomy. Normal, patent jugular vein. Patent inferior vena cava. Placement of filter in the infra-renal IVC. Please note that this is a retrievable filter. Fluoroscopy time : 1.8 minutes   Number of exposures obtained : 5         Moderate sedation was performed by the physician including the presence of an independent trained observers that assisted in monitoring the patient's level of consciousness and physiological status. Following the administration of Midazolam and Fentanyl    the physician spent continuous face-to-face time with the patient. Sedation start time: 0810   Sedation end time: 0826      Estimated blood loss: Less than 5 mL. VL Extremity Venous Bilateral   Final Result      MRI brain with and without contrast   Final Result      Postoperative changes of left-sided craniotomy with interval resection of left temporal and occipital lobe masses. Small nodular areas of enhancement along the margins of the surgical cavity as detailed above are indeterminate. Stable extensive T2    hyperintense/vasogenic edema in the left parietotemporal occipital region surrounding the resection cavities. Mass effect with effacement of left lateral ventricle and 9 mm midline shift to right. Recommended continued follow-up. CT Head wo Contrast   Final Result   1. Resection cavity is at the locations of the previous masses with persistent adjacent vasogenic edema and persistent midline shift. No progressive mass effect or acute surgical complication otherwise identified. VL Extremity Venous Bilateral   Final Result      CT HEAD WO CONTRAST   Final Result      1.  2 large hemorrhagic partially necrotic masses in the left parieto-temporal and left parieto-occipital regions with marked surrounding edema and mass effect. These demonstrate little change when compared to previous MRI study of 3/5/2022. Associated    prominent mass effect and surrounding edema resulting 11 mm midline shift to the right. MRI BRAIN W WO CONTRAST   Final Result      Two large hemorrhagic partially necrotic heterogeneously enhancing mass in the left parietotemporal and left parieto-occipital region with surrounding vasogenic edema and mass effect. These are indeterminate, most likely relate to multicentric    glioblastoma. However, possibility of metastasis cannot be excluded given the history of breast cancer. 10 mm midline shift to right. CT CHEST ABDOMEN PELVIS W CONTRAST   Final Result      Large, necrotic right breast mass. Right axillary lymphadenopathy. Uterine fibroids. High density urine in the bladder. Assessment/Plan:     Metastatic triple negative breast cancer, now with Intracranial metastasis, Previously diagnosed in March 2021 and treatment declined by the patient. Oncology following, patient decided on treatment. - Will need post-op radiation (2-4 weeks post-op) followed by systemic chemotherapy. - Heme onc consulted, appreciate recs     Intracranial vasogenic edema secondary to metastatic masses ; Symptomatic with short-term memory impairment.   Continue decadron  S/p OPERATIVE PROCEDURES PERFORMED:  1.  Stereotactic left temporal craniotomy for resection of tumor  2.  Stereotactic left parieto-occipital craniotomy for resection of tumor  3.  Intraoperative neuromonitoring (MEP, SSEP, EEG)  4.  Intraoperative Brainlab neuro navigation.  - Continue decadron taper  - Patient can discharge to [facility], prefer [facility] location  - Phenol spray for mouth soreness  - PT/OT  - Has miralax and senna scheduled for post-op constipation, still no BM recorded, added one time mag citrate and lactulose TID for two days  - Neurochecks q4h  - monitor urinary retention, bladder scans, encouraged mobilization     Acute DVT in RLE  Seen on Doppler. Likely 2/2 being off anticoag for procedure, cancer, and recent surgery. Pt is not a candidate for oral anticoagulation given hemorrhagic brain masses s/p resection. - s/p IVC filter placement  - stay off oral anticoag for two weeks post-op    Status epilepticus; Continue cEEG, Continue AEDs; being managed by neurology. Likely 2/2 brain mets. - continue keppra, vimpat     Acute resp failure due to Status epilepticus: unable to protect airway and was intubated, now on RA sating well. GI Px: protonix  DVT Prophylaxis: SubQ heparin, s/p IVC filter placement  Diet: ADULT DIET;  Dysphagia - Soft and Bite Sized   Code Status: Full Code  Dispo: Discharge to [facility] this morning    Compa Way MD, PGY-1  03/17/22  6:41 AM    This patient has been staffed and discussed with Dr. Dorene Chaudhari

## 2022-03-17 NOTE — PLAN OF CARE
Problem: Falls - Risk of:  Goal: Will remain free from falls  Description: Will remain free from falls  3/17/2022 1206 by Raine Demarco RN  Outcome: Ongoing     Problem: Skin Integrity:  Goal: Will show no infection signs and symptoms  Description: Will show no infection signs and symptoms  3/17/2022 1206 by Raine Demarco RN  Outcome: Ongoing     Problem: Skin Integrity:  Goal: Absence of new skin breakdown  Description: Absence of new skin breakdown  3/17/2022 1206 by Raine Demarco RN  Outcome: Ongoing

## 2022-03-17 NOTE — PROGRESS NOTES
Physical Therapy  Daily Treatment Note    Discharge Recommendations: Soren Davila scored a 17/24 on the AM-PAC short mobility form. Current research shows that an AM-PAC score of 17 or less is typically not associated with a discharge to the patient's home setting. Based on the patient's AM-PAC score and their current functional mobility deficits, it is recommended that the patient have 5-7 sessions per week of Physical Therapy at d/c to increase the patient's independence. At this time, this patient demonstrates the endurance, and/or tolerance for 3 hours of therapy each day, with a treatment frequency of 5-7x/wk. Please see assessment section for further patient specific details. Assessment:  Pt with improved gait tolerance today. Mobility slow and cautious with walker. Unsteady at times with turns and backing up to chair. Needs rest breaks between activities. Pt from home with multiple stairs. Was independent prior to admission. Would benefit from continued IP PT at D/C prior to returning home. Plan is for ARU. Equipment Needs: Defer to next level of care    Chart Reviewed: Yes     Other position/activity restrictions: up with assistance   Additional Pertinent Hx: Patient is a 49 y/o female admitted 3/3 with altered mental status. CT chest and abdomen (+) for Large, necrotic right breast mass. Right axillary lymphadenopathy. CT head (+) for Two large hemorrhagic partially necrotic heterogeneously enhancing mass in the left parietotemporal and left parieto-occipital region with surrounding vasogenic edema and mass effect. Neurosurgery recommending resection. Taken to OR on 3/8 but pt having status epilepticus and case had to be cancelled. Taken to OR on 3/12 for crani to resect brain masses. PMH significant for thyroid nodule and breast cancer. Diagnosis: brain metastases   Treatment Diagnosis: impaired mobility associated with brain metastases    Subjective: Pt in bed initially.  Agreeable to working with PT. In good spirits. C/o fatigue today. Reports LEs feel \"weird\". When asked to elaborate, pt stated they feel weak, especially R LE. \"I can't believe how tired that made me. \" (re: ambulation)    Pain: Denies at this time. \"My stomach hurt really bad last night. \"    Objective:    Bed mobility  Supine to sit: SBA, HOB up partially with use of rail. C/o dizziness with initial EOB, but resolved with time. Scooting: CGA to EOB    Transfers  Sit to stand: CGA from bed; CGA from chair; CGA from commode with grab bar. Cues for hand placement with each transfer--pt tends to pull up from walker  Stand to sit: CGA into chair (twice); CGA onto commode with grab bar  Other: Pt needing up to Min assist when turning/backing up to sit, due to unsteadiness. Ambulation  Assistance Level: CGA To Min assist (with turning/backing up)  Assistive device: Wheeled walker  Distance: 18 ft, 5 ft in room (bathroom/sink), 90 ft x 2 in olivares. Lengthy seated rests between walks. Quality of gait: Slow; cautious; step-through pattern; mild unsteadiness at times with turning. Exercises  While seated in chair: 15 reps B hip flexion, hip abd/add, LAQ    Balance  Sitting EOB SBA  Sitting on commode SBA  Static stance with wheeled walker CGA  Ambulation with wheeled walker CGA to Min assist    Patient Education  Hand placement with transfers when using walker. Needs cues/reminders. Calling for assist with needs. Not getting up without assist. Expressed understanding. Safety Devices  Pt left with needs in reach. In chair (reclined) with chair alarm on. RN updated.      AM-PAC score  AM-PAC Inpatient Mobility Raw Score : 17  AM-PAC Inpatient T-Scale Score : 42.13  Mobility Inpatient CMS 0-100% Score: 50.57  Mobility Inpatient CMS G-Code Modifier : CK    Goals: (as determined and assessed by primary PT)  Time Frame for Short term goals: By discharge  Short term goal 1: The pt will perform supine to sit with SBA  MET 3/15   Short term goal 2: The pt will sit EOB x 5 min with SBA  ongoing   Short term goal 3: The pt will perform sit to stand with CGA. MET 3/15  new goal: pt will transfer sit <--> stand with supervision  Short term goal 4: The pt will be able to ambulate with min A and LRAD x 15'  MET 3/15  New goal: Pt will amb 100 ft with LRAD and supervision     Plan:  Times per week: 5-7; Times per day: Daily  Current Treatment Recommendations: Adonna Schaumann Training,Neuromuscular Re-education,Cognitive Reorientation,Patient/Caregiver Education & Training,Balance Training,Gait Training,Functional Mobility Training,Stair training,Safety Education & Training,Home Exercise Program    Therapy Time    Individual  Concurrent  Group  Co-treatment    Time In  855            Time Out  938            Minutes  43              Timed Code Treatment Minutes: 43  Total Treatment Minutes: 43    Will continue per plan of care. If patient is discharged prior to next treatment, this note will serve as the discharge summary.     [place], [place] #[id number]

## 2022-03-17 NOTE — CARE COORDINATION
CTN contacted Samuel Marcelo with [facility], [facility]. [phone]. [facility]. able to accept this patient.  Samuel Marcelo will pull referral from [facility] for [facility] by 3/19  Electronically signed by Karlo Diaz LPN on 1/55/8101 at 77:31 AM

## 2022-03-17 NOTE — PROGRESS NOTES
Patient c/o of feeling like she had to urinate but unable to go. After performing a bladder scan, patient had 742 ml of urine in bladder. Straight cath patient and removed 600 ml of urine from bladder. Patient tolerated well and stated she felt much better. Will continue to monitor patient's progress.

## 2022-03-18 PROBLEM — R56.9 SEIZURE (HCC): Status: ACTIVE | Noted: 2022-03-18

## 2022-03-18 LAB
ANION GAP SERPL CALCULATED.3IONS-SCNC: 12 MMOL/L (ref 3–16)
ANION GAP SERPL CALCULATED.3IONS-SCNC: 13 MMOL/L (ref 3–16)
BACTERIA: ABNORMAL /HPF
BASOPHILS ABSOLUTE: 0 K/UL (ref 0–0.2)
BASOPHILS RELATIVE PERCENT: 0.2 %
BILIRUBIN URINE: NEGATIVE
BLOOD, URINE: NEGATIVE
BUN BLDV-MCNC: 13 MG/DL (ref 7–20)
BUN BLDV-MCNC: 16 MG/DL (ref 7–20)
CALCIUM SERPL-MCNC: 8.9 MG/DL (ref 8.3–10.6)
CALCIUM SERPL-MCNC: 9.2 MG/DL (ref 8.3–10.6)
CHLORIDE BLD-SCNC: 100 MMOL/L (ref 99–110)
CHLORIDE BLD-SCNC: 101 MMOL/L (ref 99–110)
CLARITY: CLEAR
CO2: 24 MMOL/L (ref 21–32)
CO2: 26 MMOL/L (ref 21–32)
COLOR: YELLOW
CREAT SERPL-MCNC: 0.6 MG/DL (ref 0.6–1.1)
CREAT SERPL-MCNC: 0.6 MG/DL (ref 0.6–1.1)
EOSINOPHILS ABSOLUTE: 0 K/UL (ref 0–0.6)
EOSINOPHILS RELATIVE PERCENT: 0.2 %
EPITHELIAL CELLS, UA: ABNORMAL /HPF (ref 0–5)
GFR AFRICAN AMERICAN: >60
GFR AFRICAN AMERICAN: >60
GFR NON-AFRICAN AMERICAN: >60
GFR NON-AFRICAN AMERICAN: >60
GLUCOSE BLD-MCNC: 109 MG/DL (ref 70–99)
GLUCOSE BLD-MCNC: 116 MG/DL (ref 70–99)
GLUCOSE BLD-MCNC: 143 MG/DL (ref 70–99)
GLUCOSE BLD-MCNC: 146 MG/DL (ref 70–99)
GLUCOSE BLD-MCNC: 169 MG/DL (ref 70–99)
GLUCOSE URINE: NEGATIVE MG/DL
HCT VFR BLD CALC: 30.6 % (ref 36–48)
HEMOGLOBIN: 10 G/DL (ref 12–16)
KEPPRA DOSE AMT: ABNORMAL
KEPPRA: 54.6 UG/ML (ref 6–46)
KETONES, URINE: NEGATIVE MG/DL
LACTIC ACID: 2.4 MMOL/L (ref 0.4–2)
LEUKOCYTE ESTERASE, URINE: NEGATIVE
LYMPHOCYTES ABSOLUTE: 0.8 K/UL (ref 1–5.1)
LYMPHOCYTES RELATIVE PERCENT: 6.3 %
MCH RBC QN AUTO: 27.4 PG (ref 26–34)
MCHC RBC AUTO-ENTMCNC: 32.5 G/DL (ref 31–36)
MCV RBC AUTO: 84.2 FL (ref 80–100)
MICROSCOPIC EXAMINATION: YES
MONOCYTES ABSOLUTE: 0.7 K/UL (ref 0–1.3)
MONOCYTES RELATIVE PERCENT: 5.4 %
MUCUS: ABNORMAL /LPF
NEUTROPHILS ABSOLUTE: 11.8 K/UL (ref 1.7–7.7)
NEUTROPHILS RELATIVE PERCENT: 87.9 %
NITRITE, URINE: NEGATIVE
PDW BLD-RTO: 15.1 % (ref 12.4–15.4)
PERFORMED ON: ABNORMAL
PH UA: 6 (ref 5–8)
PLATELET # BLD: 327 K/UL (ref 135–450)
PMV BLD AUTO: 7.3 FL (ref 5–10.5)
POTASSIUM REFLEX MAGNESIUM: 3.7 MMOL/L (ref 3.5–5.1)
POTASSIUM SERPL-SCNC: 4.9 MMOL/L (ref 3.5–5.1)
PROTEIN UA: ABNORMAL MG/DL
RBC # BLD: 3.64 M/UL (ref 4–5.2)
RBC UA: ABNORMAL /HPF (ref 0–4)
SODIUM BLD-SCNC: 137 MMOL/L (ref 136–145)
SODIUM BLD-SCNC: 139 MMOL/L (ref 136–145)
SPECIFIC GRAVITY UA: >=1.03 (ref 1–1.03)
URINE TYPE: ABNORMAL
UROBILINOGEN, URINE: 0.2 E.U./DL
WBC # BLD: 13.4 K/UL (ref 4–11)
WBC UA: ABNORMAL /HPF (ref 0–5)

## 2022-03-18 PROCEDURE — G0378 HOSPITAL OBSERVATION PER HR: HCPCS

## 2022-03-18 PROCEDURE — 6370000000 HC RX 637 (ALT 250 FOR IP): Performed by: NURSE PRACTITIONER

## 2022-03-18 PROCEDURE — 80177 DRUG SCRN QUAN LEVETIRACETAM: CPT

## 2022-03-18 PROCEDURE — 6370000000 HC RX 637 (ALT 250 FOR IP): Performed by: INTERNAL MEDICINE

## 2022-03-18 PROCEDURE — 2580000003 HC RX 258: Performed by: INTERNAL MEDICINE

## 2022-03-18 PROCEDURE — 6360000002 HC RX W HCPCS: Performed by: INTERNAL MEDICINE

## 2022-03-18 PROCEDURE — APPNB45 APP NON BILLABLE 31-45 MINUTES: Performed by: NURSE PRACTITIONER

## 2022-03-18 PROCEDURE — 80048 BASIC METABOLIC PNL TOTAL CA: CPT

## 2022-03-18 PROCEDURE — 81001 URINALYSIS AUTO W/SCOPE: CPT

## 2022-03-18 PROCEDURE — 36415 COLL VENOUS BLD VENIPUNCTURE: CPT

## 2022-03-18 PROCEDURE — 96372 THER/PROPH/DIAG INJ SC/IM: CPT

## 2022-03-18 PROCEDURE — 85025 COMPLETE CBC W/AUTO DIFF WBC: CPT

## 2022-03-18 RX ORDER — LORAZEPAM 2 MG/ML
1 INJECTION INTRAMUSCULAR EVERY 4 HOURS PRN
Status: DISCONTINUED | OUTPATIENT
Start: 2022-03-18 | End: 2022-03-20 | Stop reason: HOSPADM

## 2022-03-18 RX ORDER — DEXAMETHASONE 4 MG/1
4 TABLET ORAL DAILY
Status: DISCONTINUED | OUTPATIENT
Start: 2022-03-22 | End: 2022-03-18

## 2022-03-18 RX ORDER — SODIUM CHLORIDE 0.9 % (FLUSH) 0.9 %
5-40 SYRINGE (ML) INJECTION PRN
Status: DISCONTINUED | OUTPATIENT
Start: 2022-03-18 | End: 2022-03-20 | Stop reason: HOSPADM

## 2022-03-18 RX ORDER — DEXAMETHASONE 4 MG/1
4 TABLET ORAL EVERY 12 HOURS SCHEDULED
Status: DISCONTINUED | OUTPATIENT
Start: 2022-03-18 | End: 2022-03-18

## 2022-03-18 RX ORDER — DEXTROSE MONOHYDRATE 25 G/50ML
12.5 INJECTION, SOLUTION INTRAVENOUS PRN
Status: DISCONTINUED | OUTPATIENT
Start: 2022-03-18 | End: 2022-03-18 | Stop reason: CLARIF

## 2022-03-18 RX ORDER — NICOTINE POLACRILEX 4 MG
15 LOZENGE BUCCAL PRN
Status: DISCONTINUED | OUTPATIENT
Start: 2022-03-18 | End: 2022-03-20 | Stop reason: HOSPADM

## 2022-03-18 RX ORDER — LORAZEPAM 0.5 MG/1
0.5 TABLET ORAL DAILY
Status: DISCONTINUED | OUTPATIENT
Start: 2022-03-20 | End: 2022-03-20 | Stop reason: HOSPADM

## 2022-03-18 RX ORDER — ONDANSETRON 4 MG/1
4 TABLET, ORALLY DISINTEGRATING ORAL EVERY 8 HOURS PRN
Status: DISCONTINUED | OUTPATIENT
Start: 2022-03-18 | End: 2022-03-20 | Stop reason: HOSPADM

## 2022-03-18 RX ORDER — DEXTROSE MONOHYDRATE 50 MG/ML
100 INJECTION, SOLUTION INTRAVENOUS PRN
Status: DISCONTINUED | OUTPATIENT
Start: 2022-03-18 | End: 2022-03-20 | Stop reason: HOSPADM

## 2022-03-18 RX ORDER — DEXAMETHASONE 4 MG/1
4 TABLET ORAL DAILY
Status: DISCONTINUED | OUTPATIENT
Start: 2022-03-22 | End: 2022-03-20 | Stop reason: HOSPADM

## 2022-03-18 RX ORDER — INSULIN LISPRO 100 [IU]/ML
0-6 INJECTION, SOLUTION INTRAVENOUS; SUBCUTANEOUS
Status: DISCONTINUED | OUTPATIENT
Start: 2022-03-18 | End: 2022-03-20 | Stop reason: HOSPADM

## 2022-03-18 RX ORDER — LACOSAMIDE 50 MG/1
200 TABLET ORAL 2 TIMES DAILY
Status: DISCONTINUED | OUTPATIENT
Start: 2022-03-18 | End: 2022-03-20 | Stop reason: HOSPADM

## 2022-03-18 RX ORDER — SODIUM CHLORIDE 9 MG/ML
25 INJECTION, SOLUTION INTRAVENOUS PRN
Status: DISCONTINUED | OUTPATIENT
Start: 2022-03-18 | End: 2022-03-20 | Stop reason: HOSPADM

## 2022-03-18 RX ORDER — POLYETHYLENE GLYCOL 3350 17 G/17G
17 POWDER, FOR SOLUTION ORAL DAILY PRN
Status: DISCONTINUED | OUTPATIENT
Start: 2022-03-18 | End: 2022-03-20 | Stop reason: HOSPADM

## 2022-03-18 RX ORDER — DEXAMETHASONE 4 MG/1
4 TABLET ORAL EVERY 12 HOURS SCHEDULED
Status: DISCONTINUED | OUTPATIENT
Start: 2022-03-18 | End: 2022-03-20 | Stop reason: HOSPADM

## 2022-03-18 RX ORDER — ACETAMINOPHEN 650 MG/1
650 SUPPOSITORY RECTAL EVERY 6 HOURS PRN
Status: DISCONTINUED | OUTPATIENT
Start: 2022-03-18 | End: 2022-03-20 | Stop reason: HOSPADM

## 2022-03-18 RX ORDER — ACETAMINOPHEN 325 MG/1
650 TABLET ORAL EVERY 6 HOURS PRN
Status: DISCONTINUED | OUTPATIENT
Start: 2022-03-18 | End: 2022-03-20 | Stop reason: HOSPADM

## 2022-03-18 RX ORDER — ONDANSETRON 2 MG/ML
4 INJECTION INTRAMUSCULAR; INTRAVENOUS EVERY 6 HOURS PRN
Status: DISCONTINUED | OUTPATIENT
Start: 2022-03-18 | End: 2022-03-20 | Stop reason: HOSPADM

## 2022-03-18 RX ORDER — SODIUM CHLORIDE 0.9 % (FLUSH) 0.9 %
5-40 SYRINGE (ML) INJECTION EVERY 12 HOURS SCHEDULED
Status: DISCONTINUED | OUTPATIENT
Start: 2022-03-18 | End: 2022-03-20 | Stop reason: HOSPADM

## 2022-03-18 RX ORDER — LORAZEPAM 0.5 MG/1
0.5 TABLET ORAL EVERY 12 HOURS
Status: COMPLETED | OUTPATIENT
Start: 2022-03-18 | End: 2022-03-19

## 2022-03-18 RX ORDER — INSULIN LISPRO 100 [IU]/ML
0-3 INJECTION, SOLUTION INTRAVENOUS; SUBCUTANEOUS NIGHTLY
Status: DISCONTINUED | OUTPATIENT
Start: 2022-03-18 | End: 2022-03-20 | Stop reason: HOSPADM

## 2022-03-18 RX ORDER — PANTOPRAZOLE SODIUM 40 MG/1
40 TABLET, DELAYED RELEASE ORAL
Status: DISCONTINUED | OUTPATIENT
Start: 2022-03-18 | End: 2022-03-20 | Stop reason: HOSPADM

## 2022-03-18 RX ORDER — OXYCODONE HYDROCHLORIDE 5 MG/1
5 TABLET ORAL EVERY 6 HOURS PRN
Status: DISCONTINUED | OUTPATIENT
Start: 2022-03-18 | End: 2022-03-20 | Stop reason: HOSPADM

## 2022-03-18 RX ADMIN — DEXAMETHASONE 4 MG: 4 TABLET ORAL at 21:45

## 2022-03-18 RX ADMIN — LACOSAMIDE 200 MG: 50 TABLET, FILM COATED ORAL at 21:45

## 2022-03-18 RX ADMIN — SODIUM CHLORIDE, PRESERVATIVE FREE 10 ML: 5 INJECTION INTRAVENOUS at 09:47

## 2022-03-18 RX ADMIN — LACOSAMIDE 200 MG: 50 TABLET, FILM COATED ORAL at 09:46

## 2022-03-18 RX ADMIN — DEXAMETHASONE 4 MG: 4 TABLET ORAL at 09:45

## 2022-03-18 RX ADMIN — LEVETIRACETAM 2500 MG: 250 TABLET, FILM COATED ORAL at 21:45

## 2022-03-18 RX ADMIN — ENOXAPARIN SODIUM 40 MG: 40 INJECTION SUBCUTANEOUS at 09:45

## 2022-03-18 RX ADMIN — LORAZEPAM 0.5 MG: 0.5 TABLET ORAL at 21:45

## 2022-03-18 RX ADMIN — OXYCODONE 5 MG: 5 TABLET ORAL at 14:21

## 2022-03-18 RX ADMIN — LORAZEPAM 0.5 MG: 0.5 TABLET ORAL at 12:20

## 2022-03-18 RX ADMIN — LEVETIRACETAM 2000 MG: 750 TABLET, FILM COATED ORAL at 09:45

## 2022-03-18 RX ADMIN — INSULIN LISPRO 1 UNITS: 100 INJECTION, SOLUTION INTRAVENOUS; SUBCUTANEOUS at 22:24

## 2022-03-18 RX ADMIN — ACETAMINOPHEN 325MG 650 MG: 325 TABLET ORAL at 09:46

## 2022-03-18 ASSESSMENT — PAIN DESCRIPTION - FREQUENCY
FREQUENCY: CONTINUOUS
FREQUENCY: CONTINUOUS

## 2022-03-18 ASSESSMENT — PAIN DESCRIPTION - PROGRESSION
CLINICAL_PROGRESSION: NOT CHANGED

## 2022-03-18 ASSESSMENT — PAIN DESCRIPTION - ONSET
ONSET: ON-GOING
ONSET: ON-GOING

## 2022-03-18 ASSESSMENT — PAIN SCALES - GENERAL
PAINLEVEL_OUTOF10: 5
PAINLEVEL_OUTOF10: 3
PAINLEVEL_OUTOF10: 2
PAINLEVEL_OUTOF10: 0
PAINLEVEL_OUTOF10: 0
PAINLEVEL_OUTOF10: 8

## 2022-03-18 ASSESSMENT — PAIN DESCRIPTION - DESCRIPTORS
DESCRIPTORS: ACHING;HEADACHE
DESCRIPTORS: ACHING

## 2022-03-18 ASSESSMENT — PAIN DESCRIPTION - LOCATION
LOCATION: HEAD
LOCATION: HEAD;LEG

## 2022-03-18 ASSESSMENT — PAIN DESCRIPTION - PAIN TYPE
TYPE: SURGICAL PAIN
TYPE: ACUTE PAIN;SURGICAL PAIN

## 2022-03-18 ASSESSMENT — ENCOUNTER SYMPTOMS
SHORTNESS OF BREATH: 0
ABDOMINAL PAIN: 0
COUGH: 0
VOMITING: 0
WHEEZING: 0
DIARRHEA: 0
CHEST TIGHTNESS: 0
NAUSEA: 0

## 2022-03-18 ASSESSMENT — PAIN DESCRIPTION - ORIENTATION
ORIENTATION: UPPER;LOWER;MID
ORIENTATION: RIGHT

## 2022-03-18 ASSESSMENT — PAIN - FUNCTIONAL ASSESSMENT
PAIN_FUNCTIONAL_ASSESSMENT: ACTIVITIES ARE NOT PREVENTED
PAIN_FUNCTIONAL_ASSESSMENT: ACTIVITIES ARE NOT PREVENTED

## 2022-03-18 NOTE — CONSULTS
[facility] Nurse  Consult Note       NAME:  Franklyn Hays  MEDICAL RECORD NUMBER:  [id number]  AGE: 48 y.o. GENDER: female  : 1975  TODAY'S DATE:  3/18/2022    Subjective   Reason for WOCN Evaluation and Assessment: R Breast Malignancy      Franklyn Hays is a 48 y.o. female referred by:   [] Physician  [x] Nursing  [] Other:     Wound Identification:  Wound Type: malignant wound  Contributing Factors: Breast Cancer with mets    Wound History: Franklyn Hays is 48 y.o. female who presented with complaint of seizure. Symptom onset was acute for a time period of 1 day. The severity is described as moderate. The course of his symptoms over time is resolved. The symptoms improved with none and worsened with none. The patient's symptom is associated with confusion and postictal state.     Franklyn Hays is 48 y.o. female with history of recent 3/9 left temporal parietal occipital craniotomy for tumor resection  Her procedure was aborted due to seizure and status epilepticus requiring intubation and ICU stay  Craniotomy then completed on 3/12  Since then, following her procedure she has been seizure-free  This was for metastatic mass from her breast cancer  In addition patient was recently started on Decadron taper dose for cerebral vasogenic edema due to metastasis  She now presents from [facility] rehab to the ER with complaint of seizures.   Patient had a 10-minute grand mal seizure as a [facility]  On interview, she has no recollection of her seizure episode  In the ED she was drowsy but at the time of my interview she was more awake and able to answer questions  She is admitted for further evaluation by neurology  Current Wound Care Treatment: R Breast - Xeroform, abd    Patient Goal of Care:  [x] Wound Healing  [] Odor Control  [] Palliative Care  [] Pain Control   [] Other:         PAST MEDICAL HISTORY        Diagnosis Date    Thyroid nodule        PAST SURGICAL HISTORY    Past Surgical History:   Procedure Laterality Date    CRANIOTOMY Left 3/12/2022    LEFT TEMPORAL PARIETAL OCCIPITAL CRANIOTOMY FOR TUMOR RESECTION performed by Joleen De La Cruz MD at [address] IR IVC FILTER PLACEMENT W IMAGING N/A 03/16/2022    [facility]    IR IVC FILTER PLACEMENT W IMAGING  3/16/2022    IR IVC FILTER PLACEMENT W IMAGING 3/16/2022 [facility]       FAMILY HISTORY    Family History   Problem Relation Age of Onset    Cancer Father        SOCIAL HISTORY    Social History     Tobacco Use    Smoking status: Never Smoker    Smokeless tobacco: Never Used   Substance Use Topics    Alcohol use: Never     Alcohol/week: 0.0 standard drinks    Drug use: Never       ALLERGIES    No Known Allergies    MEDICATIONS    No current facility-administered medications on file prior to encounter. Current Outpatient Medications on File Prior to Encounter   Medication Sig Dispense Refill    pantoprazole (PROTONIX) 40 MG tablet Take 1 tablet by mouth every morning (before breakfast) 30 tablet 3    lacosamide (VIMPAT) 200 MG tablet Take 1 tablet by mouth 2 times daily for 30 days.  60 tablet 0    levETIRAcetam (KEPPRA) 1000 MG tablet Take 2 tablets by mouth 2 times daily 60 tablet 3    dexamethasone (DECADRON) 4 MG tablet Take 1 tablet by mouth every 12 hours for 6 doses 6 tablet 0    [START ON 3/22/2022] dexamethasone (DECADRON) 4 MG tablet Take 1 tablet by mouth daily for 3 doses 3 tablet 0    dexamethasone (DECADRON) 4 MG tablet Take 1 tablet by mouth every 8 hours for 1 day 3 tablet 0       Objective    /75   Pulse 107   Temp 98.5 °F (36.9 °C) (Oral)   Resp 16   Ht 5' 3\" (1.6 m)   Wt 182 lb (82.6 kg)   SpO2 95%   BMI 32.24 kg/m²     LABS:  WBC:    Lab Results   Component Value Date    WBC 13.4 03/18/2022     H/H:    Lab Results   Component Value Date    HGB 10.0 03/18/2022    HCT 30.6 03/18/2022     PTT:    Lab Results   Component Value Date    APTT 31.5 03/07/2022   [APTT}  PT/INR:    Lab Results   Component Value Date    PROTIME 11.9 03/11/2022    INR 1.05 03/11/2022     HgBA1c:  No results found for: LABA1C    Assessment: R Breast - large mass, bleeding, no odor, periwound is indurated, with erythema and is warm to the touch   Ike Risk Score: Ike Scale Score: 19    Patient Active Problem List   Diagnosis Code    Thyroid nodule E04.1    Brain metastases (HCC) C79.31    Brain mass G93.89    Status epilepticus (HCC) G40.901    Duct cell carcinoma (HCC) C80.1    Cerebral edema (HCC) G93.6    Seizure (HCC) R56.9       Measurements:  Wound 03/04/22 Breast Lower;Right Large mass (Active)   Wound Image   03/18/22 1438   Wound Etiology Other 03/18/22 1438   Dressing Status New drainage noted;New dressing applied 03/18/22 1438   Wound Cleansed Cleansed with saline 03/18/22 1438   Dressing/Treatment Xeroform;ABD 03/18/22 1438   Dressing Change Due 03/19/22 03/18/22 1438   Wound Length (cm) 10 cm 03/18/22 1438   Wound Width (cm) 9.5 cm 03/18/22 1438   Wound Depth (cm) 0 cm 03/18/22 1438   Wound Surface Area (cm^2) 95 cm^2 03/18/22 1438   Wound Volume (cm^3) 0 cm^3 03/18/22 1438   Wound Assessment Bleeding; Other (Comment) 03/18/22 1438   Drainage Amount Small 03/18/22 1438   Drainage Description Sanguinous 03/18/22 1438   Odor None 03/18/22 1438   Lety-wound Assessment Induration; Non-blanchable erythema; Warm 03/18/22 1438   Margins Attached edges; Defined edges 03/18/22 1438   Number of days: 14   R Breast:        Incision 03/12/22 Head Upper;Left (Active)   Dressing Status Clean;Dry; Intact 03/16/22 1500   Incision Cleansed Soap and water; Other (Comment) 03/15/22 1036   Dressing/Treatment Open to air 03/18/22 1100   Closure Staples 03/18/22 1100   Margins Approximated 03/18/22 1100   Incision Assessment Dry 03/18/22 1100   Drainage Amount None 03/18/22 1100   Odor None 03/18/22 1100   Lety-incision Assessment Intact 03/18/22 1100   Number of days: 6 Response to treatment:  Well tolerated by patient. Pain Assessment:  Severity:  0 / 10  Quality of pain: N/A  Wound Pain Timing/Severity: none  Premedicated: No    Plan   Plan of Care: Wound 03/04/22 Breast Lower;Right Large mass-Dressing/Treatment: Xeroform,ABD   Recommendation: R Breast - clean with NS, dry, cover with Xeroform, Abd pad, paper tape, change daily  Call Wound Care for deterioration [phone]    Specialty Bed Required : No   [] Low Air Loss   [] Pressure Redistribution  [] Fluid Immersion  [] Bariatric  [] Total Pressure Relief  [] Other:     Current Diet: ADULT DIET;  Regular  Dietician consult:  No    Discharge Plan:  Placement for patient upon discharge: skilled nursing    Patient appropriate for Outpatient [address]: No    Referrals:  [x]   [] [facility]  [] Supplies  [] Other    Patient/Caregiver Teaching:  Level of patient/caregiver understanding able to:   [] Indicates understanding       [] Needs reinforcement  [] Unsuccessful      [] Verbal Understanding  [] Demonstrated understanding       [] No evidence of learning  [] Refused teaching         [] N/A       Electronically signed by Pierre Silva RN, Naida Hernandez on 3/18/2022 at 2:40 PM

## 2022-03-18 NOTE — PROGRESS NOTES
Pt VSS on room air. Pt is AAO to self with intermittent confusion. About an hour after receiving scheduled ativan, Pt reports seeing \"bunnies floating around her head\" and also reports some visual changes that are worse on her right side. MD and Neurology/Neurosx made aware. Pt reports feeling sleepy and just wants to sleep. Pt has mass on right breast, wound care consulted, dressing changes daily. Pt is ambulating assist x1 with walker and gb and tolerating well. Pt reported some dizziness when ambulating. Pt endorsing pain 8/10 in her right leg and head, pain being treated with PRN pain medications and Tylenol with some relief. Pt is voiding adequately and denies any pain or discomfort when urinating. Pt is tolerating oral diet and PO fluids well, denies N/V. Pt is resting in bed with eyes closed. All fall precautions in place.

## 2022-03-18 NOTE — PROGRESS NOTES
4 Eyes Admission Assessment     I agree as the admission nurse that 2 RN's have performed a thorough Head to Toe Skin Assessment on the patient. ALL assessment sites listed below have been assessed on admission. Areas assessed by both nurses:   [x]   Head, Face, and Ears   [x]   Shoulders, Back, and Chest  [x]   Arms, Elbows, and Hands   [x]   Coccyx, Sacrum, and Ischium  [x]   Legs, Feet, and Heels        Does the Patient have Skin Breakdown?   No       Dressing to right breast     Ike Prevention initiated:  NA   Wound Care Orders initiated:  NA      WOC nurse consulted for Pressure Injury (Stage 3,4, Unstageable, DTI, NWPT, and Complex wounds) or Ike score 18 or lower:  NA      Nurse 1 eSignature: Electronically signed by Gaviota Carrington RN on 3/18/22 at 3:19 AM EDT    **SHARE this note so that the co-signing nurse is able to place an eSignature**    Nurse 2 eSignature: Electronically signed by Kaia Fernandez RN on 3/18/22 at 4:10 AM EDT

## 2022-03-18 NOTE — CARE COORDINATION
Case Management Assessment           Initial Evaluation                Date / Time of Evaluation: 3/18/2022 5:35 PM                 Assessment Completed by: Slava Chen RN     Patient re-admitted from [facility] at [place]. I spoke with Reva Guerrero at [facility] [phone] and she can return there at d/c. When I spoke with the patient she was not sure if she wanted to return there but will think about it. Patient Name: Belle Hall     YOB: 1975  Diagnosis: Seizure [facility]) [R56.9]     Date / Time: 3/17/2022 10:36 PM    Patient Admission Status: Inpatient    If patient is discharged prior to next notation, then this note serves as note for discharge by case management. Current PCP: JUDITH KIMBROUGH CNP  Clinic Patient: No    Chart Reviewed: Yes  Patient/ Family Interviewed: Yes    Initial assessment completed at bedside with: patient    Hospitalization in the last 30 days: Yes    Emergency Contacts:  Extended Emergency Contact Information  Primary Emergency Contact: Hailee Ramírez  Address: [address] [place] [address] Phone: [phone]  Relation: Parent    Advance Directives:   Code Status: Full Code    Healthcare Power of : No  Agent: NA  Contact Number: NA      Financial  Payor: Steve Shaffer / Plan: Steve Shaffer / Product Type: *No Product type* /     Pre-cert required for SNF: Yes    Pharmacy    [address] [phone] Camille Lind [phone] [address] [address]  Phone: [phone] Fax: [phone]      Potential assistance Purchasing Medications: Potential Assistance Purchasing Medications: No  Does Patient want to participate in local refill/ meds to beds program?:      Meds To Beds General Rules:  1. Can ONLY be done Monday- Friday between 8:30am-5pm  2.  Prescription(s) must be in pharmacy by 3pm to be filled same day  3. Copy of patient's insurance/ prescription drug card and patient face sheet must be sent along with the prescription(s)  4. Cost of Rx cannot be added to hospital bill. If financial assistance is needed, please contact unit  or ;  or  CANNOT provide pharmacy voucher for patients co-pays  5. Patients can then  the prescription on their way out of the hospital at discharge, or pharmacy can deliver to the bedside if staff is available. (payment due at time of pick-up or delivery - cash, check, or card accepted)     Able to afford home medications/ co-pay costs: Yes    ADLS  Support Systems: Parent    PT AM-PAC:   /24  OT AM-PAC:   /24    [place]: home  Steps: unsure    Plans to RETURN to current housing: Yes  Barriers to RETURNING to current housing: none noted    DISCHARGE PLAN:  Disposition: Inpatient Rehab: [facility] Phone: [phone] Fax: NA    Transportation PLAN for discharge: EMS transportation     Factors facilitating achievement of predicted outcomes: Family support, Cooperative and Pleasant    Barriers to discharge: neurology work up    Additional Case Management Notes: NA    The Plan for Transition of Care is related to the following treatment goals of Seizure ([facility] [address] [R56.9]    The Patient and/or patient representative Sean Marcial and her family were provided with a choice of provider and agrees with the discharge plan Yes    Freedom of choice list was provided with basic dialogue that supports the patient's individualized plan of care/goals and shares the quality data associated with the providers.  Yes    Care Transition patient: No    Kenny Rasheed RN  [facility] [employer]  Case Management Department  Ph: [phone]   Fax: [phone]

## 2022-03-18 NOTE — ED PROVIDER NOTES
ED Attending Attestation Note     Date of evaluation: 3/17/2022    This patient was seen by the advance practice provider. I have seen and examined the patient, agree with the workup, evaluation, management and diagnosis. The care plan has been discussed. My assessment reveals A&O, clear lungs, non-tender abdomen, follows commands and answers yes no appropriately but otherwise appears to have dense expressive aphasia.      Obdulio Dougherty MD  03/17/22 2016

## 2022-03-18 NOTE — PLAN OF CARE
Problem: Falls - Risk of:  Goal: Will remain free from falls  Description: Will remain free from falls  3/18/2022 1333 by Waverly Schlatter, RN  Outcome: Ongoing  All fall precautions in place. Bed locked and in lowest position with alarm on. Overbed table and personal belonings within reach. Call light within reach and patient instructed to use call light for assistance. Non-skid socks on. Problem: Self-Concept:  Goal: Level of anxiety will decrease  Description: Level of anxiety will decrease  3/18/2022 1333 by Waverly Schlatter, RN  Outcome: Ongoing   Pt is calm, quiet, and in recliner. Pt has not expressed any anxiety this shift. Spent extra time with Pt to allow time to express herself and provide emotional support, but Pt states she is sleepy and doesn't want to watch TV and would like to sleep.

## 2022-03-18 NOTE — PROGRESS NOTES
47 yo female with hx of recent craniotomy, seizures, presented from SNF with seizure activity. Feels ok, not much pain. No seizures. No chest pain, cough, sob, abdominal pian, n/v, diarrhea or urinary symptoms. Has been seeing bunnies that aren't there. Appear to be in the bathroom and elsewhere in the room. Says this started last night. Has just had a dose of Ativan. Seizure episode  Hx of metastatic bran cancer  Status post craniotomy left temporal parietal occipital craniotomy for tumor resection  Acute DVT, involving the right leg - s/p IVC filter    Neurosurgery, neurology consulted  Stable from neurosurgical perspective  Neurology modifying AEDs  Continues on Decadron Taper  May have some delirium from steroids?    Consider low dose Seroquel if further episodes    Doll Cluster, DO

## 2022-03-18 NOTE — PROGRESS NOTES
Patient admitted to room 5513. Patient is alert to self, and year. Patient states she thought she was still at [facility] and is unsure of what happened today. Patient has a clean dry and intact incision to her head. Patient has a dressing to the right breast with minimal bleeding. Patient states she just feels very tired. Patient has no numbness or tingling and all extremities are moving appropriately. VSS. Patient is in bed with seizure precautions and all fall precautions in place. Will continue to monitor.

## 2022-03-18 NOTE — CONSULTS
NEUROLOGY / NEUROCRITICAL CARE CONSULT NOTE       Cayla Ardon DO is requesting this consult. Reason for Consult: Seizure  Admission Chief Complaint: Seizures        History of Present Illness     Margot Tang is a 48 y.o. y/o female with metastatic breast CA who presents with presents back from rehab with seizures. Patient is status post craniotomy for resection of metastatic lesions on 3/12/2020. Patient with seizures preoperatively that were difficult to control. Prior to discharge to rehab patient was awake alert oriented mildly weak on the right side. Otherwise doing well. She was discharged to rehab on 3/17/2022 and returned later that day due to prolonged seizure at the rehab center. Patient was postictal in the ER, upon my exam patient was back to baseline. REVIEW OF SYSTEMS:   Constitutional- No weight loss or fevers   Eyes- No diplopia. No photophobia. Ears/nose/throat- No dysphagia. No Dysarthria   Cardiovascular- No palpitations. No chest pain   Respiratory- No dyspnea. No Cough   Gastrointestinal- No Abdominal pain. No Vomiting. Genitourinary- No incontinence. No urinary retention   Musculoskeletal- No myalgia. No arthralgia   Skin- No rash. No easy bruising. Psychiatric- No depression. No anxiety   Endocrine- No diabetes. No thyroid issues. Hematologic- No bleeding difficulty. No fatigue   Neurologic-denies headache or vision changes.     Past Medical, Surgical, Family, and Social History   PAST MEDICAL HISTORY:  Past Medical History:   Diagnosis Date    Thyroid nodule      SURGICAL HISTORY:  Past Surgical History:   Procedure Laterality Date    CRANIOTOMY Left 3/12/2022    LEFT TEMPORAL PARIETAL OCCIPITAL CRANIOTOMY FOR TUMOR RESECTION performed by Sarah Roberts MD at [address] IR IVC FILTER PLACEMENT W IMAGING N/A 03/16/2022    [facility]    IR IVC FILTER PLACEMENT W IMAGING  3/16/2022    IR IVC FILTER PLACEMENT W IMAGING 3/16/2022 [address] extinction on confrontational testing  CN 3,4,6: Pupils equal and reactive to light, extraocular muscles intact  CN5: Facial sensation symmetric   CN7: Face symmetric  CN8: Hearing symmetric to spoken voice  CN9: Palate elevated symmetrically  CN11: Traps full strength on shoulder shrug  CN12: Tongue midline with protrusion    Motor Exam:  Mild R hemiparesis  L side 5/5    Sensory: light touch intact and symmetric in all 4 extremities. Tone: normal in all 4 extremities    Cardiovascular: Warm, appears well perfused   Respiratory: Easy, non-labored respiratory pattern   Abdominal: Abdomen is without distention   Extremities: Upper and lower extremities are atraumatic in appearance without deformity. Diagnostic Results   IMAGES:  Images personally reviewed and agree w/ radiology interpretation. Impression       1.  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 No evidence of acute hemorrhage or    infarct. LABS:  All results below personally reviewed. Pertinent positives & negatives are addressed in Impression & Recommendations below. LABS   Metabolic Panel Recent Labs     03/16/22  0609 03/16/22  0609 03/17/22  0611 03/17/22  2345 03/18/22  0552      < > 137 137 139   K 4.2   < > 4.9 3.7 4.9      < > 103 100 101   CO2 26   < > 24 24 26   BUN 17   < > 15 16 13   CREATININE 0.5*   < > 0.6 0.6 0.6   GLUCOSE 127*   < > 122* 146* 143*   CALCIUM 9.1   < > 9.1 8.9 9.2   LABALBU 3.4  --  3.6  --   --    PHOS 3.2  --  3.4  --   --     < > = values in this interval not displayed.       CBC / Coags Recent Labs     03/17/22  0610 03/17/22  2345 03/18/22  0552   WBC 14.4* 15.5* 13.4*   RBC 3.89* 3.82* 3.64*   HGB 10.8* 10.5* 10.0*   HCT 32.3* 31.9* 30.6*    314 327      Other No results found for: LABA1C, LDLCALC, TRIG, TSH, [id number], FOLATE, LABSALI, COVID19  Lab Results   Component Value Date    KEPPRA 54.6 03/18/2022    LACTA 2.4 03/17/2022          CURRENT SCHEDULED MEDICATIONS   Inpatient Medications     sodium chloride flush, 5-40 mL, IntraVENous, 2 times per day    enoxaparin, 40 mg, SubCUTAneous, Daily    levETIRAcetam, 2,000 mg, Oral, BID    lacosamide, 200 mg, Oral, BID    pantoprazole, 40 mg, Oral, QAM AC    dexamethasone, 4 mg, Oral, 2 times per day    [START ON 3/22/2022] dexamethasone, 4 mg, Oral, Daily   Infusions    sodium chloride        Antibiotics   Recent Abx Admin      No antibiotic orders with administrations found. IMPRESSION & RECOMMENDATIONS     IMPRESSION:  Patient is a 49 y/o F w/ metastatic breast CA s/p resection of multiple brain mets by Dr. Eliceo Montalvo. Discharged to rehab yesterday and returned last evening after patient had seizure at rehab facility. Mental status back to baseline, patient still feels somewhat groggy otherwise doing well. Patient also complains of seeing bunnies in her room. She states this started yesterday. RECOMMENDATIONS:  Recommend continuing Vimpat 200mg BID  Increase Keppra to 2000 mg / 2500 mg  Continue decadron wean per neurosurgery recommendations   PT/OT/SLP  PM&R consult for admission to [facility] unit  Quick ativan taper 0.5mg Q12 x 2 days, then daily x 2 days  PRN ativan for seizure activity  Unsure significance of both knees, could this be seizure activity versus delirium. Increased Keppra and added on Ativan bridge. Patient may benefit from low-dose Seroquel at night as well if there is any other concerns of delirium. Thank you for consult. We will sign off. Please call with questions.     JUDITH Rosado - CNP   Neurology & Neurocritical Care   Neurology Line: [phone]  [employer]: [facility] NPs  3/18/2022 10:05 AM

## 2022-03-18 NOTE — CONSULTS
NEUROSURGERY CONSULT NOTE    Mary Pal  [id number]   1975   3/18/2022    Requesting physician: Aquiles Gaxiola MD    Reason for consultation: Seizure s/p cranial surgery    History of present illness: Patient is a 48 y.o. female that  has a past medical history of Thyroid nodule, breast cancer with mets to the brain, and s/p Left temporal parietal occipital craniotomy for tumor resection by Dr. Bernardo Finnegan on 3/12/2022. Patient presented on 3/17/2022 to [facility] ED s/p seizure at [facility] ARU. Patient does not have any recollection of the events so information provided by previous physician and nursing notes. Patient was noted to [facility] and was subsequently transferred to the hospital for evaluation. During the initial evaluation in the ED the patient was drowsy, but alert to person, year, and that she was at the hospital. Neurosurgery was consulted due to recent surgical intervention and recommended admission for ongoing evaluation of seizures and evaluation by neurology service as she is having breakthrough seizures but despite being on Keppra and Vimpat. ROS:   GENERAL:  Denies fever or recent illness.  Denies weight changes   EYES:  Denies vision change or diplopia  EARS:  Denies hearing loss  CARDIAC:  Denies chest pain  RESPIRATORY:  Denies shortness of breath  SKIN:  Denies rash or lesions   HEM:  Denies excessive bruising  PSYCH:  Denies anxiety or depression  NEURO: Endorses mild right side weakness; Denies headache, numbness or tingling or lateralizing weakness elsewhere  :  Denies urinary difficulty  GI: Denies nausea, vomiting, diarrhea or constipation  MUSCULOSKELETAL: Endorses incisional pain    No Known Allergies    Past Medical History:   Diagnosis Date    Thyroid nodule         Past Surgical History:   Procedure Laterality Date    CRANIOTOMY Left 3/12/2022    LEFT TEMPORAL PARIETAL OCCIPITAL CRANIOTOMY FOR TUMOR RESECTION performed by Kolby Mcdonnell MD at [address] IR IVC FILTER PLACEMENT W IMAGING N/A 03/16/2022    [facility]    IR IVC FILTER PLACEMENT W IMAGING  3/16/2022    IR IVC FILTER PLACEMENT W IMAGING 3/16/2022 [facility] SPECIAL PROCEDURES       Social History     Occupational History    Not on file   Tobacco Use    Smoking status: Never Smoker    Smokeless tobacco: Never Used   Substance and Sexual Activity    Alcohol use: Never     Alcohol/week: 0.0 standard drinks    Drug use: Never    Sexual activity: Not on file        Family History   Problem Relation Age of Onset    Cancer Father         No outpatient medications have been marked as taking for the 3/17/22 encounter [facility] Encounter).         Current Facility-Administered Medications   Medication Dose Route Frequency Provider Last Rate Last Admin    sodium chloride flush 0.9 % injection 5-40 mL  5-40 mL IntraVENous 2 times per day Adrian Xiong MD   10 mL at 03/18/22 0947    sodium chloride flush 0.9 % injection 5-40 mL  5-40 mL IntraVENous PRN Adrian Xiong MD        0.9 % sodium chloride infusion  25 mL IntraVENous PRN Adrian Xiong MD        enoxaparin (LOVENOX) injection 40 mg  40 mg SubCUTAneous Daily Adrian Xiong MD   40 mg at 03/18/22 0945    ondansetron (ZOFRAN-ODT) disintegrating tablet 4 mg  4 mg Oral Q8H PRN Adrian Xiong MD        Or    ondansetron (ZOFRAN) injection 4 mg  4 mg IntraVENous Q6H PRN Adrian Xiong MD        polyethylene glycol (GLYCOLAX) packet 17 g  17 g Oral Daily PRN Adrian Xiong MD        acetaminophen (TYLENOL) tablet 650 mg  650 mg Oral Q6H PRN Adrian Xiong MD   650 mg at 03/18/22 0946    Or    acetaminophen (TYLENOL) suppository 650 mg  650 mg Rectal Q6H PRN Adrian Xiong MD        levETIRAcetam (KEPPRA) tablet 2,000 mg  2,000 mg Oral BID Adrian Xiong MD   2,000 mg at 03/18/22 0945    lacosamide (VIMPAT) tablet 200 mg  200 mg Oral BID Adrian Xiong MD   200 mg at 03/18/22 Left    Deltoid  4+ 5   Hip Flex  4+ 5   Biceps  4+ 5   Knee Extensors  4+ 5   Triceps  4+ 5   Knee Flexors  4+ 5   Wrist Ext  4+ 5   Ankle Dorsiflex. 4+ 5   Wrist Flex  4+ 5   Ankle Plantarflex. 4+ 5   Handgrip  4+ 5   Ext Jake Longus  4+ 5   Thumb Ext  4+ 5              Incision: CDI        Radiological Findings:  CT Head WO Contrast  Result Date: 3/17/2022  1. 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 No evidence of acute hemorrhage or infarct. Labs:  Recent Labs     03/18/22  0552   WBC 13.4*   HGB 10.0*   HCT 30.6*          Recent Labs     03/17/22  0611 03/17/22  2345 03/18/22  0552      < > 139   K 4.9   < > 4.9      < > 101   CO2 24   < > 26   BUN 15   < > 13   CREATININE 0.6   < > 0.6   GLUCOSE 122*   < > 143*   CALCIUM 9.1   < > 9.2   PHOS 3.4  --   --     < > = values in this interval not displayed. No results for input(s): PROTIME, INR, APTT in the last 72 hours. Patient Active Problem List    Diagnosis Date Noted    Seizure [facility]) 03/18/2022    Status epilepticus ([facility] 03/09/2022    Duct cell carcinoma ([facility] 03/09/2022    Cerebral edema ([facility] 03/09/2022    Brain metastases ([facility] 03/04/2022    Brain mass 03/04/2022    Thyroid nodule 06/23/2015     Assessment:  Patient is a 48 y.o. female w/seizure at rehab facility same day she was discharge from [facility] s/p [address] 2/2 brain mets.      Plan:  1. Neurologic exams frequency: Q4H  2. For change in exam MUST contact neurosurgery team along with critical care or primary team  3. Seizure:  - Grand Mal seizure witnessed at acute rehab facility  - Neurology consulted, appreciate recs  - Patient was discharged on Keppra and Vimpat  4.  Brain mets:  - s/p left temporoparietal occipital c/r of brain mets  - CT Head shows improving post-op changes  - PRN Tylenol and Roxicodone for pain control  - Incisional Care: Open to air. Wash incision daily with warm soapy water or shower daily, and pat dry with clean dry towel. - Follow up appointment 3/28/22. - If patient unable to make f/u appointment, then remove staples on 3/28/22 at ARU  4. Cerebral edema:  - Keep Na within normal limits  - Continue Decadron 4 mg taper; PPI & SSI while on steroids  - Keep HOB >30 degrees  - If central venous access is needed please use subclavian vs femoral - No IJ as this can decrease venous return and worsen cerebral edema  5. Acute DVT:  - BLE Venous Doppler revealed evidence of acute DVT involving the right common femoral vein, deep femoral vein, and soleal veins  - IVC Filter in place  - Therapeutic anticoagulation cannot start until POD 14  6. Bowel Regimen: Glycolax and Senokot-S  7. Mobility:  - Advance as tolerates  - PT/OT consulted, appreciate recs  8. Diet: Advance as tolerates  9. DVT Prophylaxis: SCD's & SQ Heparin  10. Will follow inpatient.  Please call with any questions or decline in neurological status     DISPO: Per Neurology     Patient was seen and examined with Dr. Erik Chaap who agrees with above assessment and plan.      Electronically signed by: JUDITH Dean CNP, APRN-CNP, 3/18/2022 10:23 AM  [phone]

## 2022-03-18 NOTE — H&P
[facility] Medicine History & Physical      PCP: SHAHEED ZAMARRIPA, APRN - CNP    Date of Admission: 3/17/2022    Date of Service: Pt seen/examined on 03/18/22 and Admitted to Inpatient with expected LOS greater than two midnights due to medical therapy. Chief Complaint: Seizure      History Of Present Illness:  General Listen is 48 y.o. female who presented with complaint of seizure. Symptom onset was acute for a time period of 1 day. The severity is described as moderate. The course of his symptoms over time is resolved. The symptoms improved with none and worsened with none. The patient's symptom is associated with confusion and postictal state. General Listen is 48 y.o. female with history of recent 3/9 left temporal parietal occipital craniotomy for tumor resection  Her procedure was aborted due to seizure and status epilepticus requiring intubation and ICU stay  Craniotomy then completed on 3/12  Since then, following her procedure she has been seizure-free  This was for metastatic mass from her breast cancer  In addition patient was recently started on Decadron taper dose for cerebral vasogenic edema due to metastasis  She now presents from [facility] rehab to the ER with complaint of seizures.   Patient had a 10-minute grand mal seizure as a [facility] rehab  On interview, she has no recollection of her seizure episode  In the ED she was drowsy but at the time of my interview she was more awake and able to answer questions  She is admitted for further evaluation by neurology            Past Medical History:          Diagnosis Date    Thyroid nodule        Past Surgical History:          Procedure Laterality Date    CRANIOTOMY Left 3/12/2022    LEFT TEMPORAL PARIETAL OCCIPITAL CRANIOTOMY FOR TUMOR RESECTION performed by Ross Franco MD at [address] IR IVC FILTER PLACEMENT W IMAGING N/A 03/16/2022    [facility]    IR IVC FILTER PLACEMENT W IMAGING  3/16/2022    IR IVC FILTER PLACEMENT W IMAGING 3/16/2022 [facility] SPECIAL PROCEDURES       Medications Prior to Admission:      Prior to Admission medications    Medication Sig Start Date End Date Taking? Authorizing Provider   pantoprazole (PROTONIX) 40 MG tablet Take 1 tablet by mouth every morning (before breakfast) 3/18/22   Kenny Berger MD   lacosamide (VIMPAT) 200 MG tablet Take 1 tablet by mouth 2 times daily for 30 days. 3/17/22 4/16/22  Kenny Berger MD   levETIRAcetam (KEPPRA) 1000 MG tablet Take 2 tablets by mouth 2 times daily 3/17/22   Kenny Berger MD   dexamethasone (DECADRON) 4 MG tablet Take 1 tablet by mouth every 12 hours for 6 doses 3/18/22 3/21/22  Mamta Thomas MD   dexamethasone (DECADRON) 4 MG tablet Take 1 tablet by mouth daily for 3 doses 3/22/22 3/25/22  Mamta Thomas MD   dexamethasone (DECADRON) 4 MG tablet Take 1 tablet by mouth every 8 hours for 1 day 3/17/22 3/18/22  Mamta Thomas MD       Allergies:  Patient has no known allergies. Social History:      The patient currently lives at home    TOBACCO:   reports that she has never smoked. She has never used smokeless tobacco.  ETOH:   reports no history of alcohol use. E-Cigarettes/Vaping Use     Questions Responses    E-Cigarette/Vaping Use     Start Date     Passive Exposure     Quit Date     Counseling Given     Comments             Family History:      Reviewed in detail and negative for DM, CAD, Cancer, CVA. Positive as follows:        Problem Relation Age of Onset    Cancer Father        REVIEW OF SYSTEMS COMPLETED:   Pertinent positives as noted in the HPI. All other systems reviewed and negative. PHYSICAL EXAM PERFORMED:    /70   Pulse 110   Temp 97.8 °F (36.6 °C) (Oral)   Resp 16   Ht 5' 3\" (1.6 m)   Wt 182 lb (82.6 kg)   SpO2 92%   BMI 32.24 kg/m²     General appearance:  No apparent distress, appears stated age and cooperative. HEENT:  Normal cephalic, atraumatic without obvious deformity.  Pupils equal, round, and reactive to light. Extra ocular muscles intact. Conjunctivae/corneas clear. Neck: Supple, with full range of motion. No jugular venous distention. Trachea midline. Respiratory:  Normal respiratory effort. Clear to auscultation, bilaterally without Rales/Wheezes/Rhonchi. Cardiovascular:  Regular rate and rhythm with normal S1/S2 without murmurs, rubs or gallops. Abdomen: Soft, non-tender, non-distended with normal bowel sounds. Musculoskeletal:  No clubbing, cyanosis or edema bilaterally. Full range of motion without deformity. Skin: Skin color, texture, turgor normal.  No rashes or lesions. Neurologic:  Neurovascularly intact without any focal sensory/motor deficits. Cranial nerves: II-XII intact, grossly non-focal.  Psychiatric:  Alert and oriented, thought content appropriate, normal insight  Capillary Refill: Brisk,3 seconds, normal  Peripheral Pulses: +2 palpable, equal bilaterally       Labs:     Recent Labs     03/16/22  0610 03/17/22  0610 03/17/22  2345   WBC 17.2* 14.4* 15.5*   HGB 10.4* 10.8* 10.5*   HCT 31.8* 32.3* 31.9*    346 314     Recent Labs     03/16/22  0609 03/17/22  0611 03/17/22  2345    137 137   K 4.2 4.9 3.7    103 100   CO2 26 24 24   BUN 17 15 16   CREATININE 0.5* 0.6 0.6   CALCIUM 9.1 9.1 8.9   PHOS 3.2 3.4  --      No results for input(s): AST, ALT, BILIDIR, BILITOT, ALKPHOS in the last 72 hours. No results for input(s): INR in the last 72 hours. No results for input(s): Lorna Clayton in the last 72 hours. Urinalysis:      Lab Results   Component Value Date    NITRU Negative 03/04/2022    WBCUA 18 03/04/2022    RBCUA >900 03/04/2022    BLOODU LARGE 03/04/2022    SPECGRAV <=1.005 03/04/2022    GLUCOSEU Negative 03/04/2022       Radiology:       CT Head WO Contrast   Final Result      1.   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 No evidence of acute hemorrhage or    infarct. ASSESSMENT:    1. Seizure episode  2. Triple negative breast cancer, metastatic to brain  3. Intracranial vasogenic edema secondary to metastatic masses  4. Status post craniotomy left temporal parietal occipital craniotomy for tumor resection  5. Acute DVT, involving the right leg - s/p IVC filter      PLAN:    1. Admit to [facility]  2. Seizure precautions  3. Consult neurology  4. Continue Vimpat and Keppra for seizure prophylaxis  5. Continue to taper dose of Decadron for recent intracranial vasogenic edema      DVT Prophylaxis: Lovenox  Diet: ADULT DIET; Regular  Code Status: Full Code    PT/OT Eval Status: PT/OT consult is not ordered    Dispo - admit as inpatient       Dionicia Osgood, MD    Thank you JUDITH KIMBROUGH - AMA for the opportunity to be involved in this patient's care. If you have any questions or concerns please feel free to contact me at [phone]. good balance good balance good balance

## 2022-03-18 NOTE — PLAN OF CARE
Problem: Falls - Risk of:  Goal: Will remain free from falls  Description: Will remain free from falls  Outcome: Ongoing     Problem: Coping:  Goal: Ability to cope will improve  Description: Ability to cope will improve  Outcome: Ongoing     Problem: Safety:  Goal: Ability to remain free from injury will improve  Description: Ability to remain free from injury will improve  Outcome: Ongoing     Problem: Self-Concept:  Goal: Level of anxiety will decrease  Description: Level of anxiety will decrease  Outcome: Ongoing

## 2022-03-18 NOTE — ED PROVIDER NOTES
[address] [facility] ENCOUNTER          PHYSICIAN ASSISTANT NOTE       Date of evaluation: 3/17/2022    Chief Complaint     Seizures      History of Present Illness     Pooja Esposito is a 48 y.o. female who presents with chief complaint of seizures. Patient was noted to have brain metastasis recently and was hospitalized at this hospital.  She was to undergo craniotomy on 3/9, although procedure was aborted due to seizure and status epilepticus requiring intubation and ICU stay. Craniotomy was completed on 3/12 and there were no reported complications. Patient had been seizure-free following the surgical procedure. She was seen by neurology while inpatient and was stable on Keppra and Vimpat. She was discharged today from this hospital to [facility]. This evening, patient was noted to have a 10-minute grand mal seizure and was subsequently transferred to the hospital for evaluation. On my evaluation, patient is drowsy, although is alert to person, year, and that she is at the hospital.  She has no complaints currently. Review of Systems     Review of Systems   Constitutional: Negative for chills, diaphoresis, fatigue and fever. Respiratory: Negative for cough, chest tightness, shortness of breath and wheezing. Cardiovascular: Negative for chest pain and palpitations. Gastrointestinal: Negative for abdominal pain, diarrhea, nausea and vomiting. Genitourinary: Negative. Musculoskeletal: Negative. Skin: Negative for rash. Neurological: Positive for seizures. Negative for dizziness, weakness, light-headedness and headaches. All other systems reviewed and are negative. Past Medical, Surgical, Family, and Social History     She has a past medical history of Thyroid nodule. She has a past surgical history that includes craniotomy (Left, 3/12/2022); IR GUIDED IVC FILTER PLACEMENT (N/A, 03/16/2022); and IR GUIDED IVC FILTER PLACEMENT (3/16/2022).   Her family history includes Cancer in her father. She reports that she has never smoked. She has never used smokeless tobacco. She reports that she does not drink alcohol and does not use drugs. Medications     Current Discharge Medication List      CONTINUE these medications which have NOT CHANGED    Details   pantoprazole (PROTONIX) 40 MG tablet Take 1 tablet by mouth every morning (before breakfast)  Qty: 30 tablet, Refills: 3      lacosamide (VIMPAT) 200 MG tablet Take 1 tablet by mouth 2 times daily for 30 days. Qty: 60 tablet, Refills: 0    Associated Diagnoses: Brain metastases (HCC)      levETIRAcetam (KEPPRA) 1000 MG tablet Take 2 tablets by mouth 2 times daily  Qty: 60 tablet, Refills: 3      !! dexamethasone (DECADRON) 4 MG tablet Take 1 tablet by mouth every 12 hours for 6 doses  Qty: 6 tablet, Refills: 0      !! dexamethasone (DECADRON) 4 MG tablet Take 1 tablet by mouth daily for 3 doses  Qty: 3 tablet, Refills: 0      !! dexamethasone (DECADRON) 4 MG tablet Take 1 tablet by mouth every 8 hours for 1 day  Qty: 3 tablet, Refills: 0       !! - Potential duplicate medications found. Please discuss with provider. Allergies     She has No Known Allergies. Physical Exam     INITIAL VITALS: BP: 121/80, Temp: 98.4 °F (36.9 °C), Pulse: 102, Resp: 16, SpO2: 94 %  Physical Exam  Vitals and nursing note reviewed. Constitutional:       General: She is not in acute distress. Appearance: She is well-developed. She is not diaphoretic. HENT:      Head: Normocephalic and atraumatic. Comments: Well-healing surgical scar noted  Eyes:      Conjunctiva/sclera: Conjunctivae normal.      Pupils: Pupils are equal, round, and reactive to light. Cardiovascular:      Rate and Rhythm: Normal rate and regular rhythm. Heart sounds: Normal heart sounds. No murmur heard. No friction rub. Pulmonary:      Effort: Pulmonary effort is normal. No respiratory distress. Breath sounds: Normal breath sounds. No wheezing or rales. Abdominal:      Palpations: Abdomen is soft. Musculoskeletal:         General: Normal range of motion. Cervical back: Normal range of motion. Skin:     General: Skin is warm and dry. Comments: Large right-sided, necrotic breast wound noted   Neurological:      Mental Status: She is alert and oriented to person, place, and time. Psychiatric:         Behavior: Behavior normal.         Thought Content: Thought content normal.         Judgment: Judgment normal.         Diagnostic Results     EKG   none    RADIOLOGY:  CT Head WO Contrast   Final Result      1. 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 No evidence of acute hemorrhage or    infarct.           LABS:   Results for orders placed or performed during the hospital encounter of 03/17/22   CBC with Auto Differential   Result Value Ref Range    WBC 15.5 (H) 4.0 - 11.0 K/uL    RBC 3.82 (L) 4.00 - 5.20 M/uL    Hemoglobin 10.5 (L) 12.0 - 16.0 g/dL    Hematocrit 31.9 (L) 36.0 - 48.0 %    MCV 83.5 80.0 - 100.0 fL    MCH 27.5 26.0 - 34.0 pg    MCHC 32.9 31.0 - 36.0 g/dL    RDW 15.0 12.4 - 15.4 %    Platelets 834 195 - 876 K/uL    MPV 7.2 5.0 - 10.5 fL    Neutrophils % 86.4 %    Lymphocytes % 4.1 %    Monocytes % 8.8 %    Eosinophils % 0.3 %    Basophils % 0.4 %    Neutrophils Absolute 13.4 (H) 1.7 - 7.7 K/uL    Lymphocytes Absolute 0.6 (L) 1.0 - 5.1 K/uL    Monocytes Absolute 1.4 (H) 0.0 - 1.3 K/uL    Eosinophils Absolute 0.1 0.0 - 0.6 K/uL    Basophils Absolute 0.1 0.0 - 0.2 K/uL   Basic Metabolic Panel w/ Reflex to MG   Result Value Ref Range    Sodium 137 136 - 145 mmol/L    Potassium reflex Magnesium 3.7 3.5 - 5.1 mmol/L    Chloride 100 99 - 110 mmol/L    CO2 24 21 - 32 mmol/L    Anion Gap 13 3 - 16    Glucose 146 (H) 70 - 99 mg/dL    BUN 16 7 - 20 mg/dL CREATININE 0.6 0.6 - 1.1 mg/dL    GFR Non-African American >60 >60    GFR African American >60 >60    Calcium 8.9 8.3 - 10.6 mg/dL   Lactic Acid   Result Value Ref Range    Lactic Acid 2.4 (H) 0.4 - 2.0 mmol/L   Blood Gas, Venous   Result Value Ref Range    pH, Librado 7.441 7.350 - 7.450    pCO2, Librado 38.9 (L) 41.0 - 51.0 mmHg    pO2, Librado 46.4 (H) 25.0 - 40.0 mmHg    HCO3, Venous 26.5 24.0 - 28.0 mmol/L    Base Excess, Librado 2.2 -2.0 - 3.0 mmol/L    O2 Sat, Librado 80 Not established %    Carboxyhemoglobin 1.3 0.0 - 1.5 %    MetHgb, Librado 0.3 0.0 - 1.5 %    TC02 (Calc), Librado 28 mmol/L    Hemoglobin, Librado, Reduced 19.50 %   Levetiracetam Level   Result Value Ref Range    KEPPRA Dose Amt Unknown        ED BEDSIDE ULTRASOUND:  none    RECENT VITALS:  BP: 104/70, Temp: 97.8 °F (36.6 °C), Pulse: 110, Resp: 16, SpO2: 92 %     Procedures     none    ED Course     Nursing Notes, Past Medical Hx,Past Surgical Hx, Social Hx, Allergies, and Family Hx were reviewed. The patient was given the following medications:  Orders Placed This Encounter   Medications    sodium chloride flush 0.9 % injection 5-40 mL    sodium chloride flush 0.9 % injection 5-40 mL    0.9 % sodium chloride infusion    enoxaparin (LOVENOX) injection 40 mg    OR Linked Order Group     ondansetron (ZOFRAN-ODT) disintegrating tablet 4 mg     ondansetron (ZOFRAN) injection 4 mg    polyethylene glycol (GLYCOLAX) packet 17 g    OR Linked Order Group     acetaminophen (TYLENOL) tablet 650 mg     acetaminophen (TYLENOL) suppository 650 mg       CONSULTS:  IP CONSULT TO NEUROSURGERY  IP CONSULT TO HOSPITALIST  IP CONSULT TO [address] [place] [address] / MIGUEL / Migdalia Chriss is a 48 y.o. female presenting with a seizure following recent craniotomy. Patient is drowsy upon arrival, although is alert to person, hospital, and year. She does have slight aphasia which is likely secondary from her surgical procedure.   She has no focal neurological deficits other than some right-sided weakness which has been present since surgery. She does have a large, necrotic right-sided breast wound. She is hemodynamically stable on arrival and has no complaints. Laboratory work-up was initiated without any significant findings. CT scan of the head revealed normal vasogenic edema with improvement in her midline shift. Neurosurgery was consulted due to recent surgical intervention and they recommended admission for ongoing evaluation of seizures and evaluation by neurology service as she is having breakthrough seizures but despite being on Keppra and Vimpat. Care discussed with hospitalist who is agreeable for admission. This patient was also evaluated by the attending physician. All care plans were discussed and agreed upon. Clinical Impression     1. Seizure ([address]        Disposition     PATIENT REFERRED TO:  No follow-up provider specified.     DISCHARGE MEDICATIONS:  Current Discharge Medication List          DISPOSITION Admitted 03/18/2022 01:22:48 AM        Scottie Webber  03/18/22 0802

## 2022-03-18 NOTE — PROGRESS NOTES
Pt incision on head is clean dry and intact. No drainage noted. Cleansed with soap and water and painted with CHG. Pt tolerated well.

## 2022-03-19 LAB
ANION GAP SERPL CALCULATED.3IONS-SCNC: 11 MMOL/L (ref 3–16)
BASOPHILS ABSOLUTE: 0 K/UL (ref 0–0.2)
BASOPHILS RELATIVE PERCENT: 0.2 %
BUN BLDV-MCNC: 13 MG/DL (ref 7–20)
CALCIUM SERPL-MCNC: 9 MG/DL (ref 8.3–10.6)
CHLORIDE BLD-SCNC: 102 MMOL/L (ref 99–110)
CO2: 24 MMOL/L (ref 21–32)
CREAT SERPL-MCNC: 0.5 MG/DL (ref 0.6–1.1)
EOSINOPHILS ABSOLUTE: 0 K/UL (ref 0–0.6)
EOSINOPHILS RELATIVE PERCENT: 0.1 %
GFR AFRICAN AMERICAN: >60
GFR NON-AFRICAN AMERICAN: >60
GLUCOSE BLD-MCNC: 117 MG/DL (ref 70–99)
GLUCOSE BLD-MCNC: 128 MG/DL (ref 70–99)
GLUCOSE BLD-MCNC: 130 MG/DL (ref 70–99)
GLUCOSE BLD-MCNC: 138 MG/DL (ref 70–99)
GLUCOSE BLD-MCNC: 139 MG/DL (ref 70–99)
HCT VFR BLD CALC: 29.4 % (ref 36–48)
HEMOGLOBIN: 9.6 G/DL (ref 12–16)
LYMPHOCYTES ABSOLUTE: 0.8 K/UL (ref 1–5.1)
LYMPHOCYTES RELATIVE PERCENT: 7.1 %
MCH RBC QN AUTO: 27.3 PG (ref 26–34)
MCHC RBC AUTO-ENTMCNC: 32.6 G/DL (ref 31–36)
MCV RBC AUTO: 83.7 FL (ref 80–100)
MONOCYTES ABSOLUTE: 0.6 K/UL (ref 0–1.3)
MONOCYTES RELATIVE PERCENT: 5.4 %
NEUTROPHILS ABSOLUTE: 9.9 K/UL (ref 1.7–7.7)
NEUTROPHILS RELATIVE PERCENT: 87.2 %
PDW BLD-RTO: 15 % (ref 12.4–15.4)
PERFORMED ON: ABNORMAL
PLATELET # BLD: 272 K/UL (ref 135–450)
PMV BLD AUTO: 7.3 FL (ref 5–10.5)
POTASSIUM SERPL-SCNC: 4.7 MMOL/L (ref 3.5–5.1)
RBC # BLD: 3.51 M/UL (ref 4–5.2)
SODIUM BLD-SCNC: 137 MMOL/L (ref 136–145)
WBC # BLD: 11.3 K/UL (ref 4–11)

## 2022-03-19 PROCEDURE — 6360000002 HC RX W HCPCS: Performed by: INTERNAL MEDICINE

## 2022-03-19 PROCEDURE — 6370000000 HC RX 637 (ALT 250 FOR IP): Performed by: NURSE PRACTITIONER

## 2022-03-19 PROCEDURE — 6370000000 HC RX 637 (ALT 250 FOR IP): Performed by: INTERNAL MEDICINE

## 2022-03-19 PROCEDURE — 85025 COMPLETE CBC W/AUTO DIFF WBC: CPT

## 2022-03-19 PROCEDURE — 99232 SBSQ HOSP IP/OBS MODERATE 35: CPT | Performed by: NURSE PRACTITIONER

## 2022-03-19 PROCEDURE — 2580000003 HC RX 258: Performed by: INTERNAL MEDICINE

## 2022-03-19 PROCEDURE — 36415 COLL VENOUS BLD VENIPUNCTURE: CPT

## 2022-03-19 PROCEDURE — 96372 THER/PROPH/DIAG INJ SC/IM: CPT

## 2022-03-19 PROCEDURE — 80048 BASIC METABOLIC PNL TOTAL CA: CPT

## 2022-03-19 PROCEDURE — 1200000000 HC SEMI PRIVATE

## 2022-03-19 PROCEDURE — 93005 ELECTROCARDIOGRAM TRACING: CPT | Performed by: INTERNAL MEDICINE

## 2022-03-19 RX ORDER — MECOBALAMIN 5000 MCG
5 TABLET,DISINTEGRATING ORAL NIGHTLY
Status: DISCONTINUED | OUTPATIENT
Start: 2022-03-19 | End: 2022-03-20 | Stop reason: HOSPADM

## 2022-03-19 RX ORDER — QUETIAPINE FUMARATE 25 MG/1
12.5 TABLET, FILM COATED ORAL ONCE
Status: COMPLETED | OUTPATIENT
Start: 2022-03-19 | End: 2022-03-19

## 2022-03-19 RX ADMIN — DEXAMETHASONE 4 MG: 4 TABLET ORAL at 09:15

## 2022-03-19 RX ADMIN — LACOSAMIDE 200 MG: 50 TABLET, FILM COATED ORAL at 20:05

## 2022-03-19 RX ADMIN — LACOSAMIDE 200 MG: 50 TABLET, FILM COATED ORAL at 09:15

## 2022-03-19 RX ADMIN — LORAZEPAM 0.5 MG: 0.5 TABLET ORAL at 11:26

## 2022-03-19 RX ADMIN — ENOXAPARIN SODIUM 40 MG: 40 INJECTION SUBCUTANEOUS at 09:14

## 2022-03-19 RX ADMIN — LEVETIRACETAM 2500 MG: 250 TABLET, FILM COATED ORAL at 20:06

## 2022-03-19 RX ADMIN — ACETAMINOPHEN 325MG 650 MG: 325 TABLET ORAL at 14:38

## 2022-03-19 RX ADMIN — OXYCODONE 5 MG: 5 TABLET ORAL at 18:31

## 2022-03-19 RX ADMIN — DEXAMETHASONE 4 MG: 4 TABLET ORAL at 20:06

## 2022-03-19 RX ADMIN — SODIUM CHLORIDE, PRESERVATIVE FREE 10 ML: 5 INJECTION INTRAVENOUS at 20:08

## 2022-03-19 RX ADMIN — SODIUM CHLORIDE, PRESERVATIVE FREE 10 ML: 5 INJECTION INTRAVENOUS at 09:16

## 2022-03-19 RX ADMIN — QUETIAPINE FUMARATE 12.5 MG: 25 TABLET ORAL at 17:30

## 2022-03-19 RX ADMIN — LORAZEPAM 0.5 MG: 0.5 TABLET ORAL at 23:09

## 2022-03-19 RX ADMIN — LEVETIRACETAM 2000 MG: 750 TABLET, FILM COATED ORAL at 09:15

## 2022-03-19 RX ADMIN — Medication 5 MG: at 20:07

## 2022-03-19 ASSESSMENT — PAIN DESCRIPTION - LOCATION
LOCATION: HEAD
LOCATION: HEAD

## 2022-03-19 ASSESSMENT — PAIN DESCRIPTION - ORIENTATION
ORIENTATION: RIGHT
ORIENTATION: RIGHT

## 2022-03-19 ASSESSMENT — PAIN DESCRIPTION - PAIN TYPE
TYPE: SURGICAL PAIN
TYPE: SURGICAL PAIN

## 2022-03-19 ASSESSMENT — PAIN SCALES - GENERAL
PAINLEVEL_OUTOF10: 3
PAINLEVEL_OUTOF10: 0
PAINLEVEL_OUTOF10: 0
PAINLEVEL_OUTOF10: 6

## 2022-03-19 NOTE — PROGRESS NOTES
NEUROLOGY / NEUROCRITICAL CARE PROGRESS NOTE       Patient Name: Alejandro Orr YOB: 1975   Sex: Female Age: 48 yrs     CC / Reason for Consult: seizure    Interval Hx / Changes over last 24 hours:   No hallucinations on my visit  Exam is stable  She appears comfortable and resting well    ROS: no new weakness, no headache    HISTORY   Admission HPI:   The patient is a 48 y.o. female well known to our service from recent hospitalization. She was just released yesterday after having craniotomy for brain metastases. She did have postoperative seizures. She was discharged on Keppra 2gm bid and vimpat 200mg bid. Once at the rehab facility yesterday she had a reportedly prolonged seizure. Unclear if she missed any doses of AEDs. Postictal in the ER, but back to baseline today. She does tell me that she is seeing \"bunnies\" off and on since yesterday. PMH Past Medical History:   Diagnosis Date    Thyroid nodule       Allergies No Known Allergies   Diet ADULT DIET; Regular   Isolation No active isolations     LABS   Metabolic Panel Recent Labs     03/17/22  0611 03/17/22 0611 03/17/22 2345 03/18/22 0552 03/19/22  0613      < > 137 139 137   K 4.9  --  3.7 4.9 4.7      < > 100 101 102   CO2 24   < > 24 26 24   BUN 15   < > 16 13 13   CREATININE 0.6   < > 0.6 0.6 0.5*   GLUCOSE 122*   < > 146* 143* 138*   CALCIUM 9.1   < > 8.9 9.2 9.0   LABALBU 3.6  --   --   --   --    PHOS 3.4  --   --   --   --     < > = values in this interval not displayed. CBC / Coags Recent Labs     03/17/22 2345 03/18/22 0552 03/19/22  0613   WBC 15.5* 13.4* 11.3*   RBC 3.82* 3.64* 3.51*   HGB 10.5* 10.0* 9.6*   HCT 31.9* 30.6* 29.4*    327 272      Other No results for input(s): LABA1C, LDLCALC, TRIG, TSH, [id number], FOLATE, LABSALI, COVID19 in the last 72 hours.   Recent Labs     03/17/22 2345 03/18/22  0314   KEPPRA  --  54.6*   LACTA 2.4*  --           CURRENT SCHEDULED MEDICATIONS Inpatient Medications     sodium chloride flush, 5-40 mL, IntraVENous, 2 times per day    enoxaparin, 40 mg, SubCUTAneous, Daily    lacosamide, 200 mg, Oral, BID    pantoprazole, 40 mg, Oral, QAM AC    dexamethasone, 4 mg, Oral, 2 times per day    [START ON 3/22/2022] dexamethasone, 4 mg, Oral, Daily    LORazepam, 0.5 mg, Oral, Q12H **FOLLOWED BY** [START ON 3/20/2022] LORazepam, 0.5 mg, Oral, Daily    insulin lispro, 0-6 Units, SubCUTAneous, TID WC    insulin lispro, 0-3 Units, SubCUTAneous, Nightly    levETIRAcetam, 2,000 mg, Oral, Daily    levETIRAcetam, 2,500 mg, Oral, Nightly   Infusions    sodium chloride      dextrose          DIAGNOSTICS   IMAGES:  Images personally reviewed and agree w/ radiology interpretation. Head CT w/o Contrast: 3/17/22  Prior left-sided craniotomy for resection of left temporal and parietooccipital lobe metastases with stable vasogenic edema in the left cerebral hemisphere and slightly decreased 7 mm rightward midline shift. No evidence of acute hemorrhage or    infarct.        PHYSICAL EXAMINATION     PHYSICAL EXAM:  Vitals:    03/18/22 1855 03/18/22 2226 03/19/22 0256 03/19/22 0711   BP: 104/69 111/74 128/68 116/79   Pulse: 106 98 100 97   Resp: 16 16 16 16   Temp: 98.6 °F (37 °C) 98.7 °F (37.1 °C) 98.5 °F (36.9 °C) 98.4 °F (36.9 °C)   TempSrc: Oral Oral Oral Oral   SpO2: 92% 92% 92% 94%   Weight:       Height:             General: Alert, no distress, well-nourished  Neurologic  Mental status: eyes open to voice  orientation to person, place, time, situation   Attention intact as able to attend well to the exam     Language fluent in conversation   Comprehension intact; follows simple commands    Cranial nerves:   CN2: Visual fields grossly full  CN 3,4,6: Pupils equal and reactive to light, extraocular muscles intact  CN5: Facial sensation symmetric   CN7: Face symmetric without dysarthria  CN8: Hearing symmetric to spoken voice    Motor Exam:   R  L    Deltoid 4 5 Biceps 4 5   Triceps 4 5   Wrist extension  4 5   Interossei 4 5      R  L    Hip flexion  4  5   Hip extension  4 5   Knee flexion  4 5   Knee extension  4 5   Ankle dorsiflexion  4 5   Ankle plantar flexion  4 5     Sensory: light touch intact and symmetric in all 4 extremities. No sensory extinction on bilateral simultaneous stimulation  Cerebellar/coordination: finger nose finger normal without ataxia  Tone: normal in all 4 extremities    OTHER SYSTEMS:  Cardiovascular: Warm, appears well perfused   Respiratory: Easy, non-labored respiratory pattern   Abdominal: Abdomen is without distention   Extremities: Upper and lower extremities are atraumatic in appearance without deformity. No swelling or erythema. ASSESSMENT & RECOMMENDATIONS     Ms. Uma Diallo is a 48year old lady with metastatic breast cancer s/p resection of multiple brain mets by Dr. Librado Summers. Discharged to rehab 3/17 and returned shortly after having seizure at rehab facility. Mental status back to baseline, patient still feels somewhat groggy otherwise doing well.      Patient also complains of seeing bunnies in her room. She states this started 3/17.      RECOMMENDATIONS:  Slight increase of keppra to 2gm am and 2.5gm pm.  Continue Vimpat 200mg bid  Ativan bridge. If visual hallucinations persist another EEG could be done. Would not want to add a third AED unless testing were abnormal.    Will sign off. Call with questions, concerns, or change in exam.    JUDITH Elliott - CNP   Neurology & Neurocritical Care   Neurology Line: [phone]  [facility]: [facility] NPs  3/19/2022 11:16 AM    I spent 25 minutes in the care of this patient. Over 50% of that time was in face-to-face counseling regarding disease process, diagnostic testing, preventative measures, and answering patient and family questions.

## 2022-03-19 NOTE — PLAN OF CARE
Problem: Falls - Risk of:  Goal: Will remain free from falls  Description: Will remain free from falls  3/19/2022 0857 by Mark Lewis RN  Outcome: Ongoing  Pt is a high fall risk. High fall precautions are in place: nonskid socks, yellow blanket, fall wristband, call light in reach, bed is locked and in lowest position, bed alarm engaged.         Problem: Coping:  Goal: Ability to cope will improve  Description: Ability to cope will improve  3/19/2022 0857 by Mark Lewis RN  Outcome: Ongoing

## 2022-03-19 NOTE — PROGRESS NOTES
Wound on R breast cleansed with normal saline and dressing changed per wound care orders. Pt tolerated well. Pt is reporting surgical pain at incision site, PRN pain medication administered. Pt resting comfortably in bed. Denies other needs at this time. Call light is in reach. Bed alarm is engaged.

## 2022-03-19 NOTE — PLAN OF CARE
Problem: Falls - Risk of:  Goal: Will remain free from falls  Description: Will remain free from falls  3/19/2022 0047 by Stephanie Hernandez RN  Outcome: Ongoing     Problem: Coping:  Goal: Ability to cope will improve  Description: Ability to cope will improve  Outcome: Ongoing

## 2022-03-19 NOTE — PROGRESS NOTES
Hospitalist Progress Note      PCP: SHAHEED ZAMARRIPA, APRN - CNP    Date of Admission: 3/17/2022    Chief Complaint: Seizures,     HPI: recent 3/9 left temporal parietal occipital craniotomy for tumor resection  Her procedure was aborted due to seizure and status epilepticus requiring intubation and ICU stay  Craniotomy then completed on 3/12. Presented from [facility] rehab after grand mal seizure. Subjective:     Has still been seeing bunnies. No fevers. No sob or cough. No abdominal pain. No nausea or vomiting. No diarrhea or constipation. No new urinary symptoms. Says she is eating, says she is sleeping at night. Also sleeping during the day.      Medications:  Reviewed    Infusion Medications    sodium chloride      dextrose       Scheduled Medications    QUEtiapine  12.5 mg Oral Once    melatonin  5 mg Oral Nightly    sodium chloride flush  5-40 mL IntraVENous 2 times per day    enoxaparin  40 mg SubCUTAneous Daily    lacosamide  200 mg Oral BID    pantoprazole  40 mg Oral QAM AC    dexamethasone  4 mg Oral 2 times per day    [START ON 3/22/2022] dexamethasone  4 mg Oral Daily    LORazepam  0.5 mg Oral Q12H    Followed by   Bobbi Ramos ON 3/20/2022] LORazepam  0.5 mg Oral Daily    insulin lispro  0-6 Units SubCUTAneous TID WC    insulin lispro  0-3 Units SubCUTAneous Nightly    levETIRAcetam  2,000 mg Oral Daily    levETIRAcetam  2,500 mg Oral Nightly     PRN Meds: sodium chloride flush, sodium chloride, ondansetron **OR** ondansetron, polyethylene glycol, acetaminophen **OR** acetaminophen, LORazepam, glucose, glucagon (rDNA), dextrose, dextrose bolus (hypoglycemia) **OR** dextrose bolus (hypoglycemia), oxyCODONE      Intake/Output Summary (Last 24 hours) at 3/19/2022 1716  Last data filed at 3/19/2022 1353  Gross per 24 hour   Intake 920 ml   Output --   Net 920 ml       Exam:    /79   Pulse 121   Temp 98.6 °F (37 °C) (Oral)   Resp 20   Ht 5' 3\" (1.6 m)   Wt 182 lb (82.6 kg) SpO2 95%   BMI 32.24 kg/m²     General appearance: No apparent distress, appears stated age and cooperative. HEENT: Pupils equal, round, and reactive to light. Conjunctivae/corneas clear. Neck: Supple, with full range of motion. No jugular venous distention. Trachea midline. Respiratory:  Normal respiratory effort. Clear to auscultation, bilaterally without Rales/Wheezes/Rhonchi. Cardiovascular: Regular rate and rhythm with normal S1/S2 without murmurs, rubs or gallops. Abdomen: Soft, non-tender, non-distended with normal bowel sounds. Musculoskelatal: No clubbing, cyanosis or edema bilaterally. Skin: Skin color, texture, turgor normal.  No rashes or lesions. Neurologic:  Cranial nerves: II-XII intact, grossly non-focal.  Psychiatric: Alert and oriented, thought content appropriate, normal insight    Labs:   Recent Labs     03/17/22  2345 03/18/22  0552 03/19/22  0613   WBC 15.5* 13.4* 11.3*   HGB 10.5* 10.0* 9.6*   HCT 31.9* 30.6* 29.4*    327 272     Recent Labs     03/17/22  0611 03/17/22  0611 03/17/22  2345 03/18/22  0552 03/19/22  0613      < > 137 139 137   K 4.9  --  3.7 4.9 4.7      < > 100 101 102   CO2 24   < > 24 26 24   BUN 15   < > 16 13 13   CREATININE 0.6   < > 0.6 0.6 0.5*   CALCIUM 9.1   < > 8.9 9.2 9.0   PHOS 3.4  --   --   --   --     < > = values in this interval not displayed. No results for input(s): AST, ALT, BILIDIR, BILITOT, ALKPHOS in the last 72 hours. No results for input(s): INR in the last 72 hours. No results for input(s): Nydia Jaylene in the last 72 hours. Studies:  CT Head WO Contrast   Final Result      1. 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 No evidence of acute hemorrhage or    infarct.           Assessment/Plan:    Active Hospital Problems    Diagnosis Date Noted  Seizure ([facility] [address] [R56.9] 03/18/2022        Seizure episode  Hx of metastatic brain cancer  Status post craniotomy: left temporal parietal occipital craniotomy for tumor resection  Acute DVT, involving the right leg - s/p IVC filter     Neurosurgery, neurology consulted  Stable from neurosurgical perspective  Neurology modifying AEDs - has increased doses slightly of Keppra  Ativan bridge  Continues on Decadron Taper  May have some delirium from steroids? Start low dose Seroquel to see if this is helpful for her hallucinations  If visual hallucinations persist may need another EEG  Seizure precautions  Neuro checks    Tachycardia, new  BMP ok, CBC with leukocytosis however down-trending, and has been on steroids  Stat EKG  Will place on Tele    Hx of DVT, IVC filter  Cannot start anticoagulation until POD 14    Recent craniotomy  - Follow up appointment 3/28/22. - If patient unable to make f/u appointment, then remove staples on 3/28/22 at ARU    DVT Prophylaxis: Subq heparin ok  Diet: ADULT DIET;  Regular  Code Status: Full Code    PT/OT Eval Status: pending    Dispo - Inpatient    Corinne Fitzgerald DO

## 2022-03-19 NOTE — PROGRESS NOTES
Patient is alert, she is oriented on time and place at times. Patient has a clean dry and intact incision to her head. Patient has a clean dry and intact dressing to her right breast. Patient is resting in bed with all fall precautions in place. Will continue to monitor.

## 2022-03-20 VITALS
OXYGEN SATURATION: 96 % | DIASTOLIC BLOOD PRESSURE: 83 MMHG | TEMPERATURE: 98.2 F | HEART RATE: 103 BPM | WEIGHT: 182 LBS | RESPIRATION RATE: 16 BRPM | BODY MASS INDEX: 32.25 KG/M2 | SYSTOLIC BLOOD PRESSURE: 133 MMHG | HEIGHT: 63 IN

## 2022-03-20 LAB
ANION GAP SERPL CALCULATED.3IONS-SCNC: 9 MMOL/L (ref 3–16)
BASOPHILS ABSOLUTE: 0 K/UL (ref 0–0.2)
BASOPHILS RELATIVE PERCENT: 0.3 %
BUN BLDV-MCNC: 14 MG/DL (ref 7–20)
CALCIUM SERPL-MCNC: 9.1 MG/DL (ref 8.3–10.6)
CHLORIDE BLD-SCNC: 101 MMOL/L (ref 99–110)
CO2: 24 MMOL/L (ref 21–32)
CREAT SERPL-MCNC: 0.5 MG/DL (ref 0.6–1.1)
EOSINOPHILS ABSOLUTE: 0 K/UL (ref 0–0.6)
EOSINOPHILS RELATIVE PERCENT: 0 %
GFR AFRICAN AMERICAN: >60
GFR NON-AFRICAN AMERICAN: >60
GLUCOSE BLD-MCNC: 101 MG/DL (ref 70–99)
GLUCOSE BLD-MCNC: 116 MG/DL (ref 70–99)
GLUCOSE BLD-MCNC: 132 MG/DL (ref 70–99)
GLUCOSE BLD-MCNC: 132 MG/DL (ref 70–99)
HCT VFR BLD CALC: 28.5 % (ref 36–48)
HEMOGLOBIN: 9.5 G/DL (ref 12–16)
LYMPHOCYTES ABSOLUTE: 0.9 K/UL (ref 1–5.1)
LYMPHOCYTES RELATIVE PERCENT: 6.6 %
MCH RBC QN AUTO: 27.8 PG (ref 26–34)
MCHC RBC AUTO-ENTMCNC: 33.3 G/DL (ref 31–36)
MCV RBC AUTO: 83.6 FL (ref 80–100)
MONOCYTES ABSOLUTE: 0.7 K/UL (ref 0–1.3)
MONOCYTES RELATIVE PERCENT: 5.5 %
NEUTROPHILS ABSOLUTE: 11.7 K/UL (ref 1.7–7.7)
NEUTROPHILS RELATIVE PERCENT: 87.6 %
PDW BLD-RTO: 15.3 % (ref 12.4–15.4)
PERFORMED ON: ABNORMAL
PLATELET # BLD: 195 K/UL (ref 135–450)
PMV BLD AUTO: 7.2 FL (ref 5–10.5)
POTASSIUM SERPL-SCNC: 4.8 MMOL/L (ref 3.5–5.1)
RBC # BLD: 3.41 M/UL (ref 4–5.2)
SODIUM BLD-SCNC: 134 MMOL/L (ref 136–145)
WBC # BLD: 13.3 K/UL (ref 4–11)

## 2022-03-20 PROCEDURE — 97530 THERAPEUTIC ACTIVITIES: CPT

## 2022-03-20 PROCEDURE — 92523 SPEECH SOUND LANG COMPREHEN: CPT

## 2022-03-20 PROCEDURE — 80048 BASIC METABOLIC PNL TOTAL CA: CPT

## 2022-03-20 PROCEDURE — 85025 COMPLETE CBC W/AUTO DIFF WBC: CPT

## 2022-03-20 PROCEDURE — 6370000000 HC RX 637 (ALT 250 FOR IP): Performed by: NURSE PRACTITIONER

## 2022-03-20 PROCEDURE — 36415 COLL VENOUS BLD VENIPUNCTURE: CPT

## 2022-03-20 PROCEDURE — 97166 OT EVAL MOD COMPLEX 45 MIN: CPT

## 2022-03-20 PROCEDURE — 97116 GAIT TRAINING THERAPY: CPT

## 2022-03-20 PROCEDURE — 97162 PT EVAL MOD COMPLEX 30 MIN: CPT

## 2022-03-20 PROCEDURE — 6360000002 HC RX W HCPCS: Performed by: INTERNAL MEDICINE

## 2022-03-20 PROCEDURE — 97535 SELF CARE MNGMENT TRAINING: CPT

## 2022-03-20 PROCEDURE — 6370000000 HC RX 637 (ALT 250 FOR IP): Performed by: INTERNAL MEDICINE

## 2022-03-20 RX ORDER — LEVETIRACETAM 1000 MG/1
2000 TABLET ORAL DAILY
Qty: 60 TABLET | Refills: 3
Start: 2022-03-21

## 2022-03-20 RX ORDER — LEVETIRACETAM 500 MG/1
2500 TABLET ORAL NIGHTLY
Qty: 60 TABLET | Refills: 3
Start: 2022-03-20

## 2022-03-20 RX ORDER — QUETIAPINE FUMARATE 25 MG/1
12.5 TABLET, FILM COATED ORAL NIGHTLY
Qty: 2 TABLET | Refills: 0
Start: 2022-03-20 | End: 2022-06-20

## 2022-03-20 RX ORDER — DEXAMETHASONE 4 MG/1
4 TABLET ORAL DAILY
Qty: 3 TABLET | Refills: 0 | Status: SHIPPED
Start: 2022-03-21 | End: 2022-03-24

## 2022-03-20 RX ORDER — MECOBALAMIN 5000 MCG
5 TABLET,DISINTEGRATING ORAL NIGHTLY
COMMUNITY
Start: 2022-03-20

## 2022-03-20 RX ORDER — OXYCODONE HYDROCHLORIDE 5 MG/1
5 TABLET ORAL EVERY 6 HOURS PRN
Qty: 12 TABLET | Refills: 0 | Status: SHIPPED | OUTPATIENT
Start: 2022-03-20 | End: 2022-03-23

## 2022-03-20 RX ORDER — LORAZEPAM 0.5 MG/1
0.5 TABLET ORAL DAILY
Qty: 2 TABLET | Refills: 0 | Status: SHIPPED | OUTPATIENT
Start: 2022-03-20 | End: 2022-03-22

## 2022-03-20 RX ORDER — DEXAMETHASONE 4 MG/1
4 TABLET ORAL EVERY 12 HOURS SCHEDULED
Qty: 1 TABLET | Refills: 0 | Status: SHIPPED
Start: 2022-03-20 | End: 2022-03-21

## 2022-03-20 RX ADMIN — DEXAMETHASONE 4 MG: 4 TABLET ORAL at 08:30

## 2022-03-20 RX ADMIN — ENOXAPARIN SODIUM 40 MG: 40 INJECTION SUBCUTANEOUS at 08:30

## 2022-03-20 RX ADMIN — OXYCODONE 5 MG: 5 TABLET ORAL at 00:36

## 2022-03-20 RX ADMIN — LEVETIRACETAM 2000 MG: 750 TABLET, FILM COATED ORAL at 08:29

## 2022-03-20 RX ADMIN — PANTOPRAZOLE SODIUM 40 MG: 40 TABLET, DELAYED RELEASE ORAL at 06:04

## 2022-03-20 RX ADMIN — LORAZEPAM 0.5 MG: 0.5 TABLET ORAL at 10:40

## 2022-03-20 RX ADMIN — OXYCODONE 5 MG: 5 TABLET ORAL at 10:43

## 2022-03-20 RX ADMIN — LACOSAMIDE 200 MG: 50 TABLET, FILM COATED ORAL at 08:29

## 2022-03-20 RX ADMIN — OXYCODONE 5 MG: 5 TABLET ORAL at 16:42

## 2022-03-20 ASSESSMENT — PAIN DESCRIPTION - LOCATION
LOCATION: HEAD
LOCATION: HEAD
LOCATION: BREAST
LOCATION: HEAD

## 2022-03-20 ASSESSMENT — PAIN SCALES - GENERAL
PAINLEVEL_OUTOF10: 5
PAINLEVEL_OUTOF10: 0
PAINLEVEL_OUTOF10: 6
PAINLEVEL_OUTOF10: 0
PAINLEVEL_OUTOF10: 4
PAINLEVEL_OUTOF10: 3

## 2022-03-20 ASSESSMENT — PAIN DESCRIPTION - ORIENTATION
ORIENTATION: RIGHT

## 2022-03-20 ASSESSMENT — PAIN DESCRIPTION - ONSET
ONSET: SUDDEN
ONSET: GRADUAL

## 2022-03-20 ASSESSMENT — PAIN DESCRIPTION - DESCRIPTORS
DESCRIPTORS: ACHING

## 2022-03-20 ASSESSMENT — PAIN DESCRIPTION - FREQUENCY
FREQUENCY: CONTINUOUS
FREQUENCY: INTERMITTENT

## 2022-03-20 ASSESSMENT — PAIN DESCRIPTION - PAIN TYPE
TYPE: SURGICAL PAIN
TYPE: CHRONIC PAIN
TYPE: SURGICAL PAIN
TYPE: SURGICAL PAIN

## 2022-03-20 NOTE — CARE COORDINATION
CM following, DC order noted, [facility] can take back, pt agrees to return to [facility] but pt will need precert to return. Spoke with Kenia Lew [phone] and she will start precert today. Electronically signed by Ashleigh Koch RN on 3/20/2022 at 11:45 AM   [phone]      UPDATE:  [facility] called back and they can take pt today Ins was approved.    Set up transport with [facility] ([facility]) for 530pm.  Electronically signed by Ashleigh Koch RN on 3/20/2022 at 1:29 PM  [phone]

## 2022-03-20 NOTE — PROGRESS NOTES
Pt ambulating with steady gait to BR x 1 assist and walker. Assisted with hygiene needs; pt brushed teeth, changed gown, changed brief, and cleansed face, underarms, and zi area with wash cloth/soap and water. Denies pain at this time. Denies other needs at this time. Call light is in reach. Bed alarm is engaged.

## 2022-03-20 NOTE — PROGRESS NOTES
PIV removed without complications. Pt tolerated well. Report given to transport team. Pt to d/c to [facility]. All belongings with pt.

## 2022-03-20 NOTE — PLAN OF CARE
Problem: Falls - Risk of:  Goal: Will remain free from falls  Description: Will remain free from falls  Outcome: Ongoing  Goal: Absence of physical injury  Description: Absence of physical injury  Outcome: Ongoing     Problem: Coping:  Goal: Ability to cope will improve  Description: Ability to cope will improve  Outcome: Ongoing  Goal: Ability to identify appropriate support needs will improve  Description: Ability to identify appropriate support needs will improve  Outcome: Ongoing     Problem: Physical Regulation:  Goal: Signs of adequate cerebral perfusion will increase  Description: Signs of adequate cerebral perfusion will increase  Outcome: Ongoing     Problem: Safety:  Goal: Ability to remain free from injury will improve  Description: Ability to remain free from injury will improve  Outcome: Ongoing

## 2022-03-20 NOTE — PROGRESS NOTES
Occupational Therapy   Occupational Therapy Initial Assessment & Treatment  Date: 3/20/2022   Patient Name: Alejandro Orr  MRN: [id number]     : 1975    Date of Service: 3/20/2022    Discharge Recommendations: Alejandro Orr scored a 17/24 on the AM-PAC ADL Inpatient form. Current research shows that an AM-PAC score of 17 or less is typically not associated with a discharge to the patient's home setting. Based on the patient's AM-PAC score and their current ADL deficits, it is recommended that the patient have 5-7 sessions per week of Occupational Therapy at d/c to increase the patient's independence. At this time, this patient demonstrates the endurance, and/or tolerance for 3 hours of therapy each day, with a treatment frequency of 5-7x/wk. Please see assessment section for further patient specific details. If patient discharges prior to next session this note will serve as a discharge summary. Please see below for the latest assessment towards goals. OT Equipment Recommendations  Other: defer to next level of care    Assessment   Performance deficits / Impairments: Decreased cognition;Decreased functional mobility ; Decreased ADL status; Decreased endurance;Decreased coordination;Decreased high-level IADLs;Decreased vision/visual deficit  Assessment: Pt is 52y F recently admitted in same hospital, d/c to outside [facility] where she was acuted back to [facility] from, d/t seizure. Pt is IND at baseline w/ ADL and fxl mobility, and presently is requiring CGA, as well as cueing for safety, attention and sequencing. Pt may benefit from skilled OT to maximize safety and IND w/ ADL and fxl mobility after acute d/c, prior to retn home w/ family. Will follow as inpt. Treatment Diagnosis: impaired ADLs/transfers  Prognosis: Fair  Decision Making: Medium Complexity  OT Education: OT Role;Plan of Care;Low Vision Education; ADL Adaptive Strategies  Patient Education: verbalized understanding, needs reinforcement  REQUIRES OT FOLLOW UP: Yes  Activity Tolerance  Activity Tolerance: Patient Tolerated treatment well  Safety Devices  Safety Devices in place: Yes  Type of devices: Left in chair;Nurse notified;Call light within reach; Chair alarm in place (family and RN in room on OT/PT exit)           Patient Diagnosis(es): The primary encounter diagnosis was Seizure ([facility] [address] A diagnosis of Brain metastases [facility]) was also pertinent to this visit. has a past medical history of Thyroid nodule. has a past surgical history that includes craniotomy (Left, 3/12/2022); IR GUIDED IVC FILTER PLACEMENT (N/A, 03/16/2022); and IR GUIDED IVC FILTER PLACEMENT (3/16/2022). Treatment Diagnosis: impaired ADLs/transfers      Restrictions  Position Activity Restriction  Other position/activity restrictions: up as tolerated, ambulate, seizure precautions    Subjective   General  Chart Reviewed: Yes,Orders,Progress Notes  Patient assessed for rehabilitation services?: Yes  Additional Pertinent Hx: recent 3/9 left temporal parietal occipital craniotomy for tumor resectionHer procedure was aborted due to seizure and status epilepticus requiring intubation and ICU stayCraniotomy then completed on 3/12. Presented from [facility] rehab after grand mal seizure.   Referring Practitioner: Mychal Grimm DO  Diagnosis: seizure  Subjective  Subjective: Pt sitting EOB w/ nsg on OT/PT approach - agreed to eval  Vital Signs  Level of Consciousness: Alert (0)  Social/Functional History  Social/Functional History  Lives With: Family (mother)  Type of Home: House  Home Layout: Two level,Bed/Bath upstairs  Home Access: Stairs to enter with rails  Entrance Stairs - Number of Steps: 3 or 4  Entrance Stairs - Rails: Right  Bathroom Shower/Tub: Tub/Shower unit  Bathroom Toilet: Standard  Bathroom Equipment:  (none)  Home Equipment:  (none)  ADL Assistance: Independent  Homemaking Assistance: Independent  Homemaking Responsibilities: Yes  Ambulation Assistance: Independent  Transfer Assistance: Independent  Active : Yes  Occupation: Full time employment (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[employer], 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)  Type of occupation: hairdresser  Leisure & Hobbies: cooking/baking, working out, yard work  Additional Comments: patient denies history of falls at home       Objective   Vision: Impaired  Vision Exceptions: Wears glasses at all times (peripheral deficits- pt reporting appx 1/3 cut of periperal on each side)    Orientation  Overall Orientation Status: Impaired  Orientation Level: Oriented to place;Oriented to person (disoriented to time and situation)     Balance  Sitting Balance: Supervision  Standing Balance: Contact guard assistance  Standing Balance  Time: 1min, 8min  Activity: fxl mobility, ADL  Functional Mobility  Functional - Mobility Device: Rolling Walker  Activity: To/from bathroom; Other (walk on unit)  Assist Level: Contact guard assistance  Functional Mobility Comments: Slow pace, commenting regarding pacing  ADL  Grooming: Contact guard assistance (oral care and hand hygiene in stance at sink)  Toileting: Minimal assistance  Additional Comments: pt presenting w/ visual scanning deficits, decreased working memory and attention to task - requiring verbal cues for locating necessary items for task completion, and sequencing of tasks           Transfers  Sit to stand: Contact guard assistance  Stand to sit: Contact guard assistance  Vision - Basic Assessment  Prior Vision: Wears glasses all the time  Visual History: Other (add comment) (has worn glasses all the time since 4th grade)  Visual Field Cut:  (limited peripheral vision)  Vision Comments: Pt required cues for scanning for item location (and sequencing) in stance at sink during grooming. Peripheral vision tested at bedside, and pt reporting appx 30* of periperal cut bilaterally.  (Moving hand from behind pt to in front, w/ pt looking forward, pt reported hand entering vision appx 30* into typical field)  Cognition  Overall Cognitive Status: Exceptions  Arousal/Alertness: Delayed responses to stimuli  Following Commands: Follows one step commands with repetition; Follows one step commands with increased time  Attention Span: Attends with cues to redirect  Memory: Decreased recall of recent events  Safety Judgement: Decreased awareness of need for assistance;Decreased awareness of need for safety  Problem Solving: Decreased awareness of errors;Assistance required to identify errors made;Assistance required to implement solutions  Insights: Decreased awareness of deficits  Initiation: Requires cues for some  Sequencing: Requires cues for some  Cognition Comment: cueing for sequencing of grooming tasks. pt aware of being at hospital - unclear on recent events                 LUE AROM (degrees)  LUE General AROM: BUE AROM for tasks performed.  Pt w/ bulky dressing at R shldr/breast so ROM not fully assessed                      Plan   Plan  Times per week: 5-7  Times per day: Daily  Current Treatment Recommendations: Balance Training,Functional Mobility Training,Endurance Training,Safety Education & Training,Cognitive Reorientation,Neuromuscular Re-education,Equipment Evaluation, Education, & procurement,Self-Care / ADL,Patient/Caregiver Education & Training,Cognitive/Perceptual Training    AM-PAC Score        AM-[facility] Inpatient Daily Activity Raw Score: 17 (03/20/22 0948)  AM-PAC Inpatient ADL T-Scale Score : 37.26 (03/20/22 0948)  ADL Inpatient CMS 0-100% Score: 50.11 (03/20/22 0948)  ADL Inpatient CMS G-Code Modifier : CK (03/20/22 0948)    Goals  Short term goals  Time Frame for Short term goals: Discharge  Short term goal 1: Pt will complete grooming tasks with no additional cueing for item location, items needed, or sequence  Short term goal 2: Pt will complete 5min grooming/ADL task in stance SBA  Short term goal 3: Pt will complete toileting SBA  Patient Goals   Patient goals : to increase independence       Therapy Time   Individual Concurrent Group Co-treatment   Time In 0831         Time Out 0909         Minutes 38         Timed Code Treatment Minutes: 23 Minutes+15min eval=[id number]     KEZIA Colindres-OTR/L [id number]

## 2022-03-20 NOTE — DISCHARGE SUMMARY
Hospital Medicine Discharge Summary    Patient: Zenobia Richardson     Gender: female  : 1975   Age: 48 y.o. MRN: [id number]    Admitting Physician: Janessa Campo MD  Discharge Physician: Nidia Trejo DO    Code Status: Full Code     Admit Date: 3/17/2022   Discharge Date: 3/20/2022      Disposition:  ARU    Discharge Diagnoses: Active Hospital Problems    Diagnosis Date Noted    Seizure [facility]) [R56.9] 2022     Outpatient to do list:     1) Follow-up appointments:    Primary care provider  Neurosurgery  Neurology    Condition at Discharge: [facility] Course:     HPI: recent 3/9 left temporal parietal occipital craniotomy for tumor resection  Her procedure was aborted due to seizure and status epilepticus requiring intubation and ICU stay  Craniotomy then completed on 3/12. Presented from [facility] rehab after grand mal seizure. Seizure episode  Hx of metastatic brain cancer  Status post craniotomy: left temporal parietal occipital craniotomy for tumor resection  Acute DVT, involving the right leg - s/p IVC filter     Neurosurgery, neurology consulted  Stable from neurosurgical perspective  Neurology modifying AEDs - has increased doses slightly of Keppra  Ativan bridge  Continues on Decadron Taper   Seizure precautions     Visual hallucinations  May have some delirium from steroids? Doing much better  Continue low dose Seroquel     Sinus tachycardia  Improved  Continue monitoring     Hx of DVT, IVC filter  Cannot start anticoagulation until POD 14     Recent craniotomy  - Follow up appointment 3/28/22.   - If patient unable to make f/u appointment, then remove staples on 3/28/22 at ARU    Additional findings or notes to primary provider:  None at this time    Discharge Medications:   Discharge Medication List as of 3/20/2022  4:11 PM      START taking these medications    Details   oxyCODONE (ROXICODONE) 5 MG immediate release tablet Take 1 tablet by mouth every 6 hours as needed for Pain for up to 3 days. , Disp-12 tablet, R-0Print      LORazepam (ATIVAN) 0.5 MG tablet Take 1 tablet by mouth daily for 2 doses. , Disp-2 tablet, R-0Print      melatonin 5 MG TBDP disintegrating tablet Take 1 tablet by mouth nightlyOTC      QUEtiapine (SEROQUEL) 25 MG tablet Take 0.5 tablets by mouth nightly for 3 days, Disp-2 tablet, R-0NO PRINT           Discharge Medication List as of 3/20/2022  4:11 PM      CONTINUE these medications which have CHANGED    Details   !! levETIRAcetam (KEPPRA) 1000 MG tablet Take 2 tablets by mouth daily, Disp-60 tablet, R-3NO PRINT      !! levETIRAcetam (KEPPRA) 500 MG tablet Take 5 tablets by mouth nightly, Disp-60 tablet, R-3NO PRINT      !! dexamethasone (DECADRON) 4 MG tablet Take 1 tablet by mouth every 12 hours for 1 dose For one dose remaining to be taken 3/20/22 in the evening. Thereafter switch to the once daily dosing to complete taper. , Disp-1 tablet, R-0Adjust Sig      !! dexamethasone (DECADRON) 4 MG tablet Take 1 tablet by mouth daily for 3 doses, Disp-3 tablet, R-0Adjust Sig       !! - Potential duplicate medications found. Please discuss with provider. Discharge Medication List as of 3/20/2022  4:11 PM      CONTINUE these medications which have NOT CHANGED    Details   pantoprazole (PROTONIX) 40 MG tablet Take 1 tablet by mouth every morning (before breakfast), Disp-30 tablet, R-3Normal      lacosamide (VIMPAT) 200 MG tablet Take 1 tablet by mouth 2 times daily for 30 days. , Disp-60 tablet, R-0Normal           Discharge Medication List as of 3/20/2022  4:11 PM          Discharge ROS:  A complete review of systems was asked and negative.     Discharge Exam:    /83   Pulse 103   Temp 98.2 °F (36.8 °C) (Oral)   Resp 16   Ht 5' 3\" (1.6 m)   Wt 182 lb (82.6 kg)   SpO2 96%   BMI 32.24 kg/m²   General appearance:  NAD  HEENT:   Normal cephalic, atraumatic, moist mucous membranes, no oropharyngeal erythema or exudate  Neck: Supple, trachea midline, no anterior cervical or SC LAD  Heart[de-identified] Normal s1/s2, RRR, no murmurs, gallops, or rubs. No leg edema  Lungs:  Clear to auscultation bilaterally, no wheeze, rales or rhonchi, no use of accessory musclesNormal respiratory effort. Clear to auscultation, bilaterally without Rales/Wheezes/Rhonchi. Abdomen: Soft, non-tender, non-distended, bowel sounds present, no masses  Musculoskeletal:  No clubbing, no cyanosis, or edema  Skin: No lesion or masses  Neurologic:  Neurovascularly intact without any focal sensory/motor deficits. Cranial nerves: II-XII intact, grossly non-focal.  Psychiatric:  Alert and oriented, thought content appropriate    Labs: For convenience and continuity at follow-up the following most recent labs are provided:    Lab Results   Component Value Date    WBC 13.3 03/20/2022    HGB 9.5 03/20/2022    HCT 28.5 03/20/2022    MCV 83.6 03/20/2022     03/20/2022     03/20/2022    K 4.8 03/20/2022    K 3.7 03/17/2022     03/20/2022    CO2 24 03/20/2022    BUN 14 03/20/2022    CREATININE 0.5 03/20/2022    CALCIUM 9.1 03/20/2022    PHOS 3.4 03/17/2022    ALKPHOS 61 03/03/2022    ALT 14 03/03/2022    AST 17 03/03/2022    BILITOT 0.3 03/03/2022    LABALBU 3.6 03/17/2022     Lab Results   Component Value Date    INR 1.05 03/11/2022    INR 1.08 03/07/2022       Radiology:  CT Head WO Contrast    Result Date: 3/17/2022  PROCEDURE: CT head without contrast INDICATION: seizure; recent craniotomy; COMPARISON: 3/13/2022 and 3/5/2022 TECHNIQUE: CT head was performed without contrast according to standard protocol. Axial images and multiplanar reformatted images reviewed. Up-to-date CT equipment and radiation dose reduction techniques were employed. 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 There is slightly decreased 7 mm rightward midline shift and slightly decreased compression of the left lateral ventricle.  There is stable persistent vasogenic edema in the left cerebral hemisphere. There is no evidence of acute hemorrhage or infarct. Visualized portions of the orbits, paranasal sinuses, and mastoids appear normal. There is scalp swelling overlying the craniotomy. 1.  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 No evidence of acute hemorrhage or infarct. CT Head wo Contrast    Result Date: 3/12/2022  HISTORY: Multiple brain masses. Status post resection. Postoperative evaluation. EXAM : CT OF THE HEAD WITHOUT CONTRAST TECHNIQUE: Multiple axial images were obtained of the brain without the use of contrast. Individualized dose optimization technique was used in order to meet ALARA standards for radiation dose reduction. In addition to vendor specific dose reduction algorithms, the dose reduction techniques vary based on the specific scanner utilized but frequently include automated exposure control, adjustment of the mA and/or kV according to patient size, and use of iterative reconstruction technique. COMPARISON: NONE FINDINGS: The visualized paranasal sinuses, mastoid air cells and middle ears are clear to the extent visualized. Status post left temporal and left parietal craniotomies. There is a surgical drain along the left scalp. No significant extracranial fluid collection. Skin staples consistent with recent surgery. Intracranially, there is persistent severe midline shift measuring approximately 10 mm similar to the preoperative evaluation. The large mass in the posterior left temporal region has been resected. There is an air-fluid level in the resection cavity consistent with expected postoperative changes. Resection cavity in the left parietal-occipital region with small gas bubbles. No acute complication or acute hemorrhage identified. Pneumocephalus consistent with recent surgery. 1. Resection cavity is at the locations of the previous masses with persistent adjacent vasogenic edema and persistent midline shift. No progressive mass effect or acute surgical complication otherwise identified. The patient was seen and examined on day of discharge and this discharge summary is in conjunction with any daily progress note from day of discharge. Time Spent on discharge is more than 30 minutes in the examination, evaluation, counseling and review of medications and discharge plan. Emilia Aragon DO   3/20/2022      Thank you JUDITH KIMBROUGH CNP for the opportunity to be involved in this patient's care. If you have any questions or concerns please feel free to contact me at perfectserve.

## 2022-03-20 NOTE — PROGRESS NOTES
Speech Language Pathology  Facility/Department: [facility] 5T ORTHO/NEURO  Initial Speech/Language/Cognitive Assessment    NAME: Vin Wilson  : 1975   MRN: [id number]  ADMISSION DATE: 3/17/2022  ADMITTING DIAGNOSIS: has Thyroid nodule; Brain metastases ([address] Brain mass; Status epilepticus ([address] Duct cell carcinoma ([address] Cerebral edema ([address] and Seizure ([address] on their problem list.  DATE ONSET: 3/4/22    Date of Eval: 3/20/2022   Evaluating Therapist: PASQUALE Chowdhury    RECENT RESULTS  CT OF HEAD/MRI: 3/17/22  1.  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 No evidence of acute hemorrhage or    infarct. Primary Complaint:  Words not coming out    Pain:  None indicated    Assessment:  Cognitive Diagnosis: AKASH fully 2/2 aphasia  Aphasia Diagnosis: expressive > receptive aphasia   Diagnosis: Aphasia marked by anomia, delayed processing / initiation for verbal expression, perseverations, and reduced verbal output at conversational level. Mild receptive deficits with more complex concepts and multistep commands. Pt demonstrating improvement in functioning as compared to initial evaluation by SLP during previous admission. Ongoing ST recommended at this time. Recommendations:  Requires SLP Intervention: Yes  Duration/Frequency of Treatment: 1-3x/wk for LOS  D/C Recommendations:  (ongoing ST on IP basis)       Plan:   Goals:  Short-term Goals  Goal 1: Pt will completed graded naming and word finding tasks with 85% accuracy or min cues  Goal 2: Pt will completed multistep commands with 80% accuracy or min cues  Goal 3: Pt will participate in ongoing cognitive linguistic assessment   Patient/family involved in developing goals and treatment plan: yes - pt    Subjective:  General  Chart Reviewed:  Yes  Additional Pertinent Hx: Pt recently admitted to [facility] 3/4/22 s/p brain mass + crani and tumor resection completed 3/12/22; d/c'd to rehab at [facility] but re-admitted 3/17/22 s/p seizure. No other significant PMHx. Family / Caregiver Present: No  General Comment  Comments: Pt seen by SLP during previous admission - evaluation revealed receptive / expressive aphasia. Pt also seen for dysphagia during previous admission; however, currently on regular consistency + thin liquid diet with no concerns for swallowing difficulty per staff (consult received for aphasia this date). Pt consumed 3 oz thins via cup edge uninterrupted w/o immediate or delayed cough. Pt seen immediately after lunch and oral residue or pocketing noted. Will monitor need for formal dysphagia evaluation. Subjective  Subjective: Pt in bed, pleasant and cooperative. Vision  Vision: Impaired  Vision Exceptions: Wears glasses at all times (peripheral vision deficits per OT report)  Hearing  Hearing: Within functional limits           Objective:     Oral/Motor  Oral Motor: Exceptions to [facility]  Labial Symmetry: Abnormal symmetry right    Auditory Comprehension  Comprehension: Exceptions  Yes/No Questions: Mild (95% accuracy, difficulty with complex - delayed, error x 1)  Two Step Basic Commands: Moderate (60% accuracy, extended processing time required)  L/R Discrimination:  (100% accuracy)  Interfering Components: Processing speed  Effective Techniques: Repetition; Extra processing time         Expression  Primary Mode of Expression: Verbal    Verbal Expression  Verbal Expression: Exceptions to functional limits  Initiation: Mild  (delayed intermittent)  Repetition: Mild (delayed, errors on more complex items)  Confrontation: Mild (80%, improved to 100% with sentence completion and phonemic cue)  Divergent: Mild  Conversation: Moderate (reduced output, anomia, extended time to formulate verbal output)  Interfering Components: Perseverations; Impaired thought organization (anomia)  Effective Techniques: Provide extra time    Written Expression  Dominant Hand: Right         Pragmatics/Social Functioning  Pragmatics: Within functional limits    Cognition:      Orientation  Overall Orientation Status: Within Functional Limits (Ox4 with extended time)  Attention  Attention: Within Functional Limits  Memory  Memory:  (AKASH fully d/t aphasia)  Safety/Judgement  Safety/Judgement:  (appeared to have good insight and error awareness)      Prognosis:  Speech Therapy Prognosis  Prognosis: Good  Prognosis Considerations: Age;Previous Level of Function;Severity of Impairments;Participation Level  Additional Comments: progress made thus far  Individuals consulted  Consulted and agree with results and recommendations: Patient;RN    Education:  Patient Education: Role of SLP, purpose of visit, aphasia, improvements noted, and recommendations  Patient Education Response: Verbalizes understanding;Needs reinforcement  Safety Devices in place: Yes  Type of devices: All fall risk precautions in place    Therapy Time:   Individual Concurrent Group Co-treatment   Time In 1304 0000 0000 0000   Time Out 1323 0000 0000 0000   Minutes 19 0 0 0   Variance: 0  Timed Code Treatment Minutes: 0 Minutes  Total Treatment Time: [address], [place] CCC-SLP; [id number]  Speech-Language Pathologist  Pager # [phone]  Phone # [phone]  Office # [phone]    If patient is discharged prior to next session, this note will serve as discharge summary.

## 2022-03-20 NOTE — PLAN OF CARE
SLP evaluation completed. Please refer to EMR.     Claudetta Cypher, MA CCC-SLP; [id number]  Speech-Language Pathologist

## 2022-03-20 NOTE — DISCHARGE INSTR - COC
Continuity of Care Form    Patient Name: Shiv Ross   :  1975  MRN:  [id number]    Admit date:  3/17/2022  Discharge date:  3/20    Code Status Order: Full Code   Advance Directives:      Admitting Physician:  Aquiles Gaxiola MD  PCP: JUDITH Rawls - CNP    Discharging Nurse: Imelda Cantu 70 Unit/Room#: 5946/0168-91  Discharging Unit Phone Number: [phone]    Emergency Contact:   Extended Emergency Contact Information  Primary Emergency Contact: DesireeHailee jacob  Address: [address] [facility], [address] [place] [address] Phone: [phone]  Relation: Parent    Past Surgical History:  Past Surgical History:   Procedure Laterality Date    CRANIOTOMY Left 3/12/2022    LEFT TEMPORAL PARIETAL OCCIPITAL CRANIOTOMY FOR TUMOR RESECTION performed by Stevenson Bloch, MD at [address]    IR IVC FILTER PLACEMENT W IMAGING N/A 2022    [facility]    IR IVC FILTER PLACEMENT W IMAGING  3/16/2022    IR IVC FILTER PLAC[facility] W IMAGING [facility]       Immunization History: There is no immunization history on file for this patient.     Active Problems:  Patient Active Problem List   Diagnosis Code    Thyroid nodule E04.1    Brain metastases (HCC) C79.31    Brain mass G93.89    Status epilepticus (HCC) G40.901    Duct cell carcinoma (HCC) C80.1    Cerebral edema (HCC) G93.6    Seizure (HCC) R56.9       Isolation/Infection:   Isolation            No Isolation          Patient Infection Status       None to display            Nurse Assessment:  Last Vital Signs: /84   Pulse 90   Temp 98.2 °F (36.8 °C) (Oral)   Resp 18   Ht 5' 3\" (1.6 m)   Wt 182 lb (82.6 kg)   SpO2 96%   BMI 32.24 kg/m²     Last documented pain score (0-10 scale): Pain Level: 3  Last Weight:   Wt Readings from Last 1 Encounters:   22 182 lb (82.6 kg)     Mental Status:  oriented, alert, and some trouble word finding    IV Access:  - None    Nursing Mobility/ADLs:  Walking   Assisted  Transfer  Assisted  Bathing  Assisted  Dressing  Assisted  Toileting  Assisted  Feeding  Independent  Med Admin  Independent  Med Delivery   whole    Wound Care Documentation and Therapy:  Wound 03/04/22 Breast Lower;Right Large mass (Active)   Wound Image   03/18/22 1438   Wound Etiology Other 03/18/22 1438   Dressing Status Old drainage noted 03/20/22 0825   Wound Cleansed Cleansed with saline 03/19/22 1831   Dressing/Treatment Dry dressing 03/19/22 1447   Dressing Change Due 03/19/22 03/18/22 1438   Wound Length (cm) 10 cm 03/18/22 1438   Wound Width (cm) 9.5 cm 03/18/22 1438   Wound Depth (cm) 0 cm 03/18/22 1438   Wound Surface Area (cm^2) 95 cm^2 03/18/22 1438   Wound Volume (cm^3) 0 cm^3 03/18/22 1438   Wound Assessment Other (Comment) 03/20/22 0825   Drainage Amount Scant 03/20/22 0825   Drainage Description Serosanguinous 03/20/22 0825   Odor None 03/20/22 0825   Lety-wound Assessment Induration; Non-blanchable erythema; Warm 03/18/22 1438   Margins Other (Comment) 03/20/22 0825   Number of days: 16        Elimination:  Continence: Bowel: Yes  Bladder: Yes  Urinary Catheter: None   Colostomy/Ileostomy/Ileal Conduit: No       Date of Last BM: 3/17    Intake/Output Summary (Last 24 hours) at 3/20/2022 0944  Last data filed at 3/20/2022 0845  Gross per 24 hour   Intake 960 ml   Output --   Net 960 ml     I/O last 3 completed shifts: In: 12 [P.O.:960]  Out: -     Safety Concerns: At Risk for Falls and History of Seizures    Impairments/Disabilities:      Speech    Nutrition Therapy:  Current Nutrition Therapy:   - Oral Diet:  General    Routes of Feeding: Oral  Liquids: No Restrictions  Daily Fluid Restriction: no  Last Modified Barium Swallow with Video (Video Swallowing Test): not done    Treatments at the Time of Hospital Discharge:   Respiratory Treatments: none  Oxygen Therapy:  is not on home oxygen therapy.   Ventilator:    - No ventilator support    Rehab Therapies: Physical Therapy and Occupational Therapy  Weight Bearing Status/Restrictions: No weight bearing restrictions  Other Medical Equipment (for information only, NOT a DME order):  none  Other Treatments: none    Patient's personal belongings (please select all that are sent with patient):  none    RN SIGNATURE:  Electronically signed by Jossie Graff on 3/20/22 at 2:37 PM EDT    CASE MANAGEMENT/SOCIAL WORK SECTION    Inpatient Status Date: ***    Readmission Risk Assessment Score:  Readmission Risk              Risk of Unplanned Readmission:  19           Discharging to Facility/ Agency   [facility] Details  FAX          [address] [facility], [address] [facility] [address]       Phone: [phone]       Fax: [phone]          / signature: Electronically signed by Mir Mercedes RN on 3/20/22 at 1:25 PM EDT    PHYSICIAN SECTION    Prognosis: Good    Condition at Discharge: Stable    Rehab Potential (if transferring to Rehab): Fair    Recommended Labs or Other Treatments After Discharge:   PT/OT/SLP  Complete Decadron Taper  Complete Steroid taper BID dosing to go through 3/20, then switch to daily dosing to complete taper on 3/21  Patient in on low dose Ativan taper for couple of days  Low dose Seroquel for visual hallucinations  Follow-up with neurosurgery 3/28  No anticoagulation for DVT until ok with neurosurgery (and after at least 14 days postop). Has IVC filter. Physician Certification: I certify the above information and transfer of Alicia Mckeon  is necessary for the continuing treatment of the diagnosis listed and that she requires Acute Rehab for greater 30 days.      Update Admission H&P: No change in H&P    PHYSICIAN SIGNATURE:  Electronically signed by Alex Hernandez DO on 3/20/22 at 9:47 AM EDT

## 2022-03-20 NOTE — PROGRESS NOTES
Physical Therapy    Facility/Department: [facility] 5T ORTHO/NEURO  Initial Assessment/Treatment    NAME: Alicia Mckeon  : 1975  MRN: [id number]    Date of Service: 3/20/2022    Discharge Recommendations:    Alicia Mckeon scored a 17/24 on the AM-PAC short mobility form. Current research shows that an AM-PAC score of 17 or less is typically not associated with a discharge to the patient's home setting. Based on the patient's AM-PAC score and their current functional mobility deficits, it is recommended that the patient have 5-7 sessions per week of Physical Therapy at d/c to increase the patient's independence. At this time, this patient demonstrates the endurance, and/or tolerance for 3 hours of therapy each day, with a treatment frequency of 5-7x/wk. Please see assessment section for further patient specific details. If patient discharges prior to next session this note will serve as a discharge summary. Please see below for the latest assessment towards goals. PT Equipment Recommendations  Equipment Needed:  (defer to next level of care)    Assessment   Body structures, Functions, Activity limitations: Decreased functional mobility ; Decreased endurance;Decreased strength;Decreased balance  Assessment: 47 y/o pt with recent 3/9 left temporal parietal occipital craniotomy for tumor resectionHer procedure was aborted due to seizure and status epilepticus requiring intubation and ICU stayCraniotomy then completed on 3/12. Presented on 3/17 from [facility] rehab after grand mal seizure. Pt currently requiring CGA for transfers and amb with RW. Pt is below her baseline with mobility, exhibiting cognitive delay at times, impaired recall/memory and with reports of peripheral visual cuts. Pt would benefit from further skilled PT to maximize safety and independence with functional mobility. Will continue to follow.   Treatment Diagnosis: decreased functional mobility  Decision Making: Medium Complexity  PT Education: PT Role;Functional Mobility Training;Plan of Care;General Safety;Goals  Patient Education: pt will require reinforcement  REQUIRES PT FOLLOW UP: Yes  Activity Tolerance  Activity Tolerance: Patient limited by fatigue;Patient limited by endurance       Patient Diagnosis(es): The encounter diagnosis was Seizure ([address] has a past medical history of Thyroid nodule. has a past surgical history that includes craniotomy (Left, 3/12/2022); IR GUIDED IVC FILTER PLACEMENT (N/A, 03/16/2022); and IR GUIDED IVC FILTER PLACEMENT (3/16/2022). Restrictions  Position Activity Restriction  Other position/activity restrictions: up as tolerated, ambulate, seizure precautions  Vision/Hearing  Vision: Impaired  Vision Exceptions: Wears glasses at all times (peripheral deficits- pt reporting appx 1/3 cut of periperal on each side)     Subjective  General  Chart Reviewed: Yes  Patient assessed for rehabilitation services?: Yes  Additional Pertinent Hx: 49 y/o pt with recent 3/9 left temporal parietal occipital craniotomy for tumor resectionHer procedure was aborted due to seizure and status epilepticus requiring intubation and ICU stayCraniotomy then completed on 3/12. Presented on 3/17 from [facility] rehab after grand mal seizure. Family / Caregiver Present: No  Referring Practitioner: DO Amilcar  Diagnosis: seizure  Follows Commands: Impaired (slightly delayed at times)  Subjective  Subjective: Pt found sitting EOB upon arrival and agreeable to therapy.           Orientation  Orientation  Overall Orientation Status: Impaired  Orientation Level: Oriented to situation;Oriented to person;Disoriented to time;Disoriented to place  Social/Functional History  Social/Functional History  Lives With: Family (mother)  Type of Home: House  Home Layout: Two level,Bed/Bath upstairs  Home Access: Stairs to enter with rails  Entrance Stairs - Number of Steps: 3 or 4  Entrance Stairs - Rails: Right  Bathroom Shower/Tub: Tub/Shower unit  Bathroom Toilet: Standard  Bathroom Equipment:  (none)  Home Equipment:  (none)  ADL Assistance: Independent  Homemaking Assistance: Independent  Homemaking Responsibilities: Yes  Ambulation Assistance: Independent  Transfer Assistance: Independent  Active : Yes  Occupation: Full time employment (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 Transfers  Sit to Stand: Stand by assistance;Contact guard assistance  Stand to sit: Stand by assistance;Contact guard assistance  Ambulation  Ambulation?: Yes  Ambulation 1  Surface: level tile  Device: Rolling Walker  Assistance: Contact guard assistance  Quality of Gait: slow leelee, moderate Belkis and stride length. No overt LOB noted.   Distance: 10'+ 60'  Stairs/Curb  Stairs?: No     Balance  Posture: Fair  Sitting - Static: Good  Sitting - Dynamic: Good  Standing - Static: Fair  Standing - Dynamic: [address]  Times per week: 5-7  Times per day: Daily  Current Treatment Recommendations: Valeriano Christian Re-education,Cognitive Reorientation,Patient/Caregiver Education & Training,Balance Training,Gait Training,Functional Mobility Training,Stair training,Safety Education & Training,Home Exercise Program  Safety Devices  Type of devices: Call light within reach,Nurse notified,Gait belt,Left in bed,Chair alarm in place,Left in chair    G-Code       OutComes Score                                                  AM-PAC Score Goals  Short term goals  Time Frame for Short term goals: d/c  Short term goal 1: sup<>sit independent  Short term goal 2: sit<>stand supervision  Short term goal 3: amb 80' with LRAD and supervision  Patient Goals   Patient goals : return home when able       Therapy Time   Individual Concurrent Group Co-treatment   Time In 0830         Time Out 0908         Minutes 38           Timed Code Treatment Minutes:  23    Total Treatment Minutes:  38     If the patient is discharged before the next treatment session, this note will serve as the discharge summary.      Zoë Tuttle, PT, DPT

## 2022-03-20 NOTE — PROGRESS NOTES
Pt A&Ox3, disoriented to time. Delayed responses. Pt denies pain. Pt took all meds without issue. Pt BS was below 130, no insulin was given. Right breast wound was cleansed with NS and applied new dressing; covered with Xeroform and abd pad. Site was bleeding, but was controlled. Old dressing had small serosanguinous drainage. Pt has call light within reach. Family is at the bedside. All pt needs met at this time. Will continue to monitor.

## 2022-03-20 NOTE — PROGRESS NOTES
Report called to receiving RN at [facility] via telephone. All questions answered at this time. Pt to d/c via transport with ETA 1730 today.

## 2022-03-20 NOTE — PROGRESS NOTES
Hospitalist Progress Note      PCP: SHAHEED ZAMARRIPA, APRN - CNP    Date of Admission: 3/17/2022    Chief Complaint: Seizures,     HPI: recent 3/9 left temporal parietal occipital craniotomy for tumor resection  Her procedure was aborted due to seizure and status epilepticus requiring intubation and ICU stay  Craniotomy then completed on 3/12. Presented from [facility] rehab after grand mal seizure. Subjective:     Feels better, few bunnies last night. Much better today. Would like to stay on the Seroquel. No fever, cough, abdominal pain, n/v, diarrhea, constipation, urinary frequency, urgency or dysuria. Medications:  Reviewed    Infusion Medications    sodium chloride      dextrose       Scheduled Medications    melatonin  5 mg Oral Nightly    sodium chloride flush  5-40 mL IntraVENous 2 times per day    enoxaparin  40 mg SubCUTAneous Daily    lacosamide  200 mg Oral BID    pantoprazole  40 mg Oral QAM AC    dexamethasone  4 mg Oral 2 times per day    [START ON 3/22/2022] dexamethasone  4 mg Oral Daily    LORazepam  0.5 mg Oral Daily    insulin lispro  0-6 Units SubCUTAneous TID WC    insulin lispro  0-3 Units SubCUTAneous Nightly    levETIRAcetam  2,000 mg Oral Daily    levETIRAcetam  2,500 mg Oral Nightly     PRN Meds: sodium chloride flush, sodium chloride, ondansetron **OR** ondansetron, polyethylene glycol, acetaminophen **OR** acetaminophen, LORazepam, glucose, glucagon (rDNA), dextrose, dextrose bolus (hypoglycemia) **OR** dextrose bolus (hypoglycemia), oxyCODONE      Intake/Output Summary (Last 24 hours) at 3/20/2022 0931  Last data filed at 3/20/2022 0845  Gross per 24 hour   Intake 960 ml   Output --   Net 960 ml       Exam:    /84   Pulse 90   Temp 98.2 °F (36.8 °C) (Oral)   Resp 18   Ht 5' 3\" (1.6 m)   Wt 182 lb (82.6 kg)   SpO2 96%   BMI 32.24 kg/m²     General appearance: No apparent distress, appears stated age and cooperative.   HEENT: Pupils equal, round, and reactive to light. Conjunctivae/corneas clear. L craniotomy incision, clean, healing well. Neck: Supple, with full range of motion. No jugular venous distention. Trachea midline. Respiratory:  Normal respiratory effort. Clear to auscultation, bilaterally without Rales/Wheezes/Rhonchi. Cardiovascular: Regular rate and rhythm with normal S1/S2 without murmurs, rubs or gallops. Abdomen: Soft, non-tender, non-distended with normal bowel sounds. Musculoskelatal: No clubbing, cyanosis or edema bilaterally. Skin: Skin color, texture, turgor normal.  No rashes or lesions. Neurologic:  Cranial nerves: II-XII intact, grossly non-focal.  Psychiatric: Alert and oriented, thought content appropriate, normal insight    Labs:   Recent Labs     03/18/22 0552 03/19/22 0613 03/20/22  0642   WBC 13.4* 11.3* 13.3*   HGB 10.0* 9.6* 9.5*   HCT 30.6* 29.4* 28.5*    272 195     Recent Labs     03/18/22 0552 03/19/22  0613 03/20/22  0642    137 134*   K 4.9 4.7 4.8    102 101   CO2 26 24 24   BUN 13 13 14   CREATININE 0.6 0.5* 0.5*   CALCIUM 9.2 9.0 9.1     No results for input(s): AST, ALT, BILIDIR, BILITOT, ALKPHOS in the last 72 hours. No results for input(s): INR in the last 72 hours. No results for input(s): Skippy Gault in the last 72 hours. Studies:  CT Head WO Contrast   Final Result      1. 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 No evidence of acute hemorrhage or    infarct.           Assessment/Plan:    Active Hospital Problems    Diagnosis Date Noted    Seizure [facility]) [R56.9] 03/18/2022        Seizure episode  Hx of metastatic brain cancer  Status post craniotomy: left temporal parietal occipital craniotomy for tumor resection  Acute DVT, involving the right leg - s/p IVC filter     Neurosurgery, neurology

## 2022-03-20 NOTE — CARE COORDINATION
Case Management Assessment            Discharge Note                    Date / Time of Note: 3/20/2022 1:29 PM                  Discharge Note Completed by: Lázaro Salinas RN    Patient Name: Lucy Mccord   YOB: 1975  Diagnosis: Seizure [facility]) [R56.9]   Date / Time: 3/17/2022 10:36 PM    Current PCP: JUDITH KIMBROUGH - CNP  Clinic patient: No    Hospitalization in the last 30 days: Yes    Advance Directives:  Code Status: Full Code  [place] DNR form completed and on chart: Not Indicated    Financial:  Payor: Kourtney Ramirez / Plan: Kourtney James / Product Type: *No Product type* /      Pharmacy:    [place] [phone] - F [phone] [address] [address]  Phone: [phone] Fax: [phone]      Assistance purchasing medications?: Potential Assistance Purchasing Medications: No  Assistance provided by Case Management: None at this time    Does patient want to participate in local refill/ meds to beds program?:      Meds To Beds General Rules:  1. Can ONLY be done Monday- Friday between 8:30am-5pm  2. Prescription(s) must be in pharmacy by 3pm to be filled same day  3. Copy of patient's insurance/ prescription drug card and patient face sheet must be sent along with the prescription(s)  4. Cost of Rx cannot be added to hospital bill. If financial assistance is needed, please contact unit  or ;  or  CANNOT provide pharmacy voucher for patients co-pays  5.  Patients can then  the prescription on their way out of the hospital at discharge, or pharmacy can deliver to the bedside if staff is available. (payment due at time of pick-up or delivery - cash, check, or card accepted)     Able to afford home medications/ co-pay costs: Yes    ADLS:  Current PT AM-PAC Score: 17 /24  Current OT AM-PAC Score: 17 /24      DISCHARGE Disposition: Inpatient Rehab: [facility] Phone: [phone] Fax: [phone]    LOC at discharge: Acute Rehab  Macho Garcia Completed: Yes    Notification completed in HENS/PAS?:  Not Applicable    IMM Completed:   Not Indicated    Transportation:  Transportation PLAN for discharge: EMS transportation   Mode of Transport: Ambulance stretcher - BLS  Reason for medical transport: Other: [facility] [address]  Name of [address] [place] [address], [address]: [employer] [facility]    Phone: [phone]  Time of Transport: 530p    Transport form completed: Yes        Additional CM Notes: Pt will Dc back to [facility] today via [facility] -[phone] at 530p. Nicolás Mark accepted and can take today, nurse to call report to [phone] and orders faxed to [phone]. The Plan for Transition of Care is related to the following treatment goals of Seizure [facility]) [R56.9]    The Patient and/or patient representative Sean Marcial and her family were provided with a choice of provider and agrees with the discharge plan Yes    Freedom of choice list was provided with basic dialogue that supports the patient's individualized plan of care/goals and shares the quality data associated with the providers.  Yes    Care Transitions patient: Candice Cao, RN  [facility]  Case Management Department  Ph: [phone]  Fax: [phone]

## 2022-03-22 LAB
EKG ATRIAL RATE: 108 BPM
EKG DIAGNOSIS: NORMAL
EKG P AXIS: 21 DEGREES
EKG P-R INTERVAL: 140 MS
EKG Q-T INTERVAL: 330 MS
EKG QRS DURATION: 82 MS
EKG QTC CALCULATION (BAZETT): 442 MS
EKG R AXIS: 1 DEGREES
EKG T AXIS: 3 DEGREES
EKG VENTRICULAR RATE: 108 BPM

## 2022-03-22 PROCEDURE — 93010 ELECTROCARDIOGRAM REPORT: CPT | Performed by: INTERNAL MEDICINE

## 2022-03-25 ENCOUNTER — HOSPITAL ENCOUNTER (INPATIENT)
Age: 47
LOS: 16 days | Discharge: NURSING, REHAB OR LONG-TERM CARE | DRG: 169 | End: 2022-04-10
Attending: EMERGENCY MEDICINE | Admitting: INTERNAL MEDICINE
Payer: MEDICAID

## 2022-03-25 ENCOUNTER — APPOINTMENT (OUTPATIENT)
Dept: CT IMAGING | Age: 47
DRG: 169 | End: 2022-03-25
Payer: MEDICAID

## 2022-03-25 DIAGNOSIS — D69.6 THROMBOCYTOPENIA (HCC): ICD-10-CM

## 2022-03-25 DIAGNOSIS — I82.4Y1 ACUTE DEEP VEIN THROMBOSIS (DVT) OF PROXIMAL VEIN OF RIGHT LOWER EXTREMITY (HCC): Primary | ICD-10-CM

## 2022-03-25 DIAGNOSIS — Z98.890 S/P CRANIOTOMY: ICD-10-CM

## 2022-03-25 DIAGNOSIS — I80.9 PHLEBITIS: ICD-10-CM

## 2022-03-25 PROBLEM — I82.409 DEEP VENOUS THROMBOSIS (HCC): Status: ACTIVE | Noted: 2022-03-25

## 2022-03-25 LAB
ALBUMIN SERPL-MCNC: 3.9 G/DL (ref 3.4–5)
ALP BLD-CCNC: 74 U/L (ref 40–129)
ALT SERPL-CCNC: 31 U/L (ref 10–40)
ANION GAP SERPL CALCULATED.3IONS-SCNC: 14 MMOL/L (ref 3–16)
AST SERPL-CCNC: 28 U/L (ref 15–37)
BASOPHILS ABSOLUTE: 0.1 K/UL (ref 0–0.2)
BASOPHILS RELATIVE PERCENT: 0.7 %
BILIRUB SERPL-MCNC: 0.4 MG/DL (ref 0–1)
BILIRUBIN DIRECT: <0.2 MG/DL (ref 0–0.3)
BILIRUBIN, INDIRECT: NORMAL MG/DL (ref 0–1)
BUN BLDV-MCNC: 15 MG/DL (ref 7–20)
CALCIUM SERPL-MCNC: 9.2 MG/DL (ref 8.3–10.6)
CHLORIDE BLD-SCNC: 98 MMOL/L (ref 99–110)
CO2: 20 MMOL/L (ref 21–32)
CREAT SERPL-MCNC: 0.5 MG/DL (ref 0.6–1.1)
EKG ATRIAL RATE: 114 BPM
EKG DIAGNOSIS: NORMAL
EKG P AXIS: 33 DEGREES
EKG P-R INTERVAL: 140 MS
EKG Q-T INTERVAL: 306 MS
EKG QRS DURATION: 66 MS
EKG QTC CALCULATION (BAZETT): 421 MS
EKG R AXIS: 25 DEGREES
EKG T AXIS: 14 DEGREES
EKG VENTRICULAR RATE: 114 BPM
EOSINOPHILS ABSOLUTE: 0.1 K/UL (ref 0–0.6)
EOSINOPHILS RELATIVE PERCENT: 1.2 %
GFR AFRICAN AMERICAN: >60
GFR NON-AFRICAN AMERICAN: >60
GLUCOSE BLD-MCNC: 96 MG/DL (ref 70–99)
HCT VFR BLD CALC: 28.6 % (ref 36–48)
HEMOGLOBIN: 9.4 G/DL (ref 12–16)
INR BLD: 1.22 (ref 0.88–1.12)
LYMPHOCYTES ABSOLUTE: 1.5 K/UL (ref 1–5.1)
LYMPHOCYTES RELATIVE PERCENT: 13.5 %
MCH RBC QN AUTO: 27.7 PG (ref 26–34)
MCHC RBC AUTO-ENTMCNC: 33 G/DL (ref 31–36)
MCV RBC AUTO: 83.9 FL (ref 80–100)
MONOCYTES ABSOLUTE: 0.8 K/UL (ref 0–1.3)
MONOCYTES RELATIVE PERCENT: 7.4 %
NEUTROPHILS ABSOLUTE: 8.3 K/UL (ref 1.7–7.7)
NEUTROPHILS RELATIVE PERCENT: 77.2 %
PDW BLD-RTO: 15.3 % (ref 12.4–15.4)
PLATELET # BLD: 86 K/UL (ref 135–450)
PLATELET SLIDE REVIEW: ABNORMAL
PMV BLD AUTO: 10.4 FL (ref 5–10.5)
POTASSIUM SERPL-SCNC: 4.7 MMOL/L (ref 3.5–5.1)
PROTHROMBIN TIME: 13.9 SEC (ref 9.9–12.7)
RBC # BLD: 3.41 M/UL (ref 4–5.2)
SLIDE REVIEW: ABNORMAL
SODIUM BLD-SCNC: 132 MMOL/L (ref 136–145)
TOTAL PROTEIN: 7.5 G/DL (ref 6.4–8.2)
WBC # BLD: 10.7 K/UL (ref 4–11)

## 2022-03-25 PROCEDURE — 85025 COMPLETE CBC W/AUTO DIFF WBC: CPT

## 2022-03-25 PROCEDURE — 80076 HEPATIC FUNCTION PANEL: CPT

## 2022-03-25 PROCEDURE — 93005 ELECTROCARDIOGRAM TRACING: CPT | Performed by: NURSE PRACTITIONER

## 2022-03-25 PROCEDURE — 6370000000 HC RX 637 (ALT 250 FOR IP): Performed by: STUDENT IN AN ORGANIZED HEALTH CARE EDUCATION/TRAINING PROGRAM

## 2022-03-25 PROCEDURE — 1200000000 HC SEMI PRIVATE

## 2022-03-25 PROCEDURE — 99285 EMERGENCY DEPT VISIT HI MDM: CPT

## 2022-03-25 PROCEDURE — 80048 BASIC METABOLIC PNL TOTAL CA: CPT

## 2022-03-25 PROCEDURE — 2580000003 HC RX 258: Performed by: STUDENT IN AN ORGANIZED HEALTH CARE EDUCATION/TRAINING PROGRAM

## 2022-03-25 PROCEDURE — 85610 PROTHROMBIN TIME: CPT

## 2022-03-25 PROCEDURE — 36415 COLL VENOUS BLD VENIPUNCTURE: CPT

## 2022-03-25 RX ORDER — POLYETHYLENE GLYCOL 3350 17 G/17G
17 POWDER, FOR SOLUTION ORAL DAILY PRN
Status: DISCONTINUED | OUTPATIENT
Start: 2022-03-25 | End: 2022-04-10 | Stop reason: HOSPADM

## 2022-03-25 RX ORDER — SODIUM CHLORIDE 0.9 % (FLUSH) 0.9 %
5-40 SYRINGE (ML) INJECTION PRN
Status: DISCONTINUED | OUTPATIENT
Start: 2022-03-25 | End: 2022-04-10 | Stop reason: HOSPADM

## 2022-03-25 RX ORDER — LACOSAMIDE 50 MG/1
200 TABLET ORAL 2 TIMES DAILY
Status: DISCONTINUED | OUTPATIENT
Start: 2022-03-26 | End: 2022-04-10 | Stop reason: HOSPADM

## 2022-03-25 RX ORDER — PANTOPRAZOLE SODIUM 40 MG/1
40 TABLET, DELAYED RELEASE ORAL
Status: DISCONTINUED | OUTPATIENT
Start: 2022-03-26 | End: 2022-04-10 | Stop reason: HOSPADM

## 2022-03-25 RX ORDER — ACETAMINOPHEN 325 MG/1
650 TABLET ORAL EVERY 6 HOURS PRN
Status: DISCONTINUED | OUTPATIENT
Start: 2022-03-25 | End: 2022-04-10 | Stop reason: HOSPADM

## 2022-03-25 RX ORDER — SODIUM CHLORIDE 9 MG/ML
25 INJECTION, SOLUTION INTRAVENOUS PRN
Status: DISCONTINUED | OUTPATIENT
Start: 2022-03-25 | End: 2022-04-10 | Stop reason: HOSPADM

## 2022-03-25 RX ORDER — SODIUM CHLORIDE 9 MG/ML
INJECTION, SOLUTION INTRAVENOUS CONTINUOUS
Status: DISCONTINUED | OUTPATIENT
Start: 2022-03-25 | End: 2022-03-28

## 2022-03-25 RX ORDER — ONDANSETRON 2 MG/ML
4 INJECTION INTRAMUSCULAR; INTRAVENOUS EVERY 6 HOURS PRN
Status: DISCONTINUED | OUTPATIENT
Start: 2022-03-25 | End: 2022-04-10 | Stop reason: HOSPADM

## 2022-03-25 RX ORDER — ONDANSETRON 4 MG/1
4 TABLET, ORALLY DISINTEGRATING ORAL EVERY 8 HOURS PRN
Status: DISCONTINUED | OUTPATIENT
Start: 2022-03-25 | End: 2022-04-10 | Stop reason: HOSPADM

## 2022-03-25 RX ORDER — MECOBALAMIN 5000 MCG
5 TABLET,DISINTEGRATING ORAL NIGHTLY
Status: DISCONTINUED | OUTPATIENT
Start: 2022-03-26 | End: 2022-04-10 | Stop reason: HOSPADM

## 2022-03-25 RX ORDER — OXYCODONE HYDROCHLORIDE AND ACETAMINOPHEN 5; 325 MG/1; MG/1
1 TABLET ORAL EVERY 6 HOURS PRN
Status: DISCONTINUED | OUTPATIENT
Start: 2022-03-25 | End: 2022-03-26

## 2022-03-25 RX ORDER — SODIUM CHLORIDE 0.9 % (FLUSH) 0.9 %
5-40 SYRINGE (ML) INJECTION EVERY 12 HOURS SCHEDULED
Status: DISCONTINUED | OUTPATIENT
Start: 2022-03-26 | End: 2022-04-10 | Stop reason: HOSPADM

## 2022-03-25 RX ORDER — ACETAMINOPHEN 650 MG/1
650 SUPPOSITORY RECTAL EVERY 6 HOURS PRN
Status: DISCONTINUED | OUTPATIENT
Start: 2022-03-25 | End: 2022-04-10 | Stop reason: HOSPADM

## 2022-03-25 RX ADMIN — ACETAMINOPHEN 325MG 650 MG: 325 TABLET ORAL at 22:36

## 2022-03-25 RX ADMIN — SODIUM CHLORIDE: 9 INJECTION, SOLUTION INTRAVENOUS at 22:02

## 2022-03-25 ASSESSMENT — PAIN SCALES - GENERAL
PAINLEVEL_OUTOF10: 7
PAINLEVEL_OUTOF10: 8

## 2022-03-25 ASSESSMENT — PAIN DESCRIPTION - PAIN TYPE: TYPE: ACUTE PAIN

## 2022-03-25 ASSESSMENT — ENCOUNTER SYMPTOMS
COUGH: 0
ABDOMINAL PAIN: 0
VOMITING: 0
ABDOMINAL PAIN: 1
CONSTIPATION: 0
NAUSEA: 0
SHORTNESS OF BREATH: 0
DIARRHEA: 0

## 2022-03-25 ASSESSMENT — PAIN DESCRIPTION - ORIENTATION: ORIENTATION: LEFT;RIGHT

## 2022-03-25 ASSESSMENT — PAIN DESCRIPTION - LOCATION: LOCATION: LEG

## 2022-03-25 ASSESSMENT — PAIN - FUNCTIONAL ASSESSMENT: PAIN_FUNCTIONAL_ASSESSMENT: 0-10

## 2022-03-25 NOTE — ED PROVIDER NOTES
[address] [facility] ENCOUNTER          NURSE PRACTITIONER NOTE       Date of evaluation: 3/25/2022    Chief Complaint     Leg Pain (Brain surgery completed 2 weeks ago, diagnosed with bilateral DVTs yesterday, platelet count 97-35 and Nehal Mitchel was unsure if they could anticoagulate her)      History of Present Illness     Rishi Brown is a 48 y.o. female past medical history of breast cancer with metastasis to the brain status post left craniotomy on 0/83/8326 complicated by seizures, currently at [facility] [place] [facility]. She presents to the emergency department via EMS from her skilled nursing facility with a complaint of right-sided DVT, ongoing right leg pain, concern for drop in platelet count. Reportedly platelet count decreased to 41/45/46 over the past 2 days, patient had been receiving Lovenox at the facility for DVT prophylaxis/treatment. This was changed to [address] yesterday due to noted thrombocytopenia and concern for possible heparin-induced thrombocytopenia; stat Doppler was obtained at the facility today and noted new DVTs. Notably on chart review patient had a Doppler study done during her previous admission to this facility which showed right-sided DVT please see below. She is mainly complaining of right extremity swelling and pain, denies shortness of breath, chest pain, headaches or other concerns at this time. Venous Duplex BLE  3/15/22  Impressions   Right Impression   There is acute partially occluding deep venous thrombosis involving the deep   femoral vein. The thrombus extends into the common femoral vein by 3.1 cm at   the venous confluence. There is acute totally occluding deep venous thrombosis involving two soleal   veins in the mid calf. There is no other evidence of deep or superficial venous thrombosis involving   the right lower extremity.    Left Impression   There is no evidence of deep or superficial venous thrombosis involving the   left lower extremity. Compared to the previous study on 3/10/22, the acute DVT noted in the right   common femoral, deep femoral, and soleal veins is a new finding. The left   lower extremity venous findings are unchanged. Review of Systems     Review of Systems   Constitutional: Positive for fatigue. Respiratory: Negative for cough. Cardiovascular: Positive for leg swelling (bilateral, right worse than left). Negative for chest pain. Gastrointestinal: Negative for abdominal pain, nausea and vomiting. Musculoskeletal:        Right leg pain and swelling   Skin: Negative. Allergic/Immunologic: Positive for immunocompromised state. Neurological: Negative for headaches. Hematological: Bruises/bleeds easily. Psychiatric/Behavioral: Negative. Past Medical, Surgical, Family, and Social History     She has a past medical history of Thyroid nodule. She has a past surgical history that includes craniotomy (Left, 3/12/2022); IR GUIDED IVC FILTER PLACEMENT (N/A, 03/16/2022); and IR GUIDED IVC FILTER PLACEMENT (3/16/2022). Her family history includes Cancer in her father. She reports that she has never smoked. She has never used smokeless tobacco. She reports that she does not drink alcohol and does not use drugs. Medications     Previous Medications    LACOSAMIDE (VIMPAT) 200 MG TABLET    Take 1 tablet by mouth 2 times daily for 30 days. LEVETIRACETAM (KEPPRA) 1000 MG TABLET    Take 2 tablets by mouth daily    LEVETIRACETAM (KEPPRA) 500 MG TABLET    Take 5 tablets by mouth nightly    MELATONIN 5 MG TBDP DISINTEGRATING TABLET    Take 1 tablet by mouth nightly    PANTOPRAZOLE (PROTONIX) 40 MG TABLET    Take 1 tablet by mouth every morning (before breakfast)    QUETIAPINE (SEROQUEL) 25 MG TABLET    Take 0.5 tablets by mouth nightly for 3 days       Allergies     She has No Known Allergies.     Physical Exam     INITIAL VITALS: BP: 128/86, Temp: 98.6 °F (37 °C), Pulse: 117, Resp: 21, SpO2: 96 %   Physical Exam  Vitals and nursing note reviewed. Constitutional:       Appearance: Normal appearance. Comments: drowsy   HENT:      Head:      Comments: Dressing in place to left parietal scalp, dry  Cardiovascular:      Rate and Rhythm: Regular rhythm. Tachycardia present. Pulses: Normal pulses. Heart sounds: Normal heart sounds. No murmur heard. No friction rub. No gallop. Pulmonary:      Effort: Pulmonary effort is normal. No respiratory distress. Breath sounds: Normal breath sounds. Musculoskeletal:         General: Swelling and tenderness present. Comments: Significant edema throughout RLE with diffuse TTP, no overlying skin change noted, intact distal pulses and intact sensation   Skin:     General: Skin is warm and dry. Neurological:      Mental Status: She is alert and oriented to person, place, and time.    Psychiatric:         Mood and Affect: Mood normal.         Behavior: Behavior normal.         Diagnostic Results     EKG   Interpreted in conjunction with emergency department physician No att. providers found  Rhythm: sinus tachycardia  Rate: 110-120  Axis: normal  Ectopy: none  Conduction: normal  ST Segments: no acute change  T Waves: no acute change  Q Waves: none  Clinical Impression: no acute changes      RADIOLOGY:  No orders to display       LABS:   Results for orders placed or performed during the hospital encounter of 03/25/22   CBC with Auto Differential   Result Value Ref Range    WBC 10.7 4.0 - 11.0 K/uL    RBC 3.41 (L) 4.00 - 5.20 M/uL    Hemoglobin 9.4 (L) 12.0 - 16.0 g/dL    Hematocrit 28.6 (L) 36.0 - 48.0 %    MCV 83.9 80.0 - 100.0 fL    MCH 27.7 26.0 - 34.0 pg    MCHC 33.0 31.0 - 36.0 g/dL    RDW 15.3 12.4 - 15.4 %    Platelets 86 (L) 360 - 450 K/uL    MPV 10.4 5.0 - 10.5 fL    PLATELET SLIDE REVIEW Decreased     SLIDE REVIEW see below     Neutrophils % 77.2 %    Lymphocytes % 13.5 %    Monocytes % 7.4 %    Eosinophils % 1.2 % Basophils % 0.7 %    Neutrophils Absolute 8.3 (H) 1.7 - 7.7 K/uL    Lymphocytes Absolute 1.5 1.0 - 5.1 K/uL    Monocytes Absolute 0.8 0.0 - 1.3 K/uL    Eosinophils Absolute 0.1 0.0 - 0.6 K/uL    Basophils Absolute 0.1 0.0 - 0.2 K/uL   Protime-INR   Result Value Ref Range    Protime 13.9 (H) 9.9 - 12.7 sec    INR 1.22 (H) 0.88 - 2.59   Basic Metabolic Panel   Result Value Ref Range    Sodium 132 (L) 136 - 145 mmol/L    Potassium 4.7 3.5 - 5.1 mmol/L    Chloride 98 (L) 99 - 110 mmol/L    CO2 20 (L) 21 - 32 mmol/L    Anion Gap 14 3 - 16    Glucose 96 70 - 99 mg/dL    BUN 15 7 - 20 mg/dL    CREATININE 0.5 (L) 0.6 - 1.1 mg/dL    GFR Non-African American >60 >60    GFR African American >60 >60    Calcium 9.2 8.3 - 10.6 mg/dL   EKG 12 Lead   Result Value Ref Range    Ventricular Rate 114 BPM    Atrial Rate 114 BPM    P-R Interval 140 ms    QRS Duration 66 ms    Q-T Interval 306 ms    QTc Calculation (Bazett) 421 ms    P Axis 33 degrees    R Axis 25 degrees    T Axis 14 degrees    Diagnosis       EKG performed in ER and to be interpreted by ER physician. Confirmed by MD, ER ([id number]),  Loly Newell ([phone]) on 3/25/2022 7:23:00 PM       RECENT VITALS:  BP: 120/89, Temp: 98.6 °F (37 °C), Pulse: 109, Resp: 20, SpO2: 97 %     ED Course     Nursing Notes, Past Medical Hx, Past Surgical Hx, Social Hx, Allergies, and Family Hx were reviewed. The patient was given the following medications:  No orders of the defined types were placed in this encounter. CONSULTS:  Heavenly RIVERA / Jimmie Moy is a 48 y.o. female who presents with complaints as noted in HPI. Patient presents emergency department from her skilled nursing facility with a complaint of worsening lower extremity DVTs, and a decreased platelet count.   Concern for possible heparin-induced thrombocytopenia, and unable to be anticoagulated appropriately at the nursing facility due to her normal cytopenia, and recent craniotomy. On arrival she complains only of leg pain, denies any other complaints at this time. She is hemodynamically stable and overall appears well although does have some residual tachycardia. EKG stable. Laboratory evaluation including CBC, BMP and INR; CBC without leukocytosis, H&H of 9.4/28.6 with a platelet count of 86. BMP stable, INR 1.22. At this time I do feel the patient warrants admission to the hospital for further evaluation of thrombocytopenia with concern for possible HIT, worsening lower extremity DVTs, discussion with neurosurgery given recent craniotomy and possibly vascular surgery. Case discussed with hospitalist on-call who accepted the patient for admission. This patient was also evaluated by the attending physician. All care plans were discussed and agreed upon. Clinical Impression     1. Acute deep vein thrombosis (DVT) of proximal vein of right lower extremity ([address]    2. Thrombocytopenia ([facility]    3.  S/P craniotomy        Disposition     DISPOSITION  Admitted in stable condition        JUDITH Brooks - AMA  03/25/22 3645

## 2022-03-25 NOTE — ED TRIAGE NOTES
Patient arrives via EMS from [facility]/ [facility]. Patient had brain surgery 2 weeks ago and began to complain of leg pain for the past few days. Patient then had a doppler today that diagnosed her with BLE DVTs. [facility] RN stated that the patient's platelet count was 45 so the doctors there were unsure if she would be able to be anticoagulated. Patient tachycardic (115-120) during triage and complains of BLE pain that is an 8/10.

## 2022-03-25 NOTE — ED NOTES
Received report from previous shift RN. Assume care of patient at this time.       Myrtha Leventhal, SILVIA  03/25/22 3114

## 2022-03-25 NOTE — ED NOTES
When entering room MD assisting patient to in room restroom.  Patient 1-2 assist.      Shamika Ling RN  03/25/22 1958

## 2022-03-26 ENCOUNTER — APPOINTMENT (OUTPATIENT)
Dept: CT IMAGING | Age: 47
DRG: 169 | End: 2022-03-26
Payer: MEDICAID

## 2022-03-26 LAB
ANION GAP SERPL CALCULATED.3IONS-SCNC: 15 MMOL/L (ref 3–16)
APTT: 40.8 SEC (ref 26.2–38.6)
APTT: 44.7 SEC (ref 26.2–38.6)
BASOPHILS ABSOLUTE: 0.1 K/UL (ref 0–0.2)
BASOPHILS RELATIVE PERCENT: 0.6 %
BUN BLDV-MCNC: 16 MG/DL (ref 7–20)
CALCIUM SERPL-MCNC: 9.5 MG/DL (ref 8.3–10.6)
CHLORIDE BLD-SCNC: 96 MMOL/L (ref 99–110)
CO2: 20 MMOL/L (ref 21–32)
CREAT SERPL-MCNC: 0.6 MG/DL (ref 0.6–1.1)
EOSINOPHILS ABSOLUTE: 0.1 K/UL (ref 0–0.6)
EOSINOPHILS RELATIVE PERCENT: 0.4 %
GFR AFRICAN AMERICAN: >60
GFR NON-AFRICAN AMERICAN: >60
GLUCOSE BLD-MCNC: 136 MG/DL (ref 70–99)
GLUCOSE BLD-MCNC: 147 MG/DL (ref 70–99)
HCT VFR BLD CALC: 26.8 % (ref 36–48)
HEMOGLOBIN: 8.6 G/DL (ref 12–16)
HEMOGLOBIN: 9 G/DL (ref 12–16)
HEPARIN INDUCED PLATELET ANTIBODY: POSITIVE
LYMPHOCYTES ABSOLUTE: 1.1 K/UL (ref 1–5.1)
LYMPHOCYTES RELATIVE PERCENT: 7.5 %
MCH RBC QN AUTO: 27.6 PG (ref 26–34)
MCHC RBC AUTO-ENTMCNC: 33.6 G/DL (ref 31–36)
MCV RBC AUTO: 82.1 FL (ref 80–100)
MONOCYTES ABSOLUTE: 0.9 K/UL (ref 0–1.3)
MONOCYTES RELATIVE PERCENT: 6.2 %
NEUTROPHILS ABSOLUTE: 12.2 K/UL (ref 1.7–7.7)
NEUTROPHILS RELATIVE PERCENT: 85.3 %
PDW BLD-RTO: 15.5 % (ref 12.4–15.4)
PERFORMED ON: ABNORMAL
PLATELET # BLD: 76 K/UL (ref 135–450)
PMV BLD AUTO: 9.7 FL (ref 5–10.5)
POTASSIUM REFLEX MAGNESIUM: 4.9 MMOL/L (ref 3.5–5.1)
RBC # BLD: 3.27 M/UL (ref 4–5.2)
SODIUM BLD-SCNC: 131 MMOL/L (ref 136–145)
WBC # BLD: 14.3 K/UL (ref 4–11)

## 2022-03-26 PROCEDURE — 2580000003 HC RX 258: Performed by: STUDENT IN AN ORGANIZED HEALTH CARE EDUCATION/TRAINING PROGRAM

## 2022-03-26 PROCEDURE — 70450 CT HEAD/BRAIN W/O DYE: CPT

## 2022-03-26 PROCEDURE — 85018 HEMOGLOBIN: CPT

## 2022-03-26 PROCEDURE — 2580000003 HC RX 258: Performed by: INTERNAL MEDICINE

## 2022-03-26 PROCEDURE — 6360000002 HC RX W HCPCS: Performed by: STUDENT IN AN ORGANIZED HEALTH CARE EDUCATION/TRAINING PROGRAM

## 2022-03-26 PROCEDURE — 86022 PLATELET ANTIBODIES: CPT

## 2022-03-26 PROCEDURE — 36415 COLL VENOUS BLD VENIPUNCTURE: CPT

## 2022-03-26 PROCEDURE — 85730 THROMBOPLASTIN TIME PARTIAL: CPT

## 2022-03-26 PROCEDURE — 6360000004 HC RX CONTRAST MEDICATION: Performed by: INTERNAL MEDICINE

## 2022-03-26 PROCEDURE — 6370000000 HC RX 637 (ALT 250 FOR IP): Performed by: STUDENT IN AN ORGANIZED HEALTH CARE EDUCATION/TRAINING PROGRAM

## 2022-03-26 PROCEDURE — 1200000000 HC SEMI PRIVATE

## 2022-03-26 PROCEDURE — 74174 CTA ABD&PLVS W/CONTRAST: CPT

## 2022-03-26 PROCEDURE — 80048 BASIC METABOLIC PNL TOTAL CA: CPT

## 2022-03-26 PROCEDURE — 85025 COMPLETE CBC W/AUTO DIFF WBC: CPT

## 2022-03-26 RX ORDER — ARGATROBAN 1 MG/ML
.0625-1 INJECTION, SOLUTION INTRAVENOUS CONTINUOUS
Status: DISCONTINUED | OUTPATIENT
Start: 2022-03-26 | End: 2022-03-30

## 2022-03-26 RX ORDER — B-COMPLEX WITH VITAMIN C
1 TABLET ORAL 2 TIMES DAILY WITH MEALS
COMMUNITY

## 2022-03-26 RX ORDER — OXYCODONE HYDROCHLORIDE AND ACETAMINOPHEN 5; 325 MG/1; MG/1
1 TABLET ORAL EVERY 4 HOURS PRN
Status: DISCONTINUED | OUTPATIENT
Start: 2022-03-26 | End: 2022-03-27

## 2022-03-26 RX ORDER — 0.9 % SODIUM CHLORIDE 0.9 %
1000 INTRAVENOUS SOLUTION INTRAVENOUS ONCE
Status: COMPLETED | OUTPATIENT
Start: 2022-03-26 | End: 2022-03-26

## 2022-03-26 RX ORDER — IBUPROFEN 800 MG/1
800 TABLET ORAL EVERY 8 HOURS PRN
Status: ON HOLD | COMMUNITY
End: 2022-06-26 | Stop reason: HOSPADM

## 2022-03-26 RX ADMIN — IOPAMIDOL 80 ML: 755 INJECTION, SOLUTION INTRAVENOUS at 01:12

## 2022-03-26 RX ADMIN — Medication 5 MG: at 02:25

## 2022-03-26 RX ADMIN — ARGATROBAN 0.5 MCG/KG/MIN: 50 INJECTION INTRAVENOUS at 11:36

## 2022-03-26 RX ADMIN — ARGATROBAN 0.63 MCG/KG/MIN: 50 INJECTION INTRAVENOUS at 20:49

## 2022-03-26 RX ADMIN — ARGATROBAN 0.5 MCG/KG/MIN: 50 INJECTION INTRAVENOUS at 18:08

## 2022-03-26 RX ADMIN — OXYCODONE HYDROCHLORIDE AND ACETAMINOPHEN 1 TABLET: 5; 325 TABLET ORAL at 11:45

## 2022-03-26 RX ADMIN — Medication 5 MG: at 20:44

## 2022-03-26 RX ADMIN — OXYCODONE HYDROCHLORIDE AND ACETAMINOPHEN 1 TABLET: 5; 325 TABLET ORAL at 20:00

## 2022-03-26 RX ADMIN — OXYCODONE HYDROCHLORIDE AND ACETAMINOPHEN 1 TABLET: 5; 325 TABLET ORAL at 02:23

## 2022-03-26 RX ADMIN — LACOSAMIDE 200 MG: 50 TABLET, FILM COATED ORAL at 20:44

## 2022-03-26 RX ADMIN — PANTOPRAZOLE SODIUM 40 MG: 40 TABLET, DELAYED RELEASE ORAL at 09:12

## 2022-03-26 RX ADMIN — OXYCODONE HYDROCHLORIDE AND ACETAMINOPHEN 1 TABLET: 5; 325 TABLET ORAL at 07:26

## 2022-03-26 RX ADMIN — LEVETIRACETAM 2500 MG: 250 TABLET, FILM COATED ORAL at 20:44

## 2022-03-26 RX ADMIN — LEVETIRACETAM 2000 MG: 250 TABLET, FILM COATED ORAL at 09:11

## 2022-03-26 RX ADMIN — SODIUM CHLORIDE, PRESERVATIVE FREE 5 ML: 5 INJECTION INTRAVENOUS at 20:52

## 2022-03-26 RX ADMIN — SODIUM CHLORIDE 1000 ML: 9 INJECTION, SOLUTION INTRAVENOUS at 19:20

## 2022-03-26 RX ADMIN — POLYETHYLENE GLYCOL 3350 17 G: 17 POWDER, FOR SOLUTION ORAL at 02:26

## 2022-03-26 RX ADMIN — LEVETIRACETAM 2500 MG: 250 TABLET, FILM COATED ORAL at 02:34

## 2022-03-26 RX ADMIN — LACOSAMIDE 200 MG: 50 TABLET, FILM COATED ORAL at 02:25

## 2022-03-26 RX ADMIN — SODIUM CHLORIDE: 9 INJECTION, SOLUTION INTRAVENOUS at 23:23

## 2022-03-26 RX ADMIN — LACOSAMIDE 200 MG: 50 TABLET, FILM COATED ORAL at 09:12

## 2022-03-26 RX ADMIN — SODIUM CHLORIDE, PRESERVATIVE FREE 10 ML: 5 INJECTION INTRAVENOUS at 09:12

## 2022-03-26 ASSESSMENT — PAIN DESCRIPTION - ORIENTATION
ORIENTATION: RIGHT;LEFT
ORIENTATION: MID
ORIENTATION: RIGHT;LEFT
ORIENTATION: MID

## 2022-03-26 ASSESSMENT — PAIN DESCRIPTION - FREQUENCY
FREQUENCY: CONTINUOUS

## 2022-03-26 ASSESSMENT — PAIN DESCRIPTION - LOCATION
LOCATION: ABDOMEN
LOCATION: LEG
LOCATION: ABDOMEN
LOCATION: LEG

## 2022-03-26 ASSESSMENT — PAIN DESCRIPTION - DESCRIPTORS
DESCRIPTORS: ACHING

## 2022-03-26 ASSESSMENT — PAIN SCALES - GENERAL
PAINLEVEL_OUTOF10: 3
PAINLEVEL_OUTOF10: 6
PAINLEVEL_OUTOF10: 10
PAINLEVEL_OUTOF10: 0
PAINLEVEL_OUTOF10: 10
PAINLEVEL_OUTOF10: 9

## 2022-03-26 ASSESSMENT — PAIN DESCRIPTION - ONSET
ONSET: ON-GOING
ONSET: ON-GOING

## 2022-03-26 ASSESSMENT — PAIN DESCRIPTION - PAIN TYPE
TYPE: ACUTE PAIN

## 2022-03-26 NOTE — ED NOTES
MD perfectserved at this time by Ed  to inform vital signs: temp 99.7, , 123/79, 100% RA     Joycelyn Jones RN  03/25/22 9417

## 2022-03-26 NOTE — PROGRESS NOTES
Secure message sent to medical resident MD to report elevated HR, pt sustaining in the 120's. Placed on telemetry per order, will monitor.

## 2022-03-26 NOTE — PROGRESS NOTES
Progress Note    Admit Date: 3/25/2022  Diet: ADULT DIET; Regular    CC: Leg Pain    Interval history:   Pt HR sustaining in the 120s-140s. Sinus tachycardia  Initially pain was a contributing factor. Now pain is better controlled  BP ok  Pain mostly at surgical site  Saturating fine on RA  Having some weakness with ambulation. Has b/l LE non pitting swelling, TTP    Denies F/C, N/V, CP, SOB, HA, vision changes, weakness in arms and feet. AM labs with bicarb of 20  CBC with plts of 86 -> 76. Plts on 03/20/22 was 195    HPI: 49-year-old female with metastatic breast cancer status post craniotomy with mass resection presented [facility] due to worsening lower extremity pain and swelling. During her last admission patient was diagnosed with a DVT in the right lower extremity and due to her recent Craniotomy anticoagulation could not be started till POD 14 so patient underwent an IVC filter. Her platelets levels were noted to be low at [facility] and due to concern for worsening DVT she was sent to the hospital.  CT abdomen and pelvis was performed which did show thrombosis of the IVC at the level of the filter with marked distention of the caval bifurcation. There was also suspicion for bilateral lower lobe pulmonary emboli. Will consult hematology for further recommendations. HIT panel will be sent as patient was receiving Lovenox at [facility].   Continue to monitor as inpatient    Medications:     Scheduled Meds:   lacosamide  200 mg Oral BID    levETIRAcetam  2,000 mg Oral Daily    levETIRAcetam  2,500 mg Oral Nightly    melatonin  5 mg Oral Nightly    pantoprazole  40 mg Oral QAM AC    sodium chloride flush  5-40 mL IntraVENous 2 times per day     Continuous Infusions:   argatroban infusion      sodium chloride 100 mL/hr at 03/26/22 0700    sodium chloride       PRN Meds:oxyCODONE-acetaminophen, sodium chloride flush, sodium chloride, ondansetron **OR** ondansetron, polyethylene glycol, acetaminophen **OR** acetaminophen    Objective:   Vitals:   T-max:  Patient Vitals for the past 8 hrs:   BP Temp Temp src Pulse Resp SpO2 Weight   03/26/22 0942 124/88 98.2 °F (36.8 °C) Oral 128 20 99 % --   03/26/22 0830 -- -- -- 112 -- -- --   03/26/22 0632 (!) 145/83 99 °F (37.2 °C) Oral 126 16 98 % --   03/26/22 0345 -- -- -- -- -- -- 202 lb 13.2 oz (92 kg)     No intake or output data in the 24 hours ending 03/26/22 1056    Review of Systems  As above    Physical Exam  Constitutional:       Appearance: Normal appearance. She is not ill-appearing, toxic-appearing or diaphoretic. HENT:      Mouth/Throat:      Mouth: Mucous membranes are moist.   Eyes:      Pupils: Pupils are equal, round, and reactive to light. Cardiovascular:      Rate and Rhythm: Regular rhythm. Tachycardia present. Pulses: Normal pulses. Heart sounds: Normal heart sounds. No murmur heard. Pulmonary:      Effort: Pulmonary effort is normal. No respiratory distress. Breath sounds: Normal breath sounds. No wheezing or rales. Abdominal:      General: Bowel sounds are normal. There is no distension. Palpations: Abdomen is soft. Tenderness: There is no abdominal tenderness. There is no guarding. Musculoskeletal:      Right lower leg: Edema (TTP) present. Left lower leg: Edema (TTP) present. Neurological:      Mental Status: She is alert and oriented to person, place, and time.        LABS:  CBC:   Recent Labs     03/25/22 1904 03/26/22 0625   WBC 10.7 14.3*   HGB 9.4* 9.0*   HCT 28.6* 26.8*   PLT 86* 76*   MCV 83.9 82.1     Renal:    Recent Labs     03/25/22 1904 03/26/22 0625   * 131*   K 4.7 4.9   CL 98* 96*   CO2 20* 20*   BUN 15 16   CREATININE 0.5* 0.6   GLUCOSE 96 136*   CALCIUM 9.2 9.5   ANIONGAP 14 15     Hepatic:   Recent Labs     03/25/22 1904   AST 28   ALT 31   BILITOT 0.4   BILIDIR <0.2   PROT 7.5   LABALBU 3.9   ALKPHOS 74     Troponin: No results for input(s): TROPONINI in the last 72 hours.  BNP: No results for input(s): BNP in the last 72 hours. Lipids: No results for input(s): CHOL, HDL in the last 72 hours. Invalid input(s): LDLCALCU, TRIGLYCERIDE  ABGs:  No results for input(s): PHART, QSF7CXD, PO2ART, DJI6NOO, BEART, THGBART, V6BNDWKQ, UQY4DSA in the last 72 hours. INR:   Recent Labs     03/25/22  1904   INR 1.22*     Lactate: No results for input(s): LACTATE in the last 72 hours. Cultures:  -----------------------------------------------------------------  RAD:   CT HEAD WO CONTRAST   Final Result      Postsurgical changes with areas of low cortical attenuation in the left parietal and left occipital region appear essentially stable from prior study with associated mild midline shift which is similar to slightly improved from prior study. No evidence of developing acute intracranial hemorrhage. CTA ABDOMEN PELVIS W WO CONTRAST   Final Result   1. 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 2. Surrounding hazy density, likely related to venous congestion. However, streaky densities extending inferiorly on the right may represent blood. However, there is no evidence of retroperitoneal hematoma. Correlation with lower extremity noninvasive    duplex Doppler is recommended in addition to serial hemoglobin and crit levels. 3. Suspect bilateral lower lobe pulmonary emboli. See comments above      Redemonstrated is a large necrotic mass involving the right breast enlarged fibroid uterus.           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 Did get IVC filter  - Allegedly, had plts of 41/45/46 over the past 2 days   - CBC POA with plts of 86 -> 76. Plts on 03/20/22 was 195  - CTA abd pelvis this admission re demonstrates DVT, also suspicion for b/l LL PE. Recommended V/Q scan for further clarification.  However since NSGY ok with AC, we ill fully anticoagulate the pt  - Will start argatroban gtt given concern for HIT  - Heme consulted    Sinus tachycardia  - Likely combination of pain and PE  - Will start full dose AC     Chronic Issues:  Seizures - Vimpat 200 mg BID, Keppra 2g AM & 2.5g PM    Code Status: FULL  FEN: Regular  PPX: Argatroban  DISPO: [facility]    Alexus Cabrales MD, PGY-2  03/26/22  10:56 AM    This patient has been staffed and discussed with Candie Raya MD.

## 2022-03-26 NOTE — ED NOTES
Report called to RN of [phone], patient transported by another RN to floor      Perfecto Sheriff RN  03/25/22 0753

## 2022-03-26 NOTE — PLAN OF CARE
General Internal Medicine Attending    Chart and data reviewed. Patient seen and examined, case discussed with medical resident. Physical exam repeated. Labs and imaging studies reviewed.        Agree with documentation, assessment and plan as outlined      Monitor neuro-checks while on argatroban          David Nixon MD

## 2022-03-26 NOTE — PLAN OF CARE
Problem: Pain:  Goal: Pain level will decrease  Description: Pain level will decrease  Outcome: Ongoing   Patient given prn pain medication

## 2022-03-26 NOTE — PROGRESS NOTES
Physician Progress Note      Maricarmen Garzon  [facility] #:                  [id number]  :                       1975  ADMIT DATE:       3/25/2022 6:11 PM  DISCH DATE:  RESPONDING  PROVIDER #:        Katelynn Campbell          QUERY TEXT:    Pt admitted with worsening DVT, possible pulmonary embolism. ? Pt noted to have   previous craniotomy. ? If possible, please document in progress notes and   discharge summary:    The medical record reflects the following:  Risk Factors: 49 yo w/ S/P craniotomy 3/12/2022, off anticoagulation until   3/26. Clinical Indicators: Per H&P: DVT in the right lower extremity and due to her   recent Craniotomy anticoagulation could not be started till POD 14 so patient   underwent an IVC filter.   Treatment: Hematology consult, Neuro consult, CTA  Options provided:  -- Right lower extremity DVT is a postoperative complication  -- Right lower extremity DVT is not a postoperative complication, but is due   to other incidental risk factor, Please specify other incidental risk factor  -- Other - I will add my own diagnosis  -- Disagree - Not applicable / Not valid  -- Disagree - Clinically unable to determine / Unknown  -- Refer to Clinical Documentation Reviewer    PROVIDER RESPONSE TEXT:    Right lower extremity DVT is not a postoperative complication but due to   Malignancy    Query created by: Jesus Lindquist on 3/26/2022 8:56 AM      Electronically signed by:  Katelynn Campbell 3/26/2022 6:57 PM

## 2022-03-26 NOTE — CONSULTS
[facility]                        Consult Note      Requesting Physician:  Yonny Camargo    CHIEF COMPLAINT:   Chief Complaint   Patient presents with    Leg Pain     Brain surgery completed 2 weeks ago, diagnosed with bilateral DVTs yesterday, platelet count 54-93 and [facility] was unsure if they could anticoagulate her         HISTORY OF PRESENT ILLNESS:      Ms. Isela Perdue  is a 48 y.o. female we are seeing in consultation for DVT ? HIT. 47-year-old female with metastatic breast cancer status post craniotomy with mass resection presented from [facility] due to worsening lower extremity pain and swelling. During her last admission patient was diagnosed with a DVT in the right lower extremity and due to her recent Craniotomy anticoagulation could not be started till POD 14 so patient underwent an IVC filter. Her platelets levels were noted to be low at [facility] and due to concern for worsening DVT she was sent to the hospital.  CT abdomen and pelvis was performed which did show thrombosis of the IVC at the level of the filter with marked distention of the caval bifurcation. There was also suspicion for bilateral lower lobe pulmonary emboli. Will consult hematology for further recommendations. HIT panel sent as patient was receiving Lovenox at [facility]. Seen previously by Dr. Frances Grewal.         Past Medical History:        Diagnosis Date    Thyroid nodule      Past Surgical History:        Procedure Laterality Date    CRANIOTOMY Left 3/12/2022    LEFT TEMPORAL PARIETAL OCCIPITAL CRANIOTOMY FOR TUMOR RESECTION performed by Genet Calvillo MD at [address] IR IVC [address] N/A 03/16/2022    [facility]    IR IVC FILTER PLACEMENT W IMAGING  3/16/2022    IR IVC FILTER PLACEMENT W IMAGING 3/16/2022 [facility] SPECIAL PROCEDURES       Current Medications:    Current Facility-Administered Medications: oxyCODONE-acetaminophen (PERCOCET) 5-325 MG per tablet 1 tablet, 1 tablet, Oral, Q4H PRN  argatroban infusion 50mg in 0.9% sodium chloride 50 mL (premix), 0.0625-10 mcg/kg/min, IntraVENous, Continuous  0.9 % sodium chloride infusion, , IntraVENous, Continuous  lacosamide (VIMPAT) tablet 200 mg, 200 mg, Oral, BID  levETIRAcetam (KEPPRA) tablet 2,000 mg, 2,000 mg, Oral, Daily  levETIRAcetam (KEPPRA) tablet 2,500 mg, 2,500 mg, Oral, Nightly  melatonin disintegrating tablet 5 mg, 5 mg, Oral, Nightly  pantoprazole (PROTONIX) tablet 40 mg, 40 mg, Oral, QAM AC  sodium chloride flush 0.9 % injection 5-40 mL, 5-40 mL, IntraVENous, 2 times per day  sodium chloride flush 0.9 % injection 5-40 mL, 5-40 mL, IntraVENous, PRN  0.9 % sodium chloride infusion, 25 mL, IntraVENous, PRN  ondansetron (ZOFRAN-ODT) disintegrating tablet 4 mg, 4 mg, Oral, Q8H PRN **OR** ondansetron (ZOFRAN) injection 4 mg, 4 mg, IntraVENous, Q6H PRN  polyethylene glycol (GLYCOLAX) packet 17 g, 17 g, Oral, Daily PRN  acetaminophen (TYLENOL) tablet 650 mg, 650 mg, Oral, Q6H PRN **OR** acetaminophen (TYLENOL) suppository 650 mg, 650 mg, Rectal, Q6H PRN  Allergies:  Patient has no known allergies.     Social History:      Social History     Socioeconomic History    Marital status: Single     Spouse name: Not on file    Number of children: Not on file    Years of education: Not on file    Highest education level: Not on file   Occupational History    Not on file   Tobacco Use    Smoking status: Never Smoker    Smokeless tobacco: Never Used   Substance and Sexual Activity    Alcohol use: Never     Alcohol/week: 0.0 standard drinks    Drug use: Never    Sexual activity: Not on file   Other Topics Concern    Not on file   Social History Narrative    Not on file     Social Determinants of Health     Financial Resource Strain:     Difficulty of Paying Living Expenses: Not on file   Food Insecurity:     Worried About Running Out of Food in the Last Year: Not on file    Rosa of Food in the Last Year: Not on file   Transportation Needs:     Lack of Transportation (Medical): Not on file    Lack of Transportation (Non-Medical): Not on file   Physical Activity:     Days of Exercise per Week: Not on file    Minutes of Exercise per Session: Not on file   Stress:     Feeling of Stress : Not on file   Social Connections:     Frequency of Communication with Friends and Family: Not on file    Frequency of Social Gatherings with Friends and Family: Not on file    Attends Confucianism Services: Not on file    Active Member of [address] [place] [address] or Organizations: Not on file    Attends Club or Organization Meetings: Not on file    Marital Status: Not on file   Intimate Partner Violence:     Fear of Current or Ex-Partner: Not on file    Emotionally Abused: Not on file    Physically Abused: Not on file    Sexually Abused: Not on file   Housing Stability:     Unable to Pay for Housing in the Last Year: Not on file    Number of [place] in the Last Year: Not on file    Unstable Housing in the Last Year: Not on file          Family History:         Problem Relation Age of Onset    Cancer Father      REVIEW OF SYSTEMS:      · Constitutional: Denies fever, sweats, weight loss. .     · Eyes: No visual changes or diplopia. No scleral icterus. · ENT: No Headaches, hearing loss or vertigo. No mouth sores or sore throat. · Cardiovascular: No chest pain, dyspnea on exertion, palpitations or loss of consciousness. · Respiratory: No cough or wheezing, no sputum production. No hemoptysis. .    · Gastrointestinal: No abdominal pain, appetite loss, blood in stools. No change in bowel habits. · Genitourinary: No dysuria, trouble voiding, or hematuria. · Musculoskeletal:  Generalized weakness. No joint complaints. · Integumentary: No rash or pruritis. · Neurological: No headache, diplopia. No change in gait, balance, or coordination. No paresthesias. · Endocrine: No temperature intolerance. No excessive thirst, fluid intake, or urination.    · Hematologic/Lymphatic: No abnormal bruising or ecchymoses, blood clots or swollen lymph nodes. · Allergic/Immunologic: No nasal congestion or hives. PHYSICAL EXAM:      Vitals:  /88   Pulse 128 Comment: after ambulation  Temp 98.2 °F (36.8 °C) (Oral)   Resp 20   Ht 5' 3\" (1.6 m)   Wt 202 lb 13.2 oz (92 kg)   SpO2 99%   BMI 35.93 kg/m²     CONSTITUTIONAL:  awake, alert, cooperative, no apparent distress, and appears stated age NAD  EYES:  Lids and lashes normal, pupils equal, round and reactive to light, extra ocular muscles intact, sclera clear, conjunctiva normal  ENT:  Normocephalic, without obvious abnormality, atramatic, sinuses nontender on palpation, external ears without lesions, oral pharynx with moist mucus membranes, tonsils without erythema or exudates, gums normal and good dentition. NECK:  Supple, symmetrical, trachea midline, no adenopathy, thyroid symmetric, not enlarged and no tenderness, skin normal  HEMATOLOGIC/LYMPHATICS:  no cervical lymphadenopathy, no supraclavicular lymphadenopathy, no axillary lymphadenopathy and no inguinal lymphadenopathy  LUNGS:  No increased work of breathing, good air exchange, clear to auscultation bilaterally, no crackles or wheezing  CARDIOVASCULAR:  , regular rate and rhythm, normal S1 and S2, no S3 or S4, and no murmur noted  ABDOMEN:  No scars, normal bowel sounds, soft, non-distended, non-tender, no masses palpated, no hepatosplenomegally  CHEST/BREASTS: Necrotic mass in R breast  MUSCULOSKELETAL:  There is no redness, warmth, or swelling of the joints. Full range of motion noted. Motor strength is 5 out of 5 all extremities bilaterally. NEUROLOGIC:  Awake, alert, oriented to name, place and time. Cranial nerves II-XII are grossly intact. Motor is 5 out of 5 bilaterally. SKIN:  no bruising or bleeding      DATA:    PT/INR:    Recent Labs     03/25/22  1904   PROT 7.5   INR 1.22*     PTT:  No results for input(s): APTT in the last 72 hours.   CMP:    Lab Results   Component Value Date     03/26/2022    K 4.9 03/26/2022    CL 96 03/26/2022    CO2 20 03/26/2022    BUN 16 03/26/2022    PROT 7.5 03/25/2022     Magnesium:  No results found for: MG  Phosphorus:  No components found for: PO4  Calcium:  No results found for: CA  CBC:    Lab Results   Component Value Date    WBC 14.3 03/26/2022    RBC 3.27 03/26/2022    HGB 9.0 03/26/2022    HCT 26.8 03/26/2022    MCV 82.1 03/26/2022    RDW 15.5 03/26/2022    PLT 76 03/26/2022     DIFF:    Lab Results   Component Value Date    MCV 82.1 03/26/2022    RDW 15.5 03/26/2022      IMPRESSION/RECOMMENDATIONS:    1. Mod thrombocytopenia in association with DVT. Could be HIT and that testing has been sent. More likely hypercoagulable state related to Metastatic breast cancer for which she is followed by the solid tumor oncologists. Would certainly avoid heparin and not clear to me that she can be anticoagulated at this time per recent craniotomy. Has IVC filter in place and so though she has DVT should have protection from PE. I see taht she has started treatment with argatroban which should be adequate in the setting of HIT. She will be seen tomorrow by Dr. Torsten Xiong who can advise for outpt anticoagulation. 2. Metastatic breast cancer:  Per Dr. Torsten Xiong. Thank you for the consultation. Naga Smalls.  Katie Trujillo, [facility] [place] [facility] [place] [address]

## 2022-03-26 NOTE — PROGRESS NOTES
Patient ,  she c/o of dizziness. Patient laying in bed with eyes closed; responds   to questions; Patient c/o of pain a 5 in her lower abdomen. MD resident Nadiya Raya notified of patient's condition. Will continue to monitor patients.

## 2022-03-26 NOTE — PROGRESS NOTES
Patient's left breast mass cleaned and  dressing changed, minimal blood drainage from breast. Patient  tolerated well. Wound consult order placed. Will continue to monitor.

## 2022-03-26 NOTE — PLAN OF CARE
Problem: Falls - Risk of:  Goal: Will remain free from falls  Description: Will remain free from falls  3/26/2022 0817 by Zully Durham RN  Outcome: Ongoing  Pt is a high fall risk. High fall precautions are in place: nonskid socks, yellow blanket, fall wristband, call light in reach, bed is locked and in lowest position, bed alarm engaged. Problem: Pain:  Goal: Pain level will decrease  Description: Pain level will decrease  3/26/2022 0817 by Zully Durham RN  Outcome: Ongoing   Pt reports pain controlled with rest, repositioning, and PRN pain medication. Will continue to assess pain on 0-10 scale for appropriate pain management.

## 2022-03-26 NOTE — PROGRESS NOTES
Secure message sent to medical resident MD to report elevated HR. Pt sustaining in the upper 120's-130's for HR and went into the 150's for HR when ambulating to BR. No SOB. Denies pain. Denies other symptoms. Neuro assessment WDL. Pt just states, \"This has all been a journey. \" Remains withdrawn. Pt did eat most of dinner with assistance. Denies other needs at this time. Call light is in reach. Bed alarm is engaged. 1758:  No new orders at this time per MD.

## 2022-03-26 NOTE — H&P
Internal Medicine  PGY 1  History & Physical      CC: Leg Pain    History Obtained From:  patient    HISTORY OF PRESENT ILLNESS:   Mrs. Estrellita Hall is a 49 y/o F w/ a PMHx of Stage IIB invasive ductal carcinoma (ER-, [place]-, HER2 -) w/ known intracranial metastasis s/p resection, seizures, and DVTs p/w worsening leg pain and concern for HIT from her OSH ARU. Patient had a craniotomy w/ mass resection preformed on 3/12 and was subsequently discharged on 3/17 to an outside [address]). Patient re-admitted to [facility] on 3/18 after a 10 minute grand-mal seizure at [facility] and treated for sz's and new acute DVT. D/c'd on 3/20 again to [facility]. Patient brought back to [facility] ED today 2/2 reported worsening DVTs and concern for HIT. Patient had been receiving lovenox at [facility] and feels that her leg pain has been worsening. Reportedly platelet count decreased to 41/45/46 over the past 2 days. Patient to be admitted for r/o of possible HIT and ongoing mgmt of her AoC DVTs. Past Medical History:        Diagnosis Date    Thyroid nodule    ·     Past Surgical History:        Procedure Laterality Date    CRANIOTOMY Left 3/12/2022    LEFT TEMPORAL PARIETAL OCCIPITAL CRANIOTOMY FOR TUMOR RESECTION performed by Alfred Coelho MD at [facility] IR IVC FILTER PLACEMENT W IMAGING N/A 03/16/2022    [facility]    IR IVC FILTER PLACEMENT W IMAGING  3/16/2022    IR IVC FILTER PLACEMENT W IMAGING 3/16/2022 [facility] SPECIAL PROCEDURES   ·     Medications Priorto Admission:    · Not in a hospital admission. Allergies:  Patient has no known allergies. Social History:   · TOBACCO:   reports that she has never smoked. She has never used smokeless tobacco.  · ETOH:   reports no history of alcohol use. · DRUGS : denies  · Patient previously staying at [facility]  ·   Family History:       Problem Relation Age of Onset    Cancer Father    ·     Review of Systems   Constitutional: Positive for fatigue. Negative for chills and fever. HENT: Negative for congestion. Eyes: Negative for visual disturbance. Respiratory: Negative for cough and shortness of breath. Cardiovascular: Positive for leg swelling. Negative for chest pain and palpitations. Gastrointestinal: Positive for abdominal pain. Negative for constipation, diarrhea, nausea and vomiting. Endocrine: Negative for polyuria. Genitourinary: Negative for decreased urine volume, difficulty urinating, dysuria, frequency and hematuria. Skin: Positive for wound (R breast chronic). Neurological: Positive for seizures (once while at al). Negative for syncope, light-headedness and numbness. ROS: A 10 point review of systems was conducted, significant findings as noted in HPI. Physical Exam  Constitutional:       General: She is not in acute distress. Appearance: Normal appearance. She is ill-appearing. Comments: Large L-sided surgical scar w/ staples in place   HENT:      Right Ear: External ear normal.      Left Ear: External ear normal.      Nose: Nose normal.      Mouth/Throat:      Mouth: Mucous membranes are moist.      Pharynx: Oropharynx is clear. Eyes:      General: No scleral icterus. Extraocular Movements: Extraocular movements intact. Pupils: Pupils are equal, round, and reactive to light. Cardiovascular:      Rate and Rhythm: Regular rhythm. Tachycardia present. Heart sounds: Normal heart sounds. No murmur heard. No friction rub. No gallop. Pulmonary:      Effort: Pulmonary effort is normal. No respiratory distress. Breath sounds: No wheezing, rhonchi or rales. Abdominal:      General: Abdomen is flat. Palpations: Abdomen is soft. Tenderness: There is abdominal tenderness (TTP LUQ & RUQ, non-tender lower quadrants b/l). There is no guarding or rebound. Musculoskeletal:         General: Signs of injury (R-breast w/ bandage) present. Right lower leg: Edema present. Left lower leg: Edema present. Skin:     General: Skin is warm and dry. Capillary Refill: Capillary refill takes less than 2 seconds. Neurological:      General: No focal deficit present. Mental Status: She is alert and oriented to person, place, and time. Psychiatric:         Mood and Affect: Mood normal.         Behavior: Behavior normal.       Physical exam:       Vitals:    03/25/22 1958   BP: 120/89   Pulse: 109   Resp: 20   Temp:    SpO2: 97%       DATA:    Labs:  CBC:   Recent Labs     03/25/22 1904   WBC 10.7   HGB 9.4*   HCT 28.6*   PLT 86*       BMP:   Recent Labs     03/25/22 1904   *   K 4.7   CL 98*   CO2 20*   BUN 15   CREATININE 0.5*   GLUCOSE 96     LFT's: No results for input(s): AST, ALT, ALB, BILITOT, ALKPHOS in the last 72 hours. Troponin: No results for input(s): TROPONINI in the last 72 hours. BNP:No results for input(s): BNP in the last 72 hours. ABGs: No results for input(s): PHART, PUW5GRQ, PO2ART in the last 72 hours. INR:   Recent Labs     03/25/22 1904   INR 1.22*       U/A:No results for input(s): NITRITE, COLORU, PHUR, LABCAST, WBCUA, RBCUA, MUCUS, TRICHOMONAS, YEAST, BACTERIA, CLARITYU, SPECGRAV, LEUKOCYTESUR, UROBILINOGEN, BILIRUBINUR, BLOODU, GLUCOSEU, AMORPHOUS in the last 72 hours.     Invalid input(s): KETONESU    CT ABDOMEN PELVIS W IV CONTRAST Additional Contrast? 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

## 2022-03-26 NOTE — PROGRESS NOTES
Received patient from ED. Patient A/ox4. Report pain at a 9 in her legs. Patient vital stable with exception to her . MD notified of patient's  HR,  pain level and request for pain medication. Will continue to monitor patient.

## 2022-03-26 NOTE — ED PROVIDER NOTES
ED Attending Attestation Note     Date of evaluation: 3/25/2022    This patient was seen by the advance practice provider. I have seen and examined the patient, agree with the workup, evaluation, management and diagnosis. The care plan has been discussed. My assessment reveals a 53-year-old female who presents with chief complaint of leg pain. Patient with new diagnosis of DVTs, sent because of issues with anticoagulation at rehab, low platelets, concern for possible HIT. Patient on exam is complaining of leg pain, right lower extremity swelling greater compared to left. Patient with antalgic gait, dragging right leg due to pain. Hemant Mello MD  03/25/22 2002

## 2022-03-26 NOTE — PROGRESS NOTES
Secure message sent to medical resident MD to report continued elevated HR. Pt is resting in bed. Awaiting response/orders.

## 2022-03-26 NOTE — CARE COORDINATION
[facility] called CATHERINE and stated Pt was admitted to [facility] and they are wanting to accept Pt back (pending bed availability for location) once she is medically stable. However, Pt will need now need a new pre-cert submitted through [facility]) once she is ready for DC.    They asked CATHERINE/JUNIOR to please contact Nakul Lind [phone], when Pt is ready for DC, [facility] will continue to follow Pt.     ISAIAH Napier, LSW  Social Work/Case Management   ICU/PCU - [facility]   Ph: [phone]

## 2022-03-26 NOTE — PROGRESS NOTES
Secure message sent to MD to report continued elevated HR after resting in bed for several minutes post ambulation. Pt is sustaining in the 120's. Denies pain. Awaiting response/orders.

## 2022-03-26 NOTE — PROGRESS NOTES
Spoke with Dr. Damion Muniz via telephone, instructed to hold off on administering argatroban until CT of head results are back. MD aware of elevated HR. No other new orders at this time. Pt is resting in bed. Appears comfortable.

## 2022-03-27 ENCOUNTER — APPOINTMENT (OUTPATIENT)
Dept: GENERAL RADIOLOGY | Age: 47
DRG: 169 | End: 2022-03-27
Payer: MEDICAID

## 2022-03-27 LAB
ABO/RH: NORMAL
ANION GAP SERPL CALCULATED.3IONS-SCNC: 11 MMOL/L (ref 3–16)
ANION GAP SERPL CALCULATED.3IONS-SCNC: 14 MMOL/L (ref 3–16)
ANISOCYTOSIS: ABNORMAL
ANISOCYTOSIS: ABNORMAL
ANTIBODY SCREEN: NORMAL
APTT: 46.7 SEC (ref 26.2–38.6)
APTT: 47.5 SEC (ref 26.2–38.6)
BACTERIA: ABNORMAL /HPF
BANDED NEUTROPHILS RELATIVE PERCENT: 1 % (ref 0–7)
BASOPHILS ABSOLUTE: 0 K/UL (ref 0–0.2)
BASOPHILS ABSOLUTE: 0.1 K/UL (ref 0–0.2)
BASOPHILS RELATIVE PERCENT: 0 %
BASOPHILS RELATIVE PERCENT: 0.5 %
BILIRUBIN URINE: NEGATIVE
BLOOD, URINE: NEGATIVE
BUN BLDV-MCNC: 13 MG/DL (ref 7–20)
BUN BLDV-MCNC: 15 MG/DL (ref 7–20)
CALCIUM SERPL-MCNC: 8.5 MG/DL (ref 8.3–10.6)
CALCIUM SERPL-MCNC: 8.7 MG/DL (ref 8.3–10.6)
CHLORIDE BLD-SCNC: 94 MMOL/L (ref 99–110)
CHLORIDE BLD-SCNC: 99 MMOL/L (ref 99–110)
CLARITY: CLEAR
CO2: 18 MMOL/L (ref 21–32)
CO2: 19 MMOL/L (ref 21–32)
COLOR: YELLOW
CREAT SERPL-MCNC: 0.6 MG/DL (ref 0.6–1.1)
CREAT SERPL-MCNC: 0.6 MG/DL (ref 0.6–1.1)
EOSINOPHILS ABSOLUTE: 0 K/UL (ref 0–0.6)
EOSINOPHILS ABSOLUTE: 0.1 K/UL (ref 0–0.6)
EOSINOPHILS RELATIVE PERCENT: 0 %
EOSINOPHILS RELATIVE PERCENT: 0.5 %
EPITHELIAL CELLS, UA: ABNORMAL /HPF (ref 0–5)
GFR AFRICAN AMERICAN: >60
GFR AFRICAN AMERICAN: >60
GFR NON-AFRICAN AMERICAN: >60
GFR NON-AFRICAN AMERICAN: >60
GLUCOSE BLD-MCNC: 120 MG/DL (ref 70–99)
GLUCOSE BLD-MCNC: 131 MG/DL (ref 70–99)
GLUCOSE BLD-MCNC: 141 MG/DL (ref 70–99)
GLUCOSE BLD-MCNC: 142 MG/DL (ref 70–99)
GLUCOSE BLD-MCNC: 172 MG/DL (ref 70–99)
GLUCOSE URINE: NEGATIVE MG/DL
HCT VFR BLD CALC: 23.2 % (ref 36–48)
HCT VFR BLD CALC: 23.9 % (ref 36–48)
HEMOGLOBIN: 7.6 G/DL (ref 12–16)
HEMOGLOBIN: 7.7 G/DL (ref 12–16)
HEMOGLOBIN: 7.7 G/DL (ref 12–16)
HEMOGLOBIN: 8.1 G/DL (ref 12–16)
HYPOCHROMIA: ABNORMAL
KETONES, URINE: NEGATIVE MG/DL
LEUKOCYTE ESTERASE, URINE: NEGATIVE
LYMPHOCYTES ABSOLUTE: 1.6 K/UL (ref 1–5.1)
LYMPHOCYTES ABSOLUTE: 1.9 K/UL (ref 1–5.1)
LYMPHOCYTES RELATIVE PERCENT: 12.3 %
LYMPHOCYTES RELATIVE PERCENT: 14 %
MCH RBC QN AUTO: 27.3 PG (ref 26–34)
MCH RBC QN AUTO: 27.4 PG (ref 26–34)
MCHC RBC AUTO-ENTMCNC: 32.3 G/DL (ref 31–36)
MCHC RBC AUTO-ENTMCNC: 33.2 G/DL (ref 31–36)
MCV RBC AUTO: 82.8 FL (ref 80–100)
MCV RBC AUTO: 84.7 FL (ref 80–100)
MICROCYTES: ABNORMAL
MICROSCOPIC EXAMINATION: YES
MONOCYTES ABSOLUTE: 1.1 K/UL (ref 0–1.3)
MONOCYTES ABSOLUTE: 1.2 K/UL (ref 0–1.3)
MONOCYTES RELATIVE PERCENT: 8.8 %
MONOCYTES RELATIVE PERCENT: 9 %
NEUTROPHILS ABSOLUTE: 10 K/UL (ref 1.7–7.7)
NEUTROPHILS ABSOLUTE: 10.6 K/UL (ref 1.7–7.7)
NEUTROPHILS RELATIVE PERCENT: 76 %
NEUTROPHILS RELATIVE PERCENT: 77.9 %
NITRITE, URINE: NEGATIVE
PDW BLD-RTO: 15.1 % (ref 12.4–15.4)
PDW BLD-RTO: 15.6 % (ref 12.4–15.4)
PERFORMED ON: ABNORMAL
PH UA: 6 (ref 5–8)
PLATELET # BLD: 116 K/UL (ref 135–450)
PLATELET # BLD: 127 K/UL (ref 135–450)
PMV BLD AUTO: 9.1 FL (ref 5–10.5)
PMV BLD AUTO: 9.4 FL (ref 5–10.5)
POTASSIUM REFLEX MAGNESIUM: 4.8 MMOL/L (ref 3.5–5.1)
POTASSIUM REFLEX MAGNESIUM: 5.2 MMOL/L (ref 3.5–5.1)
PROTEIN UA: ABNORMAL MG/DL
RBC # BLD: 2.8 M/UL (ref 4–5.2)
RBC # BLD: 2.82 M/UL (ref 4–5.2)
RBC UA: ABNORMAL /HPF (ref 0–4)
SCHISTOCYTES: ABNORMAL
SLIDE REVIEW: ABNORMAL
SLIDE REVIEW: ABNORMAL
SODIUM BLD-SCNC: 126 MMOL/L (ref 136–145)
SODIUM BLD-SCNC: 129 MMOL/L (ref 136–145)
SPECIFIC GRAVITY UA: >=1.03 (ref 1–1.03)
URINE REFLEX TO CULTURE: ABNORMAL
URINE TYPE: ABNORMAL
UROBILINOGEN, URINE: 0.2 E.U./DL
WBC # BLD: 12.8 K/UL (ref 4–11)
WBC # BLD: 13.8 K/UL (ref 4–11)
WBC UA: ABNORMAL /HPF (ref 0–5)

## 2022-03-27 PROCEDURE — 87086 URINE CULTURE/COLONY COUNT: CPT

## 2022-03-27 PROCEDURE — 87077 CULTURE AEROBIC IDENTIFY: CPT

## 2022-03-27 PROCEDURE — 93005 ELECTROCARDIOGRAM TRACING: CPT | Performed by: INTERNAL MEDICINE

## 2022-03-27 PROCEDURE — 87186 SC STD MICRODIL/AGAR DIL: CPT

## 2022-03-27 PROCEDURE — 86022 PLATELET ANTIBODIES: CPT

## 2022-03-27 PROCEDURE — P9016 RBC LEUKOCYTES REDUCED: HCPCS

## 2022-03-27 PROCEDURE — 1200000000 HC SEMI PRIVATE

## 2022-03-27 PROCEDURE — 86850 RBC ANTIBODY SCREEN: CPT

## 2022-03-27 PROCEDURE — 85018 HEMOGLOBIN: CPT

## 2022-03-27 PROCEDURE — 51798 US URINE CAPACITY MEASURE: CPT

## 2022-03-27 PROCEDURE — 86923 COMPATIBILITY TEST ELECTRIC: CPT

## 2022-03-27 PROCEDURE — 36415 COLL VENOUS BLD VENIPUNCTURE: CPT

## 2022-03-27 PROCEDURE — 6360000002 HC RX W HCPCS: Performed by: STUDENT IN AN ORGANIZED HEALTH CARE EDUCATION/TRAINING PROGRAM

## 2022-03-27 PROCEDURE — 80048 BASIC METABOLIC PNL TOTAL CA: CPT

## 2022-03-27 PROCEDURE — 85025 COMPLETE CBC W/AUTO DIFF WBC: CPT

## 2022-03-27 PROCEDURE — 86901 BLOOD TYPING SEROLOGIC RH(D): CPT

## 2022-03-27 PROCEDURE — 2580000003 HC RX 258: Performed by: STUDENT IN AN ORGANIZED HEALTH CARE EDUCATION/TRAINING PROGRAM

## 2022-03-27 PROCEDURE — 6370000000 HC RX 637 (ALT 250 FOR IP): Performed by: STUDENT IN AN ORGANIZED HEALTH CARE EDUCATION/TRAINING PROGRAM

## 2022-03-27 PROCEDURE — 99223 1ST HOSP IP/OBS HIGH 75: CPT | Performed by: SURGERY

## 2022-03-27 PROCEDURE — 71045 X-RAY EXAM CHEST 1 VIEW: CPT

## 2022-03-27 PROCEDURE — 87040 BLOOD CULTURE FOR BACTERIA: CPT

## 2022-03-27 PROCEDURE — 51701 INSERT BLADDER CATHETER: CPT

## 2022-03-27 PROCEDURE — 86900 BLOOD TYPING SEROLOGIC ABO: CPT

## 2022-03-27 PROCEDURE — 85730 THROMBOPLASTIN TIME PARTIAL: CPT

## 2022-03-27 PROCEDURE — 81001 URINALYSIS AUTO W/SCOPE: CPT

## 2022-03-27 RX ORDER — DEXTROSE MONOHYDRATE 50 MG/ML
100 INJECTION, SOLUTION INTRAVENOUS PRN
Status: DISCONTINUED | OUTPATIENT
Start: 2022-03-27 | End: 2022-04-10 | Stop reason: HOSPADM

## 2022-03-27 RX ORDER — NICOTINE POLACRILEX 4 MG
15 LOZENGE BUCCAL PRN
Status: DISCONTINUED | OUTPATIENT
Start: 2022-03-27 | End: 2022-04-10 | Stop reason: HOSPADM

## 2022-03-27 RX ORDER — DEXTROSE MONOHYDRATE 100 MG/ML
250 INJECTION, SOLUTION INTRAVENOUS ONCE
Status: DISCONTINUED | OUTPATIENT
Start: 2022-03-27 | End: 2022-04-10 | Stop reason: HOSPADM

## 2022-03-27 RX ORDER — DEXTROSE MONOHYDRATE 25 G/50ML
12.5 INJECTION, SOLUTION INTRAVENOUS PRN
Status: DISCONTINUED | OUTPATIENT
Start: 2022-03-27 | End: 2022-04-10 | Stop reason: HOSPADM

## 2022-03-27 RX ADMIN — SODIUM CHLORIDE, PRESERVATIVE FREE 10 ML: 5 INJECTION INTRAVENOUS at 21:06

## 2022-03-27 RX ADMIN — LEVETIRACETAM 2500 MG: 250 TABLET, FILM COATED ORAL at 20:51

## 2022-03-27 RX ADMIN — LACOSAMIDE 200 MG: 50 TABLET, FILM COATED ORAL at 07:59

## 2022-03-27 RX ADMIN — HYDROMORPHONE HYDROCHLORIDE 0.5 MG: 1 INJECTION, SOLUTION INTRAMUSCULAR; INTRAVENOUS; SUBCUTANEOUS at 04:19

## 2022-03-27 RX ADMIN — HYDROMORPHONE HYDROCHLORIDE 0.5 MG: 1 INJECTION, SOLUTION INTRAMUSCULAR; INTRAVENOUS; SUBCUTANEOUS at 08:14

## 2022-03-27 RX ADMIN — SODIUM CHLORIDE, PRESERVATIVE FREE 10 ML: 5 INJECTION INTRAVENOUS at 07:59

## 2022-03-27 RX ADMIN — ARGATROBAN 0.63 MCG/KG/MIN: 50 INJECTION INTRAVENOUS at 19:38

## 2022-03-27 RX ADMIN — ACETAMINOPHEN 325MG 650 MG: 325 TABLET ORAL at 18:56

## 2022-03-27 RX ADMIN — PIPERACILLIN SODIUM AND TAZOBACTAM SODIUM 4500 MG: 4; .5 INJECTION, POWDER, LYOPHILIZED, FOR SOLUTION INTRAVENOUS at 23:02

## 2022-03-27 RX ADMIN — Medication 5 MG: at 20:51

## 2022-03-27 RX ADMIN — ACETAMINOPHEN 325MG 650 MG: 325 TABLET ORAL at 11:13

## 2022-03-27 RX ADMIN — PANTOPRAZOLE SODIUM 40 MG: 40 TABLET, DELAYED RELEASE ORAL at 06:43

## 2022-03-27 RX ADMIN — PIPERACILLIN SODIUM AND TAZOBACTAM SODIUM 4500 MG: 4; .5 INJECTION, POWDER, LYOPHILIZED, FOR SOLUTION INTRAVENOUS at 16:03

## 2022-03-27 RX ADMIN — OXYCODONE HYDROCHLORIDE AND ACETAMINOPHEN 1 TABLET: 5; 325 TABLET ORAL at 01:58

## 2022-03-27 RX ADMIN — HYDROMORPHONE HYDROCHLORIDE 0.5 MG: 1 INJECTION, SOLUTION INTRAMUSCULAR; INTRAVENOUS; SUBCUTANEOUS at 21:00

## 2022-03-27 RX ADMIN — HYDROMORPHONE HYDROCHLORIDE 0.5 MG: 1 INJECTION, SOLUTION INTRAMUSCULAR; INTRAVENOUS; SUBCUTANEOUS at 15:11

## 2022-03-27 RX ADMIN — LACOSAMIDE 200 MG: 50 TABLET, FILM COATED ORAL at 20:50

## 2022-03-27 RX ADMIN — ARGATROBAN 0.63 MCG/KG/MIN: 50 INJECTION INTRAVENOUS at 11:43

## 2022-03-27 RX ADMIN — LEVETIRACETAM 2000 MG: 250 TABLET, FILM COATED ORAL at 07:59

## 2022-03-27 ASSESSMENT — PAIN DESCRIPTION - ORIENTATION
ORIENTATION: RIGHT;LEFT

## 2022-03-27 ASSESSMENT — PAIN DESCRIPTION - PAIN TYPE
TYPE: ACUTE PAIN

## 2022-03-27 ASSESSMENT — PAIN DESCRIPTION - LOCATION
LOCATION: LEG

## 2022-03-27 ASSESSMENT — PAIN SCALES - GENERAL
PAINLEVEL_OUTOF10: 9
PAINLEVEL_OUTOF10: 6
PAINLEVEL_OUTOF10: 3
PAINLEVEL_OUTOF10: 7
PAINLEVEL_OUTOF10: 0
PAINLEVEL_OUTOF10: 3
PAINLEVEL_OUTOF10: 7
PAINLEVEL_OUTOF10: 2
PAINLEVEL_OUTOF10: 0
PAINLEVEL_OUTOF10: 9
PAINLEVEL_OUTOF10: 3
PAINLEVEL_OUTOF10: 7
PAINLEVEL_OUTOF10: 0

## 2022-03-27 ASSESSMENT — PAIN DESCRIPTION - DESCRIPTORS
DESCRIPTORS: ACHING;CONSTANT;SHOOTING
DESCRIPTORS: ACHING
DESCRIPTORS: ACHING;CONSTANT;DISCOMFORT;HEAVINESS
DESCRIPTORS: ACHING;CONSTANT;DISCOMFORT;HEAVINESS
DESCRIPTORS: ACHING

## 2022-03-27 NOTE — PROGRESS NOTES
Secure message sent to Dr. Jesus Livingston to report continued MEWs of 5, temp of 99, respirations 22,  and HR sustaining in the 130's. Pt is resting in bed. Pt denies pain and reports, \"I'm actually feeling much better. \" Also reported to MD hemoglobin of 7.7 and sodium 126. Awaiting response/orders.

## 2022-03-27 NOTE — PROGRESS NOTES
Secure message sent to Dr. Edrick Peabody to report VS and MEWs of 5. Will administer tylenol for temp of 100.2 and report of headache pain 3/10.  Awaiting response/orders per MD.

## 2022-03-27 NOTE — PLAN OF CARE
Problem: Falls - Risk of:  Goal: Will remain free from falls  Description: Will remain free from falls  Outcome: Ongoing   Pt is a high fall risk. High fall precautions are in place: nonskid socks, yellow blanket, fall wristband, call light in reach, bed is locked and in lowest position, bed alarm engaged. Problem: Pain:  Goal: Pain level will decrease  Description: Pain level will decrease  Outcome: Ongoing  Pt reports ongoing pain in bilateral legs and intermittent HA pain. Reports pain is controlled with rest, repositioning, PRN pain medication. Will continue to assess pain on 0-10 scale for appropriate pain management.

## 2022-03-27 NOTE — PROGRESS NOTES
Patient bladder scanned has 197 cc in bladder denies the need to void at this time. Skin warm and dry to the touch. Pain medication given for 7/10 pain in bilat legs. Dressing to right breast changed. Patient tolerated the dressing change. Will continue to monitor patient. Fall precautions in place. Bed alarm on and call light is within reach.

## 2022-03-27 NOTE — PROGRESS NOTES
Pt is a MEWs of 4, shallow breathing and respirations of 28/minute noted. She is reporting bilateral leg pain 7/10, PRN dilaudid administered as ordered, see MAR. Pt is reporting, \"I just feel very stressed. \" Secure message sent to MD to report above. Awaiting response/orders.

## 2022-03-27 NOTE — PROGRESS NOTES
Progress Note    Admit Date: 3/25/2022  Diet: ADULT DIET; Regular    CC: Leg Pain    Interval history:   Patient seen and examined at bedside. Heparin induced platelet antibody positive. Patient was started on argatroban drip yesterday. Platelet count this morning improving from 76 yesterday to 116 this today. Hb 7.7 from 8.1 yesterday   This a.m. patient reports surgical site pain. Denies any chest pain, shortness of breath, palpitations. CT head without contrast yesterday nonrevealing of acute intracranial hemorrhage. HPI: 40-year-old female with metastatic breast cancer status post craniotomy with mass resection presented from [facility] due to worsening lower extremity pain and swelling. During her last admission patient was diagnosed with a DVT in the right lower extremity and due to her recent Craniotomy anticoagulation could not be started till POD 14 so patient underwent an IVC filter. Her platelets levels were noted to be low at [facility] and due to concern for worsening DVT she was sent to the hospital.  CT abdomen and pelvis was performed which did show thrombosis of the IVC at the level of the filter with marked distention of the caval bifurcation. There was also suspicion for bilateral lower lobe pulmonary emboli. Will consult hematology for further recommendations. HIT panel will be sent as patient was receiving Lovenox at [facility].   Continue to monitor as inpatient    Medications:     Scheduled Meds:   lacosamide  200 mg Oral BID    levETIRAcetam  2,000 mg Oral Daily    levETIRAcetam  2,500 mg Oral Nightly    melatonin  5 mg Oral Nightly    pantoprazole  40 mg Oral QAM AC    sodium chloride flush  5-40 mL IntraVENous 2 times per day     Continuous Infusions:   argatroban infusion 0.634 mcg/kg/min (03/26/22 2049)    sodium chloride 100 mL/hr at 03/26/22 2323    sodium chloride       PRN Meds:HYDROmorphone **OR** HYDROmorphone, sodium chloride flush, sodium chloride, ondansetron **OR** ondansetron, polyethylene glycol, acetaminophen **OR** acetaminophen    Objective:   Vitals:   T-max:  Patient Vitals for the past 8 hrs:   BP Temp Temp src Pulse Resp SpO2   03/27/22 0630 121/83 98.8 °F (37.1 °C) Oral 116 18 97 %   03/27/22 0445 -- -- -- 115 -- --   03/27/22 0230 118/77 98.5 °F (36.9 °C) Oral 121 18 97 %   03/27/22 0045 -- -- -- 123 -- --       Intake/Output Summary (Last 24 hours) at 3/27/2022 3907  Last data filed at 3/27/2022 0400  Gross per 24 hour   Intake 960 ml   Output 150 ml   Net 810 ml       Review of Systems  As above    Physical Exam  Constitutional:       Appearance: Normal appearance. She is not ill-appearing, toxic-appearing or diaphoretic. HENT:      Mouth/Throat:      Mouth: Mucous membranes are moist.   Eyes:      Pupils: Pupils are equal, round, and reactive to light. Cardiovascular:      Rate and Rhythm: Regular rhythm. Tachycardia present. Pulses: Normal pulses. Heart sounds: Normal heart sounds. No murmur heard. Pulmonary:      Effort: Pulmonary effort is normal. No respiratory distress. Breath sounds: Normal breath sounds. No wheezing or rales. Abdominal:      General: Bowel sounds are normal. There is no distension. Palpations: Abdomen is soft. Tenderness: There is no abdominal tenderness. There is no guarding. Musculoskeletal:      Right lower leg: Edema (TTP) present. Left lower leg: Edema (TTP) present. Neurological:      Mental Status: She is alert and oriented to person, place, and time. LABS:  CBC:   Recent Labs     03/25/22  1904 03/25/22  1904 03/26/22  0625 03/26/22  0625 03/26/22  1929 03/27/22  0051 03/27/22  0556   WBC 10.7  --  14.3*  --   --   --  12.8*   HGB 9.4*   < > 9.0*   < > 8.6* 8.1* 7.7*   HCT 28.6*  --  26.8*  --   --   --  23.9*   PLT 86*  --  76*  --   --   --  116*   MCV 83.9  --  82.1  --   --   --  84.7    < > = values in this interval not displayed.      Renal:    Recent Labs     03/25/22  1924 03/26/22  0625 03/27/22  0556   * 131* 129*   K 4.7 4.9 5.2*   CL 98* 96* 99   CO2 20* 20* 19*   BUN 15 16 15   CREATININE 0.5* 0.6 0.6   GLUCOSE 96 136* 131*   CALCIUM 9.2 9.5 8.7   ANIONGAP 14 15 11     Hepatic:   Recent Labs     03/25/22 1904   AST 28   ALT 31   BILITOT 0.4   BILIDIR <0.2   PROT 7.5   LABALBU 3.9   ALKPHOS 74     Troponin: No results for input(s): TROPONINI in the last 72 hours. BNP: No results for input(s): BNP in the last 72 hours. Lipids: No results for input(s): CHOL, HDL in the last 72 hours. Invalid input(s): LDLCALCU, TRIGLYCERIDE  ABGs:  No results for input(s): PHART, [id number], PO2ART, QUK9HIZ, BEART, THGBART, [id number], ABB5EUB in the last 72 hours. INR:   Recent Labs     03/25/22 1904   INR 1.22*     Lactate: No results for input(s): LACTATE in the last 72 hours. Cultures:  -----------------------------------------------------------------  RAD:   CT HEAD WO CONTRAST   Final Result      Postsurgical changes with areas of low cortical attenuation in the left parietal and left occipital region appear essentially stable from prior study with associated mild midline shift which is similar to slightly improved from prior study. No evidence of developing acute intracranial hemorrhage. CTA ABDOMEN PELVIS W WO CONTRAST   Final Result   1. 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 2. Surrounding hazy density, likely related to venous congestion. However, streaky densities extending inferiorly on the right may represent blood. However, there is no evidence of retroperitoneal hematoma. Correlation with lower extremity noninvasive    duplex Doppler is recommended in addition to serial hemoglobin and crit levels. 3. Suspect bilateral lower lobe pulmonary emboli.  See comments above      Redemonstrated is a large necrotic mass involving the right breast enlarged fibroid uterus. Assessment/Plan:     Metastatic triple negative breast cancer w/ Intracranial Metastasis  S/p Craniotomy (3/12) d/c'd to [facility] (3/20)  - today is POD 15  - Was getting ppx lovenox at [place]  - CT head without contrast 3/26 nonrevealing of acute intracranial hemorrhage  - Pain Control w/ Dilaudid pain panel as needed  - NSGY ok with AC, patient was started on argatroban yesterday s/p HIT positive  -Hb this a.m. 7.7 dropped from 8.1  -If Hb drops further on repeat CBC, repeat CT abd pelvis     HIT  Known RLE DVT, reported worsening at outside ARU  lower lobe PE  - Had acute DVT on 03/15/2022 of right, provoked  - Pt was not d/jay on anticoagulation, only received ppx lovenox at [facility]. Did get IVC filter  - Allegedly, had plts of 41/45/46 over the past 2 days   - Plts on 03/20/22 was 195,   CBC POA with plts of 86 -> 76 > 116 s/p stopping all heparin anticoagulation and starting on argatroban yesterday  - CTA abd pelvis this admission re demonstrates DVT, also suspicion for b/l LL PE. Recommended V/Q scan for further clarification.  However since NSGY ok with AC, we will fully anticoagulate the pt  -Continue argatroban gtt given positive for HIT  -Repeat H&H, if hemoglobin less than 7 hold off argatroban and repeat CT abd pelvis   - Heme consulted     Moderate thrombocytopenia s/p HIT positive  -DC all heparin-induced anticoagulation  -Continue argatroban drip  -Platelet this morning 116 from 76 yesterday    Acute Anemia likely 2/2 blood loss  -CBC this a.m. revealing Hb 7.7 dropped from 8.1  -Repeat CBC at noon, to further drop in H&H hold off anticoagulation and transfuse 1 unit PRBC  - H&H Q6H     Sinus tachycardia  - Likely combination of fever, pain and PE  -Continue argatroban    Uncomplicated UTI  - pt febrile this  AM, 100.2   - Repeat UA revealing +3 bacteria, negative for leukocyte esterase and nitrites   - start on zosyn, broad coverage given the acute setting and multiple co-morbidities      Chronic Issues:  Seizures - Vimpat 200 mg BID, Keppra 2g AM & 2.5g PM    Code Status: FULL  FEN: Regular  PPX: Argatroban  DISPO: GMF    Rufina Worthington MD, PGY-1  03/27/22  8:14 AM    This patient has been staffed and discussed with Chago Parikh MD.

## 2022-03-27 NOTE — PROGRESS NOTES
Secure message sent to medical resident to report hemoglobin of 7.7, potassium of 5.2, sodium of 129, and elevated HR in the upper 110's-120's at rest. Awaiting response/orders.

## 2022-03-27 NOTE — PROGRESS NOTES
Patient has complaints of bilat leg pain 9/10. Secure message sent to Dr Luc Parson for pain medication. percocet 5-325 mg had been given at 0158 with out any relief in leg pain. Order obtained and dilaudid 0.5 mg given IV.

## 2022-03-27 NOTE — PROGRESS NOTES
acetaminophen (TYLENOL) suppository 650 mg, 650 mg, Rectal, Q6H PRN  Physical    VITALS:  /79   Pulse 135   Temp 100.2 °F (37.9 °C) (Oral)   Resp 25   Ht 5' 3\" (1.6 m)   Wt 202 lb 13.2 oz (92 kg)   SpO2 97%   BMI 35.93 kg/m²   TEMPERATURE:  Current - Temp: 100.2 °F (37.9 °C); Max - Temp  Av.9 °F (37.2 °C)  Min: 98 °F (36.7 °C)  Max: 100.2 °F (37.9 °C)  PULSE OXIMETRY RANGE: SpO2  Av.4 %  Min: 97 %  Max: 100 %  24HR INTAKE/OUTPUT:    Intake/Output Summary (Last 24 hours) at 3/27/2022 1236  Last data filed at 3/27/2022 6924  Gross per 24 hour   Intake 960 ml   Output 150 ml   Net 810 ml       CONSTITUTIONAL:  awake, alert, cooperative, no apparent distress, HEENT oral pharynx , no scleral icterus    LUNGS:  No increased work of breathing, good air exchange, clear to auscultation bilaterally, no crackles or wheezing  CARDIOVASCULAR:  , regular rate and rhythm, normal S1 and S2, no S3 or S4, and no murmur noted  ABDOMEN:  No scars, normal bowel sounds, soft, non-distended, non-tender, no masses palpated, no hepatosplenomegally  MUSCULOSKELETAL:  There is no redness, warmth, or swelling of the joints. EXTREMETIES:+ edema   NEUROLOGIC:  Awake, alert, oriented to name, place and time. Cranial nerves II-XII are grossly intact. Breast -covered in dressin       Data      Recent Labs     22  0625 22  1929 22  0051 22  0556 22  1135   WBC 14.3*  --   --  12.8* 13.8*   HGB 9.0*   < > 8.1* 7.7* 7.7*   HCT 26.8*  --   --  23.9* 23.2*   PLT 76*  --   --  116* 127*   MCV 82.1  --   --  84.7 82.8    < > = values in this interval not displayed.         Recent Labs     22  0625 22  0556 22  1135   * 129* 126*   K 4.9 5.2* 4.8   CL 96* 99 94*   CO2 20* 19* 18*   BUN 16 15 13   CREATININE 0.6 0.6 0.6     Recent Labs     22  1904   AST 28   ALT 31   BILIDIR <0.2   BILITOT 0.4   ALKPHOS 74       Magnesium:  No results found for: MG    Imaging CT HEAD WO CONTRAST    Result Date: 3/26/2022  CT HEAD WITHOUT CONTRAST HISTORY: Craniotomy COMPARISON: 3/17/2022 Underexposure controls were utilized during the CT examination to meet ALARA standards for radiation dose reduction. FINDINGS: Postoperative changes are noted from left frontotemporal craniotomy. Underlying hypodensity is seen in the area of prior surgery suggesting possible cortical infarct or postsurgical change. Additional area of low cortical attenuation is seen involving the medial left parieto-occipital region which also may be related to prior surgery. The appearance is similar to prior examination. Mild associated mass effect is seen with slight left to right midline shift which appears similar to slightly improved in extent since the prior study. Visualized portions of the paranasal sinuses appear clear. Postsurgical changes with areas of low cortical attenuation in the left parietal and left occipital region appear essentially stable from prior study with associated mild midline shift which is similar to slightly improved from prior study. No evidence of developing acute intracranial hemorrhage. CT Head WO Contrast    Result Date: 3/17/2022  PROCEDURE: CT head without contrast INDICATION: seizure; recent craniotomy; COMPARISON: 3/13/2022 and 3/5/2022 TECHNIQUE: CT head was performed without contrast according to standard protocol. Axial images and multiplanar reformatted images reviewed. Up-to-date CT equipment and radiation dose reduction techniques were employed. 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 There is slightly decreased 7 mm rightward midline shift and slightly decreased compression of the left lateral ventricle. There is stable persistent vasogenic edema in the left cerebral hemisphere. There is no evidence of acute hemorrhage or infarct.  Visualized portions of the orbits, paranasal sinuses, and mastoids appear normal. There is scalp swelling overlying the craniotomy. 1.  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 No evidence of acute hemorrhage or infarct. CT Head wo Contrast    Result Date: 3/12/2022  HISTORY: Multiple brain masses. Status post resection. Postoperative evaluation. EXAM : CT OF THE HEAD WITHOUT CONTRAST TECHNIQUE: Multiple axial images were obtained of the brain without the use of contrast. Individualized dose optimization technique was used in order to meet ALARA standards for radiation dose reduction. In addition to vendor specific dose reduction algorithms, the dose reduction techniques vary based on the specific scanner utilized but frequently include automated exposure control, adjustment of the mA and/or kV according to patient size, and use of iterative reconstruction technique. COMPARISON: NONE FINDINGS: The visualized paranasal sinuses, mastoid air cells and middle ears are clear to the extent visualized. Status post left temporal and left parietal craniotomies. There is a surgical drain along the left scalp. No significant extracranial fluid collection. Skin staples consistent with recent surgery. Intracranially, there is persistent severe midline shift measuring approximately 10 mm similar to the preoperative evaluation. The large mass in the posterior left temporal region has been resected. There is an air-fluid level in the resection cavity consistent with expected postoperative changes. Resection cavity in the left parietal-occipital region with small gas bubbles. No acute complication or acute hemorrhage identified. Pneumocephalus consistent with recent surgery. 1. Resection cavity is at the locations of the previous masses with persistent adjacent vasogenic edema and persistent midline shift.  No progressive mass effect or acute surgical complication otherwise identified. CT HEAD WO CONTRAST    Result Date: 3/8/2022  CT HEAD WITHOUT CONTRAST COMPARISON STUDY: MRI brain without with contrast 3/5/2022 HISTORY: Status epilepticus in or for brain tumor resection FINDINGS: Thin section axial images obtained through the head from skull base to vertex. Multiplanar reconstruction images received for interpretation. Low radiation dose CT technique utilized. Redemonstrated is a partially necrotic heterogeneous attenuating mass in the left parietal occipital region measuring approximately 3.7 x 4.6 cm peripheral increased attenuation suggesting some component of hemorrhage. This mass abuts the posterior falx  with marked surrounding neurogenic edema and mass effect. This demonstrates little change from previous MRI exam. Additional hemorrhagic partially necrotic Mixed attenuating mass in the left parietal temporal region measuring approximately a 4.2 x 4.1 cm unchanged from prior MRI exam.  Moderate surrounding vasogenic edema and mass effect. Extensive surrounding edema anteriorly extending into the left thalamus and left renal ganglia region. There is near complete effacement of the left lateral ventricle with approximately 11 mm left-to-right midline shift. Brainstem and posterior fossa appear within normal limits. Visualized orbits and paranasal sinuses are clear. 1.  2 large hemorrhagic partially necrotic masses in the left parieto-temporal and left parieto-occipital regions with marked surrounding edema and mass effect. These demonstrate little change when compared to previous MRI study of 3/5/2022. Associated  prominent mass effect and surrounding edema resulting 11 mm midline shift to the right.      CT HEAD WO CONTRAST    Result Date: 3/3/2022  EXAMINATION: CT OF THE HEAD WITHOUT CONTRAST  3/3/2022 8:17 pm TECHNIQUE: CT of the head was performed without the administration of intravenous contrast. Dose modulation, iterative reconstruction, and/or weight based adjustment of the mA/kV was utilized to reduce the radiation dose to as low as reasonably achievable. COMPARISON: None. HISTORY: ORDERING SYSTEM PROVIDED HISTORY: ams TECHNOLOGIST PROVIDED HISTORY: Reason for exam:->ams Has a \"code stroke\" or \"stroke alert\" been called? ->No Decision Support Exception - unselect if not a suspected or confirmed emergency medical condition->Emergency Medical Condition (MA) Is the patient pregnant?->No Reason for Exam: c/o memory loss that began last week. Pt's mother states last Thursday the pt states she felt like something hit her in the head, but doesn't know what. States she isn't able to remember her name, important things she would usually remember, and she is also unable to read or write. FINDINGS: BRAIN/VENTRICLES: There is diffuse hypoattenuation involving much of the left temporoparietal and posterior frontal lobe. There is hypoattenuation involving the white matter involving left posterior hemisphere likely represent areas of vasogenic edema. There is at least 2 discrete of mass is identified measuring approximate 4 cm in size 1 which is central hypoattenuation which may be related to necrosis. These left-to-right midline shift 1.4 cm in size. There is entrapment of the right lateral ventricle and both temporal horns of both lateral ventricles suggestive areas of developing hydrocephalus. There is downward transtentorial herniation as well. With mass effect on the brainstem and the partial effacement of the suprasellar and ambient cisterns. Left uncal herniation. .  Focal density identified left parasagittal occipital lobe likely represents petechial calcification of the blood products. ORBITS: The visualized portion of the orbits demonstrate no acute abnormality. SINUSES: The visualized paranasal sinuses and mastoid air cells demonstrate no acute abnormality.  SOFT TISSUES/SKULL:  No acute abnormality of the visualized skull or soft tissues. Abnormal edema left posterior cerebral hemisphere possibly related to underlying metastases versus less likely primary glioma. This causes 1.4 cm of left-to-right midline shift with cisternal effacement and downward transtentorial herniation and findings of developing hydrocephalus. Hyperdensity left parasagittal occipital lobe could represent area of calcification however small focus of blood products will not be totally excluded. Neuro surgical consultation recommended Critical results were called by Dr. Calli Mills to Chandrika BOURNE on 3/3/2022 at 21:07. IR GUIDED IVC FILTER PLACEMENT    Result Date: 3/16/2022  IVC filter placement INDICATION : Deep venous thrombosis. Recent craniotomy. : Nini Simmons MD PROCEDURE: Preprocedure sonographic evaluation of the right internal jugular vein demonstrates patency and compressibility. This measures 12 mm. A permanent sonographic image was saved to the electronic medical record. The patient's right neck was prepped and draped in sterile fashion and lidocaine used for local anesthesia. Ultrasound guidance was used and a sonographic image of the vein was obtained. The right internal jugular vein was accessed using a micropuncture set. Abdominal cavogram was obtained. An Option Elite retrievable filter was inserted under fluoroscopic guidance and deployed in routine fashion in the infrarenal IVC. Abdominal imaging was obtained to document filter position. The patient tolerated the procedure well without complication. IMPRESSION : Normal IVC anatomy. Normal, patent jugular vein. Patent inferior vena cava. Placement of filter in the infra-renal IVC. Please note that this is a retrievable filter.  Fluoroscopy time : 1.8 minutes Number of exposures obtained : 5 Moderate sedation was performed by the physician including the presence of an independent trained observers that assisted in monitoring the patient's level of consciousness and physiological status. Following the administration of Midazolam and Fentanyl the physician spent continuous face-to-face time with the patient. Sedation start time: 0810 Sedation end time: 0826 Estimated blood loss: Less than 5 mL. VL Extremity Venous Bilateral    Result Date: 3/15/2022  Lower Extremities DVT Study  Demographics   Patient Name       Ashley OSORIO   Date of Study      03/15/2022        Gender              Female   Patient Number     [id number]        Date of Birth       1975   Visit Number       [id number]         Age                 48 year(s)   Accession Number   [id number]        Room Number         8996   Corporate ID       [id number]           Sonographer         Jason Villarreal RVT,                                                           [facility]   Ordering Physician Marguerite Amador MD, [facility]                                       Physician           Readers                                                           Abdi Ibarra MD  Procedure Type of Study:   Veins:Lower Extremities DVT Study, VASC EXTREMITY VENOUS DUPLEX BILATERAL. Vascular Sonographer Report  Indications for Study:Swelling. Additional Indications:Patient presents for evaluation due to bilateral lower extremity swelling. She only c/o right lower extremity weakness. She is a poor historian. She underwent craniotomy on 3/12/22 for tumor resection. She denies a prior H/O DVT. Lower extremity venous duplex study on 3/10/22 was negative for DVT bilaterally. Venous Duplex Scan: B-mode imaging of the deep and superficial veins, with compression maneuvers, including color and Doppler spectral waveform analysis. Impressions Right Impression There is acute partially occluding deep venous thrombosis involving the deep femoral vein.  The thrombus extends into the common femoral vein by 3.1 cm at the venous confluence. There is acute totally occluding deep venous thrombosis involving two soleal veins in the mid calf. There is no other evidence of deep or superficial venous thrombosis involving the right lower extremity. Left Impression There is no evidence of deep or superficial venous thrombosis involving the left lower extremity. Compared to the previous study on 3/10/22, the acute DVT noted in the right common femoral, deep femoral, and soleal veins is a new finding. The left lower extremity venous findings are unchanged. Conclusions   Summary   There is evidence of acute DVT involving the right common femoral vein, deep  femoral vein, and soleal veins, as described above. No evidence of deep vein or superficial thrombosis involving the left lower  extremity. Signature   ------------------------------------------------------------------  Electronically signed by Kayden Patton MD (Interpreting  physician) on 03/15/2022 at 07:08 PM  ------------------------------------------------------------------  Patient Status:Routine. Study [address] [place] [address] - Vascular Lab. Technical Quality:Adequate visualization.   - Results were reported to:SILVIA Meehan at 5:30 pm on 3/15/22. Risk Factors History +------------------+----------+----------------------------------------------+ ! Diagnosis         ! Date      ! Comments                                      ! +------------------+----------+----------------------------------------------+ ! Other             ! !H/O metastatic invasive ductal cell carcinoma ! !                  !          !with brain mets, status epilepticus, cerebral ! !                  !          !edema                                         ! +------------------+----------+----------------------------------------------+ ! Previous Procedure!03/12/2022! L craniotomy for tumor resection              ! +------------------+----------+----------------------------------------------+   - The patient has breast cancer. Velocities are measured in cm/s ; Diameters are measured in mm Right Lower Extremities DVT Study Measurements Right 2D Measurements +------------------------+----------+---------------+----------+ ! Location                ! Visualized! Compressibility! Thrombosis! +------------------------+----------+---------------+----------+ ! Sapheno Femoral Junction! Yes       ! Yes            ! None      ! +------------------------+----------+---------------+----------+ ! GSV Thigh               ! Yes       ! Yes            ! None      ! +------------------------+----------+---------------+----------+ ! Common Femoral          !Yes       ! Partial        !Acute     ! +------------------------+----------+---------------+----------+ ! Femoral                 !Yes       ! Yes            ! None      ! +------------------------+----------+---------------+----------+ ! Prox Femoral            !Yes       ! Yes            ! None      ! +------------------------+----------+---------------+----------+ ! Mid Femoral             !Yes       ! Yes            ! None      ! +------------------------+----------+---------------+----------+ ! Dist Femoral            !Yes       ! Yes            ! None      ! +------------------------+----------+---------------+----------+ ! Deep Femoral            !Yes       ! Partial        !Acute     ! +------------------------+----------+---------------+----------+ ! Popliteal               !Yes       ! Yes            ! None      ! +------------------------+----------+---------------+----------+ ! GSV Below Knee          ! Yes       ! Yes            ! None      ! +------------------------+----------+---------------+----------+ ! Gastroc                 ! Yes       ! Yes            ! None      ! +------------------------+----------+---------------+----------+ ! Soleal                  !Yes       ! No             !Acute     ! +------------------------+----------+---------------+----------+ ! PTV                     ! Yes       ! Yes            ! None      ! +------------------------+----------+---------------+----------+ ! Peroneal                !Yes       ! Yes            ! None      ! +------------------------+----------+---------------+----------+ ! GSV Calf                ! Yes       ! Yes            ! None      ! +------------------------+----------+---------------+----------+ ! SSV                     ! Yes       ! Yes            ! None      ! +------------------------+----------+---------------+----------+ Right Doppler Measurements +------------------------+------+------+------------+ ! Location                ! Signal!Reflux! Reflux (sec)! +------------------------+------+------+------------+ ! Sapheno Femoral Junction! Phasic! No    !            ! +------------------------+------+------+------------+ ! Common Femoral          !Phasic! No    !            ! +------------------------+------+------+------------+ ! Femoral                 !Phasic! No    !            ! +------------------------+------+------+------------+ ! Prox Femoral            !Phasic! No    !            ! +------------------------+------+------+------------+ ! Deep Femoral            !Phasic! No    !            ! +------------------------+------+------+------------+ ! Popliteal               !Phasic! No    !            ! +------------------------+------+------+------------+ Left Lower Extremities DVT Study Measurements Left 2D Measurements +------------------------+----------+---------------+----------+ ! Location                ! Visualized! Compressibility! Thrombosis! +------------------------+----------+---------------+----------+ ! Sapheno Femoral Junction! Yes       ! Yes            ! None      ! +------------------------+----------+---------------+----------+ ! GSV Thigh               ! Yes       ! Yes            ! None      ! +------------------------+----------+---------------+----------+ !Common Femoral          !Yes       ! Yes            ! None      ! +------------------------+----------+---------------+----------+ ! Femoral                 !Yes       ! Yes            ! None      ! +------------------------+----------+---------------+----------+ ! Prox Femoral            !Yes       ! Yes            ! None      ! +------------------------+----------+---------------+----------+ ! Mid Femoral             !Yes       ! Yes            ! None      ! +------------------------+----------+---------------+----------+ ! Dist Femoral            !Yes       ! Yes            ! None      ! +------------------------+----------+---------------+----------+ ! Deep Femoral            !Yes       ! Yes            ! None      ! +------------------------+----------+---------------+----------+ ! Popliteal               !Yes       ! Yes            ! None      ! +------------------------+----------+---------------+----------+ ! GSV Below Knee          ! Yes       ! Yes            ! None      ! +------------------------+----------+---------------+----------+ ! Gastroc                 ! Yes       ! Yes            ! None      ! +------------------------+----------+---------------+----------+ ! Soleal                  !Yes       ! Yes            ! None      ! +------------------------+----------+---------------+----------+ ! PTV                     ! Yes       ! Yes            ! None      ! +------------------------+----------+---------------+----------+ ! Peroneal                !Yes       ! Yes            ! None      ! +------------------------+----------+---------------+----------+ ! GSV Calf                ! Yes       ! Yes            ! None      ! +------------------------+----------+---------------+----------+ ! SSV                     ! Yes       ! Yes            ! None      ! +------------------------+----------+---------------+----------+ Left Doppler Measurements +------------------------+------+------+------------+ ! Location                ! Signal!Reflux! Reflux (sec)! +------------------------+------+------+------------+ ! Sapheno Femoral Junction! Phasic! No    !            ! +------------------------+------+------+------------+ ! Common Femoral          !Phasic! No    !            ! +------------------------+------+------+------------+ ! Femoral                 !Phasic! No    !            ! +------------------------+------+------+------------+ ! Prox Femoral            !Phasic! No    !            ! +------------------------+------+------+------------+ ! Deep Femoral            !Phasic! No    !            ! +------------------------+------+------+------------+ ! Popliteal               !Phasic! No    !            ! +------------------------+------+------+------------+    VL Extremity Venous Bilateral    Result Date: 3/11/2022  Lower Extremities DVT Study  Demographics   Patient Name       Sunita OSORIO   Date of Study      03/10/2022        Gender              Female   Patient Number     [id number]        Date of Birth       1975   Visit Number       [id number]         Age                 48 year(s)   Accession Number   [id number]        Room Number         4800   Corporate ID       [id number]           Courtney Childress, BS,                                                           [facility]   Ordering Physician Griselda Weston MD, [facility]                                       Physician           Readers                                                           Jeffery Owusu MD  Procedure Type of Study:   Veins:Lower Extremities DVT Study, VASC EXTREMITY VENOUS DUPLEX BILATERAL. Vascular Sonographer Report  Indications for Study:Swelling. Additional Indications:Inpatient study done bedside in the ICU. Patient is a poor historian with bilateral leg swelling for an unknown number of days. Venous Duplex Scan: B-mode imaging of the deep and superficial veins, with compression maneuvers, including color and Doppler spectral waveform analysis. Impressions Right Impression Catheter and bandages on the right thigh. No evidence of deep or superficial venous thrombosis in the right lower extremity. Left Impression No evidence of deep or superficial venous thrombosis in the left lower extremity. Evidence of a cystic structure in the popliteal fossa, consistent with a Baker's cyst, measuring 1.76x0.59 cm. Conclusions   Summary   No evidence of deep or superficial venous thrombosis in the bilateral lower  extremity. Left Baker's cyst.   Signature   ------------------------------------------------------------------  Electronically signed by Isadora Narvaez MD (Interpreting  physician) on 03/11/2022 at 01:50 PM  ------------------------------------------------------------------  Patient Status:Routine. Study [facility] - Vascular Lab. Technical Quality:Adequate visualization. Velocities are measured in cm/s ; Diameters are measured in mm Right Lower Extremities DVT Study Measurements Right 2D Measurements +------------------------+----------+---------------+----------+ ! Location                ! Visualized! Compressibility! Thrombosis! +------------------------+----------+---------------+----------+ ! Sapheno Femoral Junction! Yes       ! Yes            ! None      ! +------------------------+----------+---------------+----------+ ! GSV Thigh               ! Partial   !Yes            ! None      ! +------------------------+----------+---------------+----------+ ! Common Femoral          !Partial   !Yes            ! None      ! +------------------------+----------+---------------+----------+ ! Femoral                 !Partial   !Yes            ! None      ! +------------------------+----------+---------------+----------+ ! Prox Femoral            !Partial   !Yes            ! None      ! +------------------------+----------+---------------+----------+ ! Mid Femoral             !Partial   !Yes            ! None      ! +------------------------+----------+---------------+----------+ ! Dist Femoral            !Yes       ! Yes            ! None      ! +------------------------+----------+---------------+----------+ ! Deep Femoral            !Partial   !Partial        !None      ! +------------------------+----------+---------------+----------+ ! Popliteal               !Yes       ! Yes            ! None      ! +------------------------+----------+---------------+----------+ ! GSV Below Knee          ! Yes       ! Yes            ! None      ! +------------------------+----------+---------------+----------+ ! Gastroc                 ! Yes       ! Yes            ! None      ! +------------------------+----------+---------------+----------+ ! Soleal                  !Yes       ! Yes            ! None      ! +------------------------+----------+---------------+----------+ ! PTV                     ! Yes       ! Yes            ! None      ! +------------------------+----------+---------------+----------+ ! Peroneal                !Yes       ! Yes            ! None      ! +------------------------+----------+---------------+----------+ ! GSV Calf                ! Yes       ! Yes            ! None      ! +------------------------+----------+---------------+----------+ ! SSV                     ! Yes       ! Yes            ! None      ! +------------------------+----------+---------------+----------+ Right Doppler Measurements +------------------------+------+------+------------+ ! Location                ! Signal!Reflux! Reflux (sec)! +------------------------+------+------+------------+ ! Sapheno Femoral Junction! Phasic! No    !            ! +------------------------+------+------+------------+ ! Common Femoral          !Phasic! No    !            ! +------------------------+------+------+------------+ ! Femoral                 !Phasic! No    !            ! +------------------------+------+------+------------+ ! Deep Femoral            !Phasic! No    !            ! +------------------------+------+------+------------+ ! Popliteal               !Phasic! No    !            ! +------------------------+------+------+------------+ Left Lower Extremities DVT Study Measurements Left 2D Measurements +------------------------+----------+---------------+----------+ ! Location                ! Visualized! Compressibility! Thrombosis! +------------------------+----------+---------------+----------+ ! Sapheno Femoral Junction! Yes       ! Yes            ! None      ! +------------------------+----------+---------------+----------+ ! GSV Thigh               ! Yes       ! Yes            ! None      ! +------------------------+----------+---------------+----------+ ! Common Femoral          !Yes       ! Yes            ! None      ! +------------------------+----------+---------------+----------+ ! Femoral                 !Yes       ! Yes            ! None      ! +------------------------+----------+---------------+----------+ ! Prox Femoral            !Yes       ! Yes            ! None      ! +------------------------+----------+---------------+----------+ ! Mid Femoral             !Yes       ! Yes            ! None      ! +------------------------+----------+---------------+----------+ ! Dist Femoral            !Yes       ! Yes            ! None      ! +------------------------+----------+---------------+----------+ ! Deep Femoral            !Yes       ! Partial        !None      ! +------------------------+----------+---------------+----------+ ! Popliteal               !Yes       ! Yes            ! None      ! +------------------------+----------+---------------+----------+ ! GSV Below Knee          ! Yes       ! Yes            ! None      ! +------------------------+----------+---------------+----------+ ! Gastroc                 ! Yes       ! Yes            ! None      ! +------------------------+----------+---------------+----------+ ! Soleal                  !Yes       ! Yes            ! None      ! +------------------------+----------+---------------+----------+ ! PTV                     ! Yes       ! Yes            ! None      ! +------------------------+----------+---------------+----------+ ! Peroneal                !Yes       ! Yes            ! None      ! +------------------------+----------+---------------+----------+ ! GSV Calf                ! Yes       ! Yes            ! None      ! +------------------------+----------+---------------+----------+ ! SSV                     ! Yes       ! Yes            ! None      ! +------------------------+----------+---------------+----------+ Left Doppler Measurements +------------------------+------+------+------------+ ! Location                ! Signal!Reflux! Reflux (sec)! +------------------------+------+------+------------+ ! Sapheno Femoral Junction! Phasic! No    !            ! +------------------------+------+------+------------+ ! Common Femoral          !Phasic! No    !            ! +------------------------+------+------+------------+ ! Femoral                 !Phasic! No    !            ! +------------------------+------+------+------------+ ! Deep Femoral            !Phasic! No    !            ! +------------------------+------+------+------------+ ! Popliteal               !Phasic! No    !            ! +------------------------+------+------+------------+    CTA HEAD NECK W CONTRAST    Result Date: 3/3/2022  EXAMINATION: CTA OF THE HEAD AND NECK WITH CONTRAST 3/3/2022 8:17 pm: TECHNIQUE: CTA of the head and neck was performed with the administration of intravenous contrast. Multiplanar reformatted images are provided for review. MIP images are provided for review. Stenosis of the internal carotid arteries measured using NASCET criteria. Dose modulation, iterative reconstruction, and/or weight based adjustment of the mA/kV was utilized to reduce the radiation dose to as low as reasonably achievable.  COMPARISON: CT head 03/03/2022 HISTORY: ORDERING SYSTEM PROVIDED HISTORY: ams x 1 week TECHNOLOGIST PROVIDED HISTORY: Reason for exam:->ams x 1 week Has a \"code stroke\" or \"stroke alert\" been called? ->No Decision Support Exception - unselect if not a suspected or confirmed emergency medical condition->Emergency Medical Condition (MA) Reason for Exam: ams x 1 week FINDINGS: CTA NECK: AORTIC ARCH/ARCH VESSELS: No dissection or arterial injury. No significant stenosis of the brachiocephalic or subclavian arteries. CAROTID ARTERIES: No dissection, arterial injury, or hemodynamically significant stenosis by NASCET criteria. VERTEBRAL ARTERIES: No dissection, arterial injury, or significant stenosis. SOFT TISSUES: The lung apices are clear. No cervical or superior mediastinal lymphadenopathy. The larynx and pharynx are unremarkable. No acute abnormality of the salivary and thyroid glands. Partial visualization of a large exophytic right breast mass. BONES: .  Vertebral heights are maintained. CTA HEAD: ANTERIOR CIRCULATION: No significant stenosis of the intracranial internal carotid, anterior cerebral, or middle cerebral arteries. No aneurysm. POSTERIOR CIRCULATION: No significant stenosis of the vertebral, basilar, or posterior cerebral arteries. No aneurysm. OTHER: No dural venous sinus thrombosis on this non-dedicated study. BRAIN: Left-sided masses and edema with associated midline shift. . Ventriculomegaly worrisome for areas developing hydrocephalus related to the mass effect and downward transfer herniation. Negative for large vessel occlusion or hemodynamic stenosis. CT CHEST ABDOMEN PELVIS W CONTRAST    Result Date: 3/4/2022  CT of chest abdomen and pelvis with contrast HISTORY: Brain mass. Evaluate for primary neoplasm or metastatic disease. Staging. COMPARISON: none CONTRAST:  70 mL Isovue-370 intravenous  TECHNIQUE: Individualized dose optimization technique was used in order to meet ALARA standards for radiation dose reduction.  In addition to vendor specific dose reduction algorithms, the dose reduction techniques vary based on the specific scanner utilized but frequently include automated exposure control, adjustment of the mA and/or kV according to patient size, and use of iterative reconstruction technique. COMMENTS: Chest: Mild degenerative changes in the spine. Scoliosis. There is a large, necrotic, peripherally enhancing right breast mass which tenths the anterior skin surface and measures 10.2 x 8.5 cm. There is right axillary lymphadenopathy; for example 2.1 x 1.5 cm on image 601-22. Small hiatal hernia. Mediastinal structures are unremarkable without lymphadenopathy. Mild linear subpleural atelectasis in the lungs. 2 mm perifissural nodule on the left image 601-51 is most likely benign. No evidence of pulmonary metastatic disease. Abdomen and pelvis: Degenerative changes in the spine. No lymphadenopathy. Gallbladder is within normal limits. Bowel is unremarkable. 4 mm hypodensity in the right lobe of the liver image 601-83 is too small to definitively characterize and most likely benign. Solid abdominal organs are otherwise unremarkable. Multiple uterine fibroids are seen. Bladder is collapsed. There is high density fluid in the bladder which may represent prior contrast administration or hematuria. Large, necrotic right breast mass. Right axillary lymphadenopathy. Uterine fibroids. High density urine in the bladder. MRI brain with and without contrast    Result Date: 3/14/2022  EXAM: MRI BRAIN W WO CONTRAST INDICATION: Brain mass COMPARISON: 3/12/2022 TECHNIQUE: Multiplanar, multisequence MR imaging of the head obtained without and with IV. IV contrast: 10 mL IV ProHance FINDINGS: Postoperative changes of left sided craniotomy with interval resection of underlying masses. Expected postoperative pneumocephalus. Small extra-axial subdural fluid collection subjacent to the craniotomy site.  There are resection cavities in the left temporal and occipital region with blood products within and surrounding the resection cavity. There are small areas of restricted diffusion along the margins of the resection cavity consistent with postoperative ischemia. There are small irregular nodular areas of enhancement along the anterior and lateral margins of the left occipital resection cavity (image 73 and 76 of series 13). Small irregular nodular area of enhancement along the medial margin of the left temporal resection cavity (image 70 of series 13). Thin curvilinear enhancement along the margins of the surgical cavity is likely reactive/postoperative. There is extensive T2 hyperintensity vasogenic edema in the left parietotemporal occipital region which is similar to preoperative MRI. There is mass effect with effacement of left lateral ventricle and approximately 9 mm midline shift to right. Postoperative changes of left-sided craniotomy with interval resection of left temporal and occipital lobe masses. Small nodular areas of enhancement along the margins of the surgical cavity as detailed above are indeterminate. Stable extensive T2 hyperintense/vasogenic edema in the left parietotemporal occipital region surrounding the resection cavities. Mass effect with effacement of left lateral ventricle and 9 mm midline shift to right. Recommended continued follow-up. MRI BRAIN W WO CONTRAST    Result Date: 3/5/2022  EXAM: MRI BRAIN W WO CONTRAST INDICATION: Brain metastasis COMPARISON: None TECHNIQUE: Multiplanar, multisequence MR imaging of the head obtained without and with IV. IV contrast: 12 mL IV ProHance. FINDINGS: There is no diffusion restriction to suggest an acute infarct. There is a hemorrhagic partially necrotic heterogeneously enhancing mass in the left parietal occipital region likely intra-axial measuring approximately 3.5 x 4.2 cm (image 90 of series 13). Medially it abuts posterior falx. There is surrounding vasogenic edema with mass effect.  There is another hemorrhagic partially necrotic heterogeneously enhancing mass in the left parietotemporal region measuring approximately 4.3 x 4.2 cm (image 85). It abuts the adjacent dura on the lateral aspect. There is surrounding vasogenic edema mass  effect. There is extensive surrounding vasogenic edema anteriorly extending into the left thalamus, left basal ganglia. There is mass effect with near complete effacement of left lateral ventricle and 10 mm midline shift to right. Major vascular flow voids are present. Sellar suprasellar region is unremarkable. Cervicomedullary junction is unremarkable. Visualized paranasal sinuses and mastoid air cells are clear. No suspicious calvarial abnormality. Two large hemorrhagic partially necrotic heterogeneously enhancing mass in the left parietotemporal and left parieto-occipital region with surrounding vasogenic edema and mass effect. These are indeterminate, most likely relate to multicentric glioblastoma. However, possibility of metastasis cannot be excluded given the history of breast cancer. 10 mm midline shift to right. CTA ABDOMEN PELVIS W WO CONTRAST    Result Date: 3/26/2022  EXAM: CTA ABDOMEN AND PELVIS WITHOUT CONTRAST INDICATION: LUQ abd pain w/ TTP on exam. Previous history of metastasis, IVF filter clot burden, recent IVC filter placement 3/16/2022 Necrotic right breast mass COMPARISON: 9/6/7188 TECHNIQUE: Helical CTA imaging obtained from lung bases through pelvis. Axial images and multiplanar reformatted images are provided for review Volume rendering, MIPS Arterial and delayed imaging IV Contrast: Isovue-370, 80 mL plus an additional 60 mL for additional injection Oral Contrast: None CT radiation dose optimization techniques (automated exposure control, use of a iterative reconstruction techniques, or adjustment of the mA or KV according to the patients size) were used to limit patient radiation dose.  FINDINGS: Bilateral lower lobe pulmonary emboli are suspected, there are filling defects and poorly opacified pulmonary arteries in the lower lobe segments adjacent to the pulmonary veins however, this is indeterminate. Correlation can be made with perfusion VQ scan or subsequent CT PE study of patient is well hydrated due to the added contrast given on the current exam. 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 Distended iliac veins bilaterally. Aorta and iliofemoral vessels are normal. Inferior vena cava filter remains in satisfactory position Liver: No masses or ductal dilatation Gallbladder is normal Pancreas and spleen are normal Normal kidneys Diameter of the small bowel colon are normal. No evidence of abscess. Enlarged fibroid uterus Urinary bladder is normal Normal abdominal wall. Bones: No destructive lesions. 1. 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 2. Surrounding hazy density, likely related to venous congestion. However, streaky densities extending inferiorly on the right may represent blood. However, there is no evidence of retroperitoneal hematoma. Correlation with lower extremity noninvasive duplex Doppler is recommended in addition to serial hemoglobin and crit levels. 3. Suspect bilateral lower lobe pulmonary emboli. See comments above Redemonstrated is a large necrotic mass involving the right breast enlarged fibroid uterus.       Problem List  Patient Active Problem List   Diagnosis    Thyroid nodule    Brain metastases ([address]    Brain mass    Status epilepticus (HCC)    Duct cell carcinoma (HCC)    Cerebral edema (HCC)    Seizure (HCC)    Deep venous thrombosis ([address]       ASSESSMENT AND PLAN    MEtastatic breast ca with brain met resected  Has not had any systemic therapy -per reports pt had not followed up with an oncologist due to not wanting tx-not sure what th future plan will be   radonc at some point for brain? Vs not  Then ? Systemic tx ?    Not sure what pt will agree to     Brain met with dvt/low plts  Agree with argatroban  Hit panel pos  NO cyanosis or ischemia of digits  TRES pending  Lean to arixtra to use -noac has also been used in  Hit         Mickie Sanchez MD, MD

## 2022-03-27 NOTE — PROGRESS NOTES
Patient's  normal saline bolus started. Skin warm and dry to the touch. Patient with complaints of blurry and double vision. Patient states this has been going on sense she had her surg on her head. Staples clean dry and intact to top of head and down side of head. Staples are open to air. Neuro assessment WNL. Denies SOB resp easy and non-labored. call light within reach all fall precautions in place.

## 2022-03-28 ENCOUNTER — CLINICAL DOCUMENTATION (OUTPATIENT)
Dept: RADIATION ONCOLOGY | Age: 47
End: 2022-03-28

## 2022-03-28 ENCOUNTER — APPOINTMENT (OUTPATIENT)
Dept: CT IMAGING | Age: 47
DRG: 169 | End: 2022-03-28
Payer: MEDICAID

## 2022-03-28 ENCOUNTER — APPOINTMENT (OUTPATIENT)
Dept: INTERVENTIONAL RADIOLOGY/VASCULAR | Age: 47
DRG: 169 | End: 2022-03-28
Payer: MEDICAID

## 2022-03-28 ENCOUNTER — APPOINTMENT (OUTPATIENT)
Dept: VASCULAR LAB | Age: 47
DRG: 169 | End: 2022-03-28
Payer: MEDICAID

## 2022-03-28 LAB
ANION GAP SERPL CALCULATED.3IONS-SCNC: 13 MMOL/L (ref 3–16)
APTT: 45.2 SEC (ref 26.2–38.6)
BASOPHILS ABSOLUTE: 0 K/UL (ref 0–0.2)
BASOPHILS RELATIVE PERCENT: 0.2 %
BUN BLDV-MCNC: 14 MG/DL (ref 7–20)
CALCIUM SERPL-MCNC: 8.7 MG/DL (ref 8.3–10.6)
CHLORIDE BLD-SCNC: 93 MMOL/L (ref 99–110)
CO2: 18 MMOL/L (ref 21–32)
CREAT SERPL-MCNC: 0.6 MG/DL (ref 0.6–1.1)
EKG ATRIAL RATE: 134 BPM
EKG DIAGNOSIS: NORMAL
EKG P AXIS: 22 DEGREES
EKG P-R INTERVAL: 130 MS
EKG Q-T INTERVAL: 276 MS
EKG QRS DURATION: 72 MS
EKG QTC CALCULATION (BAZETT): 412 MS
EKG R AXIS: -6 DEGREES
EKG T AXIS: -8 DEGREES
EKG VENTRICULAR RATE: 134 BPM
EOSINOPHILS ABSOLUTE: 0 K/UL (ref 0–0.6)
EOSINOPHILS RELATIVE PERCENT: 0.2 %
GFR AFRICAN AMERICAN: >60
GFR NON-AFRICAN AMERICAN: >60
GLUCOSE BLD-MCNC: 128 MG/DL (ref 70–99)
HCT VFR BLD CALC: 23.9 % (ref 36–48)
HEMOGLOBIN: 7.7 G/DL (ref 12–16)
HEMOGLOBIN: 7.7 G/DL (ref 12–16)
HEMOGLOBIN: 7.8 G/DL (ref 12–16)
HEMOGLOBIN: 8.1 G/DL (ref 12–16)
LACTIC ACID, SEPSIS: 3.3 MMOL/L (ref 0.4–1.9)
LACTIC ACID: 1.5 MMOL/L (ref 0.4–2)
LYMPHOCYTES ABSOLUTE: 1.1 K/UL (ref 1–5.1)
LYMPHOCYTES RELATIVE PERCENT: 6.7 %
MCH RBC QN AUTO: 27.3 PG (ref 26–34)
MCHC RBC AUTO-ENTMCNC: 32.5 G/DL (ref 31–36)
MCV RBC AUTO: 84.1 FL (ref 80–100)
MONOCYTES ABSOLUTE: 1.5 K/UL (ref 0–1.3)
MONOCYTES RELATIVE PERCENT: 9.5 %
NEUTROPHILS ABSOLUTE: 13.5 K/UL (ref 1.7–7.7)
NEUTROPHILS RELATIVE PERCENT: 83.4 %
OSMOLALITY URINE: 883 MOSM/KG (ref 390–1070)
OSMOLALITY: 269 MOSM/KG (ref 278–305)
PDW BLD-RTO: 15.2 % (ref 12.4–15.4)
PLATELET # BLD: 180 K/UL (ref 135–450)
PLATELET SLIDE REVIEW: ADEQUATE
PMV BLD AUTO: 8.7 FL (ref 5–10.5)
POC ACT LR: 167 SEC
POC ACT LR: 299 SEC
POC ACT LR: 332 SEC
POC ACT LR: 353 SEC
POC ACT LR: 362 SEC
POTASSIUM REFLEX MAGNESIUM: 4.8 MMOL/L (ref 3.5–5.1)
RBC # BLD: 2.84 M/UL (ref 4–5.2)
SLIDE REVIEW: ABNORMAL
SODIUM BLD-SCNC: 124 MMOL/L (ref 136–145)
SODIUM URINE: <20 MMOL/L
WBC # BLD: 16.2 K/UL (ref 4–11)

## 2022-03-28 PROCEDURE — 6360000002 HC RX W HCPCS

## 2022-03-28 PROCEDURE — C1894 INTRO/SHEATH, NON-LASER: HCPCS

## 2022-03-28 PROCEDURE — 6360000004 HC RX CONTRAST MEDICATION: Performed by: RADIOLOGY

## 2022-03-28 PROCEDURE — 83930 ASSAY OF BLOOD OSMOLALITY: CPT

## 2022-03-28 PROCEDURE — 6360000002 HC RX W HCPCS: Performed by: STUDENT IN AN ORGANIZED HEALTH CARE EDUCATION/TRAINING PROGRAM

## 2022-03-28 PROCEDURE — 97530 THERAPEUTIC ACTIVITIES: CPT

## 2022-03-28 PROCEDURE — C1887 CATHETER, GUIDING: HCPCS

## 2022-03-28 PROCEDURE — 6370000000 HC RX 637 (ALT 250 FOR IP): Performed by: STUDENT IN AN ORGANIZED HEALTH CARE EDUCATION/TRAINING PROGRAM

## 2022-03-28 PROCEDURE — B5191ZZ FLUOROSCOPY OF INFERIOR VENA CAVA USING LOW OSMOLAR CONTRAST: ICD-10-PCS | Performed by: RADIOLOGY

## 2022-03-28 PROCEDURE — 99233 SBSQ HOSP IP/OBS HIGH 50: CPT | Performed by: SURGERY

## 2022-03-28 PROCEDURE — 6370000000 HC RX 637 (ALT 250 FOR IP): Performed by: INTERNAL MEDICINE

## 2022-03-28 PROCEDURE — 83935 ASSAY OF URINE OSMOLALITY: CPT

## 2022-03-28 PROCEDURE — 37187 VENOUS MECH THROMBECTOMY: CPT

## 2022-03-28 PROCEDURE — 80048 BASIC METABOLIC PNL TOTAL CA: CPT

## 2022-03-28 PROCEDURE — 85347 COAGULATION TIME ACTIVATED: CPT

## 2022-03-28 PROCEDURE — 2709999900 HC NON-CHARGEABLE SUPPLY

## 2022-03-28 PROCEDURE — 99152 MOD SED SAME PHYS/QHP 5/>YRS: CPT

## 2022-03-28 PROCEDURE — 1200000000 HC SEMI PRIVATE

## 2022-03-28 PROCEDURE — B51D1ZZ FLUOROSCOPY OF BILATERAL LOWER EXTREMITY VEINS USING LOW OSMOLAR CONTRAST: ICD-10-PCS | Performed by: RADIOLOGY

## 2022-03-28 PROCEDURE — 93010 ELECTROCARDIOGRAM REPORT: CPT | Performed by: INTERNAL MEDICINE

## 2022-03-28 PROCEDURE — 51798 US URINE CAPACITY MEASURE: CPT

## 2022-03-28 PROCEDURE — 97535 SELF CARE MNGMENT TRAINING: CPT

## 2022-03-28 PROCEDURE — 2580000003 HC RX 258

## 2022-03-28 PROCEDURE — 85730 THROMBOPLASTIN TIME PARTIAL: CPT

## 2022-03-28 PROCEDURE — 99221 1ST HOSP IP/OBS SF/LOW 40: CPT | Performed by: NURSE PRACTITIONER

## 2022-03-28 PROCEDURE — 99153 MOD SED SAME PHYS/QHP EA: CPT

## 2022-03-28 PROCEDURE — 2580000003 HC RX 258: Performed by: STUDENT IN AN ORGANIZED HEALTH CARE EDUCATION/TRAINING PROGRAM

## 2022-03-28 PROCEDURE — 85025 COMPLETE CBC W/AUTO DIFF WBC: CPT

## 2022-03-28 PROCEDURE — 84300 ASSAY OF URINE SODIUM: CPT

## 2022-03-28 PROCEDURE — 75825 VEIN X-RAY TRUNK: CPT

## 2022-03-28 PROCEDURE — 06C03ZZ EXTIRPATION OF MATTER FROM INFERIOR VENA CAVA, PERCUTANEOUS APPROACH: ICD-10-PCS | Performed by: RADIOLOGY

## 2022-03-28 PROCEDURE — C1769 GUIDE WIRE: HCPCS

## 2022-03-28 PROCEDURE — 6360000004 HC RX CONTRAST MEDICATION

## 2022-03-28 PROCEDURE — 70450 CT HEAD/BRAIN W/O DYE: CPT

## 2022-03-28 PROCEDURE — 06CC3ZZ EXTIRPATION OF MATTER FROM RIGHT COMMON ILIAC VEIN, PERCUTANEOUS APPROACH: ICD-10-PCS | Performed by: RADIOLOGY

## 2022-03-28 PROCEDURE — 2500000003 HC RX 250 WO HCPCS

## 2022-03-28 PROCEDURE — 99223 1ST HOSP IP/OBS HIGH 75: CPT | Performed by: INTERNAL MEDICINE

## 2022-03-28 PROCEDURE — 36415 COLL VENOUS BLD VENIPUNCTURE: CPT

## 2022-03-28 PROCEDURE — 36011 PLACE CATHETER IN VEIN: CPT

## 2022-03-28 PROCEDURE — 85018 HEMOGLOBIN: CPT

## 2022-03-28 PROCEDURE — 97162 PT EVAL MOD COMPLEX 30 MIN: CPT

## 2022-03-28 PROCEDURE — 93970 EXTREMITY STUDY: CPT

## 2022-03-28 PROCEDURE — 97166 OT EVAL MOD COMPLEX 45 MIN: CPT

## 2022-03-28 PROCEDURE — 2500000003 HC RX 250 WO HCPCS: Performed by: STUDENT IN AN ORGANIZED HEALTH CARE EDUCATION/TRAINING PROGRAM

## 2022-03-28 PROCEDURE — 74177 CT ABD & PELVIS W/CONTRAST: CPT

## 2022-03-28 PROCEDURE — 83605 ASSAY OF LACTIC ACID: CPT

## 2022-03-28 RX ORDER — OXYCODONE HYDROCHLORIDE 5 MG/1
10 TABLET ORAL EVERY 4 HOURS PRN
Status: DISCONTINUED | OUTPATIENT
Start: 2022-03-28 | End: 2022-04-10 | Stop reason: HOSPADM

## 2022-03-28 RX ORDER — METOPROLOL TARTRATE 5 MG/5ML
5 INJECTION INTRAVENOUS ONCE
Status: DISCONTINUED | OUTPATIENT
Start: 2022-03-28 | End: 2022-04-10 | Stop reason: HOSPADM

## 2022-03-28 RX ORDER — QUETIAPINE FUMARATE 25 MG/1
12.5 TABLET, FILM COATED ORAL NIGHTLY
Status: DISCONTINUED | OUTPATIENT
Start: 2022-03-28 | End: 2022-04-10 | Stop reason: HOSPADM

## 2022-03-28 RX ORDER — LORAZEPAM 2 MG/ML
1 INJECTION INTRAMUSCULAR EVERY 6 HOURS PRN
Status: DISCONTINUED | OUTPATIENT
Start: 2022-03-28 | End: 2022-04-10 | Stop reason: HOSPADM

## 2022-03-28 RX ORDER — OXYCODONE HYDROCHLORIDE 5 MG/1
5 TABLET ORAL EVERY 4 HOURS PRN
Status: DISCONTINUED | OUTPATIENT
Start: 2022-03-28 | End: 2022-04-10 | Stop reason: HOSPADM

## 2022-03-28 RX ORDER — CLONAZEPAM 0.5 MG/1
0.5 TABLET ORAL 2 TIMES DAILY
Status: DISCONTINUED | OUTPATIENT
Start: 2022-03-28 | End: 2022-04-10 | Stop reason: HOSPADM

## 2022-03-28 RX ADMIN — MEROPENEM 2000 MG: 1 INJECTION, POWDER, FOR SOLUTION INTRAVENOUS at 20:10

## 2022-03-28 RX ADMIN — SODIUM CHLORIDE 25 ML: 9 INJECTION, SOLUTION INTRAVENOUS at 20:09

## 2022-03-28 RX ADMIN — SODIUM CHLORIDE, PRESERVATIVE FREE 10 ML: 5 INJECTION INTRAVENOUS at 20:07

## 2022-03-28 RX ADMIN — SODIUM BICARBONATE: 84 INJECTION, SOLUTION INTRAVENOUS at 20:06

## 2022-03-28 RX ADMIN — ARGATROBAN 0.5 MCG/KG/MIN: 50 INJECTION INTRAVENOUS at 18:18

## 2022-03-28 RX ADMIN — IOHEXOL 100 ML: 350 INJECTION, SOLUTION INTRAVENOUS at 17:05

## 2022-03-28 RX ADMIN — SODIUM CHLORIDE, PRESERVATIVE FREE 10 ML: 5 INJECTION INTRAVENOUS at 09:29

## 2022-03-28 RX ADMIN — LACOSAMIDE 200 MG: 50 TABLET, FILM COATED ORAL at 09:27

## 2022-03-28 RX ADMIN — Medication 5 MG: at 21:33

## 2022-03-28 RX ADMIN — PANTOPRAZOLE SODIUM 40 MG: 40 TABLET, DELAYED RELEASE ORAL at 06:41

## 2022-03-28 RX ADMIN — VANCOMYCIN HYDROCHLORIDE 1750 MG: 10 INJECTION, POWDER, LYOPHILIZED, FOR SOLUTION INTRAVENOUS at 20:26

## 2022-03-28 RX ADMIN — LEVETIRACETAM 2000 MG: 250 TABLET, FILM COATED ORAL at 09:28

## 2022-03-28 RX ADMIN — PIPERACILLIN SODIUM AND TAZOBACTAM SODIUM 4500 MG: 4; .5 INJECTION, POWDER, LYOPHILIZED, FOR SOLUTION INTRAVENOUS at 06:41

## 2022-03-28 RX ADMIN — ARGATROBAN 0.63 MCG/KG/MIN: 50 INJECTION INTRAVENOUS at 10:13

## 2022-03-28 RX ADMIN — ACETAMINOPHEN 325MG 650 MG: 325 TABLET ORAL at 18:54

## 2022-03-28 RX ADMIN — SODIUM CHLORIDE 25 ML: 9 INJECTION, SOLUTION INTRAVENOUS at 20:25

## 2022-03-28 RX ADMIN — LACOSAMIDE 200 MG: 50 TABLET, FILM COATED ORAL at 21:33

## 2022-03-28 RX ADMIN — HYDROMORPHONE HYDROCHLORIDE 0.5 MG: 1 INJECTION, SOLUTION INTRAMUSCULAR; INTRAVENOUS; SUBCUTANEOUS at 02:35

## 2022-03-28 RX ADMIN — ACETAMINOPHEN 325MG 650 MG: 325 TABLET ORAL at 08:21

## 2022-03-28 RX ADMIN — QUETIAPINE FUMARATE 12.5 MG: 25 TABLET ORAL at 21:33

## 2022-03-28 RX ADMIN — IOPAMIDOL 80 ML: 755 INJECTION, SOLUTION INTRAVENOUS at 19:28

## 2022-03-28 RX ADMIN — LEVETIRACETAM 2500 MG: 250 TABLET, FILM COATED ORAL at 21:34

## 2022-03-28 RX ADMIN — OXYCODONE 10 MG: 5 TABLET ORAL at 12:08

## 2022-03-28 RX ADMIN — CLONAZEPAM 0.5 MG: 0.5 TABLET ORAL at 21:34

## 2022-03-28 RX ADMIN — HYDROMORPHONE HYDROCHLORIDE 0.5 MG: 1 INJECTION, SOLUTION INTRAMUSCULAR; INTRAVENOUS; SUBCUTANEOUS at 05:45

## 2022-03-28 ASSESSMENT — PAIN SCALES - GENERAL
PAINLEVEL_OUTOF10: 0
PAINLEVEL_OUTOF10: 0
PAINLEVEL_OUTOF10: 6
PAINLEVEL_OUTOF10: 0
PAINLEVEL_OUTOF10: 3
PAINLEVEL_OUTOF10: 3
PAINLEVEL_OUTOF10: 9
PAINLEVEL_OUTOF10: 8
PAINLEVEL_OUTOF10: 7
PAINLEVEL_OUTOF10: 1
PAINLEVEL_OUTOF10: 0

## 2022-03-28 ASSESSMENT — PAIN DESCRIPTION - LOCATION
LOCATION: LEG
LOCATION: LEG

## 2022-03-28 ASSESSMENT — PAIN DESCRIPTION - DESCRIPTORS
DESCRIPTORS: ACHING;CONSTANT;DISCOMFORT;HEAVINESS
DESCRIPTORS: ACHING;CONSTANT;DISCOMFORT;HEAVINESS

## 2022-03-28 ASSESSMENT — PAIN DESCRIPTION - PAIN TYPE
TYPE: ACUTE PAIN
TYPE: ACUTE PAIN

## 2022-03-28 ASSESSMENT — PAIN DESCRIPTION - ORIENTATION
ORIENTATION: RIGHT;LEFT
ORIENTATION: RIGHT;LEFT

## 2022-03-28 ASSESSMENT — ENCOUNTER SYMPTOMS
GASTROINTESTINAL NEGATIVE: 1
RESPIRATORY NEGATIVE: 1

## 2022-03-28 NOTE — PLAN OF CARE
Problem: Falls - Risk of:  Goal: Will remain free from falls  Description: Will remain free from falls  3/28/2022 0107 by Ethyl Maye  Outcome: Ongoing  3/27/2022 1741 by Sven Weller RN  Outcome: Ongoing  Goal: Absence of physical injury  Description: Absence of physical injury  Outcome: Ongoing     Problem: Pain:  Goal: Pain level will decrease  Description: Pain level will decrease  3/28/2022 0107 by Ethyl Maye  Outcome: Ongoing  3/27/2022 1741 by Sven Weller RN  Outcome: Ongoing  Goal: Control of acute pain  Description: Control of acute pain  Outcome: Ongoing

## 2022-03-28 NOTE — PROGRESS NOTES
Progress Note    Admit Date: 3/25/2022  Diet: Diet NPO    CC: leg pain     Interval history: Patient seen and examined at bedside. This a.m., patient complaining being overwhelmed with her condition and complaining of bilateral lower extremity pain. She requested some time to process her illness and consider her treatment options. Medications:     Scheduled Meds:   insulin regular  10 Units IntraVENous Once    piperacillin-tazobactam  4,500 mg IntraVENous Q8H    lacosamide  200 mg Oral BID    levETIRAcetam  2,000 mg Oral Daily    levETIRAcetam  2,500 mg Oral Nightly    melatonin  5 mg Oral Nightly    pantoprazole  40 mg Oral QAM AC    sodium chloride flush  5-40 mL IntraVENous 2 times per day     Continuous Infusions:   dextrose      dextrose      argatroban infusion 0.634 mcg/kg/min (03/28/22 0727)    [Held by provider] sodium chloride 100 mL/hr at 03/26/22 2323    sodium chloride       PRN Meds:HYDROmorphone **OR** HYDROmorphone, glucose, dextrose, glucagon (rDNA), dextrose, sodium chloride flush, sodium chloride, ondansetron **OR** ondansetron, polyethylene glycol, acetaminophen **OR** acetaminophen    Objective:   Vitals:   T-max:  Patient Vitals for the past 8 hrs:   BP Temp Temp src Pulse Resp SpO2   03/28/22 0745 112/79 100 °F (37.8 °C) Oral 128 18 --   03/28/22 0235 121/81 98.9 °F (37.2 °C) Oral 121 18 95 %   03/28/22 0130 -- -- -- 124 -- --       Intake/Output Summary (Last 24 hours) at 3/28/2022 0827  Last data filed at 3/28/2022 0526  Gross per 24 hour   Intake 1140 ml   Output 1600 ml   Net -460 ml         Physical Exam  Constitutional:       Appearance: She is obese. HENT:      Head: Normocephalic and atraumatic. Nose: Nose normal.   Eyes:      Extraocular Movements: Extraocular movements intact. Conjunctiva/sclera: Conjunctivae normal.      Pupils: Pupils are equal, round, and reactive to light. Cardiovascular:      Rate and Rhythm: Regular rhythm. Tachycardia present. Pulses: Normal pulses. Heart sounds: Normal heart sounds. Pulmonary:      Effort: Pulmonary effort is normal.      Breath sounds: Normal breath sounds. Abdominal:      General: Bowel sounds are normal.      Palpations: Abdomen is soft. Musculoskeletal:      Cervical back: Normal range of motion. Neurological:      General: No focal deficit present. Mental Status: She is alert and oriented to person, place, and time. Mental status is at baseline. LABS:    CBC:   Recent Labs     03/27/22  0556 03/27/22  0556 03/27/22  1135 03/27/22  1135 03/27/22  1947 03/28/22  0047 03/28/22  0641   WBC 12.8*  --  13.8*  --   --   --  16.2*   HGB 7.7*   < > 7.7*   < > 7.6* 8.1* 7.7*   HCT 23.9*  --  23.2*  --   --   --  23.9*   *  --  127*  --   --   --  180   MCV 84.7  --  82.8  --   --   --  84.1    < > = values in this interval not displayed. Renal:    Recent Labs     03/27/22  0556 03/27/22  1135 03/28/22  0641   * 126* 124*   K 5.2* 4.8 4.8   CL 99 94* 93*   CO2 19* 18* 18*   BUN 15 13 14   CREATININE 0.6 0.6 0.6   GLUCOSE 131* 120* 128*   CALCIUM 8.7 8.5 8.7   ANIONGAP 11 14 13     Hepatic:   Recent Labs     03/25/22 1904   AST 28   ALT 31   BILITOT 0.4   BILIDIR <0.2   PROT 7.5   LABALBU 3.9   ALKPHOS 74     Troponin: No results for input(s): TROPONINI in the last 72 hours. BNP: No results for input(s): BNP in the last 72 hours. Lipids: No results for input(s): CHOL, HDL in the last 72 hours. Invalid input(s): LDLCALCU, TRIGLYCERIDE  ABGs:  No results for input(s): PHART, KVP9YON, PO2ART, QLU5OXO, BEART, THGBART, D8OCUHOH, MCZ6SLU in the last 72 hours. INR:   Recent Labs     03/25/22 1904   INR 1.22*     Lactate: No results for input(s): LACTATE in the last 72 hours.   Cultures:  -----------------------------------------------------------------  RAD:   XR CHEST PORTABLE   Final Result      Limited evaluation of lower right lung due to overlapping density for which infiltrate cannot be excluded. Otherwise no acute process seen. CT HEAD WO CONTRAST   Final Result      Postsurgical changes with areas of low cortical attenuation in the left parietal and left occipital region appear essentially stable from prior study with associated mild midline shift which is similar to slightly improved from prior study. No evidence of developing acute intracranial hemorrhage. CTA ABDOMEN PELVIS W WO CONTRAST   Final Result   1. 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 2. Surrounding hazy density, likely related to venous congestion. However, streaky densities extending inferiorly on the right may represent blood. However, there is no evidence of retroperitoneal hematoma. Correlation with lower extremity noninvasive    duplex Doppler is recommended in addition to serial hemoglobin and crit levels. 3. Suspect bilateral lower lobe pulmonary emboli. See comments above      Redemonstrated is a large necrotic mass involving the right breast enlarged fibroid uterus. Assessment/Plan:     Metastatic triple negative breast cancer with intracranial metastasis  S/p craniotomy on 3/12  Discharged to [facility] on 3/20  -Patient was diagnosed with triple negative breast cancer in March 2021 at which time clinical stage IIb.   Patient chose to forego any medical or surgical treatment  -Today is POD 16  -CT head without contrast on 3/26 nonrevealing of acute ICH  -Pain control with Dilaudid pain panel as needed  -NSGY okay with anticoagulation, patient was started on argatroban 3/26  S/p HIT positive  -Hb stable at 7.79  -Monitor CBC  -General surgery consulted, recommended palliative sx for pain management  -Consulted palliative care, appreciate recs      Hypercoagulable state  Suspected HIT positive  Serotonin assay pending  Bilateral lower extremity DVTs  Suspected B/L lower lobe PE  -Diagnosed of provoked RLE DVT on 3/15/2022  -Given patient's recent craniotomy on 3/12 patient was not discharged on anticoagulation and only received Lovenox prophylaxis at [facility]. Patient did get IVC filter on 3/16  -CT abdomen pelvis this admission 3/26 revealing thrombosis of IVC at the level of IVC filter with marked distention of the caval bifurcation and common iliac veins. Streaky densities extending inferiorly on the right may represent blood. But no evidence of retroperitoneal hematoma.   Suspected B/L lower lobe PE.  -Continue argatroban  -Monitor CBC, repeat CT abdomen pelvis if drop in Hb  -Transfuse PRBC if Hb <7  -Heme consulted, appreciate recs  -Consulted IR for thrombectomy  -Consulted radiation oncology    Moderate thrombocytopenia s/p HIT positive- improved  -DC all heparin-induced anticoagulation  -Continue argatroban drip  -Platelet improved to following argatroban and DC heparin and his anticoagulation  -Platelets 98> 872> 271    Acute anemia likely 2/2 blood loss  -Hb stable at 7.7  -Monitor CBC, transfuse for Hb <7    NAGMA likely 2/2 continuous IV fluids  In the setting of urinary retention  UTI  -Bicarb 18<21  -S/p straight cath x2 for urinary retention of approximately 600 &700 cc urine per bladder scan  -Figueroa placed overnight  -UA ordered yesterday revealing of +3 bacteria, negative leukocyte esterase negative nitrites  -Given patient started spiking fevers with sinus tachycardia yesterday, started on Zosyn  -Follow-up on urine osmolality, serum osmolality, urine sodium    Sinus tachycardia  Likely etiology from fever/ pain/ PE/UTI  -EKG this a.m. revealing sinus tachycardia, HR 130s  -Continuous telemetry    Seizures  -Keppra 2 g daily, Keppra 2.5 g nightly  -Vimpat    PT score 8/24  OT score 10/24    Code Status: Full code  FEN: N.p.o.  PPX: Argatroban  DISPO: GMMAMADOU Dobbisn MD, PGY-1  03/28/22  8:27 AM    This patient has been staffed and discussed with Torrey Agnel MD.

## 2022-03-28 NOTE — CONSULTS
Narinder Perez [id number]  [id number]   1975   3/28/2022    Requesting physician: Stephanie Church MD    Reason for consultation: POD#14, returned for worsened DVT/Possible HIT. CQ: Okay for systemic anticoagulation for DVT from NSGY view? History of present illness: Patient is a 48 y.o. female w/ PMH of thyroid nodule, triple neg breast cancer with mets to the brain. She is s/p left temporal parietal occipital craniotomy for tumor resection by Dr. Jeanette Avalos on 3/12/2022. She was [place] at [facility] and was readmitted to the hospital 3/27 due to worsening lower extremity pain and swelling . During her last admission patient was diagnosed with a DVT in the right lower extremity and had an IVC filter placed due to not being able to start [address] 2/2 recent craniotomy. She had worsening LE swelling and was thrombocytopenic in rehab. CT abdomen and pelvis was performed which did show thrombosis of the IVC at the level of the filter with marked distention of the caval bifurcation. There was also suspicion for bilateral lower lobe pulmonary emboli. Neurosurgery consulted for anticoagulation clearance.      ROS:   GENERAL: +generalized weakness,  + fatigue    EYES:  Denies vision change or diplopia  EARS:  Denies hearing loss  CARDIAC: +BLE edema   RESPIRATORY:  Denies shortness of breath  SKIN:  Denies rash or lesions   HEM:  Denies excessive bruising  PSYCH:  + anxiety   NEURO:  Denies headache, numbness or tingling or lateralizing weakness   :  Denies urinary difficulty  GI: Denies nausea, vomiting, diarrhea or constipation  MUSCULOSKELETAL: +BLE pain    No Known Allergies    Past Medical History:   Diagnosis Date    Thyroid nodule         Past Surgical History:   Procedure Laterality Date    CRANIOTOMY Left 3/12/2022    LEFT TEMPORAL PARIETAL OCCIPITAL CRANIOTOMY FOR TUMOR RESECTION performed by Tereso Modi MD at [facility] IR IVC FILTER PLACEMENT W IMAGING N/A 03/16/2022    [facility]    IR IVC FILTER PLACEMENT W IMAGING  3/16/2022    IR IVC FILTER PLACEMENT W IMAGING 3/16/2022 [facility]       Social History     Occupational History    Not on file   Tobacco Use    Smoking status: Never Smoker    Smokeless tobacco: Never Used   Substance and Sexual Activity    Alcohol use: Never     Alcohol/week: 0.0 standard drinks    Drug use: Never    Sexual activity: Not on file        Family History   Problem Relation Age of Onset    Cancer Father         No outpatient medications have been marked as taking for the 3/25/22 encounter [facility] Encounter).       Current Facility-Administered Medications   Medication Dose Route Frequency Provider Last Rate Last Admin    metoprolol (LOPRESSOR) injection 5 mg  5 mg IntraVENous Once Jhonny Rodriguez MD        oxyCODONE (ROXICODONE) immediate release tablet 5 mg  5 mg Oral Q4H PRN Shante Patel MD        Or    oxyCODONE (ROXICODONE) immediate release tablet 10 mg  10 mg Oral Q4H PRN Shante Patel MD   10 mg at 03/28/22 1208    LORazepam (ATIVAN) injection 1 mg  1 mg IntraVENous Q6H PRN Jhonny Rodriguez MD        HYDROmorphone (DILAUDID) injection 0.25 mg  0.25 mg IntraVENous Q3H PRN Bolivar Rodriguez MD        Or    HYDROmorphone (DILAUDID) injection 0.5 mg  0.5 mg IntraVENous Q3H PRN Bolivar Rodriguez MD   0.5 mg at 03/28/22 0545    insulin regular (HUMULIN R;NOVOLIN R) injection 10 Units  10 Units IntraVENous Once Jhonny Rodriguez MD        And    dextrose 10 % infusion  250 mL IntraVENous Once Jhonny Rodriguez MD        glucose (GLUTOSE) 40 % oral gel 15 g  15 g Oral PRN Jhonny Rodriguez MD        dextrose 50 % IV solution  12.5 g IntraVENous PRN Jhonny Rodriguez MD        glucagon (rDNA) injection 1 mg  1 mg IntraMUSCular PRN Jhonny Rodriguez MD        dextrose 5 % solution  100 mL/hr IntraVENous PRN Jhonny Rodriguez MD        piperacillin-tazobactam (ZOSYN) 4,500 mg in dextrose 5 % 100 mL IVPB (mini-bag)  4,500 mg IntraVENous Q8H Jason Bethea MD   Stopped at 03/28/22 1044    argatroban infusion 50mg in 0.9% sodium chloride 50 mL (premix)  0.0625-10 mcg/kg/min IntraVENous Continuous Morenita Gee MD 3.5 mL/hr at 03/28/22 1013 0.634 mcg/kg/min at 03/28/22 1013    lacosamide (VIMPAT) tablet 200 mg  200 mg Oral BID Mari Moeller MD   200 mg at 03/28/22 9646    levETIRAcetam (KEPPRA) tablet 2,000 mg  2,000 mg Oral Daily Mari Moeller MD   2,000 mg at 03/28/22 8439    levETIRAcetam (KEPPRA) tablet 2,500 mg  2,500 mg Oral Nightly Mari Moeller MD   2,500 mg at 03/27/22 2051    melatonin disintegrating tablet 5 mg  5 mg Oral Nightly Mari Moeller MD   5 mg at 03/27/22 2051    pantoprazole (PROTONIX) tablet 40 mg  40 mg Oral QAM AC Mari Moeller MD   40 mg at 03/28/22 1987    sodium chloride flush 0.9 % injection 5-40 mL  5-40 mL IntraVENous 2 times per day Mari Moeller MD   10 mL at 03/28/22 0929    sodium chloride flush 0.9 % injection 5-40 mL  5-40 mL IntraVENous PRN Mari Moeller MD        0.9 % sodium chloride infusion  25 mL IntraVENous PRN Mari Moeller MD        ondansetron (ZOFRAN-ODT) disintegrating tablet 4 mg  4 mg Oral Q8H PRN Mari Moeller MD        Or    ondansetron [facility]) injection 4 mg  4 mg IntraVENous Q6H PRN Mari Moeller MD        polyethylene glycol [facility]) packet 17 g  17 g Oral Daily PRN Mari Moeller MD   17 g at 03/26/22 0226    acetaminophen (TYLENOL) tablet 650 mg  650 mg Oral Q6H PRN Mari Moeller MD   650 mg at 03/28/22 7058    Or    acetaminophen (TYLENOL) suppository 650 mg  650 mg Rectal Q6H PRN Mari Moeller MD          Objective:  /80   Pulse 128   Temp 99.4 °F (37.4 °C) (Oral)   Resp 17   Ht 5' 3\" (1.6 m)   Wt 202 lb 13.2 oz (92 kg)   SpO2 97%   BMI 35.93 kg/m²     Physical Exam:  Patient seen and examined   General: Well developed. Alert and cooperative in no acute distress.      HENT: atraumatic, neck supple  Eyes: Optic discs: Not tested  Pulmonary: unlabored respiratory effort  Cardiovascular: Significant edema BLE  Gastrointestinal: abdomen soft, NT, ND    Neurologic Exam:  Neurological:  Mental Status: Awake, alert, oriented x 4  Attention: Intact  Language: No aphasia or dysarthria noted  Sensation: Intact to all extremities to light touch     Cranial Nerves:  Cranial Nerves:  II: Visual acuity not tested, denies new visual changes / diplopia  III, IV, VI: PERRL, 3 mm bilaterally, EOMI, no nystagmus noted  V: Facial sensation intact bilaterally to touch  VII: Face symmetric  VIII: Hearing intact bilaterally to spoken voice  IX: Palate movement equal bilaterally  XI: Shoulder shrug equal bilaterally  XII: Tongue midline    Musculoskeletal:   Gait: Not tested   Assist devices: None   Tone: normal   Motor strength: 4+/5 RUE, 5/5 RUE. 4/5 dorsi/plantar bilaterally. 2/5 proximal BLE. Radiological Findings:  XR CHEST PORTABLE   Final Result      Limited evaluation of lower right lung due to overlapping density for which infiltrate cannot be excluded. Otherwise no acute process seen. CT HEAD WO CONTRAST   Final Result      Postsurgical changes with areas of low cortical attenuation in the left parietal and left occipital region appear essentially stable from prior study with associated mild midline shift which is similar to slightly improved from prior study. No evidence of developing acute intracranial hemorrhage. CTA ABDOMEN PELVIS W WO CONTRAST   Final Result   1. 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 2. Surrounding hazy density, likely related to venous congestion. However, streaky densities extending inferiorly on the right may represent blood. However, there is no evidence of retroperitoneal hematoma.  Correlation with lower extremity noninvasive    duplex Doppler is recommended in addition to serial hemoglobin and crit levels. 3. Suspect bilateral lower lobe pulmonary emboli. See comments above      Redemonstrated is a large necrotic mass involving the right breast enlarged fibroid uterus. VL Extremity Venous Bilateral    (Results Pending)       Labs  Recent Labs     03/27/22  0556 03/27/22  1135 03/28/22  0641   * 126* 124*   CL 99 94* 93*   CO2 19* 18* 18*   BUN 15 13 14   CREATININE 0.6 0.6 0.6   GLUCOSE 131* 120* 128*     Recent Labs     03/25/22  1904 03/26/22  0625 03/27/22  0556 03/27/22  1135 03/28/22  0641   WBC 10.7   < > 12.8* 13.8* 16.2*   RBC 3.41*   < > 2.82* 2.80* 2.84*   INR 1.22*  --   --   --   --     < > = values in this interval not displayed. Patient Active Problem List    Diagnosis Date Noted    Deep venous thrombosis ([facility] 03/25/2022    Seizure ([facility] 03/18/2022    Status epilepticus ([facility] 03/09/2022    Duct cell carcinoma ([facility] 03/09/2022    Cerebral edema ([facility] 03/09/2022    Brain metastases ([facility] 03/04/2022    Brain mass 03/04/2022    Thyroid nodule 06/23/2015       Assessment:  49 yo female with untreated breast CA who is s/p left temporal parietal occipital craniotomy for brain met resection by Dr. Zeus Gallagher on 3/12/2022, admitted with DVT and thrombocytopenia. Plan:  1. No further neurosurgical intervention  2. Remove cranial staples today, continue to clean incision daily and keep [place]/dry  3. Management of DVT/suspected PE by primary team   -cleared for anticoagulation from NSGY standpoint, on argatroban 2/2 HIT positive  4. Oncology/rad oncology/general surgery following   -patient will be clear for systemic chemotherapy vs radiation in 3-4 weks post operatively assuming her wound remains intact  5. Nephrology consulted for persistent hyponatremia  6. Continue Keppra/Vimpat for seizures   7. PT/OT/ST  8. Thank you for consult, will follow peripherally while in house.  Please call with questions/change in neurologic exam       DISPO- up to primary team when medically stable for discharge    NELSON Padgett  Neurosurgery Nurse Practitioner  [phone]    Patient was discussed with Dr. Dione Barcenas who agrees with above assessment and plan. Electronically signed by:  JUDITH Quintana NP, 3/28/2022 12:57 PM

## 2022-03-28 NOTE — PROGRESS NOTES
Patient A&O to person and place, occasionally disoriented to time and situation. VSS, BP soft, HR elevated, medicine aware. Surgical incision clean, dry, and intact, staples to be removed this shift. Neuro checks unchanged. Patient reporting bilateral leg pain, medicated per MAR. Patient up to the chair with therapy, required x2-3 assist with stedy to return to bed. Patient NPO in anticipation for thrombectomy today. Figueroa in place for retention. Patient and family instructed to call out for needs. Will continue to monitor.

## 2022-03-28 NOTE — PROGRESS NOTES
[facility]  Progress Note    Subjective:     Craniotomy cancelled March 9 due to seizure and status epilepticus. No longer having seizures  Craniotomy done Saturday March, 12  She developed a DVT & had an IVC filter placed as therapeutic anticoagulation could not begin until POD#14 (March 26)    On argatroban gtt  Plts improved  HIT yonatan pos; TRES in process  No bleeding  No cyanosis in fingers or toes    Review of Systems:     Review of Systems   Constitutional: Positive for fatigue. Negative for fever. Respiratory: Negative. Cardiovascular: Negative. Gastrointestinal: Negative. Genitourinary: Negative. Musculoskeletal: Negative. Skin: Negative. Neurological: Negative for seizures, weakness and headaches.        Objective:     Medications    Current Facility-Administered Medications: HYDROmorphone (DILAUDID) injection 0.25 mg, 0.25 mg, IntraVENous, Q3H PRN **OR** HYDROmorphone (DILAUDID) injection 0.5 mg, 0.5 mg, IntraVENous, Q3H PRN  insulin regular (HUMULIN R;NOVOLIN R) injection 10 Units, 10 Units, IntraVENous, Once **AND** dextrose 10 % infusion, 250 mL, IntraVENous, Once **AND** [COMPLETED] POCT Glucose, , , Q30 Min **AND** [COMPLETED] POCT Glucose, , , Q1H **AND** [COMPLETED] POCT Glucose, , , 4x Daily AC & HS  glucose (GLUTOSE) 40 % oral gel 15 g, 15 g, Oral, PRN  dextrose 50 % IV solution, 12.5 g, IntraVENous, PRN  glucagon (rDNA) injection 1 mg, 1 mg, IntraMUSCular, PRN  dextrose 5 % solution, 100 mL/hr, IntraVENous, PRN  [COMPLETED] piperacillin-tazobactam (ZOSYN) 4,500 mg in dextrose 5 % 100 mL IVPB (mini-bag), 4,500 mg, IntraVENous, Once **FOLLOWED BY** piperacillin-tazobactam (ZOSYN) 4,500 mg in dextrose 5 % 100 mL IVPB (mini-bag), 4,500 mg, IntraVENous, Q8H  argatroban infusion 50mg in 0.9% sodium chloride 50 mL (premix), 0.0625-10 mcg/kg/min, IntraVENous, Continuous  [Held by provider] 0.9 % sodium chloride infusion, , IntraVENous, Continuous  lacosamide (VIMPAT) tablet 200 mg, 200 mg, Oral, BID  levETIRAcetam (KEPPRA) tablet 2,000 mg, 2,000 mg, Oral, Daily  levETIRAcetam (KEPPRA) tablet 2,500 mg, 2,500 mg, Oral, Nightly  melatonin disintegrating tablet 5 mg, 5 mg, Oral, Nightly  pantoprazole (PROTONIX) tablet 40 mg, 40 mg, Oral, QAM AC  sodium chloride flush 0.9 % injection 5-40 mL, 5-40 mL, IntraVENous, 2 times per day  sodium chloride flush 0.9 % injection 5-40 mL, 5-40 mL, IntraVENous, PRN  0.9 % sodium chloride infusion, 25 mL, IntraVENous, PRN  ondansetron (ZOFRAN-ODT) disintegrating tablet 4 mg, 4 mg, Oral, Q8H PRN **OR** ondansetron (ZOFRAN) injection 4 mg, 4 mg, IntraVENous, Q6H PRN  polyethylene glycol (GLYCOLAX) packet 17 g, 17 g, Oral, Daily PRN  acetaminophen (TYLENOL) tablet 650 mg, 650 mg, Oral, Q6H PRN **OR** acetaminophen (TYLENOL) suppository 650 mg, 650 mg, Rectal, Q6H PRN    Allergies  No Known Allergies    Physical Exam  VITALS:  /81   Pulse 121   Temp 98.9 °F (37.2 °C) (Oral)   Resp 18   Ht 5' 3\" (1.6 m)   Wt 202 lb 13.2 oz (92 kg)   SpO2 95%   BMI 35.93 kg/m²   TEMPERATURE:  Current - Temp: 98.9 °F (37.2 °C); Max - Temp  Av.2 °F (37.3 °C)  Min: 98 °F (36.7 °C)  Max: 100.3 °F (37.9 °C)  PULSE OXIMETRY RANGE: SpO2  Av %  Min: 95 %  Max: 97 %  24HR INTAKE/OUTPUT:    Intake/Output Summary (Last 24 hours) at 3/28/2022 6914  Last data filed at 3/28/2022 0526  Gross per 24 hour   Intake 1140 ml   Output 1600 ml   Net -460 ml       Physical Exam  Constitutional:       General: She is not in acute distress. Appearance: She is ill-appearing. HENT:      Head:      Comments: Craniotomy incision C/D/I - healing well  Eyes:      General: No scleral icterus. Cardiovascular:      Rate and Rhythm: Normal rate and regular rhythm. Heart sounds: No murmur heard. No gallop. Pulmonary:      Effort: Pulmonary effort is normal.      Breath sounds: Normal breath sounds. No wheezing, rhonchi or rales.    Abdominal:      Palpations: Abdomen is soft. Tenderness: There is no abdominal tenderness. Musculoskeletal:         General: No swelling. Comments: No cyanosis in digits   Lymphadenopathy:      Cervical: No cervical adenopathy. Skin:     General: Skin is warm and dry. Labs  Recent Labs     03/26/22  0625 03/26/22  1929 03/27/22  0556 03/27/22  0556 03/27/22  1135 03/27/22  1947 03/28/22  0047   WBC 14.3*  --  12.8*  --  13.8*  --   --    HGB 9.0*   < > 7.7*   < > 7.7* 7.6* 8.1*   HCT 26.8*  --  23.9*  --  23.2*  --   --    PLT 76*  --  116*  --  127*  --   --    MCV 82.1  --  84.7  --  82.8  --   --     < > = values in this interval not displayed. Recent Labs     03/26/22  0625 03/27/22  0556 03/27/22  1135   * 129* 126*   K 4.9 5.2* 4.8   CL 96* 99 94*   CO2 20* 19* 18*   BUN 16 15 13   CREATININE 0.6 0.6 0.6       Recent Labs     03/25/22  1904   AST 28   ALT 31   BILIDIR <0.2   BILITOT 0.4   ALKPHOS 74       No results for input(s): MG in the last 72 hours. Radiology  CT HEAD WO CONTRAST    Result Date: 3/26/2022  CT HEAD WITHOUT CONTRAST HISTORY: Craniotomy COMPARISON: 3/17/2022 Underexposure controls were utilized during the CT examination to meet ALARA standards for radiation dose reduction. FINDINGS: Postoperative changes are noted from left frontotemporal craniotomy. Underlying hypodensity is seen in the area of prior surgery suggesting possible cortical infarct or postsurgical change. Additional area of low cortical attenuation is seen involving the medial left parieto-occipital region which also may be related to prior surgery. The appearance is similar to prior examination. Mild associated mass effect is seen with slight left to right midline shift which appears similar to slightly improved in extent since the prior study. Visualized portions of the paranasal sinuses appear clear.      Postsurgical changes with areas of low cortical attenuation in the left parietal and left occipital region appear essentially stable from prior study with associated mild midline shift which is similar to slightly improved from prior study. No evidence of developing acute intracranial hemorrhage. CT Head WO Contrast    Result Date: 3/17/2022  PROCEDURE: CT head without contrast INDICATION: seizure; recent craniotomy; COMPARISON: 3/13/2022 and 3/5/2022 TECHNIQUE: CT head was performed without contrast according to standard protocol. Axial images and multiplanar reformatted images reviewed. Up-to-date CT equipment and radiation dose reduction techniques were employed. 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 There is slightly decreased 7 mm rightward midline shift and slightly decreased compression of the left lateral ventricle. There is stable persistent vasogenic edema in the left cerebral hemisphere. There is no evidence of acute hemorrhage or infarct. Visualized portions of the orbits, paranasal sinuses, and mastoids appear normal. There is scalp swelling overlying the craniotomy. 1.  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 No evidence of acute hemorrhage or infarct. CT Head wo Contrast    Result Date: 3/12/2022  HISTORY: Multiple brain masses. Status post resection. Postoperative evaluation. EXAM : CT OF THE HEAD WITHOUT CONTRAST TECHNIQUE: Multiple axial images were obtained of the brain without the use of contrast. Individualized dose optimization technique was used in order to meet ALARA standards for radiation dose reduction.   In addition to vendor specific dose reduction algorithms, the dose reduction techniques vary based on the specific scanner utilized but frequently include automated exposure control, adjustment of the mA and/or kV according to patient size, and use of iterative reconstruction technique. COMPARISON: NONE FINDINGS: The visualized paranasal sinuses, mastoid air cells and middle ears are clear to the extent visualized. Status post left temporal and left parietal craniotomies. There is a surgical drain along the left scalp. No significant extracranial fluid collection. Skin staples consistent with recent surgery. Intracranially, there is persistent severe midline shift measuring approximately 10 mm similar to the preoperative evaluation. The large mass in the posterior left temporal region has been resected. There is an air-fluid level in the resection cavity consistent with expected postoperative changes. Resection cavity in the left parietal-occipital region with small gas bubbles. No acute complication or acute hemorrhage identified. Pneumocephalus consistent with recent surgery. 1. Resection cavity is at the locations of the previous masses with persistent adjacent vasogenic edema and persistent midline shift. No progressive mass effect or acute surgical complication otherwise identified. CT HEAD WO CONTRAST    Result Date: 3/8/2022  CT HEAD WITHOUT CONTRAST COMPARISON STUDY: MRI brain without with contrast 3/5/2022 HISTORY: Status epilepticus in or for brain tumor resection FINDINGS: Thin section axial images obtained through the head from skull base to vertex. Multiplanar reconstruction images received for interpretation. Low radiation dose CT technique utilized. Redemonstrated is a partially necrotic heterogeneous attenuating mass in the left parietal occipital region measuring approximately 3.7 x 4.6 cm peripheral increased attenuation suggesting some component of hemorrhage. This mass abuts the posterior falx  with marked surrounding neurogenic edema and mass effect.   This demonstrates little change from previous MRI exam. Additional hemorrhagic partially necrotic Mixed attenuating mass in the left parietal temporal region measuring approximately a 4.2 x 4.1 cm unchanged from prior MRI exam.  Moderate surrounding vasogenic edema and mass effect. Extensive surrounding edema anteriorly extending into the left thalamus and left renal ganglia region. There is near complete effacement of the left lateral ventricle with approximately 11 mm left-to-right midline shift. Brainstem and posterior fossa appear within normal limits. Visualized orbits and paranasal sinuses are clear. 1.  2 large hemorrhagic partially necrotic masses in the left parieto-temporal and left parieto-occipital regions with marked surrounding edema and mass effect. These demonstrate little change when compared to previous MRI study of 3/5/2022. Associated  prominent mass effect and surrounding edema resulting 11 mm midline shift to the right. CT HEAD WO CONTRAST    Result Date: 3/3/2022  EXAMINATION: CT OF THE HEAD WITHOUT CONTRAST  3/3/2022 8:17 pm TECHNIQUE: CT of the head was performed without the administration of intravenous contrast. Dose modulation, iterative reconstruction, and/or weight based adjustment of the mA/kV was utilized to reduce the radiation dose to as low as reasonably achievable. COMPARISON: None. HISTORY: ORDERING SYSTEM PROVIDED HISTORY: ams TECHNOLOGIST PROVIDED HISTORY: Reason for exam:->ams Has a \"code stroke\" or \"stroke alert\" been called? ->No Decision Support Exception - unselect if not a suspected or confirmed emergency medical condition->Emergency Medical Condition (MA) Is the patient pregnant?->No Reason for Exam: c/o memory loss that began last week. Pt's mother states last Thursday the pt states she felt like something hit her in the head, but doesn't know what. States she isn't able to remember her name, important things she would usually remember, and she is also unable to read or write. FINDINGS: BRAIN/VENTRICLES: There is diffuse hypoattenuation involving much of the left temporoparietal and posterior frontal lobe.   There is hypoattenuation involving the white matter involving left posterior hemisphere likely represent areas of vasogenic edema. There is at least 2 discrete of mass is identified measuring approximate 4 cm in size 1 which is central hypoattenuation which may be related to necrosis. These left-to-right midline shift 1.4 cm in size. There is entrapment of the right lateral ventricle and both temporal horns of both lateral ventricles suggestive areas of developing hydrocephalus. There is downward transtentorial herniation as well. With mass effect on the brainstem and the partial effacement of the suprasellar and ambient cisterns. Left uncal herniation. .  Focal density identified left parasagittal occipital lobe likely represents petechial calcification of the blood products. ORBITS: The visualized portion of the orbits demonstrate no acute abnormality. SINUSES: The visualized paranasal sinuses and mastoid air cells demonstrate no acute abnormality. SOFT TISSUES/SKULL:  No acute abnormality of the visualized skull or soft tissues. Abnormal edema left posterior cerebral hemisphere possibly related to underlying metastases versus less likely primary glioma. This causes 1.4 cm of left-to-right midline shift with cisternal effacement and downward transtentorial herniation and findings of developing hydrocephalus. Hyperdensity left parasagittal occipital lobe could represent area of calcification however small focus of blood products will not be totally excluded. Neuro surgical consultation recommended Critical results were called by Dr. Adrian Wall to Abram BOURNE on 3/3/2022 at 21:07. IR GUIDED IVC FILTER PLACEMENT    Result Date: 3/16/2022  IVC filter placement INDICATION : Deep venous thrombosis. Recent craniotomy. : Deepti Ontiveros MD PROCEDURE: Preprocedure sonographic evaluation of the right internal jugular vein demonstrates patency and compressibility. This measures 12 mm.  A permanent sonographic image was saved to the electronic medical record. The patient's right neck was prepped and draped in sterile fashion and lidocaine used for local anesthesia. Ultrasound guidance was used and a sonographic image of the vein was obtained. The right internal jugular vein was accessed using a micropuncture set. Abdominal cavogram was obtained. An Option Elite retrievable filter was inserted under fluoroscopic guidance and deployed in routine fashion in the infrarenal IVC. Abdominal imaging was obtained to document filter position. The patient tolerated the procedure well without complication. IMPRESSION : Normal IVC anatomy. Normal, patent jugular vein. Patent inferior vena cava. Placement of filter in the infra-renal IVC. Please note that this is a retrievable filter. Fluoroscopy time : 1.8 minutes Number of exposures obtained : 5 Moderate sedation was performed by the physician including the presence of an independent trained observers that assisted in monitoring the patient's level of consciousness and physiological status. Following the administration of Midazolam and Fentanyl the physician spent continuous face-to-face time with the patient. Sedation start time: 0810 Sedation end time: 0826 Estimated blood loss: Less than 5 mL. XR CHEST PORTABLE    Result Date: 3/27/2022  AP CHEST HISTORY: Fever COMPARISON 3/4/2022 FINDINGS: The heart size is within normal limits. Abnormal density seen laterally in right lower lung. This appears to represent overlapping density but infiltrate in this area would be difficult to exclude. Repeat evaluation following removal overlapping density is recommended. Limited evaluation of lower right lung due to overlapping density for which infiltrate cannot be excluded. Otherwise no acute process seen.      VL Extremity Venous Bilateral    Result Date: 3/15/2022  Lower Extremities DVT Study  Demographics   Patient Name       [facility]   Date of Study 03/15/2022        Gender              Female   Patient Number     [id number]        Date of Birth       1975   Visit Number       [id number]         Age                 48 year(s)   Accession Number   [id number]        Room Number         2695   Corporate ID       [id number]           Sonographer         Brenda Iqbal, CHRISTOST,                                                           RDMS   Ordering Physician Aarti Anguiano MD, [facility]                                       Physician           Readers                                                           Shivam Sarabia MD  Procedure Type of Study:   Veins:Lower Extremities DVT Study, VASC EXTREMITY VENOUS DUPLEX BILATERAL. Vascular Sonographer Report  Indications for Study:Swelling. Additional Indications:Patient presents for evaluation due to bilateral lower extremity swelling. She only c/o right lower extremity weakness. She is a poor historian. She underwent craniotomy on 3/12/22 for tumor resection. She denies a prior H/O DVT. Lower extremity venous duplex study on 3/10/22 was negative for DVT bilaterally. Venous Duplex Scan: B-mode imaging of the deep and superficial veins, with compression maneuvers, including color and Doppler spectral waveform analysis. Impressions Right Impression There is acute partially occluding deep venous thrombosis involving the deep femoral vein. The thrombus extends into the common femoral vein by 3.1 cm at the venous confluence. There is acute totally occluding deep venous thrombosis involving two soleal veins in the mid calf. There is no other evidence of deep or superficial venous thrombosis involving the right lower extremity. Left Impression There is no evidence of deep or superficial venous thrombosis involving the left lower extremity.  Compared to the previous study on 3/10/22, the acute DVT noted in the right common femoral, deep femoral, and soleal veins is a new finding. The left lower extremity venous findings are unchanged. Conclusions   Summary   There is evidence of acute DVT involving the right common femoral vein, deep  femoral vein, and soleal veins, as described above. No evidence of deep vein or superficial thrombosis involving the left lower  extremity. Signature   ------------------------------------------------------------------  Electronically signed by Batool Guevara MD (Interpreting  physician) on 03/15/2022 at 07:08 PM  ------------------------------------------------------------------  Patient Status:Routine. Study [address] [place] [address] - Vascular Lab. Technical Quality:Adequate visualization.   - Results were reported to:SILVIA Meehan at 5:30 pm on 3/15/22. Risk Factors History +------------------+----------+----------------------------------------------+ ! Diagnosis         ! Date      ! Comments                                      ! +------------------+----------+----------------------------------------------+ ! Other             ! !H/O metastatic invasive ductal cell carcinoma ! !                  !          !with brain mets, status epilepticus, cerebral ! !                  !          !edema                                         ! +------------------+----------+----------------------------------------------+ ! Previous Procedure!03/12/2022! L craniotomy for tumor resection              ! +------------------+----------+----------------------------------------------+   - The patient has breast cancer. Velocities are measured in cm/s ; Diameters are measured in mm Right Lower Extremities DVT Study Measurements Right 2D Measurements +------------------------+----------+---------------+----------+ ! Location                ! Visualized! Compressibility! Thrombosis! +------------------------+----------+---------------+----------+ ! Sapheno Femoral Junction! Yes       ! Yes            ! None      ! +------------------------+----------+---------------+----------+ ! GSV Thigh               ! Yes       ! Yes            ! None      ! +------------------------+----------+---------------+----------+ ! Common Femoral          !Yes       ! Partial        !Acute     ! +------------------------+----------+---------------+----------+ ! Femoral                 !Yes       ! Yes            ! None      ! +------------------------+----------+---------------+----------+ ! Prox Femoral            !Yes       ! Yes            ! None      ! +------------------------+----------+---------------+----------+ ! Mid Femoral             !Yes       ! Yes            ! None      ! +------------------------+----------+---------------+----------+ ! Dist Femoral            !Yes       ! Yes            ! None      ! +------------------------+----------+---------------+----------+ ! Deep Femoral            !Yes       ! Partial        !Acute     ! +------------------------+----------+---------------+----------+ ! Popliteal               !Yes       ! Yes            ! None      ! +------------------------+----------+---------------+----------+ ! GSV Below Knee          ! Yes       ! Yes            ! None      ! +------------------------+----------+---------------+----------+ ! Gastroc                 ! Yes       ! Yes            ! None      ! +------------------------+----------+---------------+----------+ ! Soleal                  !Yes       ! No             !Acute     ! +------------------------+----------+---------------+----------+ ! PTV                     ! Yes       ! Yes            ! None      ! +------------------------+----------+---------------+----------+ ! Peroneal                !Yes       ! Yes            ! None      ! +------------------------+----------+---------------+----------+ ! GSV Calf                ! Yes       ! Yes            ! None      ! +------------------------+----------+---------------+----------+ ! SSV                     ! Yes       ! Yes            ! None      ! +------------------------+----------+---------------+----------+ Right Doppler Measurements +------------------------+------+------+------------+ ! Location                ! Signal!Reflux! Reflux (sec)! +------------------------+------+------+------------+ ! Sapheno Femoral Junction! Phasic! No    !            ! +------------------------+------+------+------------+ ! Common Femoral          !Phasic! No    !            ! +------------------------+------+------+------------+ ! Femoral                 !Phasic! No    !            ! +------------------------+------+------+------------+ ! Prox Femoral            !Phasic! No    !            ! +------------------------+------+------+------------+ ! Deep Femoral            !Phasic! No    !            ! +------------------------+------+------+------------+ ! Popliteal               !Phasic! No    !            ! +------------------------+------+------+------------+ Left Lower Extremities DVT Study Measurements Left 2D Measurements +------------------------+----------+---------------+----------+ ! Location                ! Visualized! Compressibility! Thrombosis! +------------------------+----------+---------------+----------+ ! Sapheno Femoral Junction! Yes       ! Yes            ! None      ! +------------------------+----------+---------------+----------+ ! GSV Thigh               ! Yes       ! Yes            ! None      ! +------------------------+----------+---------------+----------+ ! Common Femoral          !Yes       ! Yes            ! None      ! +------------------------+----------+---------------+----------+ ! Femoral                 !Yes       ! Yes            ! None      ! +------------------------+----------+---------------+----------+ ! Prox Femoral            !Yes       ! Yes            ! None      ! +------------------------+----------+---------------+----------+ ! Mid Femoral             !Yes       ! Yes            ! None      ! +------------------------+----------+---------------+----------+ ! Dist Femoral            !Yes       ! Yes            ! None      ! +------------------------+----------+---------------+----------+ ! Deep Femoral            !Yes       ! Yes            ! None      ! +------------------------+----------+---------------+----------+ ! Popliteal               !Yes       ! Yes            ! None      ! +------------------------+----------+---------------+----------+ ! GSV Below Knee          ! Yes       ! Yes            ! None      ! +------------------------+----------+---------------+----------+ ! Gastroc                 ! Yes       ! Yes            ! None      ! +------------------------+----------+---------------+----------+ ! Soleal                  !Yes       ! Yes            ! None      ! +------------------------+----------+---------------+----------+ ! PTV                     ! Yes       ! Yes            ! None      ! +------------------------+----------+---------------+----------+ ! Peroneal                !Yes       ! Yes            ! None      ! +------------------------+----------+---------------+----------+ ! GSV Calf                ! Yes       ! Yes            ! None      ! +------------------------+----------+---------------+----------+ ! SSV                     ! Yes       ! Yes            ! None      ! +------------------------+----------+---------------+----------+ Left Doppler Measurements +------------------------+------+------+------------+ ! Location                ! Signal!Reflux! Reflux (sec)! +------------------------+------+------+------------+ ! Sapheno Femoral Junction! Phasic! No    !            ! +------------------------+------+------+------------+ ! Common Femoral          !Phasic! No    !            ! +------------------------+------+------+------------+ ! Femoral                 !Phasic! No    !            ! +------------------------+------+------+------------+ ! Prox Femoral            !Phasic! No    !            ! +------------------------+------+------+------------+ ! Deep Femoral            !Phasic! No !            ! +------------------------+------+------+------------+ ! Popliteal               !Phasic! No    !            ! +------------------------+------+------+------------+    VL Extremity Venous Bilateral    Result Date: 3/11/2022  Lower Extremities DVT Study  Demographics   Patient Name       Chestine Nageotte A   Date of Study      03/10/2022        Gender              Female   Patient Number     [id number]        Date of Birth       1975   Visit Number       [id number]         Age                 48 year(s)   Accession Number   [id number]        Room Number         5442   Corporate ID       [id number]           Dasia Yates BS,                                                           RVT   Ordering Physician Chaoy French MD, [facility]                                       Physician           Melissa Norris MD  Procedure Type of Study:   Veins:Lower Extremities DVT Study, VASC EXTREMITY VENOUS DUPLEX BILATERAL. Vascular Sonographer Report  Indications for Study:Swelling. Additional Indications:Inpatient study done bedside in the ICU. Patient is a poor historian with bilateral leg swelling for an unknown number of days. Venous Duplex Scan: B-mode imaging of the deep and superficial veins, with compression maneuvers, including color and Doppler spectral waveform analysis. Impressions Right Impression Catheter and bandages on the right thigh. No evidence of deep or superficial venous thrombosis in the right lower extremity. Left Impression No evidence of deep or superficial venous thrombosis in the left lower extremity. Evidence of a cystic structure in the popliteal fossa, consistent with a Baker's cyst, measuring 1.76x0.59 cm. Conclusions   Summary   No evidence of deep or superficial venous thrombosis in the bilateral lower  extremity.   Left Baker's cyst.   Signature ------------------------------------------------------------------  Electronically signed by Giuliano Alarcon MD (Interpreting  physician) on 03/11/2022 at 01:50 PM  ------------------------------------------------------------------  Patient Status:Routine. Study [facility] - [facility]. Technical Quality:Adequate visualization. Velocities are measured in cm/s ; Diameters are measured in mm Right Lower Extremities DVT Study Measurements Right 2D Measurements +------------------------+----------+---------------+----------+ ! Location                ! Visualized! Compressibility! Thrombosis! +------------------------+----------+---------------+----------+ ! Sapheno Femoral Junction! Yes       ! Yes            ! None      ! +------------------------+----------+---------------+----------+ ! GSV Thigh               ! Partial   !Yes            ! None      ! +------------------------+----------+---------------+----------+ ! Common Femoral          !Partial   !Yes            ! None      ! +------------------------+----------+---------------+----------+ ! Femoral                 !Partial   !Yes            ! None      ! +------------------------+----------+---------------+----------+ ! Prox Femoral            !Partial   !Yes            ! None      ! +------------------------+----------+---------------+----------+ ! Mid Femoral             !Partial   !Yes            ! None      ! +------------------------+----------+---------------+----------+ ! Dist Femoral            !Yes       ! Yes            ! None      ! +------------------------+----------+---------------+----------+ ! Deep Femoral            !Partial   !Partial        !None      ! +------------------------+----------+---------------+----------+ ! Popliteal               !Yes       ! Yes            ! None      ! +------------------------+----------+---------------+----------+ ! GSV Below Knee          ! Yes       ! Yes            ! None      ! +------------------------+----------+---------------+----------+ ! Gastroc                 ! Yes       ! Yes            ! None      ! +------------------------+----------+---------------+----------+ ! Soleal                  !Yes       ! Yes            ! None      ! +------------------------+----------+---------------+----------+ ! PTV                     ! Yes       ! Yes            ! None      ! +------------------------+----------+---------------+----------+ ! Peroneal                !Yes       ! Yes            ! None      ! +------------------------+----------+---------------+----------+ ! GSV Calf                ! Yes       ! Yes            ! None      ! +------------------------+----------+---------------+----------+ ! SSV                     ! Yes       ! Yes            ! None      ! +------------------------+----------+---------------+----------+ Right Doppler Measurements +------------------------+------+------+------------+ ! Location                ! Signal!Reflux! Reflux (sec)! +------------------------+------+------+------------+ ! Sapheno Femoral Junction! Phasic! No    !            ! +------------------------+------+------+------------+ ! Common Femoral          !Phasic! No    !            ! +------------------------+------+------+------------+ ! Femoral                 !Phasic! No    !            ! +------------------------+------+------+------------+ ! Deep Femoral            !Phasic! No    !            ! +------------------------+------+------+------------+ ! Popliteal               !Phasic! No    !            ! +------------------------+------+------+------------+ Left Lower Extremities DVT Study Measurements Left 2D Measurements +------------------------+----------+---------------+----------+ ! Location                ! Visualized! Compressibility! Thrombosis! +------------------------+----------+---------------+----------+ ! Sapheno Femoral Junction! Yes       ! Yes            ! None      ! +------------------------+----------+---------------+----------+ ! GSV Thigh               ! Yes       ! Yes            ! None      ! +------------------------+----------+---------------+----------+ ! Common Femoral          !Yes       ! Yes            ! None      ! +------------------------+----------+---------------+----------+ ! Femoral                 !Yes       ! Yes            ! None      ! +------------------------+----------+---------------+----------+ ! Prox Femoral            !Yes       ! Yes            ! None      ! +------------------------+----------+---------------+----------+ ! Mid Femoral             !Yes       ! Yes            ! None      ! +------------------------+----------+---------------+----------+ ! Dist Femoral            !Yes       ! Yes            ! None      ! +------------------------+----------+---------------+----------+ ! Deep Femoral            !Yes       ! Partial        !None      ! +------------------------+----------+---------------+----------+ ! Popliteal               !Yes       ! Yes            ! None      ! +------------------------+----------+---------------+----------+ ! GSV Below Knee          ! Yes       ! Yes            ! None      ! +------------------------+----------+---------------+----------+ ! Gastroc                 ! Yes       ! Yes            ! None      ! +------------------------+----------+---------------+----------+ ! Soleal                  !Yes       ! Yes            ! None      ! +------------------------+----------+---------------+----------+ ! PTV                     ! Yes       ! Yes            ! None      ! +------------------------+----------+---------------+----------+ ! Peroneal                !Yes       ! Yes            ! None      ! +------------------------+----------+---------------+----------+ ! GSV Calf                ! Yes       ! Yes            ! None      ! +------------------------+----------+---------------+----------+ ! SSV                     ! Yes       ! Yes            ! None      ! +------------------------+----------+---------------+----------+ Left Doppler Measurements +------------------------+------+------+------------+ ! Location                ! Signal!Reflux! Reflux (sec)! +------------------------+------+------+------------+ ! Sapheno Femoral Junction! Phasic! No    !            ! +------------------------+------+------+------------+ ! Common Femoral          !Phasic! No    !            ! +------------------------+------+------+------------+ ! Femoral                 !Phasic! No    !            ! +------------------------+------+------+------------+ ! Deep Femoral            !Phasic! No    !            ! +------------------------+------+------+------------+ ! Popliteal               !Phasic! No    !            ! +------------------------+------+------+------------+    CTA HEAD NECK W CONTRAST    Result Date: 3/3/2022  EXAMINATION: CTA OF THE HEAD AND NECK WITH CONTRAST 3/3/2022 8:17 pm: TECHNIQUE: CTA of the head and neck was performed with the administration of intravenous contrast. Multiplanar reformatted images are provided for review. MIP images are provided for review. Stenosis of the internal carotid arteries measured using NASCET criteria. Dose modulation, iterative reconstruction, and/or weight based adjustment of the mA/kV was utilized to reduce the radiation dose to as low as reasonably achievable. COMPARISON: CT head 03/03/2022 HISTORY: ORDERING SYSTEM PROVIDED HISTORY: ams x 1 week TECHNOLOGIST PROVIDED HISTORY: Reason for exam:->ams x 1 week Has a \"code stroke\" or \"stroke alert\" been called? ->No Decision Support Exception - unselect if not a suspected or confirmed emergency medical condition->Emergency Medical Condition (MA) Reason for Exam: ams x 1 week FINDINGS: CTA NECK: AORTIC ARCH/ARCH VESSELS: No dissection or arterial injury. No significant stenosis of the brachiocephalic or subclavian arteries.  CAROTID ARTERIES: No dissection, arterial injury, or hemodynamically significant [id number]. Small hiatal hernia. Mediastinal structures are unremarkable without lymphadenopathy. Mild linear subpleural atelectasis in the lungs. 2 mm perifissural nodule on the left image 601-51 is most likely benign. No evidence of pulmonary metastatic disease. Abdomen and pelvis: Degenerative changes in the spine. No lymphadenopathy. Gallbladder is within normal limits. Bowel is unremarkable. 4 mm hypodensity in the right lobe of the liver image 601-83 is too small to definitively characterize and most likely benign. Solid abdominal organs are otherwise unremarkable. Multiple uterine fibroids are seen. Bladder is collapsed. There is high density fluid in the bladder which may represent prior contrast administration or hematuria. Large, necrotic right breast mass. Right axillary lymphadenopathy. Uterine fibroids. High density urine in the bladder. MRI brain with and without contrast    Result Date: 3/14/2022  EXAM: MRI BRAIN W WO CONTRAST INDICATION: Brain mass COMPARISON: 3/12/2022 TECHNIQUE: Multiplanar, multisequence MR imaging of the head obtained without and with IV. IV contrast: 10 mL IV ProHance FINDINGS: Postoperative changes of left sided craniotomy with interval resection of underlying masses. Expected postoperative pneumocephalus. Small extra-axial subdural fluid collection subjacent to the craniotomy site. There are resection cavities in the left temporal and occipital region with blood products within and surrounding the resection cavity. There are small areas of restricted diffusion along the margins of the resection cavity consistent with postoperative ischemia. There are small irregular nodular areas of enhancement along the anterior and lateral margins of the left occipital resection cavity (image 73 and 76 of series 13). Small irregular nodular area of enhancement along the medial margin of the left temporal resection cavity (image 70 of series 13).  Thin curvilinear enhancement along the region is unremarkable. Cervicomedullary junction is unremarkable. Visualized paranasal sinuses and mastoid air cells are clear. No suspicious calvarial abnormality. Two large hemorrhagic partially necrotic heterogeneously enhancing mass in the left parietotemporal and left parieto-occipital region with surrounding vasogenic edema and mass effect. These are indeterminate, most likely relate to multicentric glioblastoma. However, possibility of metastasis cannot be excluded given the history of breast cancer. 10 mm midline shift to right. CTA ABDOMEN PELVIS W WO CONTRAST    Result Date: 3/26/2022  EXAM: CTA ABDOMEN AND PELVIS WITHOUT CONTRAST INDICATION: LUQ abd pain w/ TTP on exam. Previous history of metastasis, IVF filter clot burden, recent IVC filter placement 3/16/2022 Necrotic right breast mass COMPARISON: 1/9/4306 TECHNIQUE: Helical CTA imaging obtained from lung bases through pelvis. Axial images and multiplanar reformatted images are provided for review Volume rendering, MIPS Arterial and delayed imaging IV Contrast: Isovue-370, 80 mL plus an additional 60 mL for additional injection Oral Contrast: None CT radiation dose optimization techniques (automated exposure control, use of a iterative reconstruction techniques, or adjustment of the mA or KV according to the patients size) were used to limit patient radiation dose. FINDINGS: Bilateral lower lobe pulmonary emboli are suspected, there are filling defects and poorly opacified pulmonary arteries in the lower lobe segments adjacent to the pulmonary veins however, this is indeterminate.  Correlation can be made with perfusion VQ scan or subsequent CT PE study of patient is well hydrated due to the added contrast given on the current exam. Thrombosis of the inferior vena cava filter is observed with distended retroperitoneal and inferior vena cava and distended common iliac veins bilaterally with hazy density extending from axial series 604 image 28 see Comment. COMMENT:  A, C: The carcinoma at each site shows a nearly identical   appearance, with diffuse masses of highly polymorphous cells showing a   high mitotic/karyorrhectic rate, frequent \"giant\" cells, and plentiful   geographic necrosis.  Given the patient's clinical history (see below),   immunostains were requested. The tumor is strongly positive for CK7,   GATA3, SOX-10, and beta catenin (membranous pattern), with variable   cytoplasmic and partial perinuclear(\"Golgi\") positivity for keratin   AE1+3/Cam 5.2, a tiny minority population, including geographic foci,   positive for high molecular weight keratin (), and Ki-67   proliferation rate of 90% or more. It is negative for ER, VT, and HER-2   (no positivity for any of the three stains), and also for CK20, Napsin-A,   S100 protein, villin, TTF-1, CDX2, and CD45 (LCA). The overall findings strongly support metastatic \"triple negative\" breast   carcinoma.   BRATI/BRATI     Problem List  Patient Active Problem List   Diagnosis    Thyroid nodule    Brain metastases ([address]    Brain mass    Status epilepticus ([address]    Duct cell carcinoma (HCC)    Cerebral edema (HCC)    Seizure ([address]    Deep venous thrombosis (HCC)       Assessment and Plan:     Metastatic triple negative breast cancer  - Neglected Right breast primary  - Intracranial metastasis  - The patient and her mother agreed to surgery to resect her intracranial metastasis. - March 9 Craniotomy had to be canceled due to seizures & status epilepticus.  - Saturday March 12 craniotomy and tumor resection. Recovering.  - Subsequent to this, she will require radiation therapy. Typically this is administered 2 to 4 weeks postoperatively. - Subsequent to radiation therapy she will require systemic chemotherapy.  This will be in the outpatient setting.  - Recommend she follow-up with medical and radiation oncology at the [facility], where she was seen previously, after this hospitalization to coordinate and implement systemic palliative chemotherapy once she completes radiation therapy. Instructions placed in D/C instructions section.     DVT  - Per neurosurgery, she could not receive therapeutic anticoagulation until POD #14 - March 26.  - Rec Eliquis  - S/P IVC filter.  This can be retrieved once she in therapeutically anticoagulated    HIT  - Ivone positive  - TRES in process  - On argatroban gtt  - Transititon to [facility] or Tyler Marvin MD  3/28/2022

## 2022-03-28 NOTE — CONSULTS
Clinical Pharmacy Progress Note    Vancomycin - Management by Pharmacy    Consult Date(s): 3/28  Consulting Provider(s): Dr. Rogelio Weiss / Plan  Sepsis + UTI - Vancomycin   Concurrent Antimicrobials: Meropenem   Day of Vanc Therapy: 1   Current Dosing Method: Bayesian-Guided AUC Dosing   Therapeutic Goal: 400-600 mg/L*hr   Current Dose / Frequency: 1250 mg every 12 hours   Plan / Rationale:   o Will order a vancomycin 1750 mg IV x1 LD (25 mg/kg IBW), followed by 1250 mg IV q12h. Estimated  mg/L*hr and Trough 15.1 mg/L.  Will continue to monitor clinical condition and make adjustments to regimen as appropriate. Thank you for consulting Pharmacy! Please call with any questions. Miguel Pope, PharmD  Main Pharmacy: [id number]  3/28/2022 6:58 PM      Interval update:     Subjective/Objective: Ms. Goel Setting is a 48 y.o. female with a PMHx significant for thyroid nodule, triple neg breast cancer with mets to the brain, DVT in R lower extremity (3/15/22) s/p IVC filter. She is s/p left temporal parietal occipital craniotomy for tumor resection by Dr. Lyly Delatorre on 3/12/2022. Patient presented back to [facility] for worsening R lower extremity swelling 2/2 thrombosis of the IVC at the level of the filter. Patient now cleared for [facility] by neurosurg and also HIT +. Patient has had a worsening leukocytosis since admission and was febrile to 101.9 today and tachycardic. Urine is growing enterococcus. Pharmacy has been consulted to dose vancomycin for sepsis + UTI.     Height:   Ht Readings from Last 1 Encounters:   03/25/22 5' 3\" (1.6 m)     Weight:   Wt Readings from Last 1 Encounters:   03/26/22 202 lb 13.2 oz (92 kg)     Current & Prior Antimicrobial Regimen(s):   Zosyn 4500 mg EI q8h (3/27-3/28)   Meropenem 2000 mg EI q8h (3/28-current)   Vancomycin  o 1750 mg IV x1 LD (3/28)  o 1250 mg IV q12h (to start 3/29)    Level(s) / Doses:    Date Time Dose Level / Type of Level Interpretation Note: Serum levels collected for AUC-based dosing may be high if collected in close proximity to the dose administered. This is not necessarily indicative of toxicity. Cultures & Sensitivities:    Date Site Micro Susceptibility / Result   3/27 Urine Enterococcus faecalis Pending   3/27 Blood No growth to date      Labs / Ancillary Data:    Estimated Creatinine Clearance: 123 mL/min (based on SCr of 0.6 mg/dL). Recent Labs     03/27/22  0556 03/27/22  1135 03/28/22  0641   CREATININE 0.6 0.6 0.6   BUN 15 13 14   WBC 12.8* 13.8* 16.2*     Additional Lab Values / Findings of Note:    Procalcitonin: No results for input(s): PROCAL in the last 72 hours.

## 2022-03-28 NOTE — PROGRESS NOTES
48year old previously healthy  women who presents with large breast mass, triple negative BCa and left sided parietal brain mets. Brain mets have been resected. Patient is need of cavity XRT to be delivered as out patient .   In all likelihood patient will be dc'd to rehab center and will receive XRT as out patient Out patient appointment given Formal consult dictated

## 2022-03-28 NOTE — CONSULTS
Nephrology Consult Note                                                                                                                                                                                                                                                                                                                                                               Office : [phone]     Fax :[phone]              Patient's Name: Rishi Brown  12:29 PM  3/28/2022    Reason for Consult:  Hyponatremia   Requesting Physician:  Dale Schlatter, APRN - CNP      Chief Complaint:     History of Present Ilness:    Rishi Brown is a 48 y.o. 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    Bilateral lower extremity DVTs    Will get thrombectomy today     Na worse on IVF     Past Medical History:   Diagnosis Date    Thyroid nodule        Past Surgical History:   Procedure Laterality Date    CRANIOTOMY Left 3/12/2022    LEFT TEMPORAL PARIETAL OCCIPITAL CRANIOTOMY FOR TUMOR RESECTION performed by Ezequiel Up MD at [address] IR IVC FILTER PLACEMENT W IMAGING N/A 03/16/2022    [facility]    IR IVC FILTER PLACEMENT W IMAGING  3/16/2022    IR IVC FILTER PLACEMENT W IMAGING 3/16/2022 [facility]       Family History   Problem Relation Age of Onset    Cancer Father         reports that she has never smoked. She has never used smokeless tobacco. She reports that she does not drink alcohol and does not use drugs. Allergies:  Patient has no known allergies.     Current Medications:    metoprolol (LOPRESSOR) injection 5 mg, Once  oxyCODONE (ROXICODONE) immediate release tablet 5 mg, Q4H PRN   Or  oxyCODONE (ROXICODONE) immediate release tablet 10 mg, Q4H PRN  LORazepam (ATIVAN) injection 1 mg, Q6H PRN  HYDROmorphone (DILAUDID) injection 0.25 mg, Q3H PRN Or  HYDROmorphone (DILAUDID) injection 0.5 mg, Q3H PRN  insulin regular (HUMULIN R;NOVOLIN R) injection 10 Units, Once   And  dextrose 10 % infusion, Once  glucose (GLUTOSE) 40 % oral gel 15 g, PRN  dextrose 50 % IV solution, PRN  glucagon (rDNA) injection 1 mg, PRN  dextrose 5 % solution, PRN  piperacillin-tazobactam (ZOSYN) 4,500 mg in dextrose 5 % 100 mL IVPB (mini-bag), Q8H  argatroban infusion 50mg in 0.9% sodium chloride 50 mL (premix), Continuous  lacosamide (VIMPAT) tablet 200 mg, BID  levETIRAcetam (KEPPRA) tablet 2,000 mg, Daily  levETIRAcetam (KEPPRA) tablet 2,500 mg, Nightly  melatonin disintegrating tablet 5 mg, Nightly  pantoprazole (PROTONIX) tablet 40 mg, QAM AC  sodium chloride flush 0.9 % injection 5-40 mL, 2 times per day  sodium chloride flush 0.9 % injection 5-40 mL, PRN  0.9 % sodium chloride infusion, PRN  ondansetron (ZOFRAN-ODT) disintegrating tablet 4 mg, Q8H PRN   Or  ondansetron (ZOFRAN) injection 4 mg, Q6H PRN  polyethylene glycol (GLYCOLAX) packet 17 g, Daily PRN  acetaminophen (TYLENOL) tablet 650 mg, Q6H PRN   Or  acetaminophen (TYLENOL) suppository 650 mg, Q6H PRN        Review of Systems:   14 point ROS obtained but were negative except mentioned in HPI      Physical exam:     Vitals:  /80   Pulse 128   Temp 99.4 °F (37.4 °C) (Oral)   Resp 17   Ht 5' 3\" (1.6 m)   Wt 202 lb 13.2 oz (92 kg)   SpO2 97%   BMI 35.93 kg/m²   Constitutional:  OAA X3 NAD  Skin: no rash, turgor wnl  Heent:  eomi, mmm  Neck: no bruits or jvd noted  Cardiovascular:  S1, S2 without m/r/g  Respiratory: CTA B without w/r/r  Abdomen:  +bs, soft, nt, nd  Ext: + lower extremity edema  Psychiatric: mood and affect appropriate  Musculoskeletal:  Rom, muscular strength intact    Data:   Labs:  CBC:   Recent Labs     03/27/22  0556 03/27/22  0556 03/27/22  1135 03/27/22  1135 03/27/22  1947 03/28/22  0047 03/28/22  0641   WBC 12.8*  --  13.8*  --   --   --  16.2*   HGB 7.7*   < > 7.7*   < > 7.6* 8.1* 7.7* *  --  127*  --   --   --  180    < > = values in this interval not displayed. BMP:    Recent Labs     03/27/22  0556 03/27/22  1135 03/28/22  0641   * 126* 124*   K 5.2* 4.8 4.8   CL 99 94* 93*   CO2 19* 18* 18*   BUN 15 13 14   CREATININE 0.6 0.6 0.6   GLUCOSE 131* 120* 128*     Ca/Mg/Phos:   Recent Labs     03/27/22  0556 03/27/22  1135 03/28/22  0641   CALCIUM 8.7 8.5 8.7     Hepatic:   Recent Labs     03/25/22  1904   AST 28   ALT 31   BILITOT 0.4   ALKPHOS 74     Troponin: No results for input(s): TROPONINI in the last 72 hours. BNP: No results for input(s): BNP in the last 72 hours. Lipids: No results for input(s): CHOL, TRIG, HDL, LDLCALC, LABVLDL in the last 72 hours. ABGs: No results for input(s): PHART, PO2ART, FNT9TYY in the last 72 hours. INR:   Recent Labs     03/25/22  1904   INR 1.22*     UA:  Recent Labs     03/27/22  1218   COLORU Yellow   CLARITYU Clear   GLUCOSEU Negative   BILIRUBINUR Negative   KETUA Negative   SPECGRAV >=1.030   BLOODU Negative   PHUR 6.0   PROTEINU TRACE*   UROBILINOGEN 0.2   NITRU Negative   LEUKOCYTESUR Negative   LABMICR YES   URINETYPE NotGiven      Urine Microscopic:   Recent Labs     03/27/22  1218   BACTERIA 3+*   WBCUA 0-2   RBCUA 0-2   EPIU 0-1     Urine Culture:   Recent Labs     03/27/22  1155   LABURIN >100,000 CFU/ml  Sensitivity to follow       Urine Chemistry:   Recent Labs     03/28/22  1105   NAUR <20             IMAGING:  XR CHEST PORTABLE   Final Result      Limited evaluation of lower right lung due to overlapping density for which infiltrate cannot be excluded. Otherwise no acute process seen. CT HEAD WO CONTRAST   Final Result      Postsurgical changes with areas of low cortical attenuation in the left parietal and left occipital region appear essentially stable from prior study with associated mild midline shift which is similar to slightly improved from prior study.       No evidence of developing acute intracranial hemorrhage. CTA ABDOMEN PELVIS W WO CONTRAST   Final Result   1. 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 2. Surrounding hazy density, likely related to venous congestion. However, streaky densities extending inferiorly on the right may represent blood. However, there is no evidence of retroperitoneal hematoma. Correlation with lower extremity noninvasive    duplex Doppler is recommended in addition to serial hemoglobin and crit levels. 3. Suspect bilateral lower lobe pulmonary emboli. See comments above      Redemonstrated is a large necrotic mass involving the right breast enlarged fibroid uterus. VL Extremity Venous Bilateral    (Results Pending)       Assessment/Plan   1. Hyponatremia     2. HTN    3. Anemia    4. Acid- base/ Electrolyte imbalance     5.  SIADH     Plan   - pt is npo at this point   - stop IVF   - Ur studies  - TSH   - Monitor Na   - If Na not better will give Sam[place]a   - d/w pt and family in length                     Thank you for allowing us to participate in care of Dulce Ware MD  Feel free to contact me   [facility] [id number]  Office : [phone]  Fax :[phone]

## 2022-03-28 NOTE — PLAN OF CARE
Problem: Falls - Risk of:  Goal: Will remain free from falls  Description: Will remain free from falls  3/28/2022 1037 by Rosalie Abernathy RN  Outcome: Ongoing     Problem: Pain:  Goal: Control of acute pain  Description: Control of acute pain  3/28/2022 1037 by Rosalie Abernathy RN  Outcome: Ongoing     Problem: Skin Integrity:  Goal: Will show no infection signs and symptoms  Description: Will show no infection signs and symptoms  Outcome: Ongoing

## 2022-03-28 NOTE — PROGRESS NOTES
Unable to evaluate pt today d/t pt being in cath lab, will evaluate pt tomorrow. Orders for wound care placed.

## 2022-03-28 NOTE — PROGRESS NOTES
Complexity  OT Education: OT Role;Plan of Care;Transfer Training  REQUIRES OT FOLLOW UP: Yes  Activity Tolerance  Activity Tolerance: Patient Tolerated treatment well;Patient limited by pain  Activity Tolerance: Session limited 2/2 increased pain w/ mobility  Safety Devices  Safety Devices in place: Yes  Type of devices: Left in chair;Nurse notified;Call light within reach; Chair alarm in place           Patient Diagnosis(es): The primary encounter diagnosis was Acute deep vein thrombosis (DVT) of proximal vein of right lower extremity ([address] Diagnoses of Thrombocytopenia ([address] and S/P craniotomy were also pertinent to this visit. has a past medical history of Thyroid nodule. has a past surgical history that includes craniotomy (Left, 3/12/2022); IR GUIDED IVC FILTER PLACEMENT (N/A, 03/16/2022); and IR GUIDED IVC FILTER PLACEMENT (3/16/2022). Treatment Diagnosis: impaired ADLs and functional mobility/transfers      Restrictions  Position Activity Restriction  Other position/activity restrictions: Up with assist    Subjective   General  Chart Reviewed: Yes  Patient assessed for rehabilitation services?: Yes  Additional Pertinent Hx: 58-year-old female with metastatic breast cancer status post craniotomy with mass resection presented from [facility] due to worsening lower extremity pain and swelling. During her last admission patient was diagnosed with a DVT in the right lower extremity and due to her recent Craniotomy anticoagulation could not be started till POD 14 so patient underwent an IVC filter. Her platelets levels were noted to be low at [facility] and due to concern for worsening DVT she was sent to the hospital.  CT abdomen and pelvis was performed which did show thrombosis of the IVC at the level of the filter with marked distention of the caval bifurcation. There was also suspicion for bilateral lower lobe pulmonary emboli.   Family / Caregiver Present: No  Referring Practitioner: Cole Gallo MD  Diagnosis: Thrombocytopenia  Subjective  Subjective: Pt supine in bed upon arrival and agreeable to therapy eval and treat  Patient Currently in Pain: Yes (6/10 at rest, 10/10 with mobility, B LE's, RN notified & aware)  Pain Assessment  Pain Assessment: 0-10  Pain Level: 1  Patient's Stated Pain Goal: 1  Vital Signs  Pulse: 134  BP: 111/79  Patient Currently in Pain: Yes (6/10 at rest, 10/10 with mobility, B LE's, RN notified & aware)  Social/Functional History  Social/Functional History  Lives With:  (Mother)  Type of Home: House (Bedroom and bathroom upstairs)  Home Layout: Two level  Home Access: Stairs to enter with rails  Entrance Stairs - Number of Steps: 4-5  Bathroom Shower/Tub: Tub/Shower unit  Bathroom Toilet: Standard  ADL Assistance: Independent  Homemaking Assistance: Independent  Homemaking Responsibilities: Yes  Ambulation Assistance: Independent  Transfer Assistance: Independent  Active : No (Stopped recently)  Occupation: Full time employment  Type of occupation: [employer]  Additional Comments: Pt reports 0 falls in last 6 months.        Objective   Vision: Impaired  Vision Exceptions: Wears glasses for distance (Reporting double/blurry vision)  Hearing: Within functional limits    Orientation  Overall Orientation Status: Within Functional Limits     Balance  Sitting Balance: Contact guard assistance (-SBA)  Standing Balance: Maximum assistance (Max A x1 for static stance)  Standing Balance  Time: ~1min total  Activity: sit<>stand transfers; stance for LE dressing task  Functional Mobility  Functional Mobility Comments: unable to assess this date  ADL  Feeding: NPO  LE Dressing: Dependent/Total (don/doff socks seated EOB, assist to thread BLEs into briefs seated EOB and 2 person assist to maintain stance and complete pant mgmt at hips in stance)  Toileting:  (mas)        Bed mobility  Supine to Sit: Maximum assistance (Assist required for DELMER Gordillo, MELLY elevated, increased time and effort required)  Scooting: Moderate assistance (Increased time and effort, to EOB)  Transfers  Sit to stand: 2 Person assistance;Maximum assistance (Max Ax2 from EOB>RW x3 trials;  Max Ax1 for gage bermudez)  Stand to sit: 2 Person assistance;Maximum assistance (Max Ax2)  Vision - Basic Assessment  Prior Vision: Wears contacts  Patient Visual Report: Diplopia (Pt also reporting \"When i turn my head I can't see you\")  Visual Field Cut: Not tested (Possible field cut, not tested this date)  Cognition  Overall Cognitive Status: WFL        Sensation  Overall Sensation Status: WFL        LUE AROM (degrees)  LUE AROM : WFL  Left Hand AROM (degrees)  Left Hand AROM: WFL  RUE AROM (degrees)  RUE AROM : WFL  Right Hand AROM (degrees)  Right Hand AROM: WFL                      Plan   Plan  Times per week: 2-5  Times per day: Daily  Current Treatment Recommendations: Strengthening,Home Management Training,Patient/Caregiver Education & Training,Balance Training,Functional Mobility Training,Endurance Training,Self-Care / ADL,Safety Education & Training,Pain Management    G-Code     OutComes Score                                                  -PAC Score        -[facility] Inpatient Daily Activity Raw Score: 10 (03/28/22 1015)  AM-PAC Inpatient ADL T-Scale Score : 27.31 (03/28/22 1015)  ADL Inpatient CMS 0-100% Score: 74.7 (03/28/22 1015)  ADL Inpatient CMS G-Code Modifier : CL (03/28/22 1015)    Goals  Short term goals  Time Frame for Short term goals: by dc  Short term goal 1: Pt will complete functional transfers, including BSC/toilet transfer w/ Min A  Short term goal 2: Pt will complete LE dressing w/ Min A and use of AE prn  Short term goal 3: Pt will maintain sitting balance w/ spvn to complete ADL task       Therapy Time   Individual Concurrent Group Co-treatment   Time In 0821         Time Out 0914         Minutes 53                 Timed Code Treatment Minutes:  38    Total Treatment Minutes:  [address] [facility]

## 2022-03-28 NOTE — CONSULTS
Resident Consult Note  General Surgery       Reason for Consult: Necrotic breast mass    History of Present Illness:   Gautam Paul is a 48 y.o. female with right breast invasive ductal carcinoma (ER - , VA - , HER2 - , High Ki67 (80-90%)) diagnosed in March of 2021 at [facility], at which time she was staged at Clinical stage IIB but chose to forego any medical or surgical treatment due to what appears to be a mixture of denial as well as unfavorable recent family experience with breast cancer and its treatment. The patient was apparently lost to follow-up from her last appointment at [facility] in April of 2021 until her recent presentation to [facility] on 3/3 of this year for altered mental status. The patient was found to have two large hemorrhagic and partially necrotic intracranial masses, for which she was transferred to [facility] for further management including Neurosurgery and Heme/Onc consultation. The patient subsequently underwent left temporal and left parieto-occipital craniotomy on 3/12 for stereotactic resection of the masses which were pathologically confirmed to be metastatic lesions. She later developed right common femoral, deep femoral, and soleal DVTs for which she underwent placement of an IVC filter with IR secondary to her neurosurgical contraindication to anticoagulation at that time. The patient was discharged on 3/20 to [facility] from which she returned on 3/25 secondary to increased bilateral lower extremity swelling and pain with concern for clot progression after beginning lovenox at [facility]. A CTA on readmission revealed thrombus at the level of the filter with findings suggestive of venous congestion as well as suspected bilateral lower lobe pulmonary emboli. Given the temporal association with receipt of Lovenox at [facility], there was concern for HIT. 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 General Surgery was consulted today secondary to persistent leukocytosis in the setting of a fungating breast mass. The patient reports that her breast has remained painful but is not increased in pain. She does report intermittent bleeding from the mass. She reports no fevers no chills no nausea no vomiting. Patient is still unsure if she would want surgery or chemotherapy for her breast cancer. Past Medical History:      Diagnosis Date    Thyroid nodule      Past Surgical History:      Procedure Laterality Date    CRANIOTOMY Left 3/12/2022    LEFT TEMPORAL PARIETAL OCCIPITAL CRANIOTOMY FOR TUMOR RESECTION performed by Monica Calhoun MD at [address] IR IVC FILTER PLACEMENT W IMAGING N/A 03/16/2022    [facility]    IR IVC FILTER PLACEMENT W IMAGING  3/16/2022    IR IVC FILTER PLACEMENT W IMAGING 3/16/2022 [facility]       Allergies:  Patient has no known allergies. Medications:   Home Meds  No current facility-administered medications on file prior to encounter.      Current Outpatient Medications on File Prior to Encounter   Medication Sig Dispense Refill    Multiple Vitamin (MULTIVITAMIN ADULT PO) Take 1 capsule by mouth daily      Calcium Carbonate-Vitamin D (OYSTER SHELL CALCIUM/D) 500-200 MG-UNIT TABS Take 1 tablet by mouth 2 times daily (with meals)      ibuprofen (ADVIL;MOTRIN) 800 MG tablet Take 800 mg by mouth every 8 hours as needed      levETIRAcetam (KEPPRA) 1000 MG tablet Take 2 tablets by mouth daily 60 tablet 3    levETIRAcetam (KEPPRA) 500 MG tablet Take 5 tablets by mouth nightly 60 tablet 3    melatonin 5 MG TBDP disintegrating tablet Take 1 tablet by mouth nightly      QUEtiapine (SEROQUEL) 25 MG tablet Take 0.5 tablets by mouth nightly for 3 days 2 tablet 0    pantoprazole (PROTONIX) 40 MG tablet Take 1 tablet by mouth every morning (before breakfast) 30 tablet 3    lacosamide (VIMPAT) 200 MG tablet Take 1 tablet by mouth 2 times daily for 30 days. 60 tablet 0       Current Meds  HYDROmorphone (DILAUDID) injection 0.25 mg, Q3H PRN   Or  HYDROmorphone (DILAUDID) injection 0.5 mg, Q3H PRN  insulin regular (HUMULIN R;NOVOLIN R) injection 10 Units, Once   And  dextrose 10 % infusion, Once  glucose (GLUTOSE) 40 % oral gel 15 g, PRN  dextrose 50 % IV solution, PRN  glucagon (rDNA) injection 1 mg, PRN  dextrose 5 % solution, PRN  piperacillin-tazobactam (ZOSYN) 4,500 mg in dextrose 5 % 100 mL IVPB (mini-bag), Q8H  argatroban infusion 50mg in 0.9% sodium chloride 50 mL (premix), Continuous  [Held by provider] 0.9 % sodium chloride infusion, Continuous  lacosamide (VIMPAT) tablet 200 mg, BID  levETIRAcetam (KEPPRA) tablet 2,000 mg, Daily  levETIRAcetam (KEPPRA) tablet 2,500 mg, Nightly  melatonin disintegrating tablet 5 mg, Nightly  pantoprazole (PROTONIX) tablet 40 mg, QAM AC  sodium chloride flush 0.9 % injection 5-40 mL, 2 times per day  sodium chloride flush 0.9 % injection 5-40 mL, PRN  0.9 % sodium chloride infusion, PRN  ondansetron (ZOFRAN-ODT) disintegrating tablet 4 mg, Q8H PRN   Or  ondansetron (ZOFRAN) injection 4 mg, Q6H PRN  polyethylene glycol (GLYCOLAX) packet 17 g, Daily PRN  acetaminophen (TYLENOL) tablet 650 mg, Q6H PRN   Or  acetaminophen (TYLENOL) suppository 650 mg, Q6H PRN        Family History:   Family History   Problem Relation Age of Onset    Cancer Father        Social History:   TOBACCO:   reports that she has never smoked. She has never used smokeless tobacco.  ETOH:   reports no history of alcohol use. DRUGS:   reports no history of drug use. Review of Systems:   A 14 point review of systems was conducted, significant findings as noted in HPI. All other systems negative.     Physical Exam:    Vitals:    03/27/22 1843 03/27/22 2000 03/27/22 2232 03/27/22 2300   BP: 118/76   129/85   Pulse: 137 124 118 116   Resp: 24   18   Temp: 100.3 °F (37.9 °C) 99 °F (37.2 °C)  98 °F (36.7 °C)   TempSrc: Oral Oral  Oral   SpO2: 95%   97% Weight:       Height:           General appearance: alert; no acute distress  HEENT: Normocephalic/atraumatic; PERRL; no scleral icterus; trachea midline; no JVD; no lymphadenopathy  Chest/Lungs: Normal effort; breathing room air; right breast with large non-tender fungating mass with no signs of infection at this time (clinical image below)  Cardiovascular: Tachycardic; normotensive   Abdomen: Non-distended; soft; non-tender  Skin: warm and dry; no exanthems  Extremities: no edema; no cyanosis; right axilla with enlarged non-tender lymph node   Neuro: A&Ox3; sensation intact    Clinical image below obtained after receiving verbal consent from patient:          Laboratory Results:    CBC:   Recent Labs     03/26/22  0625 03/26/22 1929 03/27/22  0556 03/27/22  1135 03/27/22 1947   WBC 14.3*  --  12.8* 13.8*  --    HGB 9.0*   < > 7.7* 7.7* 7.6*   HCT 26.8*  --  23.9* 23.2*  --    MCV 82.1  --  84.7 82.8  --    PLT 76*  --  116* 127*  --     < > = values in this interval not displayed.      BMP:   Recent Labs     03/26/22  0625 03/27/22  0556 03/27/22  1135   * 129* 126*   K 4.9 5.2* 4.8   CL 96* 99 94*   CO2 20* 19* 18*   BUN 16 15 13   CREATININE 0.6 0.6 0.6     PT/INR:   Recent Labs     03/25/22  1904   PROTIME 13.9*   INR 1.22*     APTT:   Recent Labs     03/26/22 1929 03/27/22  0051 03/27/22  0556   APTT 40.8* 47.5* 46.7*     Liver Profile:  Lab Results   Component Value Date    AST 28 03/25/2022    ALT 31 03/25/2022    BILIDIR <0.2 03/25/2022    BILITOT 0.4 03/25/2022    ALKPHOS 74 03/25/2022   No results found for: CHOL, HDL, TRIG  UA:   Lab Results   Component Value Date    COLORU Yellow 03/27/2022    PHUR 6.0 03/27/2022    WBCUA 0-2 03/27/2022    RBCUA 0-2 03/27/2022    MUCUS 1+ 03/18/2022    BACTERIA 3+ 03/27/2022    CLARITYU Clear 03/27/2022    SPECGRAV >=1.030 03/27/2022    LEUKOCYTESUR Negative 03/27/2022    UROBILINOGEN 0.2 03/27/2022    BILIRUBINUR Negative 03/27/2022    BLOODU Negative 03/27/2022    GLUCOSEU Negative 03/27/2022       Imaging:   XR CHEST PORTABLE   Final Result      Limited evaluation of lower right lung due to overlapping density for which infiltrate cannot be excluded. Otherwise no acute process seen. CT HEAD WO CONTRAST   Final Result      Postsurgical changes with areas of low cortical attenuation in the left parietal and left occipital region appear essentially stable from prior study with associated mild midline shift which is similar to slightly improved from prior study. No evidence of developing acute intracranial hemorrhage. CTA ABDOMEN PELVIS W WO CONTRAST   Final Result   1. 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 2. Surrounding hazy density, likely related to venous congestion. However, streaky densities extending inferiorly on the right may represent blood. However, there is no evidence of retroperitoneal hematoma. Correlation with lower extremity noninvasive    duplex Doppler is recommended in addition to serial hemoglobin and crit levels. 3. Suspect bilateral lower lobe pulmonary emboli. See comments above      Redemonstrated is a large necrotic mass involving the right breast enlarged fibroid uterus. Assessment/Plan: This is a 48 y.o. female with known metastatic triple negative invasive ductal carcinoma of the right breast, for which she has chosen to forego medical or surgical treatment up until this point, aside from neurosurgery intervention for brain metastases. General Surgery was consulted to evaluate the mass for infection as a potential cause of elevated leukocytosis. Though the mass does not appear infected, resection was discussed with the patient. At this time the patient reports feeling overwhelmed and unable to make a decision.  It was made clear that even if the patient wishes to not pursue chemotherapy, a palliative resection is still an option for pain control and to cease any continued bleeding from the mass. - Agree with palliative care consultation for goals of care   - No acute surgical intervention planned at this time  - Will return to discuss palliative resection with the patient  - The patient was seen and examined with Dr. Michelle Pace MD, MPH  PGY-3 General Surgery  3/27/2022    I saw and independently examined the patient today. I agree with the history of present illness, past medical/surgical histories, family history, social history, medication list and allergies as listed. The review of systems is as noted above. My physical exam confirms the findings listed above. Review of labs, pathology reports, radiology reports and medical records confirm the findings noted above. I edited the note where appropriate.       Overall a difficult clinical decision making situation  Has lot of medical problems   Risks and benefits of palliative mastectomy needs weighted carefully  Palliative care consult    Amanda Solis MD  Surgery Attending

## 2022-03-28 NOTE — PROGRESS NOTES
Physical Therapy    Facility/Department: [facility]  Initial Assessment and Treatment     NAME: Fidel Nettles  : 1975  MRN: [id number]    Date of Service: 3/28/2022    Discharge Recommendations: Fidel Nettles scored a 8/24 on the AM-PAC short mobility form. Current research shows that an AM-PAC score of 17 or less is typically not associated with a discharge to the patient's home setting. Based on the patient's AM-PAC score and their current functional mobility deficits, it is recommended that the patient have 5-7 sessions per week of Physical Therapy at d/c to increase the patient's independence. At this time, this patient demonstrates the endurance, and/or tolerance for 3 hours of therapy each day, with a treatment frequency of 5-7x/wk. Please see assessment section for further patient specific details. If patient discharges prior to next session this note will serve as a discharge summary. Please see below for the latest assessment towards goals. PT Equipment Recommendations  Equipment Needed:  (Defer)    Assessment   Body structures, Functions, Activity limitations: Decreased functional mobility ; Decreased ROM; Decreased strength;Decreased endurance;Decreased balance;Decreased coordination; Increased pain;Decreased posture  Assessment: Pt from [facility]. Pt has hx significant for breast cancer with mets to the brain and s/p craniotomy on  complicated by seizures. Pt has decreased independent mobility. Pt's mobility is currently limited by weakness and severe pain B LE pain with all mobility. Pt normally ambulates independently without AD and currently requiring max assist x1 for bed mobility and transfers with gage steady. Pt reported increased dizziness upon standing - BP remained stable however HR increased to 156, down to 135 in bedside chair, RN notified. Pt is a fall risk and not safe to stand alone.  Safety concerns for pt returing home however would seizures, IVC filter placement (3/16). Family / Caregiver Present: No  Referring Practitioner: Tamie Duenas MD  Referral Date : 03/25/22  Diagnosis: Thrombocytopenia  Subjective  Subjective: Pt presents lying supine in bed. Pt agreeable to PT. Pt states \"my legs just hurt\". Pain Screening  Patient Currently in Pain: Yes (6/10 at rest, 10/10 with mobility, B LE's, RN notified & aware)  Vital Signs  Patient Currently in Pain: Yes (6/10 at rest, 10/10 with mobility, B LE's, RN notified)       Orientation  Orientation  Overall Orientation Status: Within Functional Limits  Social/Functional History  Social/Functional History  Lives With:  (Mother)  Type of Home: House (Bedroom and bathroom upstairs)  Home Layout: Two level  Home Access: Stairs to enter with rails  Entrance Stairs - Number of Steps: 4-5  Bathroom Shower/Tub: Tub/Shower unit  Bathroom Toilet: Standard  ADL Assistance: Independent  Homemaking Assistance: Independent  Homemaking Responsibilities: Yes  Ambulation Assistance: Independent  Transfer Assistance: Independent  Active : No (Stopped recently)  Occupation: Full time employment  Type of occupation: [email or web address]  Additional Comments: Pt reports 0 falls in last 6 months. Cognition        Objective          AROM RLE (degrees)  RLE General AROM: All motions grossly decreased; AAROM - WFL  AROM LLE (degrees)  LLE General AROM: All motions grossly decreased; AAROM - WFL  Strength RLE  Comment: Grossly decreased - all motions grossly 2+/5  Strength LLE  Comment: Grossly decreased - all motions grossly 2+/5     Sensation  Overall Sensation Status: WFL  Bed mobility  Supine to Sit: Maximum assistance (Assist required for DELMER Gordillo HOB elevated, increased time and effort required)  Scooting:  Moderate assistance (Increased time and effort, to EOB)  Transfers  Sit to Stand:  (2 attempts with walker - requiring max assist x2 (able to partially clear buttocks); 1 attempt with gage steady - max assist x1, able to achieve upright standing posture, VC's for hand placement)  Stand to sit: Maximum Assistance (Required for controlled descent)  Bed to Chair: Dependent/Total (Via gage steady)  Ambulation  Ambulation?: No     Balance  Posture: Fair  Sitting - Static: Good (SBA)  Sitting - Dynamic: Fair (CGA)  Standing - Static: Fair Janell Pearl steady - max assist x1)        Plan   Plan  Times per week: 2-5  Current Treatment Recommendations: Strengthening,ROM,Balance Training,Functional Mobility Training,Transfer Training,Endurance Training,Gait Training,Neuromuscular Re-education,Pain Management,Home Exercise Program,Safety Education & Training,Patient/Caregiver Education & Training  Safety Devices  Type of devices: Call light within reach,Left in chair,Chair alarm in place,Nurse notified    G-Code       OutComes Score                                                  AM-PAC Score  AM-PAC Inpatient Mobility Raw Score : 8 (03/28/22 0955)  AM-PAC Inpatient T-Scale Score : 28.52 (03/28/22 0955)  Mobility Inpatient CMS 0-100% Score: 86.62 (03/28/22 0955)  Mobility Inpatient CMS G-Code Modifier : CM (03/28/22 0955)          Goals  Short term goals  Time Frame for Short term goals: D/C  Short term goal 1: Perform supine to sit with min assist x1  Short term goal 2: Perform STS transfer with mod assist x1 and LRAD  Short term goal 3: Perform bed to chair transfer with mod assist x1 and LRAD  Patient Goals   Patient goals :  To improve mobility and pain       Therapy Time   Individual Concurrent Group Co-treatment   Time In 0821         Time Out 0914         Minutes 53               Timed Code Treatment Minutes:   38    Total Treatment Minutes:  Azam 45, SPT

## 2022-03-28 NOTE — PROGRESS NOTES
Patient unable to void has purewick in placed. Bladder scanned for 647 cc of urine. Patient straight cath for 700 cc of cecily, cloudy urine with some sediment noted. Patient did have a UA sent on day shift 3/27/22. This is the 2nd time Patient has needed to be straight cath for not being able to void. Obtained order to leave mas in place.

## 2022-03-28 NOTE — PROGRESS NOTES
Surgery Daily Progress Note  Nadja Ramírez  CC:  Necrotic breast mass      Subjective :No acute events overnight. Figueroa placed during evening due to urinary retention. T max 100.3. Remains HDS. Complaining of breast pain    No other complaints. Review of systems is otherwise negative    Past medical history, Past surgical history, Social history, Family history and allergies reviewed and updated. Objective    Infusions:   dextrose      dextrose      argatroban infusion 0.634 mcg/kg/min (03/27/22 1938)    [Held by provider] sodium chloride 100 mL/hr at 03/26/22 2323    sodium chloride          I/O:I/O last 3 completed shifts: In: 2100 [P.O.:2100]  Out: 900 [Urine:900]           Wt Readings from Last 1 Encounters:   03/26/22 202 lb 13.2 oz (92 kg)                 LABS:    Recent Labs     03/27/22  0556 03/27/22  0556 03/27/22  1135 03/27/22  1135 03/27/22  1947 03/28/22  0047   WBC 12.8*  --  13.8*  --   --   --    HGB 7.7*   < > 7.7*   < > 7.6* 8.1*   HCT 23.9*  --  23.2*  --   --   --    MCV 84.7  --  82.8  --   --   --    *  --  127*  --   --   --     < > = values in this interval not displayed. Recent Labs     03/27/22  0556 03/27/22  1135   * 126*   K 5.2* 4.8   CL 99 94*   CO2 19* 18*   BUN 15 13   CREATININE 0.6 0.6        Recent Labs     03/25/22  1904   AST 28   ALT 31   BILIDIR <0.2   BILITOT 0.4   ALKPHOS 74      No results for input(s): LIPASE, AMYLASE in the last 72 hours. Recent Labs     03/25/22  1904 03/26/22  1524 03/27/22  0051 03/27/22  0556   PROT 7.5  --   --   --    INR 1.22*  --   --   --    APTT  --    < > 47.5* 46.7*    < > = values in this interval not displayed. No results for input(s): CKTOTAL, CKMB, CKMBINDEX, TROPONINI in the last 72 hours.             Exam:/81   Pulse 121   Temp 98.9 °F (37.2 °C) (Oral)   Resp 18   Ht 5' 3\" (1.6 m)   Wt 202 lb 13.2 oz (92 kg)   SpO2 95%   BMI 35.93 kg/m²   General appearance: alert, appears stated age and cooperative  HEENT: Normocephalic/atraumatic; PERRL; no scleral icterus; trachea midline; no JVD; no lymphadenopathy  Chest/Lungs: Normal effort; breathing room air; right breast with large non-tender fungating mass with no signs of infection at this time (clinical image below)  Cardiovascular: Tachycardic; normotensive   Abdomen: Non-distended; soft; non-tender  Skin: warm and dry; no exanthems  Extremities: no edema; no cyanosis; right axilla with enlarged non-tender lymph node   Neuro: A&Ox3; sensation intact    ASSESSMENT/PLAN: Pt. is a 48 y.o. female with known metastatic triple negative invasive ductal carcinoma of the right breast, for which she has chosen to forego medical or surgical treatment up until this point, aside from neurosurgery intervention for brain metastases. General Surgery was consulted to evaluate the mass for infection as a potential cause of elevated leukocytosis. Though the mass does not appear infected, resection was discussed with the patient. At this time the patient reports feeling overwhelmed and unable to make a decision. It was made clear that even if the patient wishes to not pursue chemotherapy, a palliative resection is still an option for pain control and to cease any continued bleeding from the mass. - will await palliative care consult where goals of care can be discussed  - no acute surgical intervention planned at this time  - surgery will be available for continued discussion regarding palliative resection  tonny Tapia 60, APRN - CNP 3/28/2022 6:40 AM  [phone]    I have personally performed the medical history, physical exam and medical decision making and agree with all pertinent clinical information unless otherwise noted.      Discussed with patient about surgery and she will think about it    Amanda Solis MD  Surgery Attending

## 2022-03-28 NOTE — PROGRESS NOTES
Patient arrived from cath lab. Patient drowsy and able to follow commands x4- coming off sedation. Patient 140s to 150s on monitor, and TMAX 102.7F- Tylenol given per order and room cooled. MD notified and at bedside. New orders placed with Labs, CT, and Antibiotics. Patient becoming more alert and bedside swallow completed with no cough and no s/s of aspiration.

## 2022-03-28 NOTE — CONSULTS
[facility]  Palliative Medicine Consultation Note      Date Of Admission:3/25/2022  Date of consult: 03/28/22  Seen by [facility] in the past:  Yes    Recommendations:        Met with pt's mother/next of kin Hailee at the bedside. The pt was sleeping and Hailee wanted to let her sleep, due to pt feeling very overwhelmed today with numerous staff talking to her about treatment options. Hailee and I discussed how pt has been doing and what lies ahead. Hailee believes pt will agree to all the proposed treatments, including thrombectomy, palliative resection of breast mass, radiation, and chemotherapy. She says her daughter gets very overwhelmed by medical interventions, daily lab draws, etc. I spoke with Hailee about how some patients decide to transition to comfort care if they feel that the burden begins to outweigh the benefit of treatment. Hailee has sher that God will heal Deidre Ty and believes that Deidre Ty will agree to the interventions that have been offered. I had a voluntary discussion about advance care planning with Hailee. We discussed code status in depth. Hailee has concerns about the potential burdens/risks of CPR, but wasn't ready to make a decision about it today. Offered emotional support, and will continue to follow. D/w RN Katina Glez and CM Wild Carpio. 1. Goals of Care/Advanced Care planning/Code status: Full Code, discussed with pt's mother today. Hailee believes pt will agree to all the proposed treatments, including thrombectomy, palliative resection of breast mass, radiation, and chemotherapy. 2. Pain: pt has c/o bilateral leg pain, likely related to DVTs per mother's report. She has not c/o pain at her breast. She has received 4 doses of dilaudid (total 2 mg) and 1 dose of oxycodone (10 mg) in the past 24 hours. Thrombectomy planned for this afternoon. 3. SOB: pt is breathing comfortably on room air.   4. Disposition: d/c to ARU/SNF when medically ready    Reason for Consult:         [x] Goals of Care  []  Code Status Discussion/Advanced Care Planning   []  Psychosocial/Family Support  []  Symptom Management  []  Other (Specify)    Requesting Physician: Dr. Mary Dietrich:  Leg pain    History Obtained From:  mother, electronic medical record    History of Present Illness:         Manasa Rivera is a 48 y.o. female with PMH of Stage IIB invasive ductal carcinoma (ER-, NM-, HER2 -) w/ known intracranial metastasis s/p resection, seizures, and DVTs who presented with leg pain and concern for HIT from her OSH ARU. There was concern for worsening DVTs at [facility]. CT abdomen and pelvis was performed which did show thrombosis of the IVC at the level of the filter with marked distention of the caval bifurcation. Hematology oncology was consulted and recommended continuing argatroban. Surgery is consulted as well and plan thrombectomy. Nephrology is following for hyponatremia.     Subjective:         Past Medical History:        Diagnosis Date    Thyroid nodule        Past Surgical History:        Procedure Laterality Date    CRANIOTOMY Left 3/12/2022    LEFT TEMPORAL PARIETAL OCCIPITAL CRANIOTOMY FOR TUMOR RESECTION performed by Zhane Goncalves MD at [address] IR IVC [address] N/A 03/16/2022    [facility]    IR IVC FILTER PLACEMENT W IMAGING  3/16/2022    IR IVC FILTER PLACEMENT W IMAGING 3/16/2022 [facility]       Current Medications:    Medications Prior to Admission: Multiple Vitamin (MULTIVITAMIN ADULT PO), Take 1 capsule by mouth daily  Calcium Carbonate-Vitamin D (OYSTER SHELL CALCIUM/D) 500-200 MG-UNIT TABS, Take 1 tablet by mouth 2 times daily (with meals)  ibuprofen (ADVIL;MOTRIN) 800 MG tablet, Take 800 mg by mouth every 8 hours as needed  levETIRAcetam (KEPPRA) 1000 MG tablet, Take 2 tablets by mouth daily  levETIRAcetam (KEPPRA) 500 MG tablet, Take 5 tablets by mouth nightly  melatonin 5 MG TBDP disintegrating tablet, Take 1 tablet by mouth nightly  QUEtiapine (SEROQUEL) 25 MG tablet, Take 0.5 tablets by mouth nightly for 3 days  pantoprazole (PROTONIX) 40 MG tablet, Take 1 tablet by mouth every morning (before breakfast)  lacosamide (VIMPAT) 200 MG tablet, Take 1 tablet by mouth 2 times daily for 30 days. Allergies:  Patient has no known allergies. Social History:    · TOBACCO: reports that she has never smoked. She has never used smokeless tobacco.  · ETOH:   reports no history of alcohol use. · Patient currently lives with family - mother    Review of Systems -   Review of Systems: Unable to obtain per mother's wishes to let pt rest.    Objective:        Physical Exam  Constitutional:       General: She is not in acute distress. HENT:      Head: Normocephalic and atraumatic. Pulmonary:      Effort: Pulmonary effort is normal.      Deferred full exam per mother's wishes to let pt rest.    Palliative Performance Scale:  [] 60% Ambulation reduced; Significant disease; Can't do hobbies/housework; intake normal or reduced; occasional assist; LOC full/confusion  [] 50% Mainly sit/lie; Extensive disease; Can't do any work; Considerable assist; intake normal  Or reduced; LOC full/confusion  [x] 40% Mainly in bed; Extensive disease; Mainly assist; intake normal or reduced; occasional assist; LOC full/confusion  [] 30% Bed Bound; Extensive disease; Total care; intake reduced; LOC full/confusion  [] 20% Bed Bound; Extensive disease; Total care; intake minimal; Drowsy/coma  [] 10% Bed Bound; Extensive disease;  Total care; Mouth care only; Drowsy/coma  [] 0% Death      Vitals:    /80   Pulse 128   Temp 99.4 °F (37.4 °C) (Oral)   Resp 17   Ht 5' 3\" (1.6 m)   Wt 202 lb 13.2 oz (92 kg)   SpO2 97%   BMI 35.93 kg/m²     Labs:    BMP:   Recent Labs     03/27/22  0556 03/27/22  1135 03/28/22  0641   * 126* 124*   K 5.2* 4.8 4.8   CL 99 94* 93*   CO2 19* 18* 18*   BUN 15 13 14   CREATININE 0.6 0.6 0.6   GLUCOSE 131* 120* 128* CBC:   Recent Labs     03/27/22  0556 03/27/22  0556 03/27/22  1135 03/27/22  1135 03/27/22  1947 03/28/22  0047 03/28/22  0641   WBC 12.8*  --  13.8*  --   --   --  16.2*   HGB 7.7*   < > 7.7*   < > 7.6* 8.1* 7.7*   HCT 23.9*  --  23.2*  --   --   --  23.9*   *  --  127*  --   --   --  180    < > = values in this interval not displayed. LFT's:   Recent Labs     03/25/22 1904   AST 28   ALT 31   BILITOT 0.4   ALKPHOS 74     Troponin: No results for input(s): TROPONINI in the last 72 hours. BNP: No results for input(s): BNP in the last 72 hours. ABGs: No results for input(s): PHART, UWV0DVA, PO2ART in the last 72 hours. INR:   Recent Labs     03/25/22 1904   INR 1.22*       U/A:  Recent Labs     03/27/22  1218   COLORU Yellow   PHUR 6.0   WBCUA 0-2   RBCUA 0-2   BACTERIA 3+*   CLARITYU Clear   SPECGRAV >=1.030   LEUKOCYTESUR Negative   UROBILINOGEN 0.2   BILIRUBINUR Negative   BLOODU Negative   GLUCOSEU Negative       XR CHEST PORTABLE   Final Result      Limited evaluation of lower right lung due to overlapping density for which infiltrate cannot be excluded. Otherwise no acute process seen. CT HEAD WO CONTRAST   Final Result      Postsurgical changes with areas of low cortical attenuation in the left parietal and left occipital region appear essentially stable from prior study with associated mild midline shift which is similar to slightly improved from prior study. No evidence of developing acute intracranial hemorrhage. CTA ABDOMEN PELVIS W WO CONTRAST   Final Result   1. 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 2. Surrounding hazy density, likely related to venous congestion. However, streaky densities extending inferiorly on the right may represent blood. However, there is no evidence of retroperitoneal hematoma.  Correlation with lower extremity noninvasive    duplex Doppler is recommended in addition to serial hemoglobin and crit levels. 3. Suspect bilateral lower lobe pulmonary emboli. See comments above      Redemonstrated is a large necrotic mass involving the right breast enlarged fibroid uterus. VL Extremity Venous Bilateral    (Results Pending)         Conclusion/Time spent:         Recommendations see above    Pt/family [phone]  Time spent with patient and/or family: 30 min  Time reviewing records: 10 min   Time communicating with staff: 5 min     A total of 45 minutes spent with the patient and family on unit greater than 50% in counseling regarding palliative care and in goals of care for the patient. Thank you to Dr. Tim Isaacs for this consultation. We will continue to follow Ms. Ramírez's care as needed.     Alisia Radford NP  [address]

## 2022-03-28 NOTE — PROCEDURES
IR Brief Postoperative Note    Dulce Bethea  YOB: 1975  [id number]    Pre-operative Diagnosis: dvt, caval and iliac    Post-operative Diagnosis: Same    Procedure: aspiration thrombectomy. Chronic thrombus remains within the filter apex.  Flow restored through the clotted ivc    Anesthesia: moderate    Surgeons/Assistants: kirk    Estimated Blood Loss: 617 cc    Complications: none    Specimens: were not obtained    See full procedure dictation to follow      Jeremy Moreno MD MD  3/28/2022

## 2022-03-28 NOTE — CARE COORDINATION
Case Management Assessment           Initial Evaluation                Date / Time of Evaluation: 3/28/2022 4:25 PM                 Assessment Completed by: ISAIAH Kwong, FOZIAW    Patient Name: Delvis Chavez     YOB: 1975  Diagnosis: Thrombocytopenia ([address] [D69.6]  S/P craniotomy [Z98.890]  Acute deep vein thrombosis (DVT) of proximal vein of right lower extremity (HCC) [I82.4Y1]  Acute deep vein thrombosis (DVT) of other specified vein of right lower extremity ([address] [I82.491]     Date / Time: 3/25/2022  6:11 PM    Patient Admission Status: Inpatient    If patient is discharged prior to next notation, then this note serves as note for discharge by case management. Current PCP: JUDITH KIMBROUGH - CNP  Clinic Patient: No    Chart Reviewed: Yes  Patient/ Family Interviewed: No    Initial assessment completed at bedside with:  n/a    Hospitalization in the last 30 days: Yes    Emergency Contacts:  Extended Emergency Contact Information  Primary Emergency Contact: Hailee Ramírez  Address: [address] [place] [address] Phone: [phone]  Relation: Parent    Advance Directives:   Code Status: Full [address]: No    Financial  Payor: Steve Shaffer / Plan: Steve 58 / Product Type: *No Product type* /     Pre-cert required for SNF: Yes    Pharmacy    [place] [phone] [facility] [phone]  [address] [address]  Phone: [phone] Fax: [phone]      Potential assistance Purchasing Medications: Potential Assistance Purchasing Medications: No  Does Patient want to participate in local refill/ meds to beds program?:      Meds To Beds General Rules:  1. Can ONLY be done Monday- Friday between 8:30am-5pm  2. Prescription(s) must be in pharmacy by 3pm to be filled same day  3. Copy of (DVT) of proximal vein of right lower extremity (HCC) [I82.4Y1]  Acute deep vein thrombosis (DVT) of other specified vein of right lower extremity ([address] [I82.491]    The Patient and/or patient representative Priscilla Reyes and her family were provided with a choice of provider and agrees with the discharge plan Yes    Freedom of choice list was provided with basic dialogue that supports the patient's individualized plan of care/goals and shares the quality data associated with the providers.  Yes    Care Transition patient: No    ISAIAH Venegas, [address]  Case Management Department  Ph: [phone]

## 2022-03-29 LAB
ALBUMIN SERPL-MCNC: 2.6 G/DL (ref 3.4–5)
ALBUMIN SERPL-MCNC: 2.7 G/DL (ref 3.4–5)
ANION GAP SERPL CALCULATED.3IONS-SCNC: 10 MMOL/L (ref 3–16)
ANION GAP SERPL CALCULATED.3IONS-SCNC: 14 MMOL/L (ref 3–16)
APTT: 39.9 SEC (ref 26.2–38.6)
APTT: 40.6 SEC (ref 26.2–38.6)
APTT: 43.2 SEC (ref 26.2–38.6)
APTT: 44.6 SEC (ref 26.2–38.6)
BASE EXCESS ARTERIAL: -1 MMOL/L (ref -3–3)
BASOPHILS ABSOLUTE: 0 K/UL (ref 0–0.2)
BASOPHILS RELATIVE PERCENT: 0 %
BLOOD BANK DISPENSE STATUS: NORMAL
BLOOD BANK DISPENSE STATUS: NORMAL
BLOOD BANK PRODUCT CODE: NORMAL
BLOOD BANK PRODUCT CODE: NORMAL
BPU ID: NORMAL
BPU ID: NORMAL
BUN BLDV-MCNC: 14 MG/DL (ref 7–20)
BUN BLDV-MCNC: 17 MG/DL (ref 7–20)
CALCIUM SERPL-MCNC: 8 MG/DL (ref 8.3–10.6)
CALCIUM SERPL-MCNC: 8.3 MG/DL (ref 8.3–10.6)
CARBOXYHEMOGLOBIN ARTERIAL: 2.1 % (ref 0–1.5)
CHLORIDE BLD-SCNC: 92 MMOL/L (ref 99–110)
CHLORIDE BLD-SCNC: 95 MMOL/L (ref 99–110)
CO2: 16 MMOL/L (ref 21–32)
CO2: 20 MMOL/L (ref 21–32)
CREAT SERPL-MCNC: 0.6 MG/DL (ref 0.6–1.1)
CREAT SERPL-MCNC: 0.6 MG/DL (ref 0.6–1.1)
DESCRIPTION BLOOD BANK: NORMAL
DESCRIPTION BLOOD BANK: NORMAL
EOSINOPHILS ABSOLUTE: 0 K/UL (ref 0–0.6)
EOSINOPHILS RELATIVE PERCENT: 0 %
GFR AFRICAN AMERICAN: >60
GFR AFRICAN AMERICAN: >60
GFR NON-AFRICAN AMERICAN: >60
GFR NON-AFRICAN AMERICAN: >60
GLUCOSE BLD-MCNC: 123 MG/DL (ref 70–99)
GLUCOSE BLD-MCNC: 145 MG/DL (ref 70–99)
HCO3 ARTERIAL: 23 MMOL/L (ref 21–29)
HCT VFR BLD CALC: 22.5 % (ref 36–48)
HEMOGLOBIN, ART, EXTENDED: 7 G/DL
HEMOGLOBIN: 6.5 G/DL (ref 12–16)
HEMOGLOBIN: 6.6 G/DL (ref 12–16)
HEMOGLOBIN: 6.8 G/DL (ref 12–16)
HEMOGLOBIN: 7.5 G/DL (ref 12–16)
LACTIC ACID: 1.5 MMOL/L (ref 0.4–2)
LACTIC ACID: 1.9 MMOL/L (ref 0.4–2)
LACTIC ACID: 2.1 MMOL/L (ref 0.4–2)
LACTIC ACID: 3.2 MMOL/L (ref 0.4–2)
LYMPHOCYTES ABSOLUTE: 1.2 K/UL (ref 1–5.1)
LYMPHOCYTES RELATIVE PERCENT: 7 %
MAGNESIUM: 2.2 MG/DL (ref 1.8–2.4)
MCH RBC QN AUTO: 27.9 PG (ref 26–34)
MCHC RBC AUTO-ENTMCNC: 33.3 G/DL (ref 31–36)
MCV RBC AUTO: 83.9 FL (ref 80–100)
METHEMOGLOBIN ARTERIAL: 0.1 % (ref 0–1.4)
MONOCYTES ABSOLUTE: 1 K/UL (ref 0–1.3)
MONOCYTES RELATIVE PERCENT: 6 %
NEUTROPHILS ABSOLUTE: 14.4 K/UL (ref 1.7–7.7)
NEUTROPHILS RELATIVE PERCENT: 87 %
O2 SAT, ARTERIAL: 99 % (ref 93–100)
ORGANISM: ABNORMAL
PCO2 ARTERIAL: 31.3 MMHG (ref 35–45)
PDW BLD-RTO: 15.7 % (ref 12.4–15.4)
PH ARTERIAL: 7.47 (ref 7.35–7.45)
PHOSPHORUS: 2.8 MG/DL (ref 2.5–4.9)
PHOSPHORUS: 3.7 MG/DL (ref 2.5–4.9)
PLATELET # BLD: 205 K/UL (ref 135–450)
PMV BLD AUTO: 8.9 FL (ref 5–10.5)
PO2 ARTERIAL: 84.9 MMHG (ref 75–108)
POTASSIUM SERPL-SCNC: 4.5 MMOL/L (ref 3.5–5.1)
POTASSIUM SERPL-SCNC: 4.9 MMOL/L (ref 3.5–5.1)
RBC # BLD: 2.69 M/UL (ref 4–5.2)
SLIDE REVIEW: ABNORMAL
SODIUM BLD-SCNC: 122 MMOL/L (ref 136–145)
SODIUM BLD-SCNC: 125 MMOL/L (ref 136–145)
TCO2 ARTERIAL: 24 MMOL/L
URINE CULTURE, ROUTINE: ABNORMAL
WBC # BLD: 16.6 K/UL (ref 4–11)

## 2022-03-29 PROCEDURE — 2580000003 HC RX 258: Performed by: INTERNAL MEDICINE

## 2022-03-29 PROCEDURE — 85730 THROMBOPLASTIN TIME PARTIAL: CPT

## 2022-03-29 PROCEDURE — 82803 BLOOD GASES ANY COMBINATION: CPT

## 2022-03-29 PROCEDURE — 1200000000 HC SEMI PRIVATE

## 2022-03-29 PROCEDURE — 6370000000 HC RX 637 (ALT 250 FOR IP): Performed by: INTERNAL MEDICINE

## 2022-03-29 PROCEDURE — 85025 COMPLETE CBC W/AUTO DIFF WBC: CPT

## 2022-03-29 PROCEDURE — 99233 SBSQ HOSP IP/OBS HIGH 50: CPT | Performed by: SURGERY

## 2022-03-29 PROCEDURE — 80069 RENAL FUNCTION PANEL: CPT

## 2022-03-29 PROCEDURE — 36569 INSJ PICC 5 YR+ W/O IMAGING: CPT

## 2022-03-29 PROCEDURE — 6360000002 HC RX W HCPCS: Performed by: STUDENT IN AN ORGANIZED HEALTH CARE EDUCATION/TRAINING PROGRAM

## 2022-03-29 PROCEDURE — 2500000003 HC RX 250 WO HCPCS

## 2022-03-29 PROCEDURE — C1751 CATH, INF, PER/CENT/MIDLINE: HCPCS

## 2022-03-29 PROCEDURE — 36600 WITHDRAWAL OF ARTERIAL BLOOD: CPT

## 2022-03-29 PROCEDURE — 83605 ASSAY OF LACTIC ACID: CPT

## 2022-03-29 PROCEDURE — 2580000003 HC RX 258

## 2022-03-29 PROCEDURE — 2580000003 HC RX 258: Performed by: STUDENT IN AN ORGANIZED HEALTH CARE EDUCATION/TRAINING PROGRAM

## 2022-03-29 PROCEDURE — 85018 HEMOGLOBIN: CPT

## 2022-03-29 PROCEDURE — 6370000000 HC RX 637 (ALT 250 FOR IP): Performed by: STUDENT IN AN ORGANIZED HEALTH CARE EDUCATION/TRAINING PROGRAM

## 2022-03-29 PROCEDURE — 99233 SBSQ HOSP IP/OBS HIGH 50: CPT | Performed by: INTERNAL MEDICINE

## 2022-03-29 PROCEDURE — 36430 TRANSFUSION BLD/BLD COMPNT: CPT

## 2022-03-29 PROCEDURE — 36415 COLL VENOUS BLD VENIPUNCTURE: CPT

## 2022-03-29 PROCEDURE — 06H03DZ INSERTION OF INTRALUMINAL DEVICE INTO INFERIOR VENA CAVA, PERCUTANEOUS APPROACH: ICD-10-PCS | Performed by: INTERNAL MEDICINE

## 2022-03-29 PROCEDURE — 2500000003 HC RX 250 WO HCPCS: Performed by: INTERNAL MEDICINE

## 2022-03-29 PROCEDURE — 83735 ASSAY OF MAGNESIUM: CPT

## 2022-03-29 RX ORDER — SODIUM CHLORIDE 9 MG/ML
25 INJECTION, SOLUTION INTRAVENOUS PRN
Status: DISCONTINUED | OUTPATIENT
Start: 2022-03-29 | End: 2022-04-10 | Stop reason: HOSPADM

## 2022-03-29 RX ORDER — SODIUM CHLORIDE 0.9 % (FLUSH) 0.9 %
5-40 SYRINGE (ML) INJECTION EVERY 12 HOURS SCHEDULED
Status: DISCONTINUED | OUTPATIENT
Start: 2022-03-29 | End: 2022-04-10 | Stop reason: HOSPADM

## 2022-03-29 RX ORDER — SODIUM CHLORIDE 9 MG/ML
INJECTION, SOLUTION INTRAVENOUS PRN
Status: DISCONTINUED | OUTPATIENT
Start: 2022-03-29 | End: 2022-04-10 | Stop reason: HOSPADM

## 2022-03-29 RX ORDER — SODIUM CHLORIDE 0.9 % (FLUSH) 0.9 %
5-40 SYRINGE (ML) INJECTION PRN
Status: DISCONTINUED | OUTPATIENT
Start: 2022-03-29 | End: 2022-04-10 | Stop reason: HOSPADM

## 2022-03-29 RX ORDER — SODIUM CHLORIDE 9 MG/ML
INJECTION, SOLUTION INTRAVENOUS PRN
Status: DISCONTINUED | OUTPATIENT
Start: 2022-03-29 | End: 2022-03-30

## 2022-03-29 RX ORDER — TOLVAPTAN 15 MG/1
7.5 TABLET ORAL ONCE
Status: COMPLETED | OUTPATIENT
Start: 2022-03-29 | End: 2022-03-29

## 2022-03-29 RX ORDER — LIDOCAINE HYDROCHLORIDE 10 MG/ML
5 INJECTION, SOLUTION EPIDURAL; INFILTRATION; INTRACAUDAL; PERINEURAL ONCE
Status: COMPLETED | OUTPATIENT
Start: 2022-03-29 | End: 2022-03-29

## 2022-03-29 RX ADMIN — ARGATROBAN 0.79 MCG/KG/MIN: 50 INJECTION INTRAVENOUS at 12:02

## 2022-03-29 RX ADMIN — CLONAZEPAM 0.5 MG: 0.5 TABLET ORAL at 08:46

## 2022-03-29 RX ADMIN — SODIUM CHLORIDE 25 ML: 9 INJECTION, SOLUTION INTRAVENOUS at 08:42

## 2022-03-29 RX ADMIN — MEROPENEM 2000 MG: 1 INJECTION, POWDER, FOR SOLUTION INTRAVENOUS at 12:31

## 2022-03-29 RX ADMIN — MEROPENEM 2000 MG: 1 INJECTION, POWDER, FOR SOLUTION INTRAVENOUS at 23:48

## 2022-03-29 RX ADMIN — LACOSAMIDE 200 MG: 50 TABLET, FILM COATED ORAL at 21:46

## 2022-03-29 RX ADMIN — LEVETIRACETAM 2000 MG: 250 TABLET, FILM COATED ORAL at 08:46

## 2022-03-29 RX ADMIN — Medication 7.5 MG: at 23:49

## 2022-03-29 RX ADMIN — Medication 5 MG: at 21:46

## 2022-03-29 RX ADMIN — SODIUM CHLORIDE 25 ML: 9 INJECTION, SOLUTION INTRAVENOUS at 23:47

## 2022-03-29 RX ADMIN — SODIUM CHLORIDE, PRESERVATIVE FREE 10 ML: 5 INJECTION INTRAVENOUS at 08:45

## 2022-03-29 RX ADMIN — SODIUM CHLORIDE 25 ML: 9 INJECTION, SOLUTION INTRAVENOUS at 05:30

## 2022-03-29 RX ADMIN — MEROPENEM 2000 MG: 1 INJECTION, POWDER, FOR SOLUTION INTRAVENOUS at 05:31

## 2022-03-29 RX ADMIN — LACOSAMIDE 200 MG: 50 TABLET, FILM COATED ORAL at 08:53

## 2022-03-29 RX ADMIN — VANCOMYCIN HYDROCHLORIDE 1250 MG: 10 INJECTION, POWDER, LYOPHILIZED, FOR SOLUTION INTRAVENOUS at 08:43

## 2022-03-29 RX ADMIN — LEVETIRACETAM 2500 MG: 250 TABLET, FILM COATED ORAL at 22:34

## 2022-03-29 RX ADMIN — SODIUM BICARBONATE: 84 INJECTION, SOLUTION INTRAVENOUS at 12:11

## 2022-03-29 RX ADMIN — QUETIAPINE FUMARATE 12.5 MG: 25 TABLET ORAL at 21:50

## 2022-03-29 RX ADMIN — SODIUM CHLORIDE 25 ML: 9 INJECTION, SOLUTION INTRAVENOUS at 12:30

## 2022-03-29 RX ADMIN — PANTOPRAZOLE SODIUM 40 MG: 40 TABLET, DELAYED RELEASE ORAL at 06:16

## 2022-03-29 RX ADMIN — LIDOCAINE HYDROCHLORIDE 5 ML: 10 INJECTION, SOLUTION EPIDURAL; INFILTRATION; INTRACAUDAL; PERINEURAL at 14:26

## 2022-03-29 RX ADMIN — SODIUM BICARBONATE: 84 INJECTION, SOLUTION INTRAVENOUS at 06:16

## 2022-03-29 ASSESSMENT — PAIN SCALES - GENERAL
PAINLEVEL_OUTOF10: 0

## 2022-03-29 NOTE — CONSULTS
[facility] Nurse  Consult Note       NAME:  Eva Alanis  MEDICAL RECORD NUMBER:  [id number]  AGE: 48 y.o. GENDER: female  : 1975  TODAY'S DATE:  3/29/2022    Subjective   Reason for WOCN Evaluation and Assessment: R Breast - cancerous tumow      Eva Alanis is a 48 y.o. female referred by:   [] Physician  [x] Nursing  [] Other:     Wound Identification:  Wound Type: malignant wound  Contributing Factors: Breast Cancer    Wound History:   Mrs. Sean Flores is a 49 y/o F w/ a PMHx of Stage IIB invasive ductal carcinoma (ER-, [place]-, HER2 -) w/ known intracranial metastasis s/p resection, seizures, and DVTs p/w worsening leg pain and concern for HIT from her OSH ARU. Patient had a craniotomy w/ mass resection preformed on 3/12 and was subsequently discharged on 3/17 to an outside [address]). Patient re-admitted to [place] [address] on 3/18 after a 10 minute grand-mal seizure at [facility] and treated for sz's and new acute DVT. D/c'd on 3/20 again to [facility]. Patient brought back to [place] [address] ED today  reported worsening DVTs and concern for HIT. Patient had been receiving lovenox at [facility] and feels that her leg pain has been worsening. Reportedly platelet count decreased to 41/45/46 over the past 2 days.  Patient to be admitted for r/o of possible HIT and ongoing mgmt of her AoC DVTs  Current Wound Care Treatment: R Breast - Adaptic, abd pads    Patient Goal of Care:  [x] Wound Healing  [] Odor Control  [] Palliative Care  [] Pain Control   [] Other:         PAST MEDICAL HISTORY        Diagnosis Date    Thyroid nodule        PAST SURGICAL HISTORY    Past Surgical History:   Procedure Laterality Date    CRANIOTOMY Left 3/12/2022    LEFT TEMPORAL PARIETAL OCCIPITAL CRANIOTOMY FOR TUMOR RESECTION performed by Favian Villatoro MD at [address] IR IVC FILTER PLACEMENT W IMAGING N/A 2022    [facility]    IR IVC FILTER PLACEMENT W IMAGING  3/16/2022    IR IVC FILTER PLACEMENT W IMAGING 3/16/2022 [facility]    IR THROMB [id number]  3/28/2022    [facility] 3/28/2022 [address] SPECIAL PROCEDURES       FAMILY HISTORY    Family History   Problem Relation Age of Onset    Cancer Father        SOCIAL HISTORY    Social History     Tobacco Use    Smoking status: Never Smoker    Smokeless tobacco: Never Used   Substance Use Topics    Alcohol use: Never     Alcohol/week: 0.0 standard drinks    Drug use: Never       ALLERGIES    No Known Allergies    MEDICATIONS    No current facility-administered medications on file prior to encounter. Current Outpatient Medications on File Prior to Encounter   Medication Sig Dispense Refill    Multiple Vitamin (MULTIVITAMIN ADULT PO) Take 1 capsule by mouth daily      Calcium Carbonate-Vitamin D (OYSTER SHELL CALCIUM/D) 500-200 MG-UNIT TABS Take 1 tablet by mouth 2 times daily (with meals)      ibuprofen (ADVIL;MOTRIN) 800 MG tablet Take 800 mg by mouth every 8 hours as needed      levETIRAcetam (KEPPRA) 1000 MG tablet Take 2 tablets by mouth daily 60 tablet 3    levETIRAcetam (KEPPRA) 500 MG tablet Take 5 tablets by mouth nightly 60 tablet 3    melatonin 5 MG TBDP disintegrating tablet Take 1 tablet by mouth nightly      QUEtiapine (SEROQUEL) 25 MG tablet Take 0.5 tablets by mouth nightly for 3 days 2 tablet 0    pantoprazole (PROTONIX) 40 MG tablet Take 1 tablet by mouth every morning (before breakfast) 30 tablet 3    lacosamide (VIMPAT) 200 MG tablet Take 1 tablet by mouth 2 times daily for 30 days.  60 tablet 0       Objective    /75   Pulse 124   Temp 98.8 °F (37.1 °C) (Oral)   Resp 16   Ht 5' 3\" (1.6 m)   Wt 202 lb 13.2 oz (92 kg)   SpO2 98%   BMI 35.93 kg/m²     LABS:  WBC:    Lab Results   Component Value Date    WBC 16.6 03/29/2022     H/H:    Lab Results   Component Value Date    HGB 6.6 03/29/2022    HCT 22.5 03/29/2022     PTT:    Lab Results   Component Value Date    APTT 44.6 03/29/2022   [APTT}  PT/INR: Lab Results   Component Value Date    PROTIME 13.9 03/25/2022    INR 1.22 03/25/2022     HgBA1c:  No results found for: LABA1C    Assessment: R Breast - large mass, some bleeding noted   Ike Risk Score: Ike Scale Score: 19    Patient Active Problem List   Diagnosis Code    Thyroid nodule E04.1    Brain metastases (HCC) C79.31    Brain mass G93.89    Status epilepticus (HCC) G40.901    Duct cell carcinoma (HCC) C80.1    Cerebral edema (HCC) G93.6    Seizure (HCC) R56.9    Deep venous thrombosis (HCC) I82.409       Measurements:  Wound 03/04/22 Breast Lower;Right Large mass (Active)   Wound Image   03/29/22 1517   Wound Etiology Other 03/29/22 1517   Dressing Status New drainage noted;New dressing applied 03/29/22 1517   Wound Cleansed Cleansed with saline 03/29/22 1517   Dressing/Treatment Petroleum impregnated gauze;ABD 03/29/22 1517   Dressing Change Due 03/30/22 03/29/22 1517   Wound Length (cm) 11 cm 03/29/22 1517   Wound Width (cm) 10 cm 03/29/22 1517   Wound Depth (cm) 0 cm 03/29/22 1517   Wound Surface Area (cm^2) 110 cm^2 03/29/22 1517   Change in Wound Size % (l*w) -15.79 03/29/22 1517   Wound Volume (cm^3) 0 cm^3 03/29/22 1517   Wound Assessment Bleeding;Pink/red 03/29/22 1517   Drainage Amount Small 03/29/22 1517   Drainage Description Sanguinous 03/29/22 1517   Odor Mild 03/29/22 1517   Lety-wound Assessment Dry/flaky; Intact 03/29/22 1517   Margins Attached edges; Defined edges 03/29/22 1517   Number of days: 25   R Breast:        Incision 03/12/22 Head Upper;Left (Active)   Dressing Status Other (Comment) 03/28/22 0300   Incision Cleansed Soap and water; Other (Comment) 03/20/22 1349   Dressing/Treatment Open to air 03/28/22 1100   Closure Open to air 03/29/22 1150   Margins Approximated 03/29/22 1150   Incision Assessment Dry 03/29/22 1150   Drainage Amount None 03/29/22 1150   Odor None 03/29/22 1150   Lety-incision Assessment Intact 03/29/22 1150   Number of days: 17     Response to

## 2022-03-29 NOTE — PROGRESS NOTES
Progress Note    Admit Date: 3/25/2022  Diet: ADULT DIET; Regular    CC: leg pain     Interval history: Patient seen and examined at bedside. Yesterday patient underwent thrombectomy per IR, tolerated procedure well. Overnight patient's hemoglobin dropped to 6.5 s/p 1 unit PRBC. Patient spiked fever fever yesterday after thrombectomy, T-max 102.4  This a.m., patient sleeping easy to arouse but unable to hold a conversation as dozing off. Vitals, afebrile, persistently tachycardic . Sinus rhythm. BP stable 115/73. Urine output 850 mL. Net +4 L  Figueroa in place.   Medications:     Scheduled Meds:   metoprolol  5 mg IntraVENous Once    clonazePAM  0.5 mg Oral BID    QUEtiapine  12.5 mg Oral Nightly    meropenem  2,000 mg IntraVENous Q8H    vancomycin  1,250 mg IntraVENous Q12H    insulin regular  10 Units IntraVENous Once    lacosamide  200 mg Oral BID    levETIRAcetam  2,000 mg Oral Daily    levETIRAcetam  2,500 mg Oral Nightly    melatonin  5 mg Oral Nightly    pantoprazole  40 mg Oral QAM AC    sodium chloride flush  5-40 mL IntraVENous 2 times per day     Continuous Infusions:   sodium chloride      IV infusion builder 100 mL/hr at 03/29/22 0616    dextrose      dextrose      argatroban infusion 0.6341 mcg/kg/min (03/29/22 0616)    sodium chloride 25 mL (03/29/22 0842)     PRN Meds:sodium chloride, oxyCODONE **OR** oxyCODONE, LORazepam, glucose, dextrose, glucagon (rDNA), dextrose, sodium chloride flush, sodium chloride, ondansetron **OR** ondansetron, polyethylene glycol, acetaminophen **OR** acetaminophen    Objective:   Vitals:   T-max:  Patient Vitals for the past 8 hrs:   BP Temp Temp src Pulse Resp SpO2   03/29/22 0820 115/73 98.8 °F (37.1 °C) Axillary 126 16 98 %   03/29/22 0600 97/65 98.5 °F (36.9 °C) Axillary 126 18 97 %   03/29/22 0423 107/74 99.4 °F (37.4 °C) Axillary 130 20 99 %   03/29/22 0237 103/69 99.6 °F (37.6 °C) Axillary 132 18 99 %   03/29/22 0227 102/72 100.5 °F (38.1 °C) Axillary 133 18 100 %   03/29/22 0149 124/80 99.8 °F (37.7 °C) Axillary 134 18 99 %       Intake/Output Summary (Last 24 hours) at 3/29/2022 0928  Last data filed at 3/29/2022 1397  Gross per 24 hour   Intake 4310.28 ml   Output 850 ml   Net 3460.28 ml         Physical Exam  Constitutional:       Appearance: She is obese. Comments: Sleeping, lethargic. Unable to hold a conversation   HENT:      Head: Normocephalic and atraumatic. Nose: Nose normal.   Eyes:      Extraocular Movements: Extraocular movements intact. Conjunctiva/sclera: Conjunctivae normal.      Pupils: Pupils are equal, round, and reactive to light. Cardiovascular:      Rate and Rhythm: Regular rhythm. Tachycardia present. Pulses: Normal pulses. Heart sounds: Normal heart sounds. Pulmonary:      Effort: Pulmonary effort is normal.      Breath sounds: Normal breath sounds. Abdominal:      General: Bowel sounds are normal.      Palpations: Abdomen is soft. Musculoskeletal:         General: Swelling present. Cervical back: Normal range of motion. Right lower leg: Edema present. Left lower leg: Edema present. Neurological:      Mental Status: She is oriented to person, place, and time. LABS:    CBC:   Recent Labs     03/27/22  1135 03/27/22  1947 03/28/22  0641 03/28/22  1304 03/28/22  1800 03/29/22  0100 03/29/22  0647   WBC 13.8*  --  16.2*  --   --   --  16.6*   HGB 7.7*   < > 7.7*   < > 7.7* 6.5* 7.5*   HCT 23.2*  --  23.9*  --   --   --  22.5*   *  --  180  --   --   --  205   MCV 82.8  --  84.1  --   --   --  83.9    < > = values in this interval not displayed.      Renal:    Recent Labs     03/27/22  1135 03/28/22  0641 03/29/22  0513   * 124* 125*   K 4.8 4.8 4.9   CL 94* 93* 95*   CO2 18* 18* 16*   BUN 13 14 17   CREATININE 0.6 0.6 0.6   GLUCOSE 120* 128* 123*   CALCIUM 8.5 8.7 8.3   MG  --   --  2.20   PHOS  --   --  3.7   ANIONGAP 14 13 14     Hepatic:   Recent Labs 03/29/22  0513   LABALBU 2.6*     Troponin: No results for input(s): TROPONINI in the last 72 hours. BNP: No results for input(s): BNP in the last 72 hours. Lipids: No results for input(s): CHOL, HDL in the last 72 hours. Invalid input(s): LDLCALCU, TRIGLYCERIDE  ABGs:  No results for input(s): PHART, PJG0TCY, PO2ART, RFH1JPQ, BEART, THGBART, T0TUDMTJ, BDI3CNI in the last 72 hours. INR:   No results for input(s): INR in the last 72 hours. Lactate: No results for input(s): LACTATE in the last 72 hours. Cultures:  -----------------------------------------------------------------  RAD:   CT HEAD WO CONTRAST   Final Result      No acute or interval change. No hemorrhage or mass effect. CT ABDOMEN PELVIS W IV CONTRAST Additional Contrast? 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 IR GUIDED THROMB hospitals VEIN   Final Result   Impression: Technically successful inferior venacavogram and bilateral iliac venogram demonstrating marked ileal caval clot associated with the filter including thrombus above the IVC filter. Technically successful vacuum-assisted thrombectomy with restoration of flow through the inferior vena cava and improved patency of the ileal caval system. Clot remains within the inferior vena cava filter. VL Extremity Venous Bilateral         XR CHEST PORTABLE   Final Result      Limited evaluation of lower right lung due to overlapping density for which infiltrate cannot be excluded. Otherwise no acute process seen. CT HEAD WO CONTRAST   Final Result      Postsurgical changes with areas of low cortical attenuation in the left parietal and left occipital region appear essentially stable from prior study with associated mild midline shift which is similar to slightly improved from prior study. No evidence of developing acute intracranial hemorrhage.                      CTA ABDOMEN PELVIS Viktoria Ganser CONTRAST   Final Result   1. 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 2. Surrounding hazy density, likely related to venous congestion. However, streaky densities extending inferiorly on the right may represent blood. However, there is no evidence of retroperitoneal hematoma. Correlation with lower extremity noninvasive    duplex Doppler is recommended in addition to serial hemoglobin and crit levels. 3. Suspect bilateral lower lobe pulmonary emboli. See comments above      Redemonstrated is a large necrotic mass involving the right breast enlarged fibroid uterus. Assessment/Plan:     Metastatic triple negative breast cancer with intracranial metastasis  S/p craniotomy on 3/12    -Patient was diagnosed with triple negative breast cancer in March 2021 at which time clinical stage IIb. Patient chose to forego any medical or surgical treatment  -Discharged to [facility] on 3/20  -Today is POD 17  -CT head without contrast on 3/26 nonrevealing of acute ICH    -Pain control with Dilaudid pain panel as needed, oxycodone  -General surgery consulted, recommended palliative sx for pain management  -Consulted radiation oncology, suggested outpatient radiation directed towards the tumor bed. Will need chemo likely in the future following radiation.  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   Patient did get IVC filter on 3/16  -CT abdomen pelvis this admission 3/26 revealing thrombosis of IVC at the level of IVC filter with marked distention of the caval bifurcation and common iliac veins. Streaky densities extending inferiorly on the right may represent blood. But no evidence of retroperitoneal hematoma. Suspected B/L lower lobe PE.    -Serotonin assay pending  -Continue argatroban  -Consulted IR, s/p thrombectomy on 3/28: Inferior venacavogram and bilateral iliac venogram demonstrating marked iliac caval clot associated with filter induced thrombus above the IVC filter.   Successful vacuum-assisted thrombectomy with restoration of flow through the IVC and improved patency of ileal caval system and clot remaining within the IVC filter  -Bilateral lower extremity venous Doppler on 3/28 revealing bilateral acute oligoanuric DVTs      Moderate thrombocytopenia s/p HIT positive- improved  -DC all heparin-induced anticoagulation, avoiding future  -Continue argatroban drip  -Platelet improved to following argatroban and DC heparin and his anticoagulation  -Platelets 20> 694> 910> 205    Acute anemia likely 2/2 blood loss  -Hb 6.5 overnight s/p 1 unit PRBC  -Hb this a.m. 7.5  -Monitor CBC, transfuse for Hb <7    Respiratory alkalosis with compensated metabolic acidosis  -Patient been persistently tachypneic, ABG revealing pH 7.466, CO2 31.3, O2 84  -Bicarb 16< 18<21  -Patient started on bicarb drip at 60  -S/p straight cath x2 for urinary retention of approximately 600 &700 cc urine per bladder scan  -Figueroa in place  -Urology consulted appreciate recs  -Nephrology consulted, appreciate recs  -urine osmolality, urine sodium WNL  -Serum osmolality decreased to 69  -Urine output 850 mL yesterday, +4 L since admission    Hyponatremia  -Sodium 125  -Nephrology consulted, appreciate recs    Complicated UTI  -UA revealing of +3 bacteria, negative leukocyte esterase negative nitrites  -Zosyn changed to Vanco and Merrem      Sinus tachycardia  Likely etiology from fever/ pain/ PE/UTI  -EKG yesterday.  revealing sinus tachycardia, HR 130s  -Continuous telemetry    Seizures  -Keppra 2 g daily, Keppra 2.5 g nightly  -Vimpat 200 mg    Anxiety  -Ativan 1 mg as needed  -Holding Clonazepam 0.5 mg, pt lethargic this AM     PT score 8/24  OT score 10/24    Code Status: Full code  FEN: N.p.o.  PPX: Argatroban  DISPO: [facility]    Shivam Guidry MD, PGY-1  03/29/22  9:28 AM    This patient has been staffed and discussed with Jose Aguilar MD.

## 2022-03-29 NOTE — PROCEDURES
TRIMMABLE POWER MIDLINE PROCEDURE NOTE  Chart reviewed for allergies, diagnosis, labs, known contraindications, reason for line placement and planned length of treatment. Insertion procedure discussed with patient/family member. Informed consent not required for Midline placement. Three patient identifiers - Patient name,   and MRN -  completed &  confirmed verbally. Hat, mask and eye shield donned. Midline site scrubbed with Chloraprep  One-Step applicator  for 30 seconds x 1. Hand Hygiene  performed with 3% Chlorhexidine surgical scrub x1 min prior to  Sterile gloves, sterile gown being donned. Patient draped using maximal sterile barrier technique ( head to toe ). Midline site scrubbed a 2nd time with Chloraprep One-Step applicator x 30 sec. Vein located by [employer] Sound and site marked with sterile pen. 1% Lidocaine 5 ml injected intradermal pre-insertion. Midline inserted. Positive brisk blood return obtained Midline flushed with 10 mls  0.9% Sterile Sodium Chloride. Midline flushes easily with no resistance. Valve placed on all lumens followed by Alcohol Swab Caps on end of each. Skin prep applied to site. CHG dressing in place. Catheter secured with non-sutured locking device per hospital protocol. Sterile Tegaderm applied over Midline site. Sterile field maintained during procedure. Positioning wire accounted for post procedure. Pt. Response to procedure, tolerated well. Appearance of site: Clean dry and intact without bleeding or edema. All edges of Tegaderm occlusive. Site marked with date and initials of RN placing line. Top 2 side rails in up position, call button in reach, RN notified of all of the above. A Trimmable Power Midline  15  CM placed in the  DEE  basilic vein. 0  cm showing from insertion site.

## 2022-03-29 NOTE — CONSULTS
[place] [address], [address]                                  CONSULTATION    PATIENT NAME: Sahara Alvarez                :        1975  MED REC NO:   [id number]                          ROOM:       4307  ACCOUNT NO:   [de-identified]                           ADMIT DATE: 2022  PROVIDER:     Roque Momin MD    RADIATION ONCOLOGY CONSULT NOTE    HISTORY OF PRESENT ILLNESS:  The patient is seen today at the request of  Neurosurgery for discussion and evaluation of her brain metastasis. The  patient is a 47-year-old [facility] [employer] female with a previous history  of just a thyroid nodule. She has been really quite well her entire  life. Her past surgical history was quite minimal.  Her occupational  history is she is a nonsmoker and nondrinker. Her family history is  really negative for cancer. The patient presented with a large invasive  right breast cancer, that was diagnosed in 2021 at the [facility]. At that time, she was staged as a clinical 2B, but chose to  forgo any medical or surgical treatment due to family history with  breast cancer that was bad. The patient was lost to followup at [facility]  until 2021. That is when her last appointment was that she had been  seen and then she presented on  to [facility] with a history of  altered mental status and as she claims her speech shut down. It was  not so much loss of word, but she became aphasic with headaches. There  was no seizures, no visual disturbances or the like. The patient was  found to have two large hemorrhagic and partially necrotic intracranial  masses. She was transported for [facility]..  The patient underwent  left temporoparietal occipital craniotomy on  for the resection of  these masses, which were confirmed to be metastatic lesions.   Then she  developed a DVT in rehab and she is now seen for anticoagulation. She  is now seen for consideration of radiation therapy. PAST MEDICAL HISTORY:  Noncontributory. PAST SURGICAL HISTORY:  Noncontributory. ALLERGIES:  She has none known. HOME MEDICATIONS:  Vitamins and calcium. She is currently on Protonix  and Keppra. The patient is seen post surgery. She is alert, awake, and nicely  verbal.    REVIEW OF SYSTEMS:  Positive for breast pain on the right, but really no  other issues in terms of bowels, breathing, or musculoskeletal issues. PHYSICAL EXAMINATION:  There is a large breast mass. There is a well  healed surgical scar on the left side. The breast mass is protruding  through the skin with axillary adenopathy. ASSESSMENT AND PLAN:  At this point, we have a 43-year-old [facility]  American woman with a T4NxM1 breast cancer, triple-negative. At this  point, she is status post craniotomy and I think radiation directed  towards the tumor bed is an order. We will do this as an outpatient. Once this was accomplished, further decision for triple negative breast  cancer would be a chemotherapy will be addressed. At this point, the  patient has been given an outpatient appointment for her treatment.         Vernon Lambert MD    D: 03/28/2022 14:27:31       T: 03/28/2022 16:23:29     [id number]  Job#: [id number]     Doc#: [id number]

## 2022-03-29 NOTE — PROGRESS NOTES
NEUROSURGERY PROGRESS NOTE    Dinora Davis   [id number]   1975   3/29/2022    Hospital Day #4      Subjective: Patient sitting up in bed, reports her legs still feel very heavy and swollen. Her cranial incision is well approximated and she has no pain at site. Objective:  BP 97/65   Pulse 126   Temp 98.5 °F (36.9 °C) (Axillary)   Resp 18   Ht 5' 3\" (1.6 m)   Wt 202 lb 13.2 oz (92 kg)   SpO2 97%   BMI 35.93 kg/m²     Labs:  Recent Labs     03/27/22  1135 03/28/22  0641 03/29/22  0513   * 124* 125*   CL 94* 93* 95*   CO2 18* 18* 16*   BUN 13 14 17   CREATININE 0.6 0.6 0.6   GLUCOSE 120* 128* 123*     Recent Labs     03/27/22  0556 03/27/22  1135 03/28/22  0641   WBC 12.8* 13.8* 16.2*   RBC 2.82* 2.80* 2.84*          Neurologic Exam:  Neurological:  Mental Status: Awake, alert, oriented x 4  Attention: Intact  Language: No aphasia or dysarthria noted  Sensation: Intact to all extremities to light touch     Cranial Nerves:  Cranial Nerves:  II: Visual acuity not tested, denies new visual changes / diplopia  III, IV, VI: PERRL, 3 mm bilaterally, EOMI, no nystagmus noted  V: Facial sensation intact bilaterally to touch  VII: Face symmetric  VIII: Hearing intact bilaterally to spoken voice  IX: Palate movement equal bilaterally  XI: Shoulder shrug equal bilaterally  XII: Tongue midline     Musculoskeletal:   Gait: Not tested   Assist devices: None   Tone: normal   Motor strength: 4+/5 RUE, 5/5 RUE. 4/5 dorsi/plantar bilaterally. 2/5 proximal BLE, significant swelling due to venous stasis in BLE    Incision-staples removed c/d/i CHARLOTTE, well approximated     Assessment:  47 yo female with untreated breast CA who is s/p left temporal parietal occipital craniotomy for brain met resection by Dr. Gavin Escobar on 3/12/2022, admitted with BLE DVTs and thrombocytopenia    Plan:  1. No further neurosurgical intervention  2. Continue to clean incision daily and keep CHARLOTTE/dry  3.  Management of DVT/suspected PE by primary team              -cleared for anticoagulation from NSGY standpoint, on argatroban 2/2 HIT positive   -s/p thrombectomy of clotted IVC filter 3/28   4. Oncology/rad oncology/general surgery following   -general surgery considering palliative resection of breast, patient seems agreeable upon our discussion this morning        -patient will be clear for systemic chemotherapy vs radiation in 3-4 weks post operatively assuming her wound remains intact  5. Nephrology consulted for persistent hyponatremia  6. Continue Keppra/Vimpat for seizures   7. PT/OT/ST  8. Will follow peripherally while in house. Please call with questions/change in neurologic exam        DISPO-Dispo timing to be determined by primary team once patient is medically stable for discharge. JUDITH Mckeon-CNP  Neurosurgery Nurse Practitioner  [phone]  Patient was seen and examined with Dr. Luann Davis who agrees with above assessment and plan. Electronically signed by: JUDITH Sim NP, 3/29/2022 8:08 AM      __________________________________________________________________    I saw and examined Chana Angel on 03/29/22. I agree with the assessment and plan as detailed above.      Nura Philip MD, PhD  [address], [address] [facility], [address] [place] [address], [phone] (c), [phone] (o)

## 2022-03-29 NOTE — FLOWSHEET NOTE
provided listening, support, and prayer for patient, as well as her sister and godmother. Patient states that she has strong sher, and as a result is not afraid about her illness; instead she is very hopeful. Patient spoke about the challenge of being the focus of people's prayers, as she frequently prays for others; however, she states that these prayers comfort her and help her to remain positive.     03/29/22 1630   Encounter Summary   Services provided to: Patient and family together   Referral/Consult From: Nurse   Support System Parent; Family members; Amish/sher community   Continue Visiting   (3/29 [facility]; pt welcomes spir spt)   Complexity of Encounter Low   Length of Encounter 45 minutes   Spiritual Assessment Completed Yes   Routine   Type Initial   Assessment Calm; Approachable;Coping; Hopeful   Intervention Active listening;Explored feelings, thoughts, concerns;Prayer;Sustaining presence/ Ministry of presence; Discussed illness/injury and it's impact; Discussed relationship with God;Discussed meaning/purpose   Outcome Engaged in conversation; Shared life review;Expressed feelings/needs/concerns; Hopeful;Receptive

## 2022-03-29 NOTE — PROGRESS NOTES
Surgery Daily Progress Note  Nadja Ramírez  CC:  Necrotic breast mass      Subjective : Patient with temp last evening of 102.4  Lactate lillian to 3.2  Transferred to PCU . Remain stale now but remains tachycardic at 126. Pain controlled    No other complaints. Review of systems is otherwise negative    Past medical history, Past surgical history, Social history, Family history and allergies reviewed and updated. Objective    Infusions:   sodium chloride      IV infusion builder 100 mL/hr at 03/29/22 0616    dextrose      dextrose      argatroban infusion 0.6341 mcg/kg/min (03/29/22 0616)    sodium chloride 25 mL (03/29/22 0530)        I/O:I/O last 3 completed shifts: In: 1140 [P.O.:1140]  Out: 1600 [Urine:1600]           Wt Readings from Last 1 Encounters:   03/26/22 202 lb 13.2 oz (92 kg)                 LABS:    Recent Labs     03/27/22  1135 03/27/22  1947 03/28/22  0641 03/28/22  1304 03/28/22  1800 03/29/22  0100   WBC 13.8*  --  16.2*  --   --   --    HGB 7.7*   < > 7.7*   < > 7.7* 6.5*   HCT 23.2*  --  23.9*  --   --   --    MCV 82.8  --  84.1  --   --   --    *  --  180  --   --   --     < > = values in this interval not displayed. Recent Labs     03/28/22  0641 03/29/22  0513   * 125*   K 4.8 4.9   CL 93* 95*   CO2 18* 16*   PHOS  --  3.7   BUN 14 17   CREATININE 0.6 0.6      No results for input(s): AST, ALT, ALB, BILIDIR, BILITOT, ALKPHOS in the last 72 hours. No results for input(s): LIPASE, AMYLASE in the last 72 hours. Recent Labs     03/28/22  0641 03/29/22  0100   APTT 45.2* 40.6*      No results for input(s): CKTOTAL, CKMB, CKMBINDEX, TROPONINI in the last 72 hours.             Exam:BP 97/65   Pulse 126   Temp 98.5 °F (36.9 °C) (Axillary)   Resp 18   Ht 5' 3\" (1.6 m)   Wt 202 lb 13.2 oz (92 kg)   SpO2 97%   BMI 35.93 kg/m²   General appearance: alert, appears stated age and cooperative  HEENT: Normocephalic/atraumatic; PERRL; no scleral icterus; trachea midline; no JVD; no lymphadenopathy  Chest/Lungs: Normal effort; breathing room air; right breast with large non-tender fungating mass with no signs of infection at this time (clinical image below)  Cardiovascular: Tachycardic; normotensive   Abdomen: Non-distended; soft; non-tender  Skin: warm and dry; no exanthems  Extremities: no edema; no cyanosis; right axilla with enlarged non-tender lymph node   Neuro: A&Ox3; sensation intact    ASSESSMENT/PLAN: Pt. is a 48 y.o. female with known metastatic triple negative invasive ductal carcinoma of the right breast, for which she has chosen to forego medical or surgical treatment up until this point, aside from neurosurgery intervention for brain metastases. General Surgery was consulted to evaluate the mass for infection as a potential cause of elevated leukocytosis. Though the mass does not appear infected, resection was discussed with the patient. At this time the patient reports feeling overwhelmed and unable to make a decision. It was made clear that even if the patient wishes to not pursue chemotherapy, a palliative resection is still an option for pain control and to cease any continued bleeding from the mass.     -   - palliative care unable to speak with patient yesterday. Will touch base with palliative care team today to discuss patient's goals  - will continue to have discussion with patient regarding palliative surgery if patient amendable      JUDITH Cross CNP 3/29/2022 6:54 AM  [phone]    I have personally performed the medical history, physical exam and medical decision making and agree with all pertinent clinical information unless otherwise noted. Discussed with family about palliative surgical options.   Patient still thinking about going for surgery or not    Adam Kent MD  Surgery Attending

## 2022-03-29 NOTE — PROGRESS NOTES
Palliative Care Chart Review  and Check in Note:     NAME:  Yovani Solis Date: 3/25/2022  Hospital Day:  Hospital Day: 5   Current Code status: Full Code    Palliative care is continuing to following Ms. Ramírez for symptom management,  and goals of care discussion as needed. Patient's chart reviewed today 3/29/22. D/w JUDITH Townsend with surgery. Met with pt at the bedside. She is alert and oriented x3, no visitors present. She reports feeling overwhelmed by the amount of decisions she's been asked to make regarding her care. Acknowledged this emotion and explored further. She initially states that she would prefer that her mother make decisions on her behalf, however she does have questions about the palliative surgical procedure being offered. Offered to have surgery team come by and provide her with more information. She is receptive to this. Updated surgery team, resident will discuss with her later today. The following are the currently established goals/code status, and Symptom management. Goals of care: Pt's stated goals are to be able to go home and regain some independence. She does endorse wanted to pursue aggressive care at this time including radiation, chemo and surgery if necessary.      Code status: Full Code, did not discuss today    Discharge plan: Planning for ARU/SNF when medically ready for discharge      JUDITH Barcenas - CNP  03/29/22  11:25 AM

## 2022-03-29 NOTE — PROGRESS NOTES
I have discussed with the patient the rationale for blood component transfusion; its benefits in treating or preventing fatigue, organ damage, or death; and its risk which includes mild transfusion reactions, rare risk of blood borne infection, or more serious but rare reactions. I have discussed the alternatives to transfusion, including the risk and consequences of not receiving transfusion. The patient had an opportunity to ask questions and had agreed to proceed with transfusion of blood components.     Lottie Mathews MD PGY-1  2:13 AM

## 2022-03-29 NOTE — PLAN OF CARE
Problem: Falls - Risk of:  Goal: Will remain free from falls  Description: Will remain free from falls  Outcome: Ongoing   Pt's bed is in lowest position, brake and bed exit alarm are on, call light and bedside table are within reach. Problem: Pain:  Goal: Pain level will decrease  Description: Pain level will decrease  Outcome: Ongoing   Pt has denied pain during shift. Problem: Skin Integrity:  Goal: Absence of new skin breakdown  Description: Absence of new skin breakdown  Outcome: Ongoing   Pt shows no new skin breakdown during this shift. Problem: Bleeding:  Goal: Will show no signs and symptoms of excessive bleeding  Description: Will show no signs and symptoms of excessive bleeding  Outcome: Ongoing   Pt's hemoglobin was 6.5 during the shift and is being given 1 unit of PRBCs. Problem: Gas Exchange - Impaired:  Goal: Levels of oxygenation will improve  Description: Levels of oxygenation will improve  Outcome: Ongoing   Pt remains on RA during this shift. Problem: Infection, Septic Shock:  Goal: Will show no infection signs and symptoms  Description: Will show no infection signs and symptoms  Outcome: Ongoing   Pt's temperature has been elevated throughout shift. At start of shift it was 102.4 F, pt was given Tylenol and room temp was decreased. Pt's last temperature was 99.6 F. Problem: Venous Thromboembolism:  Goal: Absence of signs or symptoms of impaired coagulation  Description: Absence of signs or symptoms of impaired coagulation  Outcome: Ongoing   Pt is on Argatroban gtt.

## 2022-03-29 NOTE — CONSULTS
Urology Attending Consult Note      Reason for Consultation: Retention    History: [address] yo F with metastatic triple negative invasive ductal carcinoma sp craniotomy for resection of tumor mets on 3/12/22. Returned to hospital due to worsened DVT. While inpatient, she developed urinary retention and had mas placed 3/27/22. Currently draining clear. Family History, Social History, Review of Systems:  Reviewed and agreed to as per chart    Vitals:  /73   Pulse 126   Temp 98.8 °F (37.1 °C) (Axillary)   Resp 16   Ht 5' 3\" (1.6 m)   Wt 202 lb 13.2 oz (92 kg)   SpO2 98%   BMI 35.93 kg/m²   Temp  Av.2 °F (37.9 °C)  Min: 98.5 °F (36.9 °C)  Max: 102.4 °F (39.1 °C)    Intake/Output Summary (Last 24 hours) at 3/29/2022 4603  Last data filed at 3/29/2022 5844  Gross per 24 hour   Intake 4310.28 ml   Output 850 ml   Net 3460.28 ml         Physical:   Well developed, well nourished in no acute distress   Mood indicates no abnormalities. Pt doesnt appear depressed   Orientated to time and place   Neck is supple, trachea is midline   Respiratory effort is normal   Cardiovascular show no extremity swelling   Abdomen no masses or hernias are palpated, there is no tenderness. Liver and Spleen appear normal.   Skin show no abnormal lesions   Lymph nodes are not palpated in the inguinal, neck, or axillary area.     Female :   Mas clear    Labs:  WBC:    Lab Results   Component Value Date    WBC 16.6 2022     Hemoglobin/Hematocrit:    Lab Results   Component Value Date    HGB 7.5 2022    HCT 22.5 2022     BMP:    Lab Results   Component Value Date     2022    K 4.9 2022    K 4.8 2022    CL 95 2022    CO2 16 2022    BUN 17 2022    LABALBU 2.6 2022    CREATININE 0.6 2022    CALCIUM 8.3 2022    GFRAA >60 2022    LABGLOM >60 2022     PT/INR:    Lab Results   Component Value Date    PROTIME 13.9 2022    INR 1.22 03/25/2022     PTT:    Lab Results   Component Value Date    APTT 43.2 03/29/2022   [APTT    Impression/Plan: 49 yo F with triple negative breast cancer sp craniotomy. Admitted for worsened DVT. We were consulted for retention.    -Patient currently speaking with palliative care on next step plans  -For now, keep mas in place.  Voiding trial can be attempted on the day of discharge.   -Call with any questions    Benjiman Councilman, PA

## 2022-03-29 NOTE — CARE COORDINATION
CM following for discharge planning. [facility] would like to discharge to [facility], referral sent. Pt will need precert. Pt on IV abx, and argatroban drip. Pt is POD#15 for left temporal parietal occipital craniotomy for brain met resection by Dr. Luis Xiong on 3/12/2022, admitted with BLE DVTs and thrombocytopenia. Pt had thrombectomy on 3/28. PC, Urology, Nephrology and Neurosurgery following. Rad Onc also following for outpt radiation. CM to follow up with [address] [place] [address].      Gely Bolaños RN, BSN, [facility]  Case Management Department  [phone]

## 2022-03-29 NOTE — PLAN OF CARE
Problem: Falls - Risk of:  Goal: Will remain free from falls  Description: Will remain free from falls  3/29/2022 1315 by Ilir Sigala RN  Outcome: Ongoing  Note: Pt is a Fall Risk. See Tejal Gerardoadan Fall Risk Score. Pt bed in low position and side rails up. Call light and belongings in reach. Pt encouraged to call for assistance. Will continue with hourly rounds for PO intake, pain needs, toileting, and repositioning as needed. Problem: Pain:  Goal: Pain level will decrease  Description: Pain level will decrease  3/29/2022 1315 by Ilir Sigala RN  Outcome: Ongoing  Note: Patient denies pain at rest, but endorses pain with movement. Patient repositioned and satisfied with pain response. Will continue to monitor. Problem: Skin Integrity:  Goal: Absence of new skin breakdown  Description: Absence of new skin breakdown  3/29/2022 1315 by Ilir Sigala RN  Outcome: Ongoing  Note: Patient is encouraged to reposition every two hours. Dressings assessed every two hours for drainage and s/s of infection. Will continue to monitor. Problem: Bleeding:  Goal: Will show no signs and symptoms of excessive bleeding  Description: Will show no signs and symptoms of excessive bleeding  3/29/2022 1315 by Ilir Sigala RN  Outcome: Ongoing  Note: Patient on continuous argatroban drip and bleeding precautions. Patient educated to report bleeding to RN. Will continue to monitor. Problem: Gas Exchange - Impaired:  Goal: Levels of oxygenation will improve  Description: Levels of oxygenation will improve  3/29/2022 1315 by Ilir Sigala RN  Outcome: Ongoing  Note: Patient has oxygen saturation of 98% on room air. No dyspnea observed. Will continue to monitor.       Problem: Infection, Septic Shock:  Goal: Will show no infection signs and symptoms  Description: Will show no infection signs and symptoms  3/29/2022 1315 by Ilir Sigala RN  Outcome: Ongoing  Note: Patient has elevated heart rate of 124, but is afebrile. IV antibiotics administered per orders. Will continue to monitor.

## 2022-03-29 NOTE — CONSULTS
Clinical Pharmacy Progress Note    Vancomycin - Management by Pharmacy    Consult Date(s): 3/28  Consulting Provider(s): Dr. Da Silva Rota / Plan  Sepsis + UTI - Vancomycin   Concurrent Antimicrobials: Meropenem   Day of Vanc Therapy: 2   Current Dosing Method: Bayesian-Guided AUC Dosing   Therapeutic Goal: 400-600 mg/L*hr   Current Dose / Frequency: 1250 mg every 12 hours   Plan / Rationale:   o Patient received a 1750 mg IV x1 LD (25 mg/kg IBW) yesterday, followed by 1250 mg IV q12h. o This regimen estimates an AUC of 544 mg/L*hr and Trough 15.1 mg/L.  o Random level ordered for tomorrow AM to assess kinetics    Will continue to monitor clinical condition and make adjustments to regimen as appropriate. Thanks for consulting pharmacy! Kaykay Mcdonald PharmD  Pharmacy Resident   Please call with questions [phone]  3/29/2022 11:50 AM      Interval update: S/p thrombectomy day #1. Patient afebrile overnight after procedure, Tmax 102.4F. Hemoglobin dropped to 6.5 overnight and patient received 1 unit PRBC. Subjective/Objective: Ms. Domo Newsome is a 48 y.o. female with a PMHx significant for thyroid nodule, triple neg breast cancer with mets to the brain, DVT in R lower extremity (3/15/22) s/p IVC filter. She is s/p left temporal parietal occipital craniotomy for tumor resection by Dr. Berry Dominique on 3/12/2022. Patient presented back to [facility] for worsening R lower extremity swelling 2/2 thrombosis of the IVC at the level of the filter. Patient now cleared for [facility] by neurosurg and also HIT +. Patient has had a worsening leukocytosis since admission and was febrile to 101.9 today and tachycardic. Urine is growing enterococcus. Pharmacy has been consulted to dose vancomycin for sepsis + UTI.     Height:   Ht Readings from Last 1 Encounters:   03/25/22 5' 3\" (1.6 m)     Weight:   Wt Readings from Last 1 Encounters:   03/26/22 202 lb 13.2 oz (92 kg)     Current & Prior Antimicrobial Regimen(s):   Zosyn 4500 mg EI q8h (3/27-3/28)   Meropenem 2000 mg EI q8h (3/28-current)   Vancomycin  o 1750 mg IV x1 LD (3/28)  o 1250 mg IV q12h (3/29-current)    Level(s) / Doses:    Date Time Dose Level / Type of Level Interpretation   3/30 0600 1250 mg q12h            Note: Serum levels collected for AUC-based dosing may be high if collected in close proximity to the dose administered. This is not necessarily indicative of toxicity. Cultures & Sensitivities:    Date Site Micro Susceptibility / Result   3/27 Urine Enterococcus faecalis Pending   3/27 Blood No growth to date      Labs / Ancillary Data:    Estimated Creatinine Clearance: 123 mL/min (based on SCr of 0.6 mg/dL). Recent Labs     03/27/22  1135 03/28/22  0641 03/29/22  0513 03/29/22  0647   CREATININE 0.6 0.6 0.6  --    BUN 13 14 17  --    WBC 13.8* 16.2*  --  16.6*     Additional Lab Values / Findings of Note:    Procalcitonin: No results for input(s): PROCAL in the last 72 hours.

## 2022-03-30 PROBLEM — I80.9 PHLEBITIS: Status: ACTIVE | Noted: 2022-03-30

## 2022-03-30 PROBLEM — R50.9 FEVER: Status: ACTIVE | Noted: 2022-03-30

## 2022-03-30 LAB
ALBUMIN SERPL-MCNC: 2.7 G/DL (ref 3.4–5)
ALBUMIN SERPL-MCNC: 2.9 G/DL (ref 3.4–5)
ALBUMIN SERPL-MCNC: 2.9 G/DL (ref 3.4–5)
ALP BLD-CCNC: 84 U/L (ref 40–129)
ALT SERPL-CCNC: 32 U/L (ref 10–40)
ANION GAP SERPL CALCULATED.3IONS-SCNC: 13 MMOL/L (ref 3–16)
ANION GAP SERPL CALCULATED.3IONS-SCNC: 9 MMOL/L (ref 3–16)
APTT: 34.1 SEC (ref 26.2–38.6)
APTT: 37.4 SEC (ref 26.2–38.6)
APTT: 39 SEC (ref 26.2–38.6)
AST SERPL-CCNC: 37 U/L (ref 15–37)
BASOPHILS ABSOLUTE: 0 K/UL (ref 0–0.2)
BASOPHILS RELATIVE PERCENT: 0.3 %
BILIRUB SERPL-MCNC: 0.3 MG/DL (ref 0–1)
BILIRUBIN DIRECT: <0.2 MG/DL (ref 0–0.3)
BILIRUBIN, INDIRECT: ABNORMAL MG/DL (ref 0–1)
BUN BLDV-MCNC: 12 MG/DL (ref 7–20)
BUN BLDV-MCNC: 9 MG/DL (ref 7–20)
CALCIUM SERPL-MCNC: 8.1 MG/DL (ref 8.3–10.6)
CALCIUM SERPL-MCNC: 8.2 MG/DL (ref 8.3–10.6)
CHLORIDE BLD-SCNC: 93 MMOL/L (ref 99–110)
CHLORIDE BLD-SCNC: 96 MMOL/L (ref 99–110)
CO2: 21 MMOL/L (ref 21–32)
CO2: 22 MMOL/L (ref 21–32)
CREAT SERPL-MCNC: 0.5 MG/DL (ref 0.6–1.1)
CREAT SERPL-MCNC: 0.6 MG/DL (ref 0.6–1.1)
EOSINOPHILS ABSOLUTE: 0 K/UL (ref 0–0.6)
EOSINOPHILS RELATIVE PERCENT: 0.1 %
GFR AFRICAN AMERICAN: >60
GFR AFRICAN AMERICAN: >60
GFR NON-AFRICAN AMERICAN: >60
GFR NON-AFRICAN AMERICAN: >60
GLUCOSE BLD-MCNC: 119 MG/DL (ref 70–99)
GLUCOSE BLD-MCNC: 137 MG/DL (ref 70–99)
HAPTOGLOBIN: 420 MG/DL (ref 30–200)
HCT VFR BLD CALC: 21.9 % (ref 36–48)
HCT VFR BLD CALC: 23.3 % (ref 36–48)
HCT VFR BLD CALC: 24 % (ref 36–48)
HEMOGLOBIN: 7.5 G/DL (ref 12–16)
HEMOGLOBIN: 7.5 G/DL (ref 12–16)
HEMOGLOBIN: 7.7 G/DL (ref 12–16)
HEMOGLOBIN: 7.7 G/DL (ref 12–16)
HEMOGLOBIN: 7.9 G/DL (ref 12–16)
IMMATURE RETIC FRACT: 0.62 (ref 0.21–0.37)
LACTATE DEHYDROGENASE: 493 U/L (ref 100–190)
LACTIC ACID: 1.4 MMOL/L (ref 0.4–2)
LACTIC ACID: 1.7 MMOL/L (ref 0.4–2)
LYMPHOCYTES ABSOLUTE: 1 K/UL (ref 1–5.1)
LYMPHOCYTES RELATIVE PERCENT: 7 %
MCH RBC QN AUTO: 27.7 PG (ref 26–34)
MCHC RBC AUTO-ENTMCNC: 33 G/DL (ref 31–36)
MCV RBC AUTO: 83.8 FL (ref 80–100)
MONOCYTES ABSOLUTE: 1.4 K/UL (ref 0–1.3)
MONOCYTES RELATIVE PERCENT: 9.6 %
NEUTROPHILS ABSOLUTE: 11.7 K/UL (ref 1.7–7.7)
NEUTROPHILS RELATIVE PERCENT: 83 %
OCCULT BLOOD DIAGNOSTIC: NORMAL
PDW BLD-RTO: 15.2 % (ref 12.4–15.4)
PHOSPHORUS: 2.5 MG/DL (ref 2.5–4.9)
PHOSPHORUS: 2.8 MG/DL (ref 2.5–4.9)
PLATELET # BLD: 242 K/UL (ref 135–450)
PLATELET SLIDE REVIEW: ABNORMAL
PMV BLD AUTO: 7.9 FL (ref 5–10.5)
POTASSIUM SERPL-SCNC: 4.3 MMOL/L (ref 3.5–5.1)
POTASSIUM SERPL-SCNC: 4.4 MMOL/L (ref 3.5–5.1)
RBC # BLD: 2.87 M/UL (ref 4–5.2)
RETICULOCYTE ABSOLUTE COUNT: 0.09 M/UL (ref 0.02–0.1)
RETICULOCYTE COUNT PCT: 3.37 % (ref 0.5–2.18)
SLIDE REVIEW: ABNORMAL
SODIUM BLD-SCNC: 124 MMOL/L (ref 136–145)
SODIUM BLD-SCNC: 130 MMOL/L (ref 136–145)
TOTAL PROTEIN: 6.1 G/DL (ref 6.4–8.2)
VANCOMYCIN RANDOM: 12.8 UG/ML
WBC # BLD: 14.1 K/UL (ref 4–11)

## 2022-03-30 PROCEDURE — G0328 FECAL BLOOD SCRN IMMUNOASSAY: HCPCS

## 2022-03-30 PROCEDURE — 97530 THERAPEUTIC ACTIVITIES: CPT

## 2022-03-30 PROCEDURE — 94150 VITAL CAPACITY TEST: CPT

## 2022-03-30 PROCEDURE — 97535 SELF CARE MNGMENT TRAINING: CPT

## 2022-03-30 PROCEDURE — 97110 THERAPEUTIC EXERCISES: CPT

## 2022-03-30 PROCEDURE — 80202 ASSAY OF VANCOMYCIN: CPT

## 2022-03-30 PROCEDURE — 2060000000 HC ICU INTERMEDIATE R&B

## 2022-03-30 PROCEDURE — 85045 AUTOMATED RETICULOCYTE COUNT: CPT

## 2022-03-30 PROCEDURE — 85018 HEMOGLOBIN: CPT

## 2022-03-30 PROCEDURE — 85014 HEMATOCRIT: CPT

## 2022-03-30 PROCEDURE — 6370000000 HC RX 637 (ALT 250 FOR IP): Performed by: STUDENT IN AN ORGANIZED HEALTH CARE EDUCATION/TRAINING PROGRAM

## 2022-03-30 PROCEDURE — 6370000000 HC RX 637 (ALT 250 FOR IP): Performed by: INTERNAL MEDICINE

## 2022-03-30 PROCEDURE — 80076 HEPATIC FUNCTION PANEL: CPT

## 2022-03-30 PROCEDURE — 83605 ASSAY OF LACTIC ACID: CPT

## 2022-03-30 PROCEDURE — 2580000003 HC RX 258: Performed by: INTERNAL MEDICINE

## 2022-03-30 PROCEDURE — 6360000002 HC RX W HCPCS: Performed by: STUDENT IN AN ORGANIZED HEALTH CARE EDUCATION/TRAINING PROGRAM

## 2022-03-30 PROCEDURE — 83010 ASSAY OF HAPTOGLOBIN QUANT: CPT

## 2022-03-30 PROCEDURE — 85025 COMPLETE CBC W/AUTO DIFF WBC: CPT

## 2022-03-30 PROCEDURE — 99233 SBSQ HOSP IP/OBS HIGH 50: CPT | Performed by: INTERNAL MEDICINE

## 2022-03-30 PROCEDURE — 85730 THROMBOPLASTIN TIME PARTIAL: CPT

## 2022-03-30 PROCEDURE — 99223 1ST HOSP IP/OBS HIGH 75: CPT | Performed by: INTERNAL MEDICINE

## 2022-03-30 PROCEDURE — 80069 RENAL FUNCTION PANEL: CPT

## 2022-03-30 PROCEDURE — 2580000003 HC RX 258: Performed by: STUDENT IN AN ORGANIZED HEALTH CARE EDUCATION/TRAINING PROGRAM

## 2022-03-30 PROCEDURE — 83615 LACTATE (LD) (LDH) ENZYME: CPT

## 2022-03-30 RX ORDER — ARGATROBAN 1 MG/ML
.0625-1 INJECTION, SOLUTION INTRAVENOUS CONTINUOUS
Status: DISCONTINUED | OUTPATIENT
Start: 2022-03-30 | End: 2022-04-03

## 2022-03-30 RX ORDER — TOLVAPTAN 15 MG/1
7.5 TABLET ORAL ONCE
Status: COMPLETED | OUTPATIENT
Start: 2022-03-30 | End: 2022-03-30

## 2022-03-30 RX ADMIN — VANCOMYCIN HYDROCHLORIDE 1250 MG: 10 INJECTION, POWDER, LYOPHILIZED, FOR SOLUTION INTRAVENOUS at 17:29

## 2022-03-30 RX ADMIN — APIXABAN 10 MG: 5 TABLET, FILM COATED ORAL at 08:47

## 2022-03-30 RX ADMIN — QUETIAPINE FUMARATE 12.5 MG: 25 TABLET ORAL at 21:41

## 2022-03-30 RX ADMIN — SODIUM CHLORIDE 25 ML: 9 INJECTION, SOLUTION INTRAVENOUS at 00:47

## 2022-03-30 RX ADMIN — LACOSAMIDE 200 MG: 50 TABLET, FILM COATED ORAL at 21:38

## 2022-03-30 RX ADMIN — VANCOMYCIN HYDROCHLORIDE 1250 MG: 10 INJECTION, POWDER, LYOPHILIZED, FOR SOLUTION INTRAVENOUS at 08:58

## 2022-03-30 RX ADMIN — VANCOMYCIN HYDROCHLORIDE 1250 MG: 10 INJECTION, POWDER, LYOPHILIZED, FOR SOLUTION INTRAVENOUS at 00:48

## 2022-03-30 RX ADMIN — PANTOPRAZOLE SODIUM 40 MG: 40 TABLET, DELAYED RELEASE ORAL at 06:47

## 2022-03-30 RX ADMIN — ARGATROBAN 0.8 MCG/KG/MIN: 50 INJECTION INTRAVENOUS at 00:16

## 2022-03-30 RX ADMIN — ARGATROBAN 1.25 MCG/KG/MIN: 50 INJECTION INTRAVENOUS at 10:41

## 2022-03-30 RX ADMIN — Medication 5 MG: at 21:39

## 2022-03-30 RX ADMIN — LEVETIRACETAM 2000 MG: 250 TABLET, FILM COATED ORAL at 08:49

## 2022-03-30 RX ADMIN — LACOSAMIDE 200 MG: 50 TABLET, FILM COATED ORAL at 08:47

## 2022-03-30 RX ADMIN — MEROPENEM 2000 MG: 1 INJECTION, POWDER, FOR SOLUTION INTRAVENOUS at 08:56

## 2022-03-30 RX ADMIN — SODIUM CHLORIDE, PRESERVATIVE FREE 10 ML: 5 INJECTION INTRAVENOUS at 21:39

## 2022-03-30 RX ADMIN — LEVETIRACETAM 2500 MG: 250 TABLET, FILM COATED ORAL at 21:40

## 2022-03-30 RX ADMIN — SODIUM CHLORIDE, PRESERVATIVE FREE 10 ML: 5 INJECTION INTRAVENOUS at 08:50

## 2022-03-30 RX ADMIN — Medication 7.5 MG: at 12:24

## 2022-03-30 RX ADMIN — APIXABAN 10 MG: 5 TABLET, FILM COATED ORAL at 21:38

## 2022-03-30 ASSESSMENT — PAIN SCALES - GENERAL
PAINLEVEL_OUTOF10: 0
PAINLEVEL_OUTOF10: 6
PAINLEVEL_OUTOF10: 0

## 2022-03-30 ASSESSMENT — PAIN DESCRIPTION - ONSET: ONSET: GRADUAL

## 2022-03-30 ASSESSMENT — ENCOUNTER SYMPTOMS
RESPIRATORY NEGATIVE: 1
GASTROINTESTINAL NEGATIVE: 1

## 2022-03-30 ASSESSMENT — PAIN DESCRIPTION - ORIENTATION: ORIENTATION: MID

## 2022-03-30 ASSESSMENT — PAIN DESCRIPTION - FREQUENCY: FREQUENCY: INTERMITTENT

## 2022-03-30 ASSESSMENT — PAIN DESCRIPTION - LOCATION: LOCATION: HEAD

## 2022-03-30 ASSESSMENT — PAIN DESCRIPTION - DESCRIPTORS: DESCRIPTORS: SORE;SHARP

## 2022-03-30 ASSESSMENT — PAIN - FUNCTIONAL ASSESSMENT: PAIN_FUNCTIONAL_ASSESSMENT: ACTIVITIES ARE NOT PREVENTED

## 2022-03-30 ASSESSMENT — PAIN DESCRIPTION - PAIN TYPE: TYPE: ACUTE PAIN

## 2022-03-30 ASSESSMENT — PAIN DESCRIPTION - PROGRESSION: CLINICAL_PROGRESSION: GRADUALLY IMPROVING

## 2022-03-30 NOTE — PROGRESS NOTES
IR consult received for IVC filter retrieval. The patient is well known to our service. Given thrombus still residing within the filter apex, I would recommend systemic anticoagulation for 6 weeks prior to filter retrieval.    She can be set up for an outpatient venogram and filter retrieval in early May.     Louie Vance MD

## 2022-03-30 NOTE — PLAN OF CARE
Problem: Falls - Risk of:  Goal: Will remain free from falls  Description: Will remain free from falls  3/30/2022 1631 by Carolyn Marcelo RN  Outcome: Ongoing  Note: Fall precautions in place. Bed alarm on. Call light within reach. Pt calls out appropriately    Electronically signed by Carolyn Marcelo RN on 3/30/2022 at 4:32 PM       Problem: Pain:  Goal: Pain level will decrease  Description: Pain level will decrease  3/30/2022 1631 by Carolyn Marcelo RN  Outcome: Ongoing  Note: Pt reports no pain to this RN during this shift    Electronically signed by Carolyn Marcelo RN on 3/30/2022 at 4:32 PM       Problem: Bleeding:  Goal: Will show no signs and symptoms of excessive bleeding  Description: Will show no signs and symptoms of excessive bleeding  3/30/2022 0749 by Taj Amado RN  Outcome: Ongoing     Problem: Gas Exchange - Impaired:  Goal: Levels of oxygenation will improve  Description: Levels of oxygenation will improve  3/30/2022 1631 by Carolyn Marcelo RN  Outcome: Ongoing  Note: Pt o2 sat remains above 92% on RA throughout this shift. No SOB or dyspnea noted    Electronically signed by Carolyn Marcelo RN on 3/30/2022 at 4:33 PM       Problem: Venous Thromboembolism:  Goal: Will show no signs or symptoms of venous thromboembolism  Description: Will show no signs or symptoms of venous thromboembolism  3/30/2022 1633 by Carolyn Marcelo RN  Outcome: Ongoing  Note: Pt on oral anticoagulation.  Swelling +2 BLE noted    Electronically signed by aCrolyn Marcelo RN on 3/30/2022 at 4:34 PM    3/30/2022 0749 by Taj Amado RN  Outcome: Ongoing

## 2022-03-30 NOTE — PROGRESS NOTES
Progress Note    Admit Date: 3/25/2022  Diet: ADULT DIET; Regular; 1200 ml    CC: leg pain     Interval history: Patient seen and examined at bedside. Yesterday patient underwent thrombectomy per IR, tolerated procedure well. Yesterday evening,patient's hemoglobin dropped to 6.8 s/p 1 unit PRBC. Patient spiked fever fever yesterday after thrombectomy, T-max 100.2  This a.m., patient reported felling less drowsy but is still sleepy. Able to hold a conversation. Denies any change in her symptoms of LL pain. Vitals, afebrile, persistently tachycardic HR 120s. Sinus rhythm. BP stable 115/73. Urine output 380 mL. Net +4.47 L  Figueroa in place.   Medications:     Scheduled Meds:   sodium chloride flush  5-40 mL IntraVENous 2 times per day    metoprolol  5 mg IntraVENous Once    [Held by provider] clonazePAM  0.5 mg Oral BID    QUEtiapine  12.5 mg Oral Nightly    meropenem  2,000 mg IntraVENous Q8H    vancomycin  1,250 mg IntraVENous Q12H    insulin regular  10 Units IntraVENous Once    lacosamide  200 mg Oral BID    levETIRAcetam  2,000 mg Oral Daily    levETIRAcetam  2,500 mg Oral Nightly    melatonin  5 mg Oral Nightly    pantoprazole  40 mg Oral QAM AC    sodium chloride flush  5-40 mL IntraVENous 2 times per day     Continuous Infusions:   sodium chloride      sodium chloride 25 mL (03/29/22 2372)    sodium chloride      sodium chloride      dextrose      dextrose      argatroban infusion 1.2455 mcg/kg/min (03/30/22 0722)    sodium chloride 25 mL (03/30/22 0047)     PRN Meds:sodium chloride, sodium chloride flush, sodium chloride, sodium chloride, sodium chloride, oxyCODONE **OR** oxyCODONE, LORazepam, glucose, dextrose, glucagon (rDNA), dextrose, sodium chloride flush, sodium chloride, ondansetron **OR** ondansetron, polyethylene glycol, acetaminophen **OR** acetaminophen    Objective:   Vitals:   T-max:  Patient Vitals for the past 8 hrs:   BP Temp Temp src Pulse Resp SpO2   03/30/22 0825 126/84 99.8 °F (37.7 °C) Oral 121 16 97 %   03/30/22 0342 114/78 98.5 °F (36.9 °C) Oral 121 16 100 %   03/30/22 0045 120/72 97.4 °F (36.3 °C) -- 120 16 98 %       Intake/Output Summary (Last 24 hours) at 3/30/2022 0826  Last data filed at 3/30/2022 1891  Gross per 24 hour   Intake 1040 ml   Output 380 ml   Net 660 ml         Physical Exam  Constitutional:       Appearance: She is obese. Comments: Sleeping, lethargic. Unable to hold a conversation   HENT:      Head: Normocephalic and atraumatic. Nose: Nose normal.   Eyes:      Extraocular Movements: Extraocular movements intact. Conjunctiva/sclera: Conjunctivae normal.      Pupils: Pupils are equal, round, and reactive to light. Cardiovascular:      Rate and Rhythm: Regular rhythm. Tachycardia present. Pulses: Normal pulses. Heart sounds: Normal heart sounds. Pulmonary:      Effort: Pulmonary effort is normal.      Breath sounds: Normal breath sounds. Abdominal:      General: Bowel sounds are normal.      Palpations: Abdomen is soft. Musculoskeletal:         General: Swelling present. Cervical back: Normal range of motion. Right lower leg: Edema present. Left lower leg: Edema present. Neurological:      Mental Status: She is oriented to person, place, and time. LABS:    CBC:   Recent Labs     03/28/22  0641 03/28/22  1304 03/29/22  0647 03/29/22  1303 03/29/22  1632 03/30/22  0130 03/30/22  0625   WBC 16.2*  --  16.6*  --   --   --  14.1*   HGB 7.7*   < > 7.5*   < > 6.8* 7.5* 7.9*   HCT 23.9*  --  22.5*  --   --  21.9* 24.0*     --  205  --   --   --  242   MCV 84.1  --  83.9  --   --   --  83.8    < > = values in this interval not displayed.      Renal:    Recent Labs     03/29/22  0513 03/29/22  1303 03/30/22  0130   * 122* 124*   K 4.9 4.5 4.3   CL 95* 92* 93*   CO2 16* 20* 22   BUN 17 14 12   CREATININE 0.6 0.6 0.5*   GLUCOSE 123* 145* 137*   CALCIUM 8.3 8.0* 8.1*   MG 2.20  --   --    PHOS region appear essentially stable from prior study with associated mild midline shift which is similar to slightly improved from prior study. No evidence of developing acute intracranial hemorrhage. CTA ABDOMEN PELVIS W WO CONTRAST   Final Result   1. 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 2. Surrounding hazy density, likely related to venous congestion. However, streaky densities extending inferiorly on the right may represent blood. However, there is no evidence of retroperitoneal hematoma. Correlation with lower extremity noninvasive    duplex Doppler is recommended in addition to serial hemoglobin and crit levels. 3. Suspect bilateral lower lobe pulmonary emboli. See comments above      Redemonstrated is a large necrotic mass involving the right breast enlarged fibroid uterus. Assessment/Plan:     Metastatic triple negative breast cancer with intracranial metastasis  S/p craniotomy on 3/12    -Patient was diagnosed with triple negative breast cancer in March 2021 at which time clinical stage IIb. Patient chose to forego any medical or surgical treatment  -Discharged to [facility] on 3/20  -Today is POD 17  -CT head without contrast on 3/26 nonrevealing of acute ICH    -Pain control with Dilaudid pain panel as needed, oxycodone  -General surgery consulted, recommended palliative sx for pain management  -Consulted radiation oncology, suggested outpatient radiation directed towards the tumor bed.   Will need chemo likely in the future following radiation.  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 Patient did get IVC filter on 3/16  -CT abdomen pelvis this admission 3/26 revealing thrombosis of IVC at the level of IVC filter with marked distention of the caval bifurcation and common iliac veins. Streaky densities extending inferiorly on the right may represent blood. But no evidence of retroperitoneal hematoma. Suspected B/L lower lobe PE.    -Serotonin assay pending  -Bilateral lower extremity venous Doppler on 3/28 revealing bilateral acute oligoanuric DVTs  -Consulted IR, s/p thrombectomy on 3/28: Successful vacuum-assisted thrombectomy with restoration of flow through the IVC and improved patency of ileal caval system and clot remaining within the IVC filter  -Continue argatroban, can switch to eliquis upon removal of IVC filter  - per IR, filter can be removed after 6 week, as even though pt is anticoagulated, there is still a risk of PE. Will likely do this outpatient.        Moderate thrombocytopenia s/p HIT positive- improved  -DC all heparin-induced anticoagulation, avoiding future  -Continue argatroban drip  -Platelet improved to following argatroban and DC heparin and his anticoagulation  -Platelets 35> 956> 308> 205> 242    Acute anemia likely 2/2 blood loss  - s/p total of 2 U PRBC since admit   -Hb 6.8 yesterday s/p 1 unit PRBC  -Hb this a.m. 7.9  -Monitor CBC, transfuse for Hb <7    Respiratory alkalosis with compensated metabolic acidosis- improved  -Patient been persistently tachypneic, ABG revealing pH 7.466, CO2 31.3, O2 84  -Bicarb improved on bicarb gtt   - d/c bicarb gtt  -S/p straight cath x2 for urinary retention of approximately 600 &700 cc urine per bladder scan  -Figueroa in place  -Urology consulted appreciate recs  -Nephrology consulted, appreciate recs  -urine osmolality, urine sodium WNL  -Serum osmolality decreased to 69  -Urine output 380 mL yesterday, +4.4 L since admission    Hyponatremia 2/2 SIADH   -Sodium 124  - Received a dose of samsca overnight  - reordered a dose of samsca today per nephro  -Nephrology consulted, appreciate recs    Complicated UTI  -UA revealing of +3 bacteria, negative leukocyte esterase negative nitrites  -Zosyn changed to Vanco and Merrem    Sinus tachycardia  Likely etiology from fever/ pain/ PE/UTI  -EKG yesterday.  revealing sinus tachycardia, HR 130s  -Continuous telemetry    Seizures  -Keppra 2 g daily, Keppra 2.5 g nightly  -Vimpat 200 mg    Anxiety  -Ativan 1 mg as needed  -Holding Clonazepam 0.5 mg, pt lethargic this AM     PT score 8/24  OT score 10/24    Code Status: Full code  FEN: regular diet  PPX: Argatroban  DISPO: CURTIS Hussein MD, PGY-1  03/30/22  8:26 AM    This patient has been staffed and discussed with Rigo Diggs MD.

## 2022-03-30 NOTE — PROGRESS NOTES
[facility]  Progress Note    Subjective:     Craniotomy cancelled March 9 due to seizure and status epilepticus. No longer having seizures  Craniotomy done Saturday March, 12  She developed a DVT & had an IVC filter placed as therapeutic anticoagulation could not begin until POD#14 (March 26)    On argatroban gtt  Plts now normal  HIT yonatan pos; TRES still in process  S/P IVC thrombectomy 3/28/2022  No bleeding  No cyanosis in fingers or toes    Review of Systems:     Review of Systems   Constitutional: Positive for fatigue. Negative for fever. Respiratory: Negative. Cardiovascular: Negative. Gastrointestinal: Negative. Genitourinary: Negative. Musculoskeletal: Negative. Skin: Negative. Neurological: Negative for seizures, weakness and headaches.        Objective:     Medications    Current Facility-Administered Medications: 0.9 % sodium chloride infusion, , IntraVENous, PRN  sodium chloride flush 0.9 % injection 5-40 mL, 5-40 mL, IntraVENous, 2 times per day  sodium chloride flush 0.9 % injection 5-40 mL, 5-40 mL, IntraVENous, PRN  0.9 % sodium chloride infusion, 25 mL, IntraVENous, PRN  0.9 % sodium chloride infusion, , IntraVENous, PRN  0.9 % sodium chloride infusion, , IntraVENous, PRN  metoprolol (LOPRESSOR) injection 5 mg, 5 mg, IntraVENous, Once  oxyCODONE (ROXICODONE) immediate release tablet 5 mg, 5 mg, Oral, Q4H PRN **OR** oxyCODONE (ROXICODONE) immediate release tablet 10 mg, 10 mg, Oral, Q4H PRN  LORazepam (ATIVAN) injection 1 mg, 1 mg, IntraVENous, Q6H PRN  [Held by provider] clonazePAM (KLONOPIN) tablet 0.5 mg, 0.5 mg, Oral, BID  QUEtiapine (SEROQUEL) tablet 12.5 mg, 12.5 mg, Oral, Nightly  meropenem (MERREM) 2,000 mg in sodium chloride 0.9 % 100 mL IVPB, 2,000 mg, IntraVENous, Q8H  vancomycin (VANCOCIN) 1,250 mg in dextrose 5 % 250 mL IVPB, 1,250 mg, IntraVENous, Q12H  insulin regular (HUMULIN R;NOVOLIN R) injection 10 Units, 10 Units, IntraVENous, Once **AND** dextrose 10 % infusion, 250 mL, IntraVENous, Once **AND** [COMPLETED] POCT Glucose, , , Q30 Min **AND** [COMPLETED] POCT Glucose, , , Q1H **AND** [COMPLETED] POCT Glucose, , , 4x Daily AC & HS  glucose (GLUTOSE) 40 % oral gel 15 g, 15 g, Oral, PRN  dextrose 50 % IV solution, 12.5 g, IntraVENous, PRN  glucagon (rDNA) injection 1 mg, 1 mg, IntraMUSCular, PRN  dextrose 5 % solution, 100 mL/hr, IntraVENous, PRN  argatroban infusion 50mg in 0.9% sodium chloride 50 mL (premix), 0.0625-10 mcg/kg/min, IntraVENous, Continuous  lacosamide (VIMPAT) tablet 200 mg, 200 mg, Oral, BID  levETIRAcetam (KEPPRA) tablet 2,000 mg, 2,000 mg, Oral, Daily  levETIRAcetam (KEPPRA) tablet 2,500 mg, 2,500 mg, Oral, Nightly  melatonin disintegrating tablet 5 mg, 5 mg, Oral, Nightly  pantoprazole (PROTONIX) tablet 40 mg, 40 mg, Oral, QAM AC  sodium chloride flush 0.9 % injection 5-40 mL, 5-40 mL, IntraVENous, 2 times per day  sodium chloride flush 0.9 % injection 5-40 mL, 5-40 mL, IntraVENous, PRN  0.9 % sodium chloride infusion, 25 mL, IntraVENous, PRN  ondansetron (ZOFRAN-ODT) disintegrating tablet 4 mg, 4 mg, Oral, Q8H PRN **OR** ondansetron (ZOFRAN) injection 4 mg, 4 mg, IntraVENous, Q6H PRN  polyethylene glycol (GLYCOLAX) packet 17 g, 17 g, Oral, Daily PRN  acetaminophen (TYLENOL) tablet 650 mg, 650 mg, Oral, Q6H PRN **OR** acetaminophen (TYLENOL) suppository 650 mg, 650 mg, Rectal, Q6H PRN    Allergies  No Known Allergies    Physical Exam  VITALS:  /78   Pulse 121   Temp 98.5 °F (36.9 °C) (Oral)   Resp 16   Ht 5' 3\" (1.6 m)   Wt 202 lb 13.2 oz (92 kg)   SpO2 100%   BMI 35.93 kg/m²   TEMPERATURE:  Current - Temp: 98.5 °F (36.9 °C);  Max - Temp  Av.7 °F (37.1 °C)  Min: 97.4 °F (36.3 °C)  Max: 100.2 °F (37.9 °C)  PULSE OXIMETRY RANGE: SpO2  Av.7 %  Min: 98 %  Max: 100 %  24HR INTAKE/OUTPUT:      Intake/Output Summary (Last 24 hours) at 3/30/2022 0701  Last data filed at 3/30/2022 0648  Gross per 24 hour   Intake 1040 ml Output 380 ml   Net 660 ml       Physical Exam  Constitutional:       General: She is not in acute distress. Appearance: She is ill-appearing. HENT:      Head:      Comments: Craniotomy incision C/D/I - healing well  Eyes:      General: No scleral icterus. Cardiovascular:      Rate and Rhythm: Normal rate and regular rhythm. Heart sounds: No murmur heard. No gallop. Pulmonary:      Effort: Pulmonary effort is normal.      Breath sounds: Normal breath sounds. No wheezing, rhonchi or rales. Abdominal:      Palpations: Abdomen is soft. Tenderness: There is no abdominal tenderness. Musculoskeletal:         General: Swelling (BLE) present. Comments: No cyanosis in digits   Lymphadenopathy:      Cervical: No cervical adenopathy. Skin:     General: Skin is warm and dry. Labs  Recent Labs     03/27/22  1135 03/27/22  1947 03/28/22  0641 03/28/22  1304 03/29/22  0647 03/29/22  0647 03/29/22  1303 03/29/22  1632 03/30/22  0130   WBC 13.8*  --  16.2*  --  16.6*  --   --   --   --    HGB 7.7*   < > 7.7*   < > 7.5*   < > 6.6* 6.8* 7.5*   HCT 23.2*  --  23.9*  --  22.5*  --   --   --  21.9*   *  --  180  --  205  --   --   --   --    MCV 82.8  --  84.1  --  83.9  --   --   --   --     < > = values in this interval not displayed. Recent Labs     03/29/22  0513 03/29/22  1303 03/30/22  0130   * 122* 124*   K 4.9 4.5 4.3   CL 95* 92* 93*   CO2 16* 20* 22   PHOS 3.7 2.8 2.5   BUN 17 14 12   CREATININE 0.6 0.6 0.5*       No results for input(s): AST, ALT, ALB, BILIDIR, BILITOT, ALKPHOS in the last 72 hours. Recent Labs     03/29/22  0513   MG 2.20       Radiology  CT HEAD WO CONTRAST    Result Date: 3/26/2022  CT HEAD WITHOUT CONTRAST HISTORY: Craniotomy COMPARISON: 3/17/2022 Underexposure controls were utilized during the CT examination to meet ALARA standards for radiation dose reduction. FINDINGS: Postoperative changes are noted from left frontotemporal craniotomy. Underlying hypodensity is seen in the area of prior surgery suggesting possible cortical infarct or postsurgical change. Additional area of low cortical attenuation is seen involving the medial left parieto-occipital region which also may be related to prior surgery. The appearance is similar to prior examination. Mild associated mass effect is seen with slight left to right midline shift which appears similar to slightly improved in extent since the prior study. Visualized portions of the paranasal sinuses appear clear. Postsurgical changes with areas of low cortical attenuation in the left parietal and left occipital region appear essentially stable from prior study with associated mild midline shift which is similar to slightly improved from prior study. No evidence of developing acute intracranial hemorrhage. CT Head WO Contrast    Result Date: 3/17/2022  PROCEDURE: CT head without contrast INDICATION: seizure; recent craniotomy; COMPARISON: 3/13/2022 and 3/5/2022 TECHNIQUE: CT head was performed without contrast according to standard protocol. Axial images and multiplanar reformatted images reviewed. Up-to-date CT equipment and radiation dose reduction techniques were employed. 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 There is slightly decreased 7 mm rightward midline shift and slightly decreased compression of the left lateral ventricle. There is stable persistent vasogenic edema in the left cerebral hemisphere. There is no evidence of acute hemorrhage or infarct. Visualized portions of the orbits, paranasal sinuses, and mastoids appear normal. There is scalp swelling overlying the craniotomy. 1.  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  No evidence of acute hemorrhage or infarct. CT Head wo Contrast    Result Date: 3/12/2022  HISTORY: Multiple brain masses. Status post resection. Postoperative evaluation. EXAM : CT OF THE HEAD WITHOUT CONTRAST TECHNIQUE: Multiple axial images were obtained of the brain without the use of contrast. Individualized dose optimization technique was used in order to meet ALARA standards for radiation dose reduction. In addition to vendor specific dose reduction algorithms, the dose reduction techniques vary based on the specific scanner utilized but frequently include automated exposure control, adjustment of the mA and/or kV according to patient size, and use of iterative reconstruction technique. COMPARISON: NONE FINDINGS: The visualized paranasal sinuses, mastoid air cells and middle ears are clear to the extent visualized. Status post left temporal and left parietal craniotomies. There is a surgical drain along the left scalp. No significant extracranial fluid collection. Skin staples consistent with recent surgery. Intracranially, there is persistent severe midline shift measuring approximately 10 mm similar to the preoperative evaluation. The large mass in the posterior left temporal region has been resected. There is an air-fluid level in the resection cavity consistent with expected postoperative changes. Resection cavity in the left parietal-occipital region with small gas bubbles. No acute complication or acute hemorrhage identified. Pneumocephalus consistent with recent surgery. 1. Resection cavity is at the locations of the previous masses with persistent adjacent vasogenic edema and persistent midline shift. No progressive mass effect or acute surgical complication otherwise identified.     CT HEAD WO CONTRAST    Result Date: 3/8/2022  CT HEAD WITHOUT CONTRAST COMPARISON STUDY: MRI brain without with contrast 3/5/2022 HISTORY: Status epilepticus in or for brain tumor resection FINDINGS: Thin section axial images obtained through the head from skull base to vertex. Multiplanar reconstruction images received for interpretation. Low radiation dose CT technique utilized. Redemonstrated is a partially necrotic heterogeneous attenuating mass in the left parietal occipital region measuring approximately 3.7 x 4.6 cm peripheral increased attenuation suggesting some component of hemorrhage. This mass abuts the posterior falx  with marked surrounding neurogenic edema and mass effect. This demonstrates little change from previous MRI exam. Additional hemorrhagic partially necrotic Mixed attenuating mass in the left parietal temporal region measuring approximately a 4.2 x 4.1 cm unchanged from prior MRI exam.  Moderate surrounding vasogenic edema and mass effect. Extensive surrounding edema anteriorly extending into the left thalamus and left renal ganglia region. There is near complete effacement of the left lateral ventricle with approximately 11 mm left-to-right midline shift. Brainstem and posterior fossa appear within normal limits. Visualized orbits and paranasal sinuses are clear. 1.  2 large hemorrhagic partially necrotic masses in the left parieto-temporal and left parieto-occipital regions with marked surrounding edema and mass effect. These demonstrate little change when compared to previous MRI study of 3/5/2022. Associated  prominent mass effect and surrounding edema resulting 11 mm midline shift to the right. CT HEAD WO CONTRAST    Result Date: 3/3/2022  EXAMINATION: CT OF THE HEAD WITHOUT CONTRAST  3/3/2022 8:17 pm TECHNIQUE: CT of the head was performed without the administration of intravenous contrast. Dose modulation, iterative reconstruction, and/or weight based adjustment of the mA/kV was utilized to reduce the radiation dose to as low as reasonably achievable. COMPARISON: None.  HISTORY: ORDERING SYSTEM PROVIDED HISTORY: ams TECHNOLOGIST PROVIDED HISTORY: Reason for exam:->[facility] Has a \"code stroke\" or \"stroke alert\" been called? ->No Decision Support Exception - unselect if not a suspected or confirmed emergency medical condition->Emergency Medical Condition (MA) Is the patient pregnant?->No Reason for Exam: c/o memory loss that began last week. Pt's mother states last Thursday the pt states she felt like something hit her in the head, but doesn't know what. States she isn't able to remember her name, important things she would usually remember, and she is also unable to read or write. FINDINGS: BRAIN/VENTRICLES: There is diffuse hypoattenuation involving much of the left temporoparietal and posterior frontal lobe. There is hypoattenuation involving the white matter involving left posterior hemisphere likely represent areas of vasogenic edema. There is at least 2 discrete of mass is identified measuring approximate 4 cm in size 1 which is central hypoattenuation which may be related to necrosis. These left-to-right midline shift 1.4 cm in size. There is entrapment of the right lateral ventricle and both temporal horns of both lateral ventricles suggestive areas of developing hydrocephalus. There is downward transtentorial herniation as well. With mass effect on the brainstem and the partial effacement of the suprasellar and ambient cisterns. Left uncal herniation. .  Focal density identified left parasagittal occipital lobe likely represents petechial calcification of the blood products. ORBITS: The visualized portion of the orbits demonstrate no acute abnormality. SINUSES: The visualized paranasal sinuses and mastoid air cells demonstrate no acute abnormality. SOFT TISSUES/SKULL:  No acute abnormality of the visualized skull or soft tissues. Abnormal edema left posterior cerebral hemisphere possibly related to underlying metastases versus less likely primary glioma.   This causes 1.4 cm of left-to-right midline shift with cisternal effacement and downward transtentorial herniation and findings of developing hydrocephalus. Hyperdensity left parasagittal occipital lobe could represent area of calcification however small focus of blood products will not be totally excluded. Neuro surgical consultation recommended Critical results were called by Dr. Jerica Hernandez to Nina BOURNE on 3/3/2022 at 21:07. IR GUIDED IVC FILTER PLACEMENT    Result Date: 3/16/2022  IVC filter placement INDICATION : Deep venous thrombosis. Recent craniotomy. : Jeremy Moreno MD PROCEDURE: Preprocedure sonographic evaluation of the right internal jugular vein demonstrates patency and compressibility. This measures 12 mm. A permanent sonographic image was saved to the electronic medical record. The patient's right neck was prepped and draped in sterile fashion and lidocaine used for local anesthesia. Ultrasound guidance was used and a sonographic image of the vein was obtained. The right internal jugular vein was accessed using a micropuncture set. Abdominal cavogram was obtained. An Option Elite retrievable filter was inserted under fluoroscopic guidance and deployed in routine fashion in the infrarenal IVC. Abdominal imaging was obtained to document filter position. The patient tolerated the procedure well without complication. IMPRESSION : Normal IVC anatomy. Normal, patent jugular vein. Patent inferior vena cava. Placement of filter in the infra-renal IVC. Please note that this is a retrievable filter. Fluoroscopy time : 1.8 minutes Number of exposures obtained : 5 Moderate sedation was performed by the physician including the presence of an independent trained observers that assisted in monitoring the patient's level of consciousness and physiological status. Following the administration of Midazolam and Fentanyl the physician spent continuous face-to-face time with the patient. Sedation start time: 0810 Sedation end time: 0826 Estimated blood loss: Less than 5 mL.      XR CHEST PORTABLE    Result Date: 3/27/2022  AP CHEST HISTORY: Fever COMPARISON 3/4/2022 FINDINGS: The heart size is within normal limits. Abnormal density seen laterally in right lower lung. This appears to represent overlapping density but infiltrate in this area would be difficult to exclude. Repeat evaluation following removal overlapping density is recommended. Limited evaluation of lower right lung due to overlapping density for which infiltrate cannot be excluded. Otherwise no acute process seen. VL Extremity Venous Bilateral    Result Date: 3/15/2022  Lower Extremities DVT Study  Demographics   Patient Name       Di OSORIO   Date of Study      03/15/2022        Gender              Female   Patient Number     [id number]        Date of Birth       1975   Visit Number       [id number]         Age                 48 year(s)   Accession Number   [id number]        Room Number         1156   Corporate ID       [id number]           Sonographer         Indiana Brown RVT,                                                           RDMS   Ordering Physician Demarco Wilson MD, [facility]                                       Physician           Readers                                                           Ivet Chaparro MD  Procedure Type of Study:   Veins:Lower Extremities DVT Study, VASC EXTREMITY VENOUS DUPLEX BILATERAL. Vascular Sonographer Report  Indications for Study:Swelling. Additional Indications:Patient presents for evaluation due to bilateral lower extremity swelling. She only c/o right lower extremity weakness. She is a poor historian. She underwent craniotomy on 3/12/22 for tumor resection. She denies a prior H/O DVT. Lower extremity venous duplex study on 3/10/22 was negative for DVT bilaterally.  Venous Duplex Scan: B-mode imaging of the deep and superficial veins, with compression maneuvers, including color and Doppler spectral waveform analysis. Impressions Right Impression There is acute partially occluding deep venous thrombosis involving the deep femoral vein. The thrombus extends into the common femoral vein by 3.1 cm at the venous confluence. There is acute totally occluding deep venous thrombosis involving two soleal veins in the mid calf. There is no other evidence of deep or superficial venous thrombosis involving the right lower extremity. Left Impression There is no evidence of deep or superficial venous thrombosis involving the left lower extremity. Compared to the previous study on 3/10/22, the acute DVT noted in the right common femoral, deep femoral, and soleal veins is a new finding. The left lower extremity venous findings are unchanged. Conclusions   Summary   There is evidence of acute DVT involving the right common femoral vein, deep  femoral vein, and soleal veins, as described above. No evidence of deep vein or superficial thrombosis involving the left lower  extremity.    Signature   ------------------------------------------------------------------  Electronically signed by Valeria Leal MD (Interpreting  physician) on 03/15/2022 at 07:08 PM      VL Extremity Venous Bilateral    Result Date: 3/11/2022  Lower Extremities DVT Study  Demographics   Patient Name       [facility]   Date of Study      03/10/2022        Gender              Female   Patient Number     [id number]        Date of Birth       1975   Visit Number       [id number]         Age                 48 year(s)   Accession Number   [id number]        Room Number         4292   Corporate ID       [id number]           FRANCIS Solomon,                                                           RVT   Ordering Physician Marguerite Cruz MD, [facility]                                       Physician           Readers Raj Vizcarra MD  Procedure Type of Study:   Veins:Lower Extremities DVT Study, VASC EXTREMITY VENOUS DUPLEX BILATERAL. Vascular Sonographer Report  Indications for Study:Swelling. Additional Indications:Inpatient study done bedside in the ICU. Patient is a poor historian with bilateral leg swelling for an unknown number of days. Venous Duplex Scan: B-mode imaging of the deep and superficial veins, with compression maneuvers, including color and Doppler spectral waveform analysis. Impressions Right Impression Catheter and bandages on the right thigh. No evidence of deep or superficial venous thrombosis in the right lower extremity. Left Impression No evidence of deep or superficial venous thrombosis in the left lower extremity. Evidence of a cystic structure in the popliteal fossa, consistent with a Baker's cyst, measuring 1.76x0.59 cm. Conclusions   Summary   No evidence of deep or superficial venous thrombosis in the bilateral lower  extremity. Left Baker's cyst.   Signature   ------------------------------------------------------------------  Electronically signed by Raj Vizcarra MD (Interpreting  physician) on 03/11/2022 at 01:50 PM       CTA HEAD NECK W CONTRAST    Result Date: 3/3/2022  EXAMINATION: CTA OF THE HEAD AND NECK WITH CONTRAST 3/3/2022 8:17 pm: TECHNIQUE: CTA of the head and neck was performed with the administration of intravenous contrast. Multiplanar reformatted images are provided for review. MIP images are provided for review. Stenosis of the internal carotid arteries measured using NASCET criteria. Dose modulation, iterative reconstruction, and/or weight based adjustment of the mA/kV was utilized to reduce the radiation dose to as low as reasonably achievable. COMPARISON: CT head 03/03/2022 HISTORY: ORDERING SYSTEM PROVIDED HISTORY: ams x 1 week TECHNOLOGIST PROVIDED HISTORY: Reason for exam:->ams x 1 week Has a \"code stroke\" or \"stroke alert\" been called? ->No Decision Support Exception - unselect if not a suspected or confirmed emergency medical condition->Emergency Medical Condition (MA) Reason for Exam: ams x 1 week FINDINGS: CTA NECK: AORTIC ARCH/ARCH VESSELS: No dissection or arterial injury. No significant stenosis of the brachiocephalic or subclavian arteries. CAROTID ARTERIES: No dissection, arterial injury, or hemodynamically significant stenosis by NASCET criteria. VERTEBRAL ARTERIES: No dissection, arterial injury, or significant stenosis. SOFT TISSUES: The lung apices are clear. No cervical or superior mediastinal lymphadenopathy. The larynx and pharynx are unremarkable. No acute abnormality of the salivary and thyroid glands. Partial visualization of a large exophytic right breast mass. BONES: .  Vertebral heights are maintained. CTA HEAD: ANTERIOR CIRCULATION: No significant stenosis of the intracranial internal carotid, anterior cerebral, or middle cerebral arteries. No aneurysm. POSTERIOR CIRCULATION: No significant stenosis of the vertebral, basilar, or posterior cerebral arteries. No aneurysm. OTHER: No dural venous sinus thrombosis on this non-dedicated study. BRAIN: Left-sided masses and edema with associated midline shift. . Ventriculomegaly worrisome for areas developing hydrocephalus related to the mass effect and downward transfer herniation. Negative for large vessel occlusion or hemodynamic stenosis. CT CHEST ABDOMEN PELVIS W CONTRAST    Result Date: 3/4/2022  CT of chest abdomen and pelvis with contrast HISTORY: Brain mass. Evaluate for primary neoplasm or metastatic disease. Staging. COMPARISON: none CONTRAST:  70 mL Isovue-370 intravenous  TECHNIQUE: Individualized dose optimization technique was used in order to meet ALARA standards for radiation dose reduction.  In addition to vendor specific dose reduction algorithms, the dose reduction techniques vary based on the specific scanner utilized but frequently include automated exposure control, adjustment of the mA and/or kV according to patient size, and use of iterative reconstruction technique. COMMENTS: Chest: Mild degenerative changes in the spine. Scoliosis. There is a large, necrotic, peripherally enhancing right breast mass which tenths the anterior skin surface and measures 10.2 x 8.5 cm. There is right axillary lymphadenopathy; for example 2.1 x 1.5 cm on image [id number]. Small hiatal hernia. Mediastinal structures are unremarkable without lymphadenopathy. Mild linear subpleural atelectasis in the lungs. 2 mm perifissural nodule on the left image 601-51 is most likely benign. No evidence of pulmonary metastatic disease. Abdomen and pelvis: Degenerative changes in the spine. No lymphadenopathy. Gallbladder is within normal limits. Bowel is unremarkable. 4 mm hypodensity in the right lobe of the liver image 601-83 is too small to definitively characterize and most likely benign. Solid abdominal organs are otherwise unremarkable. Multiple uterine fibroids are seen. Bladder is collapsed. There is high density fluid in the bladder which may represent prior contrast administration or hematuria. Large, necrotic right breast mass. Right axillary lymphadenopathy. Uterine fibroids. High density urine in the bladder. MRI brain with and without contrast    Result Date: 3/14/2022  EXAM: MRI BRAIN W WO CONTRAST INDICATION: Brain mass COMPARISON: 3/12/2022 TECHNIQUE: Multiplanar, multisequence MR imaging of the head obtained without and with IV. IV contrast: 10 mL IV ProHance FINDINGS: Postoperative changes of left sided craniotomy with interval resection of underlying masses. Expected postoperative pneumocephalus. Small extra-axial subdural fluid collection subjacent to the craniotomy site. There are resection cavities in the left temporal and occipital region with blood products within and surrounding the resection cavity.  There are small areas of restricted diffusion along the margins of the resection cavity consistent with postoperative ischemia. There are small irregular nodular areas of enhancement along the anterior and lateral margins of the left occipital resection cavity (image 73 and 76 of series 13). Small irregular nodular area of enhancement along the medial margin of the left temporal resection cavity (image 70 of series 13). Thin curvilinear enhancement along the margins of the surgical cavity is likely reactive/postoperative. There is extensive T2 hyperintensity vasogenic edema in the left parietotemporal occipital region which is similar to preoperative MRI. There is mass effect with effacement of left lateral ventricle and approximately 9 mm midline shift to right. Postoperative changes of left-sided craniotomy with interval resection of left temporal and occipital lobe masses. Small nodular areas of enhancement along the margins of the surgical cavity as detailed above are indeterminate. Stable extensive T2 hyperintense/vasogenic edema in the left parietotemporal occipital region surrounding the resection cavities. Mass effect with effacement of left lateral ventricle and 9 mm midline shift to right. Recommended continued follow-up. MRI BRAIN W WO CONTRAST    Result Date: 3/5/2022  EXAM: MRI BRAIN W WO CONTRAST INDICATION: Brain metastasis COMPARISON: None TECHNIQUE: Multiplanar, multisequence MR imaging of the head obtained without and with IV. IV contrast: 12 mL IV ProHance. FINDINGS: There is no diffusion restriction to suggest an acute infarct. There is a hemorrhagic partially necrotic heterogeneously enhancing mass in the left parietal occipital region likely intra-axial measuring approximately 3.5 x 4.2 cm (image 90 of series 13). Medially it abuts posterior falx. There is surrounding vasogenic edema with mass effect. There is another hemorrhagic partially necrotic heterogeneously enhancing mass in the left parietotemporal region measuring approximately 4.3 x 4.2 cm (image 85).  It abuts the adjacent dura on the lateral aspect. There is surrounding vasogenic edema mass  effect. There is extensive surrounding vasogenic edema anteriorly extending into the left thalamus, left basal ganglia. There is mass effect with near complete effacement of left lateral ventricle and 10 mm midline shift to right. Major vascular flow voids are present. Sellar suprasellar region is unremarkable. Cervicomedullary junction is unremarkable. Visualized paranasal sinuses and mastoid air cells are clear. No suspicious calvarial abnormality. Two large hemorrhagic partially necrotic heterogeneously enhancing mass in the left parietotemporal and left parieto-occipital region with surrounding vasogenic edema and mass effect. These are indeterminate, most likely relate to multicentric glioblastoma. However, possibility of metastasis cannot be excluded given the history of breast cancer. 10 mm midline shift to right. CTA ABDOMEN PELVIS W WO CONTRAST    Result Date: 3/26/2022  EXAM: CTA ABDOMEN AND PELVIS WITHOUT CONTRAST INDICATION: LUQ abd pain w/ TTP on exam. Previous history of metastasis, IVF filter clot burden, recent IVC filter placement 3/16/2022 Necrotic right breast mass COMPARISON: 3/5/8391 TECHNIQUE: Helical CTA imaging obtained from lung bases through pelvis. Axial images and multiplanar reformatted images are provided for review Volume rendering, MIPS Arterial and delayed imaging IV Contrast: Isovue-370, 80 mL plus an additional 60 mL for additional injection Oral Contrast: None CT radiation dose optimization techniques (automated exposure control, use of a iterative reconstruction techniques, or adjustment of the mA or KV according to the patients size) were used to limit patient radiation dose. FINDINGS: Bilateral lower lobe pulmonary emboli are suspected, there are filling defects and poorly opacified pulmonary arteries in the lower lobe segments adjacent to the pulmonary veins however, this is indeterminate. Correlation can be made with perfusion VQ scan or subsequent CT PE study of patient is well hydrated due to the added contrast given on the current exam. 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 Distended iliac veins bilaterally. Aorta and iliofemoral vessels are normal. Inferior vena cava filter remains in satisfactory position Liver: No masses or ductal dilatation Gallbladder is normal Pancreas and spleen are normal Normal kidneys Diameter of the small bowel colon are normal. No evidence of abscess. Enlarged fibroid uterus Urinary bladder is normal Normal abdominal wall. Bones: No destructive lesions. 1. 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 2. Surrounding hazy density, likely related to venous congestion. However, streaky densities extending inferiorly on the right may represent blood. However, there is no evidence of retroperitoneal hematoma. Correlation with lower extremity noninvasive duplex Doppler is recommended in addition to serial hemoglobin and crit levels. 3. Suspect bilateral lower lobe pulmonary emboli. See comments above Redemonstrated is a large necrotic mass involving the right breast enlarged fibroid uterus. Pathology  Department of Pathology   FINAL SURGICAL PATHOLOGY REPORT   Patient Name: Ben Lira        Accession No:  [id number]    Age Sex:   1975    46 Y / F      Location:      [id number]   Account No:   [de-identified]                 Collected:     2022   Med Rec No:    [id number]                Received:      2022   Attend Phys:   Jayy Rodrigues MD           Completed:     2022   Perform Phys: Dora Carter MD     FINAL DIAGNOSIS:        A.  Left temporal mass:      - Metastatic carcinoma compatible with \"triple negative\" breast        primary; see Comment.        B. Left temporal dura:      - Dural tissue negative for carcinoma, keratin AE1+3/Cam 5.2        immunostain reviewed on each block.        C. Left occipital mass:      - Metastatic carcinoma compatible with \"triple negative\" breast        primary; see Comment. COMMENT:  A, C: The carcinoma at each site shows a nearly identical   appearance, with diffuse masses of highly polymorphous cells showing a   high mitotic/karyorrhectic rate, frequent \"giant\" cells, and plentiful   geographic necrosis.  Given the patient's clinical history (see below),   immunostains were requested. The tumor is strongly positive for CK7,   GATA3, SOX-10, and beta catenin (membranous pattern), with variable   cytoplasmic and partial perinuclear(\"Golgi\") positivity for keratin   AE1+3/Cam 5.2, a tiny minority population, including geographic foci,   positive for high molecular weight keratin (), and Ki-67   proliferation rate of 90% or more. It is negative for ER, IA, and HER-2   (no positivity for any of the three stains), and also for CK20, Napsin-A,   S100 protein, villin, TTF-1, CDX2, and CD45 (LCA). The overall findings strongly support metastatic \"triple negative\" breast   carcinoma.   BRATI/BRATI     Problem List  Patient Active Problem List   Diagnosis    Thyroid nodule    Brain metastases ([address]    Brain mass    Status epilepticus ([address]    Duct cell carcinoma (HCC)    Cerebral edema (HCC)    Seizure ([address]    Deep venous thrombosis (HCC)       Assessment and Plan:     Metastatic triple negative breast cancer  - Neglected Right breast primary  - Intracranial metastasis  - The patient and her mother agreed to surgery to resect her intracranial metastasis. - March 9 Craniotomy had to be canceled due to seizures & status epilepticus.  - Saturday March 12 craniotomy and tumor resection.  Recovering.  - Subsequent to this, she will require radiation therapy. Typically this is administered 2 to 4 weeks postoperatively. - Subsequent to radiation therapy she will require systemic chemotherapy. This will be in the outpatient setting.  - Recommend she follow-up with medical and radiation oncology at the [facility], where she was seen previously, after this hospitalization to coordinate and implement systemic palliative chemotherapy once she completes radiation therapy. Instructions placed in D/C instructions section.     DVT  - Per neurosurgery, she could not receive therapeutic anticoagulation until POD #14 - March 26. - Now on Argatroban gtt. Rec transition to Eliquis  - S/P IVC filter.  This can be retrieved now that she is therapeutically anticoagulated    IVC Thrombus  - S/P thrombectomy  - Now that she can receive therapeutic anticoagulation, recommend removing IVC filter and transitioning to Eliquis    HIT  - Ivone positive  - TRES still in process  - On argatroban gtt w/ normalization of the plts  - Recommend transititon to Rose Franks MD  3/30/2022

## 2022-03-30 NOTE — PLAN OF CARE
Problem: Tissue Perfusion, Altered:  Goal: Circulatory function within specified parameters  Description: Circulatory function within specified parameters  Outcome: Ongoing    Pt received 1 unit of BC d/t Hgb 6.8. Pt hgb increased to 7.5 after. Pt BP stable. Sinus tachy on tele. Remained afebrile. SPO2 >90 on RA. Will continue to monitor. Problem: Gas Exchange - Impaired:  Goal: Levels of oxygenation will improve  Description: Levels of oxygenation will improve  Outcome: Ongoing    Problem: Bleeding:  Goal: Will show no signs and symptoms of excessive bleeding  Description: Will show no signs and symptoms of excessive bleeding  Outcome: Ongoing    Pt does not show any sign and symptoms of excessive bleeding throughout the shift. Will continue to monitor.     Problem: Falls - Risk of:  Goal: Will remain free from falls  Description: Will remain free from falls  Outcome: Met This Shift     Problem: Pain:  Goal: Pain level will decrease  Description: Pain level will decrease  Outcome: Met This Shift     Problem: Skin Integrity:  Goal: Absence of new skin breakdown  Description: Absence of new skin breakdown  Outcome: Met This Shift     Problem: Discharge Planning:  Goal: Discharged to appropriate level of care  Description: Discharged to appropriate level of care  Outcome: Ongoing          Problem: Venous Thromboembolism:  Goal: Will show no signs or symptoms of venous thromboembolism  Description: Will show no signs or symptoms of venous thromboembolism  Outcome: Ongoing

## 2022-03-30 NOTE — PROGRESS NOTES
Clinical Pharmacy Progress Note    Vancomycin - Management by Pharmacy    Consult Date(s): 3/28  Consulting Provider(s): Dr. Ileana Chan / Plan  Sepsis + UTI - Vancomycin   Concurrent Antimicrobials: Meropenem   Day of Vanc Therapy: 3   Current Dosing Method: Bayesian-Guided AUC Dosing   Therapeutic Goal: 400-600 mg/L*hr   Current Dose / Frequency: 1250 mg every 12 hours   Plan / Rationale:   o PM dose delayed yesterday due to receiving blood transfusion. o Random level this AM = 12.8, drawn ~ 4 hours after dose was finished infusing. This regimen estimates an AUC of 330 mg/L*hr and Trough of 7.3 mg/L.   o Since predicted AUC is <400, will adjust dose to 1250mg q8h. This regimen predicts an AUC of 494 mcg/mL and a trough of 13.8 mcg/mL  o Random level ordered for tomorrow AM to assess kinetics   o Urine culture pan sensitive; recommended de-escilation of antibiotic therapy.  Will continue to monitor clinical condition and make adjustments to regimen as appropriate. Thanks for consulting pharmacy! Justice Gregorio PharmD  Pharmacy Resident   Please call with questions [phone]  3/30/2022 9:02 AM      Interval update: S/p thrombectomy day #2. Tmax 100.2F. Hemoglobin dropped to 6.6 overnight and patient received PRBC. Argatroban drip held this AM and patient transitioned to Eliquis. Subjective/Objective: Ms. Fidel Nettles is a 48 y.o. female with a PMHx significant for thyroid nodule, triple neg breast cancer with mets to the brain, DVT in R lower extremity (3/15/22) s/p IVC filter. She is s/p left temporal parietal occipital craniotomy for tumor resection by Dr. Jen Sandy on 3/12/2022. Patient presented back to [facility] for worsening R lower extremity swelling 2/2 thrombosis of the IVC at the level of the filter. Patient now cleared for [facility] by neurosurg and also HIT +. Patient has had a worsening leukocytosis since admission and was febrile to 101.9 today and tachycardic.  Urine is growing enterococcus. Pharmacy has been consulted to dose vancomycin for sepsis + UTI. Height:   Ht Readings from Last 1 Encounters:   03/25/22 5' 3\" (1.6 m)     Weight:   Wt Readings from Last 1 Encounters:   03/26/22 202 lb 13.2 oz (92 kg)     Current & Prior Antimicrobial Regimen(s):   Zosyn 4500 mg EI q8h (3/27-3/28)   Meropenem 2000 mg EI q8h (3/28-current)   Vancomycin  o 1750 mg IV x1 LD (3/28)  o 1250 mg IV q12h (3/29-3/30)  o 1250mg IV q8h (3/30-current)    Level(s) / Doses:    Date Time Dose Level / Type of Level Interpretation   3/30 0625 1250 mg q12h Random = 12.8 mcg/mL AUC = 330, dose adjusted to 1250mg q8h           Note: Serum levels collected for AUC-based dosing may be high if collected in close proximity to the dose administered. This is not necessarily indicative of toxicity. Cultures & Sensitivities:    Date Site Micro Susceptibility / Result   3/27 Urine Enterococcus faecalis Ampicillin, tetracycline, nitrofurantoin, vancomycin    3/27 Blood No growth to date      Labs / Ancillary Data:    Estimated Creatinine Clearance: 148 mL/min (A) (based on SCr of 0.5 mg/dL (L)). Recent Labs     03/28/22  0641 03/28/22  0641 03/29/22  0513 03/29/22  0647 03/29/22  1303 03/30/22  0130 03/30/22  0625   CREATININE 0.6   < > 0.6  --  0.6 0.5*  --    BUN 14   < > 17  --  14 12  --    WBC 16.2*  --   --  16.6*  --   --  14.1*    < > = values in this interval not displayed. Additional Lab Values / Findings of Note:    Procalcitonin: No results for input(s): PROCAL in the last 72 hours.

## 2022-03-30 NOTE — PROGRESS NOTES
Physical Therapy  Facility/Department: [place] [address] [facility]  Daily Treatment Note  NAME: Lilly Burton  : 1975  MRN: [id number]    Date of Service: 3/30/2022    Discharge Recommendations: Lilly Burton scored a 9/24 on the AM-PAC short mobility form. Current research shows that an AM-PAC score of 17 or less is typically not associated with a discharge to the patient's home setting. Based on the patient's AM-PAC score and their current functional mobility deficits, it is recommended that the patient have 5-7 sessions per week of Physical Therapy at d/c to increase the patient's independence. At this time, this patient demonstrates the endurance, and/or tolerance for 3 hours of therapy each day, with a treatment frequency of 5-7x/wk. Please see assessment section for further patient specific details. If patient discharges prior to next session this note will serve as a discharge summary. Please see below for the latest assessment towards goals. PT Equipment Recommendations  Equipment Needed:  (Defer)    Assessment   Body structures, Functions, Activity limitations: Decreased functional mobility ; Decreased ROM; Decreased strength;Decreased endurance;Decreased balance;Decreased coordination; Increased pain;Decreased posture  Assessment: pt tolerated session well but continues to be limited by pain and fatigue requiring max encouragement to participate. pt required mod A for supine to sit however was dependent to return to supine. pt able to perform sit to stand transfers within steady with min A of 2 from EOB and CGA from steady paddles maintaining stance for 1 min with CGA. pt demonstrated good progress however continues to be well below independent baseline and will benefit from continued IP PT in order to maximize independence with functional mobility.  continue POC  Treatment Diagnosis: Decreased strength and mobility associated with thrombocytopenia  Prognosis: Good  Decision Making: Medium Complexity  PT Education: Goals;PT Role;Plan of Care; Functional Mobility Training;General Safety;Transfer Training  Patient Education: Pt educated on role of PT and d/c recommendations. Pt verbalized understanding however would benefit from reinforcement  REQUIRES PT FOLLOW UP: Yes  Activity Tolerance  Activity Tolerance: Patient limited by pain; Patient limited by fatigue;Patient limited by endurance     Patient Diagnosis(es): The primary encounter diagnosis was Acute deep vein thrombosis (DVT) of proximal vein of right lower extremity ([address] Diagnoses of Thrombocytopenia ([address] and S/P craniotomy were also pertinent to this visit. has a past medical history of Thyroid nodule. has a past surgical history that includes craniotomy (Left, 3/12/2022); IR GUIDED IVC FILTER PLACEMENT (N/A, 03/16/2022); IR GUIDED IVC FILTER PLACEMENT (3/16/2022); and IR GUIDED THROMB MECH VEIN (3/28/2022). Restrictions  Position Activity Restriction  Other position/activity restrictions: Up with assist  Subjective   General  Chart Reviewed: Yes  Additional Pertinent Hx: Ms. Jess Andre is a 49 y/o female presenting with B leg pain. Pt was dx with B DVT's 3/24 and had brain sx 2 weeks ago. Pt was dx with thrombocytopenia. CTA-abdomen: Thrombosis of the inferior vena cava at the level of the inferior vena cava filter with marked distention of the caval bifurcation and common iliac veins, suspect bilateral lower lobe pulmonary emboli. CT-head: (-) acute. XR-chest: (-) acute. PMH: breast cancer with mets to the brain s/p L craniotomy on 5/87 complicated by seizures, IVC filter placement (3/16).   Referring Practitioner: Lauren Soto MD  Subjective  Subjective: pt up at EOB with OT and agreeable for PT  Pain Screening  Patient Currently in Pain: Yes (reports pain in BLE- not rated)  Vital Signs  Patient Currently in Pain: Yes (reports pain in BLE- not rated)       Orientation  Orientation  Overall Orientation Status: Within Functional Limits  Cognition      Objective   Bed mobility  Supine to Sit: Moderate assistance  Sit to Supine: 2 Person assistance;Maximum assistance  Scootin Person assistance;Maximal assistance (in supine to [facility])  Transfers  Sit to Stand: 2 Person Assistance;Minimal Assistance (min A of 2 from EOB within steady, x2 from steady pads with CGA)  Stand to sit: Minimal Assistance  Ambulation  Ambulation?: No     Balance  Posture: Fair  Sitting - Static: Good  Sitting - Dynamic: Fair  Standing - Static: Fair  Comments: pt maintained EOB sitting with SBA, pt able to maintain static standing within steady for 1 min on this date with CGA of 1. inc effort to maintain stance due to fatigue and pain                           G-Code     OutComes Score                                                     AM-PAC Score  AM-PAC Inpatient Mobility Raw Score : 9 (22)  AM-PAC Inpatient T-Scale Score : 30.55 (22)  Mobility Inpatient CMS 0-100% Score: 81.38 (22)  Mobility Inpatient CMS G-Code Modifier : CM (22)          Goals  Short term goals  Time Frame for Short term goals: D/C (all ongoing)  Short term goal 1: Perform supine to sit with min assist x1  Short term goal 2: Perform STS transfer with mod assist x1 and LRAD  Short term goal 3: Perform bed to chair transfer with mod assist x1 and LRAD  Patient Goals   Patient goals :  To improve mobility and pain    Plan    Plan  Times per week: 2-5  Current Treatment Recommendations: Strengthening,ROM,Balance Training,Functional Mobility Training,Transfer Training,Endurance Training,Gait Training,Neuromuscular Re-education,Pain Management,Home Exercise Program,Safety Education & Training,Patient/Caregiver Education & Training  Safety Devices  Type of devices: Nurse notified,Call light within reach,Bed alarm in place,Left in bed,Gait belt     Therapy Time   Individual Concurrent Group Co-treatment   Time In 1430         Time Out 1455         Minutes 25 Elena Bonds, PT

## 2022-03-30 NOTE — PROGRESS NOTES
Occupational Therapy  Facility/Department: [place] [address] [facility]  Daily Treatment Note  NAME: Anselmo Downing  : 1975  MRN: [id number]    Date of Service: 3/30/2022    Discharge Recommendations: Anselmo Downing scored a  on the AM-PAC ADL Inpatient form. Current research shows that an AM-PAC score of 17 or less is typically not associated with a discharge to the patient's home setting. Based on the patient's AM-PAC score and their current ADL deficits, it is recommended that the patient have 5-7 sessions per week of Occupational Therapy at d/c to increase the patient's independence. At this time, this patient demonstrates the endurance, and/or tolerance for 3 hours of therapy each day, with a treatment frequency of 5-7x/wk. Please see assessment section for further patient specific details. If patient discharges prior to next session this note will serve as a discharge summary. Please see below for the latest assessment towards goals. OT Equipment Recommendations  Equipment Needed: No  Other: defer    Assessment   Performance deficits / Impairments: Decreased functional mobility ; Decreased endurance;Decreased ADL status; Decreased balance;Decreased strength;Decreased vision/visual deficit  Assessment: From [facility] s/p crani 3/12. Pt originally from home and independent. Currently requires Mod A supine to sit, SBA sit balance, Anil x2 stand to [address] stedy, assist with ADL. Fatigues quickly, limited by pain. Pt would benefit from cont inpt OT services upon dc to maximize safety and functional independence.   Treatment Diagnosis: impaired ADLs and functional mobility/transfers  OT Education: OT Role;Plan of Care;Transfer Training;ADL Adaptive Strategies  Patient Education: verb understanding  Barriers to Learning: none  REQUIRES OT FOLLOW UP: Yes  Activity Tolerance  Activity Tolerance: Patient Tolerated treatment well;Patient limited by pain  Safety Devices  Safety Devices in place: Yes  Type of devices: Nurse notified;Call light within reach; All fall risk precautions in place;Gait belt;Patient at risk for falls; Bed alarm in place; Left in bed         Patient Diagnosis(es): The primary encounter diagnosis was Acute deep vein thrombosis (DVT) of proximal vein of right lower extremity ([facility] [address] Diagnoses of Thrombocytopenia ([address] and S/P craniotomy were also pertinent to this visit. has a past medical history of Thyroid nodule. has a past surgical history that includes craniotomy (Left, 3/12/2022); IR GUIDED IVC FILTER PLACEMENT (N/A, 03/16/2022); IR GUIDED IVC FILTER PLACEMENT (3/16/2022); and IR GUIDED THROMB MECH VEIN (3/28/2022). Restrictions  Position Activity Restriction  Other position/activity restrictions: Up with assist  Subjective   General  Chart Reviewed: Yes  Patient assessed for rehabilitation services?: Yes  Additional Pertinent Hx: 42-year-old female with metastatic breast cancer status post craniotomy with mass resection presented from [facility] due to worsening lower extremity pain and swelling. During her last admission patient was diagnosed with a DVT in the right lower extremity and due to her recent Craniotomy anticoagulation could not be started till POD 14 so patient underwent an IVC filter. Her platelets levels were noted to be low at [facility] and due to concern for worsening DVT she was sent to the hospital.  CT abdomen and pelvis was performed which did show thrombosis of the IVC at the level of the filter with marked distention of the caval bifurcation. There was also suspicion for bilateral lower lobe pulmonary emboli. Family / Caregiver Present: No  Referring Practitioner: Re Olivia MD  Diagnosis: Thrombocytopenia  Subjective  Subjective:  In bed eating lunch, agreeable to session, reporting pain in bilat LE      Orientation     Objective    ADL  Feeding: Supervision (in bed and sitting EOB (spvn/SBA ))  LE Dressing: Dependent/Total  Toileting:  (brief savi)        Balance  Sitting Balance: Stand by assistance  Standing Balance: Minimal assistance  Standing Balance  Activity: stand 1 Min in CARLOS bermudez, 3 sit<>stands (2 from paddles, 1 from bed)  Functional Mobility  Functional - Mobility Device:  (CARLOS allysondy)  Bed mobility  Supine to Sit: Moderate assistance  Sit to Supine: 2 Person assistance;Maximum assistance  Scooting: Moderate assistance  Transfers  Sit to stand: 2 Person assistance;Minimal assistance  Stand to sit: 2 Person assistance;Minimal assistance  Transfer Comments: Anil x2 stand to CARLOS bermudez, slow and effortful.  CGA stand from stedy paddles                       Cognition  Overall Cognitive Status: WFL                    Type of ROM/Therapeutic Exercise  Comment: LE exercise, ankle pumps, knee flex/ext, marching x15 BLE                    Plan   Plan  Times per week: 2-5  Times per day: Daily  Current Treatment Recommendations: Strengthening,Home Management Training,Patient/Caregiver Education & Training,Balance Training,Functional Mobility Training,Endurance Training,Self-Care / ADL,Safety Education & Training,Pain Management    AM-PAC Score        AM-[facility] Inpatient Daily Activity Raw Score: 12 (03/30/22 1504)  AM-PAC Inpatient ADL T-Scale Score : 30.6 (03/30/22 1504)  ADL Inpatient CMS 0-100% Score: 66.57 (03/30/22 1504)  ADL Inpatient CMS G-Code Modifier : CL (03/30/22 1504)    Goals  Short term goals  Time Frame for Short term goals: by dc  Short term goal 1: Pt will complete functional transfers, including BSC/toilet transfer w/ Min A- progressing  Short term goal 2: Pt will complete LE dressing w/ Min A and use of AE prn- progressing  Short term goal 3: Pt will maintain sitting balance w/ spvn to complete ADL task- progressing       Therapy Time   Individual Concurrent Group Co-treatment   Time In 1405         Time Out 1453         Minutes 48              Timed Code Treatment Minutes:   48    Total Treatment Minutes:  [address] Shaunna Medina

## 2022-03-30 NOTE — CONSULTS
Infectious Diseases Inpatient Consult Note    Reason for Consult:   Fever   Requesting Physician:   Dr Riggs  Primary Care Physician:  JUDITH Lloyd CNP  History Obtained From:   Pt, EPIC    Admit Date: 3/25/2022  Hospital Day: 6    CHIEF COMPLAINT:       Chief Complaint   Patient presents with    Leg Pain     Brain surgery completed 2 weeks ago, diagnosed with bilateral DVTs yesterday, platelet count 63-02 and [facility] was unsure if they could anticoagulate her       HISTORY OF PRESENT ILLNESS:      47 yo woman with hx breast ca, triple negative, stage IIB invasive ductal carcinoma, brain metastasis, seizures, hx DVT    Brain met resected 3/12, discharge on 3/17 to [facility]  Transferred back to [facility] 3/18 with seizure, dx new DVT, IVC filter placed. Discharged 3/20. Presents with leg pain and swelling. In ED 3/25, afeb, WBC 10.7  3/27 Tm 100.3. Started on zosyn  3/28 Tm 101.9 at 1748. Started on Vancomycin  3/29 - Tm 100.2. WBC 16.6. IR thrombectomy. Zosyn changed to Meropenem    3/30 Afebrile.   WBC 14.1  C/o LE swelling, 'sore'      Past Medical History:    Past Medical History:   Diagnosis Date    Thyroid nodule        Past Surgical History:    Past Surgical History:   Procedure Laterality Date    CRANIOTOMY Left 3/12/2022    LEFT TEMPORAL PARIETAL OCCIPITAL CRANIOTOMY FOR TUMOR RESECTION performed by Nikia Landry MD at [address] [facility] IR IVC FILTER PLACEMENT W IMAGING N/A 03/16/2022    [facility]    IR IVC FILTER PLACEMENT W IMAGING  3/16/2022    IR IVC FILTER PLACEMENT W IMAGING 3/16/2022 [facility] SPECIAL PROCEDURES    IR THROMB [address] VEIN  3/28/2022    IR THROMB [address] 3/28/2022 [facility] SPECIAL PROCEDURES       Current Medications:     vancomycin  1,250 mg IntraVENous Q8H    apixaban  10 mg Oral BID    Followed by   Gianna Parra ON 4/6/2022] apixaban  5 mg Oral BID    sodium chloride flush  5-40 mL IntraVENous 2 times per day    metoprolol  5 mg IntraVENous Once   Princess Dia by provider] clonazePAM  0.5 mg Oral BID    QUEtiapine  12.5 mg Oral Nightly    meropenem  2,000 mg IntraVENous Q8H    insulin regular  10 Units IntraVENous Once    lacosamide  200 mg Oral BID    levETIRAcetam  2,000 mg Oral Daily    levETIRAcetam  2,500 mg Oral Nightly    melatonin  5 mg Oral Nightly    pantoprazole  40 mg Oral QAM AC    sodium chloride flush  5-40 mL IntraVENous 2 times per day       Allergies:  Patient has no known allergies. Social History:    TOBACCO:    None   ETOH:    None   DRUGS:   None   MARITAL STATUS:   Single   OCCUPATION:       Family History:   No immunodeficiency    REVIEW OF SYSTEMS:    No fever / chills / sweats. No weight loss. No visual change, eye pain, eye discharge. No oral lesion, sore throat, dysphagia. Denies cough / sputum. Denies chest pain, palpitations. Denies n / v / abd pain. No diarrhea. Denies dysuria or change in urinary function. Denies joint swelling or pain. No myalgia, arthralgia. Denies skin changes, itching  Denies focal weakness, sensory change or other neurologic symptom    Denies new / worse depression, psychiatric symptoms    PHYSICAL EXAM:      Vitals:    /76   Pulse 132   Temp 98.3 °F (36.8 °C) (Oral)   Resp 18   Ht 5' 3\" (1.6 m)   Wt 202 lb 13.2 oz (92 kg)   SpO2 99%   BMI 35.93 kg/m²     GENERAL: No apparent distress. HEENT: Membranes moist, no oral lesion, PERRL  NECK:  Supple, no lymphadenopathy  LUNGS: Clear b/l, no rales, no dullness  CARDIAC: RRR, no murmur appreciated  R breast with mass, little drainage superiorly, no ulceration (seen with female RN)  ABD:  + BS, soft / NT  EXT:  No rash, no edema, no lesions. R arm cord, tender  NEURO: No focal neurologic findings  PSYCH: Orientation, sensorium, mood normal  LINES:  L midline in place (placed 3/29).     DATA:    Lab Results   Component Value Date    WBC 14.1 (H) 03/30/2022    HGB 7.5 (L) 03/30/2022    HCT 24.0 (L) 03/30/2022    MCV 83.8 03/30/2022     03/30/2022     Lab Results   Component Value Date    CREATININE 0.6 03/30/2022    BUN 9 03/30/2022     (L) 03/30/2022    K 4.4 03/30/2022    CL 96 (L) 03/30/2022    CO2 21 03/30/2022       Hepatic Function Panel:   Lab Results   Component Value Date    ALKPHOS 84 03/30/2022    ALT 32 03/30/2022    AST 37 03/30/2022    PROT 6.1 03/30/2022    BILITOT 0.3 03/30/2022    BILIDIR <0.2 03/30/2022    IBILI see below 03/30/2022    LABALBU 2.9 03/30/2022       Micro:  3/27 Urine cult >100k E faecalis  3/27 BC x 2 no growth to date    Imaging:   3/28 LE doppler u/s   Extensive deep and superficial venous thrombosis noted in the bilateral    lower extremities essentially from groin to ankle. 3/29 IR thrombectomy  Impression:   Technically successful inferior venacavogram and bilateral iliac venogram demonstrating marked ileal caval clot associated with the filter including thrombus above the IVC filter. Technically successful vacuum-assisted thrombectomy with restoration of flow through the inferior vena cava and improved patency of the ileal caval system. Clot remains within the inferior vena cava filter    IMPRESSION:      Patient Active Problem List   Diagnosis    Thyroid nodule    Brain metastases ([address]    Brain mass    Status epilepticus (HCC)    Duct cell carcinoma (HCC)    Cerebral edema (HCC)    Seizure (HCC)    Deep venous thrombosis (HCC)       Hx breast ca, triple negative, stage IIB invasive ductal carcinoma, brain metastasis, seizures, hx DVT    3/12 Brain met resected 3/12  3/18 Seizure, dx new DVT, IVC filter placed. 3/25 Leg pain and swelling.   Extensive LE DVT     Fever   - unclear source, may be from clot and not have infectious process   - no pneumonia, lung bases on CT with atelectasis or scarring   - no pyuria, no urinary sx   - R breast mass, does not appear to be infected   - R arm phlebitis, possible source    Bacteriuria      RECOMMENDATIONS:    Cont Vancomycin  D/c Meropenem  Check procalcitonin, mark f/u on [facility]      Medical Decision Making: The following items were considered in medical decision making:  Discussion of patient care with other providers  Reviewed clinical lab tests  Reviewed radiology tests  Reviewed other diagnostic tests/interventions  Microbiology cultures and other micro tests reviewed      Risk of Complications/Morbidity: High   Illness(es)/ Infection present that pose threat to bodily function. There is potential for severe exacerbation of infection/side effects of treatment.   Therapy requires intensive monitoring for antimicrobial agent toxicity    Discussed with pt, RN  Chantell Lord MD

## 2022-03-30 NOTE — CARE COORDINATION
CM following for discharge planning. CM spoke with Alan Issa at [address] [place] [address], they are reviewing the patient's chart and will call CM back in am if they can accept and will start precert if accepted.      Doc Eloise VEGA, BSN, [address]  Case Management Department  [phone]

## 2022-03-30 NOTE — PROGRESS NOTES
Per Dr. Sonido Townsend via secure message restart argatroban at previous rate of 1.2455 mcg/kg/min.     Electronically signed by Oumar Pandya RN on 3/30/2022 at 10:09 AM

## 2022-03-31 ENCOUNTER — APPOINTMENT (OUTPATIENT)
Dept: CT IMAGING | Age: 47
DRG: 169 | End: 2022-03-31
Payer: MEDICAID

## 2022-03-31 LAB
ABO/RH: NORMAL
ALBUMIN SERPL-MCNC: 2.8 G/DL (ref 3.4–5)
ALP BLD-CCNC: 94 U/L (ref 40–129)
ALT SERPL-CCNC: 43 U/L (ref 10–40)
ANION GAP SERPL CALCULATED.3IONS-SCNC: 10 MMOL/L (ref 3–16)
ANION GAP SERPL CALCULATED.3IONS-SCNC: 12 MMOL/L (ref 3–16)
ANISOCYTOSIS: ABNORMAL
ANTIBODY SCREEN: NORMAL
AST SERPL-CCNC: 38 U/L (ref 15–37)
BASOPHILS ABSOLUTE: 0 K/UL (ref 0–0.2)
BASOPHILS RELATIVE PERCENT: 0 %
BILIRUB SERPL-MCNC: <0.2 MG/DL (ref 0–1)
BILIRUBIN DIRECT: <0.2 MG/DL (ref 0–0.3)
BILIRUBIN, INDIRECT: ABNORMAL MG/DL (ref 0–1)
BLOOD CULTURE, ROUTINE: NORMAL
BUN BLDV-MCNC: 8 MG/DL (ref 7–20)
BUN BLDV-MCNC: 9 MG/DL (ref 7–20)
CALCIUM SERPL-MCNC: 8.4 MG/DL (ref 8.3–10.6)
CALCIUM SERPL-MCNC: 8.6 MG/DL (ref 8.3–10.6)
CHLORIDE BLD-SCNC: 96 MMOL/L (ref 99–110)
CHLORIDE BLD-SCNC: 99 MMOL/L (ref 99–110)
CO2: 23 MMOL/L (ref 21–32)
CO2: 26 MMOL/L (ref 21–32)
CREAT SERPL-MCNC: 0.5 MG/DL (ref 0.6–1.1)
CREAT SERPL-MCNC: <0.5 MG/DL (ref 0.6–1.1)
CULTURE, BLOOD 2: NORMAL
EOSINOPHILS ABSOLUTE: 0 K/UL (ref 0–0.6)
EOSINOPHILS RELATIVE PERCENT: 0 %
GFR AFRICAN AMERICAN: >60
GFR AFRICAN AMERICAN: >60
GFR NON-AFRICAN AMERICAN: >60
GFR NON-AFRICAN AMERICAN: >60
GLUCOSE BLD-MCNC: 127 MG/DL (ref 70–99)
GLUCOSE BLD-MCNC: 131 MG/DL (ref 70–99)
GLUCOSE BLD-MCNC: 133 MG/DL (ref 70–99)
GLUCOSE BLD-MCNC: 133 MG/DL (ref 70–99)
GLUCOSE BLD-MCNC: 134 MG/DL (ref 70–99)
GLUCOSE BLD-MCNC: 222 MG/DL (ref 70–99)
HCT VFR BLD CALC: 21.7 % (ref 36–48)
HEMOGLOBIN: 6.9 G/DL (ref 12–16)
HEMOGLOBIN: 7.1 G/DL (ref 12–16)
HEMOGLOBIN: 7.2 G/DL (ref 12–16)
HYPOCHROMIA: ABNORMAL
LYMPHOCYTES ABSOLUTE: 2 K/UL (ref 1–5.1)
LYMPHOCYTES RELATIVE PERCENT: 16 %
MCH RBC QN AUTO: 27.5 PG (ref 26–34)
MCHC RBC AUTO-ENTMCNC: 32.8 G/DL (ref 31–36)
MCV RBC AUTO: 83.7 FL (ref 80–100)
METAMYELOCYTES RELATIVE PERCENT: 3 %
MICROCYTES: ABNORMAL
MONOCYTES ABSOLUTE: 1.3 K/UL (ref 0–1.3)
MONOCYTES RELATIVE PERCENT: 11 %
MYELOCYTE PERCENT: 1 %
NEUTROPHILS ABSOLUTE: 8.9 K/UL (ref 1.7–7.7)
NEUTROPHILS RELATIVE PERCENT: 69 %
PDW BLD-RTO: 14.9 % (ref 12.4–15.4)
PERFORMED ON: ABNORMAL
PHOSPHORUS: 2.7 MG/DL (ref 2.5–4.9)
PHOSPHORUS: 3 MG/DL (ref 2.5–4.9)
PLATELET # BLD: 260 K/UL (ref 135–450)
PMV BLD AUTO: 7.5 FL (ref 5–10.5)
POTASSIUM SERPL-SCNC: 4.4 MMOL/L (ref 3.5–5.1)
POTASSIUM SERPL-SCNC: 4.4 MMOL/L (ref 3.5–5.1)
PROCALCITONIN: 1.02 NG/ML (ref 0–0.15)
RBC # BLD: 2.6 M/UL (ref 4–5.2)
RBC # BLD: NORMAL 10*6/UL
SCHISTOCYTES: ABNORMAL
SODIUM BLD-SCNC: 132 MMOL/L (ref 136–145)
SODIUM BLD-SCNC: 134 MMOL/L (ref 136–145)
TOTAL PROTEIN: 6 G/DL (ref 6.4–8.2)
WBC # BLD: 12.2 K/UL (ref 4–11)

## 2022-03-31 PROCEDURE — 2060000000 HC ICU INTERMEDIATE R&B

## 2022-03-31 PROCEDURE — 73706 CT ANGIO LWR EXTR W/O&W/DYE: CPT

## 2022-03-31 PROCEDURE — 97530 THERAPEUTIC ACTIVITIES: CPT

## 2022-03-31 PROCEDURE — 6360000002 HC RX W HCPCS: Performed by: STUDENT IN AN ORGANIZED HEALTH CARE EDUCATION/TRAINING PROGRAM

## 2022-03-31 PROCEDURE — 6370000000 HC RX 637 (ALT 250 FOR IP): Performed by: STUDENT IN AN ORGANIZED HEALTH CARE EDUCATION/TRAINING PROGRAM

## 2022-03-31 PROCEDURE — 86923 COMPATIBILITY TEST ELECTRIC: CPT

## 2022-03-31 PROCEDURE — 85025 COMPLETE CBC W/AUTO DIFF WBC: CPT

## 2022-03-31 PROCEDURE — 86901 BLOOD TYPING SEROLOGIC RH(D): CPT

## 2022-03-31 PROCEDURE — 84145 PROCALCITONIN (PCT): CPT

## 2022-03-31 PROCEDURE — 97110 THERAPEUTIC EXERCISES: CPT

## 2022-03-31 PROCEDURE — 86850 RBC ANTIBODY SCREEN: CPT

## 2022-03-31 PROCEDURE — 86900 BLOOD TYPING SEROLOGIC ABO: CPT

## 2022-03-31 PROCEDURE — 36415 COLL VENOUS BLD VENIPUNCTURE: CPT

## 2022-03-31 PROCEDURE — 97535 SELF CARE MNGMENT TRAINING: CPT

## 2022-03-31 PROCEDURE — 2580000003 HC RX 258: Performed by: STUDENT IN AN ORGANIZED HEALTH CARE EDUCATION/TRAINING PROGRAM

## 2022-03-31 PROCEDURE — 86880 COOMBS TEST DIRECT: CPT

## 2022-03-31 PROCEDURE — 80069 RENAL FUNCTION PANEL: CPT

## 2022-03-31 PROCEDURE — 2580000003 HC RX 258: Performed by: INTERNAL MEDICINE

## 2022-03-31 PROCEDURE — P9016 RBC LEUKOCYTES REDUCED: HCPCS

## 2022-03-31 PROCEDURE — 6370000000 HC RX 637 (ALT 250 FOR IP): Performed by: INTERNAL MEDICINE

## 2022-03-31 PROCEDURE — 85018 HEMOGLOBIN: CPT

## 2022-03-31 PROCEDURE — 6360000004 HC RX CONTRAST MEDICATION: Performed by: STUDENT IN AN ORGANIZED HEALTH CARE EDUCATION/TRAINING PROGRAM

## 2022-03-31 PROCEDURE — 80076 HEPATIC FUNCTION PANEL: CPT

## 2022-03-31 PROCEDURE — 99232 SBSQ HOSP IP/OBS MODERATE 35: CPT | Performed by: INTERNAL MEDICINE

## 2022-03-31 PROCEDURE — 99233 SBSQ HOSP IP/OBS HIGH 50: CPT | Performed by: INTERNAL MEDICINE

## 2022-03-31 RX ORDER — SODIUM CHLORIDE 9 MG/ML
INJECTION, SOLUTION INTRAVENOUS PRN
Status: DISCONTINUED | OUTPATIENT
Start: 2022-03-31 | End: 2022-04-02

## 2022-03-31 RX ORDER — 0.9 % SODIUM CHLORIDE 0.9 %
500 INTRAVENOUS SOLUTION INTRAVENOUS ONCE
Status: COMPLETED | OUTPATIENT
Start: 2022-03-31 | End: 2022-03-31

## 2022-03-31 RX ADMIN — SODIUM CHLORIDE, PRESERVATIVE FREE 10 ML: 5 INJECTION INTRAVENOUS at 09:01

## 2022-03-31 RX ADMIN — ACETAMINOPHEN 325MG 650 MG: 325 TABLET ORAL at 12:40

## 2022-03-31 RX ADMIN — APIXABAN 10 MG: 5 TABLET, FILM COATED ORAL at 21:06

## 2022-03-31 RX ADMIN — QUETIAPINE FUMARATE 12.5 MG: 25 TABLET ORAL at 21:06

## 2022-03-31 RX ADMIN — PANTOPRAZOLE SODIUM 40 MG: 40 TABLET, DELAYED RELEASE ORAL at 06:47

## 2022-03-31 RX ADMIN — Medication 5 MG: at 21:06

## 2022-03-31 RX ADMIN — LACOSAMIDE 200 MG: 50 TABLET, FILM COATED ORAL at 08:54

## 2022-03-31 RX ADMIN — LEVETIRACETAM 2000 MG: 250 TABLET, FILM COATED ORAL at 08:55

## 2022-03-31 RX ADMIN — SODIUM CHLORIDE 500 ML: 9 INJECTION, SOLUTION INTRAVENOUS at 14:22

## 2022-03-31 RX ADMIN — OXYCODONE 5 MG: 5 TABLET ORAL at 14:25

## 2022-03-31 RX ADMIN — SODIUM CHLORIDE, PRESERVATIVE FREE 10 ML: 5 INJECTION INTRAVENOUS at 21:09

## 2022-03-31 RX ADMIN — OXYCODONE 10 MG: 5 TABLET ORAL at 08:55

## 2022-03-31 RX ADMIN — LEVETIRACETAM 2500 MG: 250 TABLET, FILM COATED ORAL at 21:06

## 2022-03-31 RX ADMIN — APIXABAN 10 MG: 5 TABLET, FILM COATED ORAL at 08:56

## 2022-03-31 RX ADMIN — SODIUM CHLORIDE 25 ML: 9 INJECTION, SOLUTION INTRAVENOUS at 16:15

## 2022-03-31 RX ADMIN — IOPAMIDOL 80 ML: 755 INJECTION, SOLUTION INTRAVENOUS at 13:20

## 2022-03-31 RX ADMIN — SODIUM CHLORIDE, PRESERVATIVE FREE 10 ML: 5 INJECTION INTRAVENOUS at 09:00

## 2022-03-31 RX ADMIN — LACOSAMIDE 200 MG: 50 TABLET, FILM COATED ORAL at 21:06

## 2022-03-31 RX ADMIN — VANCOMYCIN HYDROCHLORIDE 1250 MG: 10 INJECTION, POWDER, LYOPHILIZED, FOR SOLUTION INTRAVENOUS at 09:09

## 2022-03-31 RX ADMIN — VANCOMYCIN HYDROCHLORIDE 1250 MG: 10 INJECTION, POWDER, LYOPHILIZED, FOR SOLUTION INTRAVENOUS at 01:33

## 2022-03-31 RX ADMIN — SODIUM CHLORIDE, PRESERVATIVE FREE 10 ML: 5 INJECTION INTRAVENOUS at 21:08

## 2022-03-31 RX ADMIN — VANCOMYCIN HYDROCHLORIDE 1250 MG: 10 INJECTION, POWDER, LYOPHILIZED, FOR SOLUTION INTRAVENOUS at 16:18

## 2022-03-31 ASSESSMENT — PAIN DESCRIPTION - FREQUENCY
FREQUENCY: CONTINUOUS

## 2022-03-31 ASSESSMENT — PAIN SCALES - GENERAL
PAINLEVEL_OUTOF10: 4
PAINLEVEL_OUTOF10: 4
PAINLEVEL_OUTOF10: 5
PAINLEVEL_OUTOF10: 4
PAINLEVEL_OUTOF10: 0
PAINLEVEL_OUTOF10: 0
PAINLEVEL_OUTOF10: 3
PAINLEVEL_OUTOF10: 7
PAINLEVEL_OUTOF10: 7

## 2022-03-31 ASSESSMENT — PAIN DESCRIPTION - LOCATION
LOCATION: BREAST;KNEE
LOCATION: KNEE
LOCATION: KNEE

## 2022-03-31 ASSESSMENT — PAIN DESCRIPTION - ORIENTATION
ORIENTATION: RIGHT;LEFT
ORIENTATION: RIGHT
ORIENTATION: RIGHT;LEFT

## 2022-03-31 ASSESSMENT — PAIN DESCRIPTION - PROGRESSION
CLINICAL_PROGRESSION: GRADUALLY IMPROVING
CLINICAL_PROGRESSION: NOT CHANGED
CLINICAL_PROGRESSION: NOT CHANGED

## 2022-03-31 ASSESSMENT — PAIN DESCRIPTION - DESCRIPTORS
DESCRIPTORS: ACHING;SHARP
DESCRIPTORS: ACHING;SHARP
DESCRIPTORS: ACHING;CONSTANT

## 2022-03-31 ASSESSMENT — PAIN DESCRIPTION - ONSET
ONSET: GRADUAL
ONSET: ON-GOING
ONSET: ON-GOING

## 2022-03-31 ASSESSMENT — PAIN DESCRIPTION - PAIN TYPE
TYPE: ACUTE PAIN
TYPE: CHRONIC PAIN
TYPE: ACUTE PAIN

## 2022-03-31 ASSESSMENT — PAIN - FUNCTIONAL ASSESSMENT: PAIN_FUNCTIONAL_ASSESSMENT: ACTIVITIES ARE NOT PREVENTED

## 2022-03-31 ASSESSMENT — ENCOUNTER SYMPTOMS
RESPIRATORY NEGATIVE: 1
GASTROINTESTINAL NEGATIVE: 1

## 2022-03-31 NOTE — PROGRESS NOTES
Occupational Therapy  Facility/Department: [place] [address] [facility]  Daily Treatment Note  NAME: Chana Angel  : 1975  MRN: [id number]    Date of Service: 3/31/2022    Discharge Recommendations: Chana Angel scored a 15/24 on the AM-PAC ADL Inpatient form. Current research shows that an AM-PAC score of 17 or less is typically not associated with a discharge to the patient's home setting. Based on the patient's AM-PAC score and their current ADL deficits, it is recommended that the patient have 5-7 sessions per week of Occupational Therapy at d/c to increase the patient's independence. At this time, this patient demonstrates the endurance, and/or tolerance for 3 hours of therapy each day, with a treatment frequency of 5-7x/wk. Please see assessment section for further patient specific details. If patient discharges prior to next session this note will serve as a discharge summary. Please see below for the latest assessment towards goals. OT Equipment Recommendations  Equipment Needed: No    Assessment   Performance deficits / Impairments: Decreased functional mobility ; Decreased endurance;Decreased ADL status; Decreased balance;Decreased strength;Decreased vision/visual deficit  Assessment: Pt cont to require increased assist from IND baseline. Pt requires ModA with supine to sit, able to sit EOB with assist completing ADLs, and therex. Completing UE/LE exercise. Practicing scooting and bed mobility to improve UE/LE strength and functional bed mobility. Pt completing bathing and dressing. Increased time and effort for all tasks, increased time with rest breaks between all activity. Pt extremely motivated this date for therapy and showing improvement from previous session. Would benefit from cont skilled OT while in acute care and intensive inpt at dc to rtn to IND baseline. Treatment Diagnosis: impaired ADLs and functional mobility/transfers  OT Education: OT Role;Plan of Care;Transfer Training; ADL Adaptive Strategies  Patient Education: verb understanding  Barriers to Learning: none  REQUIRES OT FOLLOW UP: Yes  Activity Tolerance  Activity Tolerance: Patient Tolerated treatment well;Patient limited by fatigue  Safety Devices  Safety Devices in place: Yes  Type of devices: Nurse notified;Call light within reach; All fall risk precautions in place;Gait belt;Patient at risk for falls; Bed alarm in place; Left in bed         Patient Diagnosis(es): The primary encounter diagnosis was Acute deep vein thrombosis (DVT) of proximal vein of right lower extremity ([address] Diagnoses of Thrombocytopenia ([address] and S/P craniotomy were also pertinent to this visit. has a past medical history of Thyroid nodule. has a past surgical history that includes craniotomy (Left, 3/12/2022); IR GUIDED IVC FILTER PLACEMENT (N/A, 03/16/2022); IR GUIDED IVC FILTER PLACEMENT (3/16/2022); and IR GUIDED THROMB MECH VEIN (3/28/2022). Restrictions  Position Activity Restriction  Other position/activity restrictions: Up with assist  Subjective   General  Chart Reviewed: Yes  Patient assessed for rehabilitation services?: Yes  Additional Pertinent Hx: 40-year-old female with metastatic breast cancer status post craniotomy with mass resection presented from [facility] due to worsening lower extremity pain and swelling. During her last admission patient was diagnosed with a DVT in the right lower extremity and due to her recent Craniotomy anticoagulation could not be started till POD 14 so patient underwent an IVC filter. Her platelets levels were noted to be low at [facility] and due to concern for worsening DVT she was sent to the hospital.  CT abdomen and pelvis was performed which did show thrombosis of the IVC at the level of the filter with marked distention of the caval bifurcation. There was also suspicion for bilateral lower lobe pulmonary emboli.   Family / Caregiver Present: No  Referring Practitioner: Guillermina Ayala MD  Diagnosis: Thrombocytopenia  Subjective  Subjective: In bed, finishing lunch, agreeable to session, reporting pain 5/10 in knees with movement      Orientation  Orientation  Overall Orientation Status: Within Functional Limits  Objective    ADL  Grooming: Stand by assistance (wipe face and hands)  UE Bathing: Minimal assistance  LE Bathing: Maximum assistance  UE Dressing: Minimal assistance  LE Dressing: Dependent/Total  Toileting:  (brief donned)  Additional Comments: bathing at EOB        Balance  Sitting Balance: Supervision (~25-30 min completing bathing and therex)  Bed mobility  Supine to Sit: Moderate assistance  Sit to Supine: Maximum assistance  Scooting: Stand by assistance  Comment: increased time, effort, cues, processing time                                               Exercises  Other: UE LE exercise. UE practicing scooting to [facility], scooting ~7 times, to L, scooting back on bed.  BLE exercise knee flex/ext 10x (4 sets), Hip adduction/abduction 10x, IR of hips 10x, hip flex 10x (2 sets)                    Plan   Plan  Times per week: 2-5  Times per day: Daily  Current Treatment Recommendations: Strengthening,Home Management Training,Patient/Caregiver Education & Training,Balance Training,Functional Mobility Training,Endurance Training,Self-Care / ADL,Safety Education & Training,Pain Management    AM-PAC Score        AM-[facility] Inpatient Daily Activity Raw Score: 15 (03/31/22 1519)  AM-PAC Inpatient ADL T-Scale Score : 34.69 (03/31/22 1519)  ADL Inpatient CMS 0-100% Score: 56.46 (03/31/22 1519)  ADL Inpatient CMS G-Code Modifier : CK (03/31/22 1519)    Goals  Short term goals  Time Frame for Short term goals: by dc  Short term goal 1: Pt will complete functional transfers, including BSC/toilet transfer w/ Min A- progressing  Short term goal 2: Pt will complete LE dressing w/ Min A and use of AE prn- progressing  Short term goal 3: Pt will maintain sitting balance w/ spvn to complete ADL task- goal met  Short term goal 4: sit<>stand with Anil to RW  Short term goal 5: bed to chair transfer with Anil       Therapy Time   Individual Concurrent Group Co-treatment   Time In 1405         Time Out 1503         Minutes 58              Timed Code Treatment Minutes:   58    Total Treatment Minutes:  [address], OT

## 2022-03-31 NOTE — CARE COORDINATION
Case Management Assessment           Daily Note                 Date/ Time of Note: 3/31/2022 2:16 PM         Note completed by: Judie Stanton RN    Patient Name: Yoli Vergara  YOB: 1975    Diagnosis: Thrombocytopenia (HCC) [D69.6]  S/P craniotomy [Z98.890]  Acute deep vein thrombosis (DVT) of proximal vein of right lower extremity (HCC) [I82.4Y1]  Acute deep vein thrombosis (DVT) of other specified vein of right lower extremity ([address] [I82.491]  Patient Admission Status: Inpatient    Date of Admission:3/25/2022  6:11 PM Length of Stay: 6 GLOS:      Current Plan of Care: denied ARU admission to [facility], SNF list given and discussed  ________________________________________________________________________________________  PT AM-PAC: 9 / 24 per last evaluation on: 3.30.2022    OT AM-PAC: 12 / 24 per last evaluation on: 3.30.2022    DME Needs for discharge:   ________________________________________________________________________________________  Discharge Plan: To Be Determined DUE TO: SNF vs home w/ HHC    Tentative discharge date: TBD    Current barriers to discharge: medical stability, accepting SNF    Referrals completed: Not Applicable    Resources/ information provided: SNF List  ________________________________________________________________________________________  Case Management Notes: JUNIOR continues to follow for DC planning and needs. CM spoke with Hortencia Lujan ([phone]) in admissions with [address] [place] [address], he reports that he was able to speak the MD at [facility], they currently do not believe patient is appropriate for ARU level of care and recommend SNF at this time. CM spoke with patient at bedside, she verbalized understanding of denial from ARU at [facility], [facility] discussed SNF and home options at DC. Patient does not wish to return to [facility] at DC, due to prior bad experience. CM provided SNF list for patient to review with family and make decision.  Patient also

## 2022-03-31 NOTE — PROGRESS NOTES
Nephrology  Note                                                                                                                                                                                                                                                                                                                                                               Office : [phone]     Fax :[phone]              Patient's Name: Fidel Nettles  3/30/2022    Reason for Consult:  Hyponatremia   Requesting Physician:  JUDITH Lim - CNP      Na better   S/p samsca     Off  IVF       Past Medical History:   Diagnosis Date    Thyroid nodule        Past Surgical History:   Procedure Laterality Date    CRANIOTOMY Left 3/12/2022    LEFT TEMPORAL PARIETAL OCCIPITAL CRANIOTOMY FOR TUMOR RESECTION performed by Ruby Hunter MD at [address] IR IVC [address] N/A 03/16/2022    [facility]    IR IVC FILTER PLACEMENT W IMAGING  3/16/2022    IR IVC FILTER PLACEMENT W IMAGING 3/16/2022 [facility] SPECIAL PROCEDURES    IR THROMB [id number] VEIN  3/28/2022    IR THROMB [id number] 3/28/2022 [facility] SPECIAL PROCEDURES       Family History   Problem Relation Age of Onset    Cancer Father         reports that she has never smoked. She has never used smokeless tobacco. She reports that she does not drink alcohol and does not use drugs. Allergies:  Patient has no known allergies.     Current Medications:    vancomycin (VANCOCIN) 1,250 mg in dextrose 5 % 250 mL IVPB, Q8H  [Held by provider] argatroban infusion 50mg in 0.9% sodium chloride 50 mL (premix), Continuous  apixaban (ELIQUIS) tablet 10 mg, BID   Followed by  Maria Isabel Gay ON 4/6/2022] apixaban (ELIQUIS) tablet 5 mg, BID  0.9 % sodium chloride infusion, PRN  sodium chloride flush 0.9 % injection 5-40 mL, 2 times per day  sodium chloride flush 0.9 % injection 5-40 mL, PRN  0.9 % sodium chloride infusion, PRN  metoprolol (LOPRESSOR) injection 5 mg, Once  oxyCODONE (ROXICODONE) immediate release tablet 5 mg, Q4H PRN   Or  oxyCODONE (ROXICODONE) immediate release tablet 10 mg, Q4H PRN  LORazepam (ATIVAN) injection 1 mg, Q6H PRN  [Held by provider] clonazePAM (KLONOPIN) tablet 0.5 mg, BID  QUEtiapine (SEROQUEL) tablet 12.5 mg, Nightly  insulin regular (HUMULIN R;NOVOLIN R) injection 10 Units, Once   And  dextrose 10 % infusion, Once  glucose (GLUTOSE) 40 % oral gel 15 g, PRN  dextrose 50 % IV solution, PRN  glucagon (rDNA) injection 1 mg, PRN  dextrose 5 % solution, PRN  lacosamide (VIMPAT) tablet 200 mg, BID  levETIRAcetam (KEPPRA) tablet 2,000 mg, Daily  levETIRAcetam (KEPPRA) tablet 2,500 mg, Nightly  melatonin disintegrating tablet 5 mg, Nightly  pantoprazole (PROTONIX) tablet 40 mg, QAM AC  sodium chloride flush 0.9 % injection 5-40 mL, 2 times per day  sodium chloride flush 0.9 % injection 5-40 mL, PRN  0.9 % sodium chloride infusion, PRN  ondansetron (ZOFRAN-ODT) disintegrating tablet 4 mg, Q8H PRN   Or  ondansetron (ZOFRAN) injection 4 mg, Q6H PRN  polyethylene glycol (GLYCOLAX) packet 17 g, Daily PRN  acetaminophen (TYLENOL) tablet 650 mg, Q6H PRN   Or  acetaminophen (TYLENOL) suppository 650 mg, Q6H PRN          Physical exam:     Vitals:  /78   Pulse 127   Temp 98.5 °F (36.9 °C) (Oral)   Resp 18   Ht 5' 3\" (1.6 m)   Wt 202 lb 13.2 oz (92 kg)   SpO2 97%   BMI 35.93 kg/m²   Constitutional:  OAA X3 NAD  Skin: no rash, turgor wnl  Heent:  eomi, mmm  Neck: no bruits or jvd noted  Cardiovascular:  S1, S2 without m/r/g  Respiratory: CTA B without w/r/r  Abdomen:  +bs, soft, nt, nd  Ext: + lower extremity edema  Psychiatric: mood and affect appropriate  Musculoskeletal:  Rom, muscular strength intact    Data:   Labs:  CBC:   Recent Labs     03/28/22  0641 03/28/22  1304 03/29/22  0647 03/29/22  1303 03/30/22  0625 03/30/22  0625 03/30/22  0841 03/30/22  1232 03/30/22  2147   WBC 16.2*  --  16.6*  --  14.1*  --   --   --   --    HGB ileal caval clot associated with the filter including thrombus above the IVC filter. Technically successful vacuum-assisted thrombectomy with restoration of flow through the inferior vena cava and improved patency of the ileal caval system. Clot remains within the inferior vena cava filter. VL Extremity Venous Bilateral   Final Result      XR CHEST PORTABLE   Final Result      Limited evaluation of lower right lung due to overlapping density for which infiltrate cannot be excluded. Otherwise no acute process seen. CT HEAD WO CONTRAST   Final Result      Postsurgical changes with areas of low cortical attenuation in the left parietal and left occipital region appear essentially stable from prior study with associated mild midline shift which is similar to slightly improved from prior study. No evidence of developing acute intracranial hemorrhage. CTA ABDOMEN PELVIS W WO CONTRAST   Final Result   1. 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 2. Surrounding hazy density, likely related to venous congestion. However, streaky densities extending inferiorly on the right may represent blood. However, there is no evidence of retroperitoneal hematoma. Correlation with lower extremity noninvasive    duplex Doppler is recommended in addition to serial hemoglobin and crit levels. 3. Suspect bilateral lower lobe pulmonary emboli. See comments above      Redemonstrated is a large necrotic mass involving the right breast enlarged fibroid uterus. Assessment/Plan   1. Hyponatremia     2. HTN    3. Anemia    4. Acid- base/ Electrolyte imbalance     5.  SIADH     Plan   - samsca as needed   - Ur studies - reviewed   - TSH - nl   - Monitor Na   - d/w pt   - free water restriction   - encourage solute intake                     Thank you for allowing us to participate in care of Negra Simon MD  Feel free to contact me   [facility] [address]  Office : [phone]  Fax :[phone]

## 2022-03-31 NOTE — PROGRESS NOTES
[facility]  Progress Note    Subjective:     Craniotomy cancelled March 9 due to seizure and status epilepticus. No longer having seizures  Craniotomy done Saturday March, 12  She developed a DVT & had an IVC filter placed as therapeutic anticoagulation could not begin until POD#14 (March 26)    Off argatroban gtt - now on Eliquis  Plts now normal  HIT yonatan pos; TRES still in process  S/P IVC thrombectomy 3/28/2022  No bleeding  No cyanosis in fingers or toes    Review of Systems:     Review of Systems   Constitutional: Positive for fatigue. Negative for fever. Respiratory: Negative. Cardiovascular: Negative. Gastrointestinal: Negative. Genitourinary: Negative. Musculoskeletal: Negative. Skin: Negative. Neurological: Negative for seizures, weakness and headaches.        Objective:     Medications    Current Facility-Administered Medications: vancomycin (VANCOCIN) 1,250 mg in dextrose 5 % 250 mL IVPB, 1,250 mg, IntraVENous, Q8H  [Held by provider] argatroban infusion 50mg in 0.9% sodium chloride 50 mL (premix), 0.0625-10 mcg/kg/min, IntraVENous, Continuous  apixaban (ELIQUIS) tablet 10 mg, 10 mg, Oral, BID **FOLLOWED BY** [START ON 4/6/2022] apixaban (ELIQUIS) tablet 5 mg, 5 mg, Oral, BID  0.9 % sodium chloride infusion, , IntraVENous, PRN  sodium chloride flush 0.9 % injection 5-40 mL, 5-40 mL, IntraVENous, 2 times per day  sodium chloride flush 0.9 % injection 5-40 mL, 5-40 mL, IntraVENous, PRN  0.9 % sodium chloride infusion, 25 mL, IntraVENous, PRN  metoprolol (LOPRESSOR) injection 5 mg, 5 mg, IntraVENous, Once  oxyCODONE (ROXICODONE) immediate release tablet 5 mg, 5 mg, Oral, Q4H PRN **OR** oxyCODONE (ROXICODONE) immediate release tablet 10 mg, 10 mg, Oral, Q4H PRN  LORazepam (ATIVAN) injection 1 mg, 1 mg, IntraVENous, Q6H PRN  [Held by provider] clonazePAM (KLONOPIN) tablet 0.5 mg, 0.5 mg, Oral, BID  QUEtiapine (SEROQUEL) tablet 12.5 mg, 12.5 mg, Oral, Nightly  insulin regular (HUMULIN R;NOVOLIN R) injection 10 Units, 10 Units, IntraVENous, Once **AND** dextrose 10 % infusion, 250 mL, IntraVENous, Once **AND** [COMPLETED] POCT Glucose, , , Q30 Min **AND** [COMPLETED] POCT Glucose, , , Q1H **AND** [COMPLETED] POCT Glucose, , , 4x Daily AC & HS  glucose (GLUTOSE) 40 % oral gel 15 g, 15 g, Oral, PRN  dextrose 50 % IV solution, 12.5 g, IntraVENous, PRN  glucagon (rDNA) injection 1 mg, 1 mg, IntraMUSCular, PRN  dextrose 5 % solution, 100 mL/hr, IntraVENous, PRN  lacosamide (VIMPAT) tablet 200 mg, 200 mg, Oral, BID  levETIRAcetam (KEPPRA) tablet 2,000 mg, 2,000 mg, Oral, Daily  levETIRAcetam (KEPPRA) tablet 2,500 mg, 2,500 mg, Oral, Nightly  melatonin disintegrating tablet 5 mg, 5 mg, Oral, Nightly  pantoprazole (PROTONIX) tablet 40 mg, 40 mg, Oral, QAM AC  sodium chloride flush 0.9 % injection 5-40 mL, 5-40 mL, IntraVENous, 2 times per day  sodium chloride flush 0.9 % injection 5-40 mL, 5-40 mL, IntraVENous, PRN  0.9 % sodium chloride infusion, 25 mL, IntraVENous, PRN  ondansetron (ZOFRAN-ODT) disintegrating tablet 4 mg, 4 mg, Oral, Q8H PRN **OR** ondansetron (ZOFRAN) injection 4 mg, 4 mg, IntraVENous, Q6H PRN  polyethylene glycol (GLYCOLAX) packet 17 g, 17 g, Oral, Daily PRN  acetaminophen (TYLENOL) tablet 650 mg, 650 mg, Oral, Q6H PRN **OR** acetaminophen (TYLENOL) suppository 650 mg, 650 mg, Rectal, Q6H PRN    Allergies  No Known Allergies    Physical Exam  VITALS:  /76   Pulse 121   Temp 98.2 °F (36.8 °C) (Oral)   Resp 20   Ht 5' 3\" (1.6 m)   Wt 202 lb 13.2 oz (92 kg)   SpO2 92%   BMI 35.93 kg/m²   TEMPERATURE:  Current - Temp: 98.2 °F (36.8 °C);  Max - Temp  Av.6 °F (37 °C)  Min: 98.2 °F (36.8 °C)  Max: 99.8 °F (37.7 °C)  PULSE OXIMETRY RANGE: SpO2  Av.7 %  Min: 92 %  Max: 99 %  24HR INTAKE/OUTPUT:      Intake/Output Summary (Last 24 hours) at 3/31/2022 0704  Last data filed at 3/31/2022 0413  Gross per 24 hour   Intake 1610 ml   Output 6050 ml   Net -4440 ml Physical Exam  Constitutional:       General: She is not in acute distress. Appearance: She is ill-appearing. HENT:      Head:      Comments: Craniotomy incision C/D/I - healing well  Eyes:      General: No scleral icterus. Cardiovascular:      Rate and Rhythm: Normal rate and regular rhythm. Heart sounds: No murmur heard. No gallop. Pulmonary:      Effort: Pulmonary effort is normal.      Breath sounds: Normal breath sounds. No wheezing, rhonchi or rales. Abdominal:      Palpations: Abdomen is soft. Tenderness: There is no abdominal tenderness. Musculoskeletal:         General: Swelling (BLE) present. Comments: No cyanosis in digits   Lymphadenopathy:      Cervical: No cervical adenopathy. Skin:     General: Skin is warm and dry. Labs  Recent Labs     03/29/22  0647 03/29/22  1303 03/30/22  0625 03/30/22  0841 03/30/22  1232 03/30/22  1546 03/30/22  2147 03/31/22  0433   WBC 16.6*  --  14.1*  --   --   --   --  12.2*   HGB 7.5*   < > 7.9*   < > 7.5*  --  7.7* 7.1*   HCT 22.5*   < > 24.0*  --   --  23.3*  --  21.7*     --  242  --   --   --   --  260   MCV 83.9  --  83.8  --   --   --   --  83.7    < > = values in this interval not displayed. Recent Labs     03/30/22  0130 03/30/22  1233 03/31/22  0139   * 130* 134*   K 4.3 4.4 4.4   CL 93* 96* 99   CO2 22 21 23   PHOS 2.5 2.8 2.7   BUN 12 9 9   CREATININE 0.5* 0.6 0.5*       Recent Labs     03/30/22  0625 03/31/22  0433   AST 37 38*   ALT 32 43*   BILIDIR <0.2 <0.2   BILITOT 0.3 <0.2   ALKPHOS 84 94       Recent Labs     03/29/22  0513   MG 2.20       Radiology  CT HEAD WO CONTRAST    Result Date: 3/26/2022  CT HEAD WITHOUT CONTRAST HISTORY: Craniotomy COMPARISON: 3/17/2022 Underexposure controls were utilized during the CT examination to meet ALARA standards for radiation dose reduction. FINDINGS: Postoperative changes are noted from left frontotemporal craniotomy.  Underlying hypodensity is seen in the area of prior surgery suggesting possible cortical infarct or postsurgical change. Additional area of low cortical attenuation is seen involving the medial left parieto-occipital region which also may be related to prior surgery. The appearance is similar to prior examination. Mild associated mass effect is seen with slight left to right midline shift which appears similar to slightly improved in extent since the prior study. Visualized portions of the paranasal sinuses appear clear. Postsurgical changes with areas of low cortical attenuation in the left parietal and left occipital region appear essentially stable from prior study with associated mild midline shift which is similar to slightly improved from prior study. No evidence of developing acute intracranial hemorrhage. CT Head WO Contrast    Result Date: 3/17/2022  PROCEDURE: CT head without contrast INDICATION: seizure; recent craniotomy; COMPARISON: 3/13/2022 and 3/5/2022 TECHNIQUE: CT head was performed without contrast according to standard protocol. Axial images and multiplanar reformatted images reviewed. Up-to-date CT equipment and radiation dose reduction techniques were employed. 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 There is slightly decreased 7 mm rightward midline shift and slightly decreased compression of the left lateral ventricle. There is stable persistent vasogenic edema in the left cerebral hemisphere. There is no evidence of acute hemorrhage or infarct. Visualized portions of the orbits, paranasal sinuses, and mastoids appear normal. There is scalp swelling overlying the craniotomy. 1.  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  No evidence of acute hemorrhage or infarct. CT Head wo Contrast    Result Date: 3/12/2022  HISTORY: Multiple brain masses. Status post resection. Postoperative evaluation. EXAM : CT OF THE HEAD WITHOUT CONTRAST TECHNIQUE: Multiple axial images were obtained of the brain without the use of contrast. Individualized dose optimization technique was used in order to meet ALARA standards for radiation dose reduction. In addition to vendor specific dose reduction algorithms, the dose reduction techniques vary based on the specific scanner utilized but frequently include automated exposure control, adjustment of the mA and/or kV according to patient size, and use of iterative reconstruction technique. COMPARISON: NONE FINDINGS: The visualized paranasal sinuses, mastoid air cells and middle ears are clear to the extent visualized. Status post left temporal and left parietal craniotomies. There is a surgical drain along the left scalp. No significant extracranial fluid collection. Skin staples consistent with recent surgery. Intracranially, there is persistent severe midline shift measuring approximately 10 mm similar to the preoperative evaluation. The large mass in the posterior left temporal region has been resected. There is an air-fluid level in the resection cavity consistent with expected postoperative changes. Resection cavity in the left parietal-occipital region with small gas bubbles. No acute complication or acute hemorrhage identified. Pneumocephalus consistent with recent surgery. 1. Resection cavity is at the locations of the previous masses with persistent adjacent vasogenic edema and persistent midline shift. No progressive mass effect or acute surgical complication otherwise identified.     CT HEAD WO CONTRAST    Result Date: 3/8/2022  CT HEAD WITHOUT CONTRAST COMPARISON STUDY: MRI brain without with contrast 3/5/2022 HISTORY: Status epilepticus in or for brain tumor resection FINDINGS: Thin section axial images obtained through the head from skull base to vertex. Multiplanar reconstruction images received for interpretation. Low radiation dose CT technique utilized. Redemonstrated is a partially necrotic heterogeneous attenuating mass in the left parietal occipital region measuring approximately 3.7 x 4.6 cm peripheral increased attenuation suggesting some component of hemorrhage. This mass abuts the posterior falx  with marked surrounding neurogenic edema and mass effect. This demonstrates little change from previous MRI exam. Additional hemorrhagic partially necrotic Mixed attenuating mass in the left parietal temporal region measuring approximately a 4.2 x 4.1 cm unchanged from prior MRI exam.  Moderate surrounding vasogenic edema and mass effect. Extensive surrounding edema anteriorly extending into the left thalamus and left renal ganglia region. There is near complete effacement of the left lateral ventricle with approximately 11 mm left-to-right midline shift. Brainstem and posterior fossa appear within normal limits. Visualized orbits and paranasal sinuses are clear. 1.  2 large hemorrhagic partially necrotic masses in the left parieto-temporal and left parieto-occipital regions with marked surrounding edema and mass effect. These demonstrate little change when compared to previous MRI study of 3/5/2022. Associated  prominent mass effect and surrounding edema resulting 11 mm midline shift to the right. CT HEAD WO CONTRAST    Result Date: 3/3/2022  EXAMINATION: CT OF THE HEAD WITHOUT CONTRAST  3/3/2022 8:17 pm TECHNIQUE: CT of the head was performed without the administration of intravenous contrast. Dose modulation, iterative reconstruction, and/or weight based adjustment of the mA/kV was utilized to reduce the radiation dose to as low as reasonably achievable. COMPARISON: None.  HISTORY: ORDERING SYSTEM PROVIDED HISTORY: ams TECHNOLOGIST PROVIDED HISTORY: Reason for exam:->ams Has a \"code stroke\" or \"stroke alert\" been called? ->No Decision Support Exception - unselect if not a suspected or confirmed emergency medical condition->Emergency Medical Condition (MA) Is the patient pregnant?->No Reason for Exam: c/o memory loss that began last week. Pt's mother states last Thursday the pt states she felt like something hit her in the head, but doesn't know what. States she isn't able to remember her name, important things she would usually remember, and she is also unable to read or write. FINDINGS: BRAIN/VENTRICLES: There is diffuse hypoattenuation involving much of the left temporoparietal and posterior frontal lobe. There is hypoattenuation involving the white matter involving left posterior hemisphere likely represent areas of vasogenic edema. There is at least 2 discrete of mass is identified measuring approximate 4 cm in size 1 which is central hypoattenuation which may be related to necrosis. These left-to-right midline shift 1.4 cm in size. There is entrapment of the right lateral ventricle and both temporal horns of both lateral ventricles suggestive areas of developing hydrocephalus. There is downward transtentorial herniation as well. With mass effect on the brainstem and the partial effacement of the suprasellar and ambient cisterns. Left uncal herniation. .  Focal density identified left parasagittal occipital lobe likely represents petechial calcification of the blood products. ORBITS: The visualized portion of the orbits demonstrate no acute abnormality. SINUSES: The visualized paranasal sinuses and mastoid air cells demonstrate no acute abnormality. SOFT TISSUES/SKULL:  No acute abnormality of the visualized skull or soft tissues. Abnormal edema left posterior cerebral hemisphere possibly related to underlying metastases versus less likely primary glioma.   This causes 1.4 cm of left-to-right midline shift with cisternal effacement and downward transtentorial herniation and findings of developing hydrocephalus. Hyperdensity left parasagittal occipital lobe could represent area of calcification however small focus of blood products will not be totally excluded. Neuro surgical consultation recommended Critical results were called by Dr. Lexi Orozco to Dot BOURNE on 3/3/2022 at 21:07. IR GUIDED IVC FILTER PLACEMENT    Result Date: 3/16/2022  IVC filter placement INDICATION : Deep venous thrombosis. Recent craniotomy. : Matthew Francisco MD PROCEDURE: Preprocedure sonographic evaluation of the right internal jugular vein demonstrates patency and compressibility. This measures 12 mm. A permanent sonographic image was saved to the electronic medical record. The patient's right neck was prepped and draped in sterile fashion and lidocaine used for local anesthesia. Ultrasound guidance was used and a sonographic image of the vein was obtained. The right internal jugular vein was accessed using a micropuncture set. Abdominal cavogram was obtained. An Option Elite retrievable filter was inserted under fluoroscopic guidance and deployed in routine fashion in the infrarenal IVC. Abdominal imaging was obtained to document filter position. The patient tolerated the procedure well without complication. IMPRESSION : Normal IVC anatomy. Normal, patent jugular vein. Patent inferior vena cava. Placement of filter in the infra-renal IVC. Please note that this is a retrievable filter. Fluoroscopy time : 1.8 minutes Number of exposures obtained : 5 Moderate sedation was performed by the physician including the presence of an independent trained observers that assisted in monitoring the patient's level of consciousness and physiological status. Following the administration of Midazolam and Fentanyl the physician spent continuous face-to-face time with the patient. Sedation start time: 0810 Sedation end time: 0826 Estimated blood loss: Less than 5 mL.      XR CHEST PORTABLE    Result Date: 3/27/2022  AP CHEST HISTORY: Fever COMPARISON 3/4/2022 FINDINGS: The heart size is within normal limits. Abnormal density seen laterally in right lower lung. This appears to represent overlapping density but infiltrate in this area would be difficult to exclude. Repeat evaluation following removal overlapping density is recommended. Limited evaluation of lower right lung due to overlapping density for which infiltrate cannot be excluded. Otherwise no acute process seen. VL Extremity Venous Bilateral    Result Date: 3/15/2022  Lower Extremities DVT Study  Demographics   Patient Name       Tahmina OSORIO   Date of Study      03/15/2022        Gender              Female   Patient Number     [id number]        Date of Birth       1975   Visit Number       [id number]         Age                 48 year(s)   Accession Number   [id number]        Room Number         3255   Corporate ID       [id number]           Sonographer         Charles Bunn RVT,                                                           [facility]   Ordering Physician Mimi Hardin MD, [facility]                                       Physician           Readers                                                           Aleda Gowers, MD  Procedure Type of Study:   Veins:Lower Extremities DVT Study, VASC EXTREMITY VENOUS DUPLEX BILATERAL. Vascular Sonographer Report  Indications for Study:Swelling. Additional Indications:Patient presents for evaluation due to bilateral lower extremity swelling. She only c/o right lower extremity weakness. She is a poor historian. She underwent craniotomy on 3/12/22 for tumor resection. She denies a prior H/O DVT. Lower extremity venous duplex study on 3/10/22 was negative for DVT bilaterally.  Venous Duplex Scan: B-mode imaging of the deep and superficial veins, with compression maneuvers, including color and Doppler spectral waveform analysis. Impressions Right Impression There is acute partially occluding deep venous thrombosis involving the deep femoral vein. The thrombus extends into the common femoral vein by 3.1 cm at the venous confluence. There is acute totally occluding deep venous thrombosis involving two soleal veins in the mid calf. There is no other evidence of deep or superficial venous thrombosis involving the right lower extremity. Left Impression There is no evidence of deep or superficial venous thrombosis involving the left lower extremity. Compared to the previous study on 3/10/22, the acute DVT noted in the right common femoral, deep femoral, and soleal veins is a new finding. The left lower extremity venous findings are unchanged. Conclusions   Summary   There is evidence of acute DVT involving the right common femoral vein, deep  femoral vein, and soleal veins, as described above. No evidence of deep vein or superficial thrombosis involving the left lower  extremity.    Signature   ------------------------------------------------------------------  Electronically signed by Azul Gomez MD (Interpreting  physician) on 03/15/2022 at 07:08 PM      VL Extremity Venous Bilateral    Result Date: 3/11/2022  Lower Extremities DVT Study  Demographics   Patient Name       [facility]   Date of Study      03/10/2022        Gender              Female   Patient Number     [id number]        Date of Birth       1975   Visit Number       [id number]         Age                 48 year(s)   Accession Number   [id number]        Room Number         2460   Corporate ID       [id number]           FRANCIS Gore,                                                           RVT   Ordering Physician Consuelo Friedman MD, [facility]                                       Physician           Readers                                                           France Garcia MD  Procedure Type of Study:   Veins:Lower Extremities DVT Study, VASC EXTREMITY VENOUS DUPLEX BILATERAL. Vascular Sonographer Report  Indications for Study:Swelling. Additional Indications:Inpatient study done bedside in the ICU. Patient is a poor historian with bilateral leg swelling for an unknown number of days. Venous Duplex Scan: B-mode imaging of the deep and superficial veins, with compression maneuvers, including color and Doppler spectral waveform analysis. Impressions Right Impression Catheter and bandages on the right thigh. No evidence of deep or superficial venous thrombosis in the right lower extremity. Left Impression No evidence of deep or superficial venous thrombosis in the left lower extremity. Evidence of a cystic structure in the popliteal fossa, consistent with a Baker's cyst, measuring 1.76x0.59 cm. Conclusions   Summary   No evidence of deep or superficial venous thrombosis in the bilateral lower  extremity. Left Baker's cyst.   Signature   ------------------------------------------------------------------  Electronically signed by Rajani Turner MD (Interpreting  physician) on 03/11/2022 at 01:50 PM       CTA HEAD NECK W CONTRAST    Result Date: 3/3/2022  EXAMINATION: CTA OF THE HEAD AND NECK WITH CONTRAST 3/3/2022 8:17 pm: TECHNIQUE: CTA of the head and neck was performed with the administration of intravenous contrast. Multiplanar reformatted images are provided for review. MIP images are provided for review. Stenosis of the internal carotid arteries measured using NASCET criteria. Dose modulation, iterative reconstruction, and/or weight based adjustment of the mA/kV was utilized to reduce the radiation dose to as low as reasonably achievable. COMPARISON: CT head 03/03/2022 HISTORY: ORDERING SYSTEM PROVIDED HISTORY: ams x 1 week TECHNOLOGIST PROVIDED HISTORY: Reason for exam:->ams x 1 week Has a \"code stroke\" or \"stroke alert\" been called? ->No Decision Support Exception - unselect if not a suspected or confirmed emergency medical condition->Emergency Medical Condition (MA) Reason for Exam: ams x 1 week FINDINGS: CTA NECK: AORTIC ARCH/ARCH VESSELS: No dissection or arterial injury. No significant stenosis of the brachiocephalic or subclavian arteries. CAROTID ARTERIES: No dissection, arterial injury, or hemodynamically significant stenosis by NASCET criteria. VERTEBRAL ARTERIES: No dissection, arterial injury, or significant stenosis. SOFT TISSUES: The lung apices are clear. No cervical or superior mediastinal lymphadenopathy. The larynx and pharynx are unremarkable. No acute abnormality of the salivary and thyroid glands. Partial visualization of a large exophytic right breast mass. BONES: .  Vertebral heights are maintained. CTA HEAD: ANTERIOR CIRCULATION: No significant stenosis of the intracranial internal carotid, anterior cerebral, or middle cerebral arteries. No aneurysm. POSTERIOR CIRCULATION: No significant stenosis of the vertebral, basilar, or posterior cerebral arteries. No aneurysm. OTHER: No dural venous sinus thrombosis on this non-dedicated study. BRAIN: Left-sided masses and edema with associated midline shift. . Ventriculomegaly worrisome for areas developing hydrocephalus related to the mass effect and downward transfer herniation. Negative for large vessel occlusion or hemodynamic stenosis. CT CHEST ABDOMEN PELVIS W CONTRAST    Result Date: 3/4/2022  CT of chest abdomen and pelvis with contrast HISTORY: Brain mass. Evaluate for primary neoplasm or metastatic disease. Staging. COMPARISON: none CONTRAST:  70 mL Isovue-370 intravenous  TECHNIQUE: Individualized dose optimization technique was used in order to meet ALARA standards for radiation dose reduction.  In addition to vendor specific dose reduction algorithms, the dose reduction techniques vary based on the specific scanner utilized but frequently include automated exposure control, adjustment of the mA and/or kV postoperative ischemia. There are small irregular nodular areas of enhancement along the anterior and lateral margins of the left occipital resection cavity (image 73 and 76 of series 13). Small irregular nodular area of enhancement along the medial margin of the left temporal resection cavity (image 70 of series 13). Thin curvilinear enhancement along the margins of the surgical cavity is likely reactive/postoperative. There is extensive T2 hyperintensity vasogenic edema in the left parietotemporal occipital region which is similar to preoperative MRI. There is mass effect with effacement of left lateral ventricle and approximately 9 mm midline shift to right. Postoperative changes of left-sided craniotomy with interval resection of left temporal and occipital lobe masses. Small nodular areas of enhancement along the margins of the surgical cavity as detailed above are indeterminate. Stable extensive T2 hyperintense/vasogenic edema in the left parietotemporal occipital region surrounding the resection cavities. Mass effect with effacement of left lateral ventricle and 9 mm midline shift to right. Recommended continued follow-up. MRI BRAIN W WO CONTRAST    Result Date: 3/5/2022  EXAM: MRI BRAIN W WO CONTRAST INDICATION: Brain metastasis COMPARISON: None TECHNIQUE: Multiplanar, multisequence MR imaging of the head obtained without and with IV. IV contrast: 12 mL IV ProHance. FINDINGS: There is no diffusion restriction to suggest an acute infarct. There is a hemorrhagic partially necrotic heterogeneously enhancing mass in the left parietal occipital region likely intra-axial measuring approximately 3.5 x 4.2 cm (image 90 of series 13). Medially it abuts posterior falx. There is surrounding vasogenic edema with mass effect. There is another hemorrhagic partially necrotic heterogeneously enhancing mass in the left parietotemporal region measuring approximately 4.3 x 4.2 cm (image 85).  It abuts the adjacent dura on the lateral aspect. There is surrounding vasogenic edema mass  effect. There is extensive surrounding vasogenic edema anteriorly extending into the left thalamus, left basal ganglia. There is mass effect with near complete effacement of left lateral ventricle and 10 mm midline shift to right. Major vascular flow voids are present. Sellar suprasellar region is unremarkable. Cervicomedullary junction is unremarkable. Visualized paranasal sinuses and mastoid air cells are clear. No suspicious calvarial abnormality. Two large hemorrhagic partially necrotic heterogeneously enhancing mass in the left parietotemporal and left parieto-occipital region with surrounding vasogenic edema and mass effect. These are indeterminate, most likely relate to multicentric glioblastoma. However, possibility of metastasis cannot be excluded given the history of breast cancer. 10 mm midline shift to right. CTA ABDOMEN PELVIS W WO CONTRAST    Result Date: 3/26/2022  EXAM: CTA ABDOMEN AND PELVIS WITHOUT CONTRAST INDICATION: LUQ abd pain w/ TTP on exam. Previous history of metastasis, IVF filter clot burden, recent IVC filter placement 3/16/2022 Necrotic right breast mass COMPARISON: 2/3/9653 TECHNIQUE: Helical CTA imaging obtained from lung bases through pelvis. Axial images and multiplanar reformatted images are provided for review Volume rendering, MIPS Arterial and delayed imaging IV Contrast: Isovue-370, 80 mL plus an additional 60 mL for additional injection Oral Contrast: None CT radiation dose optimization techniques (automated exposure control, use of a iterative reconstruction techniques, or adjustment of the mA or KV according to the patients size) were used to limit patient radiation dose. FINDINGS: Bilateral lower lobe pulmonary emboli are suspected, there are filling defects and poorly opacified pulmonary arteries in the lower lobe segments adjacent to the pulmonary veins however, this is indeterminate.  Correlation can be made with perfusion VQ scan or subsequent CT PE study of patient is well hydrated due to the added contrast given on the current exam. 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 Distended iliac veins bilaterally. Aorta and iliofemoral vessels are normal. Inferior vena cava filter remains in satisfactory position Liver: No masses or ductal dilatation Gallbladder is normal Pancreas and spleen are normal Normal kidneys Diameter of the small bowel colon are normal. No evidence of abscess. Enlarged fibroid uterus Urinary bladder is normal Normal abdominal wall. Bones: No destructive lesions. 1. 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 2. Surrounding hazy density, likely related to venous congestion. However, streaky densities extending inferiorly on the right may represent blood. However, there is no evidence of retroperitoneal hematoma. Correlation with lower extremity noninvasive duplex Doppler is recommended in addition to serial hemoglobin and crit levels. 3. Suspect bilateral lower lobe pulmonary emboli. See comments above Redemonstrated is a large necrotic mass involving the right breast enlarged fibroid uterus. Pathology  Department of Pathology   FINAL SURGICAL PATHOLOGY REPORT   Patient Name: Macy Engle        Accession No:  [id number]    Age Sex:   1975    46 Y / F      Location:      [id number]   Account No:   [de-identified]                 Collected:     2022   Med Rec No:    [id number]                Received:      2022   Attend Phys:   Klaudia Valdez MD           Completed:     2022   Perform Phys: Gary Herrera MD     FINAL DIAGNOSIS:        A.  Left temporal mass:      - Metastatic carcinoma compatible with \"triple negative\" breast        primary; see Comment.        B. Left temporal dura:      - Dural tissue negative for carcinoma, keratin AE1+3/Cam 5.2        immunostain reviewed on each block.        C. Left occipital mass:      - Metastatic carcinoma compatible with \"triple negative\" breast        primary; see Comment. COMMENT:  A, C: The carcinoma at each site shows a nearly identical   appearance, with diffuse masses of highly polymorphous cells showing a   high mitotic/karyorrhectic rate, frequent \"giant\" cells, and plentiful   geographic necrosis.  Given the patient's clinical history (see below),   immunostains were requested. The tumor is strongly positive for CK7,   GATA3, SOX-10, and beta catenin (membranous pattern), with variable   cytoplasmic and partial perinuclear(\"Golgi\") positivity for keratin   AE1+3/Cam 5.2, a tiny minority population, including geographic foci,   positive for high molecular weight keratin (), and Ki-67   proliferation rate of 90% or more. It is negative for ER, AL, and HER-2   (no positivity for any of the three stains), and also for CK20, Napsin-A,   S100 protein, villin, TTF-1, CDX2, and CD45 (LCA). The overall findings strongly support metastatic \"triple negative\" breast   carcinoma.   BRATI/BRATI     Problem List  Patient Active Problem List   Diagnosis    Thyroid nodule    Brain metastases ([address]    Brain mass    Status epilepticus ([address]    Duct cell carcinoma (HCC)    Cerebral edema (HCC)    Seizure (HCC)    Deep venous thrombosis (HCC)    Fever    Phlebitis    S/P craniotomy       Assessment and Plan:     Metastatic triple negative breast cancer  - Neglected Right breast primary  - Intracranial metastasis  - The patient and her mother agreed to surgery to resect her intracranial metastasis.   - March 9 Craniotomy had to be canceled due to seizures & status epilepticus.  - Saturday March 12 craniotomy and tumor resection. Recovering.  - Subsequent to this, she will require radiation therapy. Typically this is administered 2 to 4 weeks postoperatively. - Subsequent to radiation therapy she will require systemic chemotherapy. This will be in the outpatient setting.  - Recommend she follow-up with medical and radiation oncology at the [facility] office, where she was seen previously, after this hospitalization to coordinate and implement systemic palliative chemotherapy once she completes radiation therapy. Instructions placed in D/C instructions section.     DVT  - Per neurosurgery, she could not receive therapeutic anticoagulation until POD #14 - March 26. - Now off Argatroban gtt and transitioned to Eliquis  - S/P IVC filter. This will continue to be a nidus for further thrombus.     IVC Thrombus  - S/P thrombectomy  - Transitioned from argatroban to Eliquis  - IR states filter cannot be removed for 6 weeks    HIT  - Ivone positive  - TRES still in process  - Received argatroban gtt w/ normalization of the plts  - Now transititoned to Barbara Brannon MD  3/31/2022

## 2022-03-31 NOTE — PLAN OF CARE
Call light in reach, personal items on bedside table. Bed in low position with alarm on. Hourly rounding and Q2 turns done this shift. Problem: Falls - Risk of:  Goal: Will remain free from falls  Description: Will remain free from falls  Note: Pt remains in bed this shift, worked with PT/OT in bed. Problem: Pain:  Goal: Pain level will decrease  Description: Pain level will decrease  Note: Pt complained of mild to moderate pain my entire shift.  Current pain regimen effective     Problem: Skin Integrity:  Goal: Absence of new skin breakdown  Description: Absence of new skin breakdown  Note: No s/s of skin breakdown noted this shift     Problem: Discharge Planning:  Goal: Discharged to appropriate level of care  Description: Discharged to appropriate level of care  Note: Pt to do precert to discharge to a facility for PT     Problem: Venous Thromboembolism:  Goal: Will show no signs or symptoms of venous thromboembolism  Description: Will show no signs or symptoms of venous thromboembolism  Note: Pt completed CT of BLE this shift

## 2022-03-31 NOTE — PROGRESS NOTES
Nephrology  Note                                                                                                                                                                                                                                                                                                                                                               Office : [phone]     Fax :[phone]              Patient's Name: Lorenza Pollock  3/31/2022    Reason for Consult:  Hyponatremia   Requesting Physician:  JUDITH Edwards - CNP      Na better   S/p samsca     Off  IVF       Past Medical History:   Diagnosis Date    Thyroid nodule        Past Surgical History:   Procedure Laterality Date    CRANIOTOMY Left 3/12/2022    LEFT TEMPORAL PARIETAL OCCIPITAL CRANIOTOMY FOR TUMOR RESECTION performed by Bony Owens MD at [address] IR IVC [address] N/A 03/16/2022    [facility]    IR IVC FILTER PLACEMENT W IMAGING  3/16/2022    IR IVC FILTER PLACEMENT W IMAGING 3/16/2022 [facility] SPECIAL PROCEDURES    IR THROMB [address] VEIN  3/28/2022    IR THROMB [address] 3/28/2022 [facility] SPECIAL PROCEDURES       Family History   Problem Relation Age of Onset    Cancer Father         reports that she has never smoked. She has never used smokeless tobacco. She reports that she does not drink alcohol and does not use drugs. Allergies:  Patient has no known allergies.     Current Medications:    vancomycin (VANCOCIN) 1,250 mg in dextrose 5 % 250 mL IVPB, Q8H  [Held by provider] argatroban infusion 50mg in 0.9% sodium chloride 50 mL (premix), Continuous  apixaban (ELIQUIS) tablet 10 mg, BID   Followed by  Ulysses Pacer ON 4/6/2022] apixaban (ELIQUIS) tablet 5 mg, BID  0.9 % sodium chloride infusion, PRN  sodium chloride flush 0.9 % injection 5-40 mL, 2 times per day  sodium chloride flush 0.9 % injection 5-40 mL, PRN  0.9 % sodium chloride infusion, PRN  metoprolol (LOPRESSOR) injection 5 mg, Once  oxyCODONE (ROXICODONE) immediate release tablet 5 mg, Q4H PRN   Or  oxyCODONE (ROXICODONE) immediate release tablet 10 mg, Q4H PRN  LORazepam (ATIVAN) injection 1 mg, Q6H PRN  [Held by provider] clonazePAM (KLONOPIN) tablet 0.5 mg, BID  QUEtiapine (SEROQUEL) tablet 12.5 mg, Nightly  insulin regular (HUMULIN R;NOVOLIN R) injection 10 Units, Once   And  dextrose 10 % infusion, Once  glucose (GLUTOSE) 40 % oral gel 15 g, PRN  dextrose 50 % IV solution, PRN  glucagon (rDNA) injection 1 mg, PRN  dextrose 5 % solution, PRN  lacosamide (VIMPAT) tablet 200 mg, BID  levETIRAcetam (KEPPRA) tablet 2,000 mg, Daily  levETIRAcetam (KEPPRA) tablet 2,500 mg, Nightly  melatonin disintegrating tablet 5 mg, Nightly  pantoprazole (PROTONIX) tablet 40 mg, QAM AC  sodium chloride flush 0.9 % injection 5-40 mL, 2 times per day  sodium chloride flush 0.9 % injection 5-40 mL, PRN  0.9 % sodium chloride infusion, PRN  ondansetron (ZOFRAN-ODT) disintegrating tablet 4 mg, Q8H PRN   Or  ondansetron (ZOFRAN) injection 4 mg, Q6H PRN  polyethylene glycol (GLYCOLAX) packet 17 g, Daily PRN  acetaminophen (TYLENOL) tablet 650 mg, Q6H PRN   Or  acetaminophen (TYLENOL) suppository 650 mg, Q6H PRN          Physical exam:     Vitals:  /69   Pulse 116   Temp 97.5 °F (36.4 °C) (Oral)   Resp 18   Ht 5' 3\" (1.6 m)   Wt 202 lb 13.2 oz (92 kg)   SpO2 98%   BMI 35.93 kg/m²   Constitutional:  OAA X3 NAD  Skin: no rash, turgor wnl  Heent:  eomi, mmm  Neck: no bruits or jvd noted  Cardiovascular:  S1, S2 without m/r/g  Respiratory: CTA B without w/r/r  Abdomen:  +bs, soft, nt, nd  Ext: + lower extremity edema  Psychiatric: mood and affect appropriate  Musculoskeletal:  Rom, muscular strength intact    Data:   Labs:  CBC:   Recent Labs     03/29/22  0647 03/29/22  1303 03/30/22  0625 03/30/22  0841 03/30/22  2147 03/31/22  0433 03/31/22  1245   WBC 16.6*  --  14.1*  --   --  12.2*  --    HGB 7.5*   < > 7.9*   < > 7.7* 7.1* 7.2* Result   Impression: No acute arterial abnormality. No active hemorrhage. No hematoma of the pelvis, retroperitoneum or bilateral lower extremities. Suboptimal evaluation of the venous structures due to contrast bolus timing. Findings described above may represent sequelae of poor venous outflow or rethrombosis. CT HEAD WO CONTRAST   Final Result      No acute or interval change. No hemorrhage or mass effect. CT ABDOMEN PELVIS W IV CONTRAST Additional Contrast? 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 IR GUIDED THROMB [facility] VEIN   Final Result   Impression: Technically successful inferior venacavogram and bilateral iliac venogram demonstrating marked ileal caval clot associated with the filter including thrombus above the IVC filter. Technically successful vacuum-assisted thrombectomy with restoration of flow through the inferior vena cava and improved patency of the ileal caval system. Clot remains within the inferior vena cava filter. VL Extremity Venous Bilateral   Final Result      XR CHEST PORTABLE   Final Result      Limited evaluation of lower right lung due to overlapping density for which infiltrate cannot be excluded. Otherwise no acute process seen. CT HEAD WO CONTRAST   Final Result      Postsurgical changes with areas of low cortical attenuation in the left parietal and left occipital region appear essentially stable from prior study with associated mild midline shift which is similar to slightly improved from prior study. No evidence of developing acute intracranial hemorrhage. CTA ABDOMEN PELVIS W WO CONTRAST   Final Result   1. 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 2. Surrounding hazy density, likely related to venous congestion.  However, streaky densities extending inferiorly on the right may represent blood. However, there is no evidence of retroperitoneal hematoma. Correlation with lower extremity noninvasive    duplex Doppler is recommended in addition to serial hemoglobin and crit levels. 3. Suspect bilateral lower lobe pulmonary emboli. See comments above      Redemonstrated is a large necrotic mass involving the right breast enlarged fibroid uterus. Assessment/Plan   1. Hyponatremia     2. HTN    3. Anemia    4. Acid- base/ Electrolyte imbalance     5.  SIADH     Plan   - samsca as needed   - Ur studies - reviewed   - TSH - nl   - Monitor Na   - d/w pt   - free water restriction   - encourage solute intake                     Thank you for allowing us to participate in care of Dean Martinez MD  Feel free to contact me   [facility] [address]  Office : [phone]  Fax :[phone]

## 2022-03-31 NOTE — PROGRESS NOTES
ID Follow-up NOTE    CC:   Fever, leukocytosis  Antibiotics: Vancomycin    Admit Date: 3/25/2022  Hospital Day: 7    Subjective:     Patient c/o tired, leg swelling   R arm tender with palpation (above antecubital fossa)    Objective:     Afebrile last 24 hr    Patient Vitals for the past 8 hrs:   BP Temp Temp src Pulse Resp SpO2   03/31/22 0752 100/65 97.6 °F (36.4 °C) Oral 115 15 98 %   03/31/22 0413 116/76 98.2 °F (36.8 °C) Oral 121 20 92 %   03/31/22 0349 -- -- -- 120 -- --   03/31/22 0145 -- -- -- 117 -- --     I/O last 3 completed shifts: In: 1 [P.O.:2100; I.V.:10; Blood:300]  Out: 6050 [Urine:6050]  No intake/output data recorded. EXAM:  GENERAL: No apparent distress. RA  HEENT: Membranes moist, no oral lesion  NECK:  Supple, no lymphadenopathy  LUNGS: Clear b/l, no rales, no dullness  CARDIAC: RRR, no murmur appreciated  R breast with mass - dressing. Meagan Chan 3/30 - little drainage superiorly, no ulceration (seen with female RN)]  ABD:  + BS, soft / NT  EXT:  LE edema, bilateral, pitting.  R arm cord proximal to antecubital fossa, tender    No rash, no lesions  NEURO: No focal neurologic findings  PSYCH: Orientation, sensorium, mood normal  LINES:  Peripheral iv       Data Review:  Lab Results   Component Value Date    WBC 12.2 (H) 03/31/2022    HGB 7.1 (L) 03/31/2022    HCT 21.7 (L) 03/31/2022    MCV 83.7 03/31/2022     03/31/2022     Lab Results   Component Value Date    CREATININE 0.5 (L) 03/31/2022    BUN 9 03/31/2022     (L) 03/31/2022    K 4.4 03/31/2022    CL 99 03/31/2022    CO2 23 03/31/2022       Hepatic Function Panel:   Lab Results   Component Value Date    ALKPHOS 94 03/31/2022    ALT 43 03/31/2022    AST 38 03/31/2022    PROT 6.0 03/31/2022    BILITOT <0.2 03/31/2022    BILIDIR <0.2 03/31/2022    IBILI see below 03/31/2022    LABALBU 2.8 03/31/2022       Micro:  3/27 Urine cult >100k E faecalis  3/27 BC x 2 no growth to date     Imaging:   3/28 LE doppler u/s   Extensive deep and superficial venous thrombosis noted in the bilateral    lower extremities essentially from groin to ankle.      3/29 IR thrombectomy  Impression:   Technically successful inferior venacavogram and bilateral iliac venogram demonstrating marked ileal caval clot associated with the filter including thrombus above the IVC filter. Technically successful vacuum-assisted thrombectomy with restoration of flow through the inferior vena cava and improved patency of the ileal caval system. Clot remains within the inferior vena cava filter      Scheduled Meds:   vancomycin  1,250 mg IntraVENous Q8H    apixaban  10 mg Oral BID    Followed by   Radha Hernandez ON 4/6/2022] apixaban  5 mg Oral BID    sodium chloride flush  5-40 mL IntraVENous 2 times per day    metoprolol  5 mg IntraVENous Once    [Held by provider] clonazePAM  0.5 mg Oral BID    QUEtiapine  12.5 mg Oral Nightly    insulin regular  10 Units IntraVENous Once    lacosamide  200 mg Oral BID    levETIRAcetam  2,000 mg Oral Daily    levETIRAcetam  2,500 mg Oral Nightly    melatonin  5 mg Oral Nightly    pantoprazole  40 mg Oral QAM AC    sodium chloride flush  5-40 mL IntraVENous 2 times per day       Continuous Infusions:   [Held by provider] argatroban infusion Stopped (03/30/22 2100)    sodium chloride      sodium chloride 25 mL (03/29/22 2347)    dextrose      dextrose      sodium chloride 25 mL (03/30/22 0047)       PRN Meds:  sodium chloride, sodium chloride flush, sodium chloride, oxyCODONE **OR** oxyCODONE, LORazepam, glucose, dextrose, glucagon (rDNA), dextrose, sodium chloride flush, sodium chloride, ondansetron **OR** ondansetron, polyethylene glycol, acetaminophen **OR** acetaminophen      Assessment: Hx breast ca, triple negative, stage IIB invasive ductal carcinoma, brain metastasis, seizures, hx DVT     3/12 Brain met resected 3/12  3/18 Seizure, dx new DVT, IVC filter placed. 3/25 Leg pain and swelling.   Extensive LE DVT    Fever   - unclear source, may be from clot and not have infectious process   - no pneumonia, lung bases on CT with atelectasis or scarring   - no pyuria, no urinary sx   - R breast mass, does not appear to be infected   - R arm phlebitis, possible source   - Procalcitonin 1.02     Leukocytosis, WBC 12.2 - down today 3/31  Bacteriuria    Plan:     Cont Vancomycin  Will f/u on BC - 3/27, no growth to date    Medical Decision Making:   The following items were considered in medical decision making:  Discussion of patient care with other providers  Reviewed clinical lab tests  Reviewed radiology tests  Reviewed other diagnostic tests/interventions  Independent review of radiologic images  Microbiology cultures and other micro tests reviewed      Discussed with pt, RN student  Eudora Brittle, MD

## 2022-03-31 NOTE — PROGRESS NOTES
Pt declined shift updates at this time. Addendum- pt accepted reoffer this morning. Attempt to call pt's daughter, but sent directly to voicemail.     Addendum: provided shift updates to pt's mom at . [phone]    Electronically signed by Layne Vieira RN on 9/61/9594 at 6:48 AM

## 2022-03-31 NOTE — PROGRESS NOTES
Clinical Pharmacy Progress Note    Vancomycin - Management by Pharmacy    Consult Date(s): 3/28  Consulting Provider(s): Dr. Karla Cockayne / Plan  Sepsis + UTI - Vancomycin   Concurrent Antimicrobials: none    Day of Vanc Therapy: 4   Current Dosing Method: Bayesian-Guided AUC Dosing   Therapeutic Goal: 400-600 mg/L*hr   Current Dose / Frequency: 1250 mg every 8 hours   Plan / Rationale:   o Random level ordered for this AM but dose hung prior to drawing. Will reorder level for tomorrow AM to assess kinetics. o Current regimen predicts an AUC of 494 mcg/mL and a trough of 13.8 mcg/mL. Continue current dose.   o Random level ordered for tomorrow AM to assess kinetics    Will continue to monitor clinical condition and make adjustments to regimen as appropriate. Thanks for consulting pharmacy! Claudette Villatoro PharmD  Pharmacy Resident   Please call with questions [phone]  3/31/2022 10:45 AM      Interval update: S/p thrombectomy day #3. Afebrile and HDS. Argatroban drip held and patient being transitioned to Eliquis. Subjective/Objective: Ms. Corey Chavarria is a 48 y.o. female with a PMHx significant for thyroid nodule, triple neg breast cancer with mets to the brain, DVT in R lower extremity (3/15/22) s/p IVC filter. She is s/p left temporal parietal occipital craniotomy for tumor resection by Dr. Gavin Escobar on 3/12/2022. Patient presented back to [facility] for worsening R lower extremity swelling 2/2 thrombosis of the IVC at the level of the filter. Patient now cleared for [facility] by neurosurg and also HIT +. Patient has had a worsening leukocytosis since admission and was febrile to 101.9 today and tachycardic. Urine is growing enterococcus. Pharmacy has been consulted to dose vancomycin for sepsis + UTI.     Height:   Ht Readings from Last 1 Encounters:   03/25/22 5' 3\" (1.6 m)     Weight:   Wt Readings from Last 1 Encounters:   03/26/22 202 lb 13.2 oz (92 kg)     Current & Prior Antimicrobial Regimen(s):   Zosyn 4500 mg EI q8h (3/27-3/28)   Meropenem 2000 mg EI q8h (3/28-3/30)   Vancomycin  o 1750 mg IV x1 LD (3/28)  o 1250 mg IV q12h (3/29-3/30)  o 1250mg IV q8h (3/30-current)    Level(s) / Doses:    Date Time Dose Level / Type of Level Interpretation   3/30 0625 1250 mg q12h Random = 12.8 mcg/mL AUC = 330, dose adjusted to 1250mg q8h    4/1 0600 1250mg IV q8h Random = ordered    Note: Serum levels collected for AUC-based dosing may be high if collected in close proximity to the dose administered. This is not necessarily indicative of toxicity. Cultures & Sensitivities:    Date Site Micro Susceptibility / Result   3/27 Urine Enterococcus faecalis Ampicillin, tetracycline, nitrofurantoin, vancomycin    3/27 Blood No growth to date      Labs / Ancillary Data:    Estimated Creatinine Clearance: 148 mL/min (A) (based on SCr of 0.5 mg/dL (L)). Recent Labs     03/29/22  0647 03/29/22  1303 03/30/22  0130 03/30/22  0625 03/30/22  1233 03/31/22  0139 03/31/22  0433   CREATININE  --    < > 0.5*  --  0.6 0.5*  --    BUN  --    < > 12  --  9 9  --    WBC 16.6*  --   --  14.1*  --   --  12.2*    < > = values in this interval not displayed.      Additional Lab Values / Findings of Note:    Procalcitonin:   Recent Labs     03/31/22  0433   PROCAL 1.02*

## 2022-03-31 NOTE — PLAN OF CARE
Problem: Falls - Risk of:  Goal: Will remain free from falls  Description: Will remain free from falls  3/31/2022 0051 by Frida Rodriguez  Outcome: Ongoing  Note: Pt is a high fall risk. Pt educated on using call light when needing assistance out of bed. Bed alarm on, yellow socks and wrist band on, bed in lowest position, call light within reach. All needs met at this time. Problem: Skin Integrity:  Goal: Absence of new skin breakdown  Description: Absence of new skin breakdown  Outcome: Ongoing  Note: No new skin breakdown noted this shift, legs elevated off bed, patient repositioned, mas in place. Problem: Venous Thromboembolism:  Goal: Will show no signs or symptoms of venous thromboembolism  Description: Will show no signs or symptoms of venous thromboembolism  3/31/2022 0051 by Frida Rodriguez  Outcome: Ongoing  Note: Pt receiving eloquis for bilateral DVT, legs elevated off bed.

## 2022-03-31 NOTE — PROGRESS NOTES
Progress Note    Admit Date: 3/25/2022  Diet: ADULT DIET; Regular; 1200 ml    CC: leg pain     Interval history: Patient seen and examined at bedside. Vitals, afebrile, persistently tachycardic HR 110s. Sinus rhythm. BP 100s/70s. Urine output 6 L. Net +30 ml from  +4.47 Lyesterday, s/p 2 doses of samsca   Figueroa in place.   Medications:     Scheduled Meds:   vancomycin  1,250 mg IntraVENous Q8H    apixaban  10 mg Oral BID    Followed by   Lindsey Phan ON 4/6/2022] apixaban  5 mg Oral BID    sodium chloride flush  5-40 mL IntraVENous 2 times per day    metoprolol  5 mg IntraVENous Once    [Held by provider] clonazePAM  0.5 mg Oral BID    QUEtiapine  12.5 mg Oral Nightly    insulin regular  10 Units IntraVENous Once    lacosamide  200 mg Oral BID    levETIRAcetam  2,000 mg Oral Daily    levETIRAcetam  2,500 mg Oral Nightly    melatonin  5 mg Oral Nightly    pantoprazole  40 mg Oral QAM AC    sodium chloride flush  5-40 mL IntraVENous 2 times per day     Continuous Infusions:   [Held by provider] argatroban infusion Stopped (03/30/22 2100)    sodium chloride      sodium chloride 25 mL (03/29/22 2347)    dextrose      dextrose      sodium chloride 25 mL (03/30/22 0047)     PRN Meds:sodium chloride, sodium chloride flush, sodium chloride, oxyCODONE **OR** oxyCODONE, LORazepam, glucose, dextrose, glucagon (rDNA), dextrose, sodium chloride flush, sodium chloride, ondansetron **OR** ondansetron, polyethylene glycol, acetaminophen **OR** acetaminophen    Objective:   Vitals:   T-max:  Patient Vitals for the past 8 hrs:   BP Temp Temp src Pulse Resp SpO2   03/31/22 0752 100/65 97.6 °F (36.4 °C) Oral 115 15 98 %   03/31/22 0413 116/76 98.2 °F (36.8 °C) Oral 121 20 92 %   03/31/22 0349 -- -- -- 120 -- --   03/31/22 0145 -- -- -- 117 -- --       Intake/Output Summary (Last 24 hours) at 3/31/2022 0820  Last data filed at 3/31/2022 0413  Gross per 24 hour   Intake 1610 ml   Output 6050 ml   Net -4440 ml 03/30/22  1233 03/31/22  0139 03/31/22  0433   AST 37  --   --   --  38*   ALT 32  --   --   --  43*   BILITOT 0.3  --   --   --  <0.2   BILIDIR <0.2  --   --   --  <0.2   PROT 6.1*  --   --   --  6.0*   LABALBU 2.9*   < > 2.9* 2.8* 2.8*   ALKPHOS 84  --   --   --  94    < > = values in this interval not displayed. Troponin: No results for input(s): TROPONINI in the last 72 hours. BNP: No results for input(s): BNP in the last 72 hours. Lipids: No results for input(s): CHOL, HDL in the last 72 hours. Invalid input(s): LDLCALCU, TRIGLYCERIDE  ABGs:    Recent Labs     03/29/22  1055   PHART 7.466*   HHQ4QIZ 31.3*   PO2ART 84.9   [id number] 23   BEART -1.0   [id number] 99   JPI1ETX 24       INR:   No results for input(s): INR in the last 72 hours. Lactate: No results for input(s): LACTATE in the last 72 hours. Cultures:  -----------------------------------------------------------------  RAD:   CT HEAD WO CONTRAST   Final Result      No acute or interval change. No hemorrhage or mass effect. CT ABDOMEN PELVIS W IV CONTRAST Additional Contrast? 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 [facility]   Final Result   Impression: Technically successful inferior venacavogram and bilateral iliac venogram demonstrating marked ileal caval clot associated with the filter including thrombus above the IVC filter. Technically successful vacuum-assisted thrombectomy with restoration of flow through the inferior vena cava and improved patency of the ileal caval system. Clot remains within the inferior vena cava filter. VL Extremity Venous Bilateral   Final Result      XR CHEST PORTABLE   Final Result      Limited evaluation of lower right lung due to overlapping density for which infiltrate cannot be excluded. Otherwise no acute process seen.          CT HEAD WO CONTRAST   Final Result      Postsurgical changes with areas of low cortical attenuation in the left parietal and left occipital region appear essentially stable from prior study with associated mild midline shift which is similar to slightly improved from prior study. No evidence of developing acute intracranial hemorrhage. CTA ABDOMEN PELVIS W WO CONTRAST   Final Result   1. 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 2. Surrounding hazy density, likely related to venous congestion. However, streaky densities extending inferiorly on the right may represent blood. However, there is no evidence of retroperitoneal hematoma. Correlation with lower extremity noninvasive    duplex Doppler is recommended in addition to serial hemoglobin and crit levels. 3. Suspect bilateral lower lobe pulmonary emboli. See comments above      Redemonstrated is a large necrotic mass involving the right breast enlarged fibroid uterus. Assessment/Plan:     Metastatic triple negative breast cancer with intracranial metastasis  S/p craniotomy on 3/12    -Patient was diagnosed with triple negative breast cancer in March 2021 at which time clinical stage IIb. Patient chose to forego any medical or surgical treatment  -Discharged to [facility] on 3/20  -Today is POD 17  -CT head without contrast on 3/26 nonrevealing of acute ICH    -Pain control with Dilaudid pain panel as needed, oxycodone  -General surgery consulted, recommended palliative sx for pain management  -Consulted radiation oncology, suggested outpatient radiation directed towards the tumor bed.   Will need chemo likely in the future following radiation.  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 Patient did get IVC filter on 3/16  -CT abdomen pelvis this admission 3/26 revealing thrombosis of IVC at the level of IVC filter with marked distention of the caval bifurcation and common iliac veins. Streaky densities extending inferiorly on the right may represent blood. But no evidence of retroperitoneal hematoma. Suspected B/L lower lobe PE.    -Serotonin assay pending  -Bilateral lower extremity venous Doppler on 3/28 revealing bilateral acute oligoanuric DVTs  -Consulted IR, s/p thrombectomy on 3/28: Successful vacuum-assisted thrombectomy with restoration of flow through the IVC and improved patency of ileal caval system and clot remaining within the IVC filter  - change argatroban to eliquis  - per IR, filter can be removed after 6 week, as even though pt is anticoagulated, there is still a risk of PE. Will likely do this outpatient.      Fever, unknown source   - Pt was started on zosyn on on 3/27 which was changed to  vanc and merrem on 3/28   - ID consulted, recs to dc merrem   - urine cult grew Enteroccocus fecalis, awaiting BC.  -procal 1.02    Moderate thrombocytopenia s/p HIT positive - resolved  -DC all heparin-induced anticoagulation, avoiding future  -switch argatroban to eliquis   -Platelet improved to following argatroban and DC heparin and his anticoagulation  -Platelets 20> 125> 091> 205> 242    Acute anemia likely 2/2 blood loss  - s/p total of 2 U PRBC since admit   -Hb 7.1 this am   -Monitor CBC, transfuse for Hb <7  - Haptoglobin, LDH and reticulocyte elevated   - Had 2 non-bloody BM, FOBT negative        Respiratory alkalosis with compensated metabolic acidosis- improved  -Patient been persistently tachypneic, ABG revealing pH 7.466, CO2 31.3, O2 84  -Bicarb improved on bicarb gtt   - d/c bicarb gtt  -S/p straight cath x2 for urinary retention of approximately 600 &700 cc urine per bladder scan  -Figueroa in place  -Urology consulted appreciate recs  -Nephrology consulted, appreciate recs  -urine osmolality, urine sodium WNL  -Serum osmolality decreased to 69  -Urine output 380 mL yesterday, +4.4 L since admission    Hyponatremia 2/2 SIADH - resolved   -Sodium 124 > 134 this AM  - Received 2 doses of samsca yesterday    -Nephrology consulted, appreciate recs    Complicated UTI  -UA revealing of +3 bacteria, negative leukocyte esterase negative nitrites  -Zosyn changed to [address] on 3/28   - d/c merrem yesterday   - cont vanc    Sinus tachycardia  Likely etiology from fever/ pain/ PE/UTI  -EKG yesterday.  revealing sinus tachycardia, HR 130s  -Continuous telemetry    Seizures  -Keppra 2 g daily, Keppra 2.5 g nightly  -Vimpat 200 mg    Anxiety  -Ativan 1 mg as needed  -Holding Clonazepam 0.5 mg, pt lethargic this AM     PT score 9/24  OT score 12/24    Code Status: Full code  FEN: regular diet  PPX: eliquis  DISPO: GMF    Cas Landry MD, PGY-1  03/31/22  8:20 AM    This patient has been staffed and discussed with Floresita Reynolds MD.

## 2022-04-01 LAB
ALBUMIN SERPL-MCNC: 2.6 G/DL (ref 3.4–5)
ALBUMIN SERPL-MCNC: 2.8 G/DL (ref 3.4–5)
ANION GAP SERPL CALCULATED.3IONS-SCNC: 10 MMOL/L (ref 3–16)
ANION GAP SERPL CALCULATED.3IONS-SCNC: 11 MMOL/L (ref 3–16)
BASOPHILS ABSOLUTE: 0 K/UL (ref 0–0.2)
BASOPHILS RELATIVE PERCENT: 0 %
BLASTS RELATIVE PERCENT: 1 %
BLOOD BANK DISPENSE STATUS: NORMAL
BLOOD BANK PRODUCT CODE: NORMAL
BPU ID: NORMAL
BUN BLDV-MCNC: 8 MG/DL (ref 7–20)
BUN BLDV-MCNC: 9 MG/DL (ref 7–20)
CALCIUM SERPL-MCNC: 8.3 MG/DL (ref 8.3–10.6)
CALCIUM SERPL-MCNC: 8.7 MG/DL (ref 8.3–10.6)
CHLORIDE BLD-SCNC: 93 MMOL/L (ref 99–110)
CHLORIDE BLD-SCNC: 97 MMOL/L (ref 99–110)
CO2: 24 MMOL/L (ref 21–32)
CO2: 25 MMOL/L (ref 21–32)
CREAT SERPL-MCNC: <0.5 MG/DL (ref 0.6–1.1)
CREAT SERPL-MCNC: <0.5 MG/DL (ref 0.6–1.1)
DAT POLYSPECIFIC: NORMAL
DESCRIPTION BLOOD BANK: NORMAL
EOSINOPHILS ABSOLUTE: 0 K/UL (ref 0–0.6)
EOSINOPHILS RELATIVE PERCENT: 0 %
FERRITIN: 338.5 NG/ML (ref 15–150)
GFR AFRICAN AMERICAN: >60
GFR AFRICAN AMERICAN: >60
GFR NON-AFRICAN AMERICAN: >60
GFR NON-AFRICAN AMERICAN: >60
GLUCOSE BLD-MCNC: 103 MG/DL (ref 70–99)
GLUCOSE BLD-MCNC: 117 MG/DL (ref 70–99)
GLUCOSE BLD-MCNC: 124 MG/DL (ref 70–99)
GLUCOSE BLD-MCNC: 136 MG/DL (ref 70–99)
GLUCOSE BLD-MCNC: 143 MG/DL (ref 70–99)
GLUCOSE BLD-MCNC: 171 MG/DL (ref 70–99)
HCT VFR BLD CALC: 23.4 % (ref 36–48)
HEMATOLOGY PATH CONSULT: NORMAL
HEMATOLOGY PATH CONSULT: YES
HEMOGLOBIN: 8.1 G/DL (ref 12–16)
HEMOGLOBIN: 8.1 G/DL (ref 12–16)
HEMOGLOBIN: 8.3 G/DL (ref 12–16)
IRON SATURATION: 12 % (ref 15–50)
IRON: 21 UG/DL (ref 37–145)
LYMPHOCYTES ABSOLUTE: 1 K/UL (ref 1–5.1)
LYMPHOCYTES RELATIVE PERCENT: 10 %
MCH RBC QN AUTO: 28.5 PG (ref 26–34)
MCHC RBC AUTO-ENTMCNC: 34.4 G/DL (ref 31–36)
MCV RBC AUTO: 83 FL (ref 80–100)
METAMYELOCYTES RELATIVE PERCENT: 2 %
MICROCYTES: ABNORMAL
MONOCYTES ABSOLUTE: 0.2 K/UL (ref 0–1.3)
MONOCYTES RELATIVE PERCENT: 2 %
NEUTROPHILS ABSOLUTE: 9 K/UL (ref 1.7–7.7)
NEUTROPHILS RELATIVE PERCENT: 85 %
OVALOCYTES: ABNORMAL
PDW BLD-RTO: 14.8 % (ref 12.4–15.4)
PERFORMED ON: ABNORMAL
PHOSPHORUS: 3.1 MG/DL (ref 2.5–4.9)
PHOSPHORUS: 3.2 MG/DL (ref 2.5–4.9)
PLATELET # BLD: 266 K/UL (ref 135–450)
PMV BLD AUTO: 7 FL (ref 5–10.5)
POLYCHROMASIA: ABNORMAL
POTASSIUM SERPL-SCNC: 4.4 MMOL/L (ref 3.5–5.1)
POTASSIUM SERPL-SCNC: 4.7 MMOL/L (ref 3.5–5.1)
RBC # BLD: 2.82 M/UL (ref 4–5.2)
SODIUM BLD-SCNC: 129 MMOL/L (ref 136–145)
SODIUM BLD-SCNC: 131 MMOL/L (ref 136–145)
TOTAL IRON BINDING CAPACITY: 169 UG/DL (ref 260–445)
WBC # BLD: 10.4 K/UL (ref 4–11)

## 2022-04-01 PROCEDURE — 2580000003 HC RX 258: Performed by: STUDENT IN AN ORGANIZED HEALTH CARE EDUCATION/TRAINING PROGRAM

## 2022-04-01 PROCEDURE — 99233 SBSQ HOSP IP/OBS HIGH 50: CPT | Performed by: INTERNAL MEDICINE

## 2022-04-01 PROCEDURE — 80069 RENAL FUNCTION PANEL: CPT

## 2022-04-01 PROCEDURE — 2060000000 HC ICU INTERMEDIATE R&B

## 2022-04-01 PROCEDURE — 51798 US URINE CAPACITY MEASURE: CPT

## 2022-04-01 PROCEDURE — 6370000000 HC RX 637 (ALT 250 FOR IP): Performed by: STUDENT IN AN ORGANIZED HEALTH CARE EDUCATION/TRAINING PROGRAM

## 2022-04-01 PROCEDURE — 36430 TRANSFUSION BLD/BLD COMPNT: CPT

## 2022-04-01 PROCEDURE — 6370000000 HC RX 637 (ALT 250 FOR IP): Performed by: INTERNAL MEDICINE

## 2022-04-01 PROCEDURE — 99232 SBSQ HOSP IP/OBS MODERATE 35: CPT | Performed by: INTERNAL MEDICINE

## 2022-04-01 PROCEDURE — 83550 IRON BINDING TEST: CPT

## 2022-04-01 PROCEDURE — 36415 COLL VENOUS BLD VENIPUNCTURE: CPT

## 2022-04-01 PROCEDURE — 2580000003 HC RX 258: Performed by: INTERNAL MEDICINE

## 2022-04-01 PROCEDURE — 82728 ASSAY OF FERRITIN: CPT

## 2022-04-01 PROCEDURE — 51701 INSERT BLADDER CATHETER: CPT

## 2022-04-01 PROCEDURE — 6360000002 HC RX W HCPCS: Performed by: STUDENT IN AN ORGANIZED HEALTH CARE EDUCATION/TRAINING PROGRAM

## 2022-04-01 PROCEDURE — 85018 HEMOGLOBIN: CPT

## 2022-04-01 PROCEDURE — 83540 ASSAY OF IRON: CPT

## 2022-04-01 PROCEDURE — 82607 VITAMIN B-12: CPT

## 2022-04-01 PROCEDURE — 85025 COMPLETE CBC W/AUTO DIFF WBC: CPT

## 2022-04-01 RX ADMIN — SODIUM CHLORIDE, PRESERVATIVE FREE 10 ML: 5 INJECTION INTRAVENOUS at 08:55

## 2022-04-01 RX ADMIN — SODIUM CHLORIDE 25 ML: 9 INJECTION, SOLUTION INTRAVENOUS at 17:36

## 2022-04-01 RX ADMIN — SODIUM CHLORIDE, PRESERVATIVE FREE 10 ML: 5 INJECTION INTRAVENOUS at 10:05

## 2022-04-01 RX ADMIN — Medication 5 MG: at 21:40

## 2022-04-01 RX ADMIN — SODIUM CHLORIDE, PRESERVATIVE FREE 10 ML: 5 INJECTION INTRAVENOUS at 21:40

## 2022-04-01 RX ADMIN — QUETIAPINE FUMARATE 12.5 MG: 25 TABLET ORAL at 21:40

## 2022-04-01 RX ADMIN — VANCOMYCIN HYDROCHLORIDE 1250 MG: 10 INJECTION, POWDER, LYOPHILIZED, FOR SOLUTION INTRAVENOUS at 09:02

## 2022-04-01 RX ADMIN — LEVETIRACETAM 2500 MG: 250 TABLET, FILM COATED ORAL at 21:42

## 2022-04-01 RX ADMIN — SODIUM CHLORIDE, PRESERVATIVE FREE 10 ML: 5 INJECTION INTRAVENOUS at 21:43

## 2022-04-01 RX ADMIN — PANTOPRAZOLE SODIUM 40 MG: 40 TABLET, DELAYED RELEASE ORAL at 05:15

## 2022-04-01 RX ADMIN — SODIUM CHLORIDE 25 ML: 9 INJECTION, SOLUTION INTRAVENOUS at 08:58

## 2022-04-01 RX ADMIN — VANCOMYCIN HYDROCHLORIDE 1250 MG: 10 INJECTION, POWDER, LYOPHILIZED, FOR SOLUTION INTRAVENOUS at 17:38

## 2022-04-01 RX ADMIN — LEVETIRACETAM 2000 MG: 250 TABLET, FILM COATED ORAL at 08:53

## 2022-04-01 RX ADMIN — APIXABAN 10 MG: 5 TABLET, FILM COATED ORAL at 21:40

## 2022-04-01 RX ADMIN — OXYCODONE 5 MG: 5 TABLET ORAL at 02:34

## 2022-04-01 RX ADMIN — VANCOMYCIN HYDROCHLORIDE 1250 MG: 10 INJECTION, POWDER, LYOPHILIZED, FOR SOLUTION INTRAVENOUS at 00:34

## 2022-04-01 RX ADMIN — LACOSAMIDE 200 MG: 50 TABLET, FILM COATED ORAL at 21:39

## 2022-04-01 RX ADMIN — LACOSAMIDE 200 MG: 50 TABLET, FILM COATED ORAL at 08:52

## 2022-04-01 RX ADMIN — APIXABAN 10 MG: 5 TABLET, FILM COATED ORAL at 08:52

## 2022-04-01 ASSESSMENT — PAIN DESCRIPTION - PAIN TYPE: TYPE: ACUTE PAIN

## 2022-04-01 ASSESSMENT — PAIN SCALES - GENERAL
PAINLEVEL_OUTOF10: 0
PAINLEVEL_OUTOF10: 6
PAINLEVEL_OUTOF10: 0

## 2022-04-01 ASSESSMENT — PAIN DESCRIPTION - PROGRESSION: CLINICAL_PROGRESSION: NOT CHANGED

## 2022-04-01 ASSESSMENT — PAIN DESCRIPTION - LOCATION: LOCATION: KNEE

## 2022-04-01 ASSESSMENT — PAIN DESCRIPTION - ONSET: ONSET: AWAKENED FROM SLEEP

## 2022-04-01 ASSESSMENT — PAIN - FUNCTIONAL ASSESSMENT: PAIN_FUNCTIONAL_ASSESSMENT: PREVENTS OR INTERFERES SOME ACTIVE ACTIVITIES AND ADLS

## 2022-04-01 ASSESSMENT — PAIN DESCRIPTION - FREQUENCY: FREQUENCY: INTERMITTENT

## 2022-04-01 ASSESSMENT — PAIN DESCRIPTION - ORIENTATION: ORIENTATION: RIGHT

## 2022-04-01 ASSESSMENT — ENCOUNTER SYMPTOMS
RESPIRATORY NEGATIVE: 1
GASTROINTESTINAL NEGATIVE: 1

## 2022-04-01 ASSESSMENT — PAIN DESCRIPTION - DESCRIPTORS: DESCRIPTORS: ACHING

## 2022-04-01 NOTE — PROGRESS NOTES
Progress Note    Admit Date: 3/25/2022  Diet: ADULT DIET; Regular; 1200 ml    CC: leg pain     Interval history: Patient seen and examined at bedside. Yesterday, HB decreased to 6.9, received 1 U PRBC. HB this am 8.1. Vitals, afebrile, persistently tachycardic HR 110s. Sinus rhythm. BP 100s/70s. Urine output 2.225 L. Net -754 ml , s/p 2 doses of samsca   Figueroa in place.    Medications:     Scheduled Meds:   vancomycin  1,250 mg IntraVENous Q8H    apixaban  10 mg Oral BID    Followed by   Stella Mauricio ON 4/6/2022] apixaban  5 mg Oral BID    sodium chloride flush  5-40 mL IntraVENous 2 times per day    metoprolol  5 mg IntraVENous Once    [Held by provider] clonazePAM  0.5 mg Oral BID    QUEtiapine  12.5 mg Oral Nightly    insulin regular  10 Units IntraVENous Once    lacosamide  200 mg Oral BID    levETIRAcetam  2,000 mg Oral Daily    levETIRAcetam  2,500 mg Oral Nightly    melatonin  5 mg Oral Nightly    pantoprazole  40 mg Oral QAM AC    sodium chloride flush  5-40 mL IntraVENous 2 times per day     Continuous Infusions:   sodium chloride      [Held by provider] argatroban infusion Stopped (03/30/22 2100)    sodium chloride      sodium chloride 25 mL (03/31/22 1615)    dextrose      dextrose      sodium chloride 25 mL (03/30/22 0047)     PRN Meds:sodium chloride, sodium chloride, sodium chloride flush, sodium chloride, oxyCODONE **OR** oxyCODONE, LORazepam, glucose, dextrose, glucagon (rDNA), dextrose, sodium chloride flush, sodium chloride, ondansetron **OR** ondansetron, polyethylene glycol, acetaminophen **OR** acetaminophen    Objective:   Vitals:   T-max:  Patient Vitals for the past 8 hrs:   BP Temp Temp src Pulse Resp SpO2   04/01/22 0330 105/73 97.5 °F (36.4 °C) Oral 113 16 93 %   04/01/22 0141 107/73 97.4 °F (36.3 °C) Oral 117 18 95 %   04/01/22 0041 113/74 98.5 °F (36.9 °C) Oral 117 16 95 %       Intake/Output Summary (Last 24 hours) at 4/1/2022 7190  Last data filed at 4/1/2022 4982  Gross per 24 hour   Intake 1440 ml   Output 2225 ml   Net -785 ml         Physical Exam  Constitutional:       Appearance: She is obese. She is not diaphoretic. Comments: Sleeping, lethargic. Unable to hold a conversation   HENT:      Head: Normocephalic and atraumatic. Nose: Nose normal.   Eyes:      Extraocular Movements: Extraocular movements intact. Conjunctiva/sclera: Conjunctivae normal.      Pupils: Pupils are equal, round, and reactive to light. Cardiovascular:      Rate and Rhythm: Regular rhythm. Tachycardia present. Pulses: Normal pulses. Heart sounds: Normal heart sounds. Pulmonary:      Effort: Pulmonary effort is normal.      Breath sounds: Normal breath sounds. Abdominal:      General: Bowel sounds are normal.      Palpations: Abdomen is soft. Musculoskeletal:         General: Swelling present. Cervical back: Normal range of motion. Right lower leg: Edema present. Left lower leg: Edema present. Neurological:      Mental Status: She is oriented to person, place, and time. LABS:    CBC:   Recent Labs     03/30/22  0625 03/30/22  0841 03/30/22  1546 03/30/22  2147 03/31/22  0433 03/31/22  0433 03/31/22  1245 03/31/22  2113 04/01/22  0512   WBC 14.1*  --   --   --  12.2*  --   --   --  10.4   HGB 7.9*   < >  --    < > 7.1*   < > 7.2* 6.9* 8.1*   HCT 24.0*   < > 23.3*  --  21.7*  --   --   --  23.4*     --   --   --  260  --   --   --  266   MCV 83.8  --   --   --  83.7  --   --   --  83.0    < > = values in this interval not displayed.      Renal:    Recent Labs     03/31/22  0139 03/31/22  1245 04/01/22  0512   * 132* 131*   K 4.4 4.4 4.7   CL 99 96* 97*   CO2 23 26 24   BUN 9 8 8   CREATININE 0.5* <0.5* <0.5*   GLUCOSE 133* 133* 103*   CALCIUM 8.4 8.6 8.3   PHOS 2.7 3.0 3.1   ANIONGAP 12 10 10     Hepatic:   Recent Labs     03/30/22  0625 03/30/22  1233 03/31/22  0433 03/31/22  1245 04/01/22  0512   AST 37  --  38*  --   -- ALT 32  --  43*  --   --    BILITOT 0.3  --  <0.2  --   --    BILIDIR <0.2  --  <0.2  --   --    PROT 6.1*  --  6.0*  --   --    LABALBU 2.9*   < > 2.8* 2.8* 2.6*   ALKPHOS 84  --  94  --   --     < > = values in this interval not displayed. Troponin: No results for input(s): TROPONINI in the last 72 hours. BNP: No results for input(s): BNP in the last 72 hours. Lipids: No results for input(s): CHOL, HDL in the last 72 hours. Invalid input(s): LDLCALCU, TRIGLYCERIDE  ABGs:    Recent Labs     03/29/22  1055   PHART 7.466*   [id number] 31.3*   PO2ART 84.9   [id number] 23   BEART -1.0   [id number] 99   [id number] 24       INR:   No results for input(s): INR in the last 72 hours. Lactate: No results for input(s): LACTATE in the last 72 hours. Cultures:  -----------------------------------------------------------------  RAD:   CTA LOWER EXTREMITY LEFT W CONTRAST   Final Result   Impression: No acute arterial abnormality. No active hemorrhage. No hematoma of the pelvis, retroperitoneum or bilateral lower extremities. Suboptimal evaluation of the venous structures due to contrast bolus timing. Findings described above may represent sequelae of poor venous outflow or rethrombosis. CTA LOWER EXTREMITY RIGHT W CONTRAST   Final Result   Impression: No acute arterial abnormality. No active hemorrhage. No hematoma of the pelvis, retroperitoneum or bilateral lower extremities. Suboptimal evaluation of the venous structures due to contrast bolus timing. Findings described above may represent sequelae of poor venous outflow or rethrombosis. CT HEAD WO CONTRAST   Final Result      No acute or interval change. No hemorrhage or mass effect. CT ABDOMEN PELVIS W IV CONTRAST Additional Contrast? 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       IR GUIDED [facility] VEIN   Final Result   Impression: Technically successful inferior venacavogram and bilateral iliac venogram demonstrating marked ileal caval clot associated with the filter including thrombus above the IVC filter. Technically successful vacuum-assisted thrombectomy with restoration of flow through the inferior vena cava and improved patency of the ileal caval system. Clot remains within the inferior vena cava filter. VL Extremity Venous Bilateral   Final Result      XR CHEST PORTABLE   Final Result      Limited evaluation of lower right lung due to overlapping density for which infiltrate cannot be excluded. Otherwise no acute process seen. CT HEAD WO CONTRAST   Final Result      Postsurgical changes with areas of low cortical attenuation in the left parietal and left occipital region appear essentially stable from prior study with associated mild midline shift which is similar to slightly improved from prior study. No evidence of developing acute intracranial hemorrhage. CTA ABDOMEN PELVIS W WO CONTRAST   Final Result   1. 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 2. Surrounding hazy density, likely related to venous congestion. However, streaky densities extending inferiorly on the right may represent blood. However, there is no evidence of retroperitoneal hematoma. Correlation with lower extremity noninvasive    duplex Doppler is recommended in addition to serial hemoglobin and crit levels. 3. Suspect bilateral lower lobe pulmonary emboli. See comments above      Redemonstrated is a large necrotic mass involving the right breast enlarged fibroid uterus. Assessment/Plan:     Metastatic triple negative breast cancer with intracranial metastasis  S/p craniotomy on 3/12    -Patient was diagnosed with triple negative breast cancer in March 2021 at which time clinical stage IIb.   Patient chose to forego any medical or surgical treatment  -Discharged to [facility] on 3/20  -Today is POD 17  -CT head without contrast on 3/26 nonrevealing of acute ICH    -Pain control with Dilaudid pain panel as needed, oxycodone  -General surgery consulted, recommended palliative sx for pain management  -Consulted radiation oncology, suggested outpatient radiation directed towards the tumor bed. Will need chemo likely in the future following radiation.  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 Patient did get IVC filter on 3/16  -CT abdomen pelvis this admission 3/26 revealing thrombosis of IVC at the level of IVC filter with marked distention of the caval bifurcation and common iliac veins. Streaky densities extending inferiorly on the right may represent blood. But no evidence of retroperitoneal hematoma. Suspected B/L lower lobe PE.    -Serotonin assay pending  -Bilateral lower extremity venous Doppler on 3/28 revealing bilateral acute oligoanuric DVTs  -Consulted IR, s/p thrombectomy on 3/28: Successful vacuum-assisted thrombectomy with restoration of flow through the IVC and improved patency of ileal caval system and clot remaining within the IVC filter  - change argatroban to eliquis  - per IR, filter can be removed after 6 week, as even though pt is anticoagulated, there is still a risk of PE. Will likely do this outpatient.      Fever, unknown source   - Pt was started on zosyn on on 3/27 which was changed to  vanc and merrem on 3/28   - ID consulted, recs to dc merrem  - cont vancomycin, okay to change [facility]    Cas Landry MD, PGY-1  04/01/22  8:29 AM    This patient has been staffed and discussed with Bart Muhammad MD.

## 2022-04-01 NOTE — PLAN OF CARE
Pt has personal items in reach at bedside. Bed in low position with alarm on. Call light in reach, makes wants and needs known    Problem: Falls - Risk of:  Goal: Will remain free from falls  Description: Will remain free from falls  Outcome: Ongoing  Note: Pt was able to sit on side of bed for 20 min without any complications. Returned to bed for the rest of this shift  Goal: Absence of physical injury  Description: Absence of physical injury  Outcome: Ongoing     Problem: Pain:  Goal: Pain level will decrease  Description: Pain level will decrease  Outcome: Ongoing  Note: Pt denied pain this shift.  No PRN meds used today  Goal: Control of acute pain  Description: Control of acute pain  Outcome: Ongoing  Goal: Control of chronic pain  Description: Control of chronic pain  Outcome: Ongoing     Problem: Skin Integrity:  Goal: Will show no infection signs and symptoms  Description: Will show no infection signs and symptoms  Outcome: Ongoing  Note: Dressing changed to right breast. Pt tolerated well  Goal: Absence of new skin breakdown  Description: Absence of new skin breakdown  Outcome: Ongoing     Problem: Bleeding:  Goal: Will show no signs and symptoms of excessive bleeding  Description: Will show no signs and symptoms of excessive bleeding  Outcome: Ongoing     Problem: Discharge Planning:  Goal: Discharged to appropriate level of care  Description: Discharged to appropriate level of care  Outcome: Ongoing  Note: Pt to discharge to SNF for rehab on 4/2/22     Problem: Gas Exchange - Impaired:  Goal: Levels of oxygenation will improve  Description: Levels of oxygenation will improve  Outcome: Ongoing     Problem: Infection, Septic Shock:  Goal: Will show no infection signs and symptoms  Description: Will show no infection signs and symptoms  Outcome: Ongoing     Problem: Infection - Ventilator-Associated Pneumonia:  Goal: Absence of pulmonary infection  Description: Absence of pulmonary infection  Outcome: Ongoing     Problem: Serum Glucose Level - Abnormal:  Goal: Ability to maintain appropriate glucose levels will improve  Description: Ability to maintain appropriate glucose levels will improve  Outcome: Ongoing     Problem: Tissue Perfusion, Altered:  Goal: Circulatory function within specified parameters  Description: Circulatory function within specified parameters  Outcome: Ongoing     Problem: Venous Thromboembolism:  Goal: Will show no signs or symptoms of venous thromboembolism  Description: Will show no signs or symptoms of venous thromboembolism  Outcome: Ongoing  Goal: Absence of signs or symptoms of impaired coagulation  Description: Absence of signs or symptoms of impaired coagulation  Outcome: Ongoing

## 2022-04-01 NOTE — PROGRESS NOTES
Clinical Pharmacy Progress Note    Vancomycin - Management by Pharmacy    Consult Date(s): 3/28  Consulting Provider(s): Dr. Minaya Precise / Plan  Sepsis + UTI - Vancomycin   Concurrent Antimicrobials: none    Day of Vanc Therapy: #5   Current Dosing Method: Bayesian-Guided AUC Dosing   Therapeutic Goal: 400-600 mg/L*hr   Current Dose / Frequency: 1250 mg IV q8h   Plan / Rationale:   o Remains on vancomycin 1250mg IV q8h. Renal function remains stable. o Vancomycin level was ordered for this AM, but was not drawn - unclear why.  o Will continue current dose for now. Repeat level ordered for tomorrow, Sat AM to assess kinetics / ensure pt is not accumulating vancomycin.  Will continue to monitor clinical condition and make adjustments to regimen as appropriate. Please call with questions--  Lilia Bolaños, PharmD, BCPS  Wireless: [phone]   4/1/2022 1:41 PM        Interval update: Received 1 units PRBCs yesterday for Hgb of 6.9. remains on IV vancomycin - ID recommending PO doxycyline on discharge. Subjective/Objective: Ms. Diane Scott is a 48 y.o. female with a PMHx significant for thyroid nodule, triple neg breast cancer with mets to the brain, DVT in R lower extremity (3/15/22) s/p IVC filter. She is s/p left temporal parietal occipital craniotomy for tumor resection by Dr. Ellie Will on 3/12/2022. Patient presented back to [facility] for worsening R lower extremity swelling 2/2 thrombosis of the IVC at the level of the filter. Patient now cleared for [employer] by neurosurg and also HIT +. Patient has had a worsening leukocytosis since admission and was febrile to 101.9 today and tachycardic. Urine is growing enterococcus. Pharmacy has been consulted to dose vancomycin for sepsis + UTI.     Height:   Ht Readings from Last 1 Encounters:   03/25/22 5' 3\" (1.6 m)     Weight:   Wt Readings from Last 1 Encounters:   03/26/22 202 lb 13.2 oz (92 kg)     Current & Prior Antimicrobial Regimen(s):   (3/27-3/28)   (3/28-3/30)   Vancomycin  o 1750 mg IV x1 LD (3/28)  o 1250 mg IV q12h (3/29-3/30)  o 1250mg IV q8h (3/30-current)    Level(s) / Doses:    Date Time Dose Level / Type of Level Interpretation   3/30 0625 1250 mg q12h Random = 12.8 mcg/mL AUC = 330, dose adjusted to 1250mg q8h    4/1 0600 1250mg IV q8h Random = ordered    Note: Serum levels collected for AUC-based dosing may be high if collected in close proximity to the dose administered. This is not necessarily indicative of toxicity. Cultures & Sensitivities:    Date Site Micro Susceptibility / Result   3/27 Urine Enterococcus faecalis Ampicillin, tetracycline, nitrofurantoin, vancomycin    3/27 Blood No growth to date      Labs / Ancillary Data:    CrCl cannot be calculated (This lab value cannot be used to calculate CrCl because it is not a number: <0.5). Recent Labs     03/30/22  0625 03/30/22  1233 03/31/22  0139 03/31/22  0433 03/31/22  1245 04/01/22  0512 04/01/22  1150   CREATININE  --    < >   < >  --  <0.5* <0.5* <0.5*   BUN  --    < >   < >  --  8 8 9   WBC 14.1*  --   --  12.2*  --  10.4  --     < > = values in this interval not displayed.      Additional Lab Values / Findings of Note:    Procalcitonin:   Recent Labs     03/31/22  0433   PROCAL 1.02*

## 2022-04-01 NOTE — CARE COORDINATION
Case Management Assessment           Daily Note                 Date/ Time of Note: 4/1/2022 3:50 PM         Note completed by: Sapna Calle RN    Patient Name: Layo Raya  YOB: 1975    Diagnosis: Thrombocytopenia (HCC) [D69.6]  S/P craniotomy [Z98.890]  Acute deep vein thrombosis (DVT) of proximal vein of right lower extremity (HCC) [I82.4Y1]  Acute deep vein thrombosis (DVT) of other specified vein of right lower extremity ([address] [I82.491]  Patient Admission Status: Inpatient    Date of Admission:3/25/2022  6:11 PM Length of Stay: 7 GLOS:      Current Plan of Care: monitoring H/H, possible EGD in am, monitoring labs  ________________________________________________________________________________________  PT AM-PAC: 9 / 24 per last evaluation on: 03.30.2022    OT AM-PAC: 15 / 24 per last evaluation on: 03.31.2022    DME Needs for discharge:   ________________________________________________________________________________________  Discharge Plan: To Be Determined DUE TO: ARU vs SNF (if am-pac scores improve [facility] would re-consider)    Tentative discharge date: TBD    Current barriers to discharge: Medical stability    Referrals completed: Not Applicable    Resources/ information provided: Not indicated at this time  ________________________________________________________________________________________  Case Management Notes: CM continues to follow for DC planning and needs. Patient noted to have possible plan for EGD this admission. Chelsea Rayo and her family were provided with choice of provider; she and her family are in agreement with the discharge plan.     Care Transition Patient: Candice Calle RN  [facility]  Case Management Department  Ph: [phone]  Fax: [phone]

## 2022-04-01 NOTE — PROGRESS NOTES
ID Follow-up NOTE    CC:   Fever, leukocytosis  Antibiotics: Vancomycin    Admit Date: 3/25/2022  Hospital Day: 8    Subjective:     Patient reports less leg swelling  Pt reports less R arm tenderness (tender with palpation (above antecubital fossa))    Objective:     Afebrile last 24 hr    Patient Vitals for the past 8 hrs:   BP Temp Temp src Pulse Resp SpO2   04/01/22 0330 105/73 97.5 °F (36.4 °C) Oral 113 16 93 %     I/O last 3 completed shifts: In: 2170 [P.O.:1860; I.V.:10; Blood:300]  Out: 5275 [Urine:5275]  No intake/output data recorded. EXAM:  GENERAL: No apparent distress. RA  HEENT: Membranes moist, no oral lesion  NECK:  Supple, no lymphadenopathy  LUNGS: Clear b/l, no rales, no dullness  CARDIAC: RRR, no murmur appreciated  R breast with mass - dressing. Sherrilee Hazard 3/30 - little drainage superiorly, no ulceration (seen with female RN)]  ABD:  + BS, soft / NT  EXT:  LE edema, bilateral, pitting.  R arm cord proximal to antecubital fossa, tender (less)    No rash, no lesions  NEURO: No focal neurologic findings  PSYCH: Orientation, sensorium, mood normal  LINES:  Peripheral iv       Data Review:  Lab Results   Component Value Date    WBC 10.4 04/01/2022    HGB 8.1 (L) 04/01/2022    HCT 23.4 (L) 04/01/2022    MCV 83.0 04/01/2022     04/01/2022     Lab Results   Component Value Date    CREATININE <0.5 (L) 04/01/2022    BUN 8 04/01/2022     (L) 04/01/2022    K 4.7 04/01/2022    CL 97 (L) 04/01/2022    CO2 24 04/01/2022       Hepatic Function Panel:   Lab Results   Component Value Date    ALKPHOS 94 03/31/2022    ALT 43 03/31/2022    AST 38 03/31/2022    PROT 6.0 03/31/2022    BILITOT <0.2 03/31/2022    BILIDIR <0.2 03/31/2022    IBILI see below 03/31/2022    LABALBU 2.6 04/01/2022       Micro:  3/27 Urine cult >100k E faecalis  3/27 BC x 2 no growth      Imaging:   3/28 LE doppler u/s   Extensive deep and superficial venous thrombosis noted in the bilateral    lower extremities essentially from groin to ankle.      3/29 IR thrombectomy  Impression:   Technically successful inferior venacavogram and bilateral iliac venogram demonstrating marked ileal caval clot associated with the filter including thrombus above the IVC filter. Technically successful vacuum-assisted thrombectomy with restoration of flow through the inferior vena cava and improved patency of the ileal caval system. Clot remains within the inferior vena cava filter      Scheduled Meds:   vancomycin  1,250 mg IntraVENous Q8H    apixaban  10 mg Oral BID    Followed by   Sonoma Fossa ON 4/6/2022] apixaban  5 mg Oral BID    sodium chloride flush  5-40 mL IntraVENous 2 times per day    metoprolol  5 mg IntraVENous Once    [Held by provider] clonazePAM  0.5 mg Oral BID    QUEtiapine  12.5 mg Oral Nightly    insulin regular  10 Units IntraVENous Once    lacosamide  200 mg Oral BID    levETIRAcetam  2,000 mg Oral Daily    levETIRAcetam  2,500 mg Oral Nightly    melatonin  5 mg Oral Nightly    pantoprazole  40 mg Oral QAM AC    sodium chloride flush  5-40 mL IntraVENous 2 times per day       Continuous Infusions:   sodium chloride      [Held by provider] argatroban infusion Stopped (03/30/22 2100)    sodium chloride      sodium chloride 25 mL (03/31/22 1615)    dextrose      dextrose      sodium chloride 25 mL (04/01/22 0858)       PRN Meds:  sodium chloride, sodium chloride, sodium chloride flush, sodium chloride, oxyCODONE **OR** oxyCODONE, LORazepam, glucose, dextrose, glucagon (rDNA), dextrose, sodium chloride flush, sodium chloride, ondansetron **OR** ondansetron, polyethylene glycol, acetaminophen **OR** acetaminophen      Assessment: Hx breast ca, triple negative, stage IIB invasive ductal carcinoma, brain metastasis, seizures, hx DVT     3/12 Brain met resected 3/12  3/18 Seizure, dx new DVT, IVC filter placed. 3/25 Leg pain and swelling.   Extensive LE DVT      Fever   - unclear source, may be from clot and not have infectious process   - no pneumonia, lung bases on CT with atelectasis or scarring   - no pyuria, no urinary sx   - R breast mass, does not appear to be infected   - R arm phlebitis, possible source   - Procalcitonin 1.02     Leukocytosis, WBC 10.4- down today 4/1/22    Plan:     Cont Vancomycin    Ok for discharge from ID standpoint on po Doxycycline 100 bid x 5 days further     Medical Decision Making:   The following items were considered in medical decision making:  Discussion of patient care with other providers  Reviewed clinical lab tests  Reviewed radiology tests  Reviewed other diagnostic tests/interventions  Microbiology cultures and other micro tests reviewed      Discussed with pt, RN, Medical Resident  Will sign off, call with further ID issues  Lashawn Hector MD

## 2022-04-01 NOTE — PROGRESS NOTES
[facility]  Progress Note    Subjective:     Craniotomy cancelled March 9 due to seizure and status epilepticus. No longer having seizures  Craniotomy done Saturday March, 12  She developed a DVT & had an IVC filter placed as therapeutic anticoagulation could not begin until POD#14 (March 26)    Off argatroban gtt - now on Eliquis  Plts now normal  HIT yonatan pos; TRES still in process  S/P IVC thrombectomy 3/28/2022  No bleeding  No cyanosis in fingers or toes    Review of Systems:     Review of Systems   Constitutional: Positive for fatigue. Negative for fever. Respiratory: Negative. Cardiovascular: Negative. Gastrointestinal: Negative. Genitourinary: Negative. Musculoskeletal: Negative. Skin: Negative. Neurological: Negative for seizures, weakness and headaches.        Objective:     Medications    Current Facility-Administered Medications: 0.9 % sodium chloride infusion, , IntraVENous, PRN  vancomycin (VANCOCIN) 1,250 mg in dextrose 5 % 250 mL IVPB, 1,250 mg, IntraVENous, Q8H  [Held by provider] argatroban infusion 50mg in 0.9% sodium chloride 50 mL (premix), 0.0625-10 mcg/kg/min, IntraVENous, Continuous  apixaban (ELIQUIS) tablet 10 mg, 10 mg, Oral, BID **FOLLOWED BY** [START ON 4/6/2022] apixaban (ELIQUIS) tablet 5 mg, 5 mg, Oral, BID  0.9 % sodium chloride infusion, , IntraVENous, PRN  sodium chloride flush 0.9 % injection 5-40 mL, 5-40 mL, IntraVENous, 2 times per day  sodium chloride flush 0.9 % injection 5-40 mL, 5-40 mL, IntraVENous, PRN  0.9 % sodium chloride infusion, 25 mL, IntraVENous, PRN  metoprolol (LOPRESSOR) injection 5 mg, 5 mg, IntraVENous, Once  oxyCODONE (ROXICODONE) immediate release tablet 5 mg, 5 mg, Oral, Q4H PRN **OR** oxyCODONE (ROXICODONE) immediate release tablet 10 mg, 10 mg, Oral, Q4H PRN  LORazepam (ATIVAN) injection 1 mg, 1 mg, IntraVENous, Q6H PRN  [Held by provider] clonazePAM (KLONOPIN) tablet 0.5 mg, 0.5 mg, Oral, BID  QUEtiapine (SEROQUEL) tablet 12.5 mg, 12.5 mg, Oral, Nightly  insulin regular (HUMULIN R;NOVOLIN R) injection 10 Units, 10 Units, IntraVENous, Once **AND** dextrose 10 % infusion, 250 mL, IntraVENous, Once **AND** [COMPLETED] POCT Glucose, , , Q30 Min **AND** [COMPLETED] POCT Glucose, , , Q1H **AND** [COMPLETED] POCT Glucose, , , 4x Daily AC & HS  glucose (GLUTOSE) 40 % oral gel 15 g, 15 g, Oral, PRN  dextrose 50 % IV solution, 12.5 g, IntraVENous, PRN  glucagon (rDNA) injection 1 mg, 1 mg, IntraMUSCular, PRN  dextrose 5 % solution, 100 mL/hr, IntraVENous, PRN  lacosamide (VIMPAT) tablet 200 mg, 200 mg, Oral, BID  levETIRAcetam (KEPPRA) tablet 2,000 mg, 2,000 mg, Oral, Daily  levETIRAcetam (KEPPRA) tablet 2,500 mg, 2,500 mg, Oral, Nightly  melatonin disintegrating tablet 5 mg, 5 mg, Oral, Nightly  pantoprazole (PROTONIX) tablet 40 mg, 40 mg, Oral, QAM AC  sodium chloride flush 0.9 % injection 5-40 mL, 5-40 mL, IntraVENous, 2 times per day  sodium chloride flush 0.9 % injection 5-40 mL, 5-40 mL, IntraVENous, PRN  0.9 % sodium chloride infusion, 25 mL, IntraVENous, PRN  ondansetron (ZOFRAN-ODT) disintegrating tablet 4 mg, 4 mg, Oral, Q8H PRN **OR** ondansetron (ZOFRAN) injection 4 mg, 4 mg, IntraVENous, Q6H PRN  polyethylene glycol (GLYCOLAX) packet 17 g, 17 g, Oral, Daily PRN  acetaminophen (TYLENOL) tablet 650 mg, 650 mg, Oral, Q6H PRN **OR** acetaminophen (TYLENOL) suppository 650 mg, 650 mg, Rectal, Q6H PRN    Allergies  No Known Allergies    Physical Exam  VITALS:  /73   Pulse 113   Temp 97.5 °F (36.4 °C) (Oral)   Resp 16   Ht 5' 3\" (1.6 m)   Wt 202 lb 13.2 oz (92 kg)   SpO2 93%   BMI 35.93 kg/m²   TEMPERATURE:  Current - Temp: 97.5 °F (36.4 °C);  Max - Temp  Av.1 °F (36.7 °C)  Min: 97.4 °F (36.3 °C)  Max: 98.9 °F (37.2 °C)  PULSE OXIMETRY RANGE: SpO2  Av.3 %  Min: 93 %  Max: 98 %  24HR INTAKE/OUTPUT:      Intake/Output Summary (Last 24 hours) at 2022 0647  Last data filed at 2022 0603  Gross per 24 hour Intake 1440 ml   Output 2225 ml   Net -785 ml       Physical Exam  Constitutional:       General: She is not in acute distress. Appearance: She is ill-appearing. HENT:      Head:      Comments: Craniotomy incision C/D/I - healing well  Eyes:      General: No scleral icterus. Cardiovascular:      Rate and Rhythm: Normal rate and regular rhythm. Heart sounds: No murmur heard. No gallop. Pulmonary:      Effort: Pulmonary effort is normal.      Breath sounds: Normal breath sounds. No wheezing, rhonchi or rales. Abdominal:      Palpations: Abdomen is soft. Tenderness: There is no abdominal tenderness. Musculoskeletal:         General: Swelling (BLE) present. Comments: No cyanosis in digits   Lymphadenopathy:      Cervical: No cervical adenopathy. Skin:     General: Skin is warm and dry. Labs  Recent Labs     03/30/22  0625 03/30/22  0841 03/30/22  1546 03/30/22  2147 03/31/22  0433 03/31/22  0433 03/31/22  1245 03/31/22 2113 04/01/22  0512   WBC 14.1*  --   --   --  12.2*  --   --   --  10.4   HGB 7.9*   < >  --    < > 7.1*   < > 7.2* 6.9* 8.1*   HCT 24.0*   < > 23.3*  --  21.7*  --   --   --  23.4*     --   --   --  260  --   --   --  266   MCV 83.8  --   --   --  83.7  --   --   --  83.0    < > = values in this interval not displayed. Recent Labs     03/31/22  0139 03/31/22  1245 04/01/22  0512   * 132* 131*   K 4.4 4.4 4.7   CL 99 96* 97*   CO2 23 26 24   PHOS 2.7 3.0 3.1   BUN 9 8 8   CREATININE 0.5* <0.5* <0.5*       Recent Labs     03/30/22  0625 03/31/22  0433   AST 37 38*   ALT 32 43*   BILIDIR <0.2 <0.2   BILITOT 0.3 <0.2   ALKPHOS 84 94       No results for input(s): MG in the last 72 hours. Radiology  CT HEAD WO CONTRAST    Result Date: 3/26/2022  CT HEAD WITHOUT CONTRAST HISTORY: Craniotomy COMPARISON: 3/17/2022 Underexposure controls were utilized during the CT examination to meet ALARA standards for radiation dose reduction.  FINDINGS: Postoperative changes are noted from left frontotemporal craniotomy. Underlying hypodensity is seen in the area of prior surgery suggesting possible cortical infarct or postsurgical change. Additional area of low cortical attenuation is seen involving the medial left parieto-occipital region which also may be related to prior surgery. The appearance is similar to prior examination. Mild associated mass effect is seen with slight left to right midline shift which appears similar to slightly improved in extent since the prior study. Visualized portions of the paranasal sinuses appear clear. Postsurgical changes with areas of low cortical attenuation in the left parietal and left occipital region appear essentially stable from prior study with associated mild midline shift which is similar to slightly improved from prior study. No evidence of developing acute intracranial hemorrhage. CT Head WO Contrast    Result Date: 3/17/2022  PROCEDURE: CT head without contrast INDICATION: seizure; recent craniotomy; COMPARISON: 3/13/2022 and 3/5/2022 TECHNIQUE: CT head was performed without contrast according to standard protocol. Axial images and multiplanar reformatted images reviewed. Up-to-date CT equipment and radiation dose reduction techniques were employed. 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 There is slightly decreased 7 mm rightward midline shift and slightly decreased compression of the left lateral ventricle. There is stable persistent vasogenic edema in the left cerebral hemisphere. There is no evidence of acute hemorrhage or infarct. Visualized portions of the orbits, paranasal sinuses, and mastoids appear normal. There is scalp swelling overlying the craniotomy.      1.  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 No evidence of acute hemorrhage or infarct. CT Head wo Contrast    Result Date: 3/12/2022  HISTORY: Multiple brain masses. Status post resection. Postoperative evaluation. EXAM : CT OF THE HEAD WITHOUT CONTRAST TECHNIQUE: Multiple axial images were obtained of the brain without the use of contrast. Individualized dose optimization technique was used in order to meet ALARA standards for radiation dose reduction. In addition to vendor specific dose reduction algorithms, the dose reduction techniques vary based on the specific scanner utilized but frequently include automated exposure control, adjustment of the mA and/or kV according to patient size, and use of iterative reconstruction technique. COMPARISON: NONE FINDINGS: The visualized paranasal sinuses, mastoid air cells and middle ears are clear to the extent visualized. Status post left temporal and left parietal craniotomies. There is a surgical drain along the left scalp. No significant extracranial fluid collection. Skin staples consistent with recent surgery. Intracranially, there is persistent severe midline shift measuring approximately 10 mm similar to the preoperative evaluation. The large mass in the posterior left temporal region has been resected. There is an air-fluid level in the resection cavity consistent with expected postoperative changes. Resection cavity in the left parietal-occipital region with small gas bubbles. No acute complication or acute hemorrhage identified. Pneumocephalus consistent with recent surgery. 1. Resection cavity is at the locations of the previous masses with persistent adjacent vasogenic edema and persistent midline shift. No progressive mass effect or acute surgical complication otherwise identified.     CT HEAD WO CONTRAST    Result Date: 3/8/2022  CT HEAD WITHOUT CONTRAST COMPARISON STUDY: MRI brain without with contrast 3/5/2022 HISTORY: Status epilepticus in or for brain tumor resection FINDINGS: Thin section axial images obtained through the head from skull base to vertex. Multiplanar reconstruction images received for interpretation. Low radiation dose CT technique utilized. Redemonstrated is a partially necrotic heterogeneous attenuating mass in the left parietal occipital region measuring approximately 3.7 x 4.6 cm peripheral increased attenuation suggesting some component of hemorrhage. This mass abuts the posterior falx  with marked surrounding neurogenic edema and mass effect. This demonstrates little change from previous MRI exam. Additional hemorrhagic partially necrotic Mixed attenuating mass in the left parietal temporal region measuring approximately a 4.2 x 4.1 cm unchanged from prior MRI exam.  Moderate surrounding vasogenic edema and mass effect. Extensive surrounding edema anteriorly extending into the left thalamus and left renal ganglia region. There is near complete effacement of the left lateral ventricle with approximately 11 mm left-to-right midline shift. Brainstem and posterior fossa appear within normal limits. Visualized orbits and paranasal sinuses are clear. 1.  2 large hemorrhagic partially necrotic masses in the left parieto-temporal and left parieto-occipital regions with marked surrounding edema and mass effect. These demonstrate little change when compared to previous MRI study of 3/5/2022. Associated  prominent mass effect and surrounding edema resulting 11 mm midline shift to the right. CT HEAD WO CONTRAST    Result Date: 3/3/2022  EXAMINATION: CT OF THE HEAD WITHOUT CONTRAST  3/3/2022 8:17 pm TECHNIQUE: CT of the head was performed without the administration of intravenous contrast. Dose modulation, iterative reconstruction, and/or weight based adjustment of the mA/kV was utilized to reduce the radiation dose to as low as reasonably achievable. COMPARISON: None.  HISTORY: ORDERING SYSTEM PROVIDED HISTORY: ams TECHNOLOGIST PROVIDED HISTORY: Reason for exam:->ams Has a \"code stroke\" or \"stroke alert\" been called? ->No Decision Support Exception - unselect if not a suspected or confirmed emergency medical condition->Emergency Medical Condition (MA) Is the patient pregnant?->No Reason for Exam: c/o memory loss that began last week. Pt's mother states last Thursday the pt states she felt like something hit her in the head, but doesn't know what. States she isn't able to remember her name, important things she would usually remember, and she is also unable to read or write. FINDINGS: BRAIN/VENTRICLES: There is diffuse hypoattenuation involving much of the left temporoparietal and posterior frontal lobe. There is hypoattenuation involving the white matter involving left posterior hemisphere likely represent areas of vasogenic edema. There is at least 2 discrete of mass is identified measuring approximate 4 cm in size 1 which is central hypoattenuation which may be related to necrosis. These left-to-right midline shift 1.4 cm in size. There is entrapment of the right lateral ventricle and both temporal horns of both lateral ventricles suggestive areas of developing hydrocephalus. There is downward transtentorial herniation as well. With mass effect on the brainstem and the partial effacement of the suprasellar and ambient cisterns. Left uncal herniation. .  Focal density identified left parasagittal occipital lobe likely represents petechial calcification of the blood products. ORBITS: The visualized portion of the orbits demonstrate no acute abnormality. SINUSES: The visualized paranasal sinuses and mastoid air cells demonstrate no acute abnormality. SOFT TISSUES/SKULL:  No acute abnormality of the visualized skull or soft tissues. Abnormal edema left posterior cerebral hemisphere possibly related to underlying metastases versus less likely primary glioma.   This causes 1.4 cm of left-to-right midline shift with cisternal effacement and downward transtentorial herniation and findings of developing hydrocephalus. Hyperdensity left parasagittal occipital lobe could represent area of calcification however small focus of blood products will not be totally excluded. Neuro surgical consultation recommended Critical results were called by Dr. Jossue Vargas to Saray BOURNE on 3/3/2022 at 21:07. IR GUIDED IVC FILTER PLACEMENT    Result Date: 3/16/2022  IVC filter placement INDICATION : Deep venous thrombosis. Recent craniotomy. : Alex Alvarez MD PROCEDURE: Preprocedure sonographic evaluation of the right internal jugular vein demonstrates patency and compressibility. This measures 12 mm. A permanent sonographic image was saved to the electronic medical record. The patient's right neck was prepped and draped in sterile fashion and lidocaine used for local anesthesia. Ultrasound guidance was used and a sonographic image of the vein was obtained. The right internal jugular vein was accessed using a micropuncture set. Abdominal cavogram was obtained. An Option Elite retrievable filter was inserted under fluoroscopic guidance and deployed in routine fashion in the infrarenal IVC. Abdominal imaging was obtained to document filter position. The patient tolerated the procedure well without complication. IMPRESSION : Normal IVC anatomy. Normal, patent jugular vein. Patent inferior vena cava. Placement of filter in the infra-renal IVC. Please note that this is a retrievable filter. Fluoroscopy time : 1.8 minutes Number of exposures obtained : 5 Moderate sedation was performed by the physician including the presence of an independent trained observers that assisted in monitoring the patient's level of consciousness and physiological status. Following the administration of Midazolam and Fentanyl the physician spent continuous face-to-face time with the patient.  Sedation start time: 0810 Sedation end time: 0826 Estimated blood loss: Less than 5 mL. XR CHEST PORTABLE    Result Date: 3/27/2022  AP CHEST HISTORY: Fever COMPARISON 3/4/2022 FINDINGS: The heart size is within normal limits. Abnormal density seen laterally in right lower lung. This appears to represent overlapping density but infiltrate in this area would be difficult to exclude. Repeat evaluation following removal overlapping density is recommended. Limited evaluation of lower right lung due to overlapping density for which infiltrate cannot be excluded. Otherwise no acute process seen. VL Extremity Venous Bilateral    Result Date: 3/15/2022  Lower Extremities DVT Study  Demographics   Patient Name       Tre OSORIO   Date of Study      03/15/2022        Gender              Female   Patient Number     [id number]        Date of Birth       1975   Visit Number       [id number]         Age                 48 year(s)   Accession Number   [id number]        Room Number         0903   Corporate ID       [id number]           Sonographer         Brigette Jay RVT,                                                           [facility]   Ordering Physician Jaylin Kuhn MD, [facility]                                       Physician           Readers                                                           Julia Olmos MD  Procedure Type of Study:   Veins:Lower Extremities DVT Study, VASC EXTREMITY VENOUS DUPLEX BILATERAL. Vascular Sonographer Report  Indications for Study:Swelling. Additional Indications:Patient presents for evaluation due to bilateral lower extremity swelling. She only c/o right lower extremity weakness. She is a poor historian. She underwent craniotomy on 3/12/22 for tumor resection. She denies a prior H/O DVT. Lower extremity venous duplex study on 3/10/22 was negative for DVT bilaterally.  Venous Duplex Scan: B-mode imaging of the deep and superficial veins, with compression maneuvers, including color and Doppler spectral waveform analysis. Impressions Right Impression There is acute partially occluding deep venous thrombosis involving the deep femoral vein. The thrombus extends into the common femoral vein by 3.1 cm at the venous confluence. There is acute totally occluding deep venous thrombosis involving two soleal veins in the mid calf. There is no other evidence of deep or superficial venous thrombosis involving the right lower extremity. Left Impression There is no evidence of deep or superficial venous thrombosis involving the left lower extremity. Compared to the previous study on 3/10/22, the acute DVT noted in the right common femoral, deep femoral, and soleal veins is a new finding. The left lower extremity venous findings are unchanged. Conclusions   Summary   There is evidence of acute DVT involving the right common femoral vein, deep  femoral vein, and soleal veins, as described above. No evidence of deep vein or superficial thrombosis involving the left lower  extremity.    Signature   ------------------------------------------------------------------  Electronically signed by Tamera Alvarenga MD (Interpreting  physician) on 03/15/2022 at 07:08 PM      VL Extremity Venous Bilateral    Result Date: 3/11/2022  Lower Extremities DVT Study  Demographics   Patient Name       [facility]   Date of Study      03/10/2022        Gender              Female   Patient Number     [id number]        Date of Birth       1975   Visit Number       [id number]         Age                 48 year(s)   Accession Number   [id number]        Room Number         3611   Corporate ID       [id number]           FRANCIS Junior,                                                           RVT   Ordering Physician Mary Alarcon MD, [facility]                                       Physician Readers                                                           Tanvir Loomis MD  Procedure Type of Study:   Veins:Lower Extremities DVT Study, VASC EXTREMITY VENOUS DUPLEX BILATERAL. Vascular Sonographer Report  Indications for Study:Swelling. Additional Indications:Inpatient study done bedside in the ICU. Patient is a poor historian with bilateral leg swelling for an unknown number of days. Venous Duplex Scan: B-mode imaging of the deep and superficial veins, with compression maneuvers, including color and Doppler spectral waveform analysis. Impressions Right Impression Catheter and bandages on the right thigh. No evidence of deep or superficial venous thrombosis in the right lower extremity. Left Impression No evidence of deep or superficial venous thrombosis in the left lower extremity. Evidence of a cystic structure in the popliteal fossa, consistent with a Baker's cyst, measuring 1.76x0.59 cm. Conclusions   Summary   No evidence of deep or superficial venous thrombosis in the bilateral lower  extremity. Left Baker's cyst.   Signature   ------------------------------------------------------------------  Electronically signed by Tanvir Loomis MD (Interpreting  physician) on 03/11/2022 at 01:50 PM       CTA HEAD NECK W CONTRAST    Result Date: 3/3/2022  EXAMINATION: CTA OF THE HEAD AND NECK WITH CONTRAST 3/3/2022 8:17 pm: TECHNIQUE: CTA of the head and neck was performed with the administration of intravenous contrast. Multiplanar reformatted images are provided for review. MIP images are provided for review. Stenosis of the internal carotid arteries measured using NASCET criteria. Dose modulation, iterative reconstruction, and/or weight based adjustment of the mA/kV was utilized to reduce the radiation dose to as low as reasonably achievable.  COMPARISON: CT head 03/03/2022 HISTORY: ORDERING SYSTEM PROVIDED HISTORY: ams x 1 week TECHNOLOGIST PROVIDED HISTORY: Reason for exam:->ams x 1 week Has a \"code stroke\" or \"stroke alert\" been called? ->No Decision Support Exception - unselect if not a suspected or confirmed emergency medical condition->Emergency Medical Condition (MA) Reason for Exam: ams x 1 week FINDINGS: CTA NECK: AORTIC ARCH/ARCH VESSELS: No dissection or arterial injury. No significant stenosis of the brachiocephalic or subclavian arteries. CAROTID ARTERIES: No dissection, arterial injury, or hemodynamically significant stenosis by NASCET criteria. VERTEBRAL ARTERIES: No dissection, arterial injury, or significant stenosis. SOFT TISSUES: The lung apices are clear. No cervical or superior mediastinal lymphadenopathy. The larynx and pharynx are unremarkable. No acute abnormality of the salivary and thyroid glands. Partial visualization of a large exophytic right breast mass. BONES: .  Vertebral heights are maintained. CTA HEAD: ANTERIOR CIRCULATION: No significant stenosis of the intracranial internal carotid, anterior cerebral, or middle cerebral arteries. No aneurysm. POSTERIOR CIRCULATION: No significant stenosis of the vertebral, basilar, or posterior cerebral arteries. No aneurysm. OTHER: No dural venous sinus thrombosis on this non-dedicated study. BRAIN: Left-sided masses and edema with associated midline shift. . Ventriculomegaly worrisome for areas developing hydrocephalus related to the mass effect and downward transfer herniation. Negative for large vessel occlusion or hemodynamic stenosis. CT CHEST ABDOMEN PELVIS W CONTRAST    Result Date: 3/4/2022  CT of chest abdomen and pelvis with contrast HISTORY: Brain mass. Evaluate for primary neoplasm or metastatic disease. Staging. COMPARISON: none CONTRAST:  70 mL Isovue-370 intravenous  TECHNIQUE: Individualized dose optimization technique was used in order to meet ALARA standards for radiation dose reduction.  In addition to vendor specific dose reduction algorithms, the dose reduction techniques vary based on the specific scanner utilized but frequently include automated exposure control, adjustment of the mA and/or kV according to patient size, and use of iterative reconstruction technique. COMMENTS: Chest: Mild degenerative changes in the spine. Scoliosis. There is a large, necrotic, peripherally enhancing right breast mass which tenths the anterior skin surface and measures 10.2 x 8.5 cm. There is right axillary lymphadenopathy; for example 2.1 x 1.5 cm on image [id number]. Small hiatal hernia. Mediastinal structures are unremarkable without lymphadenopathy. Mild linear subpleural atelectasis in the lungs. 2 mm perifissural nodule on the left image 601-51 is most likely benign. No evidence of pulmonary metastatic disease. Abdomen and pelvis: Degenerative changes in the spine. No lymphadenopathy. Gallbladder is within normal limits. Bowel is unremarkable. 4 mm hypodensity in the right lobe of the liver image 601-83 is too small to definitively characterize and most likely benign. Solid abdominal organs are otherwise unremarkable. Multiple uterine fibroids are seen. Bladder is collapsed. There is high density fluid in the bladder which may represent prior contrast administration or hematuria. Large, necrotic right breast mass. Right axillary lymphadenopathy. Uterine fibroids. High density urine in the bladder. MRI brain with and without contrast    Result Date: 3/14/2022  EXAM: MRI BRAIN W WO CONTRAST INDICATION: Brain mass COMPARISON: 3/12/2022 TECHNIQUE: Multiplanar, multisequence MR imaging of the head obtained without and with IV. IV contrast: 10 mL IV ProHance FINDINGS: Postoperative changes of left sided craniotomy with interval resection of underlying masses. Expected postoperative pneumocephalus. Small extra-axial subdural fluid collection subjacent to the craniotomy site. There are resection cavities in the left temporal and occipital region with blood products within and surrounding the resection cavity.  There are small areas of restricted diffusion along the margins of the resection cavity consistent with postoperative ischemia. There are small irregular nodular areas of enhancement along the anterior and lateral margins of the left occipital resection cavity (image 73 and 76 of series 13). Small irregular nodular area of enhancement along the medial margin of the left temporal resection cavity (image 70 of series 13). Thin curvilinear enhancement along the margins of the surgical cavity is likely reactive/postoperative. There is extensive T2 hyperintensity vasogenic edema in the left parietotemporal occipital region which is similar to preoperative MRI. There is mass effect with effacement of left lateral ventricle and approximately 9 mm midline shift to right. Postoperative changes of left-sided craniotomy with interval resection of left temporal and occipital lobe masses. Small nodular areas of enhancement along the margins of the surgical cavity as detailed above are indeterminate. Stable extensive T2 hyperintense/vasogenic edema in the left parietotemporal occipital region surrounding the resection cavities. Mass effect with effacement of left lateral ventricle and 9 mm midline shift to right. Recommended continued follow-up. MRI BRAIN W WO CONTRAST    Result Date: 3/5/2022  EXAM: MRI BRAIN W WO CONTRAST INDICATION: Brain metastasis COMPARISON: None TECHNIQUE: Multiplanar, multisequence MR imaging of the head obtained without and with IV. IV contrast: 12 mL IV ProHance. FINDINGS: There is no diffusion restriction to suggest an acute infarct. There is a hemorrhagic partially necrotic heterogeneously enhancing mass in the left parietal occipital region likely intra-axial measuring approximately 3.5 x 4.2 cm (image 90 of series 13). Medially it abuts posterior falx. There is surrounding vasogenic edema with mass effect.  There is another hemorrhagic partially necrotic heterogeneously enhancing mass in the left parietotemporal segments adjacent to the pulmonary veins however, this is indeterminate. Correlation can be made with perfusion VQ scan or subsequent CT PE study of patient is well hydrated due to the added contrast given on the current exam. 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 Distended iliac veins bilaterally. Aorta and iliofemoral vessels are normal. Inferior vena cava filter remains in satisfactory position Liver: No masses or ductal dilatation Gallbladder is normal Pancreas and spleen are normal Normal kidneys Diameter of the small bowel colon are normal. No evidence of abscess. Enlarged fibroid uterus Urinary bladder is normal Normal abdominal wall. Bones: No destructive lesions. 1. 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 2. Surrounding hazy density, likely related to venous congestion. However, streaky densities extending inferiorly on the right may represent blood. However, there is no evidence of retroperitoneal hematoma. Correlation with lower extremity noninvasive duplex Doppler is recommended in addition to serial hemoglobin and crit levels. 3. Suspect bilateral lower lobe pulmonary emboli. See comments above Redemonstrated is a large necrotic mass involving the right breast enlarged fibroid uterus.       Pathology  Department of Pathology   FINAL SURGICAL PATHOLOGY REPORT   Patient Name: Josh Wright        Accession No:  [id number]    Age Sex:   1975    46 Y / F      Location:      [id number]   Account No:   [de-identified]                 Collected:     2022   Med Rec No:    [id number]                Received:      2022   Attend Phys:   Yuliet Casillas MD           Completed:     03/16/2022   Perform Phys: Issac Pedraza MD     FINAL DIAGNOSIS:        A. Left temporal mass:      - Metastatic carcinoma compatible with \"triple negative\" breast        primary; see Comment.        B. Left temporal dura:      - Dural tissue negative for carcinoma, keratin AE1+3/Cam 5.2        immunostain reviewed on each block.        C. Left occipital mass:      - Metastatic carcinoma compatible with \"triple negative\" breast        primary; see Comment. COMMENT:  A, C: The carcinoma at each site shows a nearly identical   appearance, with diffuse masses of highly polymorphous cells showing a   high mitotic/karyorrhectic rate, frequent \"giant\" cells, and plentiful   geographic necrosis.  Given the patient's clinical history (see below),   immunostains were requested. The tumor is strongly positive for CK7,   GATA3, SOX-10, and beta catenin (membranous pattern), with variable   cytoplasmic and partial perinuclear(\"Golgi\") positivity for keratin   AE1+3/Cam 5.2, a tiny minority population, including geographic foci,   positive for high molecular weight keratin (), and Ki-67   proliferation rate of 90% or more. It is negative for ER, [place], and HER-2   (no positivity for any of the three stains), and also for CK20, Napsin-A,   S100 protein, villin, TTF-1, CDX2, and CD45 (LCA). The overall findings strongly support metastatic \"triple negative\" breast   carcinoma.   BRATI/BRATI     Problem List  Patient Active Problem List   Diagnosis    Thyroid nodule    Brain metastases ([address]    Brain mass    Status epilepticus ([address]    Duct cell carcinoma (HCC)    Cerebral edema (HCC)    Seizure (HCC)    Deep venous thrombosis (HCC)    Fever    Phlebitis    S/P craniotomy       Assessment and Plan:     Metastatic triple negative breast cancer  - Neglected Right breast primary  - Intracranial metastasis  - The patient and her mother agreed to surgery to resect her intracranial metastasis.   - March 9 Craniotomy had to be canceled due to seizures & status epilepticus.  - Saturday March 12 craniotomy and tumor resection. Recovering.  - Subsequent to this, she will require radiation therapy. Typically this is administered 2 to 4 weeks postoperatively. - Subsequent to radiation therapy she will require systemic chemotherapy. This will be in the outpatient setting.  - Recommend she follow-up with medical and radiation oncology at the [facility], where she was seen previously, after this hospitalization to coordinate and implement systemic palliative chemotherapy once she completes radiation therapy. Instructions placed in D/C instructions section. - Spoke w/ Dr Chelsy Krishna. Because the neglected R breast cancer is not bleeding or causing infection, it would be of little benefit to perform a mastectomy. If that changes, a toilet mastectomy could be considered.     DVT  - Per neurosurgery, she could not receive therapeutic anticoagulation until POD #14 - March 26. - Now off Argatroban gtt and transitioned to Eliquis  - S/P IVC filter. This will continue to be a nidus for further thrombus.     IVC Thrombus  - S/P thrombectomy  - Transitioned from argatroban to Eliquis  - IR states filter cannot be removed for 6 weeks    HIT  - Ivone positive  - TRES still in process  - Received argatroban gtt w/ normalization of the plts  - Now transititoned to Nathan Shaw MD  4/1/2022

## 2022-04-01 NOTE — PLAN OF CARE
Problem: Falls - Risk of:  Goal: Will remain free from falls  Description: Will remain free from falls  4/1/2022 0030 by Alex Ruiz RN  Outcome: Ongoing  Note: Bed is locked and in the lowest position. Bed alarm is on. Pt wearing gripper socks. Pt is alert and oriented x4 and uses call light for assistance. Pt is on bedrest and working with PT/OT during dayshift     Problem: Pain:  Goal: Pain level will decrease  Description: Pain level will decrease  4/1/2022 0030 by Alex Ruiz RN  Outcome: Ongoing  Note: No c/o pain at this time    Problem: Skin Integrity:  Goal: Will show no infection signs and symptoms  Description: Will show no infection signs and symptoms  Outcome: Ongoing  Note: No s/s of infection noted.  Pt is on IV antibiotics      Problem: Gas Exchange - Impaired:  Goal: Levels of oxygenation will improve  Description: Levels of oxygenation will improve  Outcome: Ongoing  Note: Pt's spo2>90% on room air

## 2022-04-01 NOTE — CONSULTS
Consultation Note    Patient Name: Eva Alanis  : 1975  Age: 48 y.o. Admitting Physician: Gabi Slaughter MD   Date of Admission: 3/25/2022  6:11 PM   Primary Care Physician: JUDITH Gutierrez Ryan is being seen at the request of Gabi Slaughter MD for anemia of unknown source, concern for GI bleed. History of Present Illness:  Ms. Derrick Evasn is a 57-year-old female with past medical history of breast cancer with metastases to the brain s/p craniotomy and tumor resection who initially presented 3/4 for memory issues and was found to have metastases to her brain. She developed seizures and status epilepticus while inpatient; this was treated and she underwent craniotomy 3/12. Post-operatively she was found to have developed a Rt sided DVT; IVC filter was placed as patient could not yet start on anticoagulation. She was discharged to [facility] 3/17 then the next day readmitted for another seizure. Her antiepileptics were adjusted and she was gain discharged to [facility] 3/20. She was discharged to [facility], but returned with low platelets (44W) and lower extremity pain and swelling concerning for worsening DVT. IVC filter had thrombosis with distention of the caval bifurcation and common iliacs. IR performed thrombectomy 3/28. There was concern for HIT (on lovenox at al) Ab positive, TRES still pending. She was started on argatroban and transitioned to eliquis. Platelets improved. Despite this and no obvious sign of bleeding, patient's hemoglobin has been dropping she has required 3U pRBCs over the last 3 days. FOBT 3/30 negative. Patient reports pain in her legs and fatigue, but otherwise feels well. Denies CP, SoB, N/V, abdominal pain, BRBPR or melena.     GI History:  None     Past Medical History:  Past Medical History:   Diagnosis Date    Thyroid nodule         Past Surgical History:  Past Surgical History:   Procedure Laterality Date    CRANIOTOMY Left 3/12/2022    LEFT TEMPORAL PARIETAL OCCIPITAL CRANIOTOMY FOR TUMOR RESECTION [facility] R[facility] [address] N/A 03/16/2022    [facility]    IR IVC FILTER PLACEMENT W IMAGING  3/16/2022    IR IVC FILTER PLACEMENT W IMAGING 3/16/2022 [id number]    IR THROMB [facility]  3/28/2022    IR THROMB [id number] 3/28/2022 [facility]        Historical Medications:  Prior to Visit Medications    Medication Sig Taking? Authorizing Provider   Multiple Vitamin (MULTIVITAMIN ADULT PO) Take 1 capsule by mouth daily  Historical Provider, MD   Calcium Carbonate-Vitamin D (OYSTER SHELL CALCIUM/D) 500-200 MG-UNIT TABS Take 1 tablet by mouth 2 times daily (with meals)  Historical Provider, MD   ibuprofen (ADVIL;MOTRIN) 800 MG tablet Take 800 mg by mouth every 8 hours as needed  Historical Provider, MD   levETIRAcetam (KEPPRA) 1000 MG tablet Take 2 tablets by mouth daily  Corinne Black, DO   levETIRAcetam (KEPPRA) 500 MG tablet Take 5 tablets by mouth nightly  Corinne Black, DO   melatonin 5 MG TBDP disintegrating tablet Take 1 tablet by mouth nightly  Corinne Black, DO   QUEtiapine (SEROQUEL) 25 MG tablet Take 0.5 tablets by mouth nightly for 3 days  Corinne Black, DO   pantoprazole (PROTONIX) 40 MG tablet Take 1 tablet by mouth every morning (before breakfast)  Bart Muhammad MD   lacosamide (VIMPAT) 200 MG tablet Take 1 tablet by mouth 2 times daily for 30 days.   Bart Muhammad MD        [facility] Medications:  Current Facility-Administered Medications: 0.9 % sodium chloride infusion, , IntraVENous, PRN  vancomycin (VANCOCIN) 1,250 mg in dextrose 5 % 250 mL IVPB, 1,250 mg, IntraVENous, Q8H  [Held by provider] argatroban infusion 50mg in 0.9% sodium chloride 50 mL (premix), 0.0625-10 mcg/kg/min, IntraVENous, Continuous  apixaban (ELIQUIS) tablet 10 mg, 10 mg, Oral, BID **FOLLOWED BY** [START ON 4/6/2022] apixaban (ELIQUIS) tablet 5 mg, 5 mg, Oral, BID  0.9 % sodium chloride infusion, , IntraVENous, PRN  sodium chloride flush 0.9 % injection 5-40 mL, 5-40 mL, IntraVENous, 2 times per day  sodium chloride flush 0.9 % injection 5-40 mL, 5-40 mL, IntraVENous, PRN  0.9 % sodium chloride infusion, 25 mL, IntraVENous, PRN  metoprolol (LOPRESSOR) injection 5 mg, 5 mg, IntraVENous, Once  oxyCODONE (ROXICODONE) immediate release tablet 5 mg, 5 mg, Oral, Q4H PRN **OR** oxyCODONE (ROXICODONE) immediate release tablet 10 mg, 10 mg, Oral, Q4H PRN  LORazepam (ATIVAN) injection 1 mg, 1 mg, IntraVENous, Q6H PRN  [Held by provider] clonazePAM (KLONOPIN) tablet 0.5 mg, 0.5 mg, Oral, BID  QUEtiapine (SEROQUEL) tablet 12.5 mg, 12.5 mg, Oral, Nightly  insulin regular (HUMULIN R;NOVOLIN R) injection 10 Units, 10 Units, IntraVENous, Once **AND** dextrose 10 % infusion, 250 mL, IntraVENous, Once **AND** [COMPLETED] POCT Glucose, , , Q30 Min **AND** [COMPLETED] POCT Glucose, , , Q1H **AND** [COMPLETED] POCT Glucose, , , 4x Daily AC & HS  glucose (GLUTOSE) 40 % oral gel 15 g, 15 g, Oral, PRN  dextrose 50 % IV solution, 12.5 g, IntraVENous, PRN  glucagon (rDNA) injection 1 mg, 1 mg, IntraMUSCular, PRN  dextrose 5 % solution, 100 mL/hr, IntraVENous, PRN  lacosamide (VIMPAT) tablet 200 mg, 200 mg, Oral, BID  levETIRAcetam (KEPPRA) tablet 2,000 mg, 2,000 mg, Oral, Daily  levETIRAcetam (KEPPRA) tablet 2,500 mg, 2,500 mg, Oral, Nightly  melatonin disintegrating tablet 5 mg, 5 mg, Oral, Nightly  pantoprazole (PROTONIX) tablet 40 mg, 40 mg, Oral, QAM AC  sodium chloride flush 0.9 % injection 5-40 mL, 5-40 mL, IntraVENous, 2 times per day  sodium chloride flush 0.9 % injection 5-40 mL, 5-40 mL, IntraVENous, PRN  0.9 % sodium chloride infusion, 25 mL, IntraVENous, PRN  ondansetron (ZOFRAN-ODT) disintegrating tablet 4 mg, 4 mg, Oral, Q8H PRN **OR** ondansetron (ZOFRAN) injection 4 mg, 4 mg, IntraVENous, Q6H PRN  polyethylene glycol (GLYCOLAX) packet 17 g, 17 g, Oral, Daily PRN  acetaminophen (TYLENOL) tablet 650 mg, 650 mg, Oral, Q6H PRN **OR** acetaminophen (TYLENOL) suppository 650 mg, 650 mg, Rectal, Q6H PRN     Social History:   Social History     Tobacco History     Smoking Status  Never Smoker    Smokeless Tobacco Use  Never Used          Alcohol History     Alcohol Use Status  Never          Drug Use     Drug Use Status  Never          Sexual Activity     Sexually Active  Not Asked                 Family History:  Family History   Problem Relation Age of Onset    Cancer Father         Allergies:  No Known Allergies     ROS:   General: No fever or weight change  Hematologic: No unexpected submucosal bleeding or bruising  HEENT: No sore throat or facial pain  Respiratory: No cough or dyspnea  Cardiovascular: No angina  Gastrointestinal: See HPI  Musculoskeletal: Bilateral LE pain and swelling No usual joint pain or stiffness  Skin: large breast mass unchanged; No skin eruptions  Neurologic: No focal weakness or numbness  Psychiatric: No anxiety or sleep disturbance    Physical Exam:  Vital Signs:   Vitals:    04/01/22 0745   BP: 104/60   Pulse: 112   Resp: 18   Temp: 98.3 °F (36.8 °C)   SpO2: 97%       General: Well-nourished, well-developed  HEENT: Sclera anicteric, mucosal membranes moist  Cardiovascular: Regular rate and rhythm. No murmurs. Respiratory: Respirations nonlabored, no crepitus  GI: Abdomen nondistended, soft, and nontender. Normal active bowel sounds. No masses palpable. Rectal: Deferred  Musculoskeletal: Bilateral LE swelling, calf tenderness; warm to touch; Large right breast mass, essentially unchanged  Neurological: Gross memory appears intact. Patient is alert and oriented    Recent labs and imaging reviewed.   CTA LE extremity 3/31/22: no active hemorrhage, hematoma of pelvis, retroperitoneum, or B/L LE  Haptoglobin elevated  Reticulocyte count appropriately elevated  FOBT negative  Iron studies B12 pending;    Assessment:    Normocytic Anemia of

## 2022-04-02 ENCOUNTER — ANESTHESIA (OUTPATIENT)
Dept: ENDOSCOPY | Age: 47
DRG: 169 | End: 2022-04-02
Payer: MEDICAID

## 2022-04-02 ENCOUNTER — ANESTHESIA EVENT (OUTPATIENT)
Dept: ENDOSCOPY | Age: 47
DRG: 169 | End: 2022-04-02
Payer: MEDICAID

## 2022-04-02 VITALS
RESPIRATION RATE: 24 BRPM | OXYGEN SATURATION: 100 % | SYSTOLIC BLOOD PRESSURE: 106 MMHG | DIASTOLIC BLOOD PRESSURE: 59 MMHG

## 2022-04-02 LAB
ALBUMIN SERPL-MCNC: 2.5 G/DL (ref 3.4–5)
ALBUMIN SERPL-MCNC: 2.6 G/DL (ref 3.4–5)
ALBUMIN SERPL-MCNC: 2.6 G/DL (ref 3.4–5)
ALBUMIN SERPL-MCNC: 2.7 G/DL (ref 3.4–5)
ALP BLD-CCNC: 94 U/L (ref 40–129)
ALT SERPL-CCNC: 55 U/L (ref 10–40)
ANION GAP SERPL CALCULATED.3IONS-SCNC: 10 MMOL/L (ref 3–16)
ANION GAP SERPL CALCULATED.3IONS-SCNC: 12 MMOL/L (ref 3–16)
ANION GAP SERPL CALCULATED.3IONS-SCNC: 9 MMOL/L (ref 3–16)
AST SERPL-CCNC: 44 U/L (ref 15–37)
BASOPHILS ABSOLUTE: 0 K/UL (ref 0–0.2)
BASOPHILS RELATIVE PERCENT: 0 %
BILIRUB SERPL-MCNC: <0.2 MG/DL (ref 0–1)
BILIRUBIN DIRECT: <0.2 MG/DL (ref 0–0.3)
BILIRUBIN, INDIRECT: ABNORMAL MG/DL (ref 0–1)
BUN BLDV-MCNC: 10 MG/DL (ref 7–20)
BUN BLDV-MCNC: 9 MG/DL (ref 7–20)
BUN BLDV-MCNC: 9 MG/DL (ref 7–20)
CALCIUM SERPL-MCNC: 8.2 MG/DL (ref 8.3–10.6)
CALCIUM SERPL-MCNC: 8.3 MG/DL (ref 8.3–10.6)
CALCIUM SERPL-MCNC: 8.4 MG/DL (ref 8.3–10.6)
CHLORIDE BLD-SCNC: 93 MMOL/L (ref 99–110)
CHLORIDE BLD-SCNC: 95 MMOL/L (ref 99–110)
CHLORIDE BLD-SCNC: 97 MMOL/L (ref 99–110)
CO2: 21 MMOL/L (ref 21–32)
CO2: 22 MMOL/L (ref 21–32)
CO2: 23 MMOL/L (ref 21–32)
CREAT SERPL-MCNC: <0.5 MG/DL (ref 0.6–1.1)
EOSINOPHILS ABSOLUTE: 0.2 K/UL (ref 0–0.6)
EOSINOPHILS RELATIVE PERCENT: 2 %
GFR AFRICAN AMERICAN: >60
GFR NON-AFRICAN AMERICAN: >60
GLUCOSE BLD-MCNC: 106 MG/DL (ref 70–99)
GLUCOSE BLD-MCNC: 107 MG/DL (ref 70–99)
GLUCOSE BLD-MCNC: 118 MG/DL (ref 70–99)
GLUCOSE BLD-MCNC: 132 MG/DL (ref 70–99)
GLUCOSE BLD-MCNC: 132 MG/DL (ref 70–99)
GLUCOSE BLD-MCNC: 138 MG/DL (ref 70–99)
GLUCOSE BLD-MCNC: 152 MG/DL (ref 70–99)
HCT VFR BLD CALC: 23.9 % (ref 36–48)
HEMOGLOBIN: 8 G/DL (ref 12–16)
HEPARIN UNFRACTIONATED: POSITIVE
HEPARIN UNFRACTIONATED: POSITIVE
LYMPHOCYTES ABSOLUTE: 1.2 K/UL (ref 1–5.1)
LYMPHOCYTES RELATIVE PERCENT: 13 %
MCH RBC QN AUTO: 28.1 PG (ref 26–34)
MCHC RBC AUTO-ENTMCNC: 33.6 G/DL (ref 31–36)
MCV RBC AUTO: 83.6 FL (ref 80–100)
METAMYELOCYTES RELATIVE PERCENT: 2 %
MONOCYTES ABSOLUTE: 1 K/UL (ref 0–1.3)
MONOCYTES RELATIVE PERCENT: 10 %
MYELOCYTE PERCENT: 2 %
NEUTROPHILS ABSOLUTE: 7.2 K/UL (ref 1.7–7.7)
NEUTROPHILS RELATIVE PERCENT: 71 %
PDW BLD-RTO: 14.9 % (ref 12.4–15.4)
PERFORMED ON: ABNORMAL
PHOSPHORUS: 3.5 MG/DL (ref 2.5–4.9)
PHOSPHORUS: 3.7 MG/DL (ref 2.5–4.9)
PHOSPHORUS: 4 MG/DL (ref 2.5–4.9)
PLATELET # BLD: 280 K/UL (ref 135–450)
PMV BLD AUTO: 7.1 FL (ref 5–10.5)
POLYCHROMASIA: ABNORMAL
POTASSIUM SERPL-SCNC: 4 MMOL/L (ref 3.5–5.1)
POTASSIUM SERPL-SCNC: 4.3 MMOL/L (ref 3.5–5.1)
POTASSIUM SERPL-SCNC: 4.4 MMOL/L (ref 3.5–5.1)
RBC # BLD: 2.86 M/UL (ref 4–5.2)
SODIUM BLD-SCNC: 125 MMOL/L (ref 136–145)
SODIUM BLD-SCNC: 127 MMOL/L (ref 136–145)
SODIUM BLD-SCNC: 130 MMOL/L (ref 136–145)
SRA, UNFRACTIONATED HEPARIN INTERPRETATION: ABNORMAL
SRA, UNFRACTIONATED HEPARIN INTERPRETATION: ABNORMAL
SRA, UNFRACTIONATED HEPARIN, HIGH DOSE: 0 %
SRA, UNFRACTIONATED HEPARIN, HIGH DOSE: 7 %
SRA, UNFRACTIONATED HEPARIN, LOW DOSE: 100 %
SRA, UNFRACTIONATED HEPARIN, LOW DOSE: 83 %
TOTAL PROTEIN: 5.8 G/DL (ref 6.4–8.2)
VANCOMYCIN RANDOM: 24.6 UG/ML
WBC # BLD: 9.6 K/UL (ref 4–11)

## 2022-04-02 PROCEDURE — 80076 HEPATIC FUNCTION PANEL: CPT

## 2022-04-02 PROCEDURE — 2580000003 HC RX 258: Performed by: STUDENT IN AN ORGANIZED HEALTH CARE EDUCATION/TRAINING PROGRAM

## 2022-04-02 PROCEDURE — 6370000000 HC RX 637 (ALT 250 FOR IP): Performed by: INTERNAL MEDICINE

## 2022-04-02 PROCEDURE — 80202 ASSAY OF VANCOMYCIN: CPT

## 2022-04-02 PROCEDURE — 6370000000 HC RX 637 (ALT 250 FOR IP): Performed by: STUDENT IN AN ORGANIZED HEALTH CARE EDUCATION/TRAINING PROGRAM

## 2022-04-02 PROCEDURE — 51798 US URINE CAPACITY MEASURE: CPT

## 2022-04-02 PROCEDURE — 80069 RENAL FUNCTION PANEL: CPT

## 2022-04-02 PROCEDURE — 3700000000 HC ANESTHESIA ATTENDED CARE: Performed by: INTERNAL MEDICINE

## 2022-04-02 PROCEDURE — 0DJ08ZZ INSPECTION OF UPPER INTESTINAL TRACT, VIA NATURAL OR ARTIFICIAL OPENING ENDOSCOPIC: ICD-10-PCS | Performed by: INTERNAL MEDICINE

## 2022-04-02 PROCEDURE — 7100000010 HC PHASE II RECOVERY - FIRST 15 MIN: Performed by: INTERNAL MEDICINE

## 2022-04-02 PROCEDURE — 2500000003 HC RX 250 WO HCPCS: Performed by: FAMILY MEDICINE

## 2022-04-02 PROCEDURE — 2060000000 HC ICU INTERMEDIATE R&B

## 2022-04-02 PROCEDURE — 6360000002 HC RX W HCPCS: Performed by: STUDENT IN AN ORGANIZED HEALTH CARE EDUCATION/TRAINING PROGRAM

## 2022-04-02 PROCEDURE — 51701 INSERT BLADDER CATHETER: CPT

## 2022-04-02 PROCEDURE — 2709999900 HC NON-CHARGEABLE SUPPLY: Performed by: INTERNAL MEDICINE

## 2022-04-02 PROCEDURE — 3609017100 HC EGD: Performed by: INTERNAL MEDICINE

## 2022-04-02 PROCEDURE — 99233 SBSQ HOSP IP/OBS HIGH 50: CPT | Performed by: INTERNAL MEDICINE

## 2022-04-02 PROCEDURE — 85025 COMPLETE CBC W/AUTO DIFF WBC: CPT

## 2022-04-02 PROCEDURE — 7100000011 HC PHASE II RECOVERY - ADDTL 15 MIN: Performed by: INTERNAL MEDICINE

## 2022-04-02 PROCEDURE — 2580000003 HC RX 258: Performed by: INTERNAL MEDICINE

## 2022-04-02 PROCEDURE — 6360000002 HC RX W HCPCS: Performed by: FAMILY MEDICINE

## 2022-04-02 RX ORDER — DOXYCYCLINE 100 MG/1
100 CAPSULE ORAL EVERY 12 HOURS SCHEDULED
Status: COMPLETED | OUTPATIENT
Start: 2022-04-02 | End: 2022-04-05

## 2022-04-02 RX ORDER — LIDOCAINE HYDROCHLORIDE 20 MG/ML
INJECTION, SOLUTION EPIDURAL; INFILTRATION; INTRACAUDAL; PERINEURAL PRN
Status: DISCONTINUED | OUTPATIENT
Start: 2022-04-02 | End: 2022-04-02 | Stop reason: SDUPTHER

## 2022-04-02 RX ORDER — PROPOFOL 10 MG/ML
INJECTION, EMULSION INTRAVENOUS PRN
Status: DISCONTINUED | OUTPATIENT
Start: 2022-04-02 | End: 2022-04-02 | Stop reason: SDUPTHER

## 2022-04-02 RX ORDER — TOLVAPTAN 15 MG/1
7.5 TABLET ORAL ONCE
Status: COMPLETED | OUTPATIENT
Start: 2022-04-02 | End: 2022-04-02

## 2022-04-02 RX ADMIN — Medication 7.5 MG: at 17:59

## 2022-04-02 RX ADMIN — OXYCODONE 10 MG: 5 TABLET ORAL at 20:59

## 2022-04-02 RX ADMIN — QUETIAPINE FUMARATE 12.5 MG: 25 TABLET ORAL at 21:02

## 2022-04-02 RX ADMIN — LIDOCAINE HYDROCHLORIDE 60 MG: 20 INJECTION, SOLUTION EPIDURAL; INFILTRATION; INTRACAUDAL; PERINEURAL at 07:49

## 2022-04-02 RX ADMIN — Medication 5 MG: at 21:01

## 2022-04-02 RX ADMIN — LEVETIRACETAM 2000 MG: 250 TABLET, FILM COATED ORAL at 10:51

## 2022-04-02 RX ADMIN — PROPOFOL 40 MG: 10 INJECTION, EMULSION INTRAVENOUS at 07:49

## 2022-04-02 RX ADMIN — SODIUM CHLORIDE, PRESERVATIVE FREE 10 ML: 5 INJECTION INTRAVENOUS at 09:35

## 2022-04-02 RX ADMIN — SODIUM CHLORIDE, PRESERVATIVE FREE 10 ML: 5 INJECTION INTRAVENOUS at 21:02

## 2022-04-02 RX ADMIN — OXYCODONE 10 MG: 5 TABLET ORAL at 02:24

## 2022-04-02 RX ADMIN — APIXABAN 10 MG: 5 TABLET, FILM COATED ORAL at 16:29

## 2022-04-02 RX ADMIN — VANCOMYCIN HYDROCHLORIDE 1250 MG: 10 INJECTION, POWDER, LYOPHILIZED, FOR SOLUTION INTRAVENOUS at 01:13

## 2022-04-02 RX ADMIN — IRON SUCROSE 200 MG: 20 INJECTION, SOLUTION INTRAVENOUS at 16:12

## 2022-04-02 RX ADMIN — DOXYCYCLINE 100 MG: 100 CAPSULE ORAL at 10:51

## 2022-04-02 RX ADMIN — LACOSAMIDE 200 MG: 50 TABLET, FILM COATED ORAL at 21:01

## 2022-04-02 RX ADMIN — DOXYCYCLINE 100 MG: 100 CAPSULE ORAL at 21:02

## 2022-04-02 RX ADMIN — PANTOPRAZOLE SODIUM 40 MG: 40 TABLET, DELAYED RELEASE ORAL at 06:09

## 2022-04-02 RX ADMIN — LACOSAMIDE 200 MG: 50 TABLET, FILM COATED ORAL at 10:51

## 2022-04-02 RX ADMIN — SODIUM CHLORIDE, PRESERVATIVE FREE 10 ML: 5 INJECTION INTRAVENOUS at 09:34

## 2022-04-02 ASSESSMENT — PULMONARY FUNCTION TESTS
PIF_VALUE: 1
PIF_VALUE: 0
PIF_VALUE: 1

## 2022-04-02 ASSESSMENT — PAIN DESCRIPTION - LOCATION: LOCATION: LEG

## 2022-04-02 ASSESSMENT — PAIN SCALES - GENERAL
PAINLEVEL_OUTOF10: 9
PAINLEVEL_OUTOF10: 0
PAINLEVEL_OUTOF10: 1
PAINLEVEL_OUTOF10: 0
PAINLEVEL_OUTOF10: 0
PAINLEVEL_OUTOF10: 7

## 2022-04-02 ASSESSMENT — PAIN DESCRIPTION - ORIENTATION: ORIENTATION: RIGHT;LEFT

## 2022-04-02 ASSESSMENT — PAIN DESCRIPTION - ONSET: ONSET: ON-GOING

## 2022-04-02 ASSESSMENT — PAIN SCALES - WONG BAKER: WONGBAKER_NUMERICALRESPONSE: 0

## 2022-04-02 ASSESSMENT — PAIN DESCRIPTION - PROGRESSION: CLINICAL_PROGRESSION: GRADUALLY WORSENING

## 2022-04-02 ASSESSMENT — PAIN DESCRIPTION - FREQUENCY: FREQUENCY: INTERMITTENT

## 2022-04-02 ASSESSMENT — PAIN DESCRIPTION - DESCRIPTORS: DESCRIPTORS: ACHING;DULL

## 2022-04-02 ASSESSMENT — PAIN DESCRIPTION - PAIN TYPE: TYPE: ACUTE PAIN

## 2022-04-02 NOTE — PROGRESS NOTES
day  sodium chloride flush 0.9 % injection 5-40 mL, PRN  0.9 % sodium chloride infusion, PRN  metoprolol (LOPRESSOR) injection 5 mg, Once  oxyCODONE (ROXICODONE) immediate release tablet 5 mg, Q4H PRN   Or  oxyCODONE (ROXICODONE) immediate release tablet 10 mg, Q4H PRN  LORazepam (ATIVAN) injection 1 mg, Q6H PRN  [Held by provider] clonazePAM (KLONOPIN) tablet 0.5 mg, BID  QUEtiapine (SEROQUEL) tablet 12.5 mg, Nightly  insulin regular (HUMULIN R;NOVOLIN R) injection 10 Units, Once   And  dextrose 10 % infusion, Once  glucose (GLUTOSE) 40 % oral gel 15 g, PRN  dextrose 50 % IV solution, PRN  glucagon (rDNA) injection 1 mg, PRN  dextrose 5 % solution, PRN  lacosamide (VIMPAT) tablet 200 mg, BID  levETIRAcetam (KEPPRA) tablet 2,000 mg, Daily  levETIRAcetam (KEPPRA) tablet 2,500 mg, Nightly  melatonin disintegrating tablet 5 mg, Nightly  pantoprazole (PROTONIX) tablet 40 mg, QAM AC  sodium chloride flush 0.9 % injection 5-40 mL, 2 times per day  sodium chloride flush 0.9 % injection 5-40 mL, PRN  0.9 % sodium chloride infusion, PRN  ondansetron (ZOFRAN-ODT) disintegrating tablet 4 mg, Q8H PRN   Or  ondansetron (ZOFRAN) injection 4 mg, Q6H PRN  polyethylene glycol (GLYCOLAX) packet 17 g, Daily PRN  acetaminophen (TYLENOL) tablet 650 mg, Q6H PRN   Or  acetaminophen (TYLENOL) suppository 650 mg, Q6H PRN          Physical exam:     Vitals:  /72   Pulse 112   Temp 98.6 °F (37 °C) (Oral)   Resp 16   Ht 5' 3\" (1.6 m)   Wt 202 lb 13.2 oz (92 kg)   SpO2 95%   BMI 35.93 kg/m²   Constitutional:  OAA X3 NAD  Skin: no rash, turgor wnl  Heent:  eomi, mmm  Neck: no bruits or jvd noted  Cardiovascular:  S1, S2 without m/r/g  Respiratory: CTA B without w/r/r  Abdomen:  +bs, soft, nt, nd  Ext: + lower extremity edema  Psychiatric: mood and affect appropriate  Musculoskeletal:  Rom, muscular strength intact    Data:   Labs:  CBC:   Recent Labs     03/31/22  0433 03/31/22  1245 04/01/22  0512 04/01/22  0512 04/01/22  1150 04/01/22 2250 04/02/22 0527   WBC 12.2*  --  10.4  --   --   --  9.6   HGB 7.1*   < > 8.1*   < > 8.3* 8.1* 8.0*     --  266  --   --   --  280    < > = values in this interval not displayed. BMP:    Recent Labs     04/01/22 1150 04/01/22 2250 04/02/22 0527   * 125* 127*   K 4.4 4.3 4.4   CL 93* 93* 95*   CO2 25 22 23   BUN 9 9 9   CREATININE <0.5* <0.5* <0.5*   GLUCOSE 124* 118* 107*     Ca/Mg/Phos:   Recent Labs     04/01/22 1150 04/01/22 2250 04/02/22 0527   CALCIUM 8.7 8.3 8.4   PHOS 3.2 3.7 4.0     Hepatic:   Recent Labs     03/31/22 0433 04/02/22 0527   AST 38* 44*   ALT 43* 55*   BILITOT <0.2 <0.2   ALKPHOS 94 94     Troponin: No results for input(s): TROPONINI in the last 72 hours. BNP: No results for input(s): BNP in the last 72 hours. Lipids: No results for input(s): CHOL, TRIG, HDL, LDLCALC, LABVLDL in the last 72 hours. ABGs:   No results for input(s): PHART, PO2ART, CUY5RKK in the last 72 hours. INR:   No results for input(s): INR in the last 72 hours. UA:  No results for input(s): Maicol Rick, GLUCOSEU, BILIRUBINUR, KETUA, SPECGRAV, BLOODU, PHUR, PROTEINU, UROBILINOGEN, NITRU, LEUKOCYTESUR, Gae Michelle in the last 72 hours. Urine Microscopic:   No results for input(s): LABCAST, BACTERIA, COMU, HYALCAST, WBCUA, RBCUA, EPIU in the last 72 hours. Urine Culture:   No results for input(s): LABURIN in the last 72 hours. Urine Chemistry:   No results for input(s): Jannette Constantino, PROTEINUR, NAUR in the last 72 hours. IMAGING:  CTA LOWER EXTREMITY LEFT W CONTRAST   Final Result   Impression: No acute arterial abnormality. No active hemorrhage. No hematoma of the pelvis, retroperitoneum or bilateral lower extremities. Suboptimal evaluation of the venous structures due to contrast bolus timing. Findings described above may represent sequelae of poor venous outflow or rethrombosis.       CTA LOWER EXTREMITY RIGHT W CONTRAST   Final Result Impression: No acute arterial abnormality. No active hemorrhage. No hematoma of the pelvis, retroperitoneum or bilateral lower extremities. Suboptimal evaluation of the venous structures due to contrast bolus timing. Findings described above may represent sequelae of poor venous outflow or rethrombosis. CT HEAD WO CONTRAST   Final Result      No acute or interval change. No hemorrhage or mass effect. CT ABDOMEN PELVIS W IV CONTRAST Additional Contrast? 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 [facility]   Final Result   Impression: Technically successful inferior venacavogram and bilateral iliac venogram demonstrating marked ileal caval clot associated with the filter including thrombus above the IVC filter. Technically successful vacuum-assisted thrombectomy with restoration of flow through the inferior vena cava and improved patency of the ileal caval system. Clot remains within the inferior vena cava filter. VL Extremity Venous Bilateral   Final Result      XR CHEST PORTABLE   Final Result      Limited evaluation of lower right lung due to overlapping density for which infiltrate cannot be excluded. Otherwise no acute process seen. CT HEAD WO CONTRAST   Final Result      Postsurgical changes with areas of low cortical attenuation in the left parietal and left occipital region appear essentially stable from prior study with associated mild midline shift which is similar to slightly improved from prior study. No evidence of developing acute intracranial hemorrhage. CTA ABDOMEN PELVIS W WO CONTRAST   Final Result   1. 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 2. Surrounding hazy density, likely related to venous congestion.  However, streaky densities extending inferiorly on the right may represent blood. However, there is no evidence of retroperitoneal hematoma. Correlation with lower extremity noninvasive    duplex Doppler is recommended in addition to serial hemoglobin and crit levels. 3. Suspect bilateral lower lobe pulmonary emboli. See comments above      Redemonstrated is a large necrotic mass involving the right breast enlarged fibroid uterus. Assessment/Plan   1. Hyponatremia     2. HTN    3. Anemia    4. Acid- base/ Electrolyte imbalance     5.  SIADH     Plan   - samsca as needed   - Ur studies - reviewed   - TSH - nl   - Monitor Na   - d/w pt   - free water restriction   - encourage solute intake                     Thank you for allowing us to participate in care of Thaddeus Hand MD  Feel free to contact me   [facility] [address]  Office : [phone]  Fax :[phone]

## 2022-04-02 NOTE — PROGRESS NOTES
Vancomcyin has been discontinued. Pharmacy will sign off consult. If medication dosing is resumed, please re-consult pharmacy.     Billy Iglesias PharmD, [facility] 4/2/2022 7:27 AM

## 2022-04-02 NOTE — PROGRESS NOTES
Nephrology  Note                                                                                                                                                                                                                                                                                                                                                               Office : [phone]     Fax :[phone]              Patient's Name: Abdi Davey    Reason for Consult:  Hyponatremia   Requesting Physician:  JUDITH Zee CNP      Na stable   Cr stable   No N/V/D       Past Medical History:   Diagnosis Date    Thyroid nodule        Past Surgical History:   Procedure Laterality Date    CRANIOTOMY Left 3/12/2022    LEFT TEMPORAL PARIETAL OCCIPITAL CRANIOTOMY FOR TUMOR RESECTION performed by Olya Aldana MD at [address] IR IVC [address] N/A 03/16/2022    [facility]    IR IVC FILTER PLACEMENT W IMAGING  3/16/2022    IR IVC FILTER PLACEMENT W IMAGING 3/16/2022 [address] SPECIAL PROCEDURES    IR THROMB [address]  3/28/2022    IR THROMB [address] 3/28/[address]22 [address] SPECIAL PROCEDURES       Family History   Problem Relation Age of Onset    Cancer Father         reports that she has never smoked. She has never used smokeless tobacco. She reports that she does not drink alcohol and does not use drugs. Allergies:  Patient has no known allergies.     Current Medications:    0.9 % sodium chloride infusion, PRN  vancomycin (VANCOCIN) 1,250 mg in dextrose 5 % 250 mL IVPB, Q8H  [Held by provider] argatroban infusion 50mg in 0.9% sodium chloride 50 mL (premix), Continuous  apixaban (ELIQUIS) tablet 10 mg, BID   Followed by  Clearance Juan Manuel ON 4/6/2022] apixaban (ELIQUIS) tablet 5 mg, BID  0.9 % sodium chloride infusion, PRN  sodium chloride flush 0.9 % injection 5-40 mL, 2 times per day  sodium chloride flush 0.9 % injection 5-40 mL, PRN  0.9 % sodium chloride infusion, PRN  metoprolol (LOPRESSOR) injection 5 mg, Once  oxyCODONE (ROXICODONE) immediate release tablet 5 mg, Q4H PRN   Or  oxyCODONE (ROXICODONE) immediate release tablet 10 mg, Q4H PRN  LORazepam (ATIVAN) injection 1 mg, Q6H PRN  [Held by provider] clonazePAM (KLONOPIN) tablet 0.5 mg, BID  QUEtiapine (SEROQUEL) tablet 12.5 mg, Nightly  insulin regular (HUMULIN R;NOVOLIN R) injection 10 Units, Once   And  dextrose 10 % infusion, Once  glucose (GLUTOSE) 40 % oral gel 15 g, PRN  dextrose 50 % IV solution, PRN  glucagon (rDNA) injection 1 mg, PRN  dextrose 5 % solution, PRN  lacosamide (VIMPAT) tablet 200 mg, BID  levETIRAcetam (KEPPRA) tablet 2,000 mg, Daily  levETIRAcetam (KEPPRA) tablet 2,500 mg, Nightly  melatonin disintegrating tablet 5 mg, Nightly  pantoprazole (PROTONIX) tablet 40 mg, QAM AC  sodium chloride flush 0.9 % injection 5-40 mL, 2 times per day  sodium chloride flush 0.9 % injection 5-40 mL, PRN  0.9 % sodium chloride infusion, PRN  ondansetron (ZOFRAN-ODT) disintegrating tablet 4 mg, Q8H PRN   Or  ondansetron (ZOFRAN) injection 4 mg, Q6H PRN  polyethylene glycol (GLYCOLAX) packet 17 g, Daily PRN  acetaminophen (TYLENOL) tablet 650 mg, Q6H PRN   Or  acetaminophen (TYLENOL) suppository 650 mg, Q6H PRN          Physical exam:     Vitals:  /73   Pulse 115   Temp 99.1 °F (37.3 °C) (Oral)   Resp 16   Ht 5' 3\" (1.6 m)   Wt 202 lb 13.2 oz (92 kg)   SpO2 94%   BMI 35.93 kg/m²   Constitutional:  OAA X3 NAD  Skin: no rash, turgor wnl  Heent:  eomi, mmm  Neck: no bruits or jvd noted  Cardiovascular:  S1, S2 without m/r/g  Respiratory: CTA B without w/r/r  Abdomen:  +bs, soft, nt, nd  Ext: + lower extremity edema  Psychiatric: mood and affect appropriate  Musculoskeletal:  Rom, muscular strength intact    Data:   Labs:  CBC:   Recent Labs     03/30/22  0625 03/30/22  0841 03/31/22  0433 03/31/22  1245 04/01/22  0512 04/01/22  1150 04/01/22  2250   WBC 14.1*  --  12.2*  --  10.4  --   --    HGB 7.9*   < > 7.1*   < > 8.1* 8.3* 8.1*     --  260  --  266  --   --     < > = values in this interval not displayed. BMP:    Recent Labs     03/31/22  1245 04/01/22  0512 04/01/22  1150   * 131* 129*   K 4.4 4.7 4.4   CL 96* 97* 93*   CO2 26 24 25   BUN 8 8 9   CREATININE <0.5* <0.5* <0.5*   GLUCOSE 133* 103* 124*     Ca/Mg/Phos:   Recent Labs     03/31/22  1245 04/01/22  0512 04/01/22  1150   CALCIUM 8.6 8.3 8.7   PHOS 3.0 3.1 3.2     Hepatic:   Recent Labs     03/30/22  0625 03/31/22  0433   AST 37 38*   ALT 32 43*   BILITOT 0.3 <0.2   ALKPHOS 84 94     Troponin: No results for input(s): TROPONINI in the last 72 hours. BNP: No results for input(s): BNP in the last 72 hours. Lipids: No results for input(s): CHOL, TRIG, HDL, LDLCALC, LABVLDL in the last 72 hours. ABGs:   No results for input(s): PHART, PO2ART, DPT6UDD in the last 72 hours. INR:   No results for input(s): INR in the last 72 hours. UA:  No results for input(s): Beverlee Belinda, GLUCOSEU, BILIRUBINUR, KETUA, SPECGRAV, BLOODU, PHUR, PROTEINU, UROBILINOGEN, NITRU, LEUKOCYTESUR, Joretta [place] in the last 72 hours. Urine Microscopic:   No results for input(s): LABCAST, BACTERIA, COMU, HYALCAST, WBCUA, RBCUA, EPIU in the last 72 hours. Urine Culture:   No results for input(s): LABURIN in the last 72 hours. Urine Chemistry:   No results for input(s): Carlus Lacer, PROTEINUR, NAUR in the last 72 hours. IMAGING:  CTA LOWER EXTREMITY LEFT W CONTRAST   Final Result   Impression: No acute arterial abnormality. No active hemorrhage. No hematoma of the pelvis, retroperitoneum or bilateral lower extremities. Suboptimal evaluation of the venous structures due to contrast bolus timing. Findings described above may represent sequelae of poor venous outflow or rethrombosis. CTA LOWER EXTREMITY RIGHT W CONTRAST   Final Result   Impression: No acute arterial abnormality. No active hemorrhage.       No hematoma of the pelvis, retroperitoneum or bilateral lower extremities. Suboptimal evaluation of the venous structures due to contrast bolus timing. Findings described above may represent sequelae of poor venous outflow or rethrombosis. CT HEAD WO CONTRAST   Final Result      No acute or interval change. No hemorrhage or mass effect. CT ABDOMEN PELVIS W IV CONTRAST Additional Contrast? 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 IR GUIDED THROMB [facility] VEIN   Final Result   Impression: Technically successful inferior venacavogram and bilateral iliac venogram demonstrating marked ileal caval clot associated with the filter including thrombus above the IVC filter. Technically successful vacuum-assisted thrombectomy with restoration of flow through the inferior vena cava and improved patency of the ileal caval system. Clot remains within the inferior vena cava filter. VL Extremity Venous Bilateral   Final Result      XR CHEST PORTABLE   Final Result      Limited evaluation of lower right lung due to overlapping density for which infiltrate cannot be excluded. Otherwise no acute process seen. CT HEAD WO CONTRAST   Final Result      Postsurgical changes with areas of low cortical attenuation in the left parietal and left occipital region appear essentially stable from prior study with associated mild midline shift which is similar to slightly improved from prior study. No evidence of developing acute intracranial hemorrhage. CTA ABDOMEN PELVIS W WO CONTRAST   Final Result   1. 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 2. Surrounding hazy density, likely related to venous congestion. However, streaky densities extending inferiorly on the right may represent blood.  However, there is no evidence of retroperitoneal hematoma. Correlation with lower extremity noninvasive    duplex Doppler is recommended in addition to serial hemoglobin and crit levels. 3. Suspect bilateral lower lobe pulmonary emboli. See comments above      Redemonstrated is a large necrotic mass involving the right breast enlarged fibroid uterus. Assessment/Plan   1. Hyponatremia     2. HTN    3. Anemia    4. Acid- base/ Electrolyte imbalance     5.  SIADH     Plan   - samsca as needed   - Ur studies - reviewed   - TSH - nl   - Monitor Na   - d/w pt   - free water restriction   - encourage solute intake                     Thank you for allowing us to participate in care of Gallito Ibarra MD  Feel free to contact me   [facility] [address]  Office : [phone]  Fax :[phone]

## 2022-04-02 NOTE — OP NOTE
EGD REPORT    Patient:  Momo Raya                  1975    Referring Physician:  Heri Dunn    Endoscopist: Kaden Beth     Indication:  Dropping H/H without overt bleeding     Medications:  MAC      Pre-Anesthesia Assessment:  I have reviewed and am in agreement with patient history and medication, including previous response to sedation. Prior to the procedure, a History and Physical was performed, and patient medications and allergies were reviewed. The patient is competent. The risks and benefits of the procedure and the sedation options and risks were discussed with the patient. Risks discussed included but were not limited to infection, bleeding, perforation, death, and missed lesions. All questions were answered and informed consent was obtained. Patient identification and proposed procedure were verified by the physician and the nurse in the pre-procedure area in the procedure room. Mallampatti: II  ASA Grade Assessment: 4     After reviewing the risks and benefits, the patient was deemed in satisfactory condition to undergo the procedure. The anesthesia plan was to use MAC anesthesia. Immediately prior to administration of medications, the patient was re-assessed for adequacy to receive sedatives and a time out was performed. Patient and healthcare providers were in agreement it was the correct patient and procedure. The heart rate, respiratory rate, oxygen saturations, blood pressure, adequacy of pulmonary ventilation, and response to care were monitored throughout the procedure. The physical status of the patient was re-assessed after the procedure. After obtaining informed consent, the endoscope was passed under direct vision. The endoscope was introduced through the mouth, and advanced to the second part of duodenum. The EGD was accomplished without difficulty. The patient tolerated the procedure well.        Duodenum: Normal  Stomach: Normal.  Retroflexion did not show a hiatal hernia. Esophagus: GE junction at 39cm. No Evidence of Mitchell's or esophagitis. Estimated blood loss none    Plan: This exam was normal. No signs of any fresh or old blood.  No biopsies were taken because patient was given her Eliquis today  No need to check H/H so frequently since she is not overtly bleeding - daily is sufficient  If still dropping over weekend, consider colonoscopy for Monday (need to make decision by Sunday afternoon for prep)

## 2022-04-02 NOTE — H&P
Pre-operative History and Physical    Patient: Gautam Paul  : 1975     History Obtained From:  Patient     HISTORY OF PRESENT ILLNESS:    The patient is a 48 y.o. female who presents for an EGD for dropping H/H requiring repeat blood transfusions. No overt bleeding. Past Medical History:        Diagnosis Date    Thyroid nodule      Past Surgical History:        Procedure Laterality Date    CRANIOTOMY Left 3/12/2022    LEFT TEMPORAL PARIETAL OCCIPITAL CRANIOTOMY FOR TUMOR RESECTION performed by Xavier Beard MD at [address] IR IVC [address] N/A 2022    [facility]    IR IVC FILTER PLACEMENT W IMAGING  3/16/2022    IR IVC FILTER PLACEMENT W IMAGING 3/16/2022 [facility] SPECIAL PROCEDURES    IR THROMB [address] VEIN  3/28/2022    IR THROMB [address]  VEIN 3/28/2022 [facility] SPECIAL PROCEDURES     Medications Prior to Admission:   No current facility-administered medications on file prior to encounter. Current Outpatient Medications on File Prior to Encounter   Medication Sig Dispense Refill    Multiple Vitamin (MULTIVITAMIN ADULT PO) Take 1 capsule by mouth daily      Calcium Carbonate-Vitamin D (OYSTER SHELL CALCIUM/D) 500-200 MG-UNIT TABS Take 1 tablet by mouth 2 times daily (with meals)      ibuprofen (ADVIL;MOTRIN) 800 MG tablet Take 800 mg by mouth every 8 hours as needed      levETIRAcetam (KEPPRA) 1000 MG tablet Take 2 tablets by mouth daily 60 tablet 3    levETIRAcetam (KEPPRA) 500 MG tablet Take 5 tablets by mouth nightly 60 tablet 3    melatonin 5 MG TBDP disintegrating tablet Take 1 tablet by mouth nightly      QUEtiapine (SEROQUEL) 25 MG tablet Take 0.5 tablets by mouth nightly for 3 days 2 tablet 0    pantoprazole (PROTONIX) 40 MG tablet Take 1 tablet by mouth every morning (before breakfast) 30 tablet 3    lacosamide (VIMPAT) 200 MG tablet Take 1 tablet by mouth 2 times daily for 30 days.  60 tablet 0     Allergies:  Patient has no known allergies. History of allergic reaction to anesthesia:  No    Social History:   Nondrinker, nonsmoker    Family History:       Problem Relation Age of Onset    Cancer Father        PHYSICAL EXAM:      /74   Pulse 109   Temp 99.4 °F (37.4 °C) (Oral)   Resp 18   Ht 5' 3\" (1.6 m)   Wt 202 lb 13.2 oz (92 kg)   SpO2 95%   BMI 35.93 kg/m²  I        Heart:  No m/r/g +s1/s2 RRR, tachy    Lungs:  CTA bilaterally    Abdomen:  Soft, nontender, non distended; +bs    ASA Grade:  ASA 4 - Patient with severe systemic disease that is a constant threat to life    Mallampati Class:  Class I: Soft palate, uvula, fauces, pillars visible  __________  Class II: Soft palate, uvula, fauces visible  ___X_______   Class III: Soft palate, base of uvula visible  __________  Class IV: Hard palate only visible   __________      ASSESSMENT AND PLAN:    1. Patient is a 48 y.o. female here for EGD with anesthesia  2. Procedure options, risks and benefits reviewed with patient. We specifically discussed that risks include, but are not limited to infection, bleeding, perforation, death, and missed lesions. Patient expresses understanding.

## 2022-04-02 NOTE — PROGRESS NOTES
Progress Note    Admit Date: 3/25/2022  Diet: ADULT DIET; Regular; 1200 ml    CC: leg pain     Interval history:   NAEON. Pt was seen at bedside today AM, resting comfortably. Was little drowsy from EGD. Feels tired, does not have energy today AM. Endorses appetite. Having normal BM and is voiding as usual.  Denies F/C, N/V, CP, SOB, HA, vision changes, weakness in arms and feet. Still tachycardic rest of the vitals within normal limits  BMP is unremarkable, improvement in sodium, creatinine has been stable  LFTs within normal limits  Hemoglobin is remained at 8 all through yesterday.   No further drop  Urine output of 1.6 L  Got straight cath x2    Medications:     Scheduled Meds:   doxycycline monohydrate  100 mg Oral 2 times per day    apixaban  10 mg Oral BID    Followed by   Roro Marroquin ON 4/6/2022] apixaban  5 mg Oral BID    sodium chloride flush  5-40 mL IntraVENous 2 times per day    metoprolol  5 mg IntraVENous Once    [Held by provider] clonazePAM  0.5 mg Oral BID    QUEtiapine  12.5 mg Oral Nightly    insulin regular  10 Units IntraVENous Once    lacosamide  200 mg Oral BID    levETIRAcetam  2,000 mg Oral Daily    levETIRAcetam  2,500 mg Oral Nightly    melatonin  5 mg Oral Nightly    pantoprazole  40 mg Oral QAM AC    sodium chloride flush  5-40 mL IntraVENous 2 times per day     Continuous Infusions:   [Held by provider] argatroban infusion Stopped (03/30/22 2100)    sodium chloride      sodium chloride 25 mL (03/31/22 1615)    dextrose      dextrose      sodium chloride 25 mL (04/01/22 1736)     PRN Meds:sodium chloride, sodium chloride flush, sodium chloride, oxyCODONE **OR** oxyCODONE, LORazepam, glucose, dextrose, glucagon (rDNA), dextrose, sodium chloride flush, sodium chloride, ondansetron **OR** ondansetron, polyethylene glycol, acetaminophen **OR** acetaminophen    Objective:   Vitals:   T-max:  Patient Vitals for the past 8 hrs:   BP Temp Temp src Pulse Resp SpO2   04/02/22 0930 123/78 -- Axillary 107 20 95 %   04/02/22 0815 116/70 -- -- 104 26 100 %   04/02/22 0811 118/69 -- -- 105 26 100 %   04/02/22 0805 115/66 -- -- 104 26 100 %   04/02/22 0800 114/65 -- -- 104 28 100 %   04/02/22 0414 125/74 99.4 °F (37.4 °C) Oral 109 18 95 %   04/02/22 0216 113/74 -- -- 112 18 --       Intake/Output Summary (Last 24 hours) at 4/2/2022 1009  Last data filed at 4/2/2022 0815  Gross per 24 hour   Intake 200 ml   Output 1685 ml   Net -1485 ml         Physical Exam  Constitutional:       Appearance: She is obese. She is not diaphoretic. Comments: Sleeping, lethargic. Unable to hold a conversation   HENT:      Head: Normocephalic and atraumatic. Nose: Nose normal.   Eyes:      Extraocular Movements: Extraocular movements intact. Conjunctiva/sclera: Conjunctivae normal.      Pupils: Pupils are equal, round, and reactive to light. Cardiovascular:      Rate and Rhythm: Regular rhythm. Tachycardia present. Pulses: Normal pulses. Heart sounds: Normal heart sounds. Pulmonary:      Effort: Pulmonary effort is normal.      Breath sounds: Normal breath sounds. Abdominal:      General: Bowel sounds are normal.      Palpations: Abdomen is soft. Musculoskeletal:         General: Swelling present. Cervical back: Normal range of motion. Right lower leg: Edema present. Left lower leg: Edema present. Neurological:      Mental Status: She is oriented to person, place, and time. LABS:    CBC:   Recent Labs     03/31/22  0433 03/31/22  1245 04/01/22  0512 04/01/22  0512 04/01/22  1150 04/01/22  2250 04/02/22  0527   WBC 12.2*  --  10.4  --   --   --  9.6   HGB 7.1*   < > 8.1*   < > 8.3* 8.1* 8.0*   HCT 21.7*  --  23.4*  --   --   --  23.9*     --  266  --   --   --  280   MCV 83.7  --  83.0  --   --   --  83.6    < > = values in this interval not displayed.      Renal:    Recent Labs     04/01/22  1150 04/01/22  2250 04/02/22  0527   * 125* 127*   K 4.4 4.3 4.4   CL 93* 93* 95*   CO2 25 22 23   BUN 9 9 9   CREATININE <0.5* <0.5* <0.5*   GLUCOSE 124* 118* 107*   CALCIUM 8.7 8.3 8.4   PHOS 3.2 3.7 4.0   ANIONGAP 11 10 9     Hepatic:   Recent Labs     03/31/22  0433 03/31/22  1245 04/01/22  1150 04/01/22  2250 04/02/22  0527   AST 38*  --   --   --  44*   ALT 43*  --   --   --  55*   BILITOT <0.2  --   --   --  <0.2   BILIDIR <0.2  --   --   --  <0.2   PROT 6.0*  --   --   --  5.8*   LABALBU 2.8*   < > 2.8* 2.6* 2.7*  2.6*   ALKPHOS 94  --   --   --  94    < > = values in this interval not displayed. Troponin: No results for input(s): TROPONINI in the last 72 hours. BNP: No results for input(s): BNP in the last 72 hours. Lipids: No results for input(s): CHOL, HDL in the last 72 hours. Invalid input(s): LDLCALCU, TRIGLYCERIDE  ABGs:    No results for input(s): PHART, NBQ9DZB, PO2ART, JUJ9WBR, BEART, THGBART, V1REWKKM, OJE9LPD in the last 72 hours. INR:   No results for input(s): INR in the last 72 hours. Lactate: No results for input(s): LACTATE in the last 72 hours. Cultures:  -----------------------------------------------------------------  RAD:   CTA LOWER EXTREMITY LEFT W CONTRAST   Final Result   Impression: No acute arterial abnormality. No active hemorrhage. No hematoma of the pelvis, retroperitoneum or bilateral lower extremities. Suboptimal evaluation of the venous structures due to contrast bolus timing. Findings described above may represent sequelae of poor venous outflow or rethrombosis. CTA LOWER EXTREMITY RIGHT W CONTRAST   Final Result   Impression: No acute arterial abnormality. No active hemorrhage. No hematoma of the pelvis, retroperitoneum or bilateral lower extremities. Suboptimal evaluation of the venous structures due to contrast bolus timing. Findings described above may represent sequelae of poor venous outflow or rethrombosis. CT HEAD WO CONTRAST   Final Result      No acute or interval change.  No hemorrhage or mass effect. CT ABDOMEN PELVIS W IV CONTRAST Additional Contrast? 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 [facility]   Final Result   Impression: Technically successful inferior venacavogram and bilateral iliac venogram demonstrating marked ileal caval clot associated with the filter including thrombus above the IVC filter. Technically successful vacuum-assisted thrombectomy with restoration of flow through the inferior vena cava and improved patency of the ileal caval system. Clot remains within the inferior vena cava filter. VL Extremity Venous Bilateral   Final Result      XR CHEST PORTABLE   Final Result      Limited evaluation of lower right lung due to overlapping density for which infiltrate cannot be excluded. Otherwise no acute process seen. CT HEAD WO CONTRAST   Final Result      Postsurgical changes with areas of low cortical attenuation in the left parietal and left occipital region appear essentially stable from prior study with associated mild midline shift which is similar to slightly improved from prior study. No evidence of developing acute intracranial hemorrhage. CTA ABDOMEN PELVIS W WO CONTRAST   Final Result   1. 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 2. Surrounding hazy density, likely related to venous congestion. However, streaky densities extending inferiorly on the right may represent blood. However, there is no evidence of retroperitoneal hematoma. Correlation with lower extremity noninvasive    duplex Doppler is recommended in addition to serial hemoglobin and crit levels. 3. Suspect bilateral lower lobe pulmonary emboli.  See comments above      Redemonstrated is a large necrotic mass involving the right breast enlarged fibroid uterus. Assessment/Plan:     Acute on chronic Fe def anemia of unknown etiology  - 3 units of packed RBCs in the last 3 days  - Hemoglobin has stayed steady over the last 24 hours  - So far work-up has been negative: CT abdomen pelvis with IV contrast with no source of bleed, CT of the lower limbs with contrast did not show any extravasation, haptoglobin LDH and reticulocyte were unremarkable, Jaspal negative, FOBT negative  -  Lab Results   Component Value Date    IRON 21 (L) 04/01/2022    TIBC 169 (L) 04/01/2022    FERRITIN 338.5 (H) 04/01/2022   TSAT 12%  Venofer 200 X 3 doses  - GI is consulted: EGD did not show any source of bleeding. If Hg drops further over weekend might need c-scope  - Protonix 40 QD    Fever, most likely from thrombophlebitis. Also has UTI  -Blood culture no growth to date  -Pt was started on zosyn on on 3/27 which was changed to  vanc and merrem on 3/28   - ID consulted, recs to dc merrem. Received 6 days of vancomycin will switch to p.o. doxycycline 100 twice daily for 4 more days for total of 10 days of antibiotics  - urine cult grew Enteroccocus fecalis  -Patient has remained afebrile for the last 48 hours    Sinus tachycardia  Likely etiology from fever/ pain/ PE/UTI  - Continues to be in sinus tach, somewhat better HR.   -Continuous telemetr    HIT positive  Bilateral lower extremity DVTs  IVC filter thrombus s/p thrombectomy  -Platelets are holding steady  -Argatroban gtt switched to Eliquis  -We will keep IVC filter in for 6 weeks, IR is following    Metastatic triple negative breast cancer with intracranial metastasis  S/p craniotomy on 3/12  - Oncology on board; recommended following medical and radiation oncology for systemic palliative chemotherapy and radiation therapy.   - Neglected right breast cancer is not bleeding or causing infection with wound be of little benefit to perform a mastectomy  - As needed oxycodone as needed for pain  -Consulted palliative care, patient has agreed to undergo all management  -Neurosurgery following, appreciate recs    Moderate thrombocytopenia s/p HIT positive - resolved  -DC all heparin products    Urinary retention  Attempted voiding trial yesterday, straight cath X 2. Will attempt today as well, if retaining will need mas    Hyponatremia 2/2 SIADH improving   - s/p 2 doses of samsca  -Nephrology consulted, appreciate recs    Complicated UTI  -UA revealing of +3 bacteria, negative leukocyte esterase negative nitrites  -Zosyn changed to Vanco and Valentino Bake on 3/28 due to spike in fever  -ID consulted.  Recommended vancomycin ONLY  -Will d/c on zbia453 BID for total of 10 days of abx    Seizures  -Keppra 2 g daily, Keppra 2.5 g nightly  -Vimpat 200 mg    Anxiety  -Ativan 1 mg as needed    Code Status: Full code  FEN: Regular diet  PPX: eliquis  DISPO: GMF    Kati Johnson MD, PGY-2  04/02/22  10:09 AM    This patient has been staffed and discussed with Jose Aguilar MD.

## 2022-04-02 NOTE — PLAN OF CARE
Problem: Falls - Risk of:  Goal: Will remain free from falls  Description: Will remain free from falls  Outcome: Ongoing  Patient alert and oriented. Calls for assistance as needed and is able to follow simple commands. Call light in reach and fall precautions in place. Patient was educated on safety measures. Will continue to monitor. Problem: Pain:  Goal: Pain level will decrease  Description: Pain level will decrease  Outcome: Ongoing  Patient has continued to deny pain. Will monitor and treat as needed.

## 2022-04-03 LAB
ALBUMIN SERPL-MCNC: 2.7 G/DL (ref 3.4–5)
ALBUMIN SERPL-MCNC: 2.7 G/DL (ref 3.4–5)
ALP BLD-CCNC: 99 U/L (ref 40–129)
ALT SERPL-CCNC: 58 U/L (ref 10–40)
ANION GAP SERPL CALCULATED.3IONS-SCNC: 11 MMOL/L (ref 3–16)
ANISOCYTOSIS: ABNORMAL
AST SERPL-CCNC: 45 U/L (ref 15–37)
BASOPHILS ABSOLUTE: 0.1 K/UL (ref 0–0.2)
BASOPHILS RELATIVE PERCENT: 1 %
BILIRUB SERPL-MCNC: <0.2 MG/DL (ref 0–1)
BILIRUBIN DIRECT: <0.2 MG/DL (ref 0–0.3)
BILIRUBIN, INDIRECT: ABNORMAL MG/DL (ref 0–1)
BUN BLDV-MCNC: 10 MG/DL (ref 7–20)
CALCIUM SERPL-MCNC: 8.4 MG/DL (ref 8.3–10.6)
CHLORIDE BLD-SCNC: 99 MMOL/L (ref 99–110)
CO2: 22 MMOL/L (ref 21–32)
CREAT SERPL-MCNC: <0.5 MG/DL (ref 0.6–1.1)
EOSINOPHILS ABSOLUTE: 0.2 K/UL (ref 0–0.6)
EOSINOPHILS RELATIVE PERCENT: 2 %
GFR AFRICAN AMERICAN: >60
GFR NON-AFRICAN AMERICAN: >60
GLUCOSE BLD-MCNC: 116 MG/DL (ref 70–99)
GLUCOSE BLD-MCNC: 118 MG/DL (ref 70–99)
GLUCOSE BLD-MCNC: 150 MG/DL (ref 70–99)
GLUCOSE BLD-MCNC: 151 MG/DL (ref 70–99)
GLUCOSE BLD-MCNC: 151 MG/DL (ref 70–99)
HCT VFR BLD CALC: 24.8 % (ref 36–48)
HEMOGLOBIN: 8.4 G/DL (ref 12–16)
LYMPHOCYTES ABSOLUTE: 0.8 K/UL (ref 1–5.1)
LYMPHOCYTES RELATIVE PERCENT: 8 %
MCH RBC QN AUTO: 28.4 PG (ref 26–34)
MCHC RBC AUTO-ENTMCNC: 34 G/DL (ref 31–36)
MCV RBC AUTO: 83.3 FL (ref 80–100)
METAMYELOCYTES RELATIVE PERCENT: 1 %
MICROCYTES: ABNORMAL
MONOCYTES ABSOLUTE: 0.8 K/UL (ref 0–1.3)
MONOCYTES RELATIVE PERCENT: 8 %
MYELOCYTE PERCENT: 2 %
NEUTROPHILS ABSOLUTE: 7.9 K/UL (ref 1.7–7.7)
NEUTROPHILS RELATIVE PERCENT: 78 %
OVALOCYTES: ABNORMAL
PDW BLD-RTO: 15.1 % (ref 12.4–15.4)
PERFORMED ON: ABNORMAL
PHOSPHORUS: 3.6 MG/DL (ref 2.5–4.9)
PLATELET # BLD: 312 K/UL (ref 135–450)
PMV BLD AUTO: 7.1 FL (ref 5–10.5)
POTASSIUM SERPL-SCNC: 4 MMOL/L (ref 3.5–5.1)
RBC # BLD: 2.98 M/UL (ref 4–5.2)
SCHISTOCYTES: ABNORMAL
SODIUM BLD-SCNC: 132 MMOL/L (ref 136–145)
TOTAL PROTEIN: 6 G/DL (ref 6.4–8.2)
WBC # BLD: 9.7 K/UL (ref 4–11)

## 2022-04-03 PROCEDURE — 2580000003 HC RX 258: Performed by: INTERNAL MEDICINE

## 2022-04-03 PROCEDURE — 6370000000 HC RX 637 (ALT 250 FOR IP): Performed by: INTERNAL MEDICINE

## 2022-04-03 PROCEDURE — 6370000000 HC RX 637 (ALT 250 FOR IP): Performed by: STUDENT IN AN ORGANIZED HEALTH CARE EDUCATION/TRAINING PROGRAM

## 2022-04-03 PROCEDURE — 6360000002 HC RX W HCPCS: Performed by: INTERNAL MEDICINE

## 2022-04-03 PROCEDURE — 99233 SBSQ HOSP IP/OBS HIGH 50: CPT | Performed by: INTERNAL MEDICINE

## 2022-04-03 PROCEDURE — 85025 COMPLETE CBC W/AUTO DIFF WBC: CPT

## 2022-04-03 PROCEDURE — 2580000003 HC RX 258: Performed by: STUDENT IN AN ORGANIZED HEALTH CARE EDUCATION/TRAINING PROGRAM

## 2022-04-03 PROCEDURE — 6360000002 HC RX W HCPCS: Performed by: STUDENT IN AN ORGANIZED HEALTH CARE EDUCATION/TRAINING PROGRAM

## 2022-04-03 PROCEDURE — 2060000000 HC ICU INTERMEDIATE R&B

## 2022-04-03 PROCEDURE — 80076 HEPATIC FUNCTION PANEL: CPT

## 2022-04-03 PROCEDURE — 36415 COLL VENOUS BLD VENIPUNCTURE: CPT

## 2022-04-03 PROCEDURE — 80069 RENAL FUNCTION PANEL: CPT

## 2022-04-03 RX ORDER — TAMSULOSIN HYDROCHLORIDE 0.4 MG/1
0.4 CAPSULE ORAL DAILY
Status: DISCONTINUED | OUTPATIENT
Start: 2022-04-03 | End: 2022-04-10 | Stop reason: HOSPADM

## 2022-04-03 RX ORDER — FUROSEMIDE 10 MG/ML
40 INJECTION INTRAMUSCULAR; INTRAVENOUS 2 TIMES DAILY
Status: COMPLETED | OUTPATIENT
Start: 2022-04-03 | End: 2022-04-03

## 2022-04-03 RX ORDER — TOLVAPTAN 15 MG/1
7.5 TABLET ORAL ONCE
Status: COMPLETED | OUTPATIENT
Start: 2022-04-03 | End: 2022-04-03

## 2022-04-03 RX ADMIN — LEVETIRACETAM 2000 MG: 250 TABLET, FILM COATED ORAL at 09:37

## 2022-04-03 RX ADMIN — OXYCODONE 10 MG: 5 TABLET ORAL at 04:24

## 2022-04-03 RX ADMIN — APIXABAN 10 MG: 5 TABLET, FILM COATED ORAL at 09:35

## 2022-04-03 RX ADMIN — LACOSAMIDE 200 MG: 50 TABLET, FILM COATED ORAL at 20:15

## 2022-04-03 RX ADMIN — DOXYCYCLINE 100 MG: 100 CAPSULE ORAL at 09:35

## 2022-04-03 RX ADMIN — SODIUM CHLORIDE, PRESERVATIVE FREE 10 ML: 5 INJECTION INTRAVENOUS at 21:12

## 2022-04-03 RX ADMIN — APIXABAN 10 MG: 5 TABLET, FILM COATED ORAL at 00:25

## 2022-04-03 RX ADMIN — SODIUM CHLORIDE, PRESERVATIVE FREE 10 ML: 5 INJECTION INTRAVENOUS at 09:39

## 2022-04-03 RX ADMIN — QUETIAPINE FUMARATE 12.5 MG: 25 TABLET ORAL at 20:15

## 2022-04-03 RX ADMIN — IRON SUCROSE 200 MG: 20 INJECTION, SOLUTION INTRAVENOUS at 12:22

## 2022-04-03 RX ADMIN — Medication 5 MG: at 20:14

## 2022-04-03 RX ADMIN — ACETAMINOPHEN 325MG 650 MG: 325 TABLET ORAL at 04:24

## 2022-04-03 RX ADMIN — SODIUM CHLORIDE, PRESERVATIVE FREE 10 ML: 5 INJECTION INTRAVENOUS at 21:13

## 2022-04-03 RX ADMIN — PANTOPRAZOLE SODIUM 40 MG: 40 TABLET, DELAYED RELEASE ORAL at 04:25

## 2022-04-03 RX ADMIN — DOXYCYCLINE 100 MG: 100 CAPSULE ORAL at 20:14

## 2022-04-03 RX ADMIN — LEVETIRACETAM 2500 MG: 250 TABLET, FILM COATED ORAL at 20:16

## 2022-04-03 RX ADMIN — LACOSAMIDE 200 MG: 50 TABLET, FILM COATED ORAL at 09:34

## 2022-04-03 RX ADMIN — FUROSEMIDE 40 MG: 10 INJECTION, SOLUTION INTRAMUSCULAR; INTRAVENOUS at 18:35

## 2022-04-03 RX ADMIN — Medication 7.5 MG: at 12:22

## 2022-04-03 RX ADMIN — FUROSEMIDE 40 MG: 10 INJECTION, SOLUTION INTRAMUSCULAR; INTRAVENOUS at 13:39

## 2022-04-03 RX ADMIN — LEVETIRACETAM 2500 MG: 250 TABLET, FILM COATED ORAL at 00:32

## 2022-04-03 RX ADMIN — OXYCODONE 10 MG: 5 TABLET ORAL at 19:43

## 2022-04-03 RX ADMIN — APIXABAN 10 MG: 5 TABLET, FILM COATED ORAL at 20:14

## 2022-04-03 RX ADMIN — TAMSULOSIN HYDROCHLORIDE 0.4 MG: 0.4 CAPSULE ORAL at 09:35

## 2022-04-03 ASSESSMENT — PAIN SCALES - WONG BAKER

## 2022-04-03 ASSESSMENT — PAIN - FUNCTIONAL ASSESSMENT: PAIN_FUNCTIONAL_ASSESSMENT: PREVENTS OR INTERFERES SOME ACTIVE ACTIVITIES AND ADLS

## 2022-04-03 ASSESSMENT — PAIN DESCRIPTION - PROGRESSION: CLINICAL_PROGRESSION: NOT CHANGED

## 2022-04-03 ASSESSMENT — PAIN SCALES - GENERAL
PAINLEVEL_OUTOF10: 0
PAINLEVEL_OUTOF10: 0
PAINLEVEL_OUTOF10: 5
PAINLEVEL_OUTOF10: 0
PAINLEVEL_OUTOF10: 0
PAINLEVEL_OUTOF10: 8
PAINLEVEL_OUTOF10: 0
PAINLEVEL_OUTOF10: 5

## 2022-04-03 ASSESSMENT — PAIN DESCRIPTION - FREQUENCY: FREQUENCY: CONTINUOUS

## 2022-04-03 ASSESSMENT — PAIN DESCRIPTION - ONSET: ONSET: ON-GOING

## 2022-04-03 ASSESSMENT — PAIN DESCRIPTION - DESCRIPTORS: DESCRIPTORS: ACHING

## 2022-04-03 ASSESSMENT — PAIN DESCRIPTION - LOCATION: LOCATION: BACK;HEAD

## 2022-04-03 ASSESSMENT — PAIN DESCRIPTION - PAIN TYPE: TYPE: ACUTE PAIN

## 2022-04-03 NOTE — PLAN OF CARE
Problem: Falls - Risk of:  Goal: Will remain free from falls  Description: Will remain free from falls  Outcome: Ongoing     Problem: Pain:  Goal: Pain level will decrease  Description: Pain level will decrease  Outcome: Ongoing     Problem: Skin Integrity:  Goal: Absence of new skin breakdown  Description: Absence of new skin breakdown  Outcome: Ongoing

## 2022-04-03 NOTE — PLAN OF CARE
Problem: Falls - Risk of:  Goal: Will remain free from falls  Description: Will remain free from falls  4/3/2022 0306 by Jackie Valencia RN  Outcome: Met This Shift  4/2/2022 1837 by Gato Simmons RN  Outcome: Ongoing  Goal: Absence of physical injury  Description: Absence of physical injury  4/3/2022 0306 by Jackie Valencia RN  Outcome: Met This Shift  4/2/2022 1837 by Gato Simmons RN  Outcome: Ongoing     Problem: Pain:  Goal: Pain level will decrease  Description: Pain level will decrease  4/3/2022 0306 by Jackie Valencia RN  Outcome: Ongoing  4/2/2022 1837 by Gato Simmons RN  Outcome: Ongoing  Goal: Control of acute pain  Description: Control of acute pain  4/3/2022 0306 by Jackie Valencia RN  Outcome: Ongoing  4/2/2022 1837 by Gato Simmons RN  Outcome: Ongoing  Goal: Control of chronic pain  Description: Control of chronic pain  4/3/2022 0306 by Jackie Valencia RN  Outcome: Ongoing  4/2/2022 1837 by Gato Simmons RN  Outcome: Ongoing     Problem: Skin Integrity:  Goal: Will show no infection signs and symptoms  Description: Will show no infection signs and symptoms  4/3/2022 0306 by Jackie Valencia RN  Outcome: Ongoing  4/2/2022 1837 by Gato Simmons RN  Outcome: Ongoing  Goal: Absence of new skin breakdown  Description: Absence of new skin breakdown  4/3/2022 0306 by Jackie Valencia RN  Outcome: Met This Shift  4/2/2022 1837 by Gato Simmons RN  Outcome: Ongoing     Problem: Bleeding:  Goal: Will show no signs and symptoms of excessive bleeding  Description: Will show no signs and symptoms of excessive bleeding  4/3/2022 0306 by Jackie Valencia RN  Outcome: Ongoing  4/2/2022 1837 by Gato Simmons RN  Outcome: Ongoing     Problem: Discharge Planning:  Goal: Discharged to appropriate level of care  Description: Discharged to appropriate level of care  4/3/2022 0306 by Jackie Valencia RN  Outcome: Not Met This Shift  4/2/2022 1837 by Gato Simmons RN  Outcome: Ongoing     Problem: Gas Exchange - Impaired:  Goal: Levels of oxygenation will improve  Description: Levels of oxygenation will improve  4/3/2022 0306 by Bashir Saldivar RN  Outcome: Met This Shift  4/2/2022 1837 by Stephanie Schulte RN  Outcome: Ongoing     Problem: Infection, Septic Shock:  Goal: Will show no infection signs and symptoms  Description: Will show no infection signs and symptoms  4/3/2022 0306 by Bashir Saldivar RN  Outcome: Ongoing  4/2/2022 1837 by Stephanie Schulte RN  Outcome: Ongoing     Problem: Infection - Ventilator-Associated Pneumonia:  Goal: Absence of pulmonary infection  Description: Absence of pulmonary infection  4/3/2022 0306 by Bashir Saldivar RN  Outcome: Completed  4/2/2022 1837 by Stephanie Schulte RN  Outcome: Ongoing     Problem: Serum Glucose Level - Abnormal:  Goal: Ability to maintain appropriate glucose levels will improve  Description: Ability to maintain appropriate glucose levels will improve  4/3/2022 0306 by Bashir Saldivar RN  Outcome: Ongoing  4/2/2022 1837 by Stephanie Schulte RN  Outcome: Ongoing     Problem: Tissue Perfusion, Altered:  Goal: Circulatory function within specified parameters  Description: Circulatory function within specified parameters  4/3/2022 0306 by Bashir Saldivar RN  Outcome: Ongoing  4/2/2022 1837 by Stephanie Schulte RN  Outcome: Ongoing     Problem: Venous Thromboembolism:  Goal: Will show no signs or symptoms of venous thromboembolism  Description: Will show no signs or symptoms of venous thromboembolism  4/3/2022 0306 by Bashir Saldivar RN  Outcome: Ongoing  4/2/2022 1837 by Stephanie Schulte RN  Outcome: Ongoing  Goal: Absence of signs or symptoms of impaired coagulation  Description: Absence of signs or symptoms of impaired coagulation  4/3/2022 0306 by Bashir Saldivar RN  Outcome: Ongoing  4/2/2022 1837 by Stephanie Schulte RN  Outcome: Ongoing

## 2022-04-03 NOTE — PROGRESS NOTES
Progress Note    Admit Date: 3/25/2022  Diet: ADULT DIET; Regular; 1200 ml    CC: leg pain     Interval history:   NAEON. Pt was seen at bedside today AM, resting comfortably. Feels tired, does not have energy today AM. Endorses appetite. Having normal BM and is voiding as usual.  Denies F/C, N/V, CP, SOB, HA, vision changes, weakness in arms and feet. VSS  Hemoglobin is remained at 8 all through yesterday.   No further drop  Urine output of 1.6 L  Got straight cath x2,mas in place     Medications:     Scheduled Meds:   doxycycline monohydrate  100 mg Oral 2 times per day    iron sucrose  200 mg IntraVENous Q24H    apixaban  10 mg Oral BID    Followed by   Alec Wu ON 4/6/2022] apixaban  5 mg Oral BID    sodium chloride flush  5-40 mL IntraVENous 2 times per day    metoprolol  5 mg IntraVENous Once    [Held by provider] clonazePAM  0.5 mg Oral BID    QUEtiapine  12.5 mg Oral Nightly    insulin regular  10 Units IntraVENous Once    lacosamide  200 mg Oral BID    levETIRAcetam  2,000 mg Oral Daily    levETIRAcetam  2,500 mg Oral Nightly    melatonin  5 mg Oral Nightly    pantoprazole  40 mg Oral QAM AC    sodium chloride flush  5-40 mL IntraVENous 2 times per day     Continuous Infusions:   [Held by provider] argatroban infusion Stopped (03/30/22 2100)    sodium chloride      sodium chloride 25 mL (03/31/22 1615)    dextrose      dextrose      sodium chloride 25 mL (04/01/22 1736)     PRN Meds:sodium chloride, sodium chloride flush, sodium chloride, oxyCODONE **OR** oxyCODONE, LORazepam, glucose, dextrose, glucagon (rDNA), dextrose, sodium chloride flush, sodium chloride, ondansetron **OR** ondansetron, polyethylene glycol, acetaminophen **OR** acetaminophen    Objective:   Vitals:   T-max:  Patient Vitals for the past 8 hrs:   BP Temp Temp src Pulse Resp SpO2   04/03/22 0754 111/76 98 °F (36.7 °C) Oral 99 16 97 %   04/03/22 0345 109/70 98.3 °F (36.8 °C) Oral 114 20 95 %       Intake/Output Summary (Last 24 hours) at 4/3/2022 0759  Last data filed at 4/3/2022 0345  Gross per 24 hour   Intake 6380.9 ml   Output 1610 ml   Net 4770.9 ml         Physical Exam  Constitutional:       Appearance: She is obese. She is not diaphoretic. Comments: Sleeping, lethargic. Unable to hold a conversation   HENT:      Head: Normocephalic and atraumatic. Nose: Nose normal.   Eyes:      Extraocular Movements: Extraocular movements intact. Conjunctiva/sclera: Conjunctivae normal.      Pupils: Pupils are equal, round, and reactive to light. Cardiovascular:      Rate and Rhythm: Regular rhythm. Tachycardia present. Pulses: Normal pulses. Heart sounds: Normal heart sounds. Pulmonary:      Effort: Pulmonary effort is normal.      Breath sounds: Normal breath sounds. Abdominal:      General: Bowel sounds are normal.      Palpations: Abdomen is soft. Musculoskeletal:         General: Swelling present. Cervical back: Normal range of motion. Right lower leg: Edema present. Left lower leg: Edema present. Neurological:      Mental Status: She is oriented to person, place, and time. LABS:    CBC:   Recent Labs     04/01/22  0512 04/01/22  1150 04/01/22  2250 04/02/22 0527 04/03/22 0456   WBC 10.4  --   --  9.6 9.7   HGB 8.1*   < > 8.1* 8.0* 8.4*   HCT 23.4*  --   --  23.9* 24.8*     --   --  280 312   MCV 83.0  --   --  83.6 83.3    < > = values in this interval not displayed.      Renal:    Recent Labs     04/02/22 0527 04/02/22 1735 04/03/22 0456   * 130* 132*   K 4.4 4.0 4.0   CL 95* 97* 99   CO2 23 21 22   BUN 9 10 10   CREATININE <0.5* <0.5* <0.5*   GLUCOSE 107* 138* 116*   CALCIUM 8.4 8.2* 8.4   PHOS 4.0 3.5 3.6   ANIONGAP 9 12 11     Hepatic:   Recent Labs     04/02/22 0527 04/02/22 1735 04/03/22 0456   AST 44*  --  45*   ALT 55*  --  58*   BILITOT <0.2  --  <0.2   BILIDIR <0.2  --  <0.2   PROT 5.8*  --  6.0*   LABALBU 2.7*  2.6* 2.5* 2.7*  2.7* ALKPHOS 94  --  99     Troponin: No results for input(s): TROPONINI in the last 72 hours. BNP: No results for input(s): BNP in the last 72 hours. Lipids: No results for input(s): CHOL, HDL in the last 72 hours. Invalid input(s): LDLCALCU, TRIGLYCERIDE  ABGs:    No results for input(s): PHART, [id number], PO2ART, PSP0XVY, BEART, THGBART, G7RYJBPQ, XTX0RTH in the last 72 hours. INR:   No results for input(s): INR in the last 72 hours. Lactate: No results for input(s): LACTATE in the last 72 hours. Cultures:  -----------------------------------------------------------------  RAD:   CTA LOWER EXTREMITY LEFT W CONTRAST   Final Result   Impression: No acute arterial abnormality. No active hemorrhage. No hematoma of the pelvis, retroperitoneum or bilateral lower extremities. Suboptimal evaluation of the venous structures due to contrast bolus timing. Findings described above may represent sequelae of poor venous outflow or rethrombosis. CTA LOWER EXTREMITY RIGHT W CONTRAST   Final Result   Impression: No acute arterial abnormality. No active hemorrhage. No hematoma of the pelvis, retroperitoneum or bilateral lower extremities. Suboptimal evaluation of the venous structures due to contrast bolus timing. Findings described above may represent sequelae of poor venous outflow or rethrombosis. CT HEAD WO CONTRAST   Final Result      No acute or interval change. No hemorrhage or mass effect. CT ABDOMEN PELVIS W IV CONTRAST Additional Contrast? 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 IR GUIDED THROMB [facility] VEIN   Final Result   Impression: Technically successful inferior venacavogram and bilateral iliac venogram demonstrating marked ileal caval clot associated with the filter including thrombus above the IVC filter.       Technically successful vacuum-assisted thrombectomy with restoration of flow through the inferior vena cava and improved patency of the ileal caval system. Clot remains within the inferior vena cava filter. VL Extremity Venous Bilateral   Final Result      XR CHEST PORTABLE   Final Result      Limited evaluation of lower right lung due to overlapping density for which infiltrate cannot be excluded. Otherwise no acute process seen. CT HEAD WO CONTRAST   Final Result      Postsurgical changes with areas of low cortical attenuation in the left parietal and left occipital region appear essentially stable from prior study with associated mild midline shift which is similar to slightly improved from prior study. No evidence of developing acute intracranial hemorrhage. CTA ABDOMEN PELVIS W WO CONTRAST   Final Result   1. 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 2. Surrounding hazy density, likely related to venous congestion. However, streaky densities extending inferiorly on the right may represent blood. However, there is no evidence of retroperitoneal hematoma. Correlation with lower extremity noninvasive    duplex Doppler is recommended in addition to serial hemoglobin and crit levels. 3. Suspect bilateral lower lobe pulmonary emboli. See comments above      Redemonstrated is a large necrotic mass involving the right breast enlarged fibroid uterus.         Assessment/Plan:     Acute on chronic Fe def anemia of unknown etiology  - 3 units of packed RBCs in the last 3 days  - Hemoglobin has stayed steady over the last 48 hours  - So far work-up has been negative: CT abdomen pelvis with IV contrast with no source of bleed, CT of the lower limbs with contrast did not show any extravasation, haptoglobin LDH and reticulocyte were unremarkable, Jaspal negative, FOBT negative  -  Lab Results   Component Value Date    IRON 21 (L) 04/01/2022    TIBC 169 (L) 04/01/2022    FERRITIN 338.5 (H) 04/01/2022   TSAT 12%  Venofer 200 X 3 doses  - GI is consulted: EGD did not show any source of bleeding. If Hg drops further over weekend might need c-scope  - Protonix 40 QD    Fever, most likely from thrombophlebitis. Also has UTI  -Blood culture no growth to date  -Pt was started on zosyn on on 3/27 which was changed to  vanc and merrem on 3/28   - ID consulted, recs to dc merrem. Received 6 days of vancomycin will switch to p.o. doxycycline 100 twice daily for 4 more days for total of 10 days of antibiotics  - urine cult grew Enteroccocus fecalis  -Patient has remained afebrile for the last 48 hours    Sinus tachycardia  Likely etiology from fever/ pain/ PE/UTI  - Continues to be in sinus tach, somewhat better HR.   -Continuous telemetry    HIT positive  Bilateral lower extremity DVTs  IVC filter thrombus s/p thrombectomy  -Platelets are holding steady  -Argatroban gtt switched to Eliquis  -We will keep IVC filter in for 6 weeks, IR to do outpatient    Metastatic triple negative breast cancer with intracranial metastasis  S/p craniotomy on 3/12  - Oncology on board; recommended following medical and radiation oncology for systemic palliative chemotherapy and radiation therapy.   - Neglected right breast cancer is not bleeding or causing infection with wound be of little benefit to perform a mastectomy  - As needed oxycodone as needed for pain  -Consulted palliative care, patient has agreed to undergo all management  -Neurosurgery following, appreciate recs    Moderate thrombocytopenia s/p HIT positive - resolved  -DC all heparin products    Urinary retention  Attempted voiding trial yesterday, straight cath X 2.   - placed mas  -started on flomax     Hyponatremia 2/2 SIADH improving   - s/p 3 doses of samsca  -Nephrology consulted, appreciate recs    Complicated UTI  -UA revealing of +3 bacteria, negative leukocyte esterase negative nitrites  -Zosyn changed to [address] on 3/28 due to spike in fever  -ID consulted.  Recommended vancomycin ONLY, vanc changed to doxy yesterday 4/2/22  - elevated vanc level this AM     Seizures  -Keppra 2 g daily, Keppra 2.5 g nightly  -Vimpat 200 mg    Anxiety  -Ativan 1 mg as needed    Code Status: Full code  FEN: Regular diet  PPX: eliquis  DISPO: [facility]    Seamus Guardado MD, PGY-1  04/03/22  7:59 AM    This patient has been staffed and discussed with Raúl Choi MD.

## 2022-04-03 NOTE — PROGRESS NOTES
Nephrology  Note                                                                                                                                                                                                                                                                                                                                                               Office : [phone]     Fax :[phone]              Patient's Name: See Adames    Reason for Consult:  Hyponatremia   Requesting Physician:  JUDITH Morejon CNP      Na better   Cr stable   No N/V/D       Past Medical History:   Diagnosis Date    Thyroid nodule        Past Surgical History:   Procedure Laterality Date    CRANIOTOMY Left 3/12/2022    LEFT TEMPORAL PARIETAL OCCIPITAL CRANIOTOMY FOR TUMOR RESECTION performed by Valery Smith MD at [address] IR IVC [address] N/A 03/16/2022    [facility]    IR IVC FILTER PLACEMENT W IMAGING  3/16/2022    IR IVC FILTER PLACEMENT W IMAGING 3/16/2022 [facility] PROCEDURES    IR THROMB [id number] VEIN  3/28/2022    IR THROMB [address] 3/28/2022 [facility] SPECIAL PROCEDURES       Family History   Problem Relation Age of Onset    Cancer Father         reports that she has never smoked. She has never used smokeless tobacco. She reports that she does not drink alcohol and does not use drugs. Allergies:  Patient has no known allergies.     Current Medications:    tamsulosin (FLOMAX) capsule 0.4 mg, Daily  furosemide (LASIX) injection 40 mg, BID  doxycycline monohydrate (MONODOX) capsule 100 mg, 2 times per day  iron sucrose (VENOFER) 200 mg in sodium chloride 0.9 % 100 mL IVPB, Q24H  apixaban (ELIQUIS) tablet 10 mg, BID   Followed by  Merrick Price ON 4/6/2022] apixaban (ELIQUIS) tablet 5 mg, BID  0.9 % sodium chloride infusion, PRN  sodium chloride flush 0.9 % injection 5-40 mL, 2 times per day  sodium chloride flush 0.9 % injection 5-40 mL, PRN  0.9 % sodium chloride infusion, PRN  metoprolol (LOPRESSOR) injection 5 mg, Once  oxyCODONE (ROXICODONE) immediate release tablet 5 mg, Q4H PRN   Or  oxyCODONE (ROXICODONE) immediate release tablet 10 mg, Q4H PRN  LORazepam (ATIVAN) injection 1 mg, Q6H PRN  [Held by provider] clonazePAM (KLONOPIN) tablet 0.5 mg, BID  QUEtiapine (SEROQUEL) tablet 12.5 mg, Nightly  insulin regular (HUMULIN R;NOVOLIN R) injection 10 Units, Once   And  dextrose 10 % infusion, Once  glucose (GLUTOSE) 40 % oral gel 15 g, PRN  dextrose 50 % IV solution, PRN  glucagon (rDNA) injection 1 mg, PRN  dextrose 5 % solution, PRN  lacosamide (VIMPAT) tablet 200 mg, BID  levETIRAcetam (KEPPRA) tablet 2,000 mg, Daily  levETIRAcetam (KEPPRA) tablet 2,500 mg, Nightly  melatonin disintegrating tablet 5 mg, Nightly  pantoprazole (PROTONIX) tablet 40 mg, QAM AC  sodium chloride flush 0.9 % injection 5-40 mL, 2 times per day  sodium chloride flush 0.9 % injection 5-40 mL, PRN  0.9 % sodium chloride infusion, PRN  ondansetron (ZOFRAN-ODT) disintegrating tablet 4 mg, Q8H PRN   Or  ondansetron (ZOFRAN) injection 4 mg, Q6H PRN  polyethylene glycol (GLYCOLAX) packet 17 g, Daily PRN  acetaminophen (TYLENOL) tablet 650 mg, Q6H PRN   Or  acetaminophen (TYLENOL) suppository 650 mg, Q6H PRN          Physical exam:     Vitals:  /71   Pulse 115   Temp 98.3 °F (36.8 °C) (Oral)   Resp 16   Ht 5' 3\" (1.6 m)   Wt 202 lb 13.2 oz (92 kg)   SpO2 94%   BMI 35.93 kg/m²   Constitutional:  OAA X3 NAD  Skin: no rash, turgor wnl  Heent:  eomi, mmm  Neck: no bruits or jvd noted  Cardiovascular:  S1, S2 without m/r/g  Respiratory: CTA B without w/r/r  Abdomen:  +bs, soft, nt, nd  Ext: + lower extremity edema  Psychiatric: mood and affect appropriate  Musculoskeletal:  Rom, muscular strength intact    Data:   Labs:  CBC:   Recent Labs     04/01/22  0512 04/01/22  1150 04/01/22  2250 04/02/22  0527 04/03/22  0456   WBC 10.4  --   --  9.6 9.7   HGB 8.1*   < > 8.1* 8.0* 8.4*    --   --  280 312    < > = values in this interval not displayed. BMP:    Recent Labs     04/02/22 0527 04/02/22 1735 04/03/22  0456   * 130* 132*   K 4.4 4.0 4.0   CL 95* 97* 99   CO2 23 21 22   BUN 9 10 10   CREATININE <0.5* <0.5* <0.5*   GLUCOSE 107* 138* 116*     Ca/Mg/Phos:   Recent Labs     04/02/22 0527 04/02/22 1735 04/03/22 0456   CALCIUM 8.4 8.2* 8.4   PHOS 4.0 3.5 3.6     Hepatic:   Recent Labs     04/02/22 0527 04/03/22 0456   AST 44* 45*   ALT 55* 58*   BILITOT <0.2 <0.2   ALKPHOS 94 99     Troponin: No results for input(s): TROPONINI in the last 72 hours. BNP: No results for input(s): BNP in the last 72 hours. Lipids: No results for input(s): CHOL, TRIG, HDL, LDLCALC, LABVLDL in the last 72 hours. ABGs:   No results for input(s): PHART, PO2ART, ZAO0PXR in the last 72 hours. INR:   No results for input(s): INR in the last 72 hours. UA:  No results for input(s): Hermina El, GLUCOSEU, BILIRUBINUR, KETUA, SPECGRAV, BLOODU, PHUR, PROTEINU, UROBILINOGEN, NITRU, LEUKOCYTESUR, Vonita Vivek in the last 72 hours. Urine Microscopic:   No results for input(s): LABCAST, BACTERIA, COMU, HYALCAST, WBCUA, RBCUA, EPIU in the last 72 hours. Urine Culture:   No results for input(s): LABURIN in the last 72 hours. Urine Chemistry:   No results for input(s): Carlos Merles, PROTEINUR, NAUR in the last 72 hours. IMAGING:  CTA LOWER EXTREMITY LEFT W CONTRAST   Final Result   Impression: No acute arterial abnormality. No active hemorrhage. No hematoma of the pelvis, retroperitoneum or bilateral lower extremities. Suboptimal evaluation of the venous structures due to contrast bolus timing. Findings described above may represent sequelae of poor venous outflow or rethrombosis. CTA LOWER EXTREMITY RIGHT W CONTRAST   Final Result   Impression: No acute arterial abnormality. No active hemorrhage.       No hematoma of the pelvis, retroperitoneum or bilateral lower extremities. Suboptimal evaluation of the venous structures due to contrast bolus timing. Findings described above may represent sequelae of poor venous outflow or rethrombosis. CT HEAD WO CONTRAST   Final Result      No acute or interval change. No hemorrhage or mass effect. CT ABDOMEN PELVIS W IV CONTRAST Additional Contrast? 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 [facility]   Final Result   Impression: Technically successful inferior venacavogram and bilateral iliac venogram demonstrating marked ileal caval clot associated with the filter including thrombus above the IVC filter. Technically successful vacuum-assisted thrombectomy with restoration of flow through the inferior vena cava and improved patency of the ileal caval system. Clot remains within the inferior vena cava filter. VL Extremity Venous Bilateral   Final Result      XR CHEST PORTABLE   Final Result      Limited evaluation of lower right lung due to overlapping density for which infiltrate cannot be excluded. Otherwise no acute process seen. CT HEAD WO CONTRAST   Final Result      Postsurgical changes with areas of low cortical attenuation in the left parietal and left occipital region appear essentially stable from prior study with associated mild midline shift which is similar to slightly improved from prior study. No evidence of developing acute intracranial hemorrhage. CTA ABDOMEN PELVIS W WO CONTRAST   Final Result   1. 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 2. Surrounding hazy density, likely related to venous congestion. However, streaky densities extending inferiorly on the right may represent blood. However, there is no evidence of retroperitoneal hematoma.  Correlation with lower extremity noninvasive    duplex Doppler is recommended in addition to serial hemoglobin and crit levels. 3. Suspect bilateral lower lobe pulmonary emboli. See comments above      Redemonstrated is a large necrotic mass involving the right breast enlarged fibroid uterus. Assessment/Plan   1. Hyponatremia     2. HTN    3. Anemia    4. Acid- base/ Electrolyte imbalance     5.  SIADH     Plan   - lasix as needed to help with edema   - samsca as needed   - Ur studies - reviewed   - TSH - nl   - Monitor Na   - d/w pt   - free water restriction   - encourage solute intake         - D/w Dr Lindsey Gerardo             Thank you for allowing us to participate in care of Gennette Goltz, MD  Feel free to contact me   [facility] [address]  Office : [phone]  Fax :[phone]

## 2022-04-04 LAB
ALBUMIN SERPL-MCNC: 2.8 G/DL (ref 3.4–5)
ANION GAP SERPL CALCULATED.3IONS-SCNC: 8 MMOL/L (ref 3–16)
BASOPHILS ABSOLUTE: 0 K/UL (ref 0–0.2)
BASOPHILS RELATIVE PERCENT: 0.2 %
BUN BLDV-MCNC: 10 MG/DL (ref 7–20)
CALCIUM SERPL-MCNC: 8.9 MG/DL (ref 8.3–10.6)
CHLORIDE BLD-SCNC: 97 MMOL/L (ref 99–110)
CO2: 27 MMOL/L (ref 21–32)
CREAT SERPL-MCNC: <0.5 MG/DL (ref 0.6–1.1)
EOSINOPHILS ABSOLUTE: 0 K/UL (ref 0–0.6)
EOSINOPHILS RELATIVE PERCENT: 0.4 %
GFR AFRICAN AMERICAN: >60
GFR NON-AFRICAN AMERICAN: >60
GLUCOSE BLD-MCNC: 107 MG/DL (ref 70–99)
GLUCOSE BLD-MCNC: 116 MG/DL (ref 70–99)
GLUCOSE BLD-MCNC: 117 MG/DL (ref 70–99)
GLUCOSE BLD-MCNC: 118 MG/DL (ref 70–99)
GLUCOSE BLD-MCNC: 137 MG/DL (ref 70–99)
HCT VFR BLD CALC: 25 % (ref 36–48)
HEMOGLOBIN: 8.2 G/DL (ref 12–16)
LYMPHOCYTES ABSOLUTE: 1 K/UL (ref 1–5.1)
LYMPHOCYTES RELATIVE PERCENT: 9.2 %
MCH RBC QN AUTO: 27.4 PG (ref 26–34)
MCHC RBC AUTO-ENTMCNC: 32.8 G/DL (ref 31–36)
MCV RBC AUTO: 83.3 FL (ref 80–100)
MONOCYTES ABSOLUTE: 1 K/UL (ref 0–1.3)
MONOCYTES RELATIVE PERCENT: 9.3 %
NEUTROPHILS ABSOLUTE: 9.1 K/UL (ref 1.7–7.7)
NEUTROPHILS RELATIVE PERCENT: 80.9 %
PDW BLD-RTO: 15.2 % (ref 12.4–15.4)
PERFORMED ON: ABNORMAL
PHOSPHORUS: 3.5 MG/DL (ref 2.5–4.9)
PLATELET # BLD: 354 K/UL (ref 135–450)
PMV BLD AUTO: 7.1 FL (ref 5–10.5)
POTASSIUM SERPL-SCNC: 3.3 MMOL/L (ref 3.5–5.1)
RBC # BLD: 3 M/UL (ref 4–5.2)
REASON FOR REJECTION: NORMAL
REJECTED TEST: NORMAL
SODIUM BLD-SCNC: 132 MMOL/L (ref 136–145)
WBC # BLD: 11.3 K/UL (ref 4–11)

## 2022-04-04 PROCEDURE — 2580000003 HC RX 258: Performed by: INTERNAL MEDICINE

## 2022-04-04 PROCEDURE — 97530 THERAPEUTIC ACTIVITIES: CPT

## 2022-04-04 PROCEDURE — 99233 SBSQ HOSP IP/OBS HIGH 50: CPT | Performed by: INTERNAL MEDICINE

## 2022-04-04 PROCEDURE — 85025 COMPLETE CBC W/AUTO DIFF WBC: CPT

## 2022-04-04 PROCEDURE — 6360000002 HC RX W HCPCS: Performed by: INTERNAL MEDICINE

## 2022-04-04 PROCEDURE — 97535 SELF CARE MNGMENT TRAINING: CPT

## 2022-04-04 PROCEDURE — 6370000000 HC RX 637 (ALT 250 FOR IP): Performed by: STUDENT IN AN ORGANIZED HEALTH CARE EDUCATION/TRAINING PROGRAM

## 2022-04-04 PROCEDURE — 6370000000 HC RX 637 (ALT 250 FOR IP): Performed by: INTERNAL MEDICINE

## 2022-04-04 PROCEDURE — P9047 ALBUMIN (HUMAN), 25%, 50ML: HCPCS | Performed by: INTERNAL MEDICINE

## 2022-04-04 PROCEDURE — 97110 THERAPEUTIC EXERCISES: CPT

## 2022-04-04 PROCEDURE — 2580000003 HC RX 258: Performed by: STUDENT IN AN ORGANIZED HEALTH CARE EDUCATION/TRAINING PROGRAM

## 2022-04-04 PROCEDURE — 6360000002 HC RX W HCPCS: Performed by: STUDENT IN AN ORGANIZED HEALTH CARE EDUCATION/TRAINING PROGRAM

## 2022-04-04 PROCEDURE — 99232 SBSQ HOSP IP/OBS MODERATE 35: CPT | Performed by: INTERNAL MEDICINE

## 2022-04-04 PROCEDURE — 80069 RENAL FUNCTION PANEL: CPT

## 2022-04-04 PROCEDURE — 2060000000 HC ICU INTERMEDIATE R&B

## 2022-04-04 RX ORDER — ALBUMIN (HUMAN) 12.5 G/50ML
25 SOLUTION INTRAVENOUS EVERY 12 HOURS
Status: DISCONTINUED | OUTPATIENT
Start: 2022-04-04 | End: 2022-04-07

## 2022-04-04 RX ORDER — OXYCODONE HYDROCHLORIDE 5 MG/1
5 TABLET ORAL EVERY 6 HOURS PRN
Qty: 12 TABLET | Refills: 0 | Status: SHIPPED | OUTPATIENT
Start: 2022-04-04 | End: 2022-04-04

## 2022-04-04 RX ORDER — TOLVAPTAN 15 MG/1
7.5 TABLET ORAL ONCE
Status: COMPLETED | OUTPATIENT
Start: 2022-04-04 | End: 2022-04-04

## 2022-04-04 RX ORDER — OXYCODONE HYDROCHLORIDE 5 MG/1
5 TABLET ORAL EVERY 6 HOURS PRN
Qty: 12 TABLET | Refills: 0 | Status: SHIPPED | OUTPATIENT
Start: 2022-04-04 | End: 2022-04-07

## 2022-04-04 RX ADMIN — ALBUMIN (HUMAN) 25 G: 0.25 INJECTION, SOLUTION INTRAVENOUS at 22:04

## 2022-04-04 RX ADMIN — ACETAMINOPHEN 325MG 650 MG: 325 TABLET ORAL at 20:23

## 2022-04-04 RX ADMIN — ALBUMIN (HUMAN) 25 G: 0.25 INJECTION, SOLUTION INTRAVENOUS at 10:27

## 2022-04-04 RX ADMIN — DOXYCYCLINE 100 MG: 100 CAPSULE ORAL at 09:44

## 2022-04-04 RX ADMIN — Medication 7.5 MG: at 16:57

## 2022-04-04 RX ADMIN — APIXABAN 10 MG: 5 TABLET, FILM COATED ORAL at 20:22

## 2022-04-04 RX ADMIN — OXYCODONE 10 MG: 5 TABLET ORAL at 20:23

## 2022-04-04 RX ADMIN — PANTOPRAZOLE SODIUM 40 MG: 40 TABLET, DELAYED RELEASE ORAL at 06:16

## 2022-04-04 RX ADMIN — LEVETIRACETAM 2500 MG: 250 TABLET, FILM COATED ORAL at 21:51

## 2022-04-04 RX ADMIN — Medication 5 MG: at 20:24

## 2022-04-04 RX ADMIN — QUETIAPINE FUMARATE 12.5 MG: 25 TABLET ORAL at 20:24

## 2022-04-04 RX ADMIN — SODIUM CHLORIDE, PRESERVATIVE FREE 10 ML: 5 INJECTION INTRAVENOUS at 20:00

## 2022-04-04 RX ADMIN — IRON SUCROSE 200 MG: 20 INJECTION, SOLUTION INTRAVENOUS at 12:10

## 2022-04-04 RX ADMIN — LACOSAMIDE 200 MG: 50 TABLET, FILM COATED ORAL at 10:07

## 2022-04-04 RX ADMIN — POTASSIUM BICARBONATE 40 MEQ: 782 TABLET, EFFERVESCENT ORAL at 10:06

## 2022-04-04 RX ADMIN — LACOSAMIDE 200 MG: 50 TABLET, FILM COATED ORAL at 20:23

## 2022-04-04 RX ADMIN — APIXABAN 10 MG: 5 TABLET, FILM COATED ORAL at 09:44

## 2022-04-04 RX ADMIN — SODIUM CHLORIDE, PRESERVATIVE FREE 10 ML: 5 INJECTION INTRAVENOUS at 10:07

## 2022-04-04 RX ADMIN — LEVETIRACETAM 2000 MG: 250 TABLET, FILM COATED ORAL at 09:45

## 2022-04-04 RX ADMIN — DOXYCYCLINE 100 MG: 100 CAPSULE ORAL at 20:23

## 2022-04-04 RX ADMIN — TAMSULOSIN HYDROCHLORIDE 0.4 MG: 0.4 CAPSULE ORAL at 09:44

## 2022-04-04 ASSESSMENT — PAIN SCALES - GENERAL
PAINLEVEL_OUTOF10: 0
PAINLEVEL_OUTOF10: 8
PAINLEVEL_OUTOF10: 0

## 2022-04-04 ASSESSMENT — ENCOUNTER SYMPTOMS
RESPIRATORY NEGATIVE: 1
GASTROINTESTINAL NEGATIVE: 1

## 2022-04-04 NOTE — PROGRESS NOTES
[facility]  GI Progress Note          Nannette Gaston is a 48 y.o. female patient. 1. Acute deep vein thrombosis (DVT) of proximal vein of right lower extremity ([facility] [address]    2. Thrombocytopenia ([facility] [address]    3. S/P craniotomy        Admit Date: 3/25/2022    Subjective:       No abd pain N/V GI bleeding    Scheduled Meds:   tamsulosin  0.4 mg Oral Daily    doxycycline monohydrate  100 mg Oral 2 times per day    iron sucrose  200 mg IntraVENous Q24H    apixaban  10 mg Oral BID    Followed by   Li Pace ON 2022] apixaban  5 mg Oral BID    sodium chloride flush  5-40 mL IntraVENous 2 times per day    metoprolol  5 mg IntraVENous Once    [Held by provider] clonazePAM  0.5 mg Oral BID    QUEtiapine  12.5 mg Oral Nightly    insulin regular  10 Units IntraVENous Once    lacosamide  200 mg Oral BID    levETIRAcetam  2,000 mg Oral Daily    levETIRAcetam  2,500 mg Oral Nightly    melatonin  5 mg Oral Nightly    pantoprazole  40 mg Oral QAM AC    sodium chloride flush  5-40 mL IntraVENous 2 times per day       Objective:       Patient Vitals for the past 24 hrs:   BP Temp Temp src Pulse Resp SpO2 Weight   22 0410 97/64 98.8 °F (37.1 °C) Oral 113 20 95 % --   22 0025 96/64 98.7 °F (37.1 °C) Oral 114 27 96 % --   22 1943 118/79 97.9 °F (36.6 °C) Oral 114 18 -- --   22 1630 116/80 98.3 °F (36.8 °C) Oral 130 18 97 % --   22 1329 -- -- -- -- -- -- 212 lb 4.9 oz (96.3 kg)   22 1224 106/71 98.3 °F (36.8 °C) Oral 115 16 94 % --   22 0754 111/76 98 °F (36.7 °C) Oral 99 16 97 % --       Exam:  VITALS:  BP 97/64   Pulse 113   Temp 98.8 °F (37.1 °C) (Oral)   Resp 20   Ht 5' 3\" (1.6 m)   Wt 212 lb 4.9 oz (96.3 kg)   SpO2 95%   BMI 37.61 kg/m²   TEMPERATURE:  Current - Temp: 98.8 °F (37.1 °C);  Max - Temp  Av.3 °F (36.8 °C)  Min: 97.9 °F (36.6 °C)  Max: 98.8 °F (37.1 °C)    NAD  General appearance: alert, appears stated age, cooperative and no distress  Abdomen: soft, non-tender; bowel sounds normal; no masses,  no organomegaly  AAOx3, No asterixis or encephalopathy  Extremities: No edema. Recent Labs     04/02/22 0527 04/03/22 0456 04/04/22  0552   WBC 9.6 9.7 11.3*   HGB 8.0* 8.4* 8.2*   HCT 23.9* 24.8* 25.0*   MCV 83.6 83.3 83.3    312 354     Recent Labs     04/02/22  1735 04/03/22 0456 04/04/22  0552   * 132* 132*   K 4.0 4.0 3.3*   CL 97* 99 97*   CO2 21 22 27   PHOS 3.5 3.6 3.5   BUN 10 10 10   CREATININE <0.5* <0.5* <0.5*     Recent Labs     04/02/22 0527 04/03/22 0456   AST 44* 45*   ALT 55* 58*   BILIDIR <0.2 <0.2   BILITOT <0.2 <0.2   ALKPHOS 94 99       Assessment:       Principal Problem:    Deep venous thrombosis (HCC)  Active Problems:    Brain metastases (HCC)    Duct cell carcinoma (HCC)    Fever    Phlebitis    S/P craniotomy  Resolved Problems:    * No resolved hospital problems. *    No GI bleeding or Gi symptoms  EGD negative heme negative    Recommendations:        Will sign off have her follow up in office with Dr Gonzalo Oscar in 3 weeks or so to see colonoscopy indicated  Will sign off    Neto Lee MD  4/4/2022  7:28 AM

## 2022-04-04 NOTE — PROGRESS NOTES
Nephrology  Note                                                                                                                                                                                                                                                                                                                                                               Office : [phone]     Fax :[phone]              Patient's Name: Abdi Davey    Reason for Consult:  Hyponatremia   Requesting Physician:  JUDITH Zee CNP      Na stable 132   Cr stable   No N/V/D       Past Medical History:   Diagnosis Date    Thyroid nodule        Past Surgical History:   Procedure Laterality Date    CRANIOTOMY Left 3/12/2022    LEFT TEMPORAL PARIETAL OCCIPITAL CRANIOTOMY FOR TUMOR RESECTION performed by Olya Aldana MD at [address] IR IVC [address] N/A 03/16/2022    [facility]    IR IVC FILTER PLACEMENT W IMAGING  3/16/2022    IR IVC FILTER PLACEMENT W IMAGING 3/16/2022 [id number] SPECIAL PROCEDURES    [facility]  3/28/2022    IR THROMB [address] 3/28/2022 [facility] SPECIAL PROCEDURES    UPPER GASTROINTESTINAL ENDOSCOPY N/A 4/2/2022    EGD DIAGNOSTIC ONLY performed by Radha Quevedo MD at [facility] ENDOSCOPY       Family History   Problem Relation Age of Onset    Cancer Father         reports that she has never smoked. She has never used smokeless tobacco. She reports that she does not drink alcohol and does not use drugs.     Allergies:  Heparin    Current Medications:    albumin human 25 % IV solution 25 g, Q12H  tamsulosin (FLOMAX) capsule 0.4 mg, Daily  doxycycline monohydrate (MONODOX) capsule 100 mg, 2 times per day  apixaban (ELIQUIS) tablet 10 mg, BID   Followed by  Clearance Juan Manuel ON 4/6/2022] apixaban (ELIQUIS) tablet 5 mg, BID  0.9 % sodium chloride infusion, PRN  sodium chloride flush 0.9 % injection 5-40 mL, 2 times per day  sodium chloride flush 0.9 % injection 5-40 mL, PRN  0.9 % sodium chloride infusion, PRN  metoprolol (LOPRESSOR) injection 5 mg, Once  oxyCODONE (ROXICODONE) immediate release tablet 5 mg, Q4H PRN   Or  oxyCODONE (ROXICODONE) immediate release tablet 10 mg, Q4H PRN  LORazepam (ATIVAN) injection 1 mg, Q6H PRN  [Held by provider] clonazePAM (KLONOPIN) tablet 0.5 mg, BID  QUEtiapine (SEROQUEL) tablet 12.5 mg, Nightly  insulin regular (HUMULIN R;NOVOLIN R) injection 10 Units, Once   And  dextrose 10 % infusion, Once  glucose (GLUTOSE) 40 % oral gel 15 g, PRN  dextrose 50 % IV solution, PRN  glucagon (rDNA) injection 1 mg, PRN  dextrose 5 % solution, PRN  lacosamide (VIMPAT) tablet 200 mg, BID  levETIRAcetam (KEPPRA) tablet 2,000 mg, Daily  levETIRAcetam (KEPPRA) tablet 2,500 mg, Nightly  melatonin disintegrating tablet 5 mg, Nightly  pantoprazole (PROTONIX) tablet 40 mg, QAM AC  sodium chloride flush 0.9 % injection 5-40 mL, 2 times per day  sodium chloride flush 0.9 % injection 5-40 mL, PRN  0.9 % sodium chloride infusion, PRN  ondansetron (ZOFRAN-ODT) disintegrating tablet 4 mg, Q8H PRN   Or  ondansetron (ZOFRAN) injection 4 mg, Q6H PRN  polyethylene glycol (GLYCOLAX) packet 17 g, Daily PRN  acetaminophen (TYLENOL) tablet 650 mg, Q6H PRN   Or  acetaminophen (TYLENOL) suppository 650 mg, Q6H PRN          Physical exam:     Vitals:  /73   Pulse 112   Temp 97.7 °F (36.5 °C) (Oral)   Resp 18   Ht 5' 3\" (1.6 m)   Wt 212 lb 4.9 oz (96.3 kg)   SpO2 96%   BMI 37.61 kg/m²   Constitutional:  OAA X3 NAD  Skin: no rash, turgor wnl  Heent:  eomi, mmm  Neck: no bruits or jvd noted  Cardiovascular:  S1, S2 without m/r/g  Respiratory: CTA B without w/r/r  Abdomen:  +bs, soft, nt, nd  Ext: + lower extremity edema  Psychiatric: mood and affect appropriate  Musculoskeletal:  Rom, muscular strength intact    Data:   Labs:  CBC:   Recent Labs     04/02/22  0527 04/03/22  0456 04/04/22  0552   WBC 9.6 9.7 11.3*   HGB 8.0* 8.4* 8.2*    312 354     BMP: Recent Labs     04/02/22  1735 04/03/22  0456 04/04/22  0552   * 132* 132*   K 4.0 4.0 3.3*   CL 97* 99 97*   CO2 21 22 27   BUN 10 10 10   CREATININE <0.5* <0.5* <0.5*   GLUCOSE 138* 116* 117*     Ca/Mg/Phos:   Recent Labs     04/02/22  1735 04/03/22  0456 04/04/22  0552   CALCIUM 8.2* 8.4 8.9   PHOS 3.5 3.6 3.5     Hepatic:   Recent Labs     04/02/22 0527 04/03/22  0456   AST 44* 45*   ALT 55* 58*   BILITOT <0.2 <0.2   ALKPHOS 94 99     Troponin: No results for input(s): TROPONINI in the last 72 hours. BNP: No results for input(s): BNP in the last 72 hours. Lipids: No results for input(s): CHOL, TRIG, HDL, LDLCALC, LABVLDL in the last 72 hours. ABGs:   No results for input(s): PHART, PO2ART, NAN1WDI in the last 72 hours. INR:   No results for input(s): INR in the last 72 hours. UA:  No results for input(s): Mickiel Broach, GLUCOSEU, BILIRUBINUR, KETUA, SPECGRAV, BLOODU, PHUR, PROTEINU, UROBILINOGEN, NITRU, LEUKOCYTESUR, Melinda Snooks in the last 72 hours. Urine Microscopic:   No results for input(s): LABCAST, BACTERIA, COMU, HYALCAST, WBCUA, RBCUA, EPIU in the last 72 hours. Urine Culture:   No results for input(s): LABURIN in the last 72 hours. Urine Chemistry:   No results for input(s): Tiajuana Neas, PROTEINUR, NAUR in the last 72 hours. IMAGING:  CTA LOWER EXTREMITY LEFT W CONTRAST   Final Result   Impression: No acute arterial abnormality. No active hemorrhage. No hematoma of the pelvis, retroperitoneum or bilateral lower extremities. Suboptimal evaluation of the venous structures due to contrast bolus timing. Findings described above may represent sequelae of poor venous outflow or rethrombosis. CTA LOWER EXTREMITY RIGHT W CONTRAST   Final Result   Impression: No acute arterial abnormality. No active hemorrhage. No hematoma of the pelvis, retroperitoneum or bilateral lower extremities.       Suboptimal evaluation of the venous structures due to contrast serial hemoglobin and crit levels. 3. Suspect bilateral lower lobe pulmonary emboli. See comments above      Redemonstrated is a large necrotic mass involving the right breast enlarged fibroid uterus. Assessment/Plan   1. Hyponatremia     2. HTN    3. Anemia    4. Acid- base/ Electrolyte imbalance     5.  SIADH     Plan   - lasix as needed to help with edema   - samsca as needed   - Ur studies - reviewed   - TSH - nl   - Monitor Na   - d/w pt   - free water restriction   - encourage solute intake                     Thank you for allowing us to participate in care of Lamberto Bro MD  Feel free to contact me   [facility] [address]  Office : [phone]  Fax :[phone]

## 2022-04-04 NOTE — PLAN OF CARE
Problem: Falls - Risk of:  Goal: Will remain free from falls  Description: Will remain free from falls  4/4/2022 0635 by Krissy Parkinson RN  Outcome: Ongoing  4/3/2022 1952 by Estefany Corrigan RN  Outcome: Ongoing     Problem: Pain:  Goal: Control of chronic pain  Description: Control of chronic pain  Outcome: Ongoing

## 2022-04-04 NOTE — PLAN OF CARE
Problem: Falls - Risk of:  Goal: Will remain free from falls  Description: Will remain free from falls  4/4/2022 1416 by Bandar Guy RN  Outcome: Ongoing  Patient is alert, oriented and able to follow commands. Generalized weakness. Uses call light to verbalize her needs. Up to chair at present with alarm activated and call light in reach will continue to monitor for safety. Problem: Pain:  Goal: Pain level will decrease  Description: Pain level will decrease  4/4/2022 1416 by Bandar Gyu RN  Outcome: Ongoing  Patient has rested comfortably this shift. She has continued to deny pain or discomfort. Will continue to monitor for change in level of comfort and to treat as needed. Problem: Bleeding:  Goal: Will show no signs and symptoms of excessive bleeding  Description: Will show no signs and symptoms of excessive bleeding  4/4/2022 1416 by Bandar Guy RN  Outcome: Ongoing  Patient has no signs of bleeding. Educated on signs of bleeding to report. Verbalized understanding. Will monitor.

## 2022-04-04 NOTE — DISCHARGE INSTR - COC
Continuity of Care Form    Patient Name: Анна Duarte   :  1975  MRN:  [id number]    Admit date:  3/25/2022  Discharge date:  04.10.2022    Code Status Order: Full Code   Advance Directives:   [facility] Documentation       Date/Time Healthcare Directive Type of Healthcare Directive Copy in [facility] [address] Agent's Name Healthcare Agent's Phone Number    22 0805 No, patient does not have an advance directive for healthcare treatment -- -- -- -- --            Admitting Physician:  Loree Hope MD  PCP: JUDITH Moon - CNP    Discharging Nurse: [facility] Unit/Room#: 0598/7689-13  Discharging Unit Phone Number: [phone]    Emergency Contact:   Extended Emergency Contact Information  Primary Emergency Contact: Hailee Ramírez  Address: [address] [place] [address] Phone: [phone]  Relation: Parent    Past Surgical History:  Past Surgical History:   Procedure Laterality Date    CRANIOTOMY Left 3/12/2022    LEFT TEMPORAL PARIETAL OCCIPITAL CRANIOTOMY FOR TUMOR RESECTION performed by Angelica Gutiérrez MD at [address]    IR IVC [address] N/A 2022    [facility]    IR IVC FILTER PLACEMENT W IMAGING  3/16/2022    IR IVC FILTER PLACEMENT W IMAGING 3/16/2022 [facility] SPECIAL PROCEDURES    IR THROMB [address]  3/28/2022    IR THROMB [address] 3/28/2022 [facility] SPECIAL PROCEDURES    UPPER GASTROINTESTINAL ENDOSCOPY N/A 2022    EGD DIAGNOSTIC ONLY performed by Nerissa Diamond MD at [facility] ENDOSCOPY       Immunization History: There is no immunization history on file for this patient.     Active Problems:  Patient Active Problem List   Diagnosis Code    Thyroid nodule E04.1    Brain metastases (HCC) C79.31    Brain mass G93.89    Status epilepticus (HCC) G40.901    Duct cell carcinoma (HCC) C80.1    Cerebral edema (HCC) G93.6    Seizure ([address] R56.9 Deep venous thrombosis (HCC) I82.409    Fever R50.9    Phlebitis I80.9    S/P craniotomy Z98.890       Isolation/Infection:   Isolation            No Isolation          Patient Infection Status       None to display            Nurse Assessment:  Last Vital Signs: BP 97/64   Pulse 113   Temp 98.8 °F (37.1 °C) (Oral)   Resp 20   Ht 5' 3\" (1.6 m)   Wt 212 lb 4.9 oz (96.3 kg)   SpO2 95%   BMI 37.61 kg/m²     Last documented pain score (0-10 scale): Pain Level: 0  Last Weight:   Wt Readings from Last 1 Encounters:   04/03/22 212 lb 4.9 oz (96.3 kg)     Mental Status:  oriented and alert    IV Access:  - None    Nursing Mobility/ADLs:  Walking   Assisted  Transfer  Assisted  Bathing  Assisted  Dressing  Assisted  Toileting  Assisted  Feeding  Independent  Med Admin  Assisted  Med Delivery   whole    Wound Care Documentation and Therapy:  Wound 03/04/22 Breast Lower;Right Large mass (Active)   Wound Image   04/03/22 1936   Wound Etiology Other 03/30/22 1227   Dressing Status Clean;Dry; Intact 04/03/22 1657   Wound Cleansed Not Cleansed 04/03/22 1657   Dressing/Treatment ABD;Dry dressing;Gauze dressing/dressing sponge 04/03/22 1657   Dressing Change Due 03/30/22 03/29/22 1517   Wound Length (cm) 11 cm 03/29/22 1517   Wound Width (cm) 10 cm 03/29/22 1517   Wound Depth (cm) 0 cm 03/29/22 1517   Wound Surface Area (cm^2) 110 cm^2 03/29/22 1517   Change in Wound Size % (l*w) -15.79 03/29/22 1517   Wound Volume (cm^3) 0 cm^3 03/29/22 1517   Wound Assessment Other (Comment) 04/03/22 1657   Drainage Amount None 04/03/22 1657   Drainage Description Thin;Black 04/03/22 1657   Odor Moderate 04/03/22 1657   Lety-wound Assessment Dry/flaky; Intact 04/03/22 1657   Margins Unattached edges 04/03/22 1657   Number of days: 31        Elimination:  Continence:    Bowel: No  Bladder: No  Urinary Catheter: None   Colostomy/Ileostomy/Ileal Conduit: No       Date of Last BM: ***    Intake/Output Summary (Last 24 hours) at 4/4/2022 2095  Last data filed at 4/4/2022 0600  Gross per 24 hour   Intake 140 ml   Output 5550 ml   Net -5410 ml     I/O last 3 completed shifts: In: 6320.9 [P.O.:720; I.V.:3544; IV Piggyback:2056.9]  Out: 5973 [Urine:6685]    Safety Concerns:     None    Impairments/Disabilities:      None    Nutrition Therapy:  Current Nutrition Therapy:   - Oral Diet:  General    Routes of Feeding: Oral  Liquids: No Restrictions  Daily Fluid Restriction: no  Last Modified Barium Swallow with Video (Video Swallowing Test): not done    Treatments at the Time of Hospital Discharge:   Respiratory Treatments:   Oxygen Therapy:  is not on home oxygen therapy.   Ventilator:    - No ventilator support    Rehab Therapies: Physical Therapy and Occupational Therapy  Weight Bearing Status/Restrictions: No weight bearing restrictions  Other Medical Equipment (for information only, NOT a DME order):  walker  Other Treatments:       Patient's personal belongings (please select all that are sent with patient):  None    RN SIGNATURE:  Electronically signed by Edd Downing on 4/10/22 at 3:26 PM EDT    CASE MANAGEMENT/SOCIAL WORK SECTION    Inpatient Status Date: 03.25.2022    Readmission Risk Assessment Score:  Readmission Risk              Risk of Unplanned Readmission:  24           Discharging to Facility/ Agency   [facility]  Address: [address] [place] [address]  Phone: [phone]  Fax: [phone]    Dialysis Facility (if applicable)   Name:  Address:  Dialysis Schedule:  Phone:  Fax:    / signature: Electronically signed by Berna Benson RN on 4/10/22 at 2:09 PM EDT    PHYSICIAN SECTION    Prognosis: Good    Condition at Discharge: Stable    Rehab Potential (if transferring to Rehab): Good    Recommended Labs or Other Treatments After Discharge: cbc, bmp, weekly, f/u GI physician in 3weeks, mas care/if retaining place mas for patient and recommend may need urology follow up    Physician Certification: I certify the above information and transfer of [place] Setting  is necessary for the continuing treatment of the diagnosis listed and that she requires [place] for greater 30 days.      Update Admission H&P: No change in H&P    PHYSICIAN SIGNATURE:  Electronically signed by Lane Stover MD on 4/10/22 at 8:33 AM EDT

## 2022-04-04 NOTE — PROGRESS NOTES
[facility]  Progress Note    Subjective:     Craniotomy cancelled March 9 due to seizure and status epilepticus. 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 Negative for fever. Respiratory: Negative. Cardiovascular: Negative. Gastrointestinal: Negative. Genitourinary: Negative. Musculoskeletal: Negative. Skin: Negative. Neurological: Negative for seizures, weakness and headaches.        Objective:     Medications    Current Facility-Administered Medications: tamsulosin (FLOMAX) capsule 0.4 mg, 0.4 mg, Oral, Daily  doxycycline monohydrate (MONODOX) capsule 100 mg, 100 mg, Oral, 2 times per day  iron sucrose (VENOFER) 200 mg in sodium chloride 0.9 % 100 mL IVPB, 200 mg, IntraVENous, Q24H  apixaban (ELIQUIS) tablet 10 mg, 10 mg, Oral, BID **FOLLOWED BY** [START ON 4/6/2022] apixaban (ELIQUIS) tablet 5 mg, 5 mg, Oral, BID  0.9 % sodium chloride infusion, , IntraVENous, PRN  sodium chloride flush 0.9 % injection 5-40 mL, 5-40 mL, IntraVENous, 2 times per day  sodium chloride flush 0.9 % injection 5-40 mL, 5-40 mL, IntraVENous, PRN  0.9 % sodium chloride infusion, 25 mL, IntraVENous, PRN  metoprolol (LOPRESSOR) injection 5 mg, 5 mg, IntraVENous, Once  oxyCODONE (ROXICODONE) immediate release tablet 5 mg, 5 mg, Oral, Q4H PRN **OR** oxyCODONE (ROXICODONE) immediate release tablet 10 mg, 10 mg, Oral, Q4H PRN  LORazepam (ATIVAN) injection 1 mg, 1 mg, IntraVENous, Q6H PRN  [Held by provider] clonazePAM (KLONOPIN) tablet 0.5 mg, 0.5 mg, Oral, BID  QUEtiapine (SEROQUEL) tablet 12.5 mg, 12.5 mg, Oral, Nightly  insulin regular (HUMULIN R;NOVOLIN R) injection 10 Units, 10 Units, IntraVENous, Once **AND** dextrose 10 % infusion, 250 mL, IntraVENous, Once **AND** [COMPLETED] POCT Glucose, , , Q30 Min **AND** [COMPLETED] POCT Glucose, , , Q1H **AND** [COMPLETED] POCT Glucose, , , 4x Daily AC & HS  glucose (GLUTOSE) 40 % oral gel 15 g, 15 g, Oral, PRN  dextrose 50 % IV solution, 12.5 g, IntraVENous, PRN  glucagon (rDNA) injection 1 mg, 1 mg, IntraMUSCular, PRN  dextrose 5 % solution, 100 mL/hr, IntraVENous, PRN  lacosamide (VIMPAT) tablet 200 mg, 200 mg, Oral, BID  levETIRAcetam (KEPPRA) tablet 2,000 mg, 2,000 mg, Oral, Daily  levETIRAcetam (KEPPRA) tablet 2,500 mg, 2,500 mg, Oral, Nightly  melatonin disintegrating tablet 5 mg, 5 mg, Oral, Nightly  pantoprazole (PROTONIX) tablet 40 mg, 40 mg, Oral, QAM AC  sodium chloride flush 0.9 % injection 5-40 mL, 5-40 mL, IntraVENous, 2 times per day  sodium chloride flush 0.9 % injection 5-40 mL, 5-40 mL, IntraVENous, PRN  0.9 % sodium chloride infusion, 25 mL, IntraVENous, PRN  ondansetron (ZOFRAN-ODT) disintegrating tablet 4 mg, 4 mg, Oral, Q8H PRN **OR** ondansetron (ZOFRAN) injection 4 mg, 4 mg, IntraVENous, Q6H PRN  polyethylene glycol (GLYCOLAX) packet 17 g, 17 g, Oral, Daily PRN  acetaminophen (TYLENOL) tablet 650 mg, 650 mg, Oral, Q6H PRN **OR** acetaminophen (TYLENOL) suppository 650 mg, 650 mg, Rectal, Q6H PRN    Allergies  No Known Allergies    Physical Exam  VITALS:  BP 97/64   Pulse 113   Temp 98.8 °F (37.1 °C) (Oral)   Resp 20   Ht 5' 3\" (1.6 m)   Wt 212 lb 4.9 oz (96.3 kg)   SpO2 95%   BMI 37.61 kg/m²   TEMPERATURE:  Current - Temp: 98.8 °F (37.1 °C);  Max - Temp  Av.3 °F (36.8 °C)  Min: 97.9 °F (36.6 °C)  Max: 98.8 °F (37.1 °C)  PULSE OXIMETRY RANGE: SpO2  Av.8 %  Min: 94 %  Max: 97 %  24HR INTAKE/OUTPUT:      Intake/Output Summary (Last 24 hours) at 2022 0724  Last data filed at 2022 0600  Gross per 24 hour   Intake 140 ml   Output 5550 ml   Net -5410 ml Physical Exam  Constitutional:       General: She is not in acute distress. Appearance: She is ill-appearing. HENT:      Head:      Comments: Craniotomy incision C/D/I - healing well  Eyes:      General: No scleral icterus. Cardiovascular:      Rate and Rhythm: Normal rate and regular rhythm. Heart sounds: No murmur heard. No gallop. Pulmonary:      Effort: Pulmonary effort is normal.      Breath sounds: Normal breath sounds. No wheezing, rhonchi or rales. Abdominal:      Palpations: Abdomen is soft. Tenderness: There is no abdominal tenderness. Musculoskeletal:         General: Swelling (BLE) present. Comments: No cyanosis in digits   Lymphadenopathy:      Cervical: No cervical adenopathy. Skin:     General: Skin is warm and dry. Labs  Recent Labs     04/02/22 0527 04/03/22 0456 04/04/22  0552   WBC 9.6 9.7 11.3*   HGB 8.0* 8.4* 8.2*   HCT 23.9* 24.8* 25.0*    312 354   MCV 83.6 83.3 83.3       Recent Labs     04/02/22  1735 04/03/22 0456 04/04/22  0552   * 132* 132*   K 4.0 4.0 3.3*   CL 97* 99 97*   CO2 21 22 27   PHOS 3.5 3.6 3.5   BUN 10 10 10   CREATININE <0.5* <0.5* <0.5*       Recent Labs     04/02/22 0527 04/03/22 0456   AST 44* 45*   ALT 55* 58*   BILIDIR <0.2 <0.2   BILITOT <0.2 <0.2   ALKPHOS 94 99       No results for input(s): MG in the last 72 hours. Radiology  CT HEAD WO CONTRAST    Result Date: 3/26/2022  CT HEAD WITHOUT CONTRAST HISTORY: Craniotomy COMPARISON: 3/17/2022 Underexposure controls were utilized during the CT examination to meet ALARA standards for radiation dose reduction. FINDINGS: Postoperative changes are noted from left frontotemporal craniotomy. Underlying hypodensity is seen in the area of prior surgery suggesting possible cortical infarct or postsurgical change. Additional area of low cortical attenuation is seen involving the medial left parieto-occipital region which also may be related to prior surgery.  The appearance is similar to prior examination. Mild associated mass effect is seen with slight left to right midline shift which appears similar to slightly improved in extent since the prior study. Visualized portions of the paranasal sinuses appear clear. Postsurgical changes with areas of low cortical attenuation in the left parietal and left occipital region appear essentially stable from prior study with associated mild midline shift which is similar to slightly improved from prior study. No evidence of developing acute intracranial hemorrhage. CT Head WO Contrast    Result Date: 3/17/2022  PROCEDURE: CT head without contrast INDICATION: seizure; recent craniotomy; COMPARISON: 3/13/2022 and 3/5/2022 TECHNIQUE: CT head was performed without contrast according to standard protocol. Axial images and multiplanar reformatted images reviewed. Up-to-date CT equipment and radiation dose reduction techniques were employed. 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 There is slightly decreased 7 mm rightward midline shift and slightly decreased compression of the left lateral ventricle. There is stable persistent vasogenic edema in the left cerebral hemisphere. There is no evidence of acute hemorrhage or infarct. Visualized portions of the orbits, paranasal sinuses, and mastoids appear normal. There is scalp swelling overlying the craniotomy. 1.  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 No evidence of acute hemorrhage or infarct. CT Head wo Contrast    Result Date: 3/12/2022  HISTORY: Multiple brain masses. Status post resection. Postoperative evaluation.  EXAM : CT OF THE HEAD WITHOUT CONTRAST TECHNIQUE: Multiple axial images were obtained of the brain without the use of contrast. Individualized dose optimization technique was used in order to meet ALARA standards for radiation dose reduction. In addition to vendor specific dose reduction algorithms, the dose reduction techniques vary based on the specific scanner utilized but frequently include automated exposure control, adjustment of the mA and/or kV according to patient size, and use of iterative reconstruction technique. COMPARISON: NONE FINDINGS: The visualized paranasal sinuses, mastoid air cells and middle ears are clear to the extent visualized. Status post left temporal and left parietal craniotomies. There is a surgical drain along the left scalp. No significant extracranial fluid collection. Skin staples consistent with recent surgery. Intracranially, there is persistent severe midline shift measuring approximately 10 mm similar to the preoperative evaluation. The large mass in the posterior left temporal region has been resected. There is an air-fluid level in the resection cavity consistent with expected postoperative changes. Resection cavity in the left parietal-occipital region with small gas bubbles. No acute complication or acute hemorrhage identified. Pneumocephalus consistent with recent surgery. 1. Resection cavity is at the locations of the previous masses with persistent adjacent vasogenic edema and persistent midline shift. No progressive mass effect or acute surgical complication otherwise identified. CT HEAD WO CONTRAST    Result Date: 3/8/2022  CT HEAD WITHOUT CONTRAST COMPARISON STUDY: MRI brain without with contrast 3/5/2022 HISTORY: Status epilepticus in or for brain tumor resection FINDINGS: Thin section axial images obtained through the head from skull base to vertex. Multiplanar reconstruction images received for interpretation. Low radiation dose CT technique utilized.  Redemonstrated is a partially necrotic heterogeneous attenuating mass in the left parietal occipital region measuring approximately 3.7 x 4.6 cm peripheral increased attenuation suggesting some component of hemorrhage. This mass abuts the posterior falx  with marked surrounding neurogenic edema and mass effect. This demonstrates little change from previous MRI exam. Additional hemorrhagic partially necrotic Mixed attenuating mass in the left parietal temporal region measuring approximately a 4.2 x 4.1 cm unchanged from prior MRI exam.  Moderate surrounding vasogenic edema and mass effect. Extensive surrounding edema anteriorly extending into the left thalamus and left renal ganglia region. There is near complete effacement of the left lateral ventricle with approximately 11 mm left-to-right midline shift. Brainstem and posterior fossa appear within normal limits. Visualized orbits and paranasal sinuses are clear. 1.  2 large hemorrhagic partially necrotic masses in the left parieto-temporal and left parieto-occipital regions with marked surrounding edema and mass effect. These demonstrate little change when compared to previous MRI study of 3/5/2022. Associated  prominent mass effect and surrounding edema resulting 11 mm midline shift to the right. CT HEAD WO CONTRAST    Result Date: 3/3/2022  EXAMINATION: CT OF THE HEAD WITHOUT CONTRAST  3/3/2022 8:17 pm TECHNIQUE: CT of the head was performed without the administration of intravenous contrast. Dose modulation, iterative reconstruction, and/or weight based adjustment of the mA/kV was utilized to reduce the radiation dose to as low as reasonably achievable. COMPARISON: None. HISTORY: ORDERING SYSTEM PROVIDED HISTORY: ams TECHNOLOGIST PROVIDED HISTORY: Reason for exam:->ams Has a \"code stroke\" or \"stroke alert\" been called? ->No Decision Support Exception - unselect if not a suspected or confirmed emergency medical condition->Emergency Medical Condition (MA) Is the patient pregnant?->No Reason for Exam: c/o memory loss that began last week.   Pt's mother states last Thursday the pt states she felt like something hit her in the head, but doesn't know what. States she isn't able to remember her name, important things she would usually remember, and she is also unable to read or write. FINDINGS: BRAIN/VENTRICLES: There is diffuse hypoattenuation involving much of the left temporoparietal and posterior frontal lobe. There is hypoattenuation involving the white matter involving left posterior hemisphere likely represent areas of vasogenic edema. There is at least 2 discrete of mass is identified measuring approximate 4 cm in size 1 which is central hypoattenuation which may be related to necrosis. These left-to-right midline shift 1.4 cm in size. There is entrapment of the right lateral ventricle and both temporal horns of both lateral ventricles suggestive areas of developing hydrocephalus. There is downward transtentorial herniation as well. With mass effect on the brainstem and the partial effacement of the suprasellar and ambient cisterns. Left uncal herniation. .  Focal density identified left parasagittal occipital lobe likely represents petechial calcification of the blood products. ORBITS: The visualized portion of the orbits demonstrate no acute abnormality. SINUSES: The visualized paranasal sinuses and mastoid air cells demonstrate no acute abnormality. SOFT TISSUES/SKULL:  No acute abnormality of the visualized skull or soft tissues. Abnormal edema left posterior cerebral hemisphere possibly related to underlying metastases versus less likely primary glioma. This causes 1.4 cm of left-to-right midline shift with cisternal effacement and downward transtentorial herniation and findings of developing hydrocephalus. Hyperdensity left parasagittal occipital lobe could represent area of calcification however small focus of blood products will not be totally excluded.  Neuro surgical consultation recommended Critical results were called by Dr. Qasim Perla [place] to [employer] PA on 3/3/2022 at 21:07. IR GUIDED IVC FILTER PLACEMENT    Result Date: 3/16/2022  IVC filter placement INDICATION : Deep venous thrombosis. Recent craniotomy. : Gael Cantu MD PROCEDURE: Preprocedure sonographic evaluation of the right internal jugular vein demonstrates patency and compressibility. This measures 12 mm. A permanent sonographic image was saved to the electronic medical record. The patient's right neck was prepped and draped in sterile fashion and lidocaine used for local anesthesia. Ultrasound guidance was used and a sonographic image of the vein was obtained. The right internal jugular vein was accessed using a micropuncture set. Abdominal cavogram was obtained. An Option Elite retrievable filter was inserted under fluoroscopic guidance and deployed in routine fashion in the infrarenal IVC. Abdominal imaging was obtained to document filter position. The patient tolerated the procedure well without complication. IMPRESSION : Normal IVC anatomy. Normal, patent jugular vein. Patent inferior vena cava. Placement of filter in the infra-renal IVC. Please note that this is a retrievable filter. Fluoroscopy time : 1.8 minutes Number of exposures obtained : 5 Moderate sedation was performed by the physician including the presence of an independent trained observers that assisted in monitoring the patient's level of consciousness and physiological status. Following the administration of Midazolam and Fentanyl the physician spent continuous face-to-face time with the patient. Sedation start time: 0810 Sedation end time: 0826 Estimated blood loss: Less than 5 mL. XR CHEST PORTABLE    Result Date: 3/27/2022  AP CHEST HISTORY: Fever COMPARISON 3/4/2022 FINDINGS: The heart size is within normal limits. Abnormal density seen laterally in right lower lung.  This appears to represent overlapping density but infiltrate in this area would be difficult to exclude. Repeat evaluation following removal overlapping density is recommended. Limited evaluation of lower right lung due to overlapping density for which infiltrate cannot be excluded. Otherwise no acute process seen. VL Extremity Venous Bilateral    Result Date: 3/15/2022  Lower Extremities DVT Study  Demographics   Patient Name       Hay OSORIO   Date of Study      03/15/2022        Gender              Female   Patient Number     [id number]        Date of Birth       1975   Visit Number       [id number]         Age                 48 year(s)   Accession Number   [id number]        Room Number         2911   Corporate ID       [id number]           Sonographer         Oc Henderson RVT,                                                           [facility]   Ordering Physician Kobe Ko MD, [facility]                                       Physician           Readers                                                           Joana Stroud MD  Procedure Type of Study:   Veins:Lower Extremities DVT Study, VASC EXTREMITY VENOUS DUPLEX BILATERAL. Vascular Sonographer Report  Indications for Study:Swelling. Additional Indications:Patient presents for evaluation due to bilateral lower extremity swelling. She only c/o right lower extremity weakness. She is a poor historian. She underwent craniotomy on 3/12/22 for tumor resection. She denies a prior H/O DVT. Lower extremity venous duplex study on 3/10/22 was negative for DVT bilaterally. Venous Duplex Scan: B-mode imaging of the deep and superficial veins, with compression maneuvers, including color and Doppler spectral waveform analysis. Impressions Right Impression There is acute partially occluding deep venous thrombosis involving the deep femoral vein. The thrombus extends into the common femoral vein by 3.1 cm at the venous confluence.  There is acute totally occluding deep venous thrombosis involving two soleal veins in the mid calf. There is no other evidence of deep or superficial venous thrombosis involving the right lower extremity. Left Impression There is no evidence of deep or superficial venous thrombosis involving the left lower extremity. Compared to the previous study on 3/10/22, the acute DVT noted in the right common femoral, deep femoral, and soleal veins is a new finding. The left lower extremity venous findings are unchanged. Conclusions   Summary   There is evidence of acute DVT involving the right common femoral vein, deep  femoral vein, and soleal veins, as described above. No evidence of deep vein or superficial thrombosis involving the left lower  extremity. Signature   ------------------------------------------------------------------  Electronically signed by Hi Barrios MD (Interpreting  physician) on 03/15/2022 at 07:08 PM      VL Extremity Venous Bilateral    Result Date: 3/11/2022  Lower Extremities DVT Study  Demographics   Patient Name       [facility]   Date of Study      03/10/2022        Gender              Female   Patient Number     [id number]        Date of Birth       1975   Visit Number       [id number]         Age                 48 year(s)   Accession Number   [id number]        Room Number         80   Corporate ID       [id number]           FRANCIS Manzo,                                                           T   Ordering Physician Yanique Durham MD, [facility]                                       Physician           Readers                                                           Carly Jamil MD  Procedure Type of Study:   Veins:Lower Extremities DVT Study, VASC EXTREMITY VENOUS DUPLEX BILATERAL. Vascular Sonographer Report  Indications for Study:Swelling. Additional Indications:Inpatient study done bedside in the ICU.  Patient is a poor historian with bilateral leg swelling for an unknown number of days. Venous Duplex Scan: B-mode imaging of the deep and superficial veins, with compression maneuvers, including color and Doppler spectral waveform analysis. Impressions Right Impression Catheter and bandages on the right thigh. No evidence of deep or superficial venous thrombosis in the right lower extremity. Left Impression No evidence of deep or superficial venous thrombosis in the left lower extremity. Evidence of a cystic structure in the popliteal fossa, consistent with a Baker's cyst, measuring 1.76x0.59 cm. Conclusions   Summary   No evidence of deep or superficial venous thrombosis in the bilateral lower  extremity. Left Baker's cyst.   Signature   ------------------------------------------------------------------  Electronically signed by France Garcia MD (Interpreting  physician) on 03/11/2022 at 01:50 PM       CTA HEAD NECK W CONTRAST    Result Date: 3/3/2022  EXAMINATION: CTA OF THE HEAD AND NECK WITH CONTRAST 3/3/2022 8:17 pm: TECHNIQUE: CTA of the head and neck was performed with the administration of intravenous contrast. Multiplanar reformatted images are provided for review. MIP images are provided for review. Stenosis of the internal carotid arteries measured using NASCET criteria. Dose modulation, iterative reconstruction, and/or weight based adjustment of the mA/kV was utilized to reduce the radiation dose to as low as reasonably achievable. COMPARISON: CT head 03/03/2022 HISTORY: ORDERING SYSTEM PROVIDED HISTORY: ams x 1 week TECHNOLOGIST PROVIDED HISTORY: Reason for exam:->ams x 1 week Has a \"code stroke\" or \"stroke alert\" been called? ->No Decision Support Exception - unselect if not a suspected or confirmed emergency medical condition->Emergency Medical Condition (MA) Reason for Exam: ams x 1 week FINDINGS: CTA NECK: AORTIC ARCH/ARCH VESSELS: No dissection or arterial injury.   No significant stenosis of the brachiocephalic or subclavian arteries. CAROTID ARTERIES: No dissection, arterial injury, or hemodynamically significant stenosis by NASCET criteria. VERTEBRAL ARTERIES: No dissection, arterial injury, or significant stenosis. SOFT TISSUES: The lung apices are clear. No cervical or superior mediastinal lymphadenopathy. The larynx and pharynx are unremarkable. No acute abnormality of the salivary and thyroid glands. Partial visualization of a large exophytic right breast mass. BONES: .  Vertebral heights are maintained. CTA HEAD: ANTERIOR CIRCULATION: No significant stenosis of the intracranial internal carotid, anterior cerebral, or middle cerebral arteries. No aneurysm. POSTERIOR CIRCULATION: No significant stenosis of the vertebral, basilar, or posterior cerebral arteries. No aneurysm. OTHER: No dural venous sinus thrombosis on this non-dedicated study. BRAIN: Left-sided masses and edema with associated midline shift. . Ventriculomegaly worrisome for areas developing hydrocephalus related to the mass effect and downward transfer herniation. Negative for large vessel occlusion or hemodynamic stenosis. CT CHEST ABDOMEN PELVIS W CONTRAST    Result Date: 3/4/2022  CT of chest abdomen and pelvis with contrast HISTORY: Brain mass. Evaluate for primary neoplasm or metastatic disease. Staging. COMPARISON: none CONTRAST:  70 mL Isovue-370 intravenous  TECHNIQUE: Individualized dose optimization technique was used in order to meet ALARA standards for radiation dose reduction. In addition to vendor specific dose reduction algorithms, the dose reduction techniques vary based on the specific scanner utilized but frequently include automated exposure control, adjustment of the mA and/or kV according to patient size, and use of iterative reconstruction technique. COMMENTS: Chest: Mild degenerative changes in the spine. Scoliosis.  There is a large, necrotic, peripherally enhancing right breast mass which tenths the anterior skin surface and measures 10.2 x 8.5 cm. There is right axillary lymphadenopathy; for example 2.1 x 1.5 cm on image [id number]. Small hiatal hernia. Mediastinal structures are unremarkable without lymphadenopathy. Mild linear subpleural atelectasis in the lungs. 2 mm perifissural nodule on the left image 601-51 is most likely benign. No evidence of pulmonary metastatic disease. Abdomen and pelvis: Degenerative changes in the spine. No lymphadenopathy. Gallbladder is within normal limits. Bowel is unremarkable. 4 mm hypodensity in the right lobe of the liver image 601-83 is too small to definitively characterize and most likely benign. Solid abdominal organs are otherwise unremarkable. Multiple uterine fibroids are seen. Bladder is collapsed. There is high density fluid in the bladder which may represent prior contrast administration or hematuria. Large, necrotic right breast mass. Right axillary lymphadenopathy. Uterine fibroids. High density urine in the bladder. MRI brain with and without contrast    Result Date: 3/14/2022  EXAM: MRI BRAIN W WO CONTRAST INDICATION: Brain mass COMPARISON: 3/12/2022 TECHNIQUE: Multiplanar, multisequence MR imaging of the head obtained without and with IV. IV contrast: 10 mL IV ProHance FINDINGS: Postoperative changes of left sided craniotomy with interval resection of underlying masses. Expected postoperative pneumocephalus. Small extra-axial subdural fluid collection subjacent to the craniotomy site. There are resection cavities in the left temporal and occipital region with blood products within and surrounding the resection cavity. There are small areas of restricted diffusion along the margins of the resection cavity consistent with postoperative ischemia. There are small irregular nodular areas of enhancement along the anterior and lateral margins of the left occipital resection cavity (image 73 and 76 of series 13). Small irregular nodular area of enhancement along the medial margin of the left temporal resection cavity (image 70 of series 13). Thin curvilinear enhancement along the margins of the surgical cavity is likely reactive/postoperative. There is extensive T2 hyperintensity vasogenic edema in the left parietotemporal occipital region which is similar to preoperative MRI. There is mass effect with effacement of left lateral ventricle and approximately 9 mm midline shift to right. Postoperative changes of left-sided craniotomy with interval resection of left temporal and occipital lobe masses. Small nodular areas of enhancement along the margins of the surgical cavity as detailed above are indeterminate. Stable extensive T2 hyperintense/vasogenic edema in the left parietotemporal occipital region surrounding the resection cavities. Mass effect with effacement of left lateral ventricle and 9 mm midline shift to right. Recommended continued follow-up. MRI BRAIN W WO CONTRAST    Result Date: 3/5/2022  EXAM: MRI BRAIN W WO CONTRAST INDICATION: Brain metastasis COMPARISON: None TECHNIQUE: Multiplanar, multisequence MR imaging of the head obtained without and with IV. IV contrast: 12 mL IV ProHance. FINDINGS: There is no diffusion restriction to suggest an acute infarct. There is a hemorrhagic partially necrotic heterogeneously enhancing mass in the left parietal occipital region likely intra-axial measuring approximately 3.5 x 4.2 cm (image 90 of series 13). Medially it abuts posterior falx. There is surrounding vasogenic edema with mass effect. There is another hemorrhagic partially necrotic heterogeneously enhancing mass in the left parietotemporal region measuring approximately 4.3 x 4.2 cm (image 85). It abuts the adjacent dura on the lateral aspect. There is surrounding vasogenic edema mass  effect. There is extensive surrounding vasogenic edema anteriorly extending into the left thalamus, left basal ganglia.  There is mass effect with near complete effacement of left lateral ventricle and 10 mm midline shift to right. Major vascular flow voids are present. Sellar suprasellar region is unremarkable. Cervicomedullary junction is unremarkable. Visualized paranasal sinuses and mastoid air cells are clear. No suspicious calvarial abnormality. Two large hemorrhagic partially necrotic heterogeneously enhancing mass in the left parietotemporal and left parieto-occipital region with surrounding vasogenic edema and mass effect. These are indeterminate, most likely relate to multicentric glioblastoma. However, possibility of metastasis cannot be excluded given the history of breast cancer. 10 mm midline shift to right. CTA ABDOMEN PELVIS W WO CONTRAST    Result Date: 3/26/2022  EXAM: CTA ABDOMEN AND PELVIS WITHOUT CONTRAST INDICATION: LUQ abd pain w/ TTP on exam. Previous history of metastasis, IVF filter clot burden, recent IVC filter placement 3/16/2022 Necrotic right breast mass COMPARISON: 2/0/4669 TECHNIQUE: Helical CTA imaging obtained from lung bases through pelvis. Axial images and multiplanar reformatted images are provided for review Volume rendering, MIPS Arterial and delayed imaging IV Contrast: Isovue-370, 80 mL plus an additional 60 mL for additional injection Oral Contrast: None CT radiation dose optimization techniques (automated exposure control, use of a iterative reconstruction techniques, or adjustment of the mA or KV according to the patients size) were used to limit patient radiation dose. FINDINGS: Bilateral lower lobe pulmonary emboli are suspected, there are filling defects and poorly opacified pulmonary arteries in the lower lobe segments adjacent to the pulmonary veins however, this is indeterminate.  Correlation can be made with perfusion VQ scan or subsequent CT PE study of patient is well hydrated due to the added contrast given on the current exam. 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 Distended iliac veins bilaterally. Aorta and iliofemoral vessels are normal. Inferior vena cava filter remains in satisfactory position Liver: No masses or ductal dilatation Gallbladder is normal Pancreas and spleen are normal Normal kidneys Diameter of the small bowel colon are normal. No evidence of abscess. Enlarged fibroid uterus Urinary bladder is normal Normal abdominal wall. Bones: No destructive lesions. 1. 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 2. Surrounding hazy density, likely related to venous congestion. However, streaky densities extending inferiorly on the right may represent blood. However, there is no evidence of retroperitoneal hematoma. Correlation with lower extremity noninvasive duplex Doppler is recommended in addition to serial hemoglobin and crit levels. 3. Suspect bilateral lower lobe pulmonary emboli. See comments above Redemonstrated is a large necrotic mass involving the right breast enlarged fibroid uterus. Pathology  Department of Pathology   FINAL SURGICAL PATHOLOGY REPORT   Patient Name: Ernesto Most        Accession No:  [id number]    Age Sex:   1975    46 Y / F      Location:      [id number]   Account No:   [de-identified]                 Collected:     2022   Med Rec No:    [id number]                Received:      2022   Attend Phys:   Blessing Holman MD           Completed:     2022   Perform Phys: Yovani Mike MD     FINAL DIAGNOSIS:        A. Left temporal mass:      - Metastatic carcinoma compatible with \"triple negative\" breast        primary; see Comment.        B. Left temporal dura:      - Dural tissue negative for carcinoma, keratin AE1+3/Cam 5.2        immunostain reviewed on each block.        C.  Left occipital mass:      - Metastatic carcinoma compatible with \"triple negative\" breast        primary; see Comment. COMMENT:  A, C: The carcinoma at each site shows a nearly identical   appearance, with diffuse masses of highly polymorphous cells showing a   high mitotic/karyorrhectic rate, frequent \"giant\" cells, and plentiful   geographic necrosis.  Given the patient's clinical history (see below),   immunostains were requested. The tumor is strongly positive for CK7,   GATA3, SOX-10, and beta catenin (membranous pattern), with variable   cytoplasmic and partial perinuclear(\"Golgi\") positivity for keratin   AE1+3/Cam 5.2, a tiny minority population, including geographic foci,   positive for high molecular weight keratin (), and Ki-67   proliferation rate of 90% or more. It is negative for ER, DC, and HER-2   (no positivity for any of the three stains), and also for CK20, Napsin-A,   S100 protein, villin, TTF-1, CDX2, and CD45 (LCA). The overall findings strongly support metastatic \"triple negative\" breast   carcinoma.   BRATI/BRATI     Problem List  Patient Active Problem List   Diagnosis    Thyroid nodule    [facility]    Brain mass    Status epilepticus ([address]    Duct cell carcinoma (HCC)    Cerebral edema (HCC)    Seizure (HCC)    Deep venous thrombosis (HCC)    Fever    Phlebitis    S/P craniotomy       Assessment and Plan:     Metastatic triple negative breast cancer  - Neglected Right breast primary  - Intracranial metastasis  - The patient and her mother agreed to surgery to resect her intracranial metastasis. - March 9 Craniotomy had to be canceled due to seizures & status epilepticus.  - Saturday March 12 craniotomy and tumor resection. Recovering.  - Subsequent to this, she will require radiation therapy. Typically this is administered 2 to 4 weeks postoperatively. - Subsequent to radiation therapy she will require systemic chemotherapy.  This will be in the outpatient setting.  - Recommend she follow-up with medical and radiation oncology at the [facility], where she was seen previously, after this hospitalization to coordinate and implement systemic palliative chemotherapy once she completes radiation therapy. Instructions placed in D/C instructions section. - Spoke w/ Dr Samantha Currie. Because the neglected R breast cancer is not bleeding or causing infection, it would be of little benefit to perform a mastectomy. If that changes, a toilet mastectomy could be considered.     DVT  - Per neurosurgery, she could not receive therapeutic anticoagulation until POD #14 - March 26. - Now off Argatroban gtt and transitioned to Eliquis  - S/P IVC filter. This will continue to be a nidus for further thrombus.     IVC Thrombus  - S/P thrombectomy  - Transitioned from argatroban to Eliquis  - IR states filter cannot be removed for 6 weeks    HIT  - Ivone positive  - TRES positive  - Received argatroban gtt w/ normalization of the plts  - Now transititoned to Eliquis    UTI  - March 27 urine cult pos for enterococcus; ampicillin, TCN sensitive  - Receiving doxycycline    Gabriela Jennings MD  4/4/2022

## 2022-04-04 NOTE — PROGRESS NOTES
Physical Therapy  Facility/Department: [place] [address] [facility]  Daily Treatment Note  NAME: Fidel Nettles  : 1975  MRN: [id number]    Date of Service: 2022    Discharge Recommendations:    Fidel Nettles scored a 8/24 on the AM-PAC short mobility form. Current research shows that an AM-PAC score of 17 or less is typically not associated with a discharge to the patient's home setting. Based on the patient's AM-PAC score and their current functional mobility deficits, it is recommended that the patient have 5-7 sessions per week of Physical Therapy at d/c to increase the patient's independence. At this time, this patient demonstrates the endurance, and/or tolerance for 3 hours of therapy each day, with a treatment frequency of 5-7x/wk. Please see assessment section for further patient specific details. If patient discharges prior to next session this note will serve as a discharge summary. Please see below for the latest assessment towards goals. DME - determined by next LOC    Assessment   Assessment: Pt participated well with PT this AM, Performed self care and transfers. She remains well below her independent baseline and will benefit from continued IP therapies to maximize mobility and safety. PT Education: Goals;PT Role;Plan of Care; Functional Mobility Training;General Safety;Transfer Training  Patient Education: Pt will benefit from reinforcement  Activity Tolerance  Activity Tolerance: Patient limited by pain; Patient limited by fatigue;Patient limited by endurance  Activity Tolerance: Swollen LE's are making mobility difficult     Patient Diagnosis(es): The primary encounter diagnosis was Acute deep vein thrombosis (DVT) of proximal vein of right lower extremity ([address] Diagnoses of Thrombocytopenia ([address] S/P craniotomy, and Phlebitis were also pertinent to this visit. has a past medical history of Thyroid nodule.    has a past surgical history that includes craniotomy (Left, 3/12/2022); IR GUIDED IVC FILTER PLACEMENT (N/A, 03/16/2022); IR GUIDED IVC FILTER PLACEMENT (3/16/2022); IR GUIDED THROMB [facility] VEIN (3/28/2022); and Upper gastrointestinal endoscopy (N/A, 4/2/2022). Restrictions  Position Activity Restriction  Other position/activity restrictions: Up with assist  Subjective   Subjective  Subjective: Pt in bed upon PT entry, agreeable to working with PT       Objective   Bed mobility  Supine to Sit: Moderate assistance  Scooting: Moderate assistance  Transfers  Sit to Stand: Moderate Assistance;2 Person Assistance (Mod x 2 from eob, min x 2 from stedy platform first time,  CG from stedy platform second time.)  Stand to sit: Moderate Assistance;2 Person Assistance (to control descent from [place])  Bed to Chair: Dependent/Total (stedy)        Balance  Comments: Pt sat on EOB 15 min with SBA, washing up and doffing/donning gown. Pt stood in stedy and attempted to perform pericare with min assist for support, unable to do so. Then held onto [place] with CG x 1-2 min for dependent pericare. Exercises -  Supine Hipe IR/ER x 5 each with min assist.  AP x 10 with VC,  Quad sets R/L and glut sets x 10, 3 sec hold with VC and PC. Heel slides with mod assist    AM-PAC Score  AM-PAC Inpatient Mobility Raw Score : 8 (04/04/22 1226)  AM-PAC Inpatient T-Scale Score : 28.52 (04/04/22 1226)  Mobility Inpatient CMS 0-100% Score: 86.62 (04/04/22 1226)  Mobility Inpatient CMS G-Code Modifier : CM (04/04/22 1226)          Goals  Short term goals  Time Frame for Short term goals: D/C (all ongoing) 4/4  Short term goal 1: Perform supine to sit with min assist x1  Short term goal 2: Perform STS transfer with mod assist x1 and LRAD  Short term goal 3: Perform bed to chair transfer with mod assist x1 and LRAD  Patient Goals   Patient goals :  To improve mobility and pain    Plan    Plan  Times per week: 2-5  Current Treatment Recommendations: [place] Dannie Mobility Training,Transfer Training,Endurance Training,Gait Training,Neuromuscular Re-education,Pain Management,Home Exercise Program,Safety Education & Training,Patient/Caregiver Education & Training  Safety Devices  Type of devices: Chair alarm in place,Call light within reach,Left in chair,Gait belt,Nurse notified     Therapy Time   Individual Concurrent Group Co-treatment   Time In 1120         Time Out 1200         Minutes 40              Timed Code Treatment Minutes:   40    Total Treatment Minutes:  [address] [facility], [id number]

## 2022-04-04 NOTE — PROGRESS NOTES
u/s   Extensive deep and superficial venous thrombosis noted in the bilateral    lower extremities essentially from groin to ankle.      3/29 IR thrombectomy  Impression:   Technically successful inferior venacavogram and bilateral iliac venogram demonstrating marked ileal caval clot associated with the filter including thrombus above the IVC filter. Technically successful vacuum-assisted thrombectomy with restoration of flow through the inferior vena cava and improved patency of the ileal caval system. Clot remains within the inferior vena cava filter      Scheduled Meds:   tamsulosin  0.4 mg Oral Daily    doxycycline monohydrate  100 mg Oral 2 times per day    iron sucrose  200 mg IntraVENous Q24H    apixaban  10 mg Oral BID    Followed by   Frieda Lackey ON 4/6/2022] apixaban  5 mg Oral BID    sodium chloride flush  5-40 mL IntraVENous 2 times per day    metoprolol  5 mg IntraVENous Once    [Held by provider] clonazePAM  0.5 mg Oral BID    QUEtiapine  12.5 mg Oral Nightly    insulin regular  10 Units IntraVENous Once    lacosamide  200 mg Oral BID    levETIRAcetam  2,000 mg Oral Daily    levETIRAcetam  2,500 mg Oral Nightly    melatonin  5 mg Oral Nightly    pantoprazole  40 mg Oral QAM AC    sodium chloride flush  5-40 mL IntraVENous 2 times per day       Continuous Infusions:   sodium chloride      sodium chloride 25 mL (03/31/22 1615)    dextrose      dextrose      sodium chloride 25 mL (04/01/22 1736)       PRN Meds:  sodium chloride, sodium chloride flush, sodium chloride, oxyCODONE **OR** oxyCODONE, LORazepam, glucose, dextrose, glucagon (rDNA), dextrose, sodium chloride flush, sodium chloride, ondansetron **OR** ondansetron, polyethylene glycol, acetaminophen **OR** acetaminophen      Assessment: Hx breast ca, triple negative, stage IIB invasive ductal carcinoma, brain metastasis, seizures, hx DVT     3/12 Brain met resected 3/12  3/18 Seizure, dx new DVT, IVC filter placed. 3/25 Leg pain and swelling. Extensive LE DVT      Fever   - unclear source, may be from clot and not have infectious process   - no pneumonia, lung bases on CT with atelectasis or scarring   - no pyuria, no urinary sx   - R breast mass, does not appear to be infected   - R arm phlebitis, possible source   - Procalcitonin 1.02     Leukocytosis, WBC 11.3- today 4/4/22    Plan:     Cont Vancomycin    Ok for discharge from ID standpoint on po Doxycycline 100 bid x 5 days further     Medical Decision Making:   The following items were considered in medical decision making:  Discussion of patient care with other providers  Reviewed clinical lab tests  Reviewed radiology tests  Reviewed other diagnostic tests/interventions  Microbiology cultures and other micro tests reviewed      Discussed with pt  Jo Avalos MD

## 2022-04-04 NOTE — PROGRESS NOTES
Occupational Therapy  Facility/Department: [place] [address] [facility]  Daily Treatment Note    NAME: Yoli Vergara  : 1975  MRN: [id number]    Date of Service: 2022    Discharge Recommendations:  Yoli Vergara scored a 15/24 on the AM-PAC ADL Inpatient form. Current research shows that an AM-PAC score of 17 or less is typically not associated with a discharge to the patient's home setting. Based on the patient's AM-PAC score and their current ADL deficits, it is recommended that the patient have 5-7 sessions per week of Occupational Therapy at d/c to increase the patient's independence. At this time, this patient demonstrates the endurance, and/or tolerance for 3 hours of therapy each day, with a treatment frequency of 5-7x/wk. Please see assessment section for further patient specific details. If patient discharges prior to next session this note will serve as a discharge summary. Please see below for the latest assessment towards goals. OT Equipment Recommendations  Other: defer    Assessment   Performance deficits / Impairments: Decreased functional mobility ; Decreased endurance;Decreased ADL status; Decreased balance;Decreased strength;Decreased vision/visual deficit  Assessment: Good effort and participation. Able to participate in seated spongebath. Continues to require assist of 2 for sit-stand transfers; Kelsey Beverly bed to chair. BLE edema. Pt will benefit from ongoing intensive IP OT upon discharge. Treatment Diagnosis: impaired ADLs and functional mobility/transfers  Prognosis: Good  OT Education: OT Role;Plan of Care;Transfer Training;ADL Adaptive Strategies  Patient Education: verb understanding  Barriers to Learning: none  REQUIRES OT FOLLOW UP: Yes  Activity Tolerance  Activity Tolerance: Patient Tolerated treatment well;Patient limited by fatigue  Safety Devices  Safety Devices in place: Yes  Type of devices: Nurse notified; Left in chair;Chair alarm in place;Call light within reach (chair in reclined position for BLE edema control)         Patient Diagnosis(es): The primary encounter diagnosis was Acute deep vein thrombosis (DVT) of proximal vein of right lower extremity ([address] Diagnoses of Thrombocytopenia ([address] S/P craniotomy, and Phlebitis were also pertinent to this visit. has a past medical history of Thyroid nodule. has a past surgical history that includes craniotomy (Left, 3/12/2022); IR GUIDED IVC FILTER PLACEMENT (N/A, 03/16/2022); IR GUIDED IVC FILTER PLACEMENT (3/16/2022); [facility] and Upper gastrointestinal endoscopy (N/A, 4/2/2022). Restrictions  Position Activity Restriction  Other position/activity restrictions: Up with assist     Subjective   General  Chart Reviewed: Yes  Patient assessed for rehabilitation services?: Yes  Additional Pertinent Hx: 23-year-old female with metastatic breast cancer status post craniotomy with mass resection presented from [facility] due to worsening lower extremity pain and swelling. During her last admission patient was diagnosed with a DVT in the right lower extremity and due to her recent Craniotomy anticoagulation could not be started till POD 14 so patient underwent an IVC filter. Her platelets levels were noted to be low at [facility] and due to concern for worsening DVT she was sent to the hospital.  CT abdomen and pelvis was performed which did show thrombosis of the IVC at the level of the filter with marked distention of the caval bifurcation. There was also suspicion for bilateral lower lobe pulmonary emboli. Family / Caregiver Present: No  Referring Practitioner: Tereso Bishop MD  Diagnosis: Thrombocytopenia    Subjective  Subjective: In bed and agreeable to therapy. \"I might need some help with that (pericare hygiene). \" C/o pain bilateral calves - nurse aware.     Vital Signs  Patient Currently in Pain: Yes (calf pain - not rated, nurse aware)     Orientation  Orientation  Overall Orientation Status: Within Functional Limits (disoriented to date \"March\")     Objective    ADL  Grooming: Setup (washing face, brushing teeth)  UE Bathing: Minimal assistance (setup, seated at EOB)  LE Bathing: Maximum assistance (assist for pericare and for lower legs)  UE Dressing: Minimal assistance (gown change)  LE Dressing: Dependent/Total  Toileting:  (mas catheter; requires assist for posterior hygiene, able to wash frontal, but requires assist for thoroughness)           Balance  Sitting Balance:  (x 15-20 minutes SB/Supervision (performed spongebath))  Standing Balance: Minimal assistance     Bed mobility  Supine to Sit: Moderate assistance  Scooting: Moderate assistance    Transfers  Sit to stand: 2 Person assistance; Moderate assistance (x 2 times)  Stand to sit: 2 Person assistance;Minimal assistance  Transfer Comments: Bed to chair via [address] [place] [address]  Overall Cognitive Status: [address] [facility]  Times per week: 2-5  Times per day: Daily  Current Treatment Recommendations: Strengthening,Home Management Training,Patient/Caregiver Education & Training,Balance Training,Functional Mobility Training,Endurance Training,Self-Care / ADL,Safety Education & Training,Pain Management                                                      AM-PAC Score        AM-[facility] Inpatient Daily Activity Raw Score: 15 (04/04/22 1227)  AM-PAC Inpatient ADL T-Scale Score : 34.69 (04/04/22 1227)  ADL Inpatient CMS 0-100% Score: 56.46 (04/04/22 1227)  ADL Inpatient CMS G-Code Modifier : CK (04/04/22 1227)    Goals  Short term goals  Time Frame for Short term goals: by dc  Short term goal 1: Pt will complete functional transfers, including BSC/toilet transfer w/ Min A- progressing  Short term goal 2: Pt will complete LE dressing w/ Min A and use of AE prn- progressing  Short term goal 3: Pt will maintain sitting balance w/ spvn to complete ADL task- goal met  Short term goal 4: sit<>stand with Anil to RW  Short term goal 5: bed to chair transfer with Anil       Therapy Time   Individual Concurrent Group Co-treatment   Time In 1119         Time Out [address] OTR/L #[id number]

## 2022-04-04 NOTE — PROGRESS NOTES
Progress Note    Admit Date: 3/25/2022  Diet: ADULT DIET; Regular    CC: leg pain     Interval history:   NAEON. S/p 2 doses of lasix 40 IV BID   Denies F/C, N/V, CP, SOB, HA, vision changes, weakness in arms and feet. VSS. Urine output of 5.550 L  Figu[place] in place     Medications:     Scheduled Meds:   potassium bicarb-citric acid  40 mEq Oral Once    tamsulosin  0.4 mg Oral Daily    doxycycline monohydrate  100 mg Oral 2 times per day    iron sucrose  200 mg IntraVENous Q24H    apixaban  10 mg Oral BID    Followed by   Tinnie Free ON 4/6/2022] apixaban  5 mg Oral BID    sodium chloride flush  5-40 mL IntraVENous 2 times per day    metoprolol  5 mg IntraVENous Once    [Held by provider] clonazePAM  0.5 mg Oral BID    QUEtiapine  12.5 mg Oral Nightly    insulin regular  10 Units IntraVENous Once    lacosamide  200 mg Oral BID    levETIRAcetam  2,000 mg Oral Daily    levETIRAcetam  2,500 mg Oral Nightly    melatonin  5 mg Oral Nightly    pantoprazole  40 mg Oral QAM AC    sodium chloride flush  5-40 mL IntraVENous 2 times per day     Continuous Infusions:   sodium chloride      sodium chloride 25 mL (03/31/22 1615)    dextrose      dextrose      sodium chloride 25 mL (04/01/22 1736)     PRN Meds:sodium chloride, sodium chloride flush, sodium chloride, oxyCODONE **OR** oxyCODONE, LORazepam, glucose, dextrose, glucagon (rDNA), dextrose, sodium chloride flush, sodium chloride, ondansetron **OR** ondansetron, polyethylene glycol, acetaminophen **OR** acetaminophen    Objective:   Vitals:   T-max:  Patient Vitals for the past 8 hrs:   BP Temp Temp src Pulse Resp SpO2   04/04/22 0410 97/64 98.8 °F (37.1 °C) Oral 113 20 95 %       Intake/Output Summary (Last 24 hours) at 4/4/2022 0836  Last data filed at 4/4/2022 0600  Gross per 24 hour   Intake 140 ml   Output 5550 ml   Net -5410 ml         Physical Exam  Constitutional:       Appearance: She is obese. She is not diaphoretic.       Comments: Sleeping, TRIGLYCERIDE  ABGs:    No results for input(s): PHART, WST7QNZ, PO2ART, RAD7LPE, BEART, THGBART, M0JSMWRC, REA0MVB in the last 72 hours. INR:   No results for input(s): INR in the last 72 hours. Lactate: No results for input(s): LACTATE in the last 72 hours. Cultures:  -----------------------------------------------------------------  RAD:   CTA LOWER EXTREMITY LEFT W CONTRAST   Final Result   Impression: No acute arterial abnormality. No active hemorrhage. No hematoma of the pelvis, retroperitoneum or bilateral lower extremities. Suboptimal evaluation of the venous structures due to contrast bolus timing. Findings described above may represent sequelae of poor venous outflow or rethrombosis. CTA LOWER EXTREMITY RIGHT W CONTRAST   Final Result   Impression: No acute arterial abnormality. No active hemorrhage. No hematoma of the pelvis, retroperitoneum or bilateral lower extremities. Suboptimal evaluation of the venous structures due to contrast bolus timing. Findings described above may represent sequelae of poor venous outflow or rethrombosis. CT HEAD WO CONTRAST   Final Result      No acute or interval change. No hemorrhage or mass effect. CT ABDOMEN PELVIS W IV CONTRAST Additional Contrast? 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 IR GUIDED THROMB [address] VEIN   Final Result   Impression: Technically successful inferior venacavogram and bilateral iliac venogram demonstrating marked ileal caval clot associated with the filter including thrombus above the IVC filter. Technically successful vacuum-assisted thrombectomy with restoration of flow through the inferior vena cava and improved patency of the ileal caval system. Clot remains within the inferior vena cava filter.       VL Extremity Venous Bilateral   Final Result      XR CHEST PORTABLE   Final Result      Limited evaluation of lower right lung due to overlapping density for which infiltrate cannot be excluded. Otherwise no acute process seen. CT HEAD WO CONTRAST   Final Result      Postsurgical changes with areas of low cortical attenuation in the left parietal and left occipital region appear essentially stable from prior study with associated mild midline shift which is similar to slightly improved from prior study. No evidence of developing acute intracranial hemorrhage. CTA ABDOMEN PELVIS W WO CONTRAST   Final Result   1. 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 2. Surrounding hazy density, likely related to venous congestion. However, streaky densities extending inferiorly on the right may represent blood. However, there is no evidence of retroperitoneal hematoma. Correlation with lower extremity noninvasive    duplex Doppler is recommended in addition to serial hemoglobin and crit levels. 3. Suspect bilateral lower lobe pulmonary emboli. See comments above      Redemonstrated is a large necrotic mass involving the right breast enlarged fibroid uterus. Assessment/Plan:     Acute on chronic Fe def anemia of unknown etiology- stable   - 3 units of packed RBCs in the last 3 days  - Hemoglobin has stayed steady over the last 48 hours  - So far work-up has been negative: CT abdomen pelvis with IV contrast with no source of bleed, CT of the lower limbs with contrast did not show any extravasation, haptoglobin LDH and reticulocyte were unremarkable, Jaspal negative, FOBT negative  -  Lab Results   Component Value Date    IRON 21 (L) 04/01/2022    TIBC 169 (L) 04/01/2022    FERRITIN 338.5 (H) 04/01/2022   TSAT 12%  Venofer 200 X 3 doses  - GI is consulted: EGD did not show any source of bleeding. HB stable in past 72 hours   - Protonix 40 QD    Fever, most likely from thrombophlebitis.  Also has UTI - resolved   -Blood culture no growth to date  -Pt was started on zosyn on on 3/27 which was changed to  vanc and merrem on 3/28   - ID consulted, recs to dc merrem. Received 6 days of vancomycin will switch to p.o. doxycycline 100 twice daily for 4 more days for total of 10 days of antibiotics  - urine cult grew Enteroccocus fecalis  -Patient has remained afebrile for the last 72 hours    Hypotension  - - s/p 2 doses of lasix 40 IB BID yesterday, responded w 5.5L UO with a now Net - 5.4 L since admit    - give albumin 25 g for BID    Sinus tachycardia  Likely etiology from fever/ pain/ PE/UTI  - Continues to be in sinus tach, somewhat better HR.   -Continuous telemetry    HIT positive  Bilateral lower extremity DVTs  IVC filter thrombus s/p thrombectomy  -Platelets are holding steady  -Argatroban gtt switched to Eliquis  -We will keep IVC filter in for 6 weeks, IR to do outpatient  - s/p 2 doses of lasix 40 IB BID yesterday, responded w 5.5L UO with a now Net - 5.4 L since admit      Metastatic triple negative breast cancer with intracranial metastasis  S/p craniotomy on 3/12  - Oncology on board; recommended following medical and radiation oncology for systemic palliative chemotherapy and radiation therapy.   - Neglected right breast cancer is not bleeding or causing infection with wound be of little benefit to perform a mastectomy  - As needed oxycodone as needed for pain  -Consulted palliative care, patient has agreed to undergo all management  -Neurosurgery following, appreciate recs    Moderate thrombocytopenia s/p HIT positive - resolved  -DC all heparin products    Urinary retention  Attempted voiding trial on Saturday 4/2/22, straight cath X 2.   - placed mas  - cont flomax     Hyponatremia 2/2 SIADH- improving   - s/p 3 doses of samsca  -Nephrology consulted, appreciate recs    Complicated UTI  -UA revealing of +3 bacteria, negative leukocyte esterase negative nitrites  -Zosyn changed to [address] on 3/28 due to spike in fever  -ID consulted. Recommended vancomycin ONLY, vanc changed to doxy on 4/2/22    Seizures  -Keppra 2 g daily, Keppra 2.5 g nightly  -Vimpat 200 mg    Anxiety  -Ativan 1 mg as needed    Code Status: Full code  FEN: Regular diet  PPX: eliquis  DISPO: [facility]    Vangie Wise MD, PGY-1  04/04/22  8:36 AM    This patient has been staffed and discussed with Guille Walters MD.     Patient seen and examined, labs and imaging studies reviewed, agree with assessment and plan as outlined above. Continue with current care and plan. Discussed case with patients nurse, discussed case with care team, discussed plan.       Guille Walters MD [id number]

## 2022-04-04 NOTE — CARE COORDINATION
Case Management Assessment           Daily Note                 Date/ Time of Note: 4/4/2022 12:27 PM         Note completed by: Steven Quiros RN    Patient Name: Betty Russo  YOB: 1975    Diagnosis: Thrombocytopenia (HCC) [D69.6]  S/P craniotomy [Z98.890]  Acute deep vein thrombosis (DVT) of proximal vein of right lower extremity (HCC) [I82.4Y1]  Acute deep vein thrombosis (DVT) of other specified vein of right lower extremity ([address] [I82.491]  Patient Admission Status: Inpatient    Date of Admission:3/25/2022  6:11 PM Length of Stay: 10 GLOS:      Current Plan of Care: awaiting accepting placement, pre-cert for placement  ________________________________________________________________________________________  PT AM-PAC: 8 / 24 per last evaluation on: 04/04/22     OT AM-PAC: 15 / 24 per last evaluation on: 04/04/22     DME Needs for discharge: deferred  ________________________________________________________________________________________  Discharge Plan: SNF: [facility] to [facility] for review    Tentative discharge date: 04/04/22 pending acceptance and pre-cert    Current barriers to discharge: accepting facility and pre-cert    Referrals completed: SNF: [facility]    Resources/ information provided: SNF List  ________________________________________________________________________________________  Case Management Notes: JUNIOR continues to follow for DC planning and needs. JUNIOR spoke with patient at bedside, she has not made any decisions for SNF placement at this time. Patient agreeable to CM calling her mother to further discuss choices for SNF options. JUNIOR spoke with patients mother, she is wanting acute rehab for patient, JUNIOR explained that patient currently does not qualify for ARU placement as she is not able to tolerate 3 hours of intense therapy, but could work up to it at [facility] and then possibly go to [facility] once able to tolerate more therapy.  Mother agreeable and provided one SNF choice at this time of [facility], CM has sent referral and awaiting clinical review, if accepted with ask for pre-cert to be started for SNF admission. 12:58 PM  [facility] unable to accept due to age requirement for community. Patient is under the age of 56. CM sent additional referrals to [facility] and [facility] for clinical review. Jaiden Oden and her family were provided with choice of provider; she and her family are in agreement with the discharge plan.     Care Transition Patient: Candice Burroughs RN  [facility]  Case Management Department  Ph: [phone]  Fax: [phone]

## 2022-04-05 LAB
ALBUMIN SERPL-MCNC: 3.3 G/DL (ref 3.4–5)
ANION GAP SERPL CALCULATED.3IONS-SCNC: 12 MMOL/L (ref 3–16)
ANION GAP SERPL CALCULATED.3IONS-SCNC: 15 MMOL/L (ref 3–16)
ANISOCYTOSIS: ABNORMAL
BANDED NEUTROPHILS RELATIVE PERCENT: 1 % (ref 0–7)
BASOPHILS ABSOLUTE: 0 K/UL (ref 0–0.2)
BASOPHILS RELATIVE PERCENT: 0 %
BUN BLDV-MCNC: 7 MG/DL (ref 7–20)
BUN BLDV-MCNC: 9 MG/DL (ref 7–20)
CALCIUM SERPL-MCNC: 8.8 MG/DL (ref 8.3–10.6)
CALCIUM SERPL-MCNC: 9 MG/DL (ref 8.3–10.6)
CHLORIDE BLD-SCNC: 99 MMOL/L (ref 99–110)
CHLORIDE BLD-SCNC: 99 MMOL/L (ref 99–110)
CO2: 22 MMOL/L (ref 21–32)
CO2: 25 MMOL/L (ref 21–32)
CREAT SERPL-MCNC: 0.5 MG/DL (ref 0.6–1.1)
CREAT SERPL-MCNC: <0.5 MG/DL (ref 0.6–1.1)
EOSINOPHILS ABSOLUTE: 0 K/UL (ref 0–0.6)
EOSINOPHILS RELATIVE PERCENT: 0 %
GFR AFRICAN AMERICAN: >60
GFR AFRICAN AMERICAN: >60
GFR NON-AFRICAN AMERICAN: >60
GFR NON-AFRICAN AMERICAN: >60
GLUCOSE BLD-MCNC: 111 MG/DL (ref 70–99)
GLUCOSE BLD-MCNC: 119 MG/DL (ref 70–99)
GLUCOSE BLD-MCNC: 123 MG/DL (ref 70–99)
GLUCOSE BLD-MCNC: 128 MG/DL (ref 70–99)
GLUCOSE BLD-MCNC: 136 MG/DL (ref 70–99)
GLUCOSE BLD-MCNC: 137 MG/DL (ref 70–99)
HCT VFR BLD CALC: 22.9 % (ref 36–48)
HEMOGLOBIN: 7.7 G/DL (ref 12–16)
LYMPHOCYTES ABSOLUTE: 1 K/UL (ref 1–5.1)
LYMPHOCYTES RELATIVE PERCENT: 9 %
MACROCYTES: ABNORMAL
MCH RBC QN AUTO: 28.2 PG (ref 26–34)
MCHC RBC AUTO-ENTMCNC: 33.7 G/DL (ref 31–36)
MCV RBC AUTO: 83.5 FL (ref 80–100)
MICROCYTES: ABNORMAL
MONOCYTES ABSOLUTE: 1 K/UL (ref 0–1.3)
MONOCYTES RELATIVE PERCENT: 9 %
MYELOCYTE PERCENT: 1 %
NEUTROPHILS ABSOLUTE: 9 K/UL (ref 1.7–7.7)
NEUTROPHILS RELATIVE PERCENT: 80 %
PDW BLD-RTO: 15.3 % (ref 12.4–15.4)
PERFORMED ON: ABNORMAL
PHOSPHORUS: 3.4 MG/DL (ref 2.5–4.9)
PLATELET # BLD: 378 K/UL (ref 135–450)
PMV BLD AUTO: 7.4 FL (ref 5–10.5)
POLYCHROMASIA: ABNORMAL
POTASSIUM SERPL-SCNC: 3.6 MMOL/L (ref 3.5–5.1)
POTASSIUM SERPL-SCNC: 3.7 MMOL/L (ref 3.5–5.1)
RBC # BLD: 2.75 M/UL (ref 4–5.2)
SODIUM BLD-SCNC: 136 MMOL/L (ref 136–145)
SODIUM BLD-SCNC: 136 MMOL/L (ref 136–145)
WBC # BLD: 11 K/UL (ref 4–11)

## 2022-04-05 PROCEDURE — 36415 COLL VENOUS BLD VENIPUNCTURE: CPT

## 2022-04-05 PROCEDURE — 85025 COMPLETE CBC W/AUTO DIFF WBC: CPT

## 2022-04-05 PROCEDURE — 99233 SBSQ HOSP IP/OBS HIGH 50: CPT | Performed by: INTERNAL MEDICINE

## 2022-04-05 PROCEDURE — 99232 SBSQ HOSP IP/OBS MODERATE 35: CPT | Performed by: INTERNAL MEDICINE

## 2022-04-05 PROCEDURE — 6370000000 HC RX 637 (ALT 250 FOR IP): Performed by: STUDENT IN AN ORGANIZED HEALTH CARE EDUCATION/TRAINING PROGRAM

## 2022-04-05 PROCEDURE — 2580000003 HC RX 258: Performed by: STUDENT IN AN ORGANIZED HEALTH CARE EDUCATION/TRAINING PROGRAM

## 2022-04-05 PROCEDURE — P9047 ALBUMIN (HUMAN), 25%, 50ML: HCPCS | Performed by: INTERNAL MEDICINE

## 2022-04-05 PROCEDURE — 6370000000 HC RX 637 (ALT 250 FOR IP): Performed by: INTERNAL MEDICINE

## 2022-04-05 PROCEDURE — 2060000000 HC ICU INTERMEDIATE R&B

## 2022-04-05 PROCEDURE — 80069 RENAL FUNCTION PANEL: CPT

## 2022-04-05 PROCEDURE — 2580000003 HC RX 258: Performed by: INTERNAL MEDICINE

## 2022-04-05 PROCEDURE — 6360000002 HC RX W HCPCS: Performed by: INTERNAL MEDICINE

## 2022-04-05 RX ORDER — FUROSEMIDE 10 MG/ML
40 INJECTION INTRAMUSCULAR; INTRAVENOUS 2 TIMES DAILY
Status: DISCONTINUED | OUTPATIENT
Start: 2022-04-05 | End: 2022-04-10 | Stop reason: HOSPADM

## 2022-04-05 RX ADMIN — APIXABAN 10 MG: 5 TABLET, FILM COATED ORAL at 20:00

## 2022-04-05 RX ADMIN — OXYCODONE 10 MG: 5 TABLET ORAL at 20:00

## 2022-04-05 RX ADMIN — QUETIAPINE FUMARATE 12.5 MG: 25 TABLET ORAL at 20:00

## 2022-04-05 RX ADMIN — ALBUMIN (HUMAN) 25 G: 0.25 INJECTION, SOLUTION INTRAVENOUS at 21:09

## 2022-04-05 RX ADMIN — DOXYCYCLINE 100 MG: 100 CAPSULE ORAL at 08:19

## 2022-04-05 RX ADMIN — SODIUM CHLORIDE, PRESERVATIVE FREE 10 ML: 5 INJECTION INTRAVENOUS at 20:08

## 2022-04-05 RX ADMIN — PANTOPRAZOLE SODIUM 40 MG: 40 TABLET, DELAYED RELEASE ORAL at 06:46

## 2022-04-05 RX ADMIN — ALBUMIN (HUMAN) 25 G: 0.25 INJECTION, SOLUTION INTRAVENOUS at 09:57

## 2022-04-05 RX ADMIN — LEVETIRACETAM 2000 MG: 250 TABLET, FILM COATED ORAL at 08:20

## 2022-04-05 RX ADMIN — LACOSAMIDE 200 MG: 50 TABLET, FILM COATED ORAL at 20:00

## 2022-04-05 RX ADMIN — DEXTROSE MONOHYDRATE 100 ML/HR: 50 INJECTION, SOLUTION INTRAVENOUS at 09:57

## 2022-04-05 RX ADMIN — LEVETIRACETAM 2500 MG: 250 TABLET, FILM COATED ORAL at 20:00

## 2022-04-05 RX ADMIN — ACETAMINOPHEN 325MG 650 MG: 325 TABLET ORAL at 20:00

## 2022-04-05 RX ADMIN — APIXABAN 10 MG: 5 TABLET, FILM COATED ORAL at 08:20

## 2022-04-05 RX ADMIN — TAMSULOSIN HYDROCHLORIDE 0.4 MG: 0.4 CAPSULE ORAL at 08:20

## 2022-04-05 RX ADMIN — OXYCODONE 10 MG: 5 TABLET ORAL at 08:38

## 2022-04-05 RX ADMIN — FUROSEMIDE 40 MG: 10 INJECTION, SOLUTION INTRAMUSCULAR; INTRAVENOUS at 17:54

## 2022-04-05 RX ADMIN — DOXYCYCLINE 100 MG: 100 CAPSULE ORAL at 20:00

## 2022-04-05 RX ADMIN — FUROSEMIDE 40 MG: 10 INJECTION, SOLUTION INTRAMUSCULAR; INTRAVENOUS at 12:09

## 2022-04-05 RX ADMIN — SODIUM CHLORIDE, PRESERVATIVE FREE 10 ML: 5 INJECTION INTRAVENOUS at 08:21

## 2022-04-05 RX ADMIN — SODIUM CHLORIDE, PRESERVATIVE FREE 10 ML: 5 INJECTION INTRAVENOUS at 20:09

## 2022-04-05 RX ADMIN — LACOSAMIDE 200 MG: 50 TABLET, FILM COATED ORAL at 08:20

## 2022-04-05 RX ADMIN — Medication 5 MG: at 21:03

## 2022-04-05 ASSESSMENT — PAIN DESCRIPTION - ONSET
ONSET: ON-GOING

## 2022-04-05 ASSESSMENT — PAIN DESCRIPTION - DESCRIPTORS
DESCRIPTORS: ACHING;HEAVINESS

## 2022-04-05 ASSESSMENT — ENCOUNTER SYMPTOMS
EYE DISCHARGE: 0
SORE THROAT: 0
ABDOMINAL PAIN: 0
NAUSEA: 0
SHORTNESS OF BREATH: 1
VOICE CHANGE: 0
WHEEZING: 0
COUGH: 0
VOMITING: 0

## 2022-04-05 ASSESSMENT — PAIN DESCRIPTION - LOCATION
LOCATION: LEG;OTHER (COMMENT)

## 2022-04-05 ASSESSMENT — PAIN DESCRIPTION - ORIENTATION
ORIENTATION: RIGHT;LEFT

## 2022-04-05 ASSESSMENT — PAIN - FUNCTIONAL ASSESSMENT
PAIN_FUNCTIONAL_ASSESSMENT: PREVENTS OR INTERFERES SOME ACTIVE ACTIVITIES AND ADLS

## 2022-04-05 ASSESSMENT — PAIN DESCRIPTION - PAIN TYPE
TYPE: ACUTE PAIN

## 2022-04-05 ASSESSMENT — PAIN DESCRIPTION - PROGRESSION
CLINICAL_PROGRESSION: GRADUALLY IMPROVING
CLINICAL_PROGRESSION: GRADUALLY IMPROVING
CLINICAL_PROGRESSION: NOT CHANGED

## 2022-04-05 ASSESSMENT — PAIN SCALES - GENERAL
PAINLEVEL_OUTOF10: 0
PAINLEVEL_OUTOF10: 2
PAINLEVEL_OUTOF10: 3
PAINLEVEL_OUTOF10: 7
PAINLEVEL_OUTOF10: 7

## 2022-04-05 ASSESSMENT — PAIN SCALES - WONG BAKER: WONGBAKER_NUMERICALRESPONSE: 0

## 2022-04-05 ASSESSMENT — PAIN DESCRIPTION - FREQUENCY
FREQUENCY: CONTINUOUS

## 2022-04-05 NOTE — PROGRESS NOTES
Progress Note    Admit Date: 3/25/2022  Day: 11  Diet: ADULT DIET; Regular    CC: leg pain      Interval history:   PETROS. States that she has continued lower extremity swelling. No significant pain in her small lower extremity. Does feel weak however. No new fevers, chills, chest pain, shortness of breath, abdominal pain, change in bowel or bladder.  Ready for SNF placement    Medications:     Scheduled Meds:   albumin human  25 g IntraVENous Q12H    tamsulosin  0.4 mg Oral Daily    doxycycline monohydrate  100 mg Oral 2 times per day    apixaban  10 mg Oral BID    Followed by   Radha Hernandez ON 4/6/2022] apixaban  5 mg Oral BID    sodium chloride flush  5-40 mL IntraVENous 2 times per day    metoprolol  5 mg IntraVENous Once    [Held by provider] clonazePAM  0.5 mg Oral BID    QUEtiapine  12.5 mg Oral Nightly    insulin regular  10 Units IntraVENous Once    lacosamide  200 mg Oral BID    levETIRAcetam  2,000 mg Oral Daily    levETIRAcetam  2,500 mg Oral Nightly    melatonin  5 mg Oral Nightly    pantoprazole  40 mg Oral QAM AC    sodium chloride flush  5-40 mL IntraVENous 2 times per day     Continuous Infusions:   sodium chloride      sodium chloride 25 mL (03/31/22 1615)    dextrose      dextrose 100 mL/hr (04/05/22 0957)    sodium chloride 25 mL (04/01/22 1736)     PRN Meds:sodium chloride, sodium chloride flush, sodium chloride, oxyCODONE **OR** oxyCODONE, LORazepam, glucose, dextrose, glucagon (rDNA), dextrose, sodium chloride flush, sodium chloride, ondansetron **OR** ondansetron, polyethylene glycol, acetaminophen **OR** acetaminophen    Objective:   Vitals:   T-max:  Patient Vitals for the past 8 hrs:   BP Temp Temp src Pulse Resp SpO2 Weight   04/05/22 0812 110/77 99.3 °F (37.4 °C) Oral 111 18 95 % --   04/05/22 0400 99/65 97.7 °F (36.5 °C) Axillary 104 14 94 % 221 lb 1.9 oz (100.3 kg)       Intake/Output Summary (Last 24 hours) at 4/5/2022 1055  Last data filed at 4/5/2022 1009  Gross Renal:    Recent Labs     04/02/22  1735 04/03/22 0456 04/04/22  0552   * 132* 132*   K 4.0 4.0 3.3*   CL 97* 99 97*   CO2 21 22 27   BUN 10 10 10   CREATININE <0.5* <0.5* <0.5*   GLUCOSE 138* 116* 117*   CALCIUM 8.2* 8.4 8.9   PHOS 3.5 3.6 3.5   ANIONGAP 12 11 8     Hepatic:   Recent Labs     04/02/22 1735 04/03/22 0456 04/04/22  0552   AST  --  45*  --    ALT  --  58*  --    BILITOT  --  <0.2  --    BILIDIR  --  <0.2  --    PROT  --  6.0*  --    LABALBU 2.5* 2.7*  2.7* 2.8*   ALKPHOS  --  99  --      Troponin: No results for input(s): TROPONINI in the last 72 hours. BNP: No results for input(s): BNP in the last 72 hours. Lipids: No results for input(s): CHOL, HDL in the last 72 hours. Invalid input(s): LDLCALCU, TRIGLYCERIDE  ABGs:  No results for input(s): PHART, HNQ4VRG, PO2ART, FDI9QNQ, BEART, THGBART, S5XDIZAI, LQO1GLJ in the last 72 hours. INR: No results for input(s): INR in the last 72 hours. Lactate: No results for input(s): LACTATE in the last 72 hours. Cultures:  -----------------------------------------------------------------  RAD:   CTA LOWER EXTREMITY LEFT W CONTRAST   Final Result   Impression: No acute arterial abnormality. No active hemorrhage. No hematoma of the pelvis, retroperitoneum or bilateral lower extremities. Suboptimal evaluation of the venous structures due to contrast bolus timing. Findings described above may represent sequelae of poor venous outflow or rethrombosis. CTA LOWER EXTREMITY RIGHT W CONTRAST   Final Result   Impression: No acute arterial abnormality. No active hemorrhage. No hematoma of the pelvis, retroperitoneum or bilateral lower extremities. Suboptimal evaluation of the venous structures due to contrast bolus timing. Findings described above may represent sequelae of poor venous outflow or rethrombosis. CT HEAD WO CONTRAST   Final Result      No acute or interval change. No hemorrhage or mass effect.       CT ABDOMEN PELVIS W IV CONTRAST Additional Contrast? 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 IR GUIDED THROMB [facility] VEIN   Final Result   Impression: Technically successful inferior venacavogram and bilateral iliac venogram demonstrating marked ileal caval clot associated with the filter including thrombus above the IVC filter. Technically successful vacuum-assisted thrombectomy with restoration of flow through the inferior vena cava and improved patency of the ileal caval system. Clot remains within the inferior vena cava filter. VL Extremity Venous Bilateral   Final Result      XR CHEST PORTABLE   Final Result      Limited evaluation of lower right lung due to overlapping density for which infiltrate cannot be excluded. Otherwise no acute process seen. CT HEAD WO CONTRAST   Final Result      Postsurgical changes with areas of low cortical attenuation in the left parietal and left occipital region appear essentially stable from prior study with associated mild midline shift which is similar to slightly improved from prior study. No evidence of developing acute intracranial hemorrhage. CTA ABDOMEN PELVIS W WO CONTRAST   Final Result   1. 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 2. Surrounding hazy density, likely related to venous congestion. However, streaky densities extending inferiorly on the right may represent blood. However, there is no evidence of retroperitoneal hematoma. Correlation with lower extremity noninvasive    duplex Doppler is recommended in addition to serial hemoglobin and crit levels. 3. Suspect bilateral lower lobe pulmonary emboli. See comments above      Redemonstrated is a large necrotic mass involving the right breast enlarged fibroid uterus. Assessment/Plan:     Hyperthermia (resolved), most likely from thrombophlebitis vs UTI   Urine cult grew Enteroccocus fecalis  -Blood culture no growth to date   -ID consulted - doxycycline 100 twice daily for 4 more days     Sinus tachycardia 2/2 malignancy  - Continues to be in sinus tach, somewhat better HR.   -Continuous telemetry     Bilateral lower extremity DVTs likely resulting in lower extremity swelling. IVC filter thrombus s/p thrombectomy  -Platelets are holding steady  -Continue on Eliquis  -We will keep IVC filter in for 6 weeks, IR to do outpatient  - Continue lasix while in the hospital. Will discontinue on discharge.      Metastatic triple negative breast cancer with intracranial metastasis  -S/p craniotomy on 3/12  -Oncology on board; recommended following medical and radiation oncology for systemic palliative chemotherapy and radiation therapy.  -Neglected right breast cancer is not bleeding or causing infection with wound be of little benefit to perform a mastectomy  - As needed oxycodone as needed for pain  -Consulted palliative care, patient has agreed to undergo all management      Urinary retention  Attempted voiding trial on Saturday 4/2/22, straight cath X 2.   - Continue mas can do voiding trial tomorrow   - cont flomax      Hyponatremia 2/2 SIADH- improving   -Nephrology consulted, appreciate recs     Chronic Disease / Resolved   Seizures -Keppra 2 g daily, Keppra 2.5 g nightly Vimpat 200 mg  Anxiety -Ativan 1 mg as needed  Moderate thrombocytopenia s/p HIT positive - DC all heparin products. No future use of heparin    Code Status: Full Code  FEN: ADULT DIET; Regular  PPX: eliquis  DISPO: Discharge pending precert    Jer Huff DO, PGY-1  04/05/22  10:55 AM    This patient has been staffed and discussed with Marie Roblero MD.     Patient seen and examined, labs and imaging studies reviewed, agree with assessment and plan as outlined above. Continue with current care and plan. Discussed case with patients nurse, discussed case with care team, discussed plan. Discussed case with nephrology, mary stevens.       MD Andrew Leiva

## 2022-04-05 NOTE — DISCHARGE SUMMARY
INTERNAL MEDICINE DEPARTMENT AT [address] [place] [address]  DISCHARGE SUMMARY    Patient ID: Bela Yanez                                             Discharge Date: 4/10/2022   Patient's PCP: JUDITH Haskins - CNP                                          Discharge Physician: Vandana Sin DO   Admit Date: 3/25/2022   Admitting Physician: Horace Candelario MD    PROBLEMS DURING HOSPITALIZATION:  Present on Admission:   Deep venous thrombosis ([address]   Brain metastases ([address]   Duct cell carcinoma ([address]   Fever   Phlebitis   S/P craniotomy      DISCHARGE DIAGNOSES:    HPI: 53-year-old female with metastatic breast cancer status post craniotomy with mass resection presented from [facility] due to worsening lower extremity pain and swelling.  During her last admission patient was diagnosed with a DVT in the right lower extremity and due to her recent Craniotomy anticoagulation could not be started till POD 14 so patient underwent an IVC filter.  Her platelets levels were noted to be low at [facility] and due to concern for worsening DVT she was sent to the hospital.  CT abdomen and pelvis was performed which did show thrombosis of the IVC at the level of the filter with marked distention of the caval bifurcation. IR was consulted for the clots in her IVC. It was discussed that her IVC would be removed/replaced in 6 weeks. HIIT panel did result positive and patient was transitioned to Eliquis. Patient was febrile while in the hospital unclear etiology of fever originally and was considering thrombophlebitis versus UTI. UTI did results in positive cultures and patient was started on Unasyn and was continued for an additional 4 days after discharge. Was consulted regarding her trip negative invasive ductal carcinoma of the right breast.  No surgery was while in the hospital but with discussion with oncology radiation therapy to be completed on an outpatient basis. Continued to have swelling in her lower extremity. Did aggressively diurese her while in the hospital. It was determined that her lower extremity swelling was secondary to clot congestion in the IVC after echocardiogram was negative for right heart strain     Discharge to SNF for further rehab. The following issues were addressed during hospitalization:    Sinus tachycardia 2/2 malignancy     Bilateral lower extremity DVTs likely resulting in lower extremity swelling. IVC filter thrombus s/p thrombectomy  -Continue on Eliquis on discharge  -We will keep IVC filter in for 6 weeks, IR to do outpatient  - Continue lasix while in the hospital. Will discontinue on discharge. - Consider diuresis at SNF.      Metastatic triple negative breast cancer with intracranial metastasis  -S/p craniotomy on 3/12  -Oncology on board; recommended following medical and radiation oncology for systemic palliative chemotherapy and radiation therapy.  -Neglected right breast cancer is not bleeding or causing infection with wound be of little benefit to perform a mastectomy  - As needed oxycodone as needed for pain  -Will plan on outpatient follow up with rad/onc and possibly general surgery for breast cancer      Urinary retention  Attempted voiding trial on Saturday 4/2/22, straight cath X 2.   - Continue mas can do voiding trial tomorrow (4/6/22 or while at SNF   - cont flomax      Hyponatremia 2/2 SIADH - improving   Hyperthermia (resolved), most likely from thrombophlebitis vs UTI      Chronic Disease / Resolved   Seizures -Continue Keppra 2 g daily, Keppra 2.5 g nightly Vimpat 200 mg  Anxiety -Continue Ativan 1 mg as needed  Moderate thrombocytopenia s/p HIT positive - DC all heparin products.  No future use of heparin    Physical Exam:  /66   Pulse 110   Temp 97.9 °F (36.6 °C) (Oral)   Resp 16   Ht 5' 3\" (1.6 m)   Wt 197 lb 5 oz (89.5 kg)   SpO2 93%   BMI 34.95 kg/m²       Consults: ID, nephrology, hematology/oncology, general surgery and rad onc  Significant about: Should I take this medication?            Where to Get Your Medications      These medications were sent to [address], [facility]   [address] [facility], [address] [place], [place] [id number]    Phone: [phone]   · spironolactone 50 MG tablet     You can get these medications from any pharmacy    Bring a paper prescription for each of these medications  · apixaban 5 MG Tabs tablet  · apixaban 5 MG Tabs tablet  · oxyCODONE 5 MG immediate release tablet       Activity: activity as tolerated  Diet: regular diet  Wound Care: keep wound clean and dry    Time Spent on discharge is more than 1.5 hours    Signed:  Deshaun Mcduffie DO, PGY-1  4/10/2022

## 2022-04-05 NOTE — PROGRESS NOTES
ID Follow-up NOTE    CC:   Fever, leukocytosis  Antibiotics: Doxycycline    Admit Date: 3/25/2022  Hospital Day: 12    Subjective:     Ongoing LE leg swelling, \"feel heavy\"  Pt reports less R arm tenderness same (tender with palpation above antecubital fossa)    Objective:     Tm 99.3    Patient Vitals for the past 8 hrs:   BP Temp Temp src Pulse Resp SpO2 Weight   04/05/22 0812 110/77 99.3 °F (37.4 °C) Oral 111 18 95 % --   04/05/22 0400 99/65 97.7 °F (36.5 °C) Axillary 104 14 94 % 221 lb 1.9 oz (100.3 kg)     I/O last 3 completed shifts: In: 140 [P.O.:120; I.V.:20]  Out: 3200 [Urine:3200]  I/O this shift:  In: 340 [P.O.:340]  Out: -     EXAM:  GENERAL: No apparent distress. RA  HEENT: Membranes moist, no oral lesion  NECK:  Supple, no lymphadenopathy  LUNGS: Clear b/l, no rales, no dullness  CARDIAC: RRR, no murmur appreciated  R breast with mass - dressing. Bety Live 4/1 - little drainage superiorly, no ulceration (seen with female RN)]  ABD:  + BS, soft / NT  EXT:  LE edema, bilateral, pitting.  R arm cord proximal to antecubital fossa, tender (less)    No rash, no lesions  NEURO: No focal neurologic findings  PSYCH: Orientation, sensorium, mood normal  LINES:  Peripheral iv       Data Review:  Lab Results   Component Value Date    WBC 11.0 04/05/2022    HGB 7.7 (L) 04/05/2022    HCT 22.9 (L) 04/05/2022    MCV 83.5 04/05/2022     04/05/2022     Lab Results   Component Value Date    CREATININE <0.5 (L) 04/04/2022    BUN 10 04/04/2022     (L) 04/04/2022    K 3.3 (L) 04/04/2022    CL 97 (L) 04/04/2022    CO2 27 04/04/2022       Hepatic Function Panel:   Lab Results   Component Value Date    ALKPHOS 99 04/03/2022    ALT 58 04/03/2022    AST 45 04/03/2022    PROT 6.0 04/03/2022    BILITOT <0.2 04/03/2022    BILIDIR <0.2 04/03/2022    IBILI see below 04/03/2022    LABALBU 2.8 04/04/2022       Micro:  3/27 Urine cult >100k E faecalis  3/27 BC x 2 no growth      Imaging:   3/28 LE doppler u/s   Extensive deep and superficial venous thrombosis noted in the bilateral    lower extremities essentially from groin to ankle.      3/29 IR thrombectomy  Impression:   Technically successful inferior venacavogram and bilateral iliac venogram demonstrating marked ileal caval clot associated with the filter including thrombus above the IVC filter. Technically successful vacuum-assisted thrombectomy with restoration of flow through the inferior vena cava and improved patency of the ileal caval system. Clot remains within the inferior vena cava filter      Scheduled Meds:   albumin human  25 g IntraVENous Q12H    tamsulosin  0.4 mg Oral Daily    doxycycline monohydrate  100 mg Oral 2 times per day    apixaban  10 mg Oral BID    Followed by   Michelle Reinoso ON 4/6/2022] apixaban  5 mg Oral BID    sodium chloride flush  5-40 mL IntraVENous 2 times per day    metoprolol  5 mg IntraVENous Once    [Held by provider] clonazePAM  0.5 mg Oral BID    QUEtiapine  12.5 mg Oral Nightly    insulin regular  10 Units IntraVENous Once    lacosamide  200 mg Oral BID    levETIRAcetam  2,000 mg Oral Daily    levETIRAcetam  2,500 mg Oral Nightly    melatonin  5 mg Oral Nightly    pantoprazole  40 mg Oral QAM AC    sodium chloride flush  5-40 mL IntraVENous 2 times per day       Continuous Infusions:   sodium chloride      sodium chloride 25 mL (03/31/22 1615)    dextrose      dextrose 100 mL/hr (04/05/22 0957)    sodium chloride 25 mL (04/01/22 1736)       PRN Meds:  sodium chloride, sodium chloride flush, sodium chloride, oxyCODONE **OR** oxyCODONE, LORazepam, glucose, dextrose, glucagon (rDNA), dextrose, sodium chloride flush, sodium chloride, ondansetron **OR** ondansetron, polyethylene glycol, acetaminophen **OR** acetaminophen      Assessment: Hx breast ca, triple negative, stage IIB invasive ductal carcinoma, brain metastasis, seizures, hx DVT     3/12 Brain met resected 3/12  3/18 Seizure, dx new DVT, IVC filter placed. 3/25 Leg pain and swelling. Extensive LE DVT      Fever   - unclear source, may be from clot and not have infectious process   - no pneumonia, lung bases on CT with atelectasis or scarring   - no pyuria, no urinary sx   - R breast mass, does not appear to be infected   - R arm phlebitis, possible source   - Procalcitonin 1.02     Leukocytosis, WBC 11.3- today 4/4/22    Plan:     Cont Doxycycline 100 bid x 4 days further     Medical Decision Making:   The following items were considered in medical decision making:  Discussion of patient care with other providers  Reviewed clinical lab tests  Reviewed radiology tests  Reviewed other diagnostic tests/interventions  Microbiology cultures and other micro tests reviewed      Discussed with pt  Will sign off, call with further ID issues  Avi Jennings MD

## 2022-04-05 NOTE — CARE COORDINATION
Case Management Assessment           Daily Note                 Date/ Time of Note: 4/5/2022 10:14 AM         Note completed by: ISAIAH Odell    Patient Name: Domo Newsome  YOB: 1975    Diagnosis: Thrombocytopenia (HCC) [D69.6]  S/P craniotomy [Z98.890]  Acute deep vein thrombosis (DVT) of proximal vein of right lower extremity (HCC) [I82.4Y1]  Acute deep vein thrombosis (DVT) of other specified vein of right lower extremity ([address] [I82.491]  Patient Admission Status: Inpatient    Date of Admission:3/25/2022  6:11 PM Length of Stay: 11 GLOS:      Current Plan of Care: awaiting placement, precert  ________________________________________________________________________________________  PT AM-PAC: 8 / 24 per last evaluation on: 4/4/22    OT AM-PAC: 15 / 24 per last evaluation on: 4/4/22    DME Needs for discharge: deferred  ________________________________________________________________________________________  Discharge Plan: SNF: TBD    Tentative discharge date: 4/4/22 pending facility acceptance, precert    Current barriers to discharge: facility acceptance, precert    Referrals completed: Other: SNF, Home at [facility], [facility]    Resources/ information provided: SNF List  ________________________________________________________________________________________  Case Management Notes: SW following for SNF placement. Previous referrals sent for placement all declined to accept. SW met with pt at bedside to provide update, pt reports she came from [facility] and does not want to return (citing wrong medication given). SW discussed SNF options, she agreed to additional referrals being sent. SW sent referrals (Home at [facility], [facility], [facility]), awaiting decision on placement. Precert is needed. 1500: [facility] is unable to accept due to pt's age. SW still waiting to hear back from Home at [address]       Jerica Ford and her family were provided with choice of provider; she and her family are in agreement with the discharge plan.     Care Transition Patient: ISAIAH Santos  [facility]  Case Management Department  Ph: [phone]  Fax: [phone]

## 2022-04-05 NOTE — PLAN OF CARE
Problem: Falls - Risk of:  Goal: Will remain free from falls  Description: Will remain free from falls  Outcome: Ongoing   Remains free from falls and accidental injury. Assessed as high fall risk, all precautions in place, utilizing call light appropriately for needs. Will monitor. Problem: Pain:  Goal: Pain level will decrease  Description: Pain level will decrease  Outcome: Ongoing   Pt c/o pain in bilateral legs and calves. PRN medications given once. Will monitor.      Problem: Skin Integrity:  Goal: Will show no infection signs and symptoms  Description: Will show no infection signs and symptoms  Outcome: Ongoing   Wound care completed on right upper chest.

## 2022-04-06 LAB
GLUCOSE BLD-MCNC: 124 MG/DL (ref 70–99)
GLUCOSE BLD-MCNC: 134 MG/DL (ref 70–99)
GLUCOSE BLD-MCNC: 148 MG/DL (ref 70–99)
PERFORMED ON: ABNORMAL

## 2022-04-06 PROCEDURE — 97116 GAIT TRAINING THERAPY: CPT

## 2022-04-06 PROCEDURE — 6370000000 HC RX 637 (ALT 250 FOR IP): Performed by: STUDENT IN AN ORGANIZED HEALTH CARE EDUCATION/TRAINING PROGRAM

## 2022-04-06 PROCEDURE — 99233 SBSQ HOSP IP/OBS HIGH 50: CPT | Performed by: INTERNAL MEDICINE

## 2022-04-06 PROCEDURE — 97535 SELF CARE MNGMENT TRAINING: CPT

## 2022-04-06 PROCEDURE — 97530 THERAPEUTIC ACTIVITIES: CPT

## 2022-04-06 PROCEDURE — 2580000003 HC RX 258: Performed by: STUDENT IN AN ORGANIZED HEALTH CARE EDUCATION/TRAINING PROGRAM

## 2022-04-06 PROCEDURE — 6370000000 HC RX 637 (ALT 250 FOR IP): Performed by: INTERNAL MEDICINE

## 2022-04-06 PROCEDURE — 2060000000 HC ICU INTERMEDIATE R&B

## 2022-04-06 PROCEDURE — 2580000003 HC RX 258: Performed by: INTERNAL MEDICINE

## 2022-04-06 PROCEDURE — 97110 THERAPEUTIC EXERCISES: CPT

## 2022-04-06 PROCEDURE — P9047 ALBUMIN (HUMAN), 25%, 50ML: HCPCS | Performed by: INTERNAL MEDICINE

## 2022-04-06 PROCEDURE — 6360000002 HC RX W HCPCS: Performed by: INTERNAL MEDICINE

## 2022-04-06 RX ORDER — TORSEMIDE 20 MG/1
20 TABLET ORAL DAILY
Qty: 15 TABLET | Refills: 0 | Status: SHIPPED | OUTPATIENT
Start: 2022-04-06 | End: 2022-04-08 | Stop reason: HOSPADM

## 2022-04-06 RX ADMIN — SODIUM CHLORIDE 25 ML: 9 INJECTION, SOLUTION INTRAVENOUS at 10:21

## 2022-04-06 RX ADMIN — OXYCODONE 10 MG: 5 TABLET ORAL at 20:45

## 2022-04-06 RX ADMIN — LEVETIRACETAM 2500 MG: 250 TABLET, FILM COATED ORAL at 20:45

## 2022-04-06 RX ADMIN — QUETIAPINE FUMARATE 12.5 MG: 25 TABLET ORAL at 20:44

## 2022-04-06 RX ADMIN — SODIUM CHLORIDE, PRESERVATIVE FREE 10 ML: 5 INJECTION INTRAVENOUS at 21:02

## 2022-04-06 RX ADMIN — ALBUMIN (HUMAN) 25 G: 0.25 INJECTION, SOLUTION INTRAVENOUS at 10:22

## 2022-04-06 RX ADMIN — PANTOPRAZOLE SODIUM 40 MG: 40 TABLET, DELAYED RELEASE ORAL at 06:06

## 2022-04-06 RX ADMIN — LACOSAMIDE 200 MG: 50 TABLET, FILM COATED ORAL at 20:44

## 2022-04-06 RX ADMIN — TAMSULOSIN HYDROCHLORIDE 0.4 MG: 0.4 CAPSULE ORAL at 08:36

## 2022-04-06 RX ADMIN — APIXABAN 10 MG: 5 TABLET, FILM COATED ORAL at 08:36

## 2022-04-06 RX ADMIN — LACOSAMIDE 200 MG: 50 TABLET, FILM COATED ORAL at 08:36

## 2022-04-06 RX ADMIN — FUROSEMIDE 40 MG: 10 INJECTION, SOLUTION INTRAMUSCULAR; INTRAVENOUS at 08:34

## 2022-04-06 RX ADMIN — Medication 5 MG: at 20:44

## 2022-04-06 RX ADMIN — LEVETIRACETAM 2000 MG: 250 TABLET, FILM COATED ORAL at 08:35

## 2022-04-06 RX ADMIN — ALBUMIN (HUMAN) 25 G: 0.25 INJECTION, SOLUTION INTRAVENOUS at 20:42

## 2022-04-06 RX ADMIN — SODIUM CHLORIDE, PRESERVATIVE FREE 10 ML: 5 INJECTION INTRAVENOUS at 08:35

## 2022-04-06 RX ADMIN — OXYCODONE 10 MG: 5 TABLET ORAL at 14:00

## 2022-04-06 RX ADMIN — FUROSEMIDE 40 MG: 10 INJECTION, SOLUTION INTRAMUSCULAR; INTRAVENOUS at 18:44

## 2022-04-06 RX ADMIN — APIXABAN 5 MG: 5 TABLET, FILM COATED ORAL at 20:44

## 2022-04-06 ASSESSMENT — PAIN DESCRIPTION - ORIENTATION
ORIENTATION: RIGHT;LEFT
ORIENTATION: LEFT;POSTERIOR
ORIENTATION: LOWER;POSTERIOR
ORIENTATION: LEFT;POSTERIOR

## 2022-04-06 ASSESSMENT — PAIN DESCRIPTION - DESCRIPTORS
DESCRIPTORS: HEADACHE
DESCRIPTORS: ACHING
DESCRIPTORS: HEADACHE

## 2022-04-06 ASSESSMENT — PAIN DESCRIPTION - PAIN TYPE
TYPE: ACUTE PAIN

## 2022-04-06 ASSESSMENT — PAIN DESCRIPTION - LOCATION
LOCATION: HEAD
LOCATION: LEG
LOCATION: HEAD

## 2022-04-06 ASSESSMENT — PAIN - FUNCTIONAL ASSESSMENT
PAIN_FUNCTIONAL_ASSESSMENT: ACTIVITIES ARE NOT PREVENTED

## 2022-04-06 ASSESSMENT — PAIN DESCRIPTION - PROGRESSION
CLINICAL_PROGRESSION: NOT CHANGED
CLINICAL_PROGRESSION: NOT CHANGED
CLINICAL_PROGRESSION: GRADUALLY IMPROVING
CLINICAL_PROGRESSION: NOT CHANGED

## 2022-04-06 ASSESSMENT — PAIN SCALES - GENERAL
PAINLEVEL_OUTOF10: 6
PAINLEVEL_OUTOF10: 0
PAINLEVEL_OUTOF10: 7
PAINLEVEL_OUTOF10: 8
PAINLEVEL_OUTOF10: 5
PAINLEVEL_OUTOF10: 7
PAINLEVEL_OUTOF10: 7
PAINLEVEL_OUTOF10: 5

## 2022-04-06 ASSESSMENT — ENCOUNTER SYMPTOMS
VOICE CHANGE: 0
WHEEZING: 0
COUGH: 0
SORE THROAT: 0
ABDOMINAL PAIN: 0
EYE DISCHARGE: 0
SHORTNESS OF BREATH: 1
NAUSEA: 0
VOMITING: 0

## 2022-04-06 ASSESSMENT — PAIN DESCRIPTION - FREQUENCY
FREQUENCY: INTERMITTENT
FREQUENCY: CONTINUOUS

## 2022-04-06 ASSESSMENT — PAIN DESCRIPTION - ONSET
ONSET: GRADUAL
ONSET: ON-GOING
ONSET: GRADUAL
ONSET: GRADUAL

## 2022-04-06 NOTE — PROGRESS NOTES
Nephrology  Note                                                                                                                                                                                                                                                                                                                                                               Office : [phone]     Fax :[phone]              Patient's Name: Domo Newsome    Reason for Consult:  Hyponatremia   Requesting Physician:  JUDITH Prabhakar CNP      Na better   Cr stable   Good UO   Edema better   No N/V/D       Past Medical History:   Diagnosis Date    Thyroid nodule        Past Surgical History:   Procedure Laterality Date    CRANIOTOMY Left 3/12/2022    LEFT TEMPORAL PARIETAL OCCIPITAL CRANIOTOMY FOR TUMOR RESECTION performed by Jose Luis Villalta MD at [address] IR IVC [address] N/A 03/16/2022    [facility]    IR IVC FILTER PLACEMENT W IMAGING  3/16/2022    IR IVC FILTER PLACEMENT W IMAGING 3/16/2022 [id number]    IR THROMB [facility] HOSPITAL VEIN  3/28/2022    IR THROMB [address] 3/28/2022 [facility] PROCEDURES    UPPER GASTROINTESTINAL ENDOSCOPY N/A 4/2/2022    EGD DIAGNOSTIC ONLY performed by Marci Garcia MD at [facility] ENDOSCOPY       Family History   Problem Relation Age of Onset    Cancer Father         reports that she has never smoked. She has never used smokeless tobacco. She reports that she does not drink alcohol and does not use drugs.     Allergies:  Heparin    Current Medications:    furosemide (LASIX) injection 40 mg, BID  albumin human 25 % IV solution 25 g, Q12H  tamsulosin (FLOMAX) capsule 0.4 mg, Daily  apixaban (ELIQUIS) tablet 5 mg, BID  0.9 % sodium chloride infusion, PRN  sodium chloride flush 0.9 % injection 5-40 mL, 2 times per day  sodium chloride flush 0.9 % injection 5-40 mL, PRN  0.9 % sodium chloride infusion, PRN  metoprolol (LOPRESSOR) injection 5 mg, Once  oxyCODONE (ROXICODONE) immediate release tablet 5 mg, Q4H PRN   Or  oxyCODONE (ROXICODONE) immediate release tablet 10 mg, Q4H PRN  LORazepam (ATIVAN) injection 1 mg, Q6H PRN  [Held by provider] clonazePAM (KLONOPIN) tablet 0.5 mg, BID  QUEtiapine (SEROQUEL) tablet 12.5 mg, Nightly  insulin regular (HUMULIN R;NOVOLIN R) injection 10 Units, Once   And  dextrose 10 % infusion, Once  glucose (GLUTOSE) 40 % oral gel 15 g, PRN  dextrose 50 % IV solution, PRN  glucagon (rDNA) injection 1 mg, PRN  dextrose 5 % solution, PRN  lacosamide (VIMPAT) tablet 200 mg, BID  levETIRAcetam (KEPPRA) tablet 2,000 mg, Daily  levETIRAcetam (KEPPRA) tablet 2,500 mg, Nightly  melatonin disintegrating tablet 5 mg, Nightly  pantoprazole (PROTONIX) tablet 40 mg, QAM AC  sodium chloride flush 0.9 % injection 5-40 mL, 2 times per day  sodium chloride flush 0.9 % injection 5-40 mL, PRN  0.9 % sodium chloride infusion, PRN  ondansetron (ZOFRAN-ODT) disintegrating tablet 4 mg, Q8H PRN   Or  ondansetron (ZOFRAN) injection 4 mg, Q6H PRN  polyethylene glycol (GLYCOLAX) packet 17 g, Daily PRN  acetaminophen (TYLENOL) tablet 650 mg, Q6H PRN   Or  acetaminophen (TYLENOL) suppository 650 mg, Q6H PRN          Physical exam:     Vitals:  /71   Pulse 105   Temp 98.1 °F (36.7 °C) (Oral)   Resp 18   Ht 5' 3\" (1.6 m)   Wt 219 lb 5.7 oz (99.5 kg)   SpO2 94%   BMI 38.86 kg/m²   Constitutional:  OAA X3 NAD  Skin: no rash, turgor wnl  Heent:  eomi, mmm  Neck: no bruits or jvd noted  Cardiovascular:  S1, S2 without m/r/g  Respiratory: CTA B without w/r/r  Abdomen:  +bs, soft, nt, nd  Ext: + lower extremity edema  Psychiatric: mood and affect appropriate  Musculoskeletal:  Rom, muscular strength intact    Data:   Labs:  CBC:   Recent Labs     04/04/22  0552 04/05/22  0519   WBC 11.3* 11.0   HGB 8.2* 7.7*    378     BMP:    Recent Labs     04/04/22  0552 04/05/22  0519 04/05/22  1418   * 136 136   K 3.3* 3.7 3.6   CL 97* 99 99   CO2 27 25 22   BUN 10 9 7   CREATININE <0.5* 0.5* <0.5*   GLUCOSE 117* 123* 128*     Ca/Mg/Phos:   Recent Labs     04/04/22  0552 04/05/22  0519 04/05/22  1418   CALCIUM 8.9 9.0 8.8   PHOS 3.5 3.4  --      Hepatic:   No results for input(s): AST, ALT, ALB, BILITOT, ALKPHOS in the last 72 hours. Troponin: No results for input(s): TROPONINI in the last 72 hours. BNP: No results for input(s): BNP in the last 72 hours. Lipids: No results for input(s): CHOL, TRIG, HDL, LDLCALC, LABVLDL in the last 72 hours. ABGs:   No results for input(s): PHART, PO2ART, PEQ3BIX in the last 72 hours. INR:   No results for input(s): INR in the last 72 hours. UA:  No results for input(s): Maicol Rick, GLUCOSEU, BILIRUBINUR, KETUA, SPECGRAV, BLOODU, PHUR, PROTEINU, UROBILINOGEN, NITRU, LEUKOCYTESUR, Gae Michelle in the last 72 hours. Urine Microscopic:   No results for input(s): LABCAST, BACTERIA, COMU, HYALCAST, WBCUA, RBCUA, EPIU in the last 72 hours. Urine Culture:   No results for input(s): LABURIN in the last 72 hours. Urine Chemistry:   No results for input(s): Jannette Abelson, PROTEINUR, NAUR in the last 72 hours. IMAGING:  CTA LOWER EXTREMITY LEFT W CONTRAST   Final Result   Impression: No acute arterial abnormality. No active hemorrhage. No hematoma of the pelvis, retroperitoneum or bilateral lower extremities. Suboptimal evaluation of the venous structures due to contrast bolus timing. Findings described above may represent sequelae of poor venous outflow or rethrombosis. CTA LOWER EXTREMITY RIGHT W CONTRAST   Final Result   Impression: No acute arterial abnormality. No active hemorrhage. No hematoma of the pelvis, retroperitoneum or bilateral lower extremities. Suboptimal evaluation of the venous structures due to contrast bolus timing. Findings described above may represent sequelae of poor venous outflow or rethrombosis.       CT HEAD WO CONTRAST   Final Result      No acute or interval change. No hemorrhage or mass effect. CT ABDOMEN PELVIS W IV CONTRAST Additional Contrast? 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 [facility]   Final Result   Impression: Technically successful inferior venacavogram and bilateral iliac venogram demonstrating marked ileal caval clot associated with the filter including thrombus above the IVC filter. Technically successful vacuum-assisted thrombectomy with restoration of flow through the inferior vena cava and improved patency of the ileal caval system. Clot remains within the inferior vena cava filter. VL Extremity Venous Bilateral   Final Result      XR CHEST PORTABLE   Final Result      Limited evaluation of lower right lung due to overlapping density for which infiltrate cannot be excluded. Otherwise no acute process seen. CT HEAD WO CONTRAST   Final Result      Postsurgical changes with areas of low cortical attenuation in the left parietal and left occipital region appear essentially stable from prior study with associated mild midline shift which is similar to slightly improved from prior study. No evidence of developing acute intracranial hemorrhage. CTA ABDOMEN PELVIS W WO CONTRAST   Final Result   1. 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 2. Surrounding hazy density, likely related to venous congestion. However, streaky densities extending inferiorly on the right may represent blood. However, there is no evidence of retroperitoneal hematoma. Correlation with lower extremity noninvasive    duplex Doppler is recommended in addition to serial hemoglobin and crit levels. 3. Suspect bilateral lower lobe pulmonary emboli.  See comments above      Redemonstrated is a large necrotic mass involving the right breast enlarged fibroid uterus. Assessment/Plan   1. Hyponatremia     2. HTN    3. Anemia    4. Acid- base/ Electrolyte imbalance     5.  SIADH     Plan   - lasix as needed to help with edema +Albumin  - samsca as needed   - Ur studies - reviewed   - TSH - nl   - Monitor Na   - d/w pt   - free water restriction   - encourage solute intake                     Thank you for allowing us to participate in care of Gallito Ibarra MD  Feel free to contact me   [facility] [address]  Office : [phone]  Fax :[phone]

## 2022-04-06 NOTE — PROGRESS NOTES
Nephrology  Note                                                                                                                                                                                                                                                                                                                                                               Office : [phone]     Fax :[phone]              Patient's Name: Chantel Lane    Reason for Consult:  Hyponatremia   Requesting Physician:  JUDITH Schaffer CNP      Na better   Cr stable   No N/V/D       Past Medical History:   Diagnosis Date    Thyroid nodule        Past Surgical History:   Procedure Laterality Date    CRANIOTOMY Left 3/12/2022    LEFT TEMPORAL PARIETAL OCCIPITAL CRANIOTOMY FOR TUMOR RESECTION performed by Chula Esteban MD at [address] IR IVC [address] N/A 03/16/2022    [facility]    IR IVC FILTER PLACEMENT W IMAGING  3/16/2022    IR IVC FILTER PLACEMENT W IMAGING 3/16/2022 [facility] PROCEDURES    [facility]  [facility]20[facility]    IR THROMB [address] 3/28/2022 [address] SPECIAL PROCEDURES    UPPER GASTROINTESTINAL ENDOSCOPY N/A 4/2/2022    EGD DIAGNOSTIC ONLY performed by Radha Andre MD at [address] ENDOSCOPY       Family History   Problem Relation Age of Onset    Cancer Father         reports that she has never smoked. She has never used smokeless tobacco. She reports that she does not drink alcohol and does not use drugs.     Allergies:  Heparin    Current Medications:    furosemide (LASIX) injection 40 mg, BID  albumin human 25 % IV solution 25 g, Q12H  tamsulosin (FLOMAX) capsule 0.4 mg, Daily  apixaban (ELIQUIS) tablet 10 mg, BID   Followed by  apixaban (ELIQUIS) tablet 5 mg, BID  0.9 % sodium chloride infusion, PRN  sodium chloride flush 0.9 % injection 5-40 mL, 2 times per day  sodium chloride flush 0.9 % injection 5-40 mL, PRN  0.9 % sodium chloride infusion, PRN  metoprolol (LOPRESSOR) injection 5 mg, Once  oxyCODONE (ROXICODONE) immediate release tablet 5 mg, Q4H PRN   Or  oxyCODONE (ROXICODONE) immediate release tablet 10 mg, Q4H PRN  LORazepam (ATIVAN) injection 1 mg, Q6H PRN  [Held by provider] clonazePAM (KLONOPIN) tablet 0.5 mg, BID  QUEtiapine (SEROQUEL) tablet 12.5 mg, Nightly  insulin regular (HUMULIN R;NOVOLIN R) injection 10 Units, Once   And  dextrose 10 % infusion, Once  glucose (GLUTOSE) 40 % oral gel 15 g, PRN  dextrose 50 % IV solution, PRN  glucagon (rDNA) injection 1 mg, PRN  dextrose 5 % solution, PRN  lacosamide (VIMPAT) tablet 200 mg, BID  levETIRAcetam (KEPPRA) tablet 2,000 mg, Daily  levETIRAcetam (KEPPRA) tablet 2,500 mg, Nightly  melatonin disintegrating tablet 5 mg, Nightly  pantoprazole (PROTONIX) tablet 40 mg, QAM AC  sodium chloride flush 0.9 % injection 5-40 mL, 2 times per day  sodium chloride flush 0.9 % injection 5-40 mL, PRN  0.9 % sodium chloride infusion, PRN  ondansetron (ZOFRAN-ODT) disintegrating tablet 4 mg, Q8H PRN   Or  ondansetron (ZOFRAN) injection 4 mg, Q6H PRN  polyethylene glycol (GLYCOLAX) packet 17 g, Daily PRN  acetaminophen (TYLENOL) tablet 650 mg, Q6H PRN   Or  acetaminophen (TYLENOL) suppository 650 mg, Q6H PRN          Physical exam:     Vitals:  /64   Pulse 105   Temp 98.4 °F (36.9 °C) (Axillary)   Resp 14   Ht 5' 3\" (1.6 m)   Wt 219 lb 5.7 oz (99.5 kg)   SpO2 93%   BMI 38.86 kg/m²   Constitutional:  OAA X3 NAD  Skin: no rash, turgor wnl  Heent:  eomi, mmm  Neck: no bruits or jvd noted  Cardiovascular:  S1, S2 without m/r/g  Respiratory: CTA B without w/r/r  Abdomen:  +bs, soft, nt, nd  Ext: + lower extremity edema  Psychiatric: mood and affect appropriate  Musculoskeletal:  Rom, muscular strength intact    Data:   Labs:  CBC:   Recent Labs     04/04/22  0552 04/05/22  0519   WBC 11.3* 11.0   HGB 8.2* 7.7*    378     BMP:    Recent Labs     04/04/22  0552 04/05/22  0519 04/05/22  1418   * 136 136   K 3.3* 3.7 3.6   CL 97* 99 99   CO2 27 25 22   BUN 10 9 7   CREATININE <0.5* 0.5* <0.5*   GLUCOSE 117* 123* 128*     Ca/Mg/Phos:   Recent Labs     04/04/22  0552 04/05/22  0519 04/05/22  1418   CALCIUM 8.9 9.0 8.8   PHOS 3.5 3.4  --      Hepatic:   No results for input(s): AST, ALT, ALB, BILITOT, ALKPHOS in the last 72 hours. Troponin: No results for input(s): TROPONINI in the last 72 hours. BNP: No results for input(s): BNP in the last 72 hours. Lipids: No results for input(s): CHOL, TRIG, HDL, LDLCALC, LABVLDL in the last 72 hours. ABGs:   No results for input(s): PHART, PO2ART, UYZ9EOS in the last 72 hours. INR:   No results for input(s): INR in the last 72 hours. UA:  No results for input(s): Blueena Laurening, GLUCOSEU, BILIRUBINUR, KETUA, SPECGRAV, BLOODU, PHUR, PROTEINU, UROBILINOGEN, NITRU, LEUKOCYTESUR, Mart Karma in the last 72 hours. Urine Microscopic:   No results for input(s): LABCAST, BACTERIA, COMU, HYALCAST, WBCUA, RBCUA, EPIU in the last 72 hours. Urine Culture:   No results for input(s): LABURIN in the last 72 hours. Urine Chemistry:   No results for input(s): Charlynne Walton, PROTEINUR, NAUR in the last 72 hours. IMAGING:  CTA LOWER EXTREMITY LEFT W CONTRAST   Final Result   Impression: No acute arterial abnormality. No active hemorrhage. No hematoma of the pelvis, retroperitoneum or bilateral lower extremities. Suboptimal evaluation of the venous structures due to contrast bolus timing. Findings described above may represent sequelae of poor venous outflow or rethrombosis. CTA LOWER EXTREMITY RIGHT W CONTRAST   Final Result   Impression: No acute arterial abnormality. No active hemorrhage. No hematoma of the pelvis, retroperitoneum or bilateral lower extremities. Suboptimal evaluation of the venous structures due to contrast bolus timing. Findings described above may represent sequelae of poor venous outflow or rethrombosis.       CT HEAD WO CONTRAST   Final Result      No acute or interval change. No hemorrhage or mass effect. CT ABDOMEN PELVIS W IV CONTRAST Additional Contrast? 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 IR GUIDED THROMB [facility] VEIN   Final Result   Impression: Technically successful inferior venacavogram and bilateral iliac venogram demonstrating marked ileal caval clot associated with the filter including thrombus above the IVC filter. Technically successful vacuum-assisted thrombectomy with restoration of flow through the inferior vena cava and improved patency of the ileal caval system. Clot remains within the inferior vena cava filter. VL Extremity Venous Bilateral   Final Result      XR CHEST PORTABLE   Final Result      Limited evaluation of lower right lung due to overlapping density for which infiltrate cannot be excluded. Otherwise no acute process seen. CT HEAD WO CONTRAST   Final Result      Postsurgical changes with areas of low cortical attenuation in the left parietal and left occipital region appear essentially stable from prior study with associated mild midline shift which is similar to slightly improved from prior study. No evidence of developing acute intracranial hemorrhage. CTA ABDOMEN PELVIS W WO CONTRAST   Final Result   1. 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 2. Surrounding hazy density, likely related to venous congestion. However, streaky densities extending inferiorly on the right may represent blood. However, there is no evidence of retroperitoneal hematoma. Correlation with lower extremity noninvasive    duplex Doppler is recommended in addition to serial hemoglobin and crit levels. 3. Suspect bilateral lower lobe pulmonary emboli.  See comments above Redemonstrated is a large necrotic mass involving the right breast enlarged fibroid uterus. Assessment/Plan   1. Hyponatremia     2. HTN    3. Anemia    4. Acid- base/ Electrolyte imbalance     5.  SIADH     Plan   - lasix as needed to help with edema   - samsca as needed   - Ur studies - reviewed   - TSH - nl   - Monitor Na   - d/w pt   - free water restriction   - encourage solute intake                     Thank you for allowing us to participate in care of Kai Thomas MD  Feel free to contact me   [facility] [address]  Office : [phone]  Fax :[phone]

## 2022-04-06 NOTE — PROGRESS NOTES
Occupational Therapy  Facility/Department: [place] [address] [facility]U  Daily Treatment Note  NAME: Momo Raya  : 1975  MRN: [id number]    Date of Service: 2022    Discharge Recommendations:  Momo Raya scored a 14/24 on the AM-PAC ADL Inpatient form. Current research shows that an AM-PAC score of 17 or less is typically not associated with a discharge to the patient's home setting. Based on the patient's AM-PAC score and their current ADL deficits, it is recommended that the patient have 5-7 sessions per week of Occupational Therapy at d/c to increase the patient's independence. At this time, this patient demonstrates the endurance, and/or tolerance for 3 hours of therapy each day, with a treatment frequency of 5-7x/wk. Please see assessment section for further patient specific details. If patient discharges prior to next session this note will serve as a discharge summary. Please see below for the latest assessment towards goals. OT Equipment Recommendations  Other: defer    Assessment   Performance deficits / Impairments: Decreased functional mobility ; Decreased endurance;Decreased ADL status; Decreased balance;Decreased strength;Decreased vision/visual deficit  Assessment: Pt progressing with therapy, completing 2 sit to stands with CGA to rw and taking 4 steps forward at [address] and very close chair follow. Pt would greatly benefit from intensive inpt therapy to maximize functional independence and safety. Continue with POC. Treatment Diagnosis: impaired ADLs and functional mobility/transfers  Prognosis: Good  OT Education: OT Role;Plan of Care;Transfer Training  Patient Education: verb understanding  Barriers to Learning: none  REQUIRES OT FOLLOW UP: Yes  Activity Tolerance  Activity Tolerance: Patient Tolerated treatment well  Activity Tolerance: seated rest breaks required  Safety Devices  Safety Devices in place: Yes  Type of devices: Nurse notified; Left in chair;Chair alarm in place;Call light within reach         Patient Diagnosis(es): The primary encounter diagnosis was Acute deep vein thrombosis (DVT) of proximal vein of right lower extremity ([address] Diagnoses of Thrombocytopenia ([address] S/P craniotomy, and Phlebitis were also pertinent to this visit. has a past medical history of Thyroid nodule. has a past surgical history that includes craniotomy (Left, 3/12/2022); IR GUIDED IVC FILTER PLACEMENT (N/A, 03/16/2022); IR GUIDED IVC FILTER PLACEMENT (3/16/2022); IR GUIDED THROMB [facility] VEIN (3/28/2022); and Upper gastrointestinal endoscopy (N/A, 4/2/2022). Restrictions  Position Activity Restriction  Other position/activity restrictions: Up with assist  Subjective   General  Chart Reviewed: Yes  Patient assessed for rehabilitation services?: Yes  Additional Pertinent Hx: 78-year-old female with metastatic breast cancer status post craniotomy with mass resection presented from [facility] due to worsening lower extremity pain and swelling. During her last admission patient was diagnosed with a DVT in the right lower extremity and due to her recent Craniotomy anticoagulation could not be started till POD 14 so patient underwent an IVC filter. Her platelets levels were noted to be low at [facility] and due to concern for worsening DVT she was sent to the hospital.  CT abdomen and pelvis was performed which did show thrombosis of the IVC at the level of the filter with marked distention of the caval bifurcation. There was also suspicion for bilateral lower lobe pulmonary emboli. Response to previous treatment: Patient with no complaints from previous session  Family / Caregiver Present: No  Referring Practitioner: Bolivar Rodriguez MD  Diagnosis: Thrombocytopenia  Subjective  Subjective: Pt supine in bed upon entry, very pleasant and agreeable to therapy session. General Comment  Comments: Pt supine to sit SBA.  Pt sit EOB Supervision completed the following ADLs with setup: pt washed face/oral care/wash UB (Min A to wash LUE). Pt sit to stand to [address]. Pt transferred via [employer] to chair, stand to sit CGA. Pt with seated rest break. Pt sit to stand to rw CGA and in stance CGA for static and Min A for weight shifting (~1 min). Pt stand to sit CGA. Pt with seated rest break. Pt sit to stand to rw CGA, pt took 4 steps forward at [address] with very close chair follow. Pt stand to sit CGA. Pt felt urge for bowel movement. Pt sit to stand CGA to [employer] and transferred to raised toilet with rails via [employer]. Pt stand to sit from [address]. Pt didn't have bowel movement, didn't wear brief and hygiene care was provided with assist. Pt transferred via [employer] to chair. Pt stand to sit CGA.  Call light in reach and chair alarm on. RN notified  Pain Assessment  Pain Level: 7  Pain Type: Acute pain  Pain Location: Head  Pain Orientation: Left;Posterior  Pain Descriptors: Headache  Pain Frequency: Intermittent  Clinical Progression: Not changed  Functional Pain Assessment: Activities are not prevented  Pre Treatment Pain Screening  Intervention List: Patient able to continue with treatment;Nurse called to administer meds;Nurse/Physician notified  Vital Signs  Patient Currently in Pain: Yes   Orientation  Orientation  Overall Orientation Status: Within Functional Limits  Objective    ADL  Grooming: Setup;Supervision  UE Bathing: Minimal assistance;Setup  LE Dressing: Dependent/Total ( socks only)  Toileting: Dependent/Total        Balance  Sitting Balance: Supervision  Standing Balance: Contact guard assistance (CGA static and Min A for weight shifting)  Standing Balance  Time: ~3 min total  Activity: Maximustawanna Lin transfers, ambulate ~4 steps and weight shifting with static stance  Functional Mobility  Functional - Mobility Device: Rolling Walker  Activity: Other  Assist Level: Minimal assistance  Functional Mobility Comments: ~4steps forward with very close chair follow  Toilet Transfers  Toilet - Technique:  (Dependent Keren Shah - CGA to/from Keren Shah)  Bed mobility  Supine to Sit: Stand by assistance  Scooting: Stand by assistance  Transfers  Sit to stand: Contact guard assistance  Stand to sit: Contact guard assistance                       Cognition  Overall Cognitive Status: [facility]  Times per week: 2-5  Times per day: Daily  Current Treatment Recommendations: Strengthening,Home Management Training,Patient/Caregiver Education & Training,Balance Training,Functional Mobility Training,Endurance Training,Self-Care / ADL,Safety Education & Training,Pain Management  G-Code     OutComes Score                                                  AM-PAC Score        AM-[facility] Inpatient Daily Activity Raw Score: 14 (04/06/22 1533)  AM-PAC Inpatient ADL T-Scale Score : 33.39 (04/06/22 1533)  ADL Inpatient CMS 0-100% Score: 59.67 (04/06/22 1533)  ADL Inpatient CMS G-Code Modifier : CK (04/06/22 1533)    Goals  Short term goals  Time Frame for Short term goals: by dc  Short term goal 1: Pt will complete functional transfers, including BSC/toilet transfer w/ Min A- progressing  Short term goal 2: Pt will complete LE dressing w/ Min A and use of AE prn- progressing  Short term goal 3: Pt will maintain sitting balance w/ spvn to complete ADL task- goal met  Short term goal 4: sit<>stand with Anil to RW - goal met 4/6; revised goal: improve sit to stand transfers to SBA (not met)  Short term goal 5: bed to chair transfer with Anil - progressing       Therapy Time   Individual Concurrent Group Co-treatment   Time In 1333         Time Out 1441         Minutes 68         Timed Code Treatment Minutes: [address], [address] [id number]    Addendum: STG#3 and 4 met. STG#4 revised. Continue per SHEILA.   Jacquie Morrow OTR/L #[id number]

## 2022-04-06 NOTE — CARE COORDINATION
Case Management Assessment           Daily Note                 Date/ Time of Note: 4/6/2022 10:11 AM         Note completed by: ISAIAH Sanchez    Patient Name: Patricia Braun  YOB: 1975    Diagnosis: Thrombocytopenia (HCC) [D69.6]  S/P craniotomy [Z98.890]  Acute deep vein thrombosis (DVT) of proximal vein of right lower extremity (HCC) [I82.4Y1]  Acute deep vein thrombosis (DVT) of other specified vein of right lower extremity ([address] [I82.491]  Patient Admission Status: Inpatient    Date of Admission:3/25/2022  6:11 PM Length of Stay: 12 GLOS:      Current Plan of Care: awaiting placement, precert  ________________________________________________________________________________________  PT AM-PAC: 8 / 24 per last evaluation on: 4/4/22    OT AM-PAC: 15 / 24 per last evaluation on: 4/4/22    DME Needs for discharge: deferred  ________________________________________________________________________________________  Discharge Plan: SNF: TBD    Tentative discharge date: 4/4/22 pending facility acceptance, precert    Current barriers to discharge: facility acceptance, precert    Referrals completed: Other: SNF, Home at [facility], [employer] [facility]    Resources/ information provided: SNF List  ________________________________________________________________________________________  Case Management Notes: SW following for SNF placement. Home at [facility] unable to accept. Still waiting to hear from  [facility]. 11:34 - SW spoke with William Rees from [facility], they are not in network. SW sent out additional referrals, will follow up. Momo Mohan and her family were provided with choice of provider; she and her family are in agreement with the discharge plan.     Care Transition Patient: ISAIAH Antunez  [facility]  Case Management Department  Ph: [phone]  Fax: [phone]

## 2022-04-06 NOTE — PROGRESS NOTES
Progress Note    Admit Date: 3/25/2022  Day: 12  Diet: ADULT DIET; Regular    CC: leg pain      Interval history:   KAY Continues to have lower extremity swelling but with good urinary output. Otherwise well and tolerating treatment with no issues. Ready for discharge to SNF    Medications:     Scheduled Meds:   furosemide  40 mg IntraVENous BID    albumin human  25 g IntraVENous Q12H    tamsulosin  0.4 mg Oral Daily    apixaban  5 mg Oral BID    sodium chloride flush  5-40 mL IntraVENous 2 times per day    metoprolol  5 mg IntraVENous Once    [Held by provider] clonazePAM  0.5 mg Oral BID    QUEtiapine  12.5 mg Oral Nightly    insulin regular  10 Units IntraVENous Once    lacosamide  200 mg Oral BID    levETIRAcetam  2,000 mg Oral Daily    levETIRAcetam  2,500 mg Oral Nightly    melatonin  5 mg Oral Nightly    pantoprazole  40 mg Oral QAM AC    sodium chloride flush  5-40 mL IntraVENous 2 times per day     Continuous Infusions:   sodium chloride      sodium chloride 25 mL (04/06/22 1021)    dextrose      dextrose 100 mL/hr (04/05/22 0957)    sodium chloride 25 mL (04/01/22 1736)     PRN Meds:sodium chloride, sodium chloride flush, sodium chloride, oxyCODONE **OR** oxyCODONE, LORazepam, glucose, dextrose, glucagon (rDNA), dextrose, sodium chloride flush, sodium chloride, ondansetron **OR** ondansetron, polyethylene glycol, acetaminophen **OR** acetaminophen    Objective:   Vitals:   T-max:  Patient Vitals for the past 8 hrs:   BP Temp Temp src Pulse Resp SpO2 Weight   04/06/22 0831 116/71 98.1 °F (36.7 °C) Oral 105 18 94 % --   04/06/22 0400 120/64 98.4 °F (36.9 °C) Axillary 105 14 93 % --   04/06/22 0330 -- -- -- -- -- -- 219 lb 5.7 oz (99.5 kg)       Intake/Output Summary (Last 24 hours) at 4/6/2022 1039  Last data filed at 4/6/2022 0831  Gross per 24 hour   Intake 360 ml   Output 4000 ml   Net -3640 ml       Review of Systems   Constitutional: Negative for chills, diaphoresis and fever. HENT: Negative for sore throat and voice change. Eyes: Negative for discharge. Respiratory: Positive for shortness of breath. Negative for cough and wheezing. Cardiovascular: Positive for leg swelling. Negative for chest pain and palpitations. Gastrointestinal: Negative for abdominal pain, nausea and vomiting. Genitourinary: Negative for dysuria and urgency. Neurological: Negative for dizziness. Physical Exam  Constitutional:       General: She is not in acute distress. Appearance: She is obese. She is not ill-appearing or diaphoretic. HENT:      Head: Normocephalic and atraumatic. Eyes:      Extraocular Movements: Extraocular movements intact. Pupils: Pupils are equal, round, and reactive to light. Cardiovascular:      Rate and Rhythm: Regular rhythm. Tachycardia present. Pulses: Normal pulses. Heart sounds: No murmur heard. Pulmonary:      Breath sounds: No wheezing or rales. Abdominal:      General: Abdomen is flat. Bowel sounds are normal.      Palpations: Abdomen is soft. Tenderness: There is no abdominal tenderness. There is no guarding. Musculoskeletal:         General: No swelling or tenderness. Right lower leg: Edema present. Left lower leg: Edema present. Comments: Bilateral 2+ edema in the extremity up to the thigh   Skin:     Coloration: Skin is not jaundiced or pale. Neurological:      Mental Status: She is alert and oriented to person, place, and time. Psychiatric:         Mood and Affect: Mood normal.         Behavior: Behavior normal.         Thought Content:  Thought content normal.         LABS:    CBC:   Recent Labs     04/04/22  0552 04/05/22  0519   WBC 11.3* 11.0   HGB 8.2* 7.7*   HCT 25.0* 22.9*    378   MCV 83.3 83.5     Renal:    Recent Labs     04/04/22  0552 04/05/22  0519 04/05/22  1418   * 136 136   K 3.3* 3.7 3.6   CL 97* 99 99   CO2 27 25 22   BUN 10 9 7   CREATININE <0.5* 0.5* <0.5*   GLUCOSE 117* 123* 128*   CALCIUM 8.9 9.0 8.8   PHOS 3.5 3.4  --    ANIONGAP 8 12 15     Hepatic:   Recent Labs     04/04/22  0552 04/05/22  0519   LABALBU 2.8* 3.3*     Troponin: No results for input(s): TROPONINI in the last 72 hours. BNP: No results for input(s): BNP in the last 72 hours. Lipids: No results for input(s): CHOL, HDL in the last 72 hours. Invalid input(s): LDLCALCU, TRIGLYCERIDE  ABGs:  No results for input(s): PHART, BMI8PPN, PO2ART, NOC3NYC, BEART, THGBART, B3WCTUFT, UNZ2PEB in the last 72 hours. INR: No results for input(s): INR in the last 72 hours. Lactate: No results for input(s): LACTATE in the last 72 hours. Cultures:  -----------------------------------------------------------------  RAD:   CTA LOWER EXTREMITY LEFT W CONTRAST   Final Result   Impression: No acute arterial abnormality. No active hemorrhage. No hematoma of the pelvis, retroperitoneum or bilateral lower extremities. Suboptimal evaluation of the venous structures due to contrast bolus timing. Findings described above may represent sequelae of poor venous outflow or rethrombosis. CTA LOWER EXTREMITY RIGHT W CONTRAST   Final Result   Impression: No acute arterial abnormality. No active hemorrhage. No hematoma of the pelvis, retroperitoneum or bilateral lower extremities. Suboptimal evaluation of the venous structures due to contrast bolus timing. Findings described above may represent sequelae of poor venous outflow or rethrombosis. CT HEAD WO CONTRAST   Final Result      No acute or interval change. No hemorrhage or mass effect. CT ABDOMEN PELVIS W IV CONTRAST Additional Contrast? 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       IR GUIDED THROMB [facility] VE[facility]   Final Result   Impression: Technically successful inferior venacavogram and bilateral iliac venogram demonstrating marked ileal caval clot associated with the filter including thrombus above the IVC filter. Technically successful vacuum-assisted thrombectomy with restoration of flow through the inferior vena cava and improved patency of the ileal caval system. Clot remains within the inferior vena cava filter. VL Extremity Venous Bilateral   Final Result      XR CHEST PORTABLE   Final Result      Limited evaluation of lower right lung due to overlapping density for which infiltrate cannot be excluded. Otherwise no acute process seen. CT HEAD WO CONTRAST   Final Result      Postsurgical changes with areas of low cortical attenuation in the left parietal and left occipital region appear essentially stable from prior study with associated mild midline shift which is similar to slightly improved from prior study. No evidence of developing acute intracranial hemorrhage. CTA ABDOMEN PELVIS W WO CONTRAST   Final Result   1. 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 2. Surrounding hazy density, likely related to venous congestion. However, streaky densities extending inferiorly on the right may represent blood. However, there is no evidence of retroperitoneal hematoma. Correlation with lower extremity noninvasive    duplex Doppler is recommended in addition to serial hemoglobin and crit levels. 3. Suspect bilateral lower lobe pulmonary emboli. See comments above      Redemonstrated is a large necrotic mass involving the right breast enlarged fibroid uterus.           Assessment/Plan:     Hyperthermia (resolved), most likely from thrombophlebitis vs UTI   Urine cult grew Enteroccocus fecalis  -Blood culture no growth to date   -ID consulted - doxycycline 100 twice daily for 4 more days     Sinus tachycardia 2/2 malignancy  - Continues to be in sinus tach, somewhat better HR.   -Continuous telemetry     Bilateral lower extremity DVTs likely resulting in lower extremity swelling. IVC filter thrombus s/p thrombectomy  -Platelets are holding steady  -Continue on Eliquis  -We will keep IVC filter in for 6 weeks, IR to do outpatient  - Continue lasix while in the hospital. Will discontinue on discharge.      Metastatic triple negative breast cancer with intracranial metastasis  -S/p craniotomy on 3/12  -Oncology on board; recommended following medical and radiation oncology for systemic palliative chemotherapy and radiation therapy.  -Neglected right breast cancer is not bleeding or causing infection with wound be of little benefit to perform a mastectomy  - As needed oxycodone as needed for pain  -Consulted palliative care, patient has agreed to undergo all management      Urinary retention  Attempted voiding trial on Saturday 4/2/22, straight cath X 2.   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 No future use of heparin    Code Status: Full Code  FEN: ADULT DIET; Regular  PPX: eliquis  DISPO: Discharge pending precert    Feliciano Solis DO, PGY-1  04/06/22  10:39 AM    This patient has been staffed and discussed with Natalio Bain MD.     Patient seen and examined, labs and imaging studies reviewed, agree with assessment and plan as outlined above. Continue with current care and plan. Discussed case with patients nurse, discussed case with care team, discussed plan. Discussed case with nephrology, mary stevens.       MD Radha Guzman

## 2022-04-06 NOTE — PROGRESS NOTES
Physical Therapy  Facility/Department: [place] [address] [facility]  Daily Treatment Note  NAME: Ester Garsia  : 1975  MRN: [id number]    Date of Service: 2022    Discharge Recommendations:  Ester Garsia scored a  on the AM-PAC short mobility form. Current research shows that an AM-PAC score of 17 or less is typically not associated with a discharge to the patient's home setting. Based on the patient's AM-PAC score and their current functional mobility deficits, it is recommended that the patient have 5-7 sessions per week of Physical Therapy at d/c to increase the patient's independence. At this time, this patient demonstrates the endurance, and/or tolerance for 3 hours of therapy each day, with a treatment frequency of 5-7x/wk. Please see assessment section for further patient specific details. If patient discharges prior to next session this note will serve as a discharge summary. Please see below for the latest assessment towards goals. Patient would benefit from continued therapy after discharge   PT Equipment Recommendations  Equipment Needed:  (defer)    Assessment   Body structures, Functions, Activity limitations: Decreased functional mobility ; Decreased ROM; Decreased strength;Decreased endurance;Decreased balance;Decreased coordination; Increased pain;Decreased posture  Assessment: Pt progressing with functional mobility & endurance. Pt cooperative & very motivated. Continue to recommend further inpt PT upon D/C. Will follow per plan of care. Treatment Diagnosis: Decreased strength and mobility associated with thrombocytopenia  Prognosis: Good  PT Education: Goals;Plan of Care;Home Exercise Program;Transfer Training;General Safety;Gait Training  REQUIRES PT FOLLOW UP: Yes  Activity Tolerance  Activity Tolerance: Patient Tolerated treatment well;Patient limited by endurance; Patient limited by fatigue     Patient Diagnosis(es): The primary encounter diagnosis was Acute deep vein thrombosis (DVT) of proximal vein of right lower extremity ([address] Diagnoses of Thrombocytopenia ([address] S/P craniotomy, and Phlebitis were also pertinent to this visit. has a past medical history of Thyroid nodule. has a past surgical history that includes craniotomy (Left, 3/12/2022); IR GUIDED IVC FILTER PLACEMENT (N/A, 03/16/2022); IR GUIDED IVC FILTER PLACEMENT (3/16/2022); IR GUIDED THROMB [facility] VEIN (3/28/2022); and Upper gastrointestinal endoscopy (N/A, 4/2/2022). Restrictions  Position Activity Restriction  Other position/activity restrictions: Up with assist  Subjective   General  Chart Reviewed: Yes  Additional Pertinent Hx: Ms. Jess Andre is a 49 y/o female presenting with B leg pain. Pt was dx with B DVT's 3/24 and had brain sx 2 weeks ago. Pt was dx with thrombocytopenia. CTA-abdomen: Thrombosis of the inferior vena cava at the level of the inferior vena cava filter with marked distention of the caval bifurcation and common iliac veins, suspect bilateral lower lobe pulmonary emboli. CT-head: (-) acute. XR-chest: (-) acute. PMH: breast cancer with mets to the brain s/p L craniotomy on 9/24 complicated by seizures, IVC filter placement (3/16). Family / Caregiver Present: No  Referring Practitioner: Lauren Soto MD  Subjective  Subjective: Pt supine in bed & agreeable to PT. Pain Screening  Patient Currently in Pain: Yes (7/10 headache. RN in to give Tylenol.)  Vital Signs  Patient Currently in Pain: Yes (7/10 headache. RN in to give Tylenol.)               Objective   Bed mobility  Supine to Sit: Stand by assistance (HOB elevated. very slow & effortful)  Scooting: Stand by assistance (seated.  effortful.)  Transfers  Sit to Stand: Contact guard assistance (from bed, chair & toilet (BSC over) to gage bermudez & from chair to RW.)  Stand to sit: Contact guard assistance (verbal cues required)  Bed to Chair: Dependent/Total (via gage aidan)  Comment: pt performed sit<->stand from chair to RW x 3. stood for ~1 min first time, ~2-3 min 2nd & 3rd trials. performed weight shifting with RW & & min A during 2nd stance. pt reported she needed to have BM. pt taken to bathroom via gage stedy, however unable to void. Ambulation  Ambulation?: Yes  Ambulation 1  Device: Rolling Walker  Other Apparatus:  (chair follow)  Assistance: Minimal assistance  Quality of Gait: decreased foot clearance bilt, very effortful  Gait Deviations: Slow Ana;Decreased step length;Decreased step height  Distance: 3 ft  Comments: distance limited by fatigue. chair pulled behind pt to sit. Balance  Sitting - Static: Good  Sitting - Dynamic: Good  Standing - Static: Fair;+ (CGA with RW)  Standing - Dynamic: Fair (min A with RW)  Exercises  Hip Flexion: x 10 bilat  Hip Abduction: x 10 bilat  Knee Long Arc Quad: x 10 bilat  Ankle Pumps: x 20 bilat         Comment: pt brused teeth & washed face while seated EOB with SBA                                                                   AM-PAC Score  AM-PAC Inpatient Mobility Raw Score : 12 (04/06/22 1628)  AM-PAC Inpatient T-Scale Score : 35.33 (04/06/22 1628)  Mobility Inpatient CMS 0-100% Score: 68.66 (04/06/22 1628)  Mobility Inpatient CMS G-Code Modifier : CL (04/06/22 1628)          Goals  Short term goals  Time Frame for Short term goals: D/C  Short term goal 1: Perform supine to sit with min assist x1 (met with HOB elevated). new goal: supine<->sit SBA with HOB flat  Short term goal 2: Perform STS transfer with mod assist x1 and LRAD (met 4/6) new goal: sit<->stand SBA  Short term goal 3: Perform bed to chair transfer with mod assist x1 and LRAD  Short term goal 4: Amb 10 ft with RW CGA  Patient Goals   Patient goals :  To improve mobility and pain    Plan    Plan  Times per week: 2-5  Current Treatment Recommendations: Strengthening,ROM,Balance Training,Functional Mobility Training,Transfer Training,Endurance Training,Gait Training,Neuromuscular Re-education,Pain Management,Home Exercise Program,Safety Education & Training,Patient/Caregiver Education & Training  Safety Devices  Type of devices: Chair alarm in place,Call light within reach,Left in chair,Gait belt,Nurse notified     Therapy Time   Individual Concurrent Group Co-treatment   Time In 1333         Time Out 1441         Minutes 48 Maddison Tai, PT

## 2022-04-06 NOTE — PLAN OF CARE
Problem: Falls - Risk of:  Goal: Will remain free from falls  Description: Will remain free from falls  Outcome: Ongoing  Note: Remains free from falls and accidental injury. Assessed as high fall risk, all precautions in place, utilizing call light appropriately for needs. Will monitor. Problem: Pain:  Goal: Pain level will decrease  Description: Pain level will decrease  Outcome: Ongoing  Note: Pt complaining of pain in the left posterior side of head. Rating pain 5/10 as of now. PRN medications given. Will continue to monitor.

## 2022-04-06 NOTE — FLOWSHEET NOTE
04/06/22 0848   Encounter Summary   Services provided to: Patient   Continue Visiting   (4/6 [facility], support and prayer)   Complexity of Encounter Low   Length of Encounter 15 minutes   Routine   Type Follow up   Assessment Calm; Approachable;Coping   Intervention Active listening;Explored feelings, thoughts, concerns;Prayer;Discussed illness/injury and it's impact; Discussed relationship with God   Outcome Comfort;Expressed gratitude;Engaged in conversation;Expressed feelings/needs/concerns;Coping;Receptive

## 2022-04-07 LAB
ANION GAP SERPL CALCULATED.3IONS-SCNC: 11 MMOL/L (ref 3–16)
ANISOCYTOSIS: ABNORMAL
BANDED NEUTROPHILS RELATIVE PERCENT: 1 % (ref 0–7)
BASOPHILS ABSOLUTE: 0 K/UL (ref 0–0.2)
BASOPHILS RELATIVE PERCENT: 0 %
BUN BLDV-MCNC: 10 MG/DL (ref 7–20)
CALCIUM SERPL-MCNC: 9.7 MG/DL (ref 8.3–10.6)
CHLORIDE BLD-SCNC: 93 MMOL/L (ref 99–110)
CO2: 29 MMOL/L (ref 21–32)
CREAT SERPL-MCNC: 0.5 MG/DL (ref 0.6–1.1)
EOSINOPHILS ABSOLUTE: 0 K/UL (ref 0–0.6)
EOSINOPHILS RELATIVE PERCENT: 0 %
GFR AFRICAN AMERICAN: >60
GFR NON-AFRICAN AMERICAN: >60
GLUCOSE BLD-MCNC: 114 MG/DL (ref 70–99)
GLUCOSE BLD-MCNC: 119 MG/DL (ref 70–99)
GLUCOSE BLD-MCNC: 125 MG/DL (ref 70–99)
GLUCOSE BLD-MCNC: 188 MG/DL (ref 70–99)
HCT VFR BLD CALC: 22.6 % (ref 36–48)
HEMOGLOBIN: 7.7 G/DL (ref 12–16)
HYPOCHROMIA: ABNORMAL
LYMPHOCYTES ABSOLUTE: 0.9 K/UL (ref 1–5.1)
LYMPHOCYTES RELATIVE PERCENT: 9 %
MAGNESIUM: 2 MG/DL (ref 1.8–2.4)
MCH RBC QN AUTO: 28.2 PG (ref 26–34)
MCHC RBC AUTO-ENTMCNC: 34 G/DL (ref 31–36)
MCV RBC AUTO: 83 FL (ref 80–100)
MONOCYTES ABSOLUTE: 0.4 K/UL (ref 0–1.3)
MONOCYTES RELATIVE PERCENT: 4 %
NEUTROPHILS ABSOLUTE: 8.6 K/UL (ref 1.7–7.7)
NEUTROPHILS RELATIVE PERCENT: 86 %
PDW BLD-RTO: 14.8 % (ref 12.4–15.4)
PERFORMED ON: ABNORMAL
PLATELET # BLD: 412 K/UL (ref 135–450)
PMV BLD AUTO: 6.6 FL (ref 5–10.5)
POLYCHROMASIA: ABNORMAL
POTASSIUM SERPL-SCNC: 3 MMOL/L (ref 3.5–5.1)
RBC # BLD: 2.73 M/UL (ref 4–5.2)
SODIUM BLD-SCNC: 133 MMOL/L (ref 136–145)
WBC # BLD: 9.9 K/UL (ref 4–11)

## 2022-04-07 PROCEDURE — 36415 COLL VENOUS BLD VENIPUNCTURE: CPT

## 2022-04-07 PROCEDURE — 51701 INSERT BLADDER CATHETER: CPT

## 2022-04-07 PROCEDURE — 6370000000 HC RX 637 (ALT 250 FOR IP): Performed by: STUDENT IN AN ORGANIZED HEALTH CARE EDUCATION/TRAINING PROGRAM

## 2022-04-07 PROCEDURE — 2060000000 HC ICU INTERMEDIATE R&B

## 2022-04-07 PROCEDURE — 51798 US URINE CAPACITY MEASURE: CPT

## 2022-04-07 PROCEDURE — 6360000002 HC RX W HCPCS: Performed by: STUDENT IN AN ORGANIZED HEALTH CARE EDUCATION/TRAINING PROGRAM

## 2022-04-07 PROCEDURE — 85025 COMPLETE CBC W/AUTO DIFF WBC: CPT

## 2022-04-07 PROCEDURE — 6370000000 HC RX 637 (ALT 250 FOR IP): Performed by: INTERNAL MEDICINE

## 2022-04-07 PROCEDURE — 99233 SBSQ HOSP IP/OBS HIGH 50: CPT | Performed by: INTERNAL MEDICINE

## 2022-04-07 PROCEDURE — 83735 ASSAY OF MAGNESIUM: CPT

## 2022-04-07 PROCEDURE — 6360000002 HC RX W HCPCS: Performed by: INTERNAL MEDICINE

## 2022-04-07 PROCEDURE — P9047 ALBUMIN (HUMAN), 25%, 50ML: HCPCS | Performed by: STUDENT IN AN ORGANIZED HEALTH CARE EDUCATION/TRAINING PROGRAM

## 2022-04-07 PROCEDURE — 80048 BASIC METABOLIC PNL TOTAL CA: CPT

## 2022-04-07 PROCEDURE — 2580000003 HC RX 258: Performed by: INTERNAL MEDICINE

## 2022-04-07 PROCEDURE — P9047 ALBUMIN (HUMAN), 25%, 50ML: HCPCS | Performed by: INTERNAL MEDICINE

## 2022-04-07 PROCEDURE — 2580000003 HC RX 258: Performed by: STUDENT IN AN ORGANIZED HEALTH CARE EDUCATION/TRAINING PROGRAM

## 2022-04-07 RX ORDER — ALBUMIN (HUMAN) 12.5 G/50ML
25 SOLUTION INTRAVENOUS EVERY 12 HOURS
Status: DISCONTINUED | OUTPATIENT
Start: 2022-04-07 | End: 2022-04-10 | Stop reason: HOSPADM

## 2022-04-07 RX ORDER — SPIRONOLACTONE 50 MG/1
50 TABLET, FILM COATED ORAL DAILY
Status: DISCONTINUED | OUTPATIENT
Start: 2022-04-07 | End: 2022-04-10 | Stop reason: HOSPADM

## 2022-04-07 RX ORDER — LANOLIN ALCOHOL/MO/W.PET/CERES
6 CREAM (GRAM) TOPICAL ONCE
Status: COMPLETED | OUTPATIENT
Start: 2022-04-07 | End: 2022-04-07

## 2022-04-07 RX ADMIN — FUROSEMIDE 40 MG: 10 INJECTION, SOLUTION INTRAMUSCULAR; INTRAVENOUS at 18:13

## 2022-04-07 RX ADMIN — POTASSIUM BICARBONATE 40 MEQ: 782 TABLET, EFFERVESCENT ORAL at 15:30

## 2022-04-07 RX ADMIN — SODIUM CHLORIDE, PRESERVATIVE FREE 10 ML: 5 INJECTION INTRAVENOUS at 23:53

## 2022-04-07 RX ADMIN — FUROSEMIDE 40 MG: 10 INJECTION, SOLUTION INTRAMUSCULAR; INTRAVENOUS at 09:55

## 2022-04-07 RX ADMIN — ALBUMIN (HUMAN) 25 G: 0.25 INJECTION, SOLUTION INTRAVENOUS at 18:13

## 2022-04-07 RX ADMIN — APIXABAN 5 MG: 5 TABLET, FILM COATED ORAL at 22:35

## 2022-04-07 RX ADMIN — OXYCODONE 10 MG: 5 TABLET ORAL at 07:37

## 2022-04-07 RX ADMIN — LACOSAMIDE 200 MG: 50 TABLET, FILM COATED ORAL at 22:34

## 2022-04-07 RX ADMIN — APIXABAN 5 MG: 5 TABLET, FILM COATED ORAL at 09:54

## 2022-04-07 RX ADMIN — QUETIAPINE FUMARATE 12.5 MG: 25 TABLET ORAL at 22:35

## 2022-04-07 RX ADMIN — SODIUM CHLORIDE, PRESERVATIVE FREE 10 ML: 5 INJECTION INTRAVENOUS at 10:32

## 2022-04-07 RX ADMIN — SODIUM CHLORIDE, PRESERVATIVE FREE 10 ML: 5 INJECTION INTRAVENOUS at 09:56

## 2022-04-07 RX ADMIN — SPIRONOLACTONE 50 MG: 50 TABLET ORAL at 15:31

## 2022-04-07 RX ADMIN — OXYCODONE 10 MG: 5 TABLET ORAL at 18:13

## 2022-04-07 RX ADMIN — PANTOPRAZOLE SODIUM 40 MG: 40 TABLET, DELAYED RELEASE ORAL at 05:48

## 2022-04-07 RX ADMIN — LEVETIRACETAM 2500 MG: 250 TABLET, FILM COATED ORAL at 22:34

## 2022-04-07 RX ADMIN — TAMSULOSIN HYDROCHLORIDE 0.4 MG: 0.4 CAPSULE ORAL at 09:56

## 2022-04-07 RX ADMIN — ALBUMIN (HUMAN) 25 G: 0.25 INJECTION, SOLUTION INTRAVENOUS at 10:31

## 2022-04-07 RX ADMIN — OXYCODONE 10 MG: 5 TABLET ORAL at 22:34

## 2022-04-07 RX ADMIN — Medication 6 MG: at 22:34

## 2022-04-07 RX ADMIN — LEVETIRACETAM 2000 MG: 250 TABLET, FILM COATED ORAL at 09:55

## 2022-04-07 RX ADMIN — LACOSAMIDE 200 MG: 50 TABLET, FILM COATED ORAL at 09:54

## 2022-04-07 RX ADMIN — POTASSIUM BICARBONATE 40 MEQ: 782 TABLET, EFFERVESCENT ORAL at 23:53

## 2022-04-07 ASSESSMENT — ENCOUNTER SYMPTOMS
COUGH: 0
VOMITING: 0
SHORTNESS OF BREATH: 1
NAUSEA: 0
VOICE CHANGE: 0
ABDOMINAL PAIN: 0
SORE THROAT: 0
EYE DISCHARGE: 0
WHEEZING: 0

## 2022-04-07 ASSESSMENT — PAIN - FUNCTIONAL ASSESSMENT
PAIN_FUNCTIONAL_ASSESSMENT: ACTIVITIES ARE NOT PREVENTED

## 2022-04-07 ASSESSMENT — PAIN DESCRIPTION - PAIN TYPE
TYPE: ACUTE PAIN

## 2022-04-07 ASSESSMENT — PAIN SCALES - GENERAL
PAINLEVEL_OUTOF10: 2
PAINLEVEL_OUTOF10: 3
PAINLEVEL_OUTOF10: 0
PAINLEVEL_OUTOF10: 0
PAINLEVEL_OUTOF10: 3
PAINLEVEL_OUTOF10: 8
PAINLEVEL_OUTOF10: 2
PAINLEVEL_OUTOF10: 8
PAINLEVEL_OUTOF10: 0
PAINLEVEL_OUTOF10: 0
PAINLEVEL_OUTOF10: 8

## 2022-04-07 ASSESSMENT — PAIN DESCRIPTION - LOCATION
LOCATION: LEG
LOCATION: LEG
LOCATION: HEAD
LOCATION: HEAD

## 2022-04-07 ASSESSMENT — PAIN DESCRIPTION - PROGRESSION
CLINICAL_PROGRESSION: NOT CHANGED

## 2022-04-07 ASSESSMENT — PAIN DESCRIPTION - FREQUENCY
FREQUENCY: CONTINUOUS

## 2022-04-07 ASSESSMENT — PAIN DESCRIPTION - DESCRIPTORS
DESCRIPTORS: DULL;ACHING
DESCRIPTORS: HEADACHE
DESCRIPTORS: DULL;ACHING
DESCRIPTORS: ACHING;DISCOMFORT

## 2022-04-07 ASSESSMENT — PAIN DESCRIPTION - ONSET
ONSET: ON-GOING
ONSET: GRADUAL
ONSET: GRADUAL
ONSET: ON-GOING

## 2022-04-07 ASSESSMENT — PAIN DESCRIPTION - ORIENTATION
ORIENTATION: RIGHT;LEFT
ORIENTATION: LEFT
ORIENTATION: RIGHT;LEFT

## 2022-04-07 NOTE — CARE COORDINATION
Case Management Assessment           Daily Note                 Date/ Time of Note: 4/7/2022 2:35 PM         Note completed by: ISAIAH Guillermo    Patient Name: Marium Burden  YOB: 1975    Diagnosis: Thrombocytopenia (HCC) [D69.6]  S/P craniotomy [Z98.890]  Acute deep vein thrombosis (DVT) of proximal vein of right lower extremity (HCC) [I82.4Y1]  Acute deep vein thrombosis (DVT) of other specified vein of right lower extremity ([address] [I82.491]  Patient Admission Status: Inpatient    Date of Admission:3/25/2022  6:11 PM Length of Stay: 13 GLOS:      Current Plan of Care: awaiting placement, precert  ________________________________________________________________________________________  PT AM-PAC: 12 / 24 per last evaluation on: 4/4/22    OT AM-PAC: 14 / 24 per last evaluation on: 4/4/22    DME Needs for discharge: deferred  ________________________________________________________________________________________  Discharge Plan: Inpatient Rehab: [facility] precmick submitted 7/7/65    Tentative discharge date: 4/4/22 pending facility acceptance, precert    Current barriers to discharge: facility acceptance, precert    ________________________________________________________________________________________  Case Management Notes: SW following, referral placed to [facility] [facility] met with pt at bedside, facility will submit precert today for potential placement. SW to follow. 1549: CATHERINE updated that [facility] can accept and will be back-up if [facility] precert is denied. Falguni Hamilton and her family were provided with choice of provider; she and her family are in agreement with the discharge plan.     Care Transition Patient: ISAIAH Sullivan  [facility]  Case Management Department  Ph: [phone]  Fax: [phone]

## 2022-04-07 NOTE — PLAN OF CARE
Patient resting quietly in bed, alarm on and call light within reach. Patient reporting pain is at manageable level with PRN pain medication. Patient showing no new signs of skin breakdown and is able to make changes to body position independently with reminders from nursing staff.    Problem: Falls - Risk of:  Goal: Will remain free from falls  Description: Will remain free from falls  Outcome: Ongoing  Goal: Absence of physical injury  Description: Absence of physical injury  Outcome: Ongoing     Problem: Pain:  Goal: Pain level will decrease  Description: Pain level will decrease  Outcome: Ongoing  Goal: Control of acute pain  Description: Control of acute pain  Outcome: Ongoing  Goal: Control of chronic pain  Description: Control of chronic pain  Outcome: Ongoing     Problem: Skin Integrity:  Goal: Will show no infection signs and symptoms  Description: Will show no infection signs and symptoms  Outcome: Ongoing  Goal: Absence of new skin breakdown  Description: Absence of new skin breakdown  Outcome: Ongoing     Problem: Bleeding:  Goal: Will show no signs and symptoms of excessive bleeding  Description: Will show no signs and symptoms of excessive bleeding  Outcome: Ongoing     Problem: Discharge Planning:  Goal: Discharged to appropriate level of care  Description: Discharged to appropriate level of care  Outcome: Ongoing     Problem: Gas Exchange - Impaired:  Goal: Levels of oxygenation will improve  Description: Levels of oxygenation will improve  Outcome: Ongoing     Problem: Infection, Septic Shock:  Goal: Will show no infection signs and symptoms  Description: Will show no infection signs and symptoms  Outcome: Ongoing     Problem: Serum Glucose Level - Abnormal:  Goal: Ability to maintain appropriate glucose levels will improve  Description: Ability to maintain appropriate glucose levels will improve  Outcome: Ongoing     Problem: Tissue Perfusion, Altered:  Goal: Circulatory function within specified parameters  Description: Circulatory function within specified parameters  Outcome: Ongoing     Problem: Venous Thromboembolism:  Goal: Will show no signs or symptoms of venous thromboembolism  Description: Will show no signs or symptoms of venous thromboembolism  Outcome: Ongoing  Goal: Absence of signs or symptoms of impaired coagulation  Description: Absence of signs or symptoms of impaired coagulation  Outcome: Ongoing

## 2022-04-07 NOTE — PROGRESS NOTES
Progress Note    Admit Date: 3/25/2022  Day: 13  Diet: ADULT DIET; Regular    CC: leg pain      Interval history:   NAEON. improved lower extremity swelling, able to urinate twice did have one instance requiring straight cath. . Otherwise well and tolerating treatment with no issues.  Ready for discharge to SNF    Medications:     Scheduled Meds:   furosemide  40 mg IntraVENous BID    albumin human  25 g IntraVENous Q12H    tamsulosin  0.4 mg Oral Daily    apixaban  5 mg Oral BID    sodium chloride flush  5-40 mL IntraVENous 2 times per day    metoprolol  5 mg IntraVENous Once    [Held by provider] clonazePAM  0.5 mg Oral BID    QUEtiapine  12.5 mg Oral Nightly    insulin regular  10 Units IntraVENous Once    lacosamide  200 mg Oral BID    levETIRAcetam  2,000 mg Oral Daily    levETIRAcetam  2,500 mg Oral Nightly    melatonin  5 mg Oral Nightly    pantoprazole  40 mg Oral QAM AC    sodium chloride flush  5-40 mL IntraVENous 2 times per day     Continuous Infusions:   sodium chloride      sodium chloride 25 mL (04/06/22 1021)    dextrose      dextrose 100 mL/hr (04/05/22 0957)    sodium chloride 25 mL (04/01/22 1736)     PRN Meds:sodium chloride, sodium chloride flush, sodium chloride, oxyCODONE **OR** oxyCODONE, LORazepam, glucose, dextrose, glucagon (rDNA), dextrose, sodium chloride flush, sodium chloride, ondansetron **OR** ondansetron, polyethylene glycol, acetaminophen **OR** acetaminophen    Objective:   Vitals:   T-max:  Patient Vitals for the past 8 hrs:   BP Temp Temp src Pulse Resp SpO2 Weight   04/07/22 1138 121/78 99.4 °F (37.4 °C) Oral 111 18 -- --   04/07/22 1044 -- -- -- 109 -- -- --   04/07/22 0733 104/63 98.3 °F (36.8 °C) Oral 102 18 92 % --   04/07/22 0600 -- -- -- -- -- -- 218 lb 4.1 oz (99 kg)   04/07/22 0445 115/71 98.9 °F (37.2 °C) Oral 103 18 93 % --       Intake/Output Summary (Last 24 hours) at 4/7/2022 1141  Last data filed at 4/7/2022 1045  Gross per 24 hour   Intake 1010 ml   Output 1900 ml   Net -890 ml       Review of Systems   Constitutional: Negative for chills, diaphoresis and fever. HENT: Negative for sore throat and voice change. Eyes: Negative for discharge. Respiratory: Positive for shortness of breath. Negative for cough and wheezing. Cardiovascular: Positive for leg swelling. Negative for chest pain and palpitations. Gastrointestinal: Negative for abdominal pain, nausea and vomiting. Genitourinary: Negative for dysuria and urgency. Neurological: Negative for dizziness. Physical Exam  Constitutional:       General: She is not in acute distress. Appearance: She is obese. She is not ill-appearing or diaphoretic. HENT:      Head: Normocephalic and atraumatic. Eyes:      Extraocular Movements: Extraocular movements intact. Pupils: Pupils are equal, round, and reactive to light. Cardiovascular:      Rate and Rhythm: Regular rhythm. Tachycardia present. Pulses: Normal pulses. Heart sounds: No murmur heard. Pulmonary:      Breath sounds: No wheezing or rales. Abdominal:      General: Abdomen is flat. Bowel sounds are normal.      Palpations: Abdomen is soft. Tenderness: There is no abdominal tenderness. There is no guarding. Musculoskeletal:         General: No swelling or tenderness. Right lower leg: Edema present. Left lower leg: Edema present. Comments: Continued Bilateral 2+ edema in the extremity up to the thigh but with improvement   Skin:     Coloration: Skin is not jaundiced or pale. Neurological:      Mental Status: She is alert and oriented to person, place, and time. Psychiatric:         Mood and Affect: Mood normal.         Behavior: Behavior normal.         Thought Content:  Thought content normal.         LABS:    CBC:   Recent Labs     04/05/22 0519 04/07/22  0448   WBC 11.0 9.9   HGB 7.7* 7.7*   HCT 22.9* 22.6*    412   MCV 83.5 83.0     Renal:    Recent Labs     04/05/22 0519 04/05/22  1418    136   K 3.7 3.6   CL 99 99   CO2 25 22   BUN 9 7   CREATININE 0.5* <0.5*   GLUCOSE 123* 128*   CALCIUM 9.0 8.8   PHOS 3.4  --    ANIONGAP 12 15     Hepatic:   Recent Labs     04/05/22  0519   LABALBU 3.3*     Troponin: No results for input(s): TROPONINI in the last 72 hours. BNP: No results for input(s): BNP in the last 72 hours. Lipids: No results for input(s): CHOL, HDL in the last 72 hours. Invalid input(s): LDLCALCU, TRIGLYCERIDE  ABGs:  No results for input(s): PHART, KSL5ODR, PO2ART, JLF0JYN, BEART, THGBART, O0RKUKBF, QTK2BOA in the last 72 hours. INR: No results for input(s): INR in the last 72 hours. Lactate: No results for input(s): LACTATE in the last 72 hours. Cultures:  -----------------------------------------------------------------  RAD:   CTA LOWER EXTREMITY LEFT W CONTRAST   Final Result   Impression: No acute arterial abnormality. No active hemorrhage. No hematoma of the pelvis, retroperitoneum or bilateral lower extremities. Suboptimal evaluation of the venous structures due to contrast bolus timing. Findings described above may represent sequelae of poor venous outflow or rethrombosis. CTA LOWER EXTREMITY RIGHT W CONTRAST   Final Result   Impression: No acute arterial abnormality. No active hemorrhage. No hematoma of the pelvis, retroperitoneum or bilateral lower extremities. Suboptimal evaluation of the venous structures due to contrast bolus timing. Findings described above may represent sequelae of poor venous outflow or rethrombosis. CT HEAD WO CONTRAST   Final Result      No acute or interval change. No hemorrhage or mass effect. CT ABDOMEN PELVIS W IV CONTRAST Additional Contrast? 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       [facility]   Final Result   Impression: Technically successful inferior venacavogram and bilateral iliac venogram demonstrating marked ileal caval clot associated with the filter including thrombus above the IVC filter. Technically successful vacuum-assisted thrombectomy with restoration of flow through the inferior vena cava and improved patency of the ileal caval system. Clot remains within the inferior vena cava filter. VL Extremity Venous Bilateral   Final Result      XR CHEST PORTABLE   Final Result      Limited evaluation of lower right lung due to overlapping density for which infiltrate cannot be excluded. Otherwise no acute process seen. CT HEAD WO CONTRAST   Final Result      Postsurgical changes with areas of low cortical attenuation in the left parietal and left occipital region appear essentially stable from prior study with associated mild midline shift which is similar to slightly improved from prior study. No evidence of developing acute intracranial hemorrhage. CTA ABDOMEN PELVIS W WO CONTRAST   Final Result   1. 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 2. Surrounding hazy density, likely related to venous congestion. However, streaky densities extending inferiorly on the right may represent blood. However, there is no evidence of retroperitoneal hematoma. Correlation with lower extremity noninvasive    duplex Doppler is recommended in addition to serial hemoglobin and crit levels. 3. Suspect bilateral lower lobe pulmonary emboli. See comments above      Redemonstrated is a large necrotic mass involving the right breast enlarged fibroid uterus. Assessment/Plan:     Hyperthermia (resolved), most likely from thrombophlebitis vs UTI   Urine cult grew Enteroccocus fecalis  -Blood culture no growth to date   -ID consulted - doxycycline 100 twice daily      Sinus tachycardia 2/2 malignancy  - Continues to be in sinus tach, somewhat better HR. -Continuous telemetry     Bilateral lower extremity DVTs likely resulting in lower extremity swelling. IVC filter thrombus s/p thrombectomy  -Platelets are holding steady  -Continue on Eliquis  -We will keep IVC filter in for 6 weeks, IR to do outpatient  - Continue lasix while in the hospital. Will discontinue on discharge.      Metastatic triple negative breast cancer with intracranial metastasis  -S/p craniotomy on 3/12  -Oncology on board; recommended following medical and radiation oncology for systemic palliative chemotherapy and radiation therapy.  -Neglected right breast cancer is not bleeding or causing infection with wound be of little benefit to perform a mastectomy  - As needed oxycodone as needed for pain  -Consulted palliative care, patient has agreed to undergo all management      Urinary retention  Attempted voiding trial on Saturday 4/2/22, straight cath X 2.   - Will continue without mas for now, May require mas cath again. 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 No future use of heparin    Code Status: Full Code  FEN: ADULT DIET; Regular  PPX: eliquis  DISPO: Discharge pending precert    Taina Cota, , PGY-1  04/07/22  11:41 AM    This patient has been staffed and discussed with Hodan Herrera MD.   Patient seen and examined, labs and imaging studies reviewed, agree with assessment and plan as outlined above. Continue with current care and plan. Discussed case with patients nurse, discussed case with care team, discussed plan.       MD Amparo Thomason

## 2022-04-07 NOTE — PROGRESS NOTES
Nephrology  Note                                                                                                                                                                                                                                                                                                                                                               Office : [phone]     Fax :[phone]              Patient's Name: Dulce Bethea    Reason for Consult:  Hyponatremia   Requesting Physician:  JUDITH Rodrigues CNP      Na stable   Cr stable   K low   Good UO   Edema better   No N/V/D       Past Medical History:   Diagnosis Date    Thyroid nodule        Past Surgical History:   Procedure Laterality Date    CRANIOTOMY Left 3/12/2022    LEFT TEMPORAL PARIETAL OCCIPITAL CRANIOTOMY FOR TUMOR RESECTION performed by Whitney Dixon MD at [address] IR  [address] IMAGING N/A 03/16/2022    [facility]    IR IVC FILTER PLACEMENT W IMAGING  3/16/2022    IR IVC FILTER PLACEMENT W IMAGING 3/16/2022 [facility]    [facility]  3/28/2022    IR THROMB [address] 3/28/2022 [facility] PROCEDURES    UPPER GASTROINTESTINAL ENDOSCOPY N/A 4/2/2022    EGD DIAGNOSTIC ONLY performed by Raffi Lopez MD at [facility] ENDOSCOPY       Family History   Problem Relation Age of Onset    Cancer Father         reports that she has never smoked. She has never used smokeless tobacco. She reports that she does not drink alcohol and does not use drugs.     Allergies:  Heparin    Current Medications:    potassium bicarb-citric acid (EFFER-K) effervescent tablet 40 mEq, BID  spironolactone (ALDACTONE) tablet 50 mg, Daily  furosemide (LASIX) injection 40 mg, BID  albumin human 25 % IV solution 25 g, Q12H  tamsulosin (FLOMAX) capsule 0.4 mg, Daily  apixaban (ELIQUIS) tablet 5 mg, BID  0.9 % sodium chloride infusion, PRN  sodium chloride flush 0.9 % injection 5-40 mL, 2 378 412     BMP:    Recent Labs     04/05/22  0519 04/05/22  1418 04/07/22  1129    136 133*   K 3.7 3.6 3.0*   CL 99 99 93*   CO2 25 22 29   BUN 9 7 10   CREATININE 0.5* <0.5* 0.5*   GLUCOSE 123* 128* 125*     Ca/Mg/Phos:   Recent Labs     04/05/22  0519 04/05/22  1418 04/07/22  1129   CALCIUM 9.0 8.8 9.7   PHOS 3.4  --   --      Hepatic:   No results for input(s): AST, ALT, ALB, BILITOT, ALKPHOS in the last 72 hours. Troponin: No results for input(s): TROPONINI in the last 72 hours. BNP: No results for input(s): BNP in the last 72 hours. Lipids: No results for input(s): CHOL, TRIG, HDL, LDLCALC, LABVLDL in the last 72 hours. ABGs:   No results for input(s): PHART, PO2ART, XWS5PTW in the last 72 hours. INR:   No results for input(s): INR in the last 72 hours. UA:  No results for input(s): Boni Lexi, GLUCOSEU, BILIRUBINUR, KETUA, SPECGRAV, BLOODU, PHUR, PROTEINU, UROBILINOGEN, NITRU, LEUKOCYTESUR, Cassie Cowper in the last 72 hours. Urine Microscopic:   No results for input(s): LABCAST, BACTERIA, COMU, HYALCAST, WBCUA, RBCUA, EPIU in the last 72 hours. Urine Culture:   No results for input(s): LABURIN in the last 72 hours. Urine Chemistry:   No results for input(s): Nelson Hashimoto, PROTEINUR, NAUR in the last 72 hours. IMAGING:  CTA LOWER EXTREMITY LEFT W CONTRAST   Final Result   Impression: No acute arterial abnormality. No active hemorrhage. No hematoma of the pelvis, retroperitoneum or bilateral lower extremities. Suboptimal evaluation of the venous structures due to contrast bolus timing. Findings described above may represent sequelae of poor venous outflow or rethrombosis. CTA LOWER EXTREMITY RIGHT W CONTRAST   Final Result   Impression: No acute arterial abnormality. No active hemorrhage. No hematoma of the pelvis, retroperitoneum or bilateral lower extremities. Suboptimal evaluation of the venous structures due to contrast bolus timing.  Findings described above may represent sequelae of poor venous outflow or rethrombosis. CT HEAD WO CONTRAST   Final Result      No acute or interval change. No hemorrhage or mass effect. CT ABDOMEN PELVIS W IV CONTRAST Additional Contrast? 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 [facility]   Final Result   Impression: Technically successful inferior venacavogram and bilateral iliac venogram demonstrating marked ileal caval clot associated with the filter including thrombus above the IVC filter. Technically successful vacuum-assisted thrombectomy with restoration of flow through the inferior vena cava and improved patency of the ileal caval system. Clot remains within the inferior vena cava filter. VL Extremity Venous Bilateral   Final Result      XR CHEST PORTABLE   Final Result      Limited evaluation of lower right lung due to overlapping density for which infiltrate cannot be excluded. Otherwise no acute process seen. CT HEAD WO CONTRAST   Final Result      Postsurgical changes with areas of low cortical attenuation in the left parietal and left occipital region appear essentially stable from prior study with associated mild midline shift which is similar to slightly improved from prior study. No evidence of developing acute intracranial hemorrhage. CTA ABDOMEN PELVIS W WO CONTRAST   Final Result   1. 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 2. Surrounding hazy density, likely related to venous congestion. However, streaky densities extending inferiorly on the right may represent blood. However, there is no evidence of retroperitoneal hematoma.  Correlation with lower extremity noninvasive    duplex Doppler is recommended in addition to serial hemoglobin and crit levels. 3. Suspect bilateral lower lobe pulmonary emboli. See comments above      Redemonstrated is a large necrotic mass involving the right breast enlarged fibroid uterus. Assessment/Plan   1. Hyponatremia     2. HTN    3. Anemia    4. Acid- base/ Electrolyte imbalance     5.  SIADH     Plan   - lasix as needed to help with edema +Albumin  - aldactone to help with K and edema   - samsca as needed   - Ur studies - reviewed   - TSH - nl   - Monitor Na   - d/w pt   - free water restriction   - encourage solute intake                     Thank you for allowing us to participate in care of Andreea oSlis MD  Feel free to contact me   [facility] [address]  Office : [phone]  Fax :[phone]

## 2022-04-08 LAB
ALBUMIN SERPL-MCNC: 4.2 G/DL (ref 3.4–5)
ANION GAP SERPL CALCULATED.3IONS-SCNC: 18 MMOL/L (ref 3–16)
BUN BLDV-MCNC: 7 MG/DL (ref 7–20)
CALCIUM SERPL-MCNC: 9.7 MG/DL (ref 8.3–10.6)
CHLORIDE BLD-SCNC: 94 MMOL/L (ref 99–110)
CO2: 24 MMOL/L (ref 21–32)
CREAT SERPL-MCNC: 0.5 MG/DL (ref 0.6–1.1)
GFR AFRICAN AMERICAN: >60
GFR NON-AFRICAN AMERICAN: >60
GLUCOSE BLD-MCNC: 77 MG/DL (ref 70–99)
LV EF: 58 %
LVEF MODALITY: NORMAL
PHOSPHORUS: 2.8 MG/DL (ref 2.5–4.9)
POTASSIUM SERPL-SCNC: 3.4 MMOL/L (ref 3.5–5.1)
SODIUM BLD-SCNC: 136 MMOL/L (ref 136–145)

## 2022-04-08 PROCEDURE — 99233 SBSQ HOSP IP/OBS HIGH 50: CPT | Performed by: INTERNAL MEDICINE

## 2022-04-08 PROCEDURE — 97530 THERAPEUTIC ACTIVITIES: CPT

## 2022-04-08 PROCEDURE — 6370000000 HC RX 637 (ALT 250 FOR IP): Performed by: STUDENT IN AN ORGANIZED HEALTH CARE EDUCATION/TRAINING PROGRAM

## 2022-04-08 PROCEDURE — 6360000002 HC RX W HCPCS: Performed by: STUDENT IN AN ORGANIZED HEALTH CARE EDUCATION/TRAINING PROGRAM

## 2022-04-08 PROCEDURE — 93306 TTE W/DOPPLER COMPLETE: CPT

## 2022-04-08 PROCEDURE — 6370000000 HC RX 637 (ALT 250 FOR IP): Performed by: INTERNAL MEDICINE

## 2022-04-08 PROCEDURE — 6360000002 HC RX W HCPCS: Performed by: INTERNAL MEDICINE

## 2022-04-08 PROCEDURE — 2580000003 HC RX 258: Performed by: INTERNAL MEDICINE

## 2022-04-08 PROCEDURE — 97535 SELF CARE MNGMENT TRAINING: CPT

## 2022-04-08 PROCEDURE — 97110 THERAPEUTIC EXERCISES: CPT

## 2022-04-08 PROCEDURE — P9047 ALBUMIN (HUMAN), 25%, 50ML: HCPCS | Performed by: STUDENT IN AN ORGANIZED HEALTH CARE EDUCATION/TRAINING PROGRAM

## 2022-04-08 PROCEDURE — 36415 COLL VENOUS BLD VENIPUNCTURE: CPT

## 2022-04-08 PROCEDURE — 2060000000 HC ICU INTERMEDIATE R&B

## 2022-04-08 PROCEDURE — 80069 RENAL FUNCTION PANEL: CPT

## 2022-04-08 PROCEDURE — 97116 GAIT TRAINING THERAPY: CPT

## 2022-04-08 RX ORDER — LANOLIN ALCOHOL/MO/W.PET/CERES
6 CREAM (GRAM) TOPICAL ONCE
Status: COMPLETED | OUTPATIENT
Start: 2022-04-08 | End: 2022-04-08

## 2022-04-08 RX ADMIN — SODIUM CHLORIDE, PRESERVATIVE FREE 10 ML: 5 INJECTION INTRAVENOUS at 09:48

## 2022-04-08 RX ADMIN — TAMSULOSIN HYDROCHLORIDE 0.4 MG: 0.4 CAPSULE ORAL at 09:46

## 2022-04-08 RX ADMIN — ALBUMIN (HUMAN) 25 G: 0.25 INJECTION, SOLUTION INTRAVENOUS at 06:25

## 2022-04-08 RX ADMIN — LEVETIRACETAM 2000 MG: 250 TABLET, FILM COATED ORAL at 09:47

## 2022-04-08 RX ADMIN — LEVETIRACETAM 2500 MG: 250 TABLET, FILM COATED ORAL at 22:34

## 2022-04-08 RX ADMIN — LACOSAMIDE 200 MG: 50 TABLET, FILM COATED ORAL at 09:46

## 2022-04-08 RX ADMIN — APIXABAN 5 MG: 5 TABLET, FILM COATED ORAL at 09:46

## 2022-04-08 RX ADMIN — SPIRONOLACTONE 50 MG: 50 TABLET ORAL at 09:46

## 2022-04-08 RX ADMIN — Medication 6 MG: at 22:34

## 2022-04-08 RX ADMIN — ACETAMINOPHEN 325MG 650 MG: 325 TABLET ORAL at 09:47

## 2022-04-08 RX ADMIN — PANTOPRAZOLE SODIUM 40 MG: 40 TABLET, DELAYED RELEASE ORAL at 06:33

## 2022-04-08 RX ADMIN — SODIUM CHLORIDE, PRESERVATIVE FREE 10 ML: 5 INJECTION INTRAVENOUS at 22:36

## 2022-04-08 RX ADMIN — QUETIAPINE FUMARATE 12.5 MG: 25 TABLET ORAL at 22:35

## 2022-04-08 RX ADMIN — APIXABAN 5 MG: 5 TABLET, FILM COATED ORAL at 22:35

## 2022-04-08 RX ADMIN — LACOSAMIDE 200 MG: 50 TABLET, FILM COATED ORAL at 22:34

## 2022-04-08 RX ADMIN — ALBUMIN (HUMAN) 25 G: 0.25 INJECTION, SOLUTION INTRAVENOUS at 18:00

## 2022-04-08 RX ADMIN — FUROSEMIDE 40 MG: 10 INJECTION, SOLUTION INTRAMUSCULAR; INTRAVENOUS at 09:48

## 2022-04-08 RX ADMIN — FUROSEMIDE 40 MG: 10 INJECTION, SOLUTION INTRAMUSCULAR; INTRAVENOUS at 18:00

## 2022-04-08 RX ADMIN — OXYCODONE 10 MG: 5 TABLET ORAL at 15:26

## 2022-04-08 ASSESSMENT — ENCOUNTER SYMPTOMS
SORE THROAT: 0
COUGH: 0
VOMITING: 0
WHEEZING: 0
VOICE CHANGE: 0
NAUSEA: 0
EYE DISCHARGE: 0
ABDOMINAL PAIN: 0

## 2022-04-08 ASSESSMENT — PAIN SCALES - GENERAL
PAINLEVEL_OUTOF10: 6
PAINLEVEL_OUTOF10: 0
PAINLEVEL_OUTOF10: 0
PAINLEVEL_OUTOF10: 8
PAINLEVEL_OUTOF10: 0
PAINLEVEL_OUTOF10: 6
PAINLEVEL_OUTOF10: 0
PAINLEVEL_OUTOF10: 0

## 2022-04-08 ASSESSMENT — PAIN DESCRIPTION - LOCATION: LOCATION: HEAD

## 2022-04-08 ASSESSMENT — PAIN DESCRIPTION - PAIN TYPE: TYPE: ACUTE PAIN

## 2022-04-08 ASSESSMENT — PAIN DESCRIPTION - DESCRIPTORS: DESCRIPTORS: ACHING;DULL

## 2022-04-08 ASSESSMENT — PAIN DESCRIPTION - FREQUENCY: FREQUENCY: CONTINUOUS

## 2022-04-08 ASSESSMENT — PAIN - FUNCTIONAL ASSESSMENT: PAIN_FUNCTIONAL_ASSESSMENT: ACTIVITIES ARE NOT PREVENTED

## 2022-04-08 ASSESSMENT — PAIN DESCRIPTION - ONSET: ONSET: ON-GOING

## 2022-04-08 ASSESSMENT — PAIN DESCRIPTION - PROGRESSION: CLINICAL_PROGRESSION: NOT CHANGED

## 2022-04-08 NOTE — PROGRESS NOTES
Occupational Therapy  Facility/Department: [place] [address] [facility]U  Daily Treatment Note  NAME: Nannette Gaston  : 1975  MRN: [id number]    Date of Service: 2022    Discharge Recommendations:  Nannette Gaston scored a 16/24 on the AM-PAC ADL Inpatient form. Current research shows that an AM-PAC score of 17 or less is typically not associated with a discharge to the patient's home setting. Based on the patient's AM-PAC score and their current ADL deficits, it is recommended that the patient have 5-7 sessions per week of Occupational Therapy at d/c to increase the patient's independence. At this time, this patient demonstrates the endurance, and/or tolerance for 3 hours of therapy each day, with a treatment frequency of 5-7x/wk. Please see assessment section for further patient specific details. If patient discharges prior to next session this note will serve as a discharge summary. Please see below for the latest assessment towards goals. OT Equipment Recommendations  Other: defer to next level of care    Assessment   Performance deficits / Impairments: Decreased functional mobility ; Decreased endurance;Decreased ADL status; Decreased balance;Decreased strength;Decreased vision/visual deficit  Assessment: Pt demonstrated increased indep with toileting, functional mobility with walker, and activity tolerance. She still requires A with all tasks, and is motivated to increase her strength and indep. Recommend intensive inpatient OT at discharge, to maximize functional indep.   Treatment Diagnosis: impaired ADLs and functional mobility/transfers  Prognosis: Good  OT Education: Transfer Training  Patient Education: UE exerc-verbal and written, needs reinforcement  REQUIRES OT FOLLOW UP: Yes  Activity Tolerance  Activity Tolerance: Patient Tolerated treatment well  Safety Devices  Safety Devices in place: Yes         Patient Diagnosis(es): The primary encounter diagnosis was Acute deep vein thrombosis (DVT) of proximal vein of right lower extremity ([address] Diagnoses of Thrombocytopenia ([address] S/P craniotomy, and Phlebitis were also pertinent to this visit. has a past medical history of Thyroid nodule. has a past surgical history that includes craniotomy (Left, 3/12/2022); IR GUIDED IVC FILTER PLACEMENT (N/A, 03/16/2022); IR GUIDED IVC FILTER PLACEMENT (3/16/2022); IR GUIDED THROMB [facility] VEIN (3/28/2022); and Upper gastrointestinal endoscopy (N/A, 4/2/2022). Restrictions  Position Activity Restriction  Other position/activity restrictions: Up with assist  Subjective   General  Chart Reviewed: Yes  Patient assessed for rehabilitation services?: Yes  Additional Pertinent Hx: 41-year-old female with metastatic breast cancer status post craniotomy with mass resection presented from [facility] due to worsening lower extremity pain and swelling. During her last admission patient was diagnosed with a DVT in the right lower extremity and due to her recent Craniotomy anticoagulation could not be started till POD 14 so patient underwent an IVC filter. Her platelets levels were noted to be low at [facility] and due to concern for worsening DVT she was sent to the hospital.  CT abdomen and pelvis was performed which did show thrombosis of the IVC at the level of the filter with marked distention of the caval bifurcation. There was also suspicion for bilateral lower lobe pulmonary emboli. Response to previous treatment: Patient with no complaints from previous session  Family / Caregiver Present: No  Referring Practitioner: Phyllis Fleming MD  Diagnosis: Thrombocytopenia  Subjective  Subjective: Pt in bed, reporting she needs to use the bathroom. Orientation  Orientation  Overall Orientation Status: Within Functional Limits  Objective    ADL  Feeding: Independent; Beverage management  Grooming: Stand by assistance (SBA while standing in gage stedy, while washing hands, brushing teeth with occ cues)  Toileting: Contact guard assistance        Balance  Sitting Balance: Supervision  Standing Balance: Contact guard assistance  Standing Balance  Time: ~6 minutes overall  Activity: ADLs , mobility  Functional Mobility  Functional Mobility Comments: Functional mobility with rolling walker, CGA, increased time/effort  Toilet Transfers  Toilet - Technique:  (dep, assist of 2 with use of gage stedy)  Equipment Used: Extra wide bedside commode (over toilet)  Toilet Transfer: 2 Person assistance (dep, assist of 2 with use of gage stedy)  Toilet Transfers Comments: sit to stand from bariatric 3 in1 commode over toilet with Paula  Bed mobility  Supine to Sit: Stand by assistance (head of bed elevated, increased time/effort)  Scooting: Stand by assistance (to edge of bed)  Transfers  Sit to stand: Minimal assistance (CGA from bed and 3 in1 commode to gage stedy, Paula from arm chair to walker)  Stand to sit: Contact guard assistance (cues for hand placement and safe postitioning)                       Cognition  Overall Cognitive Status: Exceptions  Arousal/Alertness: Delayed responses to stimuli  Following Commands:  Follows one step commands with repetition  Sequencing: Requires cues for some                    Type of ROM/Therapeutic Exercise  Comment: while seated in chair, 10 reps overhead elbow flex/ext                    Plan   Plan  Times per week: 2-5  Times per day: Daily  Current Treatment Recommendations: Strengthening,Home Management Training,Patient/Caregiver Education & Training,Balance Training,Functional Mobility Training,Endurance Training,Self-Care / ADL,Safety Education & Training,Pain Management  G-Code     OutComes Score                                                  AM-PAC Score        AM-[facility] Inpatient Daily Activity Raw Score: 16 (04/08/22 1333)  AM-PAC Inpatient ADL T-Scale Score : 35.96 (04/08/22 1333)  ADL Inpatient CMS 0-100% Score: 53.32 (04/08/22 1333)  ADL Inpatient CMS G-Code Modifier : CK (04/08/22 6013)    Goals  Short term goals  Time Frame for Short term goals: by dc  Short term goal 1: Pt will complete functional transfers, including BSC/toilet transfer w/ Min A- 4/8 goal not met  Short term goal 2: Pt will complete LE dressing w/ Min A and use of AE prn- progressing-4/8 goal not addressed  Short term goal 3: Pt will maintain sitting balance w/ spvn to complete ADL task- goal met  Short term goal 4: sit<>stand with Anil to RW - goal met 4/6; revised goal: improve sit to stand transfers to SBA-4/8 goal not met  Short term goal 5: bed to chair transfer with Anil -4/8 goal not addressed       Therapy Time   Individual Concurrent Group Co-treatment   Time In 1106         Time Out 1145         Minutes 39           Timed Code Treatment Minutes:  39 Minutes    Total Treatment Minutes:  [address], OTR/L [address]

## 2022-04-08 NOTE — PROGRESS NOTES
Progress Note    Admit Date: 3/25/2022  Day: 14  Diet: ADULT DIET; Regular    CC: leg pain      Interval history:   NAEON. improved lower extremity swelling, able to urinate twice did have one instance requiring straight cath. . Otherwise well and tolerating treatment with no issues.  Ready for discharge to SNF    Medications:     Scheduled Meds:   spironolactone  50 mg Oral Daily    albumin human  25 g IntraVENous Q12H    furosemide  40 mg IntraVENous BID    tamsulosin  0.4 mg Oral Daily    apixaban  5 mg Oral BID    sodium chloride flush  5-40 mL IntraVENous 2 times per day    metoprolol  5 mg IntraVENous Once    [Held by provider] clonazePAM  0.5 mg Oral BID    QUEtiapine  12.5 mg Oral Nightly    insulin regular  10 Units IntraVENous Once    lacosamide  200 mg Oral BID    levETIRAcetam  2,000 mg Oral Daily    levETIRAcetam  2,500 mg Oral Nightly    melatonin  5 mg Oral Nightly    pantoprazole  40 mg Oral QAM AC    sodium chloride flush  5-40 mL IntraVENous 2 times per day     Continuous Infusions:   sodium chloride      sodium chloride 25 mL (04/06/22 1021)    dextrose      dextrose 100 mL/hr (04/05/22 0957)    sodium chloride 25 mL (04/01/22 1736)     PRN Meds:sodium chloride, sodium chloride flush, sodium chloride, oxyCODONE **OR** oxyCODONE, LORazepam, glucose, dextrose, glucagon (rDNA), dextrose, sodium chloride flush, sodium chloride, ondansetron **OR** ondansetron, polyethylene glycol, acetaminophen **OR** acetaminophen    Objective:   Vitals:   T-max:  Patient Vitals for the past 8 hrs:   BP Temp Temp src Pulse Resp SpO2 Weight   04/08/22 0941 103/71 97.7 °F (36.5 °C) Oral 104 16 93 % --   04/08/22 0547 -- -- -- -- -- -- 207 lb 0.2 oz (93.9 kg)   04/08/22 0528 -- -- -- -- -- -- 224 lb 13.9 oz (102 kg)       Intake/Output Summary (Last 24 hours) at 4/8/2022 1104  Last data filed at 4/8/2022 1030  Gross per 24 hour   Intake 150 ml   Output 2650 ml   Net -2500 ml       Review of Systems 125*   CALCIUM 8.8  --  9.7   MG  --  2.00  --    ANIONGAP 15  --  11     Hepatic:   No results for input(s): AST, ALT, BILITOT, BILIDIR, PROT, LABALBU, ALKPHOS in the last 72 hours. Troponin: No results for input(s): TROPONINI in the last 72 hours. BNP: No results for input(s): BNP in the last 72 hours. Lipids: No results for input(s): CHOL, HDL in the last 72 hours. Invalid input(s): LDLCALCU, TRIGLYCERIDE  ABGs:  No results for input(s): PHART, HBB5SRG, PO2ART, IDE0QLA, BEART, THGBART, P9KVJDPQ, VUR9CXS in the last 72 hours. INR: No results for input(s): INR in the last 72 hours. Lactate: No results for input(s): LACTATE in the last 72 hours. Cultures:  -----------------------------------------------------------------  RAD:   CTA LOWER EXTREMITY LEFT W CONTRAST   Final Result   Impression: No acute arterial abnormality. No active hemorrhage. No hematoma of the pelvis, retroperitoneum or bilateral lower extremities. Suboptimal evaluation of the venous structures due to contrast bolus timing. Findings described above may represent sequelae of poor venous outflow or rethrombosis. CTA LOWER EXTREMITY RIGHT W CONTRAST   Final Result   Impression: No acute arterial abnormality. No active hemorrhage. No hematoma of the pelvis, retroperitoneum or bilateral lower extremities. Suboptimal evaluation of the venous structures due to contrast bolus timing. Findings described above may represent sequelae of poor venous outflow or rethrombosis. CT HEAD WO CONTRAST   Final Result      No acute or interval change. No hemorrhage or mass effect. CT ABDOMEN PELVIS W IV CONTRAST Additional Contrast? 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       [facility]   Final Result   Impression: Technically successful inferior venacavogram and bilateral iliac venogram demonstrating marked ileal caval clot associated with the filter including thrombus above the IVC filter. Technically successful vacuum-assisted thrombectomy with restoration of flow through the inferior vena cava and improved patency of the ileal caval system. Clot remains within the inferior vena cava filter. VL Extremity Venous Bilateral   Final Result      XR CHEST PORTABLE   Final Result      Limited evaluation of lower right lung due to overlapping density for which infiltrate cannot be excluded. Otherwise no acute process seen. CT HEAD WO CONTRAST   Final Result      Postsurgical changes with areas of low cortical attenuation in the left parietal and left occipital region appear essentially stable from prior study with associated mild midline shift which is similar to slightly improved from prior study. No evidence of developing acute intracranial hemorrhage. CTA ABDOMEN PELVIS W WO CONTRAST   Final Result   1. 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 2. Surrounding hazy density, likely related to venous congestion. However, streaky densities extending inferiorly on the right may represent blood. However, there is no evidence of retroperitoneal hematoma. Correlation with lower extremity noninvasive    duplex Doppler is recommended in addition to serial hemoglobin and crit levels. 3. Suspect bilateral lower lobe pulmonary emboli. See comments above      Redemonstrated is a large necrotic mass involving the right breast enlarged fibroid uterus. Assessment/Plan:     Bilateral lower extremity DVTs likely resulting in lower extremity swelling. IVC filter thrombus s/p thrombectomy  -Platelets are holding steady  -Continue on Eliquis  -We will keep IVC filter in for 6 weeks, IR to do outpatient  - Continue lasix while in the hospital. Will discontinue on discharge. - F/U echo heart. Continued lower extremity swelling and edema will order an echo to obtain a baseline and rule out any right heart strain    Sinus tachycardia 2/2 malignancy  - Continues to be in sinus tach, somewhat better HR.   -Continuous telemetry     Metastatic triple negative breast cancer with intracranial metastasis  -S/p craniotomy on 3/12  -Oncology on board; recommended following medical and radiation oncology for systemic palliative chemotherapy and radiation therapy.  -Neglected right breast cancer is not bleeding or causing infection with wound be of little benefit to perform a mastectomy  - As needed oxycodone as needed for pain  -Consulted palliative care, patient has agreed to undergo all management      Urinary retention  Attempted voiding trial on Saturday 4/2/22, straight cath X 2.   - Will continue without mas for now, May require mas cath again. 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 No future use of heparin    Code Status: Full Code  FEN: ADULT DIET; Regular  PPX: eliquis  DISPO: Discharge pending precert    Yuliana Guerrero DO, PGY-1  04/08/22  11:04 AM     Patient seen and examined, labs and imaging studies reviewed, agree with assessment and plan as outlined above. Continue with current care and plan. Discussed case with patients nurse, discussed case with care team, discussed plan.       Rohit Marshall MD [id number]

## 2022-04-08 NOTE — CARE COORDINATION
Case Management Assessment           Daily Note                 Date/ Time of Note: 4/8/2022 10:52 AM         Note completed by: ISAIAH Hooper    Patient Name: Yonatan Dubon  YOB: 1975    Diagnosis: Thrombocytopenia (HCC) [D69.6]  S/P craniotomy [Z98.890]  Acute deep vein thrombosis (DVT) of proximal vein of right lower extremity (HCC) [I82.4Y1]  Acute deep vein thrombosis (DVT) of other specified vein of right lower extremity ([address] [I82.491]  Patient Admission Status: Inpatient    Date of Admission:3/25/2022  6:11 PM Length of Stay: 14 GLOS:      Current Plan of Care: awaiting placement, precert  ________________________________________________________________________________________  PT AM-PAC: 12 / 24 per last evaluation on: 4/4/22    OT AM-PAC: 14 / 24 per last evaluation on: 4/4/22    DME Needs for discharge: deferred  ________________________________________________________________________________________  Discharge Plan: Inpatient Rehab: [facility], precert submitted 0/8/80 vs SNF placement    Tentative discharge date: 4/4/22 pending facility acceptance, precert    Current barriers to discharge: facility acceptance, precert    ________________________________________________________________________________________  Case Management Notes: Precert pending for [facility], if denied, [facility] can accept. CATHERINE following. 1555: CATHERINE spoke with Edmond from [facility], precert still not back yet. Ashtyn Cast will be covering this weekend, she can be reached at [phone]. Weekend contact for [facility] will be Karin Langley at [phone]. Carole Warner and her family were provided with choice of provider; she and her family are in agreement with the discharge plan.     Care Transition Patient: ISAIAH Pearce  [facility]  Case Management Department  Ph: [phone]  Fax: [phone]

## 2022-04-08 NOTE — PLAN OF CARE
Problem: Nutrition  Goal: Optimal nutrition therapy  Outcome: Ongoing  Note: Nutrition Problem #1: Increased nutrient needs  Intervention: Food and/or Nutrient Delivery: Continue Current Diet,Start Oral Nutrition Supplement  Nutritional Goals: pt will consistently consume >50% PO intakes throughout adm

## 2022-04-08 NOTE — PROGRESS NOTES
Physical Therapy  Facility/Department: [place] [address] [facility]  Daily Treatment Note  NAME: Anselmo Downing  : 1975  MRN: [id number]    Date of Service: 2022    Discharge Recommendations:  Anselmo Downing scored a 16/24 on the AM-PAC short mobility form. Current research shows that an AM-PAC score of 17 or less is typically not associated with a discharge to the patient's home setting. Based on the patient's AM-PAC score and their current functional mobility deficits, it is recommended that the patient have 5-7 sessions per week of Physical Therapy at d/c to increase the patient's independence. At this time, this patient demonstrates the endurance, and/or tolerance for 3 hours of therapy each day, with a treatment frequency of 5-7x/wk. Please see assessment section for further patient specific details. Patient would benefit from continued therapy after discharge   PT Equipment Recommendations  Equipment Needed: No (defer to next level of care)    Assessment   Body structures, Functions, Activity limitations: Decreased functional mobility ; Decreased ROM; Decreased strength;Decreased endurance;Decreased balance;Decreased coordination; Increased pain;Decreased posture  Assessment: Patient shows overall improved functional mobility during today's session requiring decreased assistance with transfers and ambulation. She was able to ambulate 27' with FWW and CGA demonstrating gait deficits as outlined above. Patient with quick fatigue with all mobility requiring multiple seated rest breaks but is motivated to return to prior level of mobility. Continue to recommend skilled PT upon discharge. Will follow while in acute care setting to maximize independence with mobility.   Treatment Diagnosis: Decreased strength and mobility associated with thrombocytopenia  Prognosis: Good  PT Education: Goals;Plan of Care;Home Exercise Program;Transfer Training;General Safety;Gait Training  Patient Education: Pt will benefit from reinforcement  REQUIRES PT FOLLOW UP: Yes  Activity Tolerance  Activity Tolerance: Patient Tolerated treatment well     Patient Diagnosis(es): The primary encounter diagnosis was Acute deep vein thrombosis (DVT) of proximal vein of right lower extremity ([address] Diagnoses of Thrombocytopenia ([address] S/P craniotomy, and Phlebitis were also pertinent to this visit. has a past medical history of Thyroid nodule. has a past surgical history that includes craniotomy (Left, 3/12/2022); IR GUIDED IVC FILTER PLACEMENT (N/A, 03/16/2022); IR GUIDED IVC FILTER PLACEMENT (3/16/2022); IR GUIDED THROMB [facility] VEIN (3/28/2022); and Upper gastrointestinal endoscopy (N/A, 4/2/2022). Restrictions  Position Activity Restriction  Other position/activity restrictions: Up with assist  Subjective   General  Chart Reviewed: Yes  Additional Pertinent Hx: Ms. Erasmo Bernal is a 47 y/o female presenting with B leg pain. Pt was dx with B DVT's 3/24 and had brain sx 2 weeks ago. Pt was dx with thrombocytopenia. CTA-abdomen: Thrombosis of the inferior vena cava at the level of the inferior vena cava filter with marked distention of the caval bifurcation and common iliac veins, suspect bilateral lower lobe pulmonary emboli. CT-head: (-) acute. XR-chest: (-) acute. PMH: breast cancer with mets to the brain s/p L craniotomy on 4/94 complicated by seizures, IVC filter placement (3/16). Response To Previous Treatment: Patient with no complaints from previous session. Family / Caregiver Present: No  Referring Practitioner: Laureano Keen MD  Subjective  Subjective: Patient supine in bed upon arrival, agreeable to PT treatment, requesting to use the bathroom.   Pain Screening  Patient Currently in Pain: Yes (reports pain in LEs with mobility, not rated, RN aware)  Vital Signs  Patient Currently in Pain: Yes (reports pain in LEs with mobility, not rated, RN aware)       Orientation  Orientation  Overall Orientation Status: Within Functional Limits  Cognition   Cognition  Overall Cognitive Status: WFL  Objective   Bed mobility  Supine to Sit: Stand by assistance (head of bed elevated, increased time/effort)  Scooting: Stand by assistance (to EOB)  Comment: patient sitting up in bedside chair at end of session  Transfers  Sit to Stand: Contact guard assistance;Minimal Assistance (CGA from EOB to gage stedy and toilet to gage stedy, min assist x 1 from bedside chair x 2 trials)  Stand to sit: Contact guard assistance (to toilet and to bedside chair x 3 trials, verbal cues for safety and reach back, decreased eccentric control)  Bed to Chair: Dependent/Total (via gage stedy to bathroom and bathroom to bedside chair)  Ambulation  Ambulation?: Yes  Ambulation 1  Surface: level tile  Device: Rolling Walker  Assistance: Contact guard assistance  Quality of Gait: gait fairly steady with no overt loss of balance noted though movement is very effortful, decreased step length/height bilaterally, decreased leelee, heavy use of UEs on walker  Distance: 8', 30'  Comments: seated rest break in between rounds of ambulation  Stairs/Curb  Stairs?: No     Balance  Sitting - Static: Fair;+ (supervision to sit EOB)  Standing - Static: Fair;+ (SBA to  gage stedy at sink and perform grooming)  Standing - Dynamic: Fair (CGA to ambulate with FWW)  Exercises  Hip Flexion: seated marches x 5 reps bilaterally  Hip Abduction: seated x 5 reps bilaterally  Knee Long Arc Quad: seated x 5 reps bilaterally  Ankle Pumps: seated x 5 reps bilaterally  Comments: educated patient on performing above exercises + gluteal sets and quad sets as tolerated throughout the day to assist in LE strengthening; she verbalized understanding.                       OutComes Score                                                     AM-PAC Score  AM-PAC Inpatient Mobility Raw Score : 16 (04/08/22 1336)  AM-PAC Inpatient T-Scale Score : 40.78 (04/08/22 1336)  Mobility Inpatient CMS 0-100% Score: 54.16 (04/08/22 1336)  Mobility Inpatient CMS G-Code Modifier : CK (04/08/22 1336)          Goals  Short term goals  Time Frame for Short term goals: D/C  Short term goal 1: Perform supine to sit with min assist x1 (met with HOB elevated). new goal: supine<->sit SBA with HOB flat - ongoing for sit to supine 4/8  Short term goal 2: Perform STS transfer with mod assist x1 and LRAD (met 4/6) new goal: sit<->stand SBA - ongoing 4/8  Short term goal 3: Perform bed to chair transfer with mod assist x1 and LRAD - not assessed 4/8  Short term goal 4: Amb 10 ft with RW CGA - goal met 4/8; NEW GOAL for patient to ambulate 48' with FWW and SBA  Patient Goals   Patient goals : To improve mobility and pain    Plan    Plan  Times per week: 2-5  Current Treatment Recommendations: Strengthening,ROM,Balance Training,Functional Mobility Training,Transfer Training,Endurance Training,Gait Training,Neuromuscular Re-education,Pain [employer] Exercise Program,Safety Education & Training,Patient/Caregiver Education & Training  Safety Devices  Type of devices: Chair alarm in place,Call light within reach,Left in chair,Gait belt,Nurse notified     Therapy Time   Individual Concurrent Group Co-treatment   Time In 1106         Time Out 1145         Minutes 39         Timed Code Treatment Minutes: 44 Minutes     If patient discharges prior to next session this note will serve as a discharge summary. Please see below for the latest assessment towards goals.    [address]

## 2022-04-08 NOTE — PROGRESS NOTES
[place] Wound Ostomy Continence Nurse  Follow-up Progress Note       NAME:  Ester Garsia  MEDICAL RECORD NUMBER:  [id number]  AGE:  48 y.o. GENDER:  female  :  1975  TODAY'S DATE:  2022    Subjective:   Wound Identification:  Wound Type: malignant wound  Contributing Factors: Breast Cancer        Patient Goal of Care:  [x] Wound Healing  [] Odor Control  [] Palliative Care  [] Pain Control   [] Other:     Objective:    /71   Pulse 104   Temp 97.7 °F (36.5 °C) (Oral)   Resp 16   Ht 5' 3\" (1.6 m)   Wt 207 lb 0.2 oz (93.9 kg)   SpO2 93%   BMI 36.67 kg/m²   Ike Risk Score: Ike Scale Score: 19  Assessment: Observed dressing change with RN, green purulent drainage noted on abd pad, slightly more odor   Measurements:  Wound 22 Breast Lower;Right Large mass (Active)   Wound Image   22 1550   Wound Etiology Other 22 0941   Dressing Status Clean;Dry; Intact 22   Wound Cleansed Cleansed with saline 22 1531   Dressing/Treatment ABD;Dry dressing;Gauze dressing/dressing sponge 22 0941   Dressing Change Due 22 1517   Wound Length (cm) 11 cm 22 1517   Wound Width (cm) 10 cm 22 1517   Wound Depth (cm) 0 cm 22 1517   Wound Surface Area (cm^2) 110 cm^2 22 1517   Change in Wound Size % (l*w) -15.79 22 1517   Wound Volume (cm^3) 0 cm^3 22 1517   Wound Assessment Other (Comment) 22 1531   Drainage Amount Moderate 22 1531   Drainage Description Yellow 22 1531   Odor Moderate 22 1531   Lety-wound Assessment Dry/flaky; Intact 22 1531   Margins Unattached edges 22 1531   Number of days: 35   R Breast Tumor:      Response to treatment:  Well tolerated by patient.      Pain Assessment:  Severity:  0 / 10  Quality of pain: N/A  Wound Pain Timing/Severity: none  Premedicated: No  Plan:   Plan of Care: Wound 22 Breast Lower;Right Large mass-Dressing/Treatment: ABD,Dry dressing,Gauze dressing/dressing sponge   R Breast - clean with NS, dry, cover with Xeroform, abd pad, paper tape, change BID  Call Wound Care for deterioration [phone]    Specialty Bed Required : No   [] Low Air Loss   [] Pressure Redistribution  [] Fluid Immersion  [] Bariatric  [] Total Pressure Relief  [] Other:     Current Diet: ADULT DIET; Regular  ADULT ORAL NUTRITION SUPPLEMENT; Lunch;  Low Calorie/High Protein Oral Supplement  Dietician consult:  Yes    Discharge Plan:  Placement for patient upon discharge: skilled nursing   Patient appropriate for Outpatient [address] [place] [address] Road: No    Referrals:  [x]   [] [address] [facility]  [] Supplies  [] Other    Patient/Caregiver Teaching:  Level of patient/caregiver understanding able to:   [] Indicates understanding       [] Needs reinforcement  [] Unsuccessful      [] Verbal Understanding  [] Demonstrated understanding       [] No evidence of learning  [] Refused teaching         [] N/A       Electronically signed by Constantino Ellis RN, Yolanda Morrow on 4/8/2022 at 3:51 PM

## 2022-04-08 NOTE — PROGRESS NOTES
edema  · Labs: Reviewed; Na 133, K+ 3.0,   · Meds: Reviewed; lasix, spironolactone, insulin  · Wounds: None       CURRENT NUTRITION THERAPIES  ADULT DIET; Regular  ADULT ORAL NUTRITION SUPPLEMENT; Lunch; Low Calorie/High Protein Oral Supplement     PO Intake: 51-75%,0%,1-25%   PO Supplement Intake:None Ordered  Additional Sources of Calories/IVF:n/a     ANTHROPOMETRICS  Current Height: 5' 3\" (160 cm)  Current Weight: 207 lb 0.2 oz (93.9 kg)    Admission weight: 198 lb 14.4 oz (90.2 kg)  Ideal Body Weight (IBW): 115 lbs  (52 kg)    Usual Bodyweight  (182-189lb per EMR)   Weight Changes: wt fluctuates noting diuretics and edema per EMR      BMI: 36.7    Wt Readings from Last 50 Encounters:   04/08/22 207 lb 0.2 oz (93.9 kg)   03/17/22 182 lb (82.6 kg)   03/15/22 186 lb 4.6 oz (84.5 kg)   03/03/22 189 lb 13.1 oz (86.1 kg)   06/23/15 169 lb (76.7 kg)       COMPARATIVE STANDARDS  Energy (kcal):  8915-2206     Protein (g):  62-73       Fluid (ml/day):  1500ml min    The patient will still be monitored per nutrition standards of care. Consult dietitian if nutrition interventions essential to patient care is needed.      Aliza Gan, [address]  [place]:  [phone]  Office:  [phone]

## 2022-04-08 NOTE — PLAN OF CARE
Patient resting in bed with alarms on and call light within reach. Patient is voiding with no issues and is able to change position in bed with reminders. She is reporting adequate pain control with current PRN medications and is showing improvement with edema in legs. Will continue to administer ordered medications to help maintain fluid balance.   Problem: Falls - Risk of:  Goal: Will remain free from falls  Description: Will remain free from falls  Outcome: Ongoing  Goal: Absence of physical injury  Description: Absence of physical injury  Outcome: Ongoing     Problem: Pain:  Goal: Pain level will decrease  Description: Pain level will decrease  Outcome: Ongoing  Goal: Control of acute pain  Description: Control of acute pain  Outcome: Ongoing  Goal: Control of chronic pain  Description: Control of chronic pain  Outcome: Ongoing     Problem: Skin Integrity:  Goal: Will show no infection signs and symptoms  Description: Will show no infection signs and symptoms  Outcome: Ongoing  Goal: Absence of new skin breakdown  Description: Absence of new skin breakdown  Outcome: Ongoing     Problem: Bleeding:  Goal: Will show no signs and symptoms of excessive bleeding  Description: Will show no signs and symptoms of excessive bleeding  Outcome: Ongoing     Problem: Discharge Planning:  Goal: Discharged to appropriate level of care  Description: Discharged to appropriate level of care  Outcome: Ongoing     Problem: Gas Exchange - Impaired:  Goal: Levels of oxygenation will improve  Description: Levels of oxygenation will improve  Outcome: Ongoing     Problem: Infection, Septic Shock:  Goal: Will show no infection signs and symptoms  Description: Will show no infection signs and symptoms  Outcome: Ongoing     Problem: Serum Glucose Level - Abnormal:  Goal: Ability to maintain appropriate glucose levels will improve  Description: Ability to maintain appropriate glucose levels will improve  Outcome: Ongoing     Problem: Tissue

## 2022-04-09 LAB
ANION GAP SERPL CALCULATED.3IONS-SCNC: 11 MMOL/L (ref 3–16)
BASOPHILS ABSOLUTE: 0.3 K/UL (ref 0–0.2)
BASOPHILS RELATIVE PERCENT: 2.6 %
BUN BLDV-MCNC: 7 MG/DL (ref 7–20)
CALCIUM SERPL-MCNC: 10 MG/DL (ref 8.3–10.6)
CHLORIDE BLD-SCNC: 93 MMOL/L (ref 99–110)
CO2: 31 MMOL/L (ref 21–32)
CREAT SERPL-MCNC: <0.5 MG/DL (ref 0.6–1.1)
EOSINOPHILS ABSOLUTE: 0.1 K/UL (ref 0–0.6)
EOSINOPHILS RELATIVE PERCENT: 0.5 %
GFR AFRICAN AMERICAN: >60
GFR NON-AFRICAN AMERICAN: >60
GLUCOSE BLD-MCNC: 99 MG/DL (ref 70–99)
HCT VFR BLD CALC: 22.7 % (ref 36–48)
HEMOGLOBIN: 7.6 G/DL (ref 12–16)
LYMPHOCYTES ABSOLUTE: 1.2 K/UL (ref 1–5.1)
LYMPHOCYTES RELATIVE PERCENT: 11.6 %
MCH RBC QN AUTO: 28.1 PG (ref 26–34)
MCHC RBC AUTO-ENTMCNC: 33.7 G/DL (ref 31–36)
MCV RBC AUTO: 83.3 FL (ref 80–100)
MONOCYTES ABSOLUTE: 0.8 K/UL (ref 0–1.3)
MONOCYTES RELATIVE PERCENT: 7.6 %
NEUTROPHILS ABSOLUTE: 7.7 K/UL (ref 1.7–7.7)
NEUTROPHILS RELATIVE PERCENT: 77.7 %
PDW BLD-RTO: 15.5 % (ref 12.4–15.4)
PLATELET # BLD: 451 K/UL (ref 135–450)
PMV BLD AUTO: 7 FL (ref 5–10.5)
POTASSIUM SERPL-SCNC: 3.3 MMOL/L (ref 3.5–5.1)
RBC # BLD: 2.72 M/UL (ref 4–5.2)
SODIUM BLD-SCNC: 135 MMOL/L (ref 136–145)
WBC # BLD: 9.9 K/UL (ref 4–11)

## 2022-04-09 PROCEDURE — P9047 ALBUMIN (HUMAN), 25%, 50ML: HCPCS | Performed by: STUDENT IN AN ORGANIZED HEALTH CARE EDUCATION/TRAINING PROGRAM

## 2022-04-09 PROCEDURE — 85025 COMPLETE CBC W/AUTO DIFF WBC: CPT

## 2022-04-09 PROCEDURE — 6360000002 HC RX W HCPCS: Performed by: STUDENT IN AN ORGANIZED HEALTH CARE EDUCATION/TRAINING PROGRAM

## 2022-04-09 PROCEDURE — 6370000000 HC RX 637 (ALT 250 FOR IP): Performed by: INTERNAL MEDICINE

## 2022-04-09 PROCEDURE — 80048 BASIC METABOLIC PNL TOTAL CA: CPT

## 2022-04-09 PROCEDURE — 6370000000 HC RX 637 (ALT 250 FOR IP): Performed by: STUDENT IN AN ORGANIZED HEALTH CARE EDUCATION/TRAINING PROGRAM

## 2022-04-09 PROCEDURE — 2580000003 HC RX 258: Performed by: INTERNAL MEDICINE

## 2022-04-09 PROCEDURE — 2580000003 HC RX 258: Performed by: STUDENT IN AN ORGANIZED HEALTH CARE EDUCATION/TRAINING PROGRAM

## 2022-04-09 PROCEDURE — 36415 COLL VENOUS BLD VENIPUNCTURE: CPT

## 2022-04-09 PROCEDURE — 2060000000 HC ICU INTERMEDIATE R&B

## 2022-04-09 PROCEDURE — 99233 SBSQ HOSP IP/OBS HIGH 50: CPT | Performed by: INTERNAL MEDICINE

## 2022-04-09 RX ORDER — SPIRONOLACTONE 50 MG/1
50 TABLET, FILM COATED ORAL DAILY
Qty: 30 TABLET | Refills: 3 | Status: SHIPPED | OUTPATIENT
Start: 2022-04-09

## 2022-04-09 RX ORDER — POTASSIUM CHLORIDE 20 MEQ/1
40 TABLET, EXTENDED RELEASE ORAL ONCE
Status: COMPLETED | OUTPATIENT
Start: 2022-04-09 | End: 2022-04-09

## 2022-04-09 RX ADMIN — LEVETIRACETAM 2000 MG: 250 TABLET, FILM COATED ORAL at 08:43

## 2022-04-09 RX ADMIN — APIXABAN 5 MG: 5 TABLET, FILM COATED ORAL at 08:44

## 2022-04-09 RX ADMIN — SODIUM CHLORIDE, PRESERVATIVE FREE 10 ML: 5 INJECTION INTRAVENOUS at 21:30

## 2022-04-09 RX ADMIN — OXYCODONE 5 MG: 5 TABLET ORAL at 21:29

## 2022-04-09 RX ADMIN — LACOSAMIDE 200 MG: 50 TABLET, FILM COATED ORAL at 21:28

## 2022-04-09 RX ADMIN — FUROSEMIDE 40 MG: 10 INJECTION, SOLUTION INTRAMUSCULAR; INTRAVENOUS at 17:59

## 2022-04-09 RX ADMIN — SODIUM CHLORIDE, PRESERVATIVE FREE 10 ML: 5 INJECTION INTRAVENOUS at 08:45

## 2022-04-09 RX ADMIN — POTASSIUM BICARBONATE 40 MEQ: 782 TABLET, EFFERVESCENT ORAL at 01:38

## 2022-04-09 RX ADMIN — ALBUMIN (HUMAN) 25 G: 0.25 INJECTION, SOLUTION INTRAVENOUS at 17:56

## 2022-04-09 RX ADMIN — TAMSULOSIN HYDROCHLORIDE 0.4 MG: 0.4 CAPSULE ORAL at 08:44

## 2022-04-09 RX ADMIN — SPIRONOLACTONE 50 MG: 50 TABLET ORAL at 08:44

## 2022-04-09 RX ADMIN — Medication 5 MG: at 21:27

## 2022-04-09 RX ADMIN — LEVETIRACETAM 2500 MG: 250 TABLET, FILM COATED ORAL at 21:28

## 2022-04-09 RX ADMIN — QUETIAPINE FUMARATE 12.5 MG: 25 TABLET ORAL at 21:28

## 2022-04-09 RX ADMIN — FUROSEMIDE 40 MG: 10 INJECTION, SOLUTION INTRAMUSCULAR; INTRAVENOUS at 08:44

## 2022-04-09 RX ADMIN — PANTOPRAZOLE SODIUM 40 MG: 40 TABLET, DELAYED RELEASE ORAL at 06:12

## 2022-04-09 RX ADMIN — APIXABAN 5 MG: 5 TABLET, FILM COATED ORAL at 21:27

## 2022-04-09 RX ADMIN — ALBUMIN (HUMAN) 25 G: 0.25 INJECTION, SOLUTION INTRAVENOUS at 06:10

## 2022-04-09 RX ADMIN — LACOSAMIDE 200 MG: 50 TABLET, FILM COATED ORAL at 08:43

## 2022-04-09 RX ADMIN — POTASSIUM CHLORIDE 40 MEQ: 20 TABLET, EXTENDED RELEASE ORAL at 17:59

## 2022-04-09 ASSESSMENT — PAIN DESCRIPTION - DESCRIPTORS
DESCRIPTORS: TIGHTNESS
DESCRIPTORS: ACHING;DISCOMFORT
DESCRIPTORS: ACHING;DISCOMFORT
DESCRIPTORS: TIGHTNESS

## 2022-04-09 ASSESSMENT — PAIN DESCRIPTION - FREQUENCY
FREQUENCY: INTERMITTENT
FREQUENCY: CONTINUOUS

## 2022-04-09 ASSESSMENT — PAIN DESCRIPTION - ORIENTATION
ORIENTATION: RIGHT;LEFT
ORIENTATION: RIGHT;LEFT
ORIENTATION: RIGHT;LEFT;POSTERIOR
ORIENTATION: RIGHT;LEFT

## 2022-04-09 ASSESSMENT — PAIN DESCRIPTION - PROGRESSION
CLINICAL_PROGRESSION: NOT CHANGED
CLINICAL_PROGRESSION: NOT CHANGED
CLINICAL_PROGRESSION: GRADUALLY IMPROVING
CLINICAL_PROGRESSION: NOT CHANGED
CLINICAL_PROGRESSION: GRADUALLY WORSENING
CLINICAL_PROGRESSION: NOT CHANGED

## 2022-04-09 ASSESSMENT — PAIN DESCRIPTION - ONSET
ONSET: GRADUAL

## 2022-04-09 ASSESSMENT — PAIN SCALES - GENERAL
PAINLEVEL_OUTOF10: 0
PAINLEVEL_OUTOF10: 7
PAINLEVEL_OUTOF10: 4
PAINLEVEL_OUTOF10: 0
PAINLEVEL_OUTOF10: 2
PAINLEVEL_OUTOF10: 4

## 2022-04-09 ASSESSMENT — PAIN DESCRIPTION - PAIN TYPE
TYPE: ACUTE PAIN

## 2022-04-09 ASSESSMENT — PAIN DESCRIPTION - LOCATION
LOCATION: LEG
LOCATION: LEG
LOCATION: HEAD
LOCATION: HEAD

## 2022-04-09 ASSESSMENT — PAIN SCALES - WONG BAKER: WONGBAKER_NUMERICALRESPONSE: 0

## 2022-04-09 ASSESSMENT — PAIN - FUNCTIONAL ASSESSMENT
PAIN_FUNCTIONAL_ASSESSMENT: ACTIVITIES ARE NOT PREVENTED
PAIN_FUNCTIONAL_ASSESSMENT: PREVENTS OR INTERFERES SOME ACTIVE ACTIVITIES AND ADLS
PAIN_FUNCTIONAL_ASSESSMENT: PREVENTS OR INTERFERES SOME ACTIVE ACTIVITIES AND ADLS

## 2022-04-09 NOTE — PLAN OF CARE
Problem: Falls - Risk of:  Goal: Will remain free from falls  Description: Will remain free from falls  Outcome: Ongoing  Goal: Absence of physical injury  Description: Absence of physical injury  Outcome: Ongoing     Problem: Pain:  Goal: Pain level will decrease  Description: Pain level will decrease  Outcome: Ongoing  Goal: Control of acute pain  Description: Control of acute pain  Outcome: Ongoing  Goal: Control of chronic pain  Description: Control of chronic pain  Outcome: Ongoing     Problem: Skin Integrity:  Goal: Will show no infection signs and symptoms  Description: Will show no infection signs and symptoms  Outcome: Ongoing  Goal: Absence of new skin breakdown  Description: Absence of new skin breakdown  Outcome: Ongoing     Problem: Bleeding:  Goal: Will show no signs and symptoms of excessive bleeding  Description: Will show no signs and symptoms of excessive bleeding  Outcome: Ongoing     Problem: Discharge Planning:  Goal: Discharged to appropriate level of care  Description: Discharged to appropriate level of care  Outcome: Ongoing     Problem: Gas Exchange - Impaired:  Goal: Levels of oxygenation will improve  Description: Levels of oxygenation will improve  Outcome: Ongoing     Problem: Infection, Septic Shock:  Goal: Will show no infection signs and symptoms  Description: Will show no infection signs and symptoms  Outcome: Ongoing     Problem: Serum Glucose Level - Abnormal:  Goal: Ability to maintain appropriate glucose levels will improve  Description: Ability to maintain appropriate glucose levels will improve  Outcome: Completed     Problem: Tissue Perfusion, Altered:  Goal: Circulatory function within specified parameters  Description: Circulatory function within specified parameters  Outcome: Ongoing     Problem: Venous Thromboembolism:  Goal: Will show no signs or symptoms of venous thromboembolism  Description: Will show no signs or symptoms of venous thromboembolism  Outcome: Ongoing  Goal: Absence of signs or symptoms of impaired coagulation  Description: Absence of signs or symptoms of impaired coagulation  Outcome: Ongoing

## 2022-04-09 NOTE — PROGRESS NOTES
Nephrology  Note                                                                                                                                                                                                                                                                                                                                                               Office : [phone]     Fax :[phone]              Patient's Name: Betty Russo    Reason for Consult:  Hyponatremia   Requesting Physician:  JUDITH Kimball CNP      Na stable   Cr stable   K low   Good UO   Edema better   No N/V/D       Past Medical History:   Diagnosis Date    Thyroid nodule        Past Surgical History:   Procedure Laterality Date    CRANIOTOMY Left 3/12/2022    LEFT TEMPORAL PARIETAL OCCIPITAL CRANIOTOMY FOR TUMOR RESECTION performed by Rachel Harvey MD at [address]  [address] W IMAGING N/A 03/16/2022    [facility]    IR IVC FILTER PLACEMENT W IMAGING  3/16/2022    IR IVC FILTER PLACEMENT W IMAGING 3/16/2022 [id number] PROCEDURES    [facility]  [facility]20[facility]    IR [address]ROMB [address] 3/28/2022 [address] SPECIAL PROCEDURES    UPPER GASTROINTESTINAL ENDOSCOPY N/A 4/2/2022    EGD DIAGNOSTIC ONLY performed by Salvatore Rosa MD at [address] ENDOSCOPY       Family History   Problem Relation Age of Onset    Cancer Father         reports that she has never smoked. She has never used smokeless tobacco. She reports that she does not drink alcohol and does not use drugs.     Allergies:  Heparin    Current Medications:    spironolactone (ALDACTONE) tablet 50 mg, Daily  albumin human 25 % IV solution 25 g, Q12H  furosemide (LASIX) injection 40 mg, BID  tamsulosin (FLOMAX) capsule 0.4 mg, Daily  apixaban (ELIQUIS) tablet 5 mg, BID  0.9 % sodium chloride infusion, PRN  sodium chloride flush 0.9 % injection 5-40 mL, 2 times per day  sodium chloride flush 0.9 % injection 5-40 mL, PRN  0.9 % sodium chloride infusion, PRN  metoprolol (LOPRESSOR) injection 5 mg, Once  oxyCODONE (ROXICODONE) immediate release tablet 5 mg, Q4H PRN   Or  oxyCODONE (ROXICODONE) immediate release tablet 10 mg, Q4H PRN  LORazepam (ATIVAN) injection 1 mg, Q6H PRN  [Held by provider] clonazePAM (KLONOPIN) tablet 0.5 mg, BID  QUEtiapine (SEROQUEL) tablet 12.5 mg, Nightly  insulin regular (HUMULIN R;NOVOLIN R) injection 10 Units, Once   And  dextrose 10 % infusion, Once  glucose (GLUTOSE) 40 % oral gel 15 g, PRN  dextrose 50 % IV solution, PRN  glucagon (rDNA) injection 1 mg, PRN  dextrose 5 % solution, PRN  lacosamide (VIMPAT) tablet 200 mg, BID  levETIRAcetam (KEPPRA) tablet 2,000 mg, Daily  levETIRAcetam (KEPPRA) tablet 2,500 mg, Nightly  melatonin disintegrating tablet 5 mg, Nightly  pantoprazole (PROTONIX) tablet 40 mg, QAM AC  sodium chloride flush 0.9 % injection 5-40 mL, 2 times per day  sodium chloride flush 0.9 % injection 5-40 mL, PRN  0.9 % sodium chloride infusion, PRN  ondansetron (ZOFRAN-ODT) disintegrating tablet 4 mg, Q8H PRN   Or  ondansetron (ZOFRAN) injection 4 mg, Q6H PRN  polyethylene glycol (GLYCOLAX) packet 17 g, Daily PRN  acetaminophen (TYLENOL) tablet 650 mg, Q6H PRN   Or  acetaminophen (TYLENOL) suppository 650 mg, Q6H PRN          Physical exam:     Vitals:  /75   Pulse 115   Temp 99.2 °F (37.3 °C) (Oral)   Resp 18   Ht 5' 3\" (1.6 m)   Wt 207 lb 0.2 oz (93.9 kg)   SpO2 96%   BMI 36.67 kg/m²   Constitutional:  OAA X3 NAD  Skin: no rash, turgor wnl  Heent:  eomi, mmm  Neck: no bruits or jvd noted  Cardiovascular:  S1, S2 without m/r/g  Respiratory: CTA B without w/r/r  Abdomen:  +bs, soft, nt, nd  Ext: + lower extremity edema  Psychiatric: mood and affect appropriate  Musculoskeletal:  Rom, muscular strength intact    Data:   Labs:  CBC:   Recent Labs     04/07/22  0448   WBC 9.9   HGB 7.7*        BMP:    Recent Labs     04/07/22  1129 04/08/22  1151   * 136   K 3.0* 3.4*   CL 93* 94*   CO2 29 24   BUN 10 7   CREATININE 0.5* 0.5*   GLUCOSE 125* 77     Ca/Mg/Phos:   Recent Labs     04/07/22  1115 04/07/22  1129 04/08/22  1151   CALCIUM  --  9.7 9.7   MG 2.00  --   --    PHOS  --   --  2.8     Hepatic:   No results for input(s): AST, ALT, ALB, BILITOT, ALKPHOS in the last 72 hours. Troponin: No results for input(s): TROPONINI in the last 72 hours. BNP: No results for input(s): BNP in the last 72 hours. Lipids: No results for input(s): CHOL, TRIG, HDL, LDLCALC, LABVLDL in the last 72 hours. ABGs:   No results for input(s): PHART, PO2ART, VNY1EGN in the last 72 hours. INR:   No results for input(s): INR in the last 72 hours. UA:  No results for input(s): Kerney Dominion, GLUCOSEU, BILIRUBINUR, KETUA, SPECGRAV, BLOODU, PHUR, PROTEINU, UROBILINOGEN, NITRU, LEUKOCYTESUR, Erna Pier in the last 72 hours. Urine Microscopic:   No results for input(s): LABCAST, BACTERIA, COMU, HYALCAST, WBCUA, RBCUA, EPIU in the last 72 hours. Urine Culture:   No results for input(s): LABURIN in the last 72 hours. Urine Chemistry:   No results for input(s): Pedro Juniper, PROTEINUR, NAUR in the last 72 hours. IMAGING:  CTA LOWER EXTREMITY LEFT W CONTRAST   Final Result   Impression: No acute arterial abnormality. No active hemorrhage. No hematoma of the pelvis, retroperitoneum or bilateral lower extremities. Suboptimal evaluation of the venous structures due to contrast bolus timing. Findings described above may represent sequelae of poor venous outflow or rethrombosis. CTA LOWER EXTREMITY RIGHT W CONTRAST   Final Result   Impression: No acute arterial abnormality. No active hemorrhage. No hematoma of the pelvis, retroperitoneum or bilateral lower extremities. Suboptimal evaluation of the venous structures due to contrast bolus timing. Findings described above may represent sequelae of poor venous outflow or rethrombosis.       CT HEAD WO CONTRAST   Final Result No acute or interval change. No hemorrhage or mass effect. CT ABDOMEN PELVIS W IV CONTRAST Additional Contrast? 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 [facility]   Final Result   Impression: Technically successful inferior venacavogram and bilateral iliac venogram demonstrating marked ileal caval clot associated with the filter including thrombus above the IVC filter. Technically successful vacuum-assisted thrombectomy with restoration of flow through the inferior vena cava and improved patency of the ileal caval system. Clot remains within the inferior vena cava filter. VL Extremity Venous Bilateral   Final Result      XR CHEST PORTABLE   Final Result      Limited evaluation of lower right lung due to overlapping density for which infiltrate cannot be excluded. Otherwise no acute process seen. CT HEAD WO CONTRAST   Final Result      Postsurgical changes with areas of low cortical attenuation in the left parietal and left occipital region appear essentially stable from prior study with associated mild midline shift which is similar to slightly improved from prior study. No evidence of developing acute intracranial hemorrhage. CTA ABDOMEN PELVIS W WO CONTRAST   Final Result   1. 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 2. Surrounding hazy density, likely related to venous congestion. However, streaky densities extending inferiorly on the right may represent blood. However, there is no evidence of retroperitoneal hematoma. Correlation with lower extremity noninvasive    duplex Doppler is recommended in addition to serial hemoglobin and crit levels. 3. Suspect bilateral lower lobe pulmonary emboli.  See comments above      Redemonstrated is a large necrotic mass involving the right breast enlarged fibroid uterus. Assessment/Plan   1. Hyponatremia     2. HTN    3. Anemia    4. Acid- base/ Electrolyte imbalance     5.  SIADH     Plan   - lasix as needed to help with edema +Albumin  - aldactone to help with K and edema   - samsca as needed   - Ur studies - reviewed   - TSH - nl   - Monitor Na   - d/w pt   - free water restriction   - encourage solute intake                     Thank you for allowing us to participate in care of Shantel Valladares MD  Feel free to contact me   [facility] [address]  Office : [phone]  Fax :[phone]

## 2022-04-09 NOTE — PLAN OF CARE
Problem: Falls - Risk of:  Goal: Will remain free from falls  Description: Will remain free from falls  4/8/2022 2340 by Yuko Craft RN  Outcome: Ongoing     Problem: Pain:  Description: Pain management should include both nonpharmacologic and pharmacologic interventions.   Goal: Control of acute pain  Description: Control of acute pain  4/8/2022 2340 by Yuko Craft RN  Outcome: Ongoing     Problem: Skin Integrity:  Goal: Absence of new skin breakdown  Description: Absence of new skin breakdown  4/8/2022 2340 by Yuko Craft RN  Outcome: Ongoing

## 2022-04-09 NOTE — PROGRESS NOTES
Nephrology  Note                                                                                                                                                                                                                                                                                                                                                               Office : [phone]     Fax :[phone]              Patient's Name: Nannette Gaston    Reason for Consult:  Hyponatremia   Requesting Physician:  JUDITH Hernandez CNP      Na stable   Cr stable   K low   Good UO   Edema better   No N/V/D       Past Medical History:   Diagnosis Date    Thyroid nodule        Past Surgical History:   Procedure Laterality Date    CRANIOTOMY Left 3/12/2022    LEFT TEMPORAL PARIETAL OCCIPITAL CRANIOTOMY FOR TUMOR RESECTION performed by Thom Dial MD at [address]  [address] W IMAGING N/A 03/16/2022    [facility]    IR IVC FILTER PLACEMENT W IMAGING  3/16/2022    IR IVC FILTER PLACEMENT W IMAGING 3/16/2022 [facility] SPECIAL PROCEDURES    IR THROMB \[facility] [place] [facility]"" VEIN  3/28/2022    IR THROMB [address] 3/28/2022 [address] SPECIAL PROCEDURES    UPPER GASTROINTESTINAL ENDOSCOPY N/A 4/2/2022    EGD DIAGNOSTIC ONLY performed by Loly Duffy MD at [address] ENDOSCOPY       Family History   Problem Relation Age of Onset    Cancer Father         reports that she has never smoked. She has never used smokeless tobacco. She reports that she does not drink alcohol and does not use drugs.     Allergies:  Heparin    Current Medications:    spironolactone (ALDACTONE) tablet 50 mg, Daily  albumin human 25 % IV solution 25 g, Q12H  furosemide (LASIX) injection 40 mg, BID  tamsulosin (FLOMAX) capsule 0.4 mg, Daily  apixaban (ELIQUIS) tablet 5 mg, BID  0.9 % sodium chloride infusion, PRN  sodium chloride flush 0.9 % injection 5-40 mL, 2 times per day  sodium chloride flush 0.9 % injection 5-40 mL, PRN  0.9 % sodium chloride infusion, PRN  metoprolol (LOPRESSOR) injection 5 mg, Once  oxyCODONE (ROXICODONE) immediate release tablet 5 mg, Q4H PRN   Or  oxyCODONE (ROXICODONE) immediate release tablet 10 mg, Q4H PRN  LORazepam (ATIVAN) injection 1 mg, Q6H PRN  [Held by provider] clonazePAM (KLONOPIN) tablet 0.5 mg, BID  QUEtiapine (SEROQUEL) tablet 12.5 mg, Nightly  insulin regular (HUMULIN R;NOVOLIN R) injection 10 Units, Once   And  dextrose 10 % infusion, Once  glucose (GLUTOSE) 40 % oral gel 15 g, PRN  dextrose 50 % IV solution, PRN  glucagon (rDNA) injection 1 mg, PRN  dextrose 5 % solution, PRN  lacosamide (VIMPAT) tablet 200 mg, BID  levETIRAcetam (KEPPRA) tablet 2,000 mg, Daily  levETIRAcetam (KEPPRA) tablet 2,500 mg, Nightly  melatonin disintegrating tablet 5 mg, Nightly  pantoprazole (PROTONIX) tablet 40 mg, QAM AC  sodium chloride flush 0.9 % injection 5-40 mL, 2 times per day  sodium chloride flush 0.9 % injection 5-40 mL, PRN  0.9 % sodium chloride infusion, PRN  ondansetron (ZOFRAN-ODT) disintegrating tablet 4 mg, Q8H PRN   Or  ondansetron (ZOFRAN) injection 4 mg, Q6H PRN  polyethylene glycol (GLYCOLAX) packet 17 g, Daily PRN  acetaminophen (TYLENOL) tablet 650 mg, Q6H PRN   Or  acetaminophen (TYLENOL) suppository 650 mg, Q6H PRN          Physical exam:     Vitals:  /66   Pulse 105   Temp 98.9 °F (37.2 °C) (Oral)   Resp 14   Ht 5' 3\" (1.6 m)   Wt 203 lb 0.7 oz (92.1 kg)   SpO2 94%   BMI 35.97 kg/m²   Constitutional:  OAA X3 NAD  Skin: no rash, turgor wnl  Heent:  eomi, mmm  Neck: no bruits or jvd noted  Cardiovascular:  S1, S2 without m/r/g  Respiratory: CTA B without w/r/r  Abdomen:  +bs, soft, nt, nd  Ext: + lower extremity edema  Psychiatric: mood and affect appropriate  Musculoskeletal:  Rom, muscular strength intact    Data:   Labs:  CBC:   Recent Labs     04/07/22  0448 04/09/22  0521   WBC 9.9 9.9   HGB 7.7* 7.6*    451*     BMP:    Recent Labs     04/07/22  1129 04/08/22  1151   NA 133* 136   K 3.0* 3.4*   CL 93* 94*   CO2 29 24   BUN 10 7   CREATININE 0.5* 0.5*   GLUCOSE 125* 77     Ca/Mg/Phos:   Recent Labs     04/07/22  1115 04/07/22  1129 04/08/22  1151   CALCIUM  --  9.7 9.7   MG 2.00  --   --    PHOS  --   --  2.8     Hepatic:   No results for input(s): AST, ALT, ALB, BILITOT, ALKPHOS in the last 72 hours. Troponin: No results for input(s): TROPONINI in the last 72 hours. BNP: No results for input(s): BNP in the last 72 hours. Lipids: No results for input(s): CHOL, TRIG, HDL, LDLCALC, LABVLDL in the last 72 hours. ABGs:   No results for input(s): PHART, PO2ART, [id number] in the last 72 hours. INR:   No results for input(s): INR in the last 72 hours. UA:  No results for input(s): Roxine Nunnery, GLUCOSEU, BILIRUBINUR, KETUA, SPECGRAV, BLOODU, PHUR, PROTEINU, UROBILINOGEN, NITRU, LEUKOCYTESUR, Soledad Caraballo in the last 72 hours. Urine Microscopic:   No results for input(s): LABCAST, BACTERIA, COMU, HYALCAST, WBCUA, RBCUA, EPIU in the last 72 hours. Urine Culture:   No results for input(s): LABURIN in the last 72 hours. Urine Chemistry:   No results for input(s): Ursula Salk, PROTEINUR, NAUR in the last 72 hours. IMAGING:  CTA LOWER EXTREMITY LEFT W CONTRAST   Final Result   Impression: No acute arterial abnormality. No active hemorrhage. No hematoma of the pelvis, retroperitoneum or bilateral lower extremities. Suboptimal evaluation of the venous structures due to contrast bolus timing. Findings described above may represent sequelae of poor venous outflow or rethrombosis. CTA LOWER EXTREMITY RIGHT W CONTRAST   Final Result   Impression: No acute arterial abnormality. No active hemorrhage. No hematoma of the pelvis, retroperitoneum or bilateral lower extremities. Suboptimal evaluation of the venous structures due to contrast bolus timing. Findings described above may represent sequelae of poor venous outflow or rethrombosis.       CT HEAD WO CONTRAST   Final Result      No acute or interval change. No hemorrhage or mass effect. CT ABDOMEN PELVIS W IV CONTRAST Additional Contrast? 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 IR GUIDED THROMB \[facility]\"" VEIN   Final Result   Impression: Technically successful inferior venacavogram and bilateral iliac venogram demonstrating marked ileal caval clot associated with the filter including thrombus above the IVC filter. Technically successful vacuum-assisted thrombectomy with restoration of flow through the inferior vena cava and improved patency of the ileal caval system. Clot remains within the inferior vena cava filter. VL Extremity Venous Bilateral   Final Result      XR CHEST PORTABLE   Final Result      Limited evaluation of lower right lung due to overlapping density for which infiltrate cannot be excluded. Otherwise no acute process seen. CT HEAD WO CONTRAST   Final Result      Postsurgical changes with areas of low cortical attenuation in the left parietal and left occipital region appear essentially stable from prior study with associated mild midline shift which is similar to slightly improved from prior study. No evidence of developing acute intracranial hemorrhage. CTA ABDOMEN PELVIS W WO CONTRAST   Final Result   1. 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 2. Surrounding hazy density, likely related to venous congestion. However, streaky densities extending inferiorly on the right may represent blood. However, there is no evidence of retroperitoneal hematoma. Correlation with lower extremity noninvasive    duplex Doppler is recommended in addition to serial hemoglobin and crit levels. 3. Suspect bilateral lower lobe pulmonary emboli.  See comments above Redemonstrated is a large necrotic mass involving the right breast enlarged fibroid uterus. Assessment/Plan   1. Hyponatremia     2. HTN    3. Anemia    4. Acid- base/ Electrolyte imbalance     5.  SIADH     Plan   - lasix as needed to help with edema +Albumin  - aldactone to help with K and edema   - samsca as needed   - Ur studies - reviewed   - TSH - nl   - Monitor Na   - d/w pt   - free water restriction   - encourage solute intake                     Thank you for allowing us to participate in care of Kayley Muhammad MD  Feel free to contact me   [facility] [address]  Office : [phone]  Fax :[phone]

## 2022-04-09 NOTE — PROGRESS NOTES
Progress Note    Admit Date: 3/25/2022  Diet: ADULT DIET; Regular  ADULT ORAL NUTRITION SUPPLEMENT; Lunch; Low Calorie/High Protein Oral Supplement    CC: leg pain     Interval history:   NAEON.  improved edema on lasix BID   UOP 1.9 on spontaneous voiding.   Ready for discharge to SNF vs ARU [facility]     Medications:     Scheduled Meds:   spironolactone  50 mg Oral Daily    albumin human  25 g IntraVENous Q12H    furosemide  40 mg IntraVENous BID    tamsulosin  0.4 mg Oral Daily    apixaban  5 mg Oral BID    sodium chloride flush  5-40 mL IntraVENous 2 times per day    metoprolol  5 mg IntraVENous Once    [Held by provider] clonazePAM  0.5 mg Oral BID    QUEtiapine  12.5 mg Oral Nightly    insulin regular  10 Units IntraVENous Once    lacosamide  200 mg Oral BID    levETIRAcetam  2,000 mg Oral Daily    levETIRAcetam  2,500 mg Oral Nightly    melatonin  5 mg Oral Nightly    pantoprazole  40 mg Oral QAM AC    sodium chloride flush  5-40 mL IntraVENous 2 times per day     Continuous Infusions:   sodium chloride      sodium chloride 25 mL (04/06/22 1021)    dextrose      dextrose 100 mL/hr (04/05/22 0957)    sodium chloride 25 mL (04/01/22 1736)     PRN Meds:sodium chloride, sodium chloride flush, sodium chloride, oxyCODONE **OR** oxyCODONE, LORazepam, glucose, dextrose, glucagon (rDNA), dextrose, sodium chloride flush, sodium chloride, ondansetron **OR** ondansetron, polyethylene glycol, acetaminophen **OR** acetaminophen    Objective:   Vitals:   T-max:  Patient Vitals for the past 8 hrs:   BP Temp Temp src Pulse Resp SpO2 Weight   04/09/22 0745 103/66 98.9 °F (37.2 °C) Oral 105 14 -- --   04/09/22 0600 -- -- -- -- -- -- 203 lb 0.7 oz (92.1 kg)   04/09/22 0405 (!) 101/56 98.5 °F (36.9 °C) Oral 103 12 94 % --       Intake/Output Summary (Last 24 hours) at 4/9/2022 1053  Last data filed at 4/9/2022 0268  Gross per 24 hour   Intake 120 ml   Output 1550 ml   Net -1430 ml       Review of Systems Constitutional: Negative for fever. Genitourinary: Negative for dysuria. Neurological: Negative for light-headedness and headaches. Physical Exam  Cardiovascular:      Rate and Rhythm: Regular rhythm. Tachycardia present. Pulses: Normal pulses. Pulmonary:      Effort: Pulmonary effort is normal.      Breath sounds: Normal breath sounds. Abdominal:      Palpations: Abdomen is soft. Tenderness: There is no abdominal tenderness. Musculoskeletal:      Right lower leg: Edema present. Left lower leg: Edema present. Neurological:      General: No focal deficit present. Mental Status: She is alert and oriented to person, place, and time. LABS:    CBC:   Recent Labs     04/07/22  0448 04/09/22  0521   WBC 9.9 9.9   HGB 7.7* 7.6*   HCT 22.6* 22.7*    451*   MCV 83.0 83.3     Renal:    Recent Labs     04/07/22  1115 04/07/22  1129 04/08/22  1151   NA  --  133* 136   K  --  3.0* 3.4*   CL  --  93* 94*   CO2  --  29 24   BUN  --  10 7   CREATININE  --  0.5* 0.5*   GLUCOSE  --  125* 77   CALCIUM  --  9.7 9.7   MG 2.00  --   --    PHOS  --   --  2.8   ANIONGAP  --  11 18*     Hepatic:   Recent Labs     04/08/22  1151   LABALBU 4.2     Troponin: No results for input(s): TROPONINI in the last 72 hours. BNP: No results for input(s): BNP in the last 72 hours. Lipids: No results for input(s): CHOL, HDL in the last 72 hours. Invalid input(s): LDLCALCU, TRIGLYCERIDE  ABGs:  No results for input(s): PHART, YVN8XIQ, PO2ART, VPB2EOL, BEART, THGBART, E1WARCTV, NED8YZG in the last 72 hours. INR: No results for input(s): INR in the last 72 hours. Lactate: No results for input(s): LACTATE in the last 72 hours. Cultures:  -----------------------------------------------------------------  RAD:   CTA LOWER EXTREMITY LEFT W CONTRAST   Final Result   Impression: No acute arterial abnormality. No active hemorrhage.       No hematoma of the pelvis, retroperitoneum or bilateral lower extremities. Suboptimal evaluation of the venous structures due to contrast bolus timing. Findings described above may represent sequelae of poor venous outflow or rethrombosis. CTA LOWER EXTREMITY RIGHT W CONTRAST   Final Result   Impression: No acute arterial abnormality. No active hemorrhage. No hematoma of the pelvis, retroperitoneum or bilateral lower extremities. Suboptimal evaluation of the venous structures due to contrast bolus timing. Findings described above may represent sequelae of poor venous outflow or rethrombosis. CT HEAD WO CONTRAST   Final Result      No acute or interval change. No hemorrhage or mass effect. CT ABDOMEN PELVIS W IV CONTRAST Additional Contrast? 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 IR GUIDED THROMB [facility] VEIN   Final Result   Impression: Technically successful inferior venacavogram and bilateral iliac venogram demonstrating marked ileal caval clot associated with the filter including thrombus above the IVC filter. Technically successful vacuum-assisted thrombectomy with restoration of flow through the inferior vena cava and improved patency of the ileal caval system. Clot remains within the inferior vena cava filter. VL Extremity Venous Bilateral   Final Result      XR CHEST PORTABLE   Final Result      Limited evaluation of lower right lung due to overlapping density for which infiltrate cannot be excluded. Otherwise no acute process seen. CT HEAD WO CONTRAST   Final Result      Postsurgical changes with areas of low cortical attenuation in the left parietal and left occipital region appear essentially stable from prior study with associated mild midline shift which is similar to slightly improved from prior study. No evidence of developing acute intracranial hemorrhage. CTA ABDOMEN PELVIS W WO CONTRAST   Final Result   1. 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 2. Surrounding hazy density, likely related to venous congestion. However, streaky densities extending inferiorly on the right may represent blood. However, there is no evidence of retroperitoneal hematoma. Correlation with lower extremity noninvasive    duplex Doppler is recommended in addition to serial hemoglobin and crit levels. 3. Suspect bilateral lower lobe pulmonary emboli. See comments above      Redemonstrated is a large necrotic mass involving the right breast enlarged fibroid uterus. Assessment/Plan:     Bilateral lower extremity DVTs likely resulting in lower extremity swelling. IVC filter thrombus s/p thrombectomy  -Continue on Eliquis  - keep IVC filter in for 6 weeks, IR to do outpatient - placed 3/16   - Continue lasix while in the hospital. Will discontinue on discharge.   - Echo without systolic of diastolic dysfunction, no right heart strain      Sinus tachycardia 2/2 malignancy  - Continues to be in sinus tach, somewhat better HR.   -Continuous telemetry     Metastatic triple negative breast cancer with intracranial metastasis  -S/p craniotomy on 3/12  -Oncology on board; recommended following medical and radiation oncology for systemic palliative chemotherapy and radiation therapy.  -Neglected right breast cancer is not bleeding or causing infection with wound be of little benefit to perform a mastectomy  - As needed oxycodone as needed for pain  -Consulted palliative care, patient has agreed to undergo all management      Urinary retention - improved   Attempted voiding trial on Saturday 4/2/22, straight cath X 2.   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 No future use of heparin     Code Status: Full Code  FEN: ADULT DIET;  Regular  PPX: eliquis  DISPO: Discharge pending precert to [facility] ARU vs SNF - likely Monday     Hank Tirado MD, PGY-2  04/09/22  10:53 AM    This patient has been staffed and discussed with Chitra Sumner MD.

## 2022-04-10 VITALS
DIASTOLIC BLOOD PRESSURE: 66 MMHG | TEMPERATURE: 97.9 F | OXYGEN SATURATION: 93 % | RESPIRATION RATE: 16 BRPM | HEIGHT: 63 IN | WEIGHT: 197.31 LBS | BODY MASS INDEX: 34.96 KG/M2 | HEART RATE: 110 BPM | SYSTOLIC BLOOD PRESSURE: 100 MMHG

## 2022-04-10 PROCEDURE — 6370000000 HC RX 637 (ALT 250 FOR IP): Performed by: STUDENT IN AN ORGANIZED HEALTH CARE EDUCATION/TRAINING PROGRAM

## 2022-04-10 PROCEDURE — P9047 ALBUMIN (HUMAN), 25%, 50ML: HCPCS | Performed by: STUDENT IN AN ORGANIZED HEALTH CARE EDUCATION/TRAINING PROGRAM

## 2022-04-10 PROCEDURE — 6360000002 HC RX W HCPCS: Performed by: STUDENT IN AN ORGANIZED HEALTH CARE EDUCATION/TRAINING PROGRAM

## 2022-04-10 PROCEDURE — 99233 SBSQ HOSP IP/OBS HIGH 50: CPT | Performed by: HOSPITALIST

## 2022-04-10 PROCEDURE — 6370000000 HC RX 637 (ALT 250 FOR IP): Performed by: INTERNAL MEDICINE

## 2022-04-10 PROCEDURE — 2580000003 HC RX 258: Performed by: INTERNAL MEDICINE

## 2022-04-10 RX ORDER — POTASSIUM CHLORIDE 20 MEQ/1
40 TABLET, EXTENDED RELEASE ORAL 2 TIMES DAILY WITH MEALS
Status: DISCONTINUED | OUTPATIENT
Start: 2022-04-10 | End: 2022-04-10 | Stop reason: HOSPADM

## 2022-04-10 RX ADMIN — OXYCODONE 5 MG: 5 TABLET ORAL at 09:13

## 2022-04-10 RX ADMIN — FUROSEMIDE 40 MG: 10 INJECTION, SOLUTION INTRAMUSCULAR; INTRAVENOUS at 09:13

## 2022-04-10 RX ADMIN — APIXABAN 5 MG: 5 TABLET, FILM COATED ORAL at 09:13

## 2022-04-10 RX ADMIN — LEVETIRACETAM 2000 MG: 250 TABLET, FILM COATED ORAL at 09:17

## 2022-04-10 RX ADMIN — DICLOFENAC SODIUM 4 G: 10 GEL TOPICAL at 12:51

## 2022-04-10 RX ADMIN — TAMSULOSIN HYDROCHLORIDE 0.4 MG: 0.4 CAPSULE ORAL at 09:13

## 2022-04-10 RX ADMIN — ALBUMIN (HUMAN) 25 G: 0.25 INJECTION, SOLUTION INTRAVENOUS at 09:10

## 2022-04-10 RX ADMIN — PANTOPRAZOLE SODIUM 40 MG: 40 TABLET, DELAYED RELEASE ORAL at 06:06

## 2022-04-10 RX ADMIN — SPIRONOLACTONE 50 MG: 50 TABLET ORAL at 09:13

## 2022-04-10 RX ADMIN — ACETAMINOPHEN 325MG 650 MG: 325 TABLET ORAL at 03:59

## 2022-04-10 RX ADMIN — SODIUM CHLORIDE, PRESERVATIVE FREE 10 ML: 5 INJECTION INTRAVENOUS at 09:21

## 2022-04-10 RX ADMIN — LACOSAMIDE 200 MG: 50 TABLET, FILM COATED ORAL at 09:12

## 2022-04-10 RX ADMIN — OXYCODONE 5 MG: 5 TABLET ORAL at 04:00

## 2022-04-10 RX ADMIN — POTASSIUM CHLORIDE 40 MEQ: 20 TABLET, EXTENDED RELEASE ORAL at 09:13

## 2022-04-10 ASSESSMENT — PAIN DESCRIPTION - PROGRESSION: CLINICAL_PROGRESSION: GRADUALLY WORSENING

## 2022-04-10 ASSESSMENT — ENCOUNTER SYMPTOMS
VOMITING: 0
VOICE CHANGE: 0
COUGH: 0
EYE DISCHARGE: 0
SHORTNESS OF BREATH: 0
NAUSEA: 0
WHEEZING: 0
ABDOMINAL PAIN: 0
SORE THROAT: 0

## 2022-04-10 ASSESSMENT — PAIN SCALES - WONG BAKER
WONGBAKER_NUMERICALRESPONSE: 0

## 2022-04-10 ASSESSMENT — PAIN SCALES - GENERAL
PAINLEVEL_OUTOF10: 0
PAINLEVEL_OUTOF10: 8
PAINLEVEL_OUTOF10: 0
PAINLEVEL_OUTOF10: 5
PAINLEVEL_OUTOF10: 3

## 2022-04-10 ASSESSMENT — PAIN DESCRIPTION - DESCRIPTORS: DESCRIPTORS: ACHING;SORE;HEAVINESS

## 2022-04-10 ASSESSMENT — PAIN DESCRIPTION - LOCATION: LOCATION: LEG

## 2022-04-10 ASSESSMENT — PAIN DESCRIPTION - PAIN TYPE: TYPE: ACUTE PAIN

## 2022-04-10 ASSESSMENT — PAIN DESCRIPTION - FREQUENCY: FREQUENCY: INTERMITTENT

## 2022-04-10 ASSESSMENT — PAIN DESCRIPTION - ONSET: ONSET: ON-GOING

## 2022-04-10 ASSESSMENT — PAIN DESCRIPTION - ORIENTATION: ORIENTATION: RIGHT;LEFT

## 2022-04-10 NOTE — PLAN OF CARE
Problem: Venous Thromboembolism:  Goal: Will show no signs or symptoms of venous thromboembolism  Description: Will show no signs or symptoms of venous thromboembolism  4/9/2022 2354 by [employer] [place]  Outcome: Ongoing  Note: Pt showed no new signs of VTEs during shift, +1 pulse in bilateral pedal pulses, +2 pulse in bilateral upper extremities. No pain reported in extremities      Problem: Pain:  Goal: Control of acute pain  Description: Control of acute pain  4/9/2022 2354 by [employer] [place]  Note: Patient reported a decreased pain level of 4 from 7 on a scale 0-10 after adminstered Roxicodone.

## 2022-04-10 NOTE — PROGRESS NOTES
Nephrology  Note                                                                                                                                                                                                                                                                                                                                                               Office : [phone]     Fax :[phone]              Patient's Name: Stephanie Torre    Reason for Consult:  Hyponatremia   Requesting Physician:  JUDITH Dutta CNP      Feels fair  No shortness of breath   Good UO   Edema better   No N/V/D       Past Medical History:   Diagnosis Date    Thyroid nodule        Past Surgical History:   Procedure Laterality Date    CRANIOTOMY Left 3/12/2022    LEFT TEMPORAL PARIETAL OCCIPITAL CRANIOTOMY FOR TUMOR RESECTION performed by Reji Broderick MD at [address] IR IVC [address] N/A 03/16/2022    [facility]    IR IVC FILTER PLACEMENT W IMAGING  3/16/2022    IR IVC FILTER PLACEMENT W IMAGING 3/16/2022 [id number]    IR THROMB [address] VEIN  3/28/2022    IR THROMB [address] 3/28/2022 [address] SPECIAL PROCEDURES    UPPER GASTROINTESTINAL ENDOSCOPY N/A 4/2/2022    EGD DIAGNOSTIC ONLY performed by Vonda Tristan MD at [address] ENDOSCOPY       Family History   Problem Relation Age of Onset    Cancer Father         reports that she has never smoked. She has never used smokeless tobacco. She reports that she does not drink alcohol and does not use drugs.     Allergies:  Heparin    Current Medications:    potassium chloride (KLOR-CON M) extended release tablet 40 mEq, BID WC  diclofenac sodium (VOLTAREN) 1 % gel 4 g, BID  spironolactone (ALDACTONE) tablet 50 mg, Daily  albumin human 25 % IV solution 25 g, Q12H  furosemide (LASIX) injection 40 mg, BID  tamsulosin (FLOMAX) capsule 0.4 mg, Daily  apixaban (ELIQUIS) tablet 5 mg, BID  0.9 % sodium chloride infusion, PRN  sodium chloride flush 0.9 % injection 5-40 mL, 2 times per day  sodium chloride flush 0.9 % injection 5-40 mL, PRN  0.9 % sodium chloride infusion, PRN  metoprolol (LOPRESSOR) injection 5 mg, Once  oxyCODONE (ROXICODONE) immediate release tablet 5 mg, Q4H PRN   Or  oxyCODONE (ROXICODONE) immediate release tablet 10 mg, Q4H PRN  LORazepam (ATIVAN) injection 1 mg, Q6H PRN  [Held by provider] clonazePAM (KLONOPIN) tablet 0.5 mg, BID  QUEtiapine (SEROQUEL) tablet 12.5 mg, Nightly  insulin regular (HUMULIN R;NOVOLIN R) injection 10 Units, Once   And  dextrose 10 % infusion, Once  glucose (GLUTOSE) 40 % oral gel 15 g, PRN  dextrose 50 % IV solution, PRN  glucagon (rDNA) injection 1 mg, PRN  dextrose 5 % solution, PRN  lacosamide (VIMPAT) tablet 200 mg, BID  levETIRAcetam (KEPPRA) tablet 2,000 mg, Daily  levETIRAcetam (KEPPRA) tablet 2,500 mg, Nightly  melatonin disintegrating tablet 5 mg, Nightly  pantoprazole (PROTONIX) tablet 40 mg, QAM AC  sodium chloride flush 0.9 % injection 5-40 mL, 2 times per day  sodium chloride flush 0.9 % injection 5-40 mL, PRN  0.9 % sodium chloride infusion, PRN  ondansetron (ZOFRAN-ODT) disintegrating tablet 4 mg, Q8H PRN   Or  ondansetron (ZOFRAN) injection 4 mg, Q6H PRN  polyethylene glycol (GLYCOLAX) packet 17 g, Daily PRN  acetaminophen (TYLENOL) tablet 650 mg, Q6H PRN   Or  acetaminophen (TYLENOL) suppository 650 mg, Q6H PRN          Physical exam:     Vitals:  /69   Pulse 96   Temp 99.4 °F (37.4 °C) (Oral)   Resp 18   Ht 5' 3\" (1.6 m)   Wt 197 lb 5 oz (89.5 kg)   SpO2 93%   BMI 34.95 kg/m²   Constitutional:  OAA X3 NAD  Skin: no rash, turgor wnl  Heent:  eomi, mmm  Neck: no bruits or jvd noted  Cardiovascular:  S1, S2 without m/r/g  Respiratory: CTA B without w/r/r  Abdomen:  +bs, soft, nt, nd  Ext: ++ lower extremity edema      Data:   Labs:  CBC:   Recent Labs     04/09/22  0521   WBC 9.9   HGB 7.6*   *     BMP:    Recent Labs     04/07/22  1129 04/08/22  1151 04/09/22  1052   * 136 135*   K 3.0* 3.4* 3.3*   CL 93* 94* 93*   CO2 29 24 31   BUN 10 7 7   CREATININE 0.5* 0.5* <0.5*   GLUCOSE 125* 77 99     Ca/Mg/Phos:   Recent Labs     04/07/22  1115 04/07/22  1129 04/08/22  1151 04/09/22  1052   CALCIUM  --  9.7 9.7 10.0   MG 2.00  --   --   --    PHOS  --   --  2.8  --      Hepatic:   No results for input(s): AST, ALT, ALB, BILITOT, ALKPHOS in the last 72 hours. Troponin: No results for input(s): TROPONINI in the last 72 hours. BNP: No results for input(s): BNP in the last 72 hours. Lipids: No results for input(s): CHOL, TRIG, HDL, LDLCALC, LABVLDL in the last 72 hours. ABGs:   No results for input(s): PHART, PO2ART, FSH2RTP in the last 72 hours. INR:   No results for input(s): INR in the last 72 hours. UA:  No results for input(s): Wayne Bairon, GLUCOSEU, BILIRUBINUR, KETUA, SPECGRAV, BLOODU, PHUR, PROTEINU, UROBILINOGEN, NITRU, LEUKOCYTESUR, Fortine Heckle in the last 72 hours. Urine Microscopic:   No results for input(s): LABCAST, BACTERIA, COMU, HYALCAST, WBCUA, RBCUA, EPIU in the last 72 hours. Urine Culture:   No results for input(s): LABURIN in the last 72 hours. Urine Chemistry:   No results for input(s): Pacheco Gitelman, PROTEINUR, NAUR in the last 72 hours. IMAGING:  CTA LOWER EXTREMITY LEFT W CONTRAST   Final Result   Impression: No acute arterial abnormality. No active hemorrhage. No hematoma of the pelvis, retroperitoneum or bilateral lower extremities. Suboptimal evaluation of the venous structures due to contrast bolus timing. Findings described above may represent sequelae of poor venous outflow or rethrombosis. CTA LOWER EXTREMITY RIGHT W CONTRAST   Final Result   Impression: No acute arterial abnormality. No active hemorrhage. No hematoma of the pelvis, retroperitoneum or bilateral lower extremities. Suboptimal evaluation of the venous structures due to contrast bolus timing.  Findings described above may represent sequelae of poor venous outflow or rethrombosis. CT HEAD WO CONTRAST   Final Result      No acute or interval change. No hemorrhage or mass effect. CT ABDOMEN PELVIS W IV CONTRAST Additional Contrast? 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 [facility]   Final Result   Impression: Technically successful inferior venacavogram and bilateral iliac venogram demonstrating marked ileal caval clot associated with the filter including thrombus above the IVC filter. Technically successful vacuum-assisted thrombectomy with restoration of flow through the inferior vena cava and improved patency of the ileal caval system. Clot remains within the inferior vena cava filter. VL Extremity Venous Bilateral   Final Result      XR CHEST PORTABLE   Final Result      Limited evaluation of lower right lung due to overlapping density for which infiltrate cannot be excluded. Otherwise no acute process seen. CT HEAD WO CONTRAST   Final Result      Postsurgical changes with areas of low cortical attenuation in the left parietal and left occipital region appear essentially stable from prior study with associated mild midline shift which is similar to slightly improved from prior study. No evidence of developing acute intracranial hemorrhage. CTA ABDOMEN PELVIS W WO CONTRAST   Final Result   1. 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 2. Surrounding hazy density, likely related to venous congestion. However, streaky densities extending inferiorly on the right may represent blood. However, there is no evidence of retroperitoneal hematoma.  Correlation with lower extremity noninvasive    duplex Doppler is recommended in addition to serial hemoglobin and crit levels. 3. Suspect bilateral lower lobe pulmonary emboli. See comments above      Redemonstrated is a large necrotic mass involving the right breast enlarged fibroid uterus. Assessment/Plan   1. Hyponatremia   -improved    2. HTN    3. Anemia    4.  Vol overload  -neg 15 L        Plan   - continue diuresis with edema +Albumin  - aldactone to help with K and edema   - samsca as needed   - Ur studies - reviewed   - TSH - nl   - Monitor Na     - free water restriction   - encourage solute intake                     Thank you for allowing us to participate in care of Whit Mcgarry MD  Feel free to contact me   [facility] [address]  Office : [phone]  Fax :[phone]

## 2022-04-10 NOTE — PROGRESS NOTES
Patient educated on discharge instructions and new medications. All questions answered. IV removed with no brusieng or bleeding at site. AOx4, VSS, no pain noted and patient taken to restroom before taken by EMS. Report given to EMS team and all questions answered.

## 2022-04-10 NOTE — CARE COORDINATION
Case Management Assessment            Discharge Note                    Date / Time of Note: 4/10/2022 2:05 PM                  Discharge Note Completed by: Tory Beaver RN    Patient Name: Manasa Rivera   YOB: 1975  Diagnosis: Thrombocytopenia ([facility] [address] [D69.6]  S/P craniotomy [Z98.890]  Acute deep vein thrombosis (DVT) of proximal vein of right lower extremity (HCC) [I82.4Y1]  Acute deep vein thrombosis (DVT) of other specified vein of right lower extremity ([facility] [address] [I82.491]   Date / Time: 3/25/2022  6:11 PM    Current PCP: JUDITH KIMBROUGH - CNP  Clinic patient: No    Hospitalization in the last 30 days: Yes    Advance Directives:  Code Status: Full Code  [place] DNR form completed and on chart: No, Not Indicated    Financial:  Payor: Lakesha Howard / Plan: Lakesha Howard / Product Type: *No Product type* /      Pharmacy:    [address] [phone] -  [phone]  [address] [address]  Phone: [phone] Fax: [phone]      Assistance purchasing medications?: Potential Assistance Purchasing Medications: No  Assistance provided by Case Management: None at this time    Does patient want to participate in local refill/ meds to beds program?:      Meds To Beds General Rules:  1. Can ONLY be done Monday- Friday between 8:30am-5pm  2. Prescription(s) must be in pharmacy by 3pm to be filled same day  3. Copy of patient's insurance/ prescription drug card and patient face sheet must be sent along with the prescription(s)  4. Cost of Rx cannot be added to hospital bill. If financial assistance is needed, please contact unit  or ;  or  CANNOT provide pharmacy voucher for patients co-pays  5.  Patients can then  the prescription on their way out of the hospital at discharge, or pharmacy can deliver to the bedside if staff is available. (payment due at time of pick-up or delivery - cash, check, or card accepted)     Able to afford home medications/ co-pay costs: Yes    ADLS:  Current PT AM-PAC Score: 16 /24  Current OT AM-PAC Score: 16 /24      DISCHARGE Disposition: Inpatient Rehab: [facility] Phone: [phone] Fax: [phone]    LOC at discharge: Skilled  [address] Completed: Yes    Notification completed in HENS/PAS?:  Not Applicable    IMM Completed:   Not Indicated    Transportation:  Transportation PLAN for discharge: EMS transportation   Mode of Transport: Ambulance stretcher - BLS  Reason for medical transport: Bed confined: Meets the following criteria 1) unable to get out of bed without assistance or ambulate, 2) unable to safely sit up in a wheelchair, 3) unable to maintain erect seating position in a chair for time needed for transport  Name of [employer]: [employer]: [phone]  Time of Transport: 1800    Transport form completed: Yes    Home Care:  [address] ordered at discharge: No, Not Indicated  Home Care Agency: Not Applicable  Orders faxed: No    Durable Medical Equipment:  DME Provider: deferred  Equipment obtained during hospitalization:     Home Oxygen and Respiratory Equipment:  Oxygen needed at discharge?: No, Not [address]: Not Applicable  Portable tank available for discharge?: No, Not Indicated    Dialysis:  Dialysis patient: No    Dialysis Center:  Not Applicable    Hospice Services:  Location: Not Applicable  Agency: Not Applicable    Consents signed: No, Not Indicated    Referrals made at [facility] for outpatient continued care:  Not Applicable    Additional CM Notes: Pre-cert obtained for admission to [place] [address] rehab, bed available to today, patient, family and medical team aware and all in agreement with DC plans for today. [place] ambulance to transport between 5:30-6pm to [address] at [place] [address].     The Plan for Transition of Care is related to the following treatment goals of Thrombocytopenia (HCC) [D69.6]  S/P craniotomy [Z98.890]  Acute deep vein thrombosis (DVT) of proximal vein of right lower extremity (HCC) [I82.4Y1]  Acute deep vein thrombosis (DVT) of other specified vein of right lower extremity ([address] [I82.491]    The Patient and/or patient representative Chelsea Rayo and her family were provided with a choice of provider and agrees with the discharge plan Yes    Freedom of choice list was provided with basic dialogue that supports the patient's individualized plan of care/goals and shares the quality data associated with the providers.  Yes    Care Transitions patient: No    Sapna Calle RN  [facility] [employer]  Case Management Department  Ph: [phone]  Fax: [phone]

## 2022-04-10 NOTE — PROGRESS NOTES
Progress Note    Admit Date: 3/25/2022  Diet: ADULT DIET; Regular  ADULT ORAL NUTRITION SUPPLEMENT; Lunch; Low Calorie/High Protein Oral Supplement    CC: leg pain     Interval history:   NAEON.  improved edema on lasix BID Still ready for discharge to SNF vs ARU [facility].  Generalized stable leg pain    Medications:     Scheduled Meds:   potassium chloride  40 mEq Oral BID WC    diclofenac sodium  4 g Topical BID    spironolactone  50 mg Oral Daily    albumin human  25 g IntraVENous Q12H    furosemide  40 mg IntraVENous BID    tamsulosin  0.4 mg Oral Daily    apixaban  5 mg Oral BID    sodium chloride flush  5-40 mL IntraVENous 2 times per day    metoprolol  5 mg IntraVENous Once    [Held by provider] clonazePAM  0.5 mg Oral BID    QUEtiapine  12.5 mg Oral Nightly    insulin regular  10 Units IntraVENous Once    lacosamide  200 mg Oral BID    levETIRAcetam  2,000 mg Oral Daily    levETIRAcetam  2,500 mg Oral Nightly    melatonin  5 mg Oral Nightly    pantoprazole  40 mg Oral QAM AC    sodium chloride flush  5-40 mL IntraVENous 2 times per day     Continuous Infusions:   sodium chloride      sodium chloride 25 mL (04/06/22 1021)    dextrose      dextrose 100 mL/hr (04/05/22 0957)    sodium chloride 25 mL (04/01/22 1736)     PRN Meds:sodium chloride, sodium chloride flush, sodium chloride, oxyCODONE **OR** oxyCODONE, LORazepam, glucose, dextrose, glucagon (rDNA), dextrose, sodium chloride flush, sodium chloride, ondansetron **OR** ondansetron, polyethylene glycol, acetaminophen **OR** acetaminophen    Objective:   Vitals:   T-max:  Patient Vitals for the past 8 hrs:   BP Temp Temp src Pulse Resp SpO2 Weight   04/10/22 0414 -- -- -- -- -- -- 197 lb 5 oz (89.5 kg)   04/10/22 0336 103/69 99.4 °F (37.4 °C) Oral 96 18 93 % --       Intake/Output Summary (Last 24 hours) at 4/10/2022 0900  Last data filed at 4/10/2022 0404  Gross per 24 hour   Intake 560 ml   Output 1825 ml   Net -1265 ml       Review of Systems   Constitutional: Negative for chills, diaphoresis and fever. HENT: Negative for sore throat and voice change. Eyes: Negative for discharge. Respiratory: Negative for cough, shortness of breath and wheezing. Cardiovascular: Negative for chest pain and palpitations. Gastrointestinal: Negative for abdominal pain, nausea and vomiting. Genitourinary: Negative for dysuria and urgency. Musculoskeletal: Positive for arthralgias, gait problem and myalgias. Neurological: Negative for dizziness, light-headedness and headaches. Physical Exam  Cardiovascular:      Rate and Rhythm: Regular rhythm. Tachycardia present. Pulses: Normal pulses. Pulmonary:      Effort: Pulmonary effort is normal.      Breath sounds: Normal breath sounds. Abdominal:      Palpations: Abdomen is soft. Tenderness: There is no abdominal tenderness. Musculoskeletal:      Right lower leg: Edema present. Left lower leg: Edema present. Comments: edematous   Neurological:      General: No focal deficit present. Mental Status: She is alert and oriented to person, place, and time. LABS:    CBC:   Recent Labs     04/09/22  0521   WBC 9.9   HGB 7.6*   HCT 22.7*   *   MCV 83.3     Renal:    Recent Labs     04/07/22  1115 04/07/22  1129 04/08/22  1151 04/09/22  1052   NA  --  133* 136 135*   K  --  3.0* 3.4* 3.3*   CL  --  93* 94* 93*   CO2  --  29 24 31   BUN  --  10 7 7   CREATININE  --  0.5* 0.5* <0.5*   GLUCOSE  --  125* 77 99   CALCIUM  --  9.7 9.7 10.0   MG 2.00  --   --   --    PHOS  --   --  2.8  --    ANIONGAP  --  11 18* 11     Hepatic:   Recent Labs     04/08/22  1151   LABALBU 4.2     Troponin: No results for input(s): TROPONINI in the last 72 hours. BNP: No results for input(s): BNP in the last 72 hours. Lipids: No results for input(s): CHOL, HDL in the last 72 hours.     Invalid input(s): LDLCALCU, TRIGLYCERIDE  ABGs:  No results for input(s): PHART, MWT7ILN, PO2ART, PNC9YDX, BEART, THGBART, U4HVVQVQ, IWM1JYQ in the last 72 hours. INR: No results for input(s): INR in the last 72 hours. Lactate: No results for input(s): LACTATE in the last 72 hours. Cultures:  -----------------------------------------------------------------  RAD:   CTA LOWER EXTREMITY LEFT W CONTRAST   Final Result   Impression: No acute arterial abnormality. No active hemorrhage. No hematoma of the pelvis, retroperitoneum or bilateral lower extremities. Suboptimal evaluation of the venous structures due to contrast bolus timing. Findings described above may represent sequelae of poor venous outflow or rethrombosis. CTA LOWER EXTREMITY RIGHT W CONTRAST   Final Result   Impression: No acute arterial abnormality. No active hemorrhage. No hematoma of the pelvis, retroperitoneum or bilateral lower extremities. Suboptimal evaluation of the venous structures due to contrast bolus timing. Findings described above may represent sequelae of poor venous outflow or rethrombosis. CT HEAD WO CONTRAST   Final Result      No acute or interval change. No hemorrhage or mass effect. CT ABDOMEN PELVIS W IV CONTRAST Additional Contrast? 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 IR GUIDED THROMB [facility] VEIN   Final Result   Impression: Technically successful inferior venacavogram and bilateral iliac venogram demonstrating marked ileal caval clot associated with the filter including thrombus above the IVC filter. Technically successful vacuum-assisted thrombectomy with restoration of flow through the inferior vena cava and improved patency of the ileal caval system. Clot remains within the inferior vena cava filter. VL Extremity Venous Bilateral   Final Result      XR CHEST PORTABLE   Final Result      Limited evaluation of lower right lung due to overlapping density for which infiltrate cannot be excluded. Otherwise no acute process seen. CT HEAD WO CONTRAST   Final Result      Postsurgical changes with areas of low cortical attenuation in the left parietal and left occipital region appear essentially stable from prior study with associated mild midline shift which is similar to slightly improved from prior study. No evidence of developing acute intracranial hemorrhage. CTA ABDOMEN PELVIS W WO CONTRAST   Final Result   1. 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 2. Surrounding hazy density, likely related to venous congestion. However, streaky densities extending inferiorly on the right may represent blood. However, there is no evidence of retroperitoneal hematoma. Correlation with lower extremity noninvasive    duplex Doppler is recommended in addition to serial hemoglobin and crit levels. 3. Suspect bilateral lower lobe pulmonary emboli. See comments above      Redemonstrated is a large necrotic mass involving the right breast enlarged fibroid uterus. Assessment/Plan:     Bilateral lower extremity DVTs likely resulting in lower extremity swelling. IVC filter thrombus s/p thrombectomy  - Echo without systolic of diastolic dysfunction, no right heart strain   -Continue on Eliquis  - keep IVC filter in for 6 weeks, IR to do outpatient - placed 3/16   - Continue lasix while in the hospital. Will discontinue on discharge.   -  Voltaren gel for leg pain    Sinus tachycardia 2/2 malignancy  - Continues to be in sinus tach, somewhat better HR.   -Continuous telemetry     Metastatic triple negative breast cancer with intracranial metastasis  -S/p craniotomy on 3/12  -Oncology on board; recommended following medical and radiation oncology for systemic palliative chemotherapy and radiation therapy.  -Neglected right breast cancer is not bleeding or causing infection with wound be of little benefit to perform a mastectomy  - As needed oxycodone as needed for pain  -Consulted palliative care, patient has agreed to undergo all management      Urinary retention - improved   Attempted voiding trial on Saturday 4/2/22, straight cath X 2.   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 No future use of heparin     Code Status: Full Code  FEN: ADULT DIET;  Regular  PPX: eliquis  DISPO: Discharge pending precert to [facility] ARU vs SNF - likely Monday     Margarita Lovell DO, PGY-1  04/10/22  9:00 AM    This patient has been staffed and discussed with Bonita Chambers MD.

## 2022-04-10 NOTE — PLAN OF CARE
Problem: Falls - Risk of:  Goal: Will remain free from falls  Description: Will remain free from falls  Outcome: Ongoing  Goal: Absence of physical injury  Description: Absence of physical injury  Outcome: Ongoing     Problem: Pain:  Goal: Pain level will decrease  Description: Pain level will decrease  Outcome: Ongoing  Goal: Control of acute pain  Description: Control of acute pain  4/10/2022 0933 by Dajuan Boykin  Outcome: Ongoing  4/9/2022 2354 by Chely Jeffers  Note: Patient reported a decreased pain level of 4 from 7 on a scale 0-10 after adminstered Roxicodone.    Goal: Control of chronic pain  Description: Control of chronic pain  Outcome: Ongoing     Problem: Skin Integrity:  Goal: Will show no infection signs and symptoms  Description: Will show no infection signs and symptoms  Outcome: Ongoing  Goal: Absence of new skin breakdown  Description: Absence of new skin breakdown  Outcome: Ongoing     Problem: Nutrition  Goal: Optimal nutrition therapy  Outcome: Ongoing     Problem: Bleeding:  Goal: Will show no signs and symptoms of excessive bleeding  Description: Will show no signs and symptoms of excessive bleeding  Outcome: Ongoing     Problem: Discharge Planning:  Goal: Discharged to appropriate level of care  Description: Discharged to appropriate level of care  Outcome: Ongoing     Problem: Gas Exchange - Impaired:  Goal: Levels of oxygenation will improve  Description: Levels of oxygenation will improve  Outcome: Met This Shift     Problem: Infection, Septic Shock:  Goal: Will show no infection signs and symptoms  Description: Will show no infection signs and symptoms  Outcome: Met This Shift     Problem: Tissue Perfusion, Altered:  Goal: Circulatory function within specified parameters  Description: Circulatory function within specified parameters  Outcome: Ongoing  Note: Monitor peripheral pulses and edema, wo;; continue to monitor and assess     Problem: Venous Thromboembolism:  Goal: Will show no signs or symptoms of venous thromboembolism  Description: Will show no signs or symptoms of venous thromboembolism  4/10/2022 0933 by Grace Swanson  Outcome: Ongoing  4/9/2022 3134 by Nathalia Gaona  Outcome: Ongoing  Note: Pt showed no new signs of VTEs during shift, +1 pulse in bilateral pedal pulses, +2 pulse in bilateral upper extremities.  No pain reported in extremities   Goal: Absence of signs or symptoms of impaired coagulation  Description: Absence of signs or symptoms of impaired coagulation  Outcome: Ongoing

## 2022-04-10 NOTE — PROGRESS NOTES
Pt's mother Hailee updated on pt's current status and plan of care. All questions answered.  Electronically signed by Constantino Mckinney RN on 4/10/2022 at 7:37 AM

## 2022-05-17 ENCOUNTER — TELEPHONE (OUTPATIENT)
Dept: INTERVENTIONAL RADIOLOGY/VASCULAR | Age: 47
End: 2022-05-17

## 2022-05-17 DIAGNOSIS — C79.31 SECONDARY CANCER OF BRAIN (HCC): Primary | ICD-10-CM

## 2022-05-20 PROCEDURE — 2500000003 HC RX 250 WO HCPCS

## 2022-05-20 RX ORDER — SODIUM CHLORIDE 9 MG/ML
INJECTION, SOLUTION INTRAVENOUS CONTINUOUS
Status: DISCONTINUED | OUTPATIENT
Start: 2022-05-20 | End: 2022-05-21 | Stop reason: HOSPADM

## 2022-05-24 ENCOUNTER — HOSPITAL ENCOUNTER (OUTPATIENT)
Dept: INTERVENTIONAL RADIOLOGY/VASCULAR | Age: 47
Discharge: HOME | End: 2022-05-24
Payer: MEDICAID

## 2022-05-24 ENCOUNTER — HOSPITAL ENCOUNTER (OUTPATIENT)
Dept: INTERVENTIONAL RADIOLOGY/VASCULAR | Age: 47
Discharge: HOME | End: 2022-05-20
Payer: MEDICAID

## 2022-05-24 VITALS
HEART RATE: 100 BPM | RESPIRATION RATE: 16 BRPM | WEIGHT: 197 LBS | HEIGHT: 63 IN | TEMPERATURE: 100.1 F | SYSTOLIC BLOOD PRESSURE: 120 MMHG | BODY MASS INDEX: 34.91 KG/M2 | DIASTOLIC BLOOD PRESSURE: 72 MMHG | OXYGEN SATURATION: 98 %

## 2022-05-24 DIAGNOSIS — C79.31 SECONDARY CANCER OF BRAIN (HCC): ICD-10-CM

## 2022-05-24 LAB
INR BLD: 1.11 (ref 0.88–1.12)
PLATELET # BLD: 243 K/UL (ref 135–450)
PROTHROMBIN TIME: 12.6 SEC (ref 9.9–12.7)

## 2022-05-24 PROCEDURE — 85610 PROTHROMBIN TIME: CPT

## 2022-05-24 PROCEDURE — 7100000011 HC PHASE II RECOVERY - ADDTL 15 MIN

## 2022-05-24 PROCEDURE — 99152 MOD SED SAME PHYS/QHP 5/>YRS: CPT

## 2022-05-24 PROCEDURE — 2709999900 IR PORT PLACEMENT > 5 YEARS

## 2022-05-24 PROCEDURE — 7100000010 HC PHASE II RECOVERY - FIRST 15 MIN

## 2022-05-24 PROCEDURE — 2500000003 HC RX 250 WO HCPCS

## 2022-05-24 PROCEDURE — 6360000002 HC RX W HCPCS: Performed by: RADIOLOGY

## 2022-05-24 PROCEDURE — 77001 FLUOROGUIDE FOR VEIN DEVICE: CPT

## 2022-05-24 PROCEDURE — 99153 MOD SED SAME PHYS/QHP EA: CPT

## 2022-05-24 PROCEDURE — 85049 AUTOMATED PLATELET COUNT: CPT

## 2022-05-24 PROCEDURE — 6370000000 HC RX 637 (ALT 250 FOR IP): Performed by: RADIOLOGY

## 2022-05-24 PROCEDURE — 36415 COLL VENOUS BLD VENIPUNCTURE: CPT

## 2022-05-24 PROCEDURE — 76937 US GUIDE VASCULAR ACCESS: CPT

## 2022-05-24 PROCEDURE — 36561 INSERT TUNNELED CV CATH: CPT

## 2022-05-24 RX ORDER — SODIUM CHLORIDE 9 MG/ML
INJECTION, SOLUTION INTRAVENOUS ONCE
Status: DISCONTINUED | OUTPATIENT
Start: 2022-05-24 | End: 2022-05-25 | Stop reason: HOSPADM

## 2022-05-24 RX ORDER — ACETAMINOPHEN 325 MG/1
650 TABLET ORAL EVERY 4 HOURS PRN
Status: DISCONTINUED | OUTPATIENT
Start: 2022-05-24 | End: 2022-05-25 | Stop reason: HOSPADM

## 2022-05-24 RX ORDER — FENTANYL CITRATE 50 UG/ML
INJECTION, SOLUTION INTRAMUSCULAR; INTRAVENOUS DAILY PRN
Status: DISCONTINUED | OUTPATIENT
Start: 2022-05-24 | End: 2022-05-25 | Stop reason: HOSPADM

## 2022-05-24 RX ORDER — MIDAZOLAM HYDROCHLORIDE 1 MG/ML
INJECTION INTRAMUSCULAR; INTRAVENOUS DAILY PRN
Status: DISCONTINUED | OUTPATIENT
Start: 2022-05-24 | End: 2022-05-25 | Stop reason: HOSPADM

## 2022-05-24 RX ADMIN — FENTANYL CITRATE 50 MCG: 50 INJECTION INTRAMUSCULAR; INTRAVENOUS at 11:00

## 2022-05-24 RX ADMIN — MIDAZOLAM 1 MG: 1 INJECTION INTRAMUSCULAR; INTRAVENOUS at 11:15

## 2022-05-24 RX ADMIN — FENTANYL CITRATE 50 MCG: 50 INJECTION INTRAMUSCULAR; INTRAVENOUS at 11:15

## 2022-05-24 RX ADMIN — CEFAZOLIN 2000 MG: 10 INJECTION, POWDER, FOR SOLUTION INTRAVENOUS at 10:40

## 2022-05-24 RX ADMIN — ACETAMINOPHEN 650 MG: 325 TABLET ORAL at 12:14

## 2022-05-24 RX ADMIN — MIDAZOLAM 1 MG: 1 INJECTION INTRAMUSCULAR; INTRAVENOUS at 11:00

## 2022-05-24 ASSESSMENT — PAIN DESCRIPTION - PAIN TYPE: TYPE: SURGICAL PAIN

## 2022-05-24 ASSESSMENT — PAIN SCALES - GENERAL
PAINLEVEL_OUTOF10: 5
PAINLEVEL_OUTOF10: 3
PAINLEVEL_OUTOF10: 3

## 2022-05-24 ASSESSMENT — PAIN - FUNCTIONAL ASSESSMENT
PAIN_FUNCTIONAL_ASSESSMENT: ACTIVITIES ARE NOT PREVENTED
PAIN_FUNCTIONAL_ASSESSMENT: 0-10

## 2022-05-24 ASSESSMENT — PAIN DESCRIPTION - LOCATION
LOCATION: CHEST
LOCATION: CHEST

## 2022-05-24 ASSESSMENT — PAIN DESCRIPTION - DESCRIPTORS
DESCRIPTORS: ACHING;TENDER
DESCRIPTORS: ACHING
DESCRIPTORS: DISCOMFORT

## 2022-05-24 ASSESSMENT — PAIN DESCRIPTION - ORIENTATION
ORIENTATION: LEFT;UPPER
ORIENTATION: LEFT;UPPER

## 2022-05-24 NOTE — PROGRESS NOTES
Pt states not ready to go d/t being tired. Given explanation that would last up to 24 hours and would not likely feel fully awake today with verbal understanding of patient and family. Given ice pack for discomfort at left chest surgical site.

## 2022-05-24 NOTE — PROGRESS NOTES
Received from Radiology. Admitted to Phase 2 care. Awake and alert, respirations easy and even. Oriented to room and surroundings. No complaints.

## 2022-05-24 NOTE — BRIEF OP NOTE
Brief Postoperative Note    Corey Chavarria  YOB: 1975  [id number]    Pre-operative Diagnosis: breast cancer    Post-operative Diagnosis: Same    Procedure: Port placement    Anesthesia: Moderate Sedation    Surgeons: Sarah Contreras MD    Estimated Blood Loss: Less than 5 mL    Complications: None    Specimens: Was Not Obtained    Findings: Successful placement of a right IJ port.     Electronically signed by Sarah Contreras MD on 5/24/2022 at 11:35 AM

## 2022-05-24 NOTE — PRE SEDATION
Sedation Pre-Procedure Note    Patient Name: Stephanie Torre   YOB: 1975  Room/Bed: Room/bed info not found  Medical Record Number: [id number]  Date: 5/24/2022   Time: 11:35 AM       Indication:  Here for port placmeent. Consent: I have discussed with the patient and/or the patient representative the indication, alternatives, and the possible risks and/or complications of the planned procedure and the anesthesia methods. The patient and/or patient representative appear to understand and agree to proceed. Vital Signs:   Vitals:    05/24/22 1130   BP:    Pulse: 103   Resp: 24   Temp:    SpO2: 98%       Past Medical History:   has a past medical history of Thyroid nodule. Past Surgical History:   has a past surgical history that includes craniotomy (Left, 03/12/2022); IR GUIDED IVC FILTER PLACEMENT (N/A, 03/16/2022); IR GUIDED IVC FILTER PLACEMENT (03/16/2022); IR GUIDED THROMB MECH VEIN (03/28/2022); Upper gastrointestinal endoscopy (N/A, 04/02/2022); and IR PORT PLACEMENT > 5 YEARS (Left, 05/24/2022). Medications:   Scheduled Meds:   Continuous Infusions:    sodium chloride       PRN Meds: midazolam, fentanNYL  Home Meds:   Prior to Admission medications    Medication Sig Start Date End Date Taking?  Authorizing Provider   spironolactone (ALDACTONE) 50 MG tablet Take 1 tablet by mouth daily 4/9/22   Hank Tirado MD   apixaban (ELIQUIS) 5 MG TABS tablet Take 2 tablets by mouth 2 times daily for 3 doses 4/4/22 4/6/22  Roxy Macias MD   apixaban (ELIQUIS) 5 MG TABS tablet Take 1 tablet by mouth 2 times daily 4/7/22   Roxy Macias MD   Multiple Vitamin (MULTIVITAMIN ADULT PO) Take 1 capsule by mouth daily    Historical Provider, MD   Calcium Carbonate-Vitamin D (OYSTER SHELL CALCIUM/D) 500-200 MG-UNIT TABS Take 1 tablet by mouth 2 times daily (with meals)    Historical Provider, MD   ibuprofen (ADVIL;MOTRIN) 800 MG tablet Take 800 mg by mouth every 8 hours as needed    Historical Provider, MD   levETIRAcetam (KEPPRA) 1000 MG tablet Take 2 tablets by mouth daily 3/21/22   Pilar Hanson,    levETIRAcetam (KEPPRA) 500 MG tablet Take 5 tablets by mouth nightly 3/20/22   Corinne Black, DO   melatonin 5 MG TBDP disintegrating tablet Take 1 tablet by mouth nightly 3/20/22   Corinne Black, DO   QUEtiapine (SEROQUEL) 25 MG tablet Take 0.5 tablets by mouth nightly for 3 days 3/20/22 3/23/22  Corinne Black, DO   pantoprazole (PROTONIX) 40 MG tablet Take 1 tablet by mouth every morning (before breakfast) 3/18/22   Gabi Slaughter MD   lacosamide (VIMPAT) 200 MG tablet Take 1 tablet by mouth 2 times daily for 30 days. 3/17/22 4/16/22  Gabi Slaughter MD     Coumadin Use Last 7 Days:  no  Antiplatelet drug therapy use last 7 days: no  Other anticoagulant use last 7 days: no  Additional Medication Information:  n/a      Pre-Sedation Documentation and Exam:   I have reviewed the patient's history and review of systems.     Mallampati Airway Assessment:  Mallampati Class II - (soft palate, fauces & uvula are visible)    Prior History of Anesthesia Complications:   none    ASA Classification:  Class 2 - A normal healthy patient with mild systemic disease    Sedation/ Anesthesia Plan:   intravenous sedation    Medications Planned:   midazolam (Versed) intravenously and fentanyl intravenously    Patient is an appropriate candidate for plan of sedation: yes    Electronically signed by Nellie Marina MD on 5/24/2022 at 11:35 AM

## 2022-06-05 ENCOUNTER — HOSPITAL ENCOUNTER (EMERGENCY)
Age: 47
Discharge: ANOTHER HOSPITAL | End: 2022-06-05
Attending: EMERGENCY MEDICINE
Payer: MEDICAID

## 2022-06-05 ENCOUNTER — HOSPITAL ENCOUNTER (INPATIENT)
Age: 47
LOS: 9 days | Discharge: HOME HEALTH | DRG: 169 | End: 2022-06-14
Attending: INTERNAL MEDICINE | Admitting: INTERNAL MEDICINE
Payer: MEDICAID

## 2022-06-05 ENCOUNTER — APPOINTMENT (OUTPATIENT)
Dept: GENERAL RADIOLOGY | Age: 47
End: 2022-06-05
Payer: MEDICAID

## 2022-06-05 ENCOUNTER — APPOINTMENT (OUTPATIENT)
Dept: CT IMAGING | Age: 47
DRG: 169 | End: 2022-06-05
Attending: INTERNAL MEDICINE
Payer: MEDICAID

## 2022-06-05 VITALS
HEIGHT: 63 IN | RESPIRATION RATE: 14 BRPM | DIASTOLIC BLOOD PRESSURE: 79 MMHG | WEIGHT: 189.6 LBS | BODY MASS INDEX: 33.59 KG/M2 | SYSTOLIC BLOOD PRESSURE: 123 MMHG | HEART RATE: 105 BPM | TEMPERATURE: 98.4 F | OXYGEN SATURATION: 92 %

## 2022-06-05 DIAGNOSIS — R60.0 BILATERAL LOWER EXTREMITY EDEMA: Primary | ICD-10-CM

## 2022-06-05 DIAGNOSIS — I82.403 DEEP VEIN THROMBOSIS (DVT) OF BOTH LOWER EXTREMITIES, UNSPECIFIED CHRONICITY, UNSPECIFIED VEIN (HCC): ICD-10-CM

## 2022-06-05 DIAGNOSIS — Z86.79 HISTORY OF INTRACEREBRAL HEMORRHAGE WITHOUT RESIDUAL DEFICIT: ICD-10-CM

## 2022-06-05 DIAGNOSIS — C50.919 METASTATIC BREAST CANCER (HCC): ICD-10-CM

## 2022-06-05 DIAGNOSIS — I82.4Y3 DVT, LOWER EXTREMITY, PROXIMAL, ACUTE, BILATERAL (HCC): Primary | ICD-10-CM

## 2022-06-05 PROBLEM — I82.A19 ACUTE DEEP VEIN THROMBOSIS (DVT) OF AXILLARY VEIN (HCC): Status: ACTIVE | Noted: 2022-06-05

## 2022-06-05 LAB
ALBUMIN SERPL-MCNC: 3.2 G/DL (ref 3.4–5)
ALP BLD-CCNC: 59 U/L (ref 40–129)
ALT SERPL-CCNC: 9 U/L (ref 10–40)
ANION GAP SERPL CALCULATED.3IONS-SCNC: 14 MMOL/L (ref 3–16)
AST SERPL-CCNC: 11 U/L (ref 15–37)
BASOPHILS ABSOLUTE: 0 K/UL (ref 0–0.2)
BASOPHILS RELATIVE PERCENT: 0.5 %
BILIRUB SERPL-MCNC: <0.2 MG/DL (ref 0–1)
BILIRUBIN DIRECT: <0.2 MG/DL (ref 0–0.3)
BILIRUBIN, INDIRECT: ABNORMAL MG/DL (ref 0–1)
BUN BLDV-MCNC: 5 MG/DL (ref 7–20)
CALCIUM SERPL-MCNC: 8.7 MG/DL (ref 8.3–10.6)
CHLORIDE BLD-SCNC: 107 MMOL/L (ref 99–110)
CO2: 19 MMOL/L (ref 21–32)
CREAT SERPL-MCNC: 0.6 MG/DL (ref 0.6–1.1)
EOSINOPHILS ABSOLUTE: 0.1 K/UL (ref 0–0.6)
EOSINOPHILS RELATIVE PERCENT: 1 %
GFR AFRICAN AMERICAN: >60
GFR NON-AFRICAN AMERICAN: >60
GLUCOSE BLD-MCNC: 96 MG/DL (ref 70–99)
HCT VFR BLD CALC: 27.2 % (ref 36–48)
HEMOGLOBIN: 8.9 G/DL (ref 12–16)
INR BLD: 1.18 (ref 0.87–1.14)
LYMPHOCYTES ABSOLUTE: 0.8 K/UL (ref 1–5.1)
LYMPHOCYTES RELATIVE PERCENT: 11.9 %
MCH RBC QN AUTO: 27.8 PG (ref 26–34)
MCHC RBC AUTO-ENTMCNC: 32.6 G/DL (ref 31–36)
MCV RBC AUTO: 85.3 FL (ref 80–100)
MONOCYTES ABSOLUTE: 1 K/UL (ref 0–1.3)
MONOCYTES RELATIVE PERCENT: 15.4 %
NEUTROPHILS ABSOLUTE: 4.9 K/UL (ref 1.7–7.7)
NEUTROPHILS RELATIVE PERCENT: 71.2 %
PDW BLD-RTO: 20 % (ref 12.4–15.4)
PLATELET # BLD: 503 K/UL (ref 135–450)
PMV BLD AUTO: 6.4 FL (ref 5–10.5)
POTASSIUM REFLEX MAGNESIUM: 3.6 MMOL/L (ref 3.5–5.1)
PRO-BNP: 80 PG/ML (ref 0–124)
PROTHROMBIN TIME: 14.9 SEC (ref 11.7–14.5)
RBC # BLD: 3.19 M/UL (ref 4–5.2)
SODIUM BLD-SCNC: 140 MMOL/L (ref 136–145)
TOTAL CK: 40 U/L (ref 26–192)
TOTAL PROTEIN: 6.6 G/DL (ref 6.4–8.2)
WBC # BLD: 6.8 K/UL (ref 4–11)

## 2022-06-05 PROCEDURE — 2060000000 HC ICU INTERMEDIATE R&B

## 2022-06-05 PROCEDURE — 85610 PROTHROMBIN TIME: CPT

## 2022-06-05 PROCEDURE — 6360000002 HC RX W HCPCS: Performed by: INTERNAL MEDICINE

## 2022-06-05 PROCEDURE — 80076 HEPATIC FUNCTION PANEL: CPT

## 2022-06-05 PROCEDURE — 36415 COLL VENOUS BLD VENIPUNCTURE: CPT

## 2022-06-05 PROCEDURE — 2580000003 HC RX 258: Performed by: INTERNAL MEDICINE

## 2022-06-05 PROCEDURE — 96374 THER/PROPH/DIAG INJ IV PUSH: CPT

## 2022-06-05 PROCEDURE — 1200000000 HC SEMI PRIVATE

## 2022-06-05 PROCEDURE — 2580000003 HC RX 258: Performed by: EMERGENCY MEDICINE

## 2022-06-05 PROCEDURE — 85025 COMPLETE CBC W/AUTO DIFF WBC: CPT

## 2022-06-05 PROCEDURE — 99285 EMERGENCY DEPT VISIT HI MDM: CPT

## 2022-06-05 PROCEDURE — 74177 CT ABD & PELVIS W/CONTRAST: CPT

## 2022-06-05 PROCEDURE — 96361 HYDRATE IV INFUSION ADD-ON: CPT

## 2022-06-05 PROCEDURE — 83880 ASSAY OF NATRIURETIC PEPTIDE: CPT

## 2022-06-05 PROCEDURE — 6360000002 HC RX W HCPCS: Performed by: EMERGENCY MEDICINE

## 2022-06-05 PROCEDURE — 96375 TX/PRO/DX INJ NEW DRUG ADDON: CPT

## 2022-06-05 PROCEDURE — 93005 ELECTROCARDIOGRAM TRACING: CPT | Performed by: EMERGENCY MEDICINE

## 2022-06-05 PROCEDURE — 80048 BASIC METABOLIC PNL TOTAL CA: CPT

## 2022-06-05 PROCEDURE — 6370000000 HC RX 637 (ALT 250 FOR IP): Performed by: INTERNAL MEDICINE

## 2022-06-05 PROCEDURE — 82550 ASSAY OF CK (CPK): CPT

## 2022-06-05 PROCEDURE — 6360000004 HC RX CONTRAST MEDICATION: Performed by: INTERNAL MEDICINE

## 2022-06-05 PROCEDURE — 71045 X-RAY EXAM CHEST 1 VIEW: CPT

## 2022-06-05 RX ORDER — 0.9 % SODIUM CHLORIDE 0.9 %
1000 INTRAVENOUS SOLUTION INTRAVENOUS ONCE
Status: COMPLETED | OUTPATIENT
Start: 2022-06-05 | End: 2022-06-05

## 2022-06-05 RX ORDER — POLYETHYLENE GLYCOL 3350 17 G/17G
17 POWDER, FOR SOLUTION ORAL DAILY PRN
Status: DISCONTINUED | OUTPATIENT
Start: 2022-06-05 | End: 2022-06-12

## 2022-06-05 RX ORDER — FONDAPARINUX SODIUM 7.5 MG/.6ML
7.5 INJECTION SUBCUTANEOUS DAILY
Status: DISCONTINUED | OUTPATIENT
Start: 2022-06-05 | End: 2022-06-06

## 2022-06-05 RX ORDER — LACOSAMIDE 50 MG/1
200 TABLET ORAL 2 TIMES DAILY
Status: DISCONTINUED | OUTPATIENT
Start: 2022-06-05 | End: 2022-06-14 | Stop reason: HOSPADM

## 2022-06-05 RX ORDER — ACETAMINOPHEN 650 MG/1
650 SUPPOSITORY RECTAL EVERY 6 HOURS PRN
Status: DISCONTINUED | OUTPATIENT
Start: 2022-06-05 | End: 2022-06-14 | Stop reason: HOSPADM

## 2022-06-05 RX ORDER — LEVETIRACETAM 500 MG/1
2000 TABLET ORAL DAILY
Status: DISCONTINUED | OUTPATIENT
Start: 2022-06-06 | End: 2022-06-14 | Stop reason: HOSPADM

## 2022-06-05 RX ORDER — OXYCODONE HYDROCHLORIDE 5 MG/1
5 TABLET ORAL EVERY 4 HOURS PRN
Status: DISCONTINUED | OUTPATIENT
Start: 2022-06-05 | End: 2022-06-11

## 2022-06-05 RX ORDER — SODIUM CHLORIDE 9 MG/ML
INJECTION, SOLUTION INTRAVENOUS PRN
Status: DISCONTINUED | OUTPATIENT
Start: 2022-06-05 | End: 2022-06-14 | Stop reason: HOSPADM

## 2022-06-05 RX ORDER — SODIUM CHLORIDE 0.9 % (FLUSH) 0.9 %
5-40 SYRINGE (ML) INJECTION EVERY 12 HOURS SCHEDULED
Status: DISCONTINUED | OUTPATIENT
Start: 2022-06-05 | End: 2022-06-14 | Stop reason: HOSPADM

## 2022-06-05 RX ORDER — PANTOPRAZOLE SODIUM 40 MG/1
40 TABLET, DELAYED RELEASE ORAL
Status: DISCONTINUED | OUTPATIENT
Start: 2022-06-06 | End: 2022-06-14 | Stop reason: HOSPADM

## 2022-06-05 RX ORDER — ONDANSETRON 4 MG/1
4 TABLET, ORALLY DISINTEGRATING ORAL EVERY 8 HOURS PRN
Status: DISCONTINUED | OUTPATIENT
Start: 2022-06-05 | End: 2022-06-14 | Stop reason: HOSPADM

## 2022-06-05 RX ORDER — ACETAMINOPHEN 325 MG/1
650 TABLET ORAL EVERY 6 HOURS PRN
Status: DISCONTINUED | OUTPATIENT
Start: 2022-06-05 | End: 2022-06-14 | Stop reason: HOSPADM

## 2022-06-05 RX ORDER — ONDANSETRON 2 MG/ML
4 INJECTION INTRAMUSCULAR; INTRAVENOUS ONCE
Status: COMPLETED | OUTPATIENT
Start: 2022-06-05 | End: 2022-06-05

## 2022-06-05 RX ORDER — ONDANSETRON 2 MG/ML
4 INJECTION INTRAMUSCULAR; INTRAVENOUS EVERY 6 HOURS PRN
Status: DISCONTINUED | OUTPATIENT
Start: 2022-06-05 | End: 2022-06-14 | Stop reason: HOSPADM

## 2022-06-05 RX ORDER — SODIUM CHLORIDE 0.9 % (FLUSH) 0.9 %
5-40 SYRINGE (ML) INJECTION PRN
Status: DISCONTINUED | OUTPATIENT
Start: 2022-06-05 | End: 2022-06-14 | Stop reason: HOSPADM

## 2022-06-05 RX ORDER — MECOBALAMIN 5000 MCG
5 TABLET,DISINTEGRATING ORAL NIGHTLY
Status: DISCONTINUED | OUTPATIENT
Start: 2022-06-05 | End: 2022-06-14 | Stop reason: HOSPADM

## 2022-06-05 RX ADMIN — LEVETIRACETAM 2500 MG: 750 TABLET, FILM COATED ORAL at 20:29

## 2022-06-05 RX ADMIN — SODIUM CHLORIDE, PRESERVATIVE FREE 10 ML: 5 INJECTION INTRAVENOUS at 20:25

## 2022-06-05 RX ADMIN — HYDROMORPHONE HYDROCHLORIDE 1 MG: 1 INJECTION, SOLUTION INTRAMUSCULAR; INTRAVENOUS; SUBCUTANEOUS at 06:53

## 2022-06-05 RX ADMIN — Medication 5 MG: at 20:25

## 2022-06-05 RX ADMIN — SODIUM CHLORIDE 1000 ML: 9 INJECTION, SOLUTION INTRAVENOUS at 08:38

## 2022-06-05 RX ADMIN — FONDAPARINUX SODIUM 7.5 MG: 7.5 INJECTION SUBCUTANEOUS at 18:23

## 2022-06-05 RX ADMIN — IOPAMIDOL 80 ML: 755 INJECTION, SOLUTION INTRAVENOUS at 22:28

## 2022-06-05 RX ADMIN — LACOSAMIDE 200 MG: 50 TABLET, FILM COATED ORAL at 20:25

## 2022-06-05 RX ADMIN — ONDANSETRON 4 MG: 2 INJECTION INTRAMUSCULAR; INTRAVENOUS at 06:53

## 2022-06-05 RX ADMIN — OXYCODONE 5 MG: 5 TABLET ORAL at 20:27

## 2022-06-05 ASSESSMENT — PAIN SCALES - WONG BAKER
WONGBAKER_NUMERICALRESPONSE: 0
WONGBAKER_NUMERICALRESPONSE: 0

## 2022-06-05 ASSESSMENT — PAIN SCALES - GENERAL
PAINLEVEL_OUTOF10: 0
PAINLEVEL_OUTOF10: 6
PAINLEVEL_OUTOF10: 2
PAINLEVEL_OUTOF10: 9
PAINLEVEL_OUTOF10: 0
PAINLEVEL_OUTOF10: 0

## 2022-06-05 ASSESSMENT — PAIN - FUNCTIONAL ASSESSMENT: PAIN_FUNCTIONAL_ASSESSMENT: PREVENTS OR INTERFERES SOME ACTIVE ACTIVITIES AND ADLS

## 2022-06-05 ASSESSMENT — PAIN DESCRIPTION - LOCATION
LOCATION: BREAST
LOCATION: BREAST

## 2022-06-05 ASSESSMENT — PAIN DESCRIPTION - ORIENTATION
ORIENTATION: RIGHT
ORIENTATION: RIGHT

## 2022-06-05 ASSESSMENT — PAIN DESCRIPTION - DESCRIPTORS
DESCRIPTORS: BURNING
DESCRIPTORS: OTHER (COMMENT)

## 2022-06-05 NOTE — PLAN OF CARE
Problem: Safety - Adult  Goal: Free from fall injury  Note: Pt free from injury or falls at this time, fall precautions in place, bed in low position, side rail up x2, Covarrubias Fall Risk: High (45 and higher), bed alarm on, reoriented to room and call light, reminded not to get up without assistance, call light in reach, will continue to monitor. Pt verbalizes understanding of fall risk procedures.

## 2022-06-05 NOTE — PROGRESS NOTES
4 Eyes Admission Assessment     I agree as the admission nurse that 2 RN's have performed a thorough Head to Toe Skin Assessment on the patient. ALL assessment sites listed below have been assessed on admission. Areas assessed by both nurses:   [x]   Head, Face, and Ears   [x]   Shoulders, Back, and Chest- pre-existing wound/mass on R breast (see wound LDA)  [x]   Arms, Elbows, and Hands   [x]   Coccyx, Sacrum, and Ischium  [x]   Legs, Feet, and Heels        Does the Patient have Skin Breakdown?   Yes a wound was noted on the Admission Assessment and an LDA was Initiated documentation include the Lety-wound, Wound Assessment, Measurements, Dressing Treatment, Drainage, and Color\",         Ike Prevention initiated:  No   Wound Care Orders initiated:  Yes      [id number]  nurse consulted for Pressure Injury (Stage 3,4, Unstageable, DTI, NWPT, and Complex wounds) or Ike score 18 or lower:  Yes      Nurse 1 eSignature: Electronically signed by Garnett Duane, RN on 6/5/22 at 5:24 PM EDT    **SHARE this note so that the co-signing nurse is able to place an eSignature**    Nurse 2 eSignature: Electronically signed by Di Chester RN on 6/5/22 at 5:27 PM EDT

## 2022-06-05 NOTE — ED PROVIDER NOTES
EMERGENCY DEPARTMENT PROVIDER NOTE    Patient Identification  Pt Name: See Adames  MRN: [id number]  [place] 1975  Date of evaluation: 6/5/2022  Provider: Hadley Morfin DO  PCP: JUDITH Morejon - CNP    Chief Complaint  Leg Swelling (pt states that both of her legs and feet are swollen started yesterday )      HPI  (History provided by patient, mother)  This is a 48 y.o. female with pertinent past medical history of metastatic breast cancer, prior DVT with IVC filter placement, intracerebral hemorrhage in 4/22 after resection of brain lesion, seizure disorder who was brought in by family for leg swelling which began yesterday. Associated with generalized muscle aches and pains. Nothing seems to make the symptoms any better or worse. Patient states she was previously on Eliquis for DVT, however this was stopped due to her cerebral hemorrhage, states she was not instructed on when to restart this and is not currently taking any anticoagulation. States she is supposed to start chemotherapy and radiation treatments, however this is also not yet begun. She denies any fevers, chills, chest pain or trouble breathing.      ROS    Const:  No fevers, no chills, no generalized weakness  Skin:  No rash, no lesions  Eyes:  No visual changes, no blurry or double vision, no pain  ENT:  No sore throat, no difficulty swallowing, no ear pain, no sinus pain or congestion  Card:  No chest pain, no palpitations, +edema  Resp:  No shortness of breath, no cough, no wheezing  Abd:  No abdominal pain, no nausea, no vomiting, no diarrhea  Genitourinary:  No dysuria, no hematuria, no vaginal discharge, no vaginal bleeding  MSK:  No joint pain, +myalgia  Neuro:  No focal weakness, no headache, no paresthesia    All other systems reviewed and negative unless otherwise noted in HPI      I have reviewed the following nursing documentation:  Allergies: Heparin    Past medical history:   Past Medical History: Diagnosis Date    Thyroid nodule      Past surgical history:   Past Surgical History:   Procedure Laterality Date    CRANIOTOMY Left 03/12/2022    LEFT TEMPORAL PARIETAL OCCIPITAL CRANIOTOMY FOR TUMOR RESECTION performed by Whitney Dixon MD at [address] IR IVC FILTER PLACEMENT W IMAGING N/A 03/16/2022    [facility]    IR IVC FILTER PLACEMENT W IMAGING  03/16/2022    IR IVC FILTER PLACEMENT W IMAGING 3/16/2022 [facility] PROCEDURES    IR PORT PLACEMENT EQUAL OR GREATER THAN 5 YEARS Left 05/24/2022    Power Port; Marindia access; 28 cm; Dr. Louanne Goldmann 5 YEARS  5/24/2022    IR PORT PLACEMENT EQUAL OR GREATER THAN 5 YEARS 5/24/2022 [facility] SPECIAL PROCEDURES    [facility] TH[facility]B [facility]  03/28/2022    IR THROMB [address] 3/28/2022 [address] SPECIAL PROCEDURES    UPPER GASTROINTESTINAL ENDOSCOPY N/A 04/02/2022    EGD DIAGNOSTIC ONLY performed by Raffi Lopez MD at [facility] medications:   Previous Medications    APIXABAN (ELIQUIS) 5 MG TABS TABLET    Take 2 tablets by mouth 2 times daily for 3 doses    APIXABAN (ELIQUIS) 5 MG TABS TABLET    Take 1 tablet by mouth 2 times daily    CALCIUM CARBONATE-VITAMIN D (OYSTER SHELL CALCIUM/D) 500-200 MG-UNIT TABS    Take 1 tablet by mouth 2 times daily (with meals)    IBUPROFEN (ADVIL;MOTRIN) 800 MG TABLET    Take 800 mg by mouth every 8 hours as needed    LACOSAMIDE (VIMPAT) 200 MG TABLET    Take 1 tablet by mouth 2 times daily for 30 days.     LEVETIRACETAM (KEPPRA) 1000 MG TABLET    Take 2 tablets by mouth daily    LEVETIRACETAM (KEPPRA) 500 MG TABLET    Take 5 tablets by mouth nightly    MELATONIN 5 MG TBDP DISINTEGRATING TABLET    Take 1 tablet by mouth nightly    MULTIPLE VITAMIN (MULTIVITAMIN ADULT PO)    Take 1 capsule by mouth daily    PANTOPRAZOLE (PROTONIX) 40 MG TABLET    Take 1 tablet by mouth every morning (before breakfast)    QUETIAPINE (SEROQUEL) 25 MG TABLET    Take 0.5 tablets by mouth nightly for 3 days    SPIRONOLACTONE (ALDACTONE) 50 MG TABLET    Take 1 tablet by mouth daily       Social history:  reports that she has never smoked. She has never used smokeless tobacco. She reports that she does not drink alcohol and does not use drugs. Family history:    Family History   Problem Relation Age of Onset    Cancer Father          Exam  ED Triage Vitals   BP Temp Temp src Heart Rate Resp SpO2 Height Weight   06/05/22 0310 06/05/22 0314 -- 06/05/22 0310 06/05/22 0310 06/05/22 0310 06/05/22 0310 --   119/87 98.4 °F (36.9 °C)  (!) 105 18 98 % 5' 3\" (1.6 m)      Nursing note and vitals reviewed. Constitutional: Well developed, well nourished. Non-toxic in appearance. HENT:      Head: Normocephalic and atraumatic. Ears: External ears normal.      Nose: Nose normal.     Mouth: Membrane mucosa moist and pink. Eyes: Anicteric sclera. No discharge. Neck: Supple. Trachea midline. Cardiovascular: Tachycardia, regular rhythm; no murmurs, rubs, or gallops. 1+ pitting edema symmetric lower extremities. Pulmonary/Chest: Effort normal. No respiratory distress. CTAB. No stridor. No wheezes. No rales. Abdominal: Soft. No distension. Nontender to deep palpation all quadrants. Musculoskeletal: Moves all extremities. No gross deformity. Bilateral calf and thigh compartments are soft, tender to compression. Neurological: Alert and oriented. Face symmetric. Speech is clear. 5 out of 5 motor and sensation grossly intact bilateral lower extremities. Skin: Warm and dry. Large malodorous fungating lesion overlying right breast.   Psychiatric: Normal mood and affect. Behavior is normal.          Radiology  XR CHEST PORTABLE   Final Result   Tip of the left chest wall IJ approach MediPort terminates within the lower   SVC.              Labs  Results for orders placed or performed during the hospital encounter of 06/05/22   CBC with Auto Differential   Result Value Ref Range    WBC 6.8 4.0 - 11.0 K/uL RBC 3.19 (L) 4.00 - 5.20 M/uL    Hemoglobin 8.9 (L) 12.0 - 16.0 g/dL    Hematocrit 27.2 (L) 36.0 - 48.0 %    MCV 85.3 80.0 - 100.0 fL    MCH 27.8 26.0 - 34.0 pg    MCHC 32.6 31.0 - 36.0 g/dL    RDW 20.0 (H) 12.4 - 15.4 %    Platelets 095 (H) 226 - 450 K/uL    MPV 6.4 5.0 - 10.5 fL    Neutrophils % 71.2 %    Lymphocytes % 11.9 %    Monocytes % 15.4 %    Eosinophils % 1.0 %    Basophils % 0.5 %    Neutrophils Absolute 4.9 1.7 - 7.7 K/uL    Lymphocytes Absolute 0.8 (L) 1.0 - 5.1 K/uL    Monocytes Absolute 1.0 0.0 - 1.3 K/uL    Eosinophils Absolute 0.1 0.0 - 0.6 K/uL    Basophils Absolute 0.0 0.0 - 0.2 K/uL   Protime-INR   Result Value Ref Range    Protime 14.9 (H) 11.7 - 14.5 sec    INR 1.18 (H) 0.87 - 1.14   Brain Natriuretic Peptide   Result Value Ref Range    Pro-BNP 80 0 - 124 pg/mL       MDM and ED Course    Patient afebrile and nontoxic. Alert on arrival without outward evidence of painful or respiratory distress. No hypoxia or increased work of breathing. Nothing to suggest pulmonary edema. Lower extremities are neurovascularly intact, no evidence of limb ischemia, very low suspicion for compartment syndrome. Presentation not consistent with skin or soft tissue infection of the lower extremities. Patient has a large fungating right breast mass due to her known malignancy, does not appear obviously infected however. On record review, patient with history of bilateral DVTs, has IVC filter which is also been previously complicated by obstructive thrombus. Patient states she is not currently on any anticoagulation, previous on Eliquis but states she was never certain if this should be restarted after her cerebral hemorrhage and has not been taking it. Very complex medical history including metastatic breast cancer, metastatic brain lesion status post resection complicated by cerebral hemorrhage and seizures. I suspect that patient's lower leg edema and pain is most likely due to recurrent DVTs. Presentation is not consistent with CHF, do not anticipate any benefit from emergent diuresis. I held lengthy discussion with patient and her mother, patient's care team for her metastatic breast cancer and IVC filters at [facility]., patient and mother do request transfer to [facility] for further evaluation and care. Given the complexities of her management I do also feel this is appropriate and I do not feel that patient can be safely discharged without further specialty evaluation at this time. Will reach out to hospital service at [facility]. Callback not yet received at time of my end of shift, I discussed case with Dr. Donna Muinz who will assume care at 0730. Final Impression  1. Bilateral lower extremity edema    2. Deep vein thrombosis (DVT) of both lower extremities, unspecified chronicity, unspecified vein (HCC)    3. Metastatic breast cancer ([facility] [address]    4. History of intracerebral hemorrhage without residual deficit        Blood pressure 119/87, pulse (!) 105, temperature 98.4 °F (36.9 °C), resp. rate 18, height 5' 3\" (1.6 m), SpO2 98 %. Disposition:  DISPOSITION        Patient Referrals:  No follow-up provider specified. Discharge Medications:  New Prescriptions    No medications on file       Discontinued Medications:  Discontinued Medications    No medications on file       This chart was generated using the [employer] dictation system. I created this record but it may contain dictation errors given the limitations of this technology.     Lynnette Best DO (electronically signed)  Attending Emergency Physician       Lynnette Best DO  06/05/22 [id number]

## 2022-06-05 NOTE — ED TRIAGE NOTES
Pt presents to the ed wit bi-lateral leg and feet swelling. Pt states that it started yesterday and she does not know why. Pedal pulses present.   Pt is alert and oriented at this rime with her mother at the bedside

## 2022-06-05 NOTE — H&P
History and Physical    Admit Date: 6/5/2022    Patient's PCP: Dr. Lani Hdz, APRN - CNP        Chief complaints:  Leg edema        HISTORY OF PRESENT ILLNESS:    This is a very pleasant 48 y.o. female with metastatic breast cancer status post craniotomy with mass resection, recent admission, recent REBECCA/DVT  s/p thrombectomy who presented to ED at OSH with worsening bilateral leg swelling. Was admitted 3/25/2022 - 4/10/2022 from [facility] due to worsening lower extremity pain and swelling.      During a previous admission  patient was diagnosed with RLE DVT and due to recent Craniotomy, anticoagulation could not be started till POD 14 so IVC filter was placed. Her platelets levels were noted to be low at [facility] and due to concern for worsening DVT she was sent to the hospital.     On admission 3/25/2022,  CT abdomen and pelvis showed thrombosis of the IVC at the level of the filter with marked distention of the caval bifurcation. IR was consulted, pxt had thrombectomy 3/28/2022; she was continued on anticoagulation and as concern her filter will continue to be a nidus for infection, it was discussed that it would be re-evaluated\". .. in 6 weeks\". HIT panel was positive and patient was treated with argatroban,  transitioned to Eliquis.     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  Plan was radiation therapy and palliative chemo as outpatient.     Of note, she continued to have swelling in her lower extremity which persisted in spite of aggressive diureses, and was determined to be secondary to clot congestion in the IVC. She presented with her family to the ED at OSH, for worsening leg swelling associated with pains in both lower extremities. Patient states she was told to stop her Eliquis for radiation treatments, whch she did complete 10 treatments, and states she was never told to resume it; and hence has not been on it for about 4-6 weeks.      She is yet to start chemotherapy and had  placement of a right IJ port 5/24/2022 in anticipation of starting palliative chemo in several days. She denies fevers, chills, chest pain or dyspnea. In the ED at OSH, she was noted to be tachycardic, but afebrile. Routine labs were fairly normal apart from some anemia and thrombocytosis. Past Medical / Surgical History:    Past Medical History:   Diagnosis Date    Cancer ([address]     History of blood transfusion     Hx of blood clots     Thyroid nodule        Past Surgical History:   Procedure Laterality Date    CRANIOTOMY Left 03/12/2022    LEFT TEMPORAL PARIETAL OCCIPITAL CRANIOTOMY FOR TUMOR RESECTION performed by Marni Cummins MD at [address] IR IVC FILTER PLACEMENT W IMAGING N/A 03/16/2022    [facility]    IR IVC FILTER PLACEMENT W IMAGING  03/16/2022    IR IVC FILTER PLACEMENT W IMAGING 3/16/2022 [facility] PROCEDURES    IR PORT PLACEMENT EQUAL OR GREATER THAN 5 YEARS Left 05/24/2022    Power Port; Marindia access; 28 cm; Dr. Mateo Wooten 5 YEARS  5/24/2022    IR PORT PLACEMENT EQUAL OR GREATER THAN 5 YEARS 5/24/2022 [facility] PROCEDURES    [facility]  [facility]    IR THROMB [address] 3/28/2022 [facility] PROCEDURES    UPPER GASTROINTESTINAL ENDOSCOPY N/A 04/02/2022    EGD DIAGNOSTIC ONLY performed by Marcbetzaida Newsome MD at [facility] ENDOSCOPY       Medications Prior to Admission:    No current facility-administered medications on file prior to encounter.      Current Outpatient Medications on File Prior to Encounter   Medication Sig Dispense Refill    spironolactone (ALDACTONE) 50 MG tablet Take 1 tablet by mouth daily 30 tablet 3    apixaban (ELIQUIS) 5 MG TABS tablet Take 2 tablets by mouth 2 times daily for 3 doses 5 tablet 0    apixaban (ELIQUIS) 5 MG TABS tablet Take 1 tablet by mouth 2 times daily (Patient not taking: Reported on 6/5/2022) 60 tablet 2    Multiple Vitamin (MULTIVITAMIN ADULT PO) Take 1 capsule by mouth daily      Calcium Carbonate-Vitamin D (OYSTER SHELL CALCIUM/D) 500-200 MG-UNIT TABS Take 1 tablet by mouth 2 times daily (with meals)      ibuprofen (ADVIL;MOTRIN) 800 MG tablet Take 800 mg by mouth every 8 hours as needed      levETIRAcetam (KEPPRA) 1000 MG tablet Take 2 tablets by mouth daily 60 tablet 3    levETIRAcetam (KEPPRA) 500 MG tablet Take 5 tablets by mouth nightly 60 tablet 3    melatonin 5 MG TBDP disintegrating tablet Take 1 tablet by mouth nightly      QUEtiapine (SEROQUEL) 25 MG tablet Take 0.5 tablets by mouth nightly for 3 days 2 tablet 0    pantoprazole (PROTONIX) 40 MG tablet Take 1 tablet by mouth every morning (before breakfast) 30 tablet 3    lacosamide (VIMPAT) 200 MG tablet Take 1 tablet by mouth 2 times daily for 30 days. 60 tablet 0       Allergies:  Heparin    Social History:   TOBACCO:   reports that she has never smoked. She has never used smokeless tobacco.     ETOH:   reports no history of alcohol use. Family History:       Problem Relation Age of Onset    Cancer Father        ROS: Review of Systems - Negative except as in HPI. .   All other systems reviewed and are negative. PHYSICAL EXAM:  /80   Pulse (!) 108   Temp 98 °F (36.7 °C) (Oral)   Resp 16   SpO2 98%     No results for input(s): POCGLU in the last 72 hours. General appearance: alert, appears stated age and cooperative  Head: Normocephalic, without obvious abnormality, atraumatic  Throat: lips, mucosa, and tongue normal; teeth and gums normal  Neck: no adenopathy, no carotid bruit, no JVD, supple, symmetrical, trachea midline and thyroid not enlarged, symmetric, no tenderness/mass/nodules  Lungs: clear to auscultation bilaterally  Breasts: Large malodorous fungating   right breast lesion.    Heart: Tachycardic; regular rhythm, S1, S2 normal, no murmur, click, rub or gallop  Abdomen: soft, non-tender; bowel sounds normal; no masses,  no organomegaly  Extremities: Gross bilateral LE edema involving entire LEs bilaterally, pitting, tender. Atraumatic, no cyanosis   Pulses: 2+ and symmetric  Neurologic: Grossly normal       LABS:  Recent Labs     06/05/22 0652   WBC 6.8   HGB 8.9*   HCT 27.2*   *                                                                  Recent Labs     06/05/22 0652      K 3.6      CO2 19*   BUN 5*   CREATININE 0.6   GLUCOSE 96     Recent Labs     06/05/22 0652   AST 11*   ALT 9*   BILITOT <0.2   ALKPHOS 59     No results for input(s): TROPONINI in the last 72 hours. No results for input(s): BNP in the last 72 hours. Lab Results   Component Value Date    PHART 7.466 03/29/2022    BZT3SLZ 31.3 03/29/2022    PO2ART 84.9 03/29/2022     Recent Labs     06/05/22 0652   INR 1.18*     No results for input(s): NITRITE, COLORU, PHUR, LABCAST, WBCUA, RBCUA, MUCUS, TRICHOMONAS, YEAST, BACTERIA, CLARITYU, SPECGRAV, LEUKOCYTESUR, UROBILINOGEN, BILIRUBINUR, BLOODU, GLUCOSEU, AMORPHOUS in the last 72 hours. Invalid input(s): [address] Safe       Assessment & Plan:     48 y.o. female with metastatic breast cancer status post craniotomy with mass resection, recent admission, recent REBECCA/DVT s/p thrombectomy who presented to ED at OSH with worsening bilateral leg swelling. Acute on chronic Bilateral lower extremity DVTs   Hx of recent IVC filter thrombus   - Developed DVT in setting of malignancy, REBECCA. She could not receive therapeutic anticoagulation until POD #14 - March 26 after craniotomy for brain mets, so IVC filter placed. - Readmitted with REBECCA and noted with thrombosis on filter S/p IR thrombectomy 3/28/2022  - Was on Argatroban gtt and transitioned to Eliquis with plan to keep IVC filter in for 6 weeks, to follow up with IR outpatient  - Has not been taking the Eliquis for at least 4-6 weeks. - Will obtain LE dopplers to re-eval clot burden and CT A/P to re-eval clot around her IVC filter.  Will likely need IR eval to consider further clot directed therapies pending imaging.  - Resume AC: Will use Fondaparinux, and plan transition to Eliquis  - Avoid all heparin products. .      Metastatic triple negative breast cancer with intracranial metastasis  -S/p craniotomy on 3/12  -Oncology recommended following with medical and radiation oncology for systemic palliative chemotherapy and radiation therapy.  -Neglected right breast cancer was felt not to be bleeding or causing infection with would be of little benefit to perform a mastectomy  - She had Successful placement of a right IJ port 5/24/2022  - On oxycodone as needed for pain  -Completed radiation txt. Was seen by general surgery for the fungating breast mass in the last admissions  - Oncology consult  - Followed by Jalen Moreno last admit.          Seizures sec to brain met   -Continue Keppra 2 g daily, Keppra 2.5 g nightly Vimpat 200 mg  - Has appt with Dr. Johnny Escalante. Will see if they want to see her here this admit      Anxiety -Continue Ativan PRN         The patient and / or the family were informed of the results of any tests, a time was given to answer questions, a plan was proposed and they agreed with plan. Thank you Dr. Shavonne Lovell, APRN - CNP for the opportunity to be involved in this patients care. If you have any questions or concerns please feel free to contact me at [phone].   Full Code    Joseph Armas MD

## 2022-06-05 NOTE — DISCHARGE INSTR - COC
Continuity of Care Form    Patient Name: Jorgito Iyer   :  1975  MRN:  [id number]    Admit date:  2022  Discharge date:  2022    Code Status Order: Prior   Advance Directives:      Admitting Physician:  Jason Kimball MD  PCP: JUDITH Marie CNP    Discharging Nurse: [facility] Unit/Room#: 5720/9309-97  Discharging Unit Phone Number: ***    Emergency Contact:   Extended Emergency Contact Information  Primary Emergency Contact: Desiree,Jasetravis  Address: [address] [place] [address], [address] [place] [address] Phone: [phone]  Relation: Parent    Past Surgical History:  Past Surgical History:   Procedure Laterality Date    CRANIOTOMY Left 2022    LEFT TEMPORAL PARIETAL OCCIPITAL CRANIOTOMY FOR TUMOR RESECTION performed by Jn Baumann MD at [address]    IR IVC [address] N/A 2022    [facility]    IR IVC FILTER PLACEMENT W IMAGING  2022    IR IVC FILTER PLACEMENT W IMAGING 3/16/2022 [facility] SPECIAL PROCEDURES    IR PORT PLACEMENT EQUAL OR GREATER THAN 5 YEARS Left 2022    Power Port; Marindia access; 28 cm; Dr. Deniz Medina 5 YEARS  2022    IR PORT PLACEMENT EQUAL OR GREATER THAN 5 YEARS 2022 [facility] SPECIAL PROCEDURES    IR THROMB [address]  2022    IR THROMB [address] 3/28/2022 [address] SPECIAL PROCEDURES    UPPER GASTROINTESTINAL ENDOSCOPY N/A 2022    EGD DIAGNOSTIC ONLY performed by Dean Bello MD at [address] ENDOSCOPY       Immunization History: There is no immunization history on file for this patient.     Active Problems:  Patient Active Problem List   Diagnosis Code    Thyroid nodule E04.1    Brain metastases (HCC) C79.31    Brain mass G93.89    Status epilepticus (HCC) G40.901    Duct cell carcinoma (HCC) C80.1    Cerebral edema (HCC) G93.6    Seizure (HCC) R56.9    Deep venous thrombosis (HCC) I82.409    Fever R50.9 Phlebitis I80.9    S/P craniotomy Z98.890    Acute deep vein thrombosis (DVT) of axillary vein (HCC) I82. A19       Isolation/Infection:   Isolation            No Isolation          Patient Infection Status       None to display            Nurse Assessment:  Last Vital Signs: /81   Pulse (!) 105   Temp 98 °F (36.7 °C) (Oral)   Resp 16   SpO2 98%     Last documented pain score (0-10 scale):    Last Weight:   Wt Readings from Last 1 Encounters:   06/05/22 189 lb 9.5 oz (86 kg)     Mental Status:  {IP PT MENTAL STATUS:20030}    IV Access:  { STEPHANIE IV ACCESS:167353055}    Nursing Mobility/ADLs:  Walking   {P DME NEHB:515245385}  Transfer  {P DME NVQD:638655968}  Bathing  {CHP DME KJOO:166568131}  Dressing  {CHP DME UZQF:650199921}  Toileting  {CHP DME FMBS:113040435}  Feeding  {P DME NUCZ:468397294}  Med Admin  {P DME BRXJ:855555268}  Med Delivery   { STEPHANIE MED Delivery:230221147}    Wound Care Documentation and Therapy:  Wound 03/04/22 Breast Lower;Right Large mass (Active)   Number of days: 93       Incision 03/12/22 Head Upper;Left (Active)   Number of days: 85        Elimination:  Continence: Bowel: {YES / ST:35264}  Bladder: {YES / AB:99833}  Urinary Catheter: {Urinary Catheter:624564467}   Colostomy/Ileostomy/Ileal Conduit: {YES / SN:74578}       Date of Last BM: ***  No intake or output data in the 24 hours ending 06/05/22 1417  No intake/output data recorded.     Safety Concerns:     [address] Safety Concerns:411869554}    Impairments/Disabilities:      [address] Impairments/Disabilities:200465889}    Nutrition Therapy:  Current Nutrition Therapy:   [address] Diet List:623804585}    Routes of Feeding: {CHP DME Other Feedings:470126927}  Liquids: {Slp liquid thickness:14475}  Daily Fluid Restriction: {CHP DME Yes amt example:176191678}  Last Modified Barium Swallow with Video (Video Swallowing Test): {Done Not Done BRQR:725525948}    Treatments at the Time of Hospital Discharge:   Respiratory Treatments: ***  Oxygen Therapy:  {Therapy; copd oxygen:82221}  Ventilator:    { CC Vent PUUD:969652222}    Rehab Therapies: {THERAPEUTIC INTERVENTION:5152662286}  Weight Bearing Status/Restrictions: [address] [employer] Weight Bearin}  Other Medical Equipment (for information only, NOT a DME order):  {EQUIPMENT:368654811}  Other Treatments: ***    Patient's personal belongings (please select all that are sent with patient):  {CHP DME Belongings:995006151}    RN SIGNATURE:  {Esignature:154742546}    CASE MANAGEMENT/SOCIAL WORK SECTION    Inpatient Status Date: ***    Readmission Risk Assessment Score:  Readmission Risk              Risk of Unplanned Readmission:  16           Discharging to Facility/ Agency   Name:   Address:  Phone:  Fax:    Dialysis Facility (if applicable)   Name:  Address:  Dialysis Schedule:  Phone:  Fax:    / signature: {Esignature:000073363}    PHYSICIAN SECTION    Prognosis: Fair    Condition at Discharge: Stable    Rehab Potential (if transferring to Rehab): Guarded    Recommended Labs or Other Treatments After Discharge: Non adherent,ABD (crushed Flagyl)   Recommendation: R Breast - cleanse with NS, if available crushed Flagyl sprinkled over R Breast (odor control), cover with Adaptic, abd pads, medipore tape, change daily    Physician Certification: I certify the above information and transfer of Anay Lou  is necessary for the continuing treatment of the diagnosis listed and that she requires Home Care for greater 30 days.      Update Admission H&P: No change in H&P    PHYSICIAN SIGNATURE:  Electronically signed by Stacey Cohe MD on 22 at 9:59 AM EDT

## 2022-06-05 NOTE — ED NOTES
Pt resting comfortably with visitor in room. Gave water to both.      Missy Riddle RN  06/05/22 7036

## 2022-06-05 NOTE — ED NOTES
Called report to Nguyen Santa at [address] [place] [address] for patient transfer.      Marielena Novak RN  06/05/22 1270

## 2022-06-06 ENCOUNTER — APPOINTMENT (OUTPATIENT)
Dept: VASCULAR LAB | Age: 47
DRG: 169 | End: 2022-06-06
Attending: INTERNAL MEDICINE
Payer: MEDICAID

## 2022-06-06 LAB
ANION GAP SERPL CALCULATED.3IONS-SCNC: 11 MMOL/L (ref 3–16)
ANISOCYTOSIS: ABNORMAL
APTT: 37.8 SEC (ref 23–34.3)
BASOPHILS ABSOLUTE: 0.1 K/UL (ref 0–0.2)
BASOPHILS RELATIVE PERCENT: 1 %
BUN BLDV-MCNC: 4 MG/DL (ref 7–20)
CALCIUM SERPL-MCNC: 8.7 MG/DL (ref 8.3–10.6)
CHLORIDE BLD-SCNC: 103 MMOL/L (ref 99–110)
CO2: 22 MMOL/L (ref 21–32)
CREAT SERPL-MCNC: 0.6 MG/DL (ref 0.6–1.1)
EKG ATRIAL RATE: 103 BPM
EKG DIAGNOSIS: NORMAL
EKG P AXIS: 13 DEGREES
EKG P-R INTERVAL: 140 MS
EKG Q-T INTERVAL: 338 MS
EKG QRS DURATION: 80 MS
EKG QTC CALCULATION (BAZETT): 442 MS
EKG R AXIS: 2 DEGREES
EKG T AXIS: 10 DEGREES
EKG VENTRICULAR RATE: 103 BPM
EOSINOPHILS ABSOLUTE: 0 K/UL (ref 0–0.6)
EOSINOPHILS RELATIVE PERCENT: 0 %
GFR AFRICAN AMERICAN: >60
GFR NON-AFRICAN AMERICAN: >60
GLUCOSE BLD-MCNC: 104 MG/DL (ref 70–99)
HCT VFR BLD CALC: 26.6 % (ref 36–48)
HEMOGLOBIN: 8.9 G/DL (ref 12–16)
LYMPHOCYTES ABSOLUTE: 0.7 K/UL (ref 1–5.1)
LYMPHOCYTES RELATIVE PERCENT: 9 %
MACROCYTES: ABNORMAL
MCH RBC QN AUTO: 28 PG (ref 26–34)
MCHC RBC AUTO-ENTMCNC: 33.4 G/DL (ref 31–36)
MCV RBC AUTO: 84.1 FL (ref 80–100)
METAMYELOCYTES RELATIVE PERCENT: 2 %
MICROCYTES: ABNORMAL
MONOCYTES ABSOLUTE: 0.5 K/UL (ref 0–1.3)
MONOCYTES RELATIVE PERCENT: 6 %
NEUTROPHILS ABSOLUTE: 7 K/UL (ref 1.7–7.7)
NEUTROPHILS RELATIVE PERCENT: 82 %
NUCLEATED RED BLOOD CELLS: 1 /100 WBC
OVALOCYTES: ABNORMAL
PDW BLD-RTO: 19.9 % (ref 12.4–15.4)
PLATELET # BLD: 531 K/UL (ref 135–450)
PMV BLD AUTO: 6.6 FL (ref 5–10.5)
POLYCHROMASIA: ABNORMAL
POTASSIUM REFLEX MAGNESIUM: 3.9 MMOL/L (ref 3.5–5.1)
RBC # BLD: 3.16 M/UL (ref 4–5.2)
SODIUM BLD-SCNC: 136 MMOL/L (ref 136–145)
WBC # BLD: 8.3 K/UL (ref 4–11)

## 2022-06-06 PROCEDURE — 80048 BASIC METABOLIC PNL TOTAL CA: CPT

## 2022-06-06 PROCEDURE — 93970 EXTREMITY STUDY: CPT

## 2022-06-06 PROCEDURE — 1200000000 HC SEMI PRIVATE

## 2022-06-06 PROCEDURE — 99221 1ST HOSP IP/OBS SF/LOW 40: CPT | Performed by: NURSE PRACTITIONER

## 2022-06-06 PROCEDURE — 6360000002 HC RX W HCPCS: Performed by: INTERNAL MEDICINE

## 2022-06-06 PROCEDURE — 93010 ELECTROCARDIOGRAM REPORT: CPT | Performed by: INTERNAL MEDICINE

## 2022-06-06 PROCEDURE — 85730 THROMBOPLASTIN TIME PARTIAL: CPT

## 2022-06-06 PROCEDURE — 85025 COMPLETE CBC W/AUTO DIFF WBC: CPT

## 2022-06-06 PROCEDURE — 2580000003 HC RX 258: Performed by: INTERNAL MEDICINE

## 2022-06-06 PROCEDURE — 36415 COLL VENOUS BLD VENIPUNCTURE: CPT

## 2022-06-06 PROCEDURE — 99223 1ST HOSP IP/OBS HIGH 75: CPT | Performed by: SURGERY

## 2022-06-06 PROCEDURE — 6370000000 HC RX 637 (ALT 250 FOR IP): Performed by: INTERNAL MEDICINE

## 2022-06-06 RX ORDER — ARGATROBAN 1 MG/ML
.0625-1 INJECTION, SOLUTION INTRAVENOUS CONTINUOUS
Status: DISCONTINUED | OUTPATIENT
Start: 2022-06-07 | End: 2022-06-11

## 2022-06-06 RX ORDER — METRONIDAZOLE 500 MG/1
1000 TABLET ORAL DAILY
Status: DISCONTINUED | OUTPATIENT
Start: 2022-06-07 | End: 2022-06-14 | Stop reason: HOSPADM

## 2022-06-06 RX ADMIN — OXYCODONE 5 MG: 5 TABLET ORAL at 20:17

## 2022-06-06 RX ADMIN — OXYCODONE 5 MG: 5 TABLET ORAL at 12:49

## 2022-06-06 RX ADMIN — FONDAPARINUX SODIUM 7.5 MG: 7.5 INJECTION SUBCUTANEOUS at 08:09

## 2022-06-06 RX ADMIN — SODIUM CHLORIDE, PRESERVATIVE FREE 10 ML: 5 INJECTION INTRAVENOUS at 20:24

## 2022-06-06 RX ADMIN — LACOSAMIDE 200 MG: 50 TABLET, FILM COATED ORAL at 08:09

## 2022-06-06 RX ADMIN — LEVETIRACETAM 2000 MG: 500 TABLET, FILM COATED ORAL at 08:09

## 2022-06-06 RX ADMIN — OXYCODONE 5 MG: 5 TABLET ORAL at 02:46

## 2022-06-06 RX ADMIN — LACOSAMIDE 200 MG: 50 TABLET, FILM COATED ORAL at 20:17

## 2022-06-06 RX ADMIN — Medication 5 MG: at 20:17

## 2022-06-06 RX ADMIN — ACETAMINOPHEN 650 MG: 325 TABLET ORAL at 18:53

## 2022-06-06 RX ADMIN — LEVETIRACETAM 2500 MG: 750 TABLET, FILM COATED ORAL at 20:23

## 2022-06-06 RX ADMIN — SODIUM CHLORIDE, PRESERVATIVE FREE 10 ML: 5 INJECTION INTRAVENOUS at 08:10

## 2022-06-06 RX ADMIN — PANTOPRAZOLE SODIUM 40 MG: 40 TABLET, DELAYED RELEASE ORAL at 06:11

## 2022-06-06 ASSESSMENT — PAIN DESCRIPTION - ORIENTATION
ORIENTATION: RIGHT
ORIENTATION: RIGHT;LEFT

## 2022-06-06 ASSESSMENT — PAIN DESCRIPTION - FREQUENCY
FREQUENCY: CONTINUOUS

## 2022-06-06 ASSESSMENT — PAIN DESCRIPTION - ONSET
ONSET: ON-GOING

## 2022-06-06 ASSESSMENT — PAIN SCALES - GENERAL
PAINLEVEL_OUTOF10: 6
PAINLEVEL_OUTOF10: 0
PAINLEVEL_OUTOF10: 5
PAINLEVEL_OUTOF10: 0

## 2022-06-06 ASSESSMENT — PAIN - FUNCTIONAL ASSESSMENT
PAIN_FUNCTIONAL_ASSESSMENT: ACTIVITIES ARE NOT PREVENTED
PAIN_FUNCTIONAL_ASSESSMENT: PREVENTS OR INTERFERES SOME ACTIVE ACTIVITIES AND ADLS
PAIN_FUNCTIONAL_ASSESSMENT: ACTIVITIES ARE NOT PREVENTED
PAIN_FUNCTIONAL_ASSESSMENT: ACTIVITIES ARE NOT PREVENTED

## 2022-06-06 ASSESSMENT — PAIN DESCRIPTION - DESCRIPTORS
DESCRIPTORS: BURNING
DESCRIPTORS: DULL;HEAVINESS

## 2022-06-06 ASSESSMENT — PAIN DESCRIPTION - LOCATION
LOCATION: LEG;BREAST
LOCATION: LEG
LOCATION: BREAST
LOCATION: BREAST

## 2022-06-06 ASSESSMENT — PAIN DESCRIPTION - PAIN TYPE
TYPE: ACUTE PAIN

## 2022-06-06 ASSESSMENT — PAIN SCALES - WONG BAKER
WONGBAKER_NUMERICALRESPONSE: 0

## 2022-06-06 NOTE — PLAN OF CARE
Problem: Discharge Planning  Goal: Discharge to home or other facility with appropriate resources  6/6/2022 1024 by Nelson Steven RN  Outcome: Progressing  Flowsheets (Taken 6/6/2022 0800)  Discharge to home or other facility with appropriate resources: Identify barriers to discharge with patient and caregiver  6/6/2022 0151 by Nicole Dawkins RN  Outcome: Progressing  [address] (Taken 6/5/2022 2000)  Discharge to home or other facility with appropriate resources: Identify barriers to discharge with patient and caregiver     Problem: Safety - Adult  Goal: Free from fall injury  6/6/2022 1024 by Nelson Steven RN  Outcome: Progressing  Flowsheets (Taken 6/6/2022 1023)  Free From Fall Injury: Instruct family/caregiver on patient safety  6/6/2022 0151 by Nicole Dawkins RN  Outcome: Progressing     Problem: Pain  Goal: Verbalizes/displays adequate comfort level or baseline comfort level  6/6/2022 1024 by Nelson Steven RN  Outcome: Progressing  Flowsheets (Taken 6/6/2022 0758)  Verbalizes/displays adequate comfort level or baseline comfort level:   Encourage patient to monitor pain and request assistance   Assess pain using appropriate pain scale  6/6/2022 0151 by Nicole Dawkins RN  Outcome: Progressing

## 2022-06-06 NOTE — CONSULTS
[facility]  Palliative Medicine Consultation Note      Date Of Admission:6/5/2022  Date of consult: 06/06/22  Seen by [facility] in the past:  Yes    Recommendations:        Pt is well known to the palliative care team. Brigida Sindy to see the pt at the bedside. Her mother Hailee is also present. Discussed her understanding of her medical condition. She reports she had been at home, doing well following radiation until she developed lower extremity swelling and pain. She reports that she was told to stop eliquis during radiation and was not told to restart. She was supposed to start palliative chemotherapy tomorrow and still plans to pursue this when able. 1. Goals of Care/Advanced Care planning/Code status: Full code, did not discuss today. Pt wants to continue with current management - reported she had been doing well at home since last admission and still plans to pursue palliative chemotherapy. 2. Pain likely 2/2 LE DVT: Pt endorses LE pain today, improved since admission. Describes as a burning sensation. Pt was started on fondaparinux with plans to transition to Eliquis  3. SOB: pt denies  4. Metastatic triple negative breast cancer with intracranial metastasis: pt has completed radiation with plans for palliative chemotherapy. Oncology following. 5. Disposition: Likely return home when medically ready for discharge    Reason for Consult:         [x]  Goals of Care  [x]  Code Status Discussion/Advanced Care Planning   []  Psychosocial/Family Support  []  Symptom Management  []  Other (Specify)    Requesting Physician: Dr. Tahir Johnston:  Leg edema    History Obtained From:  patient, electronic medical record    History of Present Illness:         Pablo Dancer is a 48 y.o. female with PMH of metastatic breast cancer s/p craniotomy with mass resection, recent DV s/p thrombectomy who presented with worsening bilateral leg swelling.  During a previous admission  patient was diagnosed with RLE DVT and due to recent Craniotomy, anticoagulation could not be started till POD 14 so IVC filter was placed. On admission 3/25/2022,  CT abdomen and pelvis showed thrombosis of the IVC at the level of the filter with marked distention of the caval bifurcation.  IR was consulted, pxt had thrombectomy 3/28/2022; she was continued on anticoagulation and as concern her filter will continue to be a nidus for infection, it was discussed that it would be re-evaluated\". .. in 6 weeks\". 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 Pt also has triple negative invasive ductal carcinoma of the right breast and was planning on radiation therapy and palliative chemo as outpatient. Pt presented to OSH with leg swelling and pain. She reported that she was told to hold Eliquis for radiation treatments. She did complete 10 radiation treatments, reported she was never told to resume it so has been off Eliquis for 4-6 weeks. She is yet to start chemotherapy and had  placement of a right IJ port 5/24/2022 in anticipation of starting palliative chemo in several days. Palliative care was consulted for goals of care and code status discussion.      Subjective:         Past Medical History:        Diagnosis Date    Cancer ([facility] [address]     History of blood transfusion     Hx of blood clots     Thyroid nodule        Past Surgical History:        Procedure Laterality Date    CRANIOTOMY Left 03/12/2022    LEFT TEMPORAL PARIETAL OCCIPITAL CRANIOTOMY FOR TUMOR RESECTION performed by Bacilio Orlando MD at [address] IR IVC FILTER PLACEMENT W IMAGING N/A 03/16/2022    [facility]    IR IVC FILTER PLACEMENT W IMAGING  03/16/2022    IR IVC FILTER PLACEMENT W IMAGING 3/16/2022 [facility] SPECIAL PROCEDURES    IR PORT PLACEMENT EQUAL OR GREATER THAN 5 YEARS Left 05/24/2022    Power Port; [address] access; 28 cm; Dr. Ernesto Sow 5 YEARS 5/24/2022    IR PORT PLACEMENT EQUAL OR GREATER THAN 5 YEARS 5/24/2022 [facility] SPECIAL PROCEDURES    [facility]  03/28/2022    IR THROMB [address] 3/28/2022 [facility] SPECIAL PROCEDURES    UPPER GASTROINTESTINAL ENDOSCOPY N/A 04/02/2022    EGD DIAGNOSTIC ONLY performed by Oli Harvey MD at [facility] ENDOSCOPY       Current Medications:    Medications Prior to Admission: spironolactone (ALDACTONE) 50 MG tablet, Take 1 tablet by mouth daily  apixaban (ELIQUIS) 5 MG TABS tablet, Take 2 tablets by mouth 2 times daily for 3 doses  apixaban (ELIQUIS) 5 MG TABS tablet, Take 1 tablet by mouth 2 times daily (Patient not taking: Reported on 6/5/2022)  Multiple Vitamin (MULTIVITAMIN ADULT PO), Take 1 capsule by mouth daily  Calcium Carbonate-Vitamin D (OYSTER SHELL CALCIUM/D) 500-200 MG-UNIT TABS, Take 1 tablet by mouth 2 times daily (with meals)  ibuprofen (ADVIL;MOTRIN) 800 MG tablet, Take 800 mg by mouth every 8 hours as needed  levETIRAcetam (KEPPRA) 1000 MG tablet, Take 2 tablets by mouth daily  levETIRAcetam (KEPPRA) 500 MG tablet, Take 5 tablets by mouth nightly  melatonin 5 MG TBDP disintegrating tablet, Take 1 tablet by mouth nightly  QUEtiapine (SEROQUEL) 25 MG tablet, Take 0.5 tablets by mouth nightly for 3 days  pantoprazole (PROTONIX) 40 MG tablet, Take 1 tablet by mouth every morning (before breakfast)  lacosamide (VIMPAT) 200 MG tablet, Take 1 tablet by mouth 2 times daily for 30 days. Allergies:  Heparin    Social History:    · TOBACCO: reports that she has never smoked. She has never used smokeless tobacco.  · ETOH:   reports no history of alcohol use. · Patient currently lives with family mother    Review of Systems -   Review of Systems: A 10 point review of systems was conducted, significant findings as notedin HPI. Objective:          Physical Exam  Constitutional:       General: She is not in acute distress. Cardiovascular:      Rate and Rhythm: Normal rate and regular rhythm.       Heart sounds: Normal heart sounds. Pulmonary:      Breath sounds: Normal breath sounds. Abdominal:      General: Bowel sounds are normal.      Palpations: Abdomen is soft. Musculoskeletal:      Right lower leg: No edema. Left lower leg: No edema. Skin:     General: Skin is warm and dry. Neurological:      Mental Status: She is alert and oriented to person, place, and time. Palliative Performance Scale:  [] 60% Ambulation reduced; Significant disease; Can't do hobbies/housework; intake normal or reduced; occasional assist; LOC full/confusion  [] 50% Mainly sit/lie; Extensive disease; Can't do any work; Considerable assist; intake normal  Or reduced; LOC full/confusion  [] 40% Mainly in bed; Extensive disease; Mainly assist; intake normal or reduced; occasional assist; LOC full/confusion  [] 30% Bed Bound; Extensive disease; Total care; intake reduced; LOC full/confusion  [] 20% Bed Bound; Extensive disease; Total care; intake minimal; Drowsy/coma  [] 10% Bed Bound; Extensive disease; Total care; Mouth care only; Drowsy/coma  [] 0% Death    PPS: greater than 60%     Vitals:    /79   Pulse (!) 104   Temp 99.1 °F (37.3 °C) (Oral)   Resp 16   SpO2 94%     Labs:    BMP:   Recent Labs     06/05/22 0652 06/06/22 0432    136   K 3.6 3.9    103   CO2 19* 22   BUN 5* 4*   CREATININE 0.6 0.6   GLUCOSE 96 104*     CBC:   Recent Labs     06/05/22 0652 06/06/22 0432   WBC 6.8 8.3   HGB 8.9* 8.9*   HCT 27.2* 26.6*   * 531*       LFT's:   Recent Labs     06/05/22 0652   AST 11*   ALT 9*   BILITOT <0.2   ALKPHOS 59     Troponin: No results for input(s): TROPONINI in the last 72 hours. BNP: No results for input(s): BNP in the last 72 hours. ABGs: No results for input(s): PHART, RKB4WYO, PO2ART in the last 72 hours.   INR:   Recent Labs     06/05/22 0652   INR 1.18*       U/A:No results for input(s): NITRITE, COLORU, PHUR, LABCAST, WBCUA, RBCUA, MUCUS, TRICHOMONAS, YEAST, BACTERIA,

## 2022-06-06 NOTE — PLAN OF CARE
Problem: Discharge Planning  Goal: Discharge to home or other facility with appropriate resources  Outcome: Progressing  Flowsheets (Taken 6/5/2022 2000)  Discharge to home or other facility with appropriate resources: Identify barriers to discharge with patient and caregiver     Problem: Safety - Adult  Goal: Free from fall injury  6/6/2022 0151 by Quirino Mukherjee RN  Outcome: Progressing  No falls noted, call light in reach, bed in lowest position and locked, non slip socks placed on patient, bed alarm in place      Problem: Pain  Goal: Verbalizes/displays adequate comfort level or baseline comfort level  Outcome: Progressing  PRN pain medications per [facility]

## 2022-06-06 NOTE — PROGRESS NOTES
Progress Note    Admit Date: 6/5/2022    Patient's PCP: Dr. Naeem Sparks, APRN - CNP        Chief complaints:  Leg edema        HISTORY OF PRESENT ILLNESS:    This is a very pleasant 48 y.o. female with metastatic breast cancer status post craniotomy with mass resection, recent admission, recent REBECCA/DVT  s/p thrombectomy who presented to ED at OSH with worsening bilateral leg swelling.      Was admitted 3/25/2022 - 4/10/2022 from [place] due to worsening lower extremity pain and swelling.       During a previous admission  patient was diagnosed with RLE DVT and due to recent Craniotomy, anticoagulation could not be started till POD 14 so IVC filter was placed. Her platelets levels were noted to be low at [facility] and due to concern for worsening DVT she was sent to the hospital.      On admission 3/25/2022,  CT abdomen and pelvis showed thrombosis of the IVC at the level of the filter with marked distention of the caval bifurcation.  IR was consulted, pxt had thrombectomy 3/28/2022; she was continued on anticoagulation and as concern her filter will continue to be a nidus for infection, it was discussed that it would be re-evaluated\". .. in 6 weeks\".  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  Plan was radiation therapy and palliative chemo as outpatient.     Of note, she continued to have swelling in her lower extremity which persisted in spite of aggressive diureses, and was determined to be secondary to clot congestion in the IVC.      She presented with her family to the ED at OSH, for worsening leg swelling associated with pains in both lower extremities.       Patient states she was told to stop her Eliquis for radiation treatments, whch she did complete 10 treatments, and states she was never told to resume it; and hence has not been on it for about 4-6 weeks.      She is yet to start chemotherapy and had  placement of a right IJ port 5/24/2022 in anticipation of starting palliative chemo in several days.     She denies fevers, chills, chest pain or dyspnea.      In the ED at OSH, she was noted to be tachycardic, but afebrile. Routine labs were fairly normal apart from some anemia and thrombocytosis.          Interval HPI  No new complaints  Still leg pain, right thigh mostly  No fever  No dyspnea    Mother in room    US tech in room for LE Duplex studies. Medications: Reviewed      Allergies:  Heparin      ROS: Review of Systems - Negative except as in HPI. .   All other systems reviewed and are negative. PHYSICAL EXAM:  /70   Pulse (!) 105   Temp 98.8 °F (37.1 °C) (Oral)   Resp 16   SpO2 92%     No results for input(s): POCGLU in the last 72 hours. General appearance: alert, appears stated age and cooperative  Head: Normocephalic, without obvious abnormality, atraumatic  Throat: lips, mucosa, and tongue normal; teeth and gums normal  Neck: no adenopathy, no carotid bruit, no JVD, supple, symmetrical, trachea midline and thyroid not enlarged, symmetric, no tenderness/mass/nodules  Lungs: clear to auscultation bilaterally  Breasts: Large malodorous fungating  right breast mass. Heart: Mildly Tachycardic; regular rhythm, S1, S2 normal, no murmur, click, rub or gallop  Abdomen: soft, non-tender; bowel sounds normal; no masses,  no organomegaly  Extremities: Gross bilateral LE edema involving entire LEs bilaterally, pitting, tender.  Atraumatic, no cyanosis   Pulses: 2+ and symmetric  Neurologic: Grossly normal       LABS:  Recent Labs     06/05/22 0652 06/06/22  0432   WBC 6.8 8.3   HGB 8.9* 8.9*   HCT 27.2* 26.6*   * 531*                                                                  Recent Labs     06/05/22  0652 06/06/22  0432    136   K 3.6 3.9    103   CO2 19* 22   BUN 5* 4*   CREATININE 0.6 0.6   GLUCOSE 96 104*     Recent Labs 06/05/22  0652   AST 11*   ALT 9*   BILITOT <0.2   ALKPHOS 59     No results for input(s): TROPONINI in the last 72 hours. No results for input(s): BNP in the last 72 hours. Lab Results   Component Value Date    PHART 7.466 03/29/2022    ASA9NPD 31.3 03/29/2022    PO2ART 84.9 03/29/2022     Recent Labs     06/05/22  0652   INR 1.18*     No results for input(s): NITRITE, COLORU, PHUR, LABCAST, WBCUA, RBCUA, MUCUS, TRICHOMONAS, YEAST, BACTERIA, CLARITYU, SPECGRAV, LEUKOCYTESUR, UROBILINOGEN, BILIRUBINUR, BLOODU, GLUCOSEU, AMORPHOUS in the last 72 hours. Invalid input(s): Say Loyd       Assessment & Plan:     48 y.o. female with metastatic breast cancer status post craniotomy with mass resection, recent admission, recent REBECCA/DVT s/p thrombectomy who presented to ED at OSH with worsening bilateral leg swelling. Acute on chronic Bilateral lower extremity DVTs   Hx of recent IVC filter thrombus, REBECCA   - Recent admission for DVT developed in setting of malignancy, REBECCA. She could not receive therapeutic anticoagulation until POD #14 after craniotomy for brain mets, so IVC filter placed. - Was re-admitted with REBECCA and noted with severe thrombosis on LEs, including around filter S/p IR thrombectomy 3/28/2022. Was on Argatroban gtt and transitioned to Eliquis with plan to keep IVC filter in \"for 6 weeks\", to follow up with IR outpatient  - Has not been taking the Eliquis for at least 4-6 weeks: Pxt states she was told to suspend it for radiation txts, and never told to resume  - Ordered LE dopplers to re-eval clot burden  - Ordered CT A/P to re-eval clot around her IVC filter. Report reviewed. Limited study. Will discuss with IR eval to consider further clot directed therapies if US duplex studies show significant new clot burden.   - Resumed AC with Fondaparinux, pending further mgt,  plan transition to Eliquis; lifelong at discharge  - Emphasized need to be compliant with [facility]  - Avoid all heparin/heparinoids. Metastatic triple negative breast cancer with intracranial metastasis  Fungating, necrotic right breast mass  -Known brain mets S/p craniotomy on 3/12 for brain mets withseizures. -S/p radiation, completed with plans for palliative systemic chemotherapy - She had placement of a right IJ port 5/24/2022 - Oncology consulted. - CT A/P on admit: prob new metastatic lesions in the  Liver (\". .3-4 small. Anderson Gutter ) and pancreas (1.6 cm )  -For her neglected right breast cancer with fungating, necrotic mass: Was seen by general surgery in the last admissions to consider palliative resection. Pxt had elected not to do so at that time, stating she was recovering from her brain surgery. Will re-consult Gen Surgery to re-evaluate. - On oxycodone as needed for pain  - Followed by Pall Med last admit. Seizures sec to brain met   -Continue Keppra 2 g daily, Keppra 2.5 g nightly Vimpat 200 mg. Appears may not have been taking the keppra as was intended. - Has appt with Dr. Yovani Geller. Will see if they want to see her here this admit        Anxiety -Continue Ativan PRN         The patient and / or the family were informed of the results of any tests, a time was given to answer questions, a plan was proposed and they agreed with plan. Full Code   Pall Med consult        Disposition: in pxt pending progress. Pxt from Home./ Likely will return Home at discharge.        Yaa Rios MD

## 2022-06-06 NOTE — PROGRESS NOTES
GIM Attending    Discussed with IR: Seen by IR, and discussed thrombectomy with pxt, who \"wants to think about it\". Per IR, hold fondaparinux, in case will need procedure. We will start on argatroban for [facility] [place] to start tomorrow 8AM, as alreadyt received Fondaparinux today.     Gen surgery and Oncol inputs pending    Discussed with pxt    Discussed with RN      Josh Boswell MD

## 2022-06-06 NOTE — CARE COORDINATION
Case Management Assessment           Initial Evaluation                Date / Time of Evaluation: 6/6/2022 4:15 PM                 Assessment Completed by: Moon Rocha RN    Patient Name: Herman Boykin     YOB: 1975  Diagnosis: Acute deep vein thrombosis (DVT) of axillary vein, unspecified laterality ([facility] Timo.Olp. A19]  DVT, lower extremity, proximal, acute, bilateral ([facility] [address] [I82.4Y3]     Date / Time: 6/5/2022  1:22 PM    Patient Admission Status: Inpatient    If patient is discharged prior to next notation, then this note serves as note for discharge by case management. Current PCP: JUDITH KIMBROUGH - CNP  Clinic Patient: No    Chart Reviewed: Yes  Patient/ Family Interviewed: Yes    Initial assessment completed at bedside with: Patient and family at bedside    Hospitalization in the last 30 days: No    Emergency Contacts:  Extended Emergency Contact Information  Primary Emergency Contact: Hailee Ramírez  Address: [address] [place] [address] Phone: [phone]  Relation: Parent    Advance Directives:   Code Status: Full [address]: Yes  Agent: Hailee Ramírez  Contact Number: [phone]    Copy present: No     In paper Chart: No    Scanned into EMR No    Financial  Payor: Steve 58 / Plan: Heiligengeistbrjeffreye 58 / Product Type: *No Product type* /     Pre-cert required for SNF: Yes    Pharmacy    [place] [phone] [facility] [phone] [address] [address]  Phone: [phone] Fax: [phone]      Potential assistance Purchasing Medications: Potential Assistance Purchasing Medications: No  Does Patient want to participate in local refill/ meds to beds program?:      Meds To Beds General Rules:  1. Can ONLY be done Monday- Friday between 8:30am-5pm  2.  Prescription(s) must be in pharmacy reached out to [facility] for additional support and resources for patient. The Plan for Transition of Care is related to the following treatment goals of Acute deep vein thrombosis (DVT) of axillary vein, unspecified laterality ([address] Immo.Kenna. A19]  DVT, lower extremity, proximal, acute, bilateral ([address] [I82.4Y3]    The Patient and/or patient representative Norma Shannon and her family were provided with a choice of provider and agrees with the discharge plan Yes    Freedom of choice list was provided with basic dialogue that supports the patient's individualized plan of care/goals and shares the quality data associated with the providers.  Yes    Care Transition patient: No    Lobo Jett RN  [facility] [employer]  Case Management Department  Ph: [phone]   Fax: [phone]

## 2022-06-06 NOTE — CONSULTS
IMAGING  03/16/2022    IR IVC FILTER PLACEMENT W IMAGING 3/16/2022 [facility] SPECIAL PROCEDURES    IR PORT PLACEMENT EQUAL OR GREATER THAN 5 YEARS Left 05/24/2022    Power Port; Ifeomandia access; 28 cm; Dr. Ashleigh Cox 5 YEARS  5/24/2022    IR PORT PLACEMENT EQUAL OR GREATER THAN 5 YEARS 5/24/2022 [facility] SPECIAL PROCEDURES    IR THROMB [address]  03/28/2022    IR THROMB [address] 3/28/2022 [facility] SPECIAL PROCEDURES    UPPER GASTROINTESTINAL ENDOSCOPY N/A 04/02/2022    EGD DIAGNOSTIC ONLY performed by Dean Bello MD at [facility] ENDOSCOPY       Allergies:  Heparin    Medications:   Home Meds  No current facility-administered medications on file prior to encounter. Current Outpatient Medications on File Prior to Encounter   Medication Sig Dispense Refill    spironolactone (ALDACTONE) 50 MG tablet Take 1 tablet by mouth daily 30 tablet 3    apixaban (ELIQUIS) 5 MG TABS tablet Take 2 tablets by mouth 2 times daily for 3 doses 5 tablet 0    apixaban (ELIQUIS) 5 MG TABS tablet Take 1 tablet by mouth 2 times daily (Patient not taking: Reported on 6/5/2022) 60 tablet 2    Multiple Vitamin (MULTIVITAMIN ADULT PO) Take 1 capsule by mouth daily      Calcium Carbonate-Vitamin D (OYSTER SHELL CALCIUM/D) 500-200 MG-UNIT TABS Take 1 tablet by mouth 2 times daily (with meals)      ibuprofen (ADVIL;MOTRIN) 800 MG tablet Take 800 mg by mouth every 8 hours as needed      levETIRAcetam (KEPPRA) 1000 MG tablet Take 2 tablets by mouth daily 60 tablet 3    levETIRAcetam (KEPPRA) 500 MG tablet Take 5 tablets by mouth nightly 60 tablet 3    melatonin 5 MG TBDP disintegrating tablet Take 1 tablet by mouth nightly      QUEtiapine (SEROQUEL) 25 MG tablet Take 0.5 tablets by mouth nightly for 3 days 2 tablet 0    pantoprazole (PROTONIX) 40 MG tablet Take 1 tablet by mouth every morning (before breakfast) 30 tablet 3    lacosamide (VIMPAT) 200 MG tablet Take 1 tablet by mouth 2 times daily for 30 days. 60 tablet 0       Current Meds  [START ON 6/7/2022] metroNIDAZOLE (FLAGYL) tablet 1,000 mg, Daily  sodium chloride flush 0.9 % injection 5-40 mL, 2 times per day  sodium chloride flush 0.9 % injection 5-40 mL, PRN  0.9 % sodium chloride infusion, PRN  ondansetron (ZOFRAN-ODT) disintegrating tablet 4 mg, Q8H PRN   Or  ondansetron (ZOFRAN) injection 4 mg, Q6H PRN  polyethylene glycol (GLYCOLAX) packet 17 g, Daily PRN  acetaminophen (TYLENOL) tablet 650 mg, Q6H PRN   Or  acetaminophen (TYLENOL) suppository 650 mg, Q6H PRN  pantoprazole (PROTONIX) tablet 40 mg, QAM AC  melatonin disintegrating tablet 5 mg, Nightly  levETIRAcetam (KEPPRA) tablet 2,500 mg, Nightly  levETIRAcetam (KEPPRA) tablet 2,000 mg, Daily  lacosamide (VIMPAT) tablet 200 mg, BID  oxyCODONE (ROXICODONE) immediate release tablet 5 mg, Q4H PRN  HYDROmorphone (DILAUDID) injection 0.25 mg, Q3H PRN  fondaparinux (ARIXTRA) injection 7.5 mg, Daily        Family History:   Family History   Problem Relation Age of Onset    Cancer Father        Social History:   TOBACCO:   reports that she has never smoked. She has never used smokeless tobacco.  ETOH:   reports no history of alcohol use. DRUGS:   reports no history of drug use. Review of Systems:     Constitutional: As above  HENT: Negative. Eyes: Negative. Respiratory: Negative. Cardiovascular: As above  Gastrointestinal: Negative   Genitourinary: Negative. Musculoskeletal: Negative. Skin: As above  Endocrine: Negative. Allergic/Immunologic: Negative. Neurological: Negative. Hematological: As above  Psychiatric/Behavioral: Negative.     Physical exam:    Vitals:    06/06/22 0758 06/06/22 1015 06/06/22 1246 06/06/22 1400   BP: 104/70  120/79    Pulse: (!) 105 (!) 111 (!) 104 (!) 112   Resp: 16  16    Temp: 98.8 °F (37.1 °C)  99.1 °F (37.3 °C)    TempSrc: Oral  Oral    SpO2: 92%  94%        General appearance: alert, resting in bed, in NAD  Eyes: EOMI, no scleral icterus  Neck: trachea midline, no JVD, no lymphadenopathy  Chest/Lungs: normal effort, no adventitious breath sounds, on RA, large fungating right breast mass with minimal foul smelling drainage on bandages  Cardiovascular: RRR  Abdomen: soft, non-tender, non-distended  Skin: warm and dry, no rashes  Extremities: bilateral lower extremity edema with tenderness R>L  Neuro: A&Ox3, no focal deficits, sensation intact            Labs:    CBC:   Recent Labs     06/05/22 0652 06/06/22 0432   WBC 6.8 8.3   HGB 8.9* 8.9*   HCT 27.2* 26.6*   MCV 85.3 84.1   * 531*     BMP:   Recent Labs     06/05/22 0652 06/06/22 0432    136   K 3.6 3.9    103   CO2 19* 22   BUN 5* 4*   CREATININE 0.6 0.6     PT/INR:   Recent Labs     06/05/22 0652   PROTIME 14.9*   INR 1.18*     APTT: No results for input(s): APTT in the last 72 hours. Liver Profile:   Lab Results   Component Value Date    AST 11 06/05/2022    ALT 9 06/05/2022    BILIDIR <0.2 06/05/2022    BILITOT <0.2 06/05/2022    ALKPHOS 59 06/05/2022   No results found for: CHOL, HDL, TRIG  UA:   Lab Results   Component Value Date    COLORU Yellow 03/27/2022    PHUR 6.0 03/27/2022    WBCUA 0-2 03/27/2022    RBCUA 0-2 03/27/2022    MUCUS 1+ 03/18/2022    BACTERIA 3+ 03/27/2022    CLARITYU Clear 03/27/2022    SPECGRAV >=1.030 03/27/2022    LEUKOCYTESUR Negative 03/27/2022    UROBILINOGEN 0.2 03/27/2022    BILIRUBINUR Negative 03/27/2022    BLOODU Negative 03/27/2022    GLUCOSEU Negative 03/27/2022       Imaging:   CT ABDOMEN PELVIS W IV CONTRAST Additional Contrast? None   Final Result      1. Large necrotic right breast mass. 2. 3-4 small hypoattenuating lesions in the liver which are new since 3/26/2022 concerning for metastasis. 3. 1.6 cm hypoattenuating lesion in the body of the pancreas, new since 3/28/2022 concerning for neoplasm/metastasis. 4. IVC filter in place. Suboptimal evaluation for residual IVC thrombosis due to inadequate opacification.       VL Extremity Venous Bilateral (Results Pending)         Assessment/Plan: This is a 48 y.o. female with Hx of known metastatic triple negative R breast cancer with associated fungating mass. She presents to [facility] with worsening LE swelling in setting of known bilateral DVTs. Surgical Oncology is consulted in order to re-visit role of palliative mastectomy in her current care plan. - patient now with new liver and pancreatic lesions on CT from 06/05 suggestive of further metastatic disease  - large clot burden noted on lower extremity duplex imaging today. IR has been contacted and is recommending repeat thrombectomy  - Had discussion with patient and her Godmother, who both voiced an interest in surgical removal of the breast mass. Also had a long discussion with the patient's mother, and updated her regarding the new liver and pancreatic lesions. The patient and her family were again explained that the surgery would be palliative in nature and not considered a curative intervention  - will prioritize treatment of current DVTs, will follow up on plan tomorrow  - Plan to have a discussion with medical oncology regarding best treatment plan for the patient with regards to proceeding with chemotherapy versus offering surgery  - will re-visit discussion with patient and family after above    Patient discussed with Dr. Jamie Ogden DO  06/06/22  3:37 PM    I saw and independently examined the patient today. I agree with the history of present illness, past medical/surgical histories, family history, social history, medication list and allergies as listed. The review of systems is as noted above. My physical exam confirms the findings listed above. Review of labs, pathology reports, radiology reports and medical records confirm the findings noted above. I edited the note where appropriate. Her DVT / IVC clots will take priority. Will need to stop anticoagulation for any surgery.  Discussed with IR radiologist - address venous clots first.    Loren Dimas MD  Surgery Attending

## 2022-06-06 NOTE — CONSULTS
[facility] Continence Nurse  Consult Note       NAME:  Tom Mchugh  MEDICAL RECORD NUMBER:  [id number]  AGE: 48 y.o. GENDER: female  : 1975  TODAY'S DATE:  2022    Subjective   Reason for WOCN Evaluation and Assessment: R Breast Malignancy      Tom Mchugh is a 48 y.o. female referred by:   [] Physician  [x] Nursing  [] Other:     Wound Identification:  Wound Type: malignant wound  Contributing Factors: Stage 4 Breast Cancer    Wound History: This is a very pleasant 48 y.o. female with metastatic breast cancer status post craniotomy with mass resection, recent admission, recent REBECCA/DVT  s/p thrombectomy who presented to ED at OSH with worsening bilateral leg swelling. Was admitted 3/25/2022 - 4/10/2022 from [facility] due to worsening lower extremity pain and swelling.    During a previous admission  patient was diagnosed with RLE DVT and due to recent Craniotomy, anticoagulation could not be started till POD 14 so IVC filter was placed. Her platelets levels were noted to be low at [facility] and due to concern for worsening DVT she was sent to the hospital.   On admission 3/25/2022,  CT abdomen and pelvis showed thrombosis of the IVC at the level of the filter with marked distention of the caval bifurcation.  IR was consulted, pxt had thrombectomy 3/28/2022; she was continued on anticoagulation and as concern her filter will continue to be a nidus for infection, it was discussed that it would be re-evaluated\". .. in 6 weeks\". 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  Plan was radiation therapy and palliative chemo as outpatient. Of note, she continued to have swelling in her lower extremity which persisted in spite of aggressive diureses, and was determined to be secondary to clot congestion in the IVC.    She presented with her family to the ED at OSH, for worsening leg swelling associated with pains in both lower extremities.    Patient states she was told to stop her Eliquis for radiation treatments, whch she did complete 10 treatments, and states she was never told to resume it; and hence has not been on it for about 4-6 weeks. She is yet to start chemotherapy and had  placement of a right IJ port 5/24/2022 in anticipation of starting palliative chemo in several days. She denies fevers, chills, chest pain or dyspnea.   In the ED at OSH, she was noted to be tachycardic, but afebrile. Routine labs were fairly normal apart from some anemia and thrombocytosis.    Current Wound Care Treatment: R Breast - crushed Flagyl, Adaptic, abd pads    Patient Goal of Care:  [x] Wound Healing  [] Odor Control  [] Palliative Care  [] Pain Control   [] Other:         PAST MEDICAL HISTORY        Diagnosis Date    Cancer ([facility] [address]     History of blood transfusion     Hx of blood clots     Thyroid nodule        PAST SURGICAL HISTORY    Past Surgical History:   Procedure Laterality Date    CRANIOTOMY Left 03/12/2022    LEFT TEMPORAL PARIETAL OCCIPITAL CRANIOTOMY FOR TUMOR RESECTION performed by Phan Gill MD at [facility] IR IVC FILTER PLACEMENT W IMAGING N/A 03/16/2022    [facility]    IR IVC FILTER PLACEMENT W IMAGING  03/16/2022    IR IVC FILTER PLACEMENT W IMAGING 3/16/2022 [facility] SPECIAL PROCEDURES    IR PORT PLACEMENT EQUAL OR GREATER THAN 5 YEARS Left 05/24/2022    Power Port; Marindia access; 28 cm; Dr. Mono Pulliam 5 YEARS  5/24/2022    IR PORT PLACEMENT EQUAL OR GREATER THAN 5 YEARS 5/24/2022 [facility] SPECIAL PROCEDURES    [facility] TH[facility]B [facility]  03/28/2022    IR THROMB [address] 3/28/2022 [facility] SPECIAL PROCEDURES    UPPER GASTROINTESTINAL ENDOSCOPY N/A 04/02/2022    EGD DIAGNOSTIC ONLY performed by Che Stinson MD at [facility] ENDOSCOPY       FAMILY HISTORY    Family History   Problem Relation Age of Onset    Cancer Father SOCIAL HISTORY    Social History     Tobacco Use    Smoking status: Never Smoker    Smokeless tobacco: Never Used   Substance Use Topics    Alcohol use: Never     Alcohol/week: 0.0 standard drinks    Drug use: Never       ALLERGIES    Allergies   Allergen Reactions    Heparin Other (See Comments)     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 +       MEDICATIONS    No current facility-administered medications on file prior to encounter. Current Outpatient Medications on File Prior to Encounter   Medication Sig Dispense Refill    spironolactone (ALDACTONE) 50 MG tablet Take 1 tablet by mouth daily 30 tablet 3    apixaban (ELIQUIS) 5 MG TABS tablet Take 2 tablets by mouth 2 times daily for 3 doses 5 tablet 0    apixaban (ELIQUIS) 5 MG TABS tablet Take 1 tablet by mouth 2 times daily (Patient not taking: Reported on 6/5/2022) 60 tablet 2    Multiple Vitamin (MULTIVITAMIN ADULT PO) Take 1 capsule by mouth daily      Calcium Carbonate-Vitamin D (OYSTER SHELL CALCIUM/D) 500-200 MG-UNIT TABS Take 1 tablet by mouth 2 times daily (with meals)      ibuprofen (ADVIL;MOTRIN) 800 MG tablet Take 800 mg by mouth every 8 hours as needed      levETIRAcetam (KEPPRA) 1000 MG tablet Take 2 tablets by mouth daily 60 tablet 3    levETIRAcetam (KEPPRA) 500 MG tablet Take 5 tablets by mouth nightly 60 tablet 3    melatonin 5 MG TBDP disintegrating tablet Take 1 tablet by mouth nightly      QUEtiapine (SEROQUEL) 25 MG tablet Take 0.5 tablets by mouth nightly for 3 days 2 tablet 0    pantoprazole (PROTONIX) 40 MG tablet Take 1 tablet by mouth every morning (before breakfast) 30 tablet 3    lacosamide (VIMPAT) 200 MG tablet Take 1 tablet by mouth 2 times daily for 30 days.  60 tablet 0       Objective    /70   Pulse (!) 111   Temp 98.8 °F (37.1 °C) (Oral)   Resp 16   SpO2 92%     LABS:  WBC:    Lab Results   Component Value Date    WBC 8.3 06/06/2022     H/H:    Lab Results Component Value Date    HGB 8.9 06/06/2022    HCT 26.6 06/06/2022     PTT:    Lab Results   Component Value Date    APTT 37.4 03/30/2022   [APTT}  PT/INR:    Lab Results   Component Value Date    PROTIME 14.9 06/05/2022    INR 1.18 06/05/2022     HgBA1c:  No results found for: LABA1C    Assessment: R Breast - necrotic tissue noted, yellow slough, purulent drainage, malodorous   Ike Risk Score: Ike Scale Score: 20    Patient Active Problem List   Diagnosis Code    Thyroid nodule E04.1    Brain metastases (HCC) C79.31    Brain mass G93.89    Status epilepticus (HCC) G40.901    Duct cell carcinoma (HCC) C80.1    Cerebral edema (HCC) G93.6    Seizure (HCC) R56.9    Deep venous thrombosis (HCC) I82.409    Fever R50.9    Phlebitis I80.9    S/P craniotomy Z98.890    Acute deep vein thrombosis (DVT) of axillary vein (HCC) I82. A19    DVT, lower extremity, proximal, acute, bilateral ([address] I82.4Y3       Measurements:  Wound 03/04/22 Breast Lower;Right Large mass (Active)   Wound Image   06/06/22 1104   Wound Etiology Other 06/06/22 1104   Dressing Status New drainage noted;New dressing applied 06/06/22 1104   Wound Cleansed Cleansed with saline 06/06/22 1104   Dressing/Treatment Pharmaceutical agent (see MAR); Non adherent;ABD 06/06/22 1104   Dressing Change Due 06/07/22 06/06/22 1104   Wound Length (cm) 17 cm 06/06/22 1104   Wound Width (cm) 15 cm 06/06/22 1104   Wound Depth (cm) 0 cm 06/06/22 1104   Wound Surface Area (cm^2) 255 cm^2 06/06/22 1104   Change in Wound Size % (l*w) -168.42 06/06/22 1104   Wound Volume (cm^3) 0 cm^3 06/06/22 1104   Wound Assessment Bleeding;Eschar dry;Fibrinous; [facility] 06/06/22 1104   Drainage Amount Moderate 06/06/22 1104   Drainage Description Serosanguinous 06/06/22 1104   Odor Malodorous/putrid 06/06/22 1104   Lety-wound Assessment Edematous; Non-blanchable erythema 06/06/22 1104   Margins Attached edges; Defined edges 06/06/22 1104   Number of days: 94   R Breast:        Response to treatment:  Well tolerated by patient.      Pain Assessment:  Severity:  0 / 10  Quality of pain: N/A  Wound Pain Timing/Severity: none  Premedicated: No    Plan   Plan of Care: Wound 03/04/22 Breast Lower;Right Large mass-Dressing/Treatment: Pharmaceutical agent (see MAR),Non adherent,ABD (crushed Flagyl)   Recommendation: R Breast - cleanse with NS, if available crushed Flagyl sprinkled over R Breast (odor control), cover with Adaptic, abd pads, medipore tape, change daily  Call Wound Care for deterioration [phone]    Specialty Bed Required : No   [] Low Air Loss   [] Pressure Redistribution  [] Fluid Immersion  [] Bariatric  [] Total Pressure Relief  [] Other:     Current Diet: Diet NPO  Dietician consult:  No    Discharge Plan:  Placement for patient upon discharge: skilled nursing    Patient appropriate for Outpatient [address]: Yes    Referrals:  [x]   [] [facility]  [] Supplies  [] Other    Patient/Caregiver Teaching:  Level of patient/caregiver understanding able to:   [] Indicates understanding       [] Needs reinforcement  [] Unsuccessful      [] Verbal Understanding  [] Demonstrated understanding       [] No evidence of learning  [] Refused teaching         [] N/A       Electronically signed by Gely Rios RN, Cristiana Camargo on 6/6/2022 at 11:06 AM

## 2022-06-06 NOTE — CONSULTS
Oncology [facility]ematology [facility]    Consult Note      Requesting Physician: Dr Jair Vera:  Leg swelling; Metastatic breast cancer      HISTORY OF PRESENT ILLNESS:      Ms. Jeane Tubbs  is a 48 y.o. 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 She is followed at the [facility] by Imani Salinas Chi and Elisa Morse. She is status post craniotomy with mass resection, recent admission, recent REBECCA/DVT  s/p thrombectomy who presented to ED at [facility] with worsening bilateral leg swelling.      Was admitted 3/25/2022 - 4/10/2022 from [facility] due to worsening lower extremity pain and swelling.       During a previous admission  patient was diagnosed with RLE DVT and due to recent Craniotomy, anticoagulation could not be started till POD 14 so IVC filter was placed. Her platelets levels were noted to be low at [facility] and due to concern for worsening DVT she was sent to the hospital.      On admission 3/25/2022,  CT abdomen and pelvis showed thrombosis of the IVC at the level of the filter with marked distention of the caval bifurcation.  IR was consulted, pxt had thrombectomy 3/28/2022; she was continued on anticoagulation and as concern her filter will continue to be a nidus for infection, it was discussed that it would be re-evaluated\". .. in 6 weeks\".  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  Plan was radiation therapy and palliative chemo as outpatient.     Of note, she continued to have swelling in her lower extremity which persisted in spite of aggressive diureses, and was determined to be secondary to clot congestion in the IVC.      She presented with her family to the ED at [facility], for worsening leg swelling associated with pains in both lower extremities.       Patient states she was told to stop her Eliquis for radiation treatments, whch she did complete 10 treatments, and states she was never told to resume it; and hence has not been on it for about 4-6 weeks.      She is yet to start chemotherapy and had  placement of a right IJ port 5/24/2022 in anticipation of starting palliative chemo in several days.     She denies fevers, chills, chest pain or dyspnea.      In the ED at [facility], she was noted to be tachycardic, but afebrile. Routine labs were fairly normal apart from some anemia and thrombocytosis.        Past Medical History:    Past Medical History:   Diagnosis Date    Cancer ([address]     History of blood transfusion     Hx of blood clots     Thyroid nodule      Past Surgical History:    Past Surgical History:   Procedure Laterality Date    CRANIOTOMY Left 03/12/2022    LEFT TEMPORAL PARIETAL OCCIPITAL CRANIOTOMY FOR TUMOR RESECTION performed by Nuris Gomez MD at [address] IR IVC FILTER PLACEMENT W IMAGING N/A 03/16/2022    [facility]on [facility]    IR IVC FILTER PLACEMENT W IMAGING  03/16/2022    IR IVC FILTER PLACEMENT W IMAGING 3/16/2022 [facility] SPECIAL PROCEDURES    IR PORT PLACEMENT EQUAL OR GREATER THAN 5 YEARS Left 05/24/2022    Power Port; Deanna access; 28 cm; Dr. Vladimir Cintron 5 YEARS  5/24/2022    IR PORT PLACEMENT EQUAL OR GREATER THAN 5 YEARS 5/24/2022 [facility] SPECIAL PROCEDURES    [facility]  03/28/20[facility]    IR THROMB [address] 3/28/2022 [facility] SPECIAL PROCEDURES    UPPER GASTROINTESTINAL ENDOSCOPY N/A 04/02/2022    EGD DIAGNOSTIC ONLY performed by Elda Elliott MD at [facility] ENDOSCOPY       Current Medications:    Current Facility-Administered Medications   Medication Dose Route Frequency Provider Last Rate Last Admin    sodium chloride flush 0.9 % injection 5-40 mL  5-40 mL IntraVENous 2 times per day Shandra Subramanian MD   10 mL at 06/05/22 2025    sodium chloride flush 0.9 % injection 5-40 mL  5-40 mL IntraVENous PRN Andrea Laird MD        0.9 % sodium chloride infusion   IntraVENous PRN Andrea Laird MD        ondansetron (ZOFRAN-ODT) disintegrating tablet 4 mg  4 mg Oral Q8H PRN Andrea Laird MD        Or    ondansetron (ZOFRAN) injection 4 mg  4 mg IntraVENous Q6H PRN Andrea Laird MD        polyethylene glycol (GLYCOLAX) packet 17 g  17 g Oral Daily PRN Andrea Laird MD        acetaminophen (TYLENOL) tablet 650 mg  650 mg Oral Q6H PRN Andrea Laird MD        Or    acetaminophen (TYLENOL) suppository 650 mg  650 mg Rectal Q6H PRN Andrea Laird MD        pantoprazole (PROTONIX) tablet 40 mg  40 mg Oral QAM AC Andrea Laird MD   40 mg at 06/06/22 9407    melatonin disintegrating tablet 5 mg  5 mg Oral Nightly Andrea Laird MD   5 mg at 06/05/22 2025    levETIRAcetam (KEPPRA) tablet 2,500 mg  2,500 mg Oral Nightly Andrea Laird MD   2,500 mg at 06/05/22 2029    levETIRAcetam (KEPPRA) tablet 2,000 mg  2,000 mg Oral Daily Andrea Laird MD        lacosamide (VIMPAT) tablet 200 mg  200 mg Oral BID Andrea Laird MD   200 mg at 06/05/22 2025    oxyCODONE (ROXICODONE) immediate release tablet 5 mg  5 mg Oral Q4H PRN Andrea Laird MD   5 mg at 06/06/22 0246    HYDROmorphone (DILAUDID) injection 0.25 mg  0.25 mg IntraVENous Q3H PRN Andrea Laird MD        fondaparinux (ARIXTRA) injection 7.5 mg  7.5 mg SubCUTAneous Daily Andrea Laird MD   7.5 mg at 06/05/22 1823     Allergies:     Allergies   Allergen Reactions    Heparin Other (See Comments)     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 +       Social History:   Social History     Socioeconomic History    Marital status: Single     Spouse name: Not on file    Number of children: Not on file    Years of education: Not on file    Highest education level: Not on file   Occupational History    Not on file   Tobacco Use    Smoking status: Never Smoker    Smokeless tobacco: Never Used   Substance and Sexual Activity    Alcohol use: Never     Alcohol/week: 0.0 standard drinks    Drug use: Never    Sexual activity: Not on file   Other Topics Concern    Not on file   Social History Narrative    Not on file     Social Determinants of Health     Financial Resource Strain:     Difficulty of Paying Living Expenses: Not on file   Food Insecurity:     Worried About Running Out of Food in the Last Year: Not on file    Rosa of Food in the Last Year: Not on file   Transportation Needs:     Lack of Transportation (Medical): Not on file    Lack of Transportation (Non-Medical): Not on file   Physical Activity:     Days of Exercise per Week: Not on file    Minutes of Exercise per Session: Not on file   Stress:     Feeling of Stress : Not on file   Social Connections:     Frequency of Communication with Friends and Family: Not on file    Frequency of Social Gatherings with Friends and Family: Not on file    Attends Mosque Services: Not on file    Active Member of [address] [place] [address] or Organizations: Not on file    Attends Club or Organization Meetings: Not on file    Marital Status: Not on file   Intimate Partner Violence:     Fear of Current or Ex-Partner: Not on file    Emotionally Abused: Not on file    Physically Abused: Not on file    Sexually Abused: Not on file   Housing Stability:     Unable to Pay for Housing in the Last Year: Not on file    Number of [place] in the Last Year: Not on file    Unstable Housing in the Last Year: Not on file          Family History:     Family History   Problem Relation Age of Onset    Cancer Father      REVIEW OF SYSTEMS:      Constitutional:  No weight loss, No fever, No chills, No night sweats. Energy level good.   Eyes:  No impairment or change in vision  ENT / Mouth:  No pain, abnormal ulceration, bleeding, nasal drip or change in voice or hearing  Cardiovascular:  No chest pain, palpitations, new edema, or calf discomfort  Respiratory:  No pain, hemoptysis, change to breathing  Breast:  No pain, large, open wound R breast  Gastrointestinal:  No pain, cramping, jaundice, change to eating and bowel habits  Urinary:  No pain, bleeding or change in continence  Musculoskeletal:  LE edema  Skin:  No pruritus, rash, change to nodules or lesions  Neurologic:  No discomfort, change in mental status, speech, sensory or motor activity  Psychiatric:  No change in concentration or change to affect or mood  Endocrine:  No hot flashes, increased thirst, or change to urine production  Hematologic: No petechiae, ecchymosis or bleeding  Lymphatic:  No lymphadenopathy or lymphedema  Allergy / Immunologic:  No eczema, hives, frequent or recurrent infections    PHYSICAL EXAM:      Vitals:  /67   Pulse 100   Temp 98.3 °F (36.8 °C) (Oral)   Resp 16   SpO2 91%   CONSTITUTIONAL: awake, alert, cooperative, no apparent distress   EYES: pupils equal, round and reactive to light, sclera clear and conjunctiva normal  ENT: Normocephalic, without obvious abnormality, atraumatic  NECK: supple, symmetrical, no jugular venous distension and no carotid bruits   HEMATOLOGIC/LYMPHATIC: no cervical, supraclavicular or axillary lymphadenopathy   LUNGS: no increased work of breathing and clear to auscultation   CARDIOVASCULAR: regular rate and rhythm, normal S1 and S2, no murmur noted; PAC site free of erythema  BREAST: Dressing over R breast wound  ABDOMEN: normal bowel sounds x 4, soft, non-distended, non-tender, no masses palpated, no hepatosplenomgaly   MUSCULOSKELETAL: full range of motion noted, tone is normal  NEUROLOGIC: awake, alert, oriented to name, place and time. Motor skills grossly intact. SKIN: Normal skin color, texture, turgor and no jaundice.  appears intact   EXTREMITIES: 1+ LE edema       DATA:  Recent Labs     06/06/22  0432 06/05/22  0652 05/24/22  1019   WBC 8.3 6.8  --    NEUTROABS 7.0 4.9  --    LYMPHOPCT 9.0 11.9  --    RBC 3.16* 3.19*  --    HGB 8.9* 8.9*  --    HCT 26.6* 27.2*  --    MCV 84.1 85.3  --    MCH 28.0 27.8  --    MCHC 33.4 32.6  --    RDW 19.9* 20.0*  --    * 503* 243     Lab Results   Component Value Date    WBC 8.3 06/06/2022    WBC 6.8 06/05/2022    WBC 9.9 04/09/2022    NEUTROABS 7.0 06/06/2022    NEUTROABS 4.9 06/05/2022    NEUTROABS 7.7 04/09/2022    HGB 8.9 (L) 06/06/2022    HGB 8.9 (L) 06/05/2022    HGB 7.6 (L) 04/09/2022    HCT 26.6 (L) 06/06/2022    HCT 27.2 (L) 06/05/2022    HCT 22.7 (L) 04/09/2022    MCV 84.1 06/06/2022    MCV 85.3 06/05/2022    MCV 83.3 04/09/2022     (H) 06/06/2022     (H) 06/05/2022     05/24/2022       Lab Results   Component Value Date     06/06/2022    K 3.9 06/06/2022     06/06/2022    CO2 22 06/06/2022    GLUCOSE 104 (H) 06/06/2022    BUN 4 (L) 06/06/2022    CREATININE 0.6 06/06/2022    LABGLOM >60 06/06/2022    GFRAA >60 06/06/2022    CALCIUM 8.7 06/06/2022    PROT 6.6 06/05/2022    LABALBU 3.2 (L) 06/05/2022    AGRATIO 1.0 (L) 03/03/2022    BILITOT <0.2 06/05/2022    ALKPHOS 59 06/05/2022    ALT 9 (L) 06/05/2022    AST 11 (L) 06/05/2022    MG 2.00 04/07/2022       Lab Results   Component Value Date    PROT 6.6 06/05/2022    PROT 6.0 (L) 04/03/2022    PROT 5.8 (L) 04/02/2022    INR 1.18 (H) 06/05/2022    INR 1.11 05/24/2022    INR 1.22 (H) 03/25/2022     Lab Results   Component Value Date    APTT 37.4 03/30/2022    APTT 34.1 03/30/2022    APTT 39.0 (H) 03/30/2022       Radiology Review:  CT ABDOMEN PELVIS W IV CONTRAST Additional Contrast? None    Result Date: 6/5/2022  EXAM: CT ABDOMEN AND PELVIS WITH CONTRAST INDICATION: IVC thrombosis; Eval IVC, Pain COMPARISON: 3/28/2022. TECHNIQUE: Axial CT imaging obtained from lung bases through pelvis. Axial images and multiplanar reformatted images are provided for review. Individualized dose optimization technique was used in order to meet ALARA standards for radiation dose reduction.   In addition to vendor specific dose reduction algorithms, the dose reduction techniques vary based on the specific scanner utilized but frequently include automated exposure control, adjustment of the mA and/or kV according to patient size, and use of iterative reconstruction technique. IV Contrast: 100 mL Isovue-370 Oral Contrast: None. FINDINGS: Study limited by poor contrast bolus/timing. LUNG BASES: Bibasilar atelectasis. Partially visualized large necrotic mass in the right breast.. LIVER: 3-4 small hypoattenuating foci in the liver, new since 3/28/2022, largest measuring 1 cm in the inferior right hepatic lobe. GALLBLADDER AND BILIARY TREE: No calcified gallstones. No gallbladder distention. No intra- or extrahepatic biliary dilatation. PANCREAS: 1.6 cm hypoattenuating lesion in the body of the pancreas. . SPLEEN: Normal. ADRENAL GLANDS: Normal. KIDNEYS AND URETERS: No hydronephrosis. Normal enhancement. No urolithiasis. URINARY BLADDER: Partially distended. REPRODUCTIVE ORGANS: Mildly enlarged fibroid uterus. . BOWEL: Normal caliber. Normal appendix. LYMPH NODES: No abnormally enlarged nodes. PERITONEUM/RETROPERITONEUM: Trace free fluid in the pelvis. VESSELS: Aorta is normal caliber. IVC filter in place. Limited evaluation for IVC thrombosis due to inadequate opacification with contrast. ABDOMINAL WALL: No significant abnormality. BONES: No destructive process. Degenerative changes spine. 1. Large necrotic right breast mass. 2. 3-4 small hypoattenuating lesions in the liver which are new since 3/26/2022 concerning for metastasis. 3. 1.6 cm hypoattenuating lesion in the body of the pancreas, new since 3/28/2022 concerning for neoplasm/metastasis. 4. IVC filter in place. Suboptimal evaluation for residual IVC thrombosis due to inadequate opacification.     XR CHEST PORTABLE    Result Date: 6/5/2022  EXAMINATION: ONE XRAY VIEW OF THE CHEST 6/5/2022 6:32 am COMPARISON: 03/27/2022 HISTORY: ORDERING SYSTEM PROVIDED HISTORY: confirm port placement FINDINGS: Tip of the left chest wall IJ approach MediPort terminates within the lower SVC. Stable caliber and configuration of the cardiomediastinal silhouette. Large soft tissue density, likely corresponding with the known right breast mass obscures portions of the right lung. No sizable pleural effusion or pneumothorax. Tip of the left chest wall IJ approach MediPort terminates within the lower SVC. IR PORT PLACEMENT > 5 YEARS    Result Date: 5/24/2022  PROCEDURE: ULTRASOUND GUIDED VASCULAR ACCESS. FLUOROSCOPY GUIDED PLACEMENT OF A SUBCUTANEOUS PORT-A-CATH. MODERATE CONSCIOUS SEDATION 5/24/2022. HISTORY: ORDERING SYSTEM PROVIDED HISTORY: Secondary cancer of brain [facility]) TECHNOLOGIST PROVIDED HISTORY: Is the patient pregnant?->No SEDATION: 2 mgversed and 100 mcg fentanyl were titrated intravenously for moderate sedation monitored under my direction. Total intraservice time of sedation was 30 minutes. The patient's vital signs were monitored throughout the procedure and recorded in the patient's medical record by the nurse. FLUOROSCOPY DOSE AND TYPE OR TIME AND EXPOSURES: Fluoro time: 0.6 minute Cumulative air kerma: 5.86 mGy TECHNIQUE: Informed consent was obtained after a detailed explanation of the procedure including risks, benefits, and alternatives. All aspects of maximum sterile barrier technique were used including washing hands with conventional soap and water or with alcohol-based hand rubs (ABHR), skin preparation, cap, mask, sterile gown, sterile gloves, and sterile full body drape. Local anesthesia was achieved with lidocaine. A micropuncture needle was used to access the right internal jugular vein using ultrasound guidance. An ultrasound image demonstrating patency of the vein with needle tip located within it. An image was obtained and stored in PACs. A 0.035 guidewire was used to place a peel-away sheath.  A chest wall incision was made and a subcutaneous pocket was created. The port reservoir was placed within the pocket and the port tubing was attached and tunneled subcutaneously to the venotomy site. The port tubing was cut to an appropriate length and advanced through the peel-away sheath under fluoroscopic guidance to the cavo-atrial junction. The chest wall incision was closed using 3-0 and 4-0 Vicryl sutures and tissue adhesive was applied to the venotomy site and chest wall incision. The port flushed easily and there was a good blood return. The port was locked with heparinized saline. The patient tolerated the procedure well and there were no immediate complications. Estimated blood loss: Less than 5 cc FINDINGS: Fluoroscopic image demonstrates the tip of the port at the cavo-atrial junction . Successful ultrasound and fluoroscopy guided right IJ Port-A-Cath placement. VL Extremity Venous Bilateral    Result Date: 6/6/2022  Vascular Lower Extremities DVT Study Procedure -- PRELIMINARY SONOGRAPHER REPORT --   Demographics   Patient Name       Diane OSORIO   Date of Study      06/06/2022         Gender              Female   Patient Number     [id number]         Date of Birth       1975   Visit Number       [id number]          Age                 48 year(s)   Accession Number   [id number]         Room Number         4490   Corporate ID       [id number]            Sonographer         Nora Toro,                                                            BS, RVT   Ordering Physician Kyler Moreira,   [facility]                     MD                 Physician           Readers  Procedure Type of Study:   Veins:Lower Extremities DVT Study, VASC EXTREMITY VENOUS DUPLEX BILATERAL. Tech Comments Right There is acute on chronic, totally occluding deep venous thrombosis involving the common femoral vein. Acute deep venous thrombosis is noted involving the femoral vein, popliteal vein and gastocnemious veins.  Acute superficial venous thrombosis is noted in the short saphenous vein. The posterior tibial and peroneal veins were poorly visualized due to edema. Subacute thrombus is noted in one soleal vein. Left Evidence of a chronic phlebitic process is noted in the common femoral vein. There is acute totally occluding deep venous thrombosis involving the femoral vein, popliteal vein, gastrocnemious veins, peroneal veins and soleal veins. There is acute totally occluding superficial venous thrombosis involving the short saphenous vein. There is no other evidence of deep or superficial venous thrombosis involving the left lower extremity. Problem List  Patient Active Problem List   Diagnosis    Thyroid nodule    Brain [address] [address]    Brain mass    Status epilepticus (HCC)    Duct cell carcinoma (HCC)    Cerebral edema (HCC)    Seizure (HCC)    Deep venous thrombosis (HCC)    Fever    Phlebitis    S/P craniotomy    Acute deep vein thrombosis (DVT) of axillary vein (HCC)    DVT, lower extremity, proximal, acute, bilateral (HCC)       IMPRESSION/RECOMMENDATIONS:  Metastatic triple negative breast cancer  - Neglected Right breast primary  - Intracranial metastasis  - Liver metastases  - The patient and her mother agreed to surgery to resect her intracranial metastasis. - March 9 Craniotomy had to be canceled due to seizures & status epilepticus.  - Saturday March 12 craniotomy and tumor resection. Recovering.  - She completed radiation therapy. - Subsequent to radiation therapy she will require systemic chemotherapy. This will be in the outpatient setting. Dr. Yonny Leal is planning Abraxane/pembrolizumab  - Recommend she follow-up with medical (Dr Peewee Moreno radiation (Dr Blue) oncology at the [facility], where she was seen previously, after this hospitalization to coordinate and implement systemic palliative chemotherapy now that she has completed radiation therapy.  Instructions placed in D/C instructions

## 2022-06-07 LAB
APTT: 38.6 SEC (ref 23–34.3)
APTT: 39.4 SEC (ref 23–34.3)
APTT: 58.5 SEC (ref 23–34.3)
APTT: 64.2 SEC (ref 23–34.3)

## 2022-06-07 PROCEDURE — 2580000003 HC RX 258: Performed by: INTERNAL MEDICINE

## 2022-06-07 PROCEDURE — 1200000000 HC SEMI PRIVATE

## 2022-06-07 PROCEDURE — 85730 THROMBOPLASTIN TIME PARTIAL: CPT

## 2022-06-07 PROCEDURE — 36415 COLL VENOUS BLD VENIPUNCTURE: CPT

## 2022-06-07 PROCEDURE — 6370000000 HC RX 637 (ALT 250 FOR IP): Performed by: INTERNAL MEDICINE

## 2022-06-07 PROCEDURE — 99232 SBSQ HOSP IP/OBS MODERATE 35: CPT | Performed by: SURGERY

## 2022-06-07 PROCEDURE — 6360000002 HC RX W HCPCS: Performed by: INTERNAL MEDICINE

## 2022-06-07 RX ADMIN — PANTOPRAZOLE SODIUM 40 MG: 40 TABLET, DELAYED RELEASE ORAL at 06:28

## 2022-06-07 RX ADMIN — LACOSAMIDE 200 MG: 50 TABLET, FILM COATED ORAL at 21:04

## 2022-06-07 RX ADMIN — LEVETIRACETAM 2000 MG: 500 TABLET, FILM COATED ORAL at 08:15

## 2022-06-07 RX ADMIN — SODIUM CHLORIDE, PRESERVATIVE FREE 10 ML: 5 INJECTION INTRAVENOUS at 08:15

## 2022-06-07 RX ADMIN — LACOSAMIDE 200 MG: 50 TABLET, FILM COATED ORAL at 08:15

## 2022-06-07 RX ADMIN — ARGATROBAN 0.5 MCG/KG/MIN: 50 INJECTION INTRAVENOUS at 08:28

## 2022-06-07 RX ADMIN — METRONIDAZOLE 1000 MG: 500 TABLET ORAL at 11:10

## 2022-06-07 RX ADMIN — LEVETIRACETAM 2500 MG: 750 TABLET, FILM COATED ORAL at 21:04

## 2022-06-07 RX ADMIN — OXYCODONE 5 MG: 5 TABLET ORAL at 14:35

## 2022-06-07 RX ADMIN — ACETAMINOPHEN 650 MG: 325 TABLET ORAL at 08:14

## 2022-06-07 RX ADMIN — ARGATROBAN 0.5 MCG/KG/MIN: 50 INJECTION INTRAVENOUS at 18:31

## 2022-06-07 RX ADMIN — Medication 5 MG: at 21:04

## 2022-06-07 RX ADMIN — SODIUM CHLORIDE, PRESERVATIVE FREE 10 ML: 5 INJECTION INTRAVENOUS at 21:05

## 2022-06-07 ASSESSMENT — PAIN DESCRIPTION - ORIENTATION
ORIENTATION: RIGHT
ORIENTATION: MID
ORIENTATION: RIGHT;LEFT

## 2022-06-07 ASSESSMENT — PAIN DESCRIPTION - PAIN TYPE
TYPE: ACUTE PAIN
TYPE: CHRONIC PAIN

## 2022-06-07 ASSESSMENT — PAIN SCALES - GENERAL
PAINLEVEL_OUTOF10: 0
PAINLEVEL_OUTOF10: 6
PAINLEVEL_OUTOF10: 4
PAINLEVEL_OUTOF10: 0
PAINLEVEL_OUTOF10: 7
PAINLEVEL_OUTOF10: 0

## 2022-06-07 ASSESSMENT — PAIN - FUNCTIONAL ASSESSMENT
PAIN_FUNCTIONAL_ASSESSMENT: PREVENTS OR INTERFERES SOME ACTIVE ACTIVITIES AND ADLS
PAIN_FUNCTIONAL_ASSESSMENT: PREVENTS OR INTERFERES SOME ACTIVE ACTIVITIES AND ADLS
PAIN_FUNCTIONAL_ASSESSMENT: ACTIVITIES ARE NOT PREVENTED

## 2022-06-07 ASSESSMENT — ENCOUNTER SYMPTOMS
RESPIRATORY NEGATIVE: 1
EYES NEGATIVE: 1
GASTROINTESTINAL NEGATIVE: 1

## 2022-06-07 ASSESSMENT — PAIN SCALES - WONG BAKER
WONGBAKER_NUMERICALRESPONSE: 0
WONGBAKER_NUMERICALRESPONSE: 0

## 2022-06-07 ASSESSMENT — PAIN DESCRIPTION - DESCRIPTORS
DESCRIPTORS: ITCHING
DESCRIPTORS: BURNING
DESCRIPTORS: ACHING

## 2022-06-07 ASSESSMENT — PAIN DESCRIPTION - FREQUENCY
FREQUENCY: INTERMITTENT
FREQUENCY: INTERMITTENT

## 2022-06-07 ASSESSMENT — PAIN DESCRIPTION - LOCATION
LOCATION: HEAD;EAR
LOCATION: BREAST
LOCATION: HEAD

## 2022-06-07 ASSESSMENT — PAIN DESCRIPTION - ONSET
ONSET: SUDDEN
ONSET: GRADUAL

## 2022-06-07 NOTE — PLAN OF CARE
Problem: Discharge Planning  Goal: Discharge to home or other facility with appropriate resources  6/7/2022 1459 by Rae Weiss RN  Outcome: Progressing  Flowsheets (Taken 6/6/2022 2000 by Liliana Mishra RN)  Discharge to home or other facility with appropriate resources: Identify barriers to discharge with patient and caregiver     Problem: Safety - Adult  Goal: Free from fall injury  6/7/2022 1459 by Rae Weiss RN  Outcome: Progressing  Flowsheets (Taken 6/6/2022 1936 by Liliana Mishra, RN)  Free From Fall Injury: Instruct family/caregiver on patient safety     Problem: Pain  Goal: Verbalizes/displays adequate comfort level or baseline comfort level  6/7/2022 1459 by Rae Weiss RN  Outcome: Progressing  Flowsheets (Taken 6/6/2022 1958 by Liliana Mishra RN)  Verbalizes/displays adequate comfort level or baseline comfort level:   Encourage patient to monitor pain and request assistance   Assess pain using appropriate pain scale   Administer analgesics based on type and severity of pain and evaluate response

## 2022-06-07 NOTE — PROGRESS NOTES
Surgery Daily Progress Note  Nadja Ramírez  CC:  Fungating breast mass      Subjective : Patient with T max of 100.5 with mild tachycardia. Tolerating po intake. Discussion with family and godmother regarding results of most recent CT scan and duplex scan. Patient continues to c/o bilateral leg swelling. Stable breast discomfort      Objective    Infusions:   argatroban infusion      sodium chloride          I/O:I/O last 3 completed shifts: In: 360 [P.O.:360]  Out: -            Wt Readings from Last 1 Encounters:   06/05/22 189 lb 9.5 oz (86 kg)                 LABS:    Recent Labs     06/05/22 0652 06/06/22  0432   WBC 6.8 8.3   HGB 8.9* 8.9*   HCT 27.2* 26.6*   MCV 85.3 84.1   * 531*        Recent Labs     06/05/22  0652 06/06/22  0432    136   K 3.6 3.9    103   CO2 19* 22   BUN 5* 4*   CREATININE 0.6 0.6        Recent Labs     06/05/22  0652   AST 11*   ALT 9*   BILIDIR <0.2   BILITOT <0.2   ALKPHOS 59      No results for input(s): LIPASE, AMYLASE in the last 72 hours. Recent Labs     06/05/22  0652 06/06/22  2147 06/07/22  0117   PROT 6.6  --   --    INR 1.18*  --   --    APTT  --  37.8* 38.6*        Recent Labs     06/05/22  0652   CKTOTAL 40               Exam:/78   Pulse (!) 106   Temp 99.6 °F (37.6 °C) (Oral)   Resp 16   SpO2 92%   General appearance: alert, appears stated age and cooperative  Eyes: EOMI, no scleral icterus  Neck: trachea midline, no JVD, no lymphadenopathy  Chest/Lungs: normal effort, no adventitious breath sounds, on RA, large fungating right breast mass with minimal foul smelling drainage on bandages  Cardiovascular: RRR  Abdomen: soft, non-tender, non-distended  Skin: warm and dry, no rashes  Extremities: bilateral lower extremity edema with tenderness R>L  Neuro: A&Ox3, no focal deficits, sensation intact      ASSESSMENT/PLAN: Pt. is a 48 y.o. female with Hx of known metastatic triple negative R breast cancer with associated fungating mass. She presents to [facility] with worsening LE swelling in setting of known bilateral DVTs. Surgical Oncology is consulted in order to re-visit role of palliative mastectomy in her current care plan. - will await discussion with medical oncology regarding best treatment for patient with regards to chemo versus surgery  - prioritize treatment of current DVT, will f/u with possible IR thrombectomy  - a/c currently on hold for possible thrombectomy  - medical management per primary team      [facility], APRN - CNP 6/7/2022 6:45 AM  [phone]    I have personally performed the medical history, physical exam and medical decision making and agree with all pertinent clinical information unless otherwise noted.      Rose Spence MD  Surgery Attending

## 2022-06-07 NOTE — PROGRESS NOTES
[facility]  Progress Note    Subjective:     Patient lying in hospital bed in no acute distress. States they are talking about removing her IVC filter possibly Thursday. Also states General Surgery discussed possible breast surgery. Denies any bleeding from her breast but does mention brown discharge which has a foul odor. Review of Systems:     Review of Systems   Constitutional: Negative. HENT: Negative. Eyes: Negative. Respiratory: Negative. Cardiovascular: Negative. Gastrointestinal: Negative. Genitourinary: Negative. Musculoskeletal: Negative. Skin: Positive for wound. Neurological: Negative. Hematological: Negative.         Objective:     Medications    Current Facility-Administered Medications: metroNIDAZOLE (FLAGYL) tablet 1,000 mg, 1,000 mg, Topical, Daily  argatroban infusion 50mg in 0.9% sodium chloride 50 mL (premix), 0.0625-10 mcg/kg/min, IntraVENous, Continuous  sodium chloride flush 0.9 % injection 5-40 mL, 5-40 mL, IntraVENous, 2 times per day  sodium chloride flush 0.9 % injection 5-40 mL, 5-40 mL, IntraVENous, PRN  0.9 % sodium chloride infusion, , IntraVENous, PRN  ondansetron (ZOFRAN-ODT) disintegrating tablet 4 mg, 4 mg, Oral, Q8H PRN **OR** ondansetron (ZOFRAN) injection 4 mg, 4 mg, IntraVENous, Q6H PRN  polyethylene glycol (GLYCOLAX) packet 17 g, 17 g, Oral, Daily PRN  acetaminophen (TYLENOL) tablet 650 mg, 650 mg, Oral, Q6H PRN **OR** acetaminophen (TYLENOL) suppository 650 mg, 650 mg, Rectal, Q6H PRN  pantoprazole (PROTONIX) tablet 40 mg, 40 mg, Oral, QAM AC  melatonin disintegrating tablet 5 mg, 5 mg, Oral, Nightly  levETIRAcetam (KEPPRA) tablet 2,500 mg, 2,500 mg, Oral, Nightly  levETIRAcetam (KEPPRA) tablet 2,000 mg, 2,000 mg, Oral, Daily  lacosamide (VIMPAT) tablet 200 mg, 200 mg, Oral, BID  oxyCODONE (ROXICODONE) immediate release tablet 5 mg, 5 mg, Oral, Q4H PRN  HYDROmorphone (DILAUDID) injection 0.25 mg, 0.25 mg, IntraVENous, Q3H PRN    Allergies  Allergies   Allergen Reactions    Heparin Other (See Comments)     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 She is ill-appearing. HENT:      Nose: Nose normal.      Mouth/Throat:      Mouth: Mucous membranes are moist.   Eyes:      Extraocular Movements: Extraocular movements intact. Pupils: Pupils are equal, round, and reactive to light. Cardiovascular:      Rate and Rhythm: Normal rate and regular rhythm. Pulses: Normal pulses. Pulmonary:      Effort: Pulmonary effort is normal.   Abdominal:      Palpations: Abdomen is soft. Skin:     General: Skin is warm. Comments: Bilateral lower extremity edema   Neurological:      General: No focal deficit present. Mental Status: She is alert and oriented to person, place, and time. Labs  Recent Labs     22   WBC 6.8 8.3   HGB 8.9* 8.9*   HCT 27.2* 26.6*   * 531*   MCV 85.3 84.1       Recent Labs     2252 22  043    136   K 3.6 3.9    103   CO2 19* 22   BUN 5* 4*   CREATININE 0.6 0.6       Recent Labs     22   AST 11*   ALT 9*   BILIDIR <0.2   BILITOT <0.2   ALKPHOS 59       No results for input(s): MG in the last 72 hours.     Radiology  CT ABDOMEN PELVIS W IV CONTRAST Additional Contrast? None    Result Date: 2022  EXAM: CT ABDOMEN AND PELVIS WITH CONTRAST INDICATION: IVC thrombosis; Eval IVC, Pain COMPARISON: 3/28/2022. TECHNIQUE: Axial CT imaging obtained from lung bases through pelvis. Axial images and multiplanar reformatted images are provided for review. Individualized dose optimization technique was used in order to meet ALARA standards for radiation dose reduction. In addition to vendor specific dose reduction algorithms, the dose reduction techniques vary based on the specific scanner utilized but frequently include automated exposure control, adjustment of the mA and/or kV according to patient size, and use of iterative reconstruction technique. IV Contrast: 100 mL Isovue-370 Oral Contrast: None. FINDINGS: Study limited by poor contrast bolus/timing. LUNG BASES: Bibasilar atelectasis. Partially visualized large necrotic mass in the right breast.. LIVER: 3-4 small hypoattenuating foci in the liver, new since 3/28/2022, largest measuring 1 cm in the inferior right hepatic lobe. GALLBLADDER AND BILIARY TREE: No calcified gallstones. No gallbladder distention. No intra- or extrahepatic biliary dilatation. PANCREAS: 1.6 cm hypoattenuating lesion in the body of the pancreas. . SPLEEN: Normal. ADRENAL GLANDS: Normal. KIDNEYS AND URETERS: No hydronephrosis. Normal enhancement. No urolithiasis. URINARY BLADDER: Partially distended. REPRODUCTIVE ORGANS: Mildly enlarged fibroid uterus. . BOWEL: Normal caliber. Normal appendix. LYMPH NODES: No abnormally enlarged nodes. PERITONEUM/RETROPERITONEUM: Trace free fluid in the pelvis. VESSELS: Aorta is normal caliber. IVC filter in place. Limited evaluation for IVC thrombosis due to inadequate opacification with contrast. ABDOMINAL WALL: No significant abnormality. BONES: No destructive process. Degenerative changes spine. 1. Large necrotic right breast mass. 2. 3-4 small hypoattenuating lesions in the liver which are new since 3/26/2022 concerning for metastasis.  3. 1.6 cm hypoattenuating lesion in the body of the pancreas, new since 3/28/2022 concerning for neoplasm/metastasis. 4. IVC filter in place. Suboptimal evaluation for residual IVC thrombosis due to inadequate opacification. XR CHEST PORTABLE    Result Date: 6/5/2022  EXAMINATION: ONE XRAY VIEW OF THE CHEST 6/5/2022 6:32 am COMPARISON: 03/27/2022 HISTORY: ORDERING SYSTEM PROVIDED HISTORY: confirm port placement FINDINGS: Tip of the left chest wall IJ approach MediPort terminates within the lower SVC. Stable caliber and configuration of the cardiomediastinal silhouette. Large soft tissue density, likely corresponding with the known right breast mass obscures portions of the right lung. No sizable pleural effusion or pneumothorax. Tip of the left chest wall IJ approach MediPort terminates within the lower SVC. IR PORT PLACEMENT > 5 YEARS    Result Date: 5/24/2022  PROCEDURE: ULTRASOUND GUIDED VASCULAR ACCESS. FLUOROSCOPY GUIDED PLACEMENT OF A SUBCUTANEOUS PORT-A-CATH. MODERATE CONSCIOUS SEDATION 5/24/2022. HISTORY: ORDERING SYSTEM PROVIDED HISTORY: Secondary cancer of brain [facility]) TECHNOLOGIST PROVIDED HISTORY: Is the patient pregnant?->No SEDATION: 2 mgversed and 100 mcg fentanyl were titrated intravenously for moderate sedation monitored under my direction. Total intraservice time of sedation was 30 minutes. The patient's vital signs were monitored throughout the procedure and recorded in the patient's medical record by the nurse. FLUOROSCOPY DOSE AND TYPE OR TIME AND EXPOSURES: Fluoro time: 0.6 minute Cumulative air kerma: 5.86 mGy TECHNIQUE: Informed consent was obtained after a detailed explanation of the procedure including risks, benefits, and alternatives. All aspects of maximum sterile barrier technique were used including washing hands with conventional soap and water or with alcohol-based hand rubs (ABHR), skin preparation, cap, mask, sterile gown, sterile gloves, and sterile full body drape. Local anesthesia was achieved with lidocaine.  A micropuncture needle was used to access the right internal jugular vein using ultrasound guidance. An ultrasound image demonstrating patency of the vein with needle tip located within it. An image was obtained and stored in PACs. A 0.035 guidewire was used to place a peel-away sheath. A chest wall incision was made and a subcutaneous pocket was created. The port reservoir was placed within the pocket and the port tubing was attached and tunneled subcutaneously to the venotomy site. The port tubing was cut to an appropriate length and advanced through the peel-away sheath under fluoroscopic guidance to the cavo-atrial junction. The chest wall incision was closed using 3-0 and 4-0 Vicryl sutures and tissue adhesive was applied to the venotomy site and chest wall incision. The port flushed easily and there was a good blood return. The port was locked with heparinized saline. The patient tolerated the procedure well and there were no immediate complications. Estimated blood loss: Less than 5 cc FINDINGS: Fluoroscopic image demonstrates the tip of the port at the cavo-atrial junction . Successful ultrasound and fluoroscopy guided right IJ Port-A-Cath placement.      VL Extremity Venous Bilateral    Result Date: 6/6/2022  Vascular Lower Extremities DVT Study Procedure -- PRELIMINARY SONOGRAPHER REPORT --   Demographics   Patient Name       Comer File A   Date of Study      06/06/2022         Gender              Female   Patient Number     [id number]         Date of Birth       1975   Visit Number       [id number]          Age                 48 year(s)   Accession Number   [id number]         Room Number         9424   Corporate ID       [id number]            Sonographer         Anna Toro,                                                            FRANCIS, RVT   Ordering Physician Rian Palma,   [facility]                     MD                 Physician           Readers Procedure Type of Study:   Veins:Lower Extremities DVT Study, VASC EXTREMITY VENOUS DUPLEX BILATERAL. Tech Comments Right There is acute on chronic, totally occluding deep venous thrombosis involving the common femoral vein. Acute deep venous thrombosis is noted involving the femoral vein, popliteal vein and gastocnemious veins. Acute superficial venous thrombosis is noted in the short saphenous vein. The posterior tibial and peroneal veins were poorly visualized due to edema. Subacute thrombus is noted in one soleal vein. Left Evidence of a chronic phlebitic process is noted in the common femoral vein. There is acute totally occluding deep venous thrombosis involving the femoral vein, popliteal vein, gastrocnemious veins, peroneal veins and soleal veins. There is acute totally occluding superficial venous thrombosis involving the short saphenous vein. There is no other evidence of deep or superficial venous thrombosis involving the left lower extremity. Pathology  Department of Pathology   ADDENDUM SURGICAL PATHOLOGY REPORT   Patient Name: Luis Santos      Accession No:  [id number]    Age Sex:   1975    46 Y / F      Location:      DIS 01   Account No:   [de-identified]                 Collected:     2022   Med Rec No:    [id number]                Received:      2022   Attend Phys:   Sofia Smith MD         Completed:     2022   Perform Phys: Nghia Cornelius MD       ADDENDUM:   At formal (faxed) request from [facility], the   request initiated by Ericka Vale M.D., a formalin-fixed,   paraffin-embedded (FFPE) tissue block (C9) was selected by a pathologist   (after review of the slides) and sent to [facility] for   molecular analysis (\"[employer]\" tumor profiling). See   that separate report, when complete, accession number [id number], for   analytic details/analytic results.      Additional manual (not electronic) professional-only CPT code: Bib Chandler M.D.   (Electronic Signature)   05/16/2022       FINAL DIAGNOSIS:        A. Left temporal mass:      - Metastatic carcinoma compatible with \"triple negative\" breast        primary; see Comment.        B. Left temporal dura:      - Dural tissue negative for carcinoma, keratin AE1+3/Cam 5.2        immunostain reviewed on each block.        C. Left occipital mass:      - Metastatic carcinoma compatible with \"triple negative\" breast        primary; see Comment. COMMENT:  A, C: The carcinoma at each site shows a nearly identical   appearance, with diffuse masses of highly polymorphous cells showing a   high mitotic/karyorrhectic rate, frequent \"giant\" cells, and plentiful   geographic necrosis.  Given the patient's clinical history (see below),   immunostains were requested. The tumor is strongly positive for CK7,   GATA3, SOX-10, and beta catenin (membranous pattern), with variable   cytoplasmic and partial perinuclear(\"Golgi\") positivity for keratin   AE1+3/Cam 5.2, a tiny minority population, including geographic foci,   positive for high molecular weight keratin (), and Ki-67   proliferation rate of 90% or more. It is negative for ER, KY, and HER-2   (no positivity for any of the three stains), and also for CK20, Napsin-A,   S100 protein, villin, TTF-1, CDX2, and CD45 (LCA).      The overall findings strongly support metastatic \"triple negative\" breast   carcinoma.   BRATI/BRATI     Problem List  Patient Active Problem List   Diagnosis    Thyroid nodule    Brain metastases ([address]    Brain mass    Status epilepticus ([address]    Duct cell carcinoma (HCC)    Cerebral edema (HCC)    Seizure (HCC)    Deep venous thrombosis (HCC)    Fever    Phlebitis    S/P craniotomy    Acute deep vein thrombosis (DVT) of axillary vein (HCC)    DVT, lower extremity, proximal, acute, bilateral ([address]       Assessment and Plan:     Metastatic triple negative breast cancer  - Neglected Right breast primary  - Intracranial metastasis  - Liver metastases  - The patient and her mother agreed to surgery to resect her intracranial metastasis. - March 9 Craniotomy had to be canceled due to seizures & status epilepticus.  - Saturday March 12 craniotomy and tumor resection. Recovering.  - She completed radiation therapy. - Subsequent to radiation therapy she will require systemic chemotherapy. This will be in the outpatient setting. Dr. Nicole Min is planning Abraxane/pembrolizumab  - Recommend she follow-up with medical (Dr Brooklynn Butleran radiation (Dr Blue) oncology at the [facility] office, where she was seen previously, after this hospitalization to coordinate and implement systemic palliative chemotherapy now that she has completed radiation therapy. Instructions placed in D/C instructions section.  - Because the neglected R breast cancer is not bleeding or causing infection, it would be of little benefit to perform a mastectomy. If that changes, a toilet mastectomy could be considered.      Seizures  - Secondary to intracranial metastases which have been resected and treated with radiation.  - Recommend continuing Keppra and Vimpat     DVT  - Per neurosurgery, she could not receive therapeutic anticoagulation until POD #14 - March 26. Therefore an IVC filter was placed until therapeutic anticoagulation could begin. - Due to HIT, previously treated with Argatroban gtt and transitioned to Eliquis  - S/P IVC filter. This will continue to be a nidus for further thrombus. - Anticoagulation is not typically held for radiation treatment  - Doppler, June 2022 shows acute and chronic clot in the lower extremities  - Agree with therapeutic anticoagulation with argatroban which can be transitioned to Eliquis. - Discussed case with Radiology this AM about IVC filter removal. This depends on clot burden to IVC filter.  Will need to be on [facility] likely for 2 weeks priro to having filter removed     IVC Thrombus  - S/P thrombectomy  - Transitioned from argatroban to Eliquis  - Discussed case with Radiology this AM about IVC filter removal. This depends on clot burden to IVC filter. Will need to be on [facility] likely for 2 weeks priro to having filter removed     HIT  - Ivone positive  - TRES positive  - Received argatroban gtt w/ normalization of the plts in April hospitalization  - Transititoned to Eliquis  - Agree with argatroban and a transition to Eliquis for this hospitalization as well       Patient was seen and staffed with Dr. Alejandro Jarvis this AM    Juliane Kat MD  6/7/2022     Attending:    Patient seen and examined. Data reviewed. Case discussed with Dr. Karma Wilson on morning rounds and agree with his progress note with my modification.     Esthela Lambert MD

## 2022-06-07 NOTE — CARE COORDINATION
CM spoke with patient and family at bedside, patient lives at home and has [facility] with [facility] prior to admission for nursing, PT/OT and SLP. Patient plans for return home at [place].   [facility], General Surgery, Hem Onc following. Pt on argatroban infusion for DVT , will transition to Eliquis.      Chioma Fraser RN, BSN, [address]  Case Management Department  [phone]

## 2022-06-07 NOTE — PLAN OF CARE
Problem: Discharge Planning  Goal: Discharge to home or other facility with appropriate resources  Outcome: Progressing  Flowsheets (Taken 6/6/2022 2000)  Discharge to home or other facility with appropriate resources: Identify barriers to discharge with patient and caregiver     Problem: Safety - Adult  Goal: Free from fall injury  Outcome: Progressing  Flowsheets (Taken 6/6/2022 1936)  Free From Fall Injury: Instruct family/caregiver on patient safety  Call light in reach, bed alarm in place, non slip socks placed on the patient, bed rails x3, bed in lowest position and locked.        Problem: Pain  Goal: Verbalizes/displays adequate comfort level or baseline comfort level  Outcome: Progressing  Flowsheets (Taken 6/6/2022 1958)  Verbalizes/displays adequate comfort level or baseline comfort level:   Encourage patient to monitor pain and request assistance   Assess pain using appropriate pain scale   Administer analgesics based on type and severity of pain and evaluate response     Problem: ABCDS Injury Assessment  Goal: Absence of physical injury  Outcome: Progressing

## 2022-06-07 NOTE — PROGRESS NOTES
Progress Note    Admit Date: 6/5/2022    Patient's PCP: Dr. Damien Montgomery, APRN - CNP        Chief complaints:  Leg edema        HISTORY OF PRESENT ILLNESS:    This is a very pleasant 48 y.o. female with metastatic breast cancer status post craniotomy with mass resection, recent admission, recent REBECCA/DVT  s/p thrombectomy who presented to ED at OSH with worsening bilateral leg swelling.      Was admitted 3/25/2022 - 4/10/2022 from [facility] due to worsening lower extremity pain and swelling.       During a previous admission  patient was diagnosed with RLE DVT and due to recent Craniotomy, anticoagulation could not be started till POD 14 so IVC filter was placed. Her platelets levels were noted to be low at [facility] and due to concern for worsening DVT she was sent to the hospital.      On admission 3/25/2022,  CT abdomen and pelvis showed thrombosis of the IVC at the level of the filter with marked distention of the caval bifurcation.  IR was consulted, pxt had thrombectomy 3/28/2022; she was continued on anticoagulation and as concern her filter will continue to be a nidus for infection, it was discussed that it would be re-evaluated\". .. in 6 weeks\".  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  Plan was radiation therapy and palliative chemo as outpatient.     Of note, she continued to have swelling in her lower extremity which persisted in spite of aggressive diureses, and was determined to be secondary to clot congestion in the IVC.      She presented with her family to the ED at OSH, for worsening leg swelling associated with pains in both lower extremities.       Patient states she was told to stop her Eliquis for radiation treatments, whch she did complete 10 treatments, and states she was never told to resume it; and hence has not been on it for about 4-6 weeks.      She is yet to start chemotherapy and had  placement of a right IJ port 5/24/2022 in anticipation of starting palliative chemo in several days.     She denies fevers, chills, chest pain or dyspnea.      In the ED at OSH, she was noted to be tachycardic, but afebrile. Routine labs were fairly normal apart from some anemia and thrombocytosis.          Subjective: No acute events reported overnight. Patient had a lot of questions about the treatment options and what the best plan of care is. She just overall feels overwhelmed. Denies any specific complaints today        Medications: Reviewed      Allergies:  Heparin      ROS: Review of Systems - Negative except as in HPI. .   All other systems reviewed and are negative. PHYSICAL EXAM:  /77   Pulse (!) 104   Temp 99.3 °F (37.4 °C) (Oral)   Resp 16   SpO2 93%     No results for input(s): POCGLU in the last 72 hours.   General appearance: alert, appears stated age and cooperative  Head: Normocephalic, without obvious abnormality, atraumatic  Throat: lips, mucosa, and tongue normal; teeth and gums normal  Neck: no adenopathy, no carotid bruit, no JVD, supple, symmetrical, trachea midline and thyroid not enlarged, symmetric, no tenderness/mass/nodules  Lungs: clear to auscultation bilaterally  Breasts: Large right breast mass  Heart: ; regular rhythm, S1, S2 normal, no murmur, click, rub or gallop  Abdomen: soft, non-tender; bowel sounds normal; no masses,  no organomegaly  Extremities: Bilateral lower extremity edema  Pulses: 2+ and symmetric  Neurologic: Grossly normal       LABS:  Recent Labs     06/05/22 0652 06/06/22 0432   WBC 6.8 8.3   HGB 8.9* 8.9*   HCT 27.2* 26.6*   * 531*                                                                  Recent Labs     06/05/22 0652 06/06/22  0432    136   K 3.6 3.9    103   CO2 19* 22   BUN 5* 4*   CREATININE 0.6 0.6   GLUCOSE 96 104*     Recent Labs     06/05/22 0652   AST 11*   ALT 9*   BILITOT <0.2   ALKPHOS 59     No results for input(s): TROPONINI in the last 72 hours. No results for input(s): BNP in the last 72 hours. Lab Results   Component Value Date    PHART 7.466 03/29/2022    FCY6MQZ 31.3 03/29/2022    PO2ART 84.9 03/29/2022     Recent Labs     06/05/22  0652   INR 1.18*     No results for input(s): NITRITE, COLORU, PHUR, LABCAST, WBCUA, RBCUA, MUCUS, TRICHOMONAS, YEAST, BACTERIA, CLARITYU, SPECGRAV, LEUKOCYTESUR, UROBILINOGEN, BILIRUBINUR, BLOODU, GLUCOSEU, AMORPHOUS in the last 72 hours. Invalid input(s): Geoff Ro       Assessment & Plan:     48 y.o. female with metastatic breast cancer status post craniotomy with mass resection, recent admission, recent REBECCA/DVT s/p thrombectomy who presented to ED at OSH with worsening bilateral leg swelling. Acute on chronic Bilateral lower extremity DVTs   Hx of recent IVC filter thrombus, REBECCA   - Recent admission for DVT developed in setting of malignancy, REBECCA. She could not receive therapeutic anticoagulation until POD #14 - March 26 after craniotomy for brain mets. Therefore an IVC filter was placed until therapeutic anticoagulation could begin  - Was re-admitted with REBECCA and noted with severe thrombosis on LEs, including around filter S/p IR thrombectomy 3/28/2022. Was on Argatroban gtt and transitioned to Eliquis with plan to keep IVC filter in \"for 6 weeks\", to follow up with IR outpatient  - Has not been taking the Eliquis for at least 4-6 weeks: Pxt states she was told to suspend it for radiation txts, and never told to resume  -Lower extremity Doppler showing right acute on chronic total occlusion DVT involving the common femoral vein, acute DVT involving the femoral vein, popliteal vein and gastrocnemius vein. On the left side there is acute occluding DVT involving the femoral, popliteal, gastrocnemius, peroneal and soleal veins  - Hematology is on board.   Patient will likely need removal of the IVC filter as it is serving as a nidus for further

## 2022-06-07 NOTE — PROGRESS NOTES
Pt is agreeing to have IVC filter removed on Friday at this time. IR charge called to relay information.      Electronically signed by Enedina Lawrence RN on 6/7/2022 at 2:53 PM

## 2022-06-08 LAB — APTT: 59.2 SEC (ref 23–34.3)

## 2022-06-08 PROCEDURE — 36415 COLL VENOUS BLD VENIPUNCTURE: CPT

## 2022-06-08 PROCEDURE — 6360000002 HC RX W HCPCS: Performed by: INTERNAL MEDICINE

## 2022-06-08 PROCEDURE — 99232 SBSQ HOSP IP/OBS MODERATE 35: CPT | Performed by: SURGERY

## 2022-06-08 PROCEDURE — 2580000003 HC RX 258: Performed by: INTERNAL MEDICINE

## 2022-06-08 PROCEDURE — 85730 THROMBOPLASTIN TIME PARTIAL: CPT

## 2022-06-08 PROCEDURE — 6370000000 HC RX 637 (ALT 250 FOR IP): Performed by: INTERNAL MEDICINE

## 2022-06-08 PROCEDURE — 1200000000 HC SEMI PRIVATE

## 2022-06-08 RX ORDER — LIDOCAINE AND PRILOCAINE 25; 25 MG/G; MG/G
CREAM TOPICAL ONCE
Status: COMPLETED | OUTPATIENT
Start: 2022-06-08 | End: 2022-06-08

## 2022-06-08 RX ADMIN — LACOSAMIDE 200 MG: 50 TABLET, FILM COATED ORAL at 09:39

## 2022-06-08 RX ADMIN — ARGATROBAN 0.5 MCG/KG/MIN: 50 INJECTION INTRAVENOUS at 12:30

## 2022-06-08 RX ADMIN — LACOSAMIDE 200 MG: 50 TABLET, FILM COATED ORAL at 21:17

## 2022-06-08 RX ADMIN — LEVETIRACETAM 2500 MG: 750 TABLET, FILM COATED ORAL at 21:18

## 2022-06-08 RX ADMIN — SODIUM CHLORIDE, PRESERVATIVE FREE 10 ML: 5 INJECTION INTRAVENOUS at 21:18

## 2022-06-08 RX ADMIN — OXYCODONE 5 MG: 5 TABLET ORAL at 05:56

## 2022-06-08 RX ADMIN — METRONIDAZOLE 1000 MG: 500 TABLET ORAL at 09:38

## 2022-06-08 RX ADMIN — LIDOCAINE AND PRILOCAINE: 25; 25 CREAM TOPICAL at 13:32

## 2022-06-08 RX ADMIN — OXYCODONE 5 MG: 5 TABLET ORAL at 10:12

## 2022-06-08 RX ADMIN — HYDROMORPHONE HYDROCHLORIDE 0.25 MG: 1 INJECTION, SOLUTION INTRAMUSCULAR; INTRAVENOUS; SUBCUTANEOUS at 21:17

## 2022-06-08 RX ADMIN — OXYCODONE 5 MG: 5 TABLET ORAL at 18:03

## 2022-06-08 RX ADMIN — LEVETIRACETAM 2000 MG: 500 TABLET, FILM COATED ORAL at 09:38

## 2022-06-08 RX ADMIN — HYDROMORPHONE HYDROCHLORIDE 0.25 MG: 1 INJECTION, SOLUTION INTRAMUSCULAR; INTRAVENOUS; SUBCUTANEOUS at 14:12

## 2022-06-08 RX ADMIN — ACETAMINOPHEN 650 MG: 325 TABLET ORAL at 18:03

## 2022-06-08 RX ADMIN — PANTOPRAZOLE SODIUM 40 MG: 40 TABLET, DELAYED RELEASE ORAL at 05:56

## 2022-06-08 RX ADMIN — Medication 5 MG: at 21:17

## 2022-06-08 ASSESSMENT — PAIN DESCRIPTION - LOCATION
LOCATION: JAW
LOCATION: BREAST

## 2022-06-08 ASSESSMENT — PAIN DESCRIPTION - DESCRIPTORS
DESCRIPTORS: BURNING
DESCRIPTORS: ITCHING
DESCRIPTORS: BURNING
DESCRIPTORS: SORE;THROBBING;TENDER

## 2022-06-08 ASSESSMENT — PAIN DESCRIPTION - ORIENTATION
ORIENTATION: RIGHT
ORIENTATION: LEFT

## 2022-06-08 ASSESSMENT — PAIN DESCRIPTION - ONSET
ONSET: ON-GOING

## 2022-06-08 ASSESSMENT — PAIN - FUNCTIONAL ASSESSMENT
PAIN_FUNCTIONAL_ASSESSMENT: PREVENTS OR INTERFERES SOME ACTIVE ACTIVITIES AND ADLS

## 2022-06-08 ASSESSMENT — PAIN DESCRIPTION - FREQUENCY
FREQUENCY: INTERMITTENT

## 2022-06-08 ASSESSMENT — PAIN SCALES - GENERAL
PAINLEVEL_OUTOF10: 6
PAINLEVEL_OUTOF10: 0
PAINLEVEL_OUTOF10: 8
PAINLEVEL_OUTOF10: 7
PAINLEVEL_OUTOF10: 0
PAINLEVEL_OUTOF10: 4

## 2022-06-08 ASSESSMENT — PAIN DESCRIPTION - PAIN TYPE
TYPE: CHRONIC PAIN

## 2022-06-08 ASSESSMENT — ENCOUNTER SYMPTOMS
GASTROINTESTINAL NEGATIVE: 1
EYES NEGATIVE: 1
RESPIRATORY NEGATIVE: 1

## 2022-06-08 ASSESSMENT — PAIN SCALES - WONG BAKER: WONGBAKER_NUMERICALRESPONSE: 0

## 2022-06-08 NOTE — PROGRESS NOTES
Surgery Daily Progress Note  Nadja Ramírez  CC:  Fungating breast mass      Subjective :   No acute issues overnight. Patient denies breast pain. Stable drainage without bleeding. Objective    Infusions:   argatroban infusion 0.5 mcg/kg/min (06/07/22 1839)    sodium chloride          I/O:I/O last 3 completed shifts: In: 740 [P.O.:740]  Out: -            Wt Readings from Last 1 Encounters:   06/05/22 189 lb 9.5 oz (86 kg)                 LABS:    Recent Labs     06/05/22  0652 06/06/22  0432   WBC 6.8 8.3   HGB 8.9* 8.9*   HCT 27.2* 26.6*   MCV 85.3 84.1   * 531*        Recent Labs     06/05/22  0652 06/06/22  0432    136   K 3.6 3.9    103   CO2 19* 22   BUN 5* 4*   CREATININE 0.6 0.6        Recent Labs     06/05/22  0652   AST 11*   ALT 9*   BILIDIR <0.2   BILITOT <0.2   ALKPHOS 59      No results for input(s): LIPASE, AMYLASE in the last 72 hours. Recent Labs     06/05/22  0652 06/06/22  2147 06/07/22  1644 06/08/22  0524   PROT 6.6  --   --   --    INR 1.18*  --   --   --    APTT  --    < > 58.5* 59.2*    < > = values in this interval not displayed. Recent Labs     06/05/22  0652   CKTOTAL 40               Exam:/82   Pulse (!) 107   Temp 98.3 °F (36.8 °C) (Oral)   Resp 16   SpO2 94%      General appearance: alert, appears stated age and cooperative  Eyes: EOMI, no scleral icterus  Neck: trachea midline, no JVD, no lymphadenopathy  Chest/Lungs: normal effort, no adventitious breath sounds, on RA, large fungating right breast mass with clean dressing, no staining  Cardiovascular: RRR  Abdomen: soft, non-tender, non-distended  Skin: warm and dry, no rashes  Extremities: bilateral lower extremity edema with tenderness R>L  Neuro: A&Ox3, no focal deficits, sensation intact      ASSESSMENT/PLAN: Pt. is a 48 y.o. female with Hx of known metastatic triple negative R breast cancer with associated fungating mass.  She presents to [facility] with worsening LE swelling in setting of known bilateral DVTs. Surgical Oncology is consulted in order to re-visit role of palliative mastectomy in her current care plan. - discussed with medical oncology. Patient will need to be on full anticoagulation. Patient will need to be started on chemotherapy soon  - prioritize treatment of current DVT, possible IR thrombectomy and IVC filter revision on Friday'  - will defer surgery at this time, due to anticoagulation and chemotherapy plan  - please contact general surgery if patient develops bleeding or infection from breast site  - medical management per primary team      Say Topete DO  PGY1, General Surgery  06/08/22  6:36 AM    I am post-call today and will not be on campus. Please contact Dr. Diane Kim at [phone] for questions or concerns regarding this patient. I have personally performed the medical history, physical exam and medical decision making and agree with all pertinent clinical information unless otherwise noted.      Yamil Stoner MD  Surgery Attending

## 2022-06-08 NOTE — CARE COORDINATION
CM following for discharge planning. Plan is for thrombectomy with IVC filter revision on  Friday. Pt to follow up with Oncology at discharge. Pt with daily dressing changes on right breast. Pt active with [facility] and will resume at discharge. Pt on Argatroban infusion, to be transition to Eloquis at discharge.      Manuela Cowden RN, BSN, [facility]  Case Management Department  [phone]

## 2022-06-08 NOTE — PLAN OF CARE
Problem: Discharge Planning  Goal: Discharge to home or other facility with appropriate resources  6/7/2022 2203 by Jesus Alberto Kaplan RN  Outcome: Progressing  Flowsheets (Taken 6/7/2022 1950)  Discharge to home or other facility with appropriate resources: Identify barriers to discharge with patient and caregiver   Patient plans to have evaluations PT OT to determine discharge needs. Problem: Safety - Adult  Goal: Free from fall injury  6/7/2022 2203 by Jesus Alberto Kaplan RN  Outcome: Progressing   Patient meets Fabiana Miller fall risk guidelines. Non skid footwear with ambulation. Bed in lowest position. Call light in reach. Bed alarm activated. Reminded to call for assistance before exiting bed. Continue safety precautions. Problem: Pain  Goal: Verbalizes/displays adequate comfort level or baseline comfort level  6/7/2022 2203 by Jesus Alberto Kaplan RN  Outcome: Progressing   Denies pain but describes a \"pressure' in her head tonight relieved by anti seizure medications.

## 2022-06-08 NOTE — PROGRESS NOTES
Oncology [facility]  Progress Note    Subjective:     Patient just got back from bathroom. Unable to have BM. Scheduled for thrombectomy Friday with possible filter revision. Will need to follow-up with Dr. Roberto Borden to start systemic chemotherapy after D/C. Will transition to [address] following procedure Friday. Review of Systems:     Review of Systems   Constitutional: Negative. HENT: Negative. Eyes: Negative. Respiratory: Negative. Cardiovascular: Negative. Gastrointestinal: Negative. Genitourinary: Negative. Musculoskeletal: Negative. Skin: Positive for wound. Neurological: Negative. Hematological: Negative.         Objective:     Medications    Current Facility-Administered Medications: metroNIDAZOLE (FLAGYL) tablet 1,000 mg, 1,000 mg, Topical, Daily  argatroban infusion 50mg in 0.9% sodium chloride 50 mL (premix), 0.0625-10 mcg/kg/min, IntraVENous, Continuous  sodium chloride flush 0.9 % injection 5-40 mL, 5-40 mL, IntraVENous, 2 times per day  sodium chloride flush 0.9 % injection 5-40 mL, 5-40 mL, IntraVENous, PRN  0.9 % sodium chloride infusion, , IntraVENous, PRN  ondansetron (ZOFRAN-ODT) disintegrating tablet 4 mg, 4 mg, Oral, Q8H PRN **OR** ondansetron (ZOFRAN) injection 4 mg, 4 mg, IntraVENous, Q6H PRN  polyethylene glycol (GLYCOLAX) packet 17 g, 17 g, Oral, Daily PRN  acetaminophen (TYLENOL) tablet 650 mg, 650 mg, Oral, Q6H PRN **OR** acetaminophen (TYLENOL) suppository 650 mg, 650 mg, Rectal, Q6H PRN  pantoprazole (PROTONIX) tablet 40 mg, 40 mg, Oral, QAM AC  melatonin disintegrating tablet 5 mg, 5 mg, Oral, Nightly  levETIRAcetam (KEPPRA) tablet 2,500 mg, 2,500 mg, Oral, Nightly  levETIRAcetam (KEPPRA) tablet 2,000 mg, 2,000 mg, Oral, Daily  lacosamide (VIMPAT) tablet 200 mg, 200 mg, Oral, BID  oxyCODONE (ROXICODONE) immediate release tablet 5 mg, 5 mg, Oral, Q4H PRN  HYDROmorphone (DILAUDID) injection 0.25 mg, 0.25 mg, IntraVENous, Q3H PRN    Allergies  Allergies   Allergen Reactions    Heparin Other (See Comments)     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 She is ill-appearing. HENT:      Nose: Nose normal.      Mouth/Throat:      Mouth: Mucous membranes are moist.   Eyes:      Extraocular Movements: Extraocular movements intact. Pupils: Pupils are equal, round, and reactive to light. Cardiovascular:      Rate and Rhythm: Normal rate and regular rhythm. Pulses: Normal pulses. Pulmonary:      Effort: Pulmonary effort is normal.   Abdominal:      Palpations: Abdomen is soft. Skin:     General: Skin is warm. Comments: Bilateral lower extremity edema   Neurological:      General: No focal deficit present. Mental Status: She is alert and oriented to person, place, and time.          Labs  Recent Labs     22  0432 22  0652 22  1019   WBC 8.3 6.8  --    NEUTROABS 7.0 4.9  --    LYMPHOPCT 9.0 11.9  --    RBC 3.16* 3.19*  --    HGB 8.9* 8.9*  --    HCT 26.6* 27.2*  --    MCV 84.1 85.3  --    MCH 28.0 27.8  --    MCHC 33.4 32.6  --    RDW 19.9* 20.0*  --    * 503* 243       Lab Results   Component Value Date     2022    K 3.9 2022     2022    CO2 22 2022    GLUCOSE 104 (H) 2022    BUN 4 (L) 2022    CREATININE 0.6 2022    LABGLOM >60 2022    GFRAA >60 06/06/2022    CALCIUM 8.7 06/06/2022    PROT 6.6 06/05/2022    LABALBU 3.2 (L) 06/05/2022    AGRATIO 1.0 (L) 03/03/2022    BILITOT <0.2 06/05/2022    ALKPHOS 59 06/05/2022    ALT 9 (L) 06/05/2022    AST 11 (L) 06/05/2022    MG 2.00 04/07/2022       Lab Results   Component Value Date    PROT 6.6 06/05/2022    PROT 6.0 (L) 04/03/2022    PROT 5.8 (L) 04/02/2022    INR 1.18 (H) 06/05/2022    INR 1.11 05/24/2022    INR 1.22 (H) 03/25/2022     Lab Results   Component Value Date    APTT 59.2 (H) 06/08/2022    APTT 58.5 (H) 06/07/2022    APTT 64.2 (H) 06/07/2022     Radiology  CT ABDOMEN PELVIS W IV CONTRAST Additional Contrast? None    Result Date: 6/5/2022  EXAM: CT ABDOMEN AND PELVIS WITH CONTRAST INDICATION: IVC thrombosis; Eval IVC, Pain COMPARISON: 3/28/2022. TECHNIQUE: Axial CT imaging obtained from lung bases through pelvis. Axial images and multiplanar reformatted images are provided for review. Individualized dose optimization technique was used in order to meet ALARA standards for radiation dose reduction. In addition to vendor specific dose reduction algorithms, the dose reduction techniques vary based on the specific scanner utilized but frequently include automated exposure control, adjustment of the mA and/or kV according to patient size, and use of iterative reconstruction technique. IV Contrast: 100 mL Isovue-370 Oral Contrast: None. FINDINGS: Study limited by poor contrast bolus/timing. LUNG BASES: Bibasilar atelectasis. Partially visualized large necrotic mass in the right breast.. LIVER: 3-4 small hypoattenuating foci in the liver, new since 3/28/2022, largest measuring 1 cm in the inferior right hepatic lobe. GALLBLADDER AND BILIARY TREE: No calcified gallstones. No gallbladder distention. No intra- or extrahepatic biliary dilatation. PANCREAS: 1.6 cm hypoattenuating lesion in the body of the pancreas. . SPLEEN: Normal. ADRENAL GLANDS: Normal. KIDNEYS AND URETERS: No hydronephrosis.  Normal enhancement. No urolithiasis. URINARY BLADDER: Partially distended. REPRODUCTIVE ORGANS: Mildly enlarged fibroid uterus. . BOWEL: Normal caliber. Normal appendix. LYMPH NODES: No abnormally enlarged nodes. PERITONEUM/RETROPERITONEUM: Trace free fluid in the pelvis. VESSELS: Aorta is normal caliber. IVC filter in place. Limited evaluation for IVC thrombosis due to inadequate opacification with contrast. ABDOMINAL WALL: No significant abnormality. BONES: No destructive process. Degenerative changes spine. 1. Large necrotic right breast mass. 2. 3-4 small hypoattenuating lesions in the liver which are new since 3/26/2022 concerning for metastasis. 3. 1.6 cm hypoattenuating lesion in the body of the pancreas, new since 3/28/2022 concerning for neoplasm/metastasis. 4. IVC filter in place. Suboptimal evaluation for residual IVC thrombosis due to inadequate opacification. XR CHEST PORTABLE    Result Date: 6/5/2022  EXAMINATION: ONE XRAY VIEW OF THE CHEST 6/5/2022 6:32 am COMPARISON: 03/27/2022 HISTORY: ORDERING SYSTEM PROVIDED HISTORY: confirm port placement FINDINGS: Tip of the left chest wall IJ approach MediPort terminates within the lower SVC. Stable caliber and configuration of the cardiomediastinal silhouette. Large soft tissue density, likely corresponding with the known right breast mass obscures portions of the right lung. No sizable pleural effusion or pneumothorax. Tip of the left chest wall IJ approach MediPort terminates within the lower SVC. IR PORT PLACEMENT > 5 YEARS    Result Date: 5/24/2022  PROCEDURE: ULTRASOUND GUIDED VASCULAR ACCESS. FLUOROSCOPY GUIDED PLACEMENT OF A SUBCUTANEOUS PORT-A-CATH. MODERATE CONSCIOUS SEDATION 5/24/2022. HISTORY: ORDERING SYSTEM PROVIDED HISTORY: Secondary cancer of brain [facility]) TECHNOLOGIST PROVIDED HISTORY: Is the patient pregnant?->No SEDATION: 2 mgversed and 100 mcg fentanyl were titrated intravenously for moderate sedation monitored under my direction. Total intraservice time of sedation was 30 minutes. The patient's vital signs were monitored throughout the procedure and recorded in the patient's medical record by the nurse. FLUOROSCOPY DOSE AND TYPE OR TIME AND EXPOSURES: Fluoro time: 0.6 minute Cumulative air kerma: 5.86 mGy TECHNIQUE: Informed consent was obtained after a detailed explanation of the procedure including risks, benefits, and alternatives. All aspects of maximum sterile barrier technique were used including washing hands with conventional soap and water or with alcohol-based hand rubs (ABHR), skin preparation, cap, mask, sterile gown, sterile gloves, and sterile full body drape. Local anesthesia was achieved with lidocaine. A micropuncture needle was used to access the right internal jugular vein using ultrasound guidance. An ultrasound image demonstrating patency of the vein with needle tip located within it. An image was obtained and stored in PACs. A 0.035 guidewire was used to place a peel-away sheath. A chest wall incision was made and a subcutaneous pocket was created. The port reservoir was placed within the pocket and the port tubing was attached and tunneled subcutaneously to the venotomy site. The port tubing was cut to an appropriate length and advanced through the peel-away sheath under fluoroscopic guidance to the cavo-atrial junction. The chest wall incision was closed using 3-0 and 4-0 Vicryl sutures and tissue adhesive was applied to the venotomy site and chest wall incision. The port flushed easily and there was a good blood return. The port was locked with heparinized saline. The patient tolerated the procedure well and there were no immediate complications. Estimated blood loss: Less than 5 cc FINDINGS: Fluoroscopic image demonstrates the tip of the port at the cavo-atrial junction . Successful ultrasound and fluoroscopy guided right IJ Port-A-Cath placement.      VL Extremity Venous Bilateral    Result Date: 2022  Vascular Lower Extremities DVT Study Procedure -- PRELIMINARY SONOGRAPHER REPORT --   Demographics   Patient Name       [facility]   Date of Study      2022         Gender              Female   Patient Number     [id number]         Date of Birth       1975   Visit Number       [id number]          Age                 48 year(s)   Accession Number   [id number]         Room Number         6108   Corporate ID       [id number]            Sonographer         Elieser Toro,                                                            FRANCIS, RVT   Ordering Physician Rina Turner,   Interpreting        [facility]                     MD                 Physician           Readers  Procedure Type of Study:   Veins:Lower Extremities DVT Study, VASC EXTREMITY VENOUS DUPLEX BILATERAL. Tech Comments Right There is acute on chronic, totally occluding deep venous thrombosis involving the common femoral vein. Acute deep venous thrombosis is noted involving the femoral vein, popliteal vein and gastocnemious veins. Acute superficial venous thrombosis is noted in the short saphenous vein. The posterior tibial and peroneal veins were poorly visualized due to edema. Subacute thrombus is noted in one soleal vein. Left Evidence of a chronic phlebitic process is noted in the common femoral vein. There is acute totally occluding deep venous thrombosis involving the femoral vein, popliteal vein, gastrocnemious veins, peroneal veins and soleal veins. There is acute totally occluding superficial venous thrombosis involving the short saphenous vein. There is no other evidence of deep or superficial venous thrombosis involving the left lower extremity.       Pathology  Department of Pathology   ADDENDUM SURGICAL PATHOLOGY REPORT   Patient Name: Sandoval Kat        Accession No:  [id number]    Age Sex:   1975    46 Y / F      Location:      DIS 01   Account No:   [de-identified]                 Collected:     03/12/2022   Med Rec No:    [id number]                Received:      03/14/2022   Attend Phys: Susi Werner MD         Completed:     03/16/2022   Perform Phys: Starla Scheuermann, MD       ADDENDUM:   At formal (faxed) request from [facility], the   request initiated by Manuel Steven M.D., a formalin-fixed,   paraffin-embedded (FFPE) tissue block (C9) was selected by a pathologist   (after review of the slides) and sent to [facility] for   molecular analysis (\"[employer]\" tumor profiling). See   that separate report, when complete, accession number [id number], for   analytic details/analytic results. Additional manual (not electronic) professional-only CPT code: Willard Cuenca M.D.   (Electronic Signature)   05/16/2022       FINAL DIAGNOSIS:        A. Left temporal mass:      - Metastatic carcinoma compatible with \"triple negative\" breast        primary; see Comment.        B. Left temporal dura:      - Dural tissue negative for carcinoma, keratin AE1+3/Cam 5.2        immunostain reviewed on each block.        C. Left occipital mass:      - Metastatic carcinoma compatible with \"triple negative\" breast        primary; see Comment. COMMENT:  A, C: The carcinoma at each site shows a nearly identical   appearance, with diffuse masses of highly polymorphous cells showing a   high mitotic/karyorrhectic rate, frequent \"giant\" cells, and plentiful   geographic necrosis.  Given the patient's clinical history (see below),   immunostains were requested. The tumor is strongly positive for CK7,   GATA3, SOX-10, and beta catenin (membranous pattern), with variable   cytoplasmic and partial perinuclear(\"Golgi\") positivity for keratin   AE1+3/Cam 5.2, a tiny minority population, including geographic foci,   positive for high molecular weight keratin (), and Ki-67   proliferation rate of 90% or more.  It is negative for ER, NE, and HER-2   (no positivity for any of the three stains), and also for CK20, Napsin-A,   S100 protein, villin, TTF-1, CDX2, and CD45 (LCA). The overall findings strongly support metastatic \"triple negative\" breast   carcinoma.   BRATI/BRATI     Problem List  Patient Active Problem List   Diagnosis    Thyroid nodule    Brain metastases ([facility] [address] [facility]    Brain mass    Status epilepticus ([facility] [address]    Duct cell carcinoma (HCC)    Cerebral edema (HCC)    Seizure (HCC)    Deep venous thrombosis (HCC)    Fever    Phlebitis    S/P craniotomy    Acute deep vein thrombosis (DVT) of axillary vein (HCC)    DVT, lower extremity, proximal, acute, bilateral (HCC)       Assessment and Plan:     Metastatic triple negative breast cancer  - Neglected Right breast primary  - Intracranial metastasis  - Liver metastases  - The patient and her mother agreed to surgery to resect her intracranial metastasis. - March 9 Craniotomy had to be canceled due to seizures & status epilepticus.  - Saturday March 12 craniotomy and tumor resection. Recovering.  - She completed radiation therapy. - Subsequent to radiation therapy she will require systemic chemotherapy. This will be in the outpatient setting. Dr. Nicole Min is planning Abraxane/pembrolizumab. - Recommend she follow-up with Dr Crystal Campa radiation (Dr Blue) oncology at the [facility], where she was seen previously, after this hospitalization to coordinate and implement systemic palliative chemotherapy now that she has completed radiation therapy. Instructions placed in D/C instructions section.  - Because the neglected R breast cancer is not bleeding or causing infection, it would be of little benefit to perform a mastectomy.  If that changes, a toilet mastectomy could be considered.      Seizures  - Secondary to intracranial metastases which have been resected and treated with radiation.  - Recommend continuing Keppra and Vimpat     DVT  - Per neurosurgery, she could not receive therapeutic anticoagulation until POD #14 - March 26. Therefore an IVC filter was placed until therapeutic anticoagulation could begin.  - Started on heparin, but developed HIT  - Therefore treated with Argatroban gtt and transitioned to Eliquis  - IVC filter will continue to be a nidus for further thrombus. - Anticoagulation is not typically held for radiation treatment  - Doppler, June 2022 shows acute and chronic clot in both lower extremities  - Agree with therapeutic anticoagulation with argatroban which can be transitioned to Eliquis. - Plan for thrombectomy Friday with possible IVC filter revision  - Will need to transition to [address] at discharge. Discussed the importance of complaince    IVC Thrombus  - S/P previous thrombectomy  - Transitioned from argatroban to Eliquis  - Plan for thrombectomy Friday with possible IVC filter revision  - Will need to transition to [address] at discharge. Discussed the importance of complaince     HIT  - Ivone positive  - TRES positive  - Received argatroban gtt w/ normalization of the plts during April hospitalization  - Transititoned to Eliquis  - Agree with argatroban and a transition to Eliquis for this hospitalization as well     Disposition  Medical oncology will follow peripherally. Will need follow-up with her primary Oncologist, Dr Tere Gutiérrez shortly after discharge to start systemic chemotherapy. Patient was seen and staffed with Dr. Heber Guerrero this AM      Una Mejia MD  6/8/2022     Attending:    Patient seen and examined. Data reviewed. Case discussed with Dr. Yina Sierra on morning rounds and agree with his progress note with my modification. Also discussed with Dr. Amy Sarmiento of IR. Agree with his plan for revascularization.     Kei Segundo MD

## 2022-06-08 NOTE — PROGRESS NOTES
Progress Note    Admit Date: 6/5/2022    Patient's PCP: Dr. Solomon Farley, APRN - CNP        Chief complaints:  Leg edema        HISTORY OF PRESENT ILLNESS:    This is a very pleasant 48 y.o. female with metastatic breast cancer status post craniotomy with mass resection, recent admission, recent REBECCA/DVT  s/p thrombectomy who presented to ED at OSH with worsening bilateral leg swelling.      Was admitted 3/25/2022 - 4/10/2022 from [place] due to worsening lower extremity pain and swelling.       During a previous admission  patient was diagnosed with RLE DVT and due to recent Craniotomy, anticoagulation could not be started till POD 14 so IVC filter was placed. Her platelets levels were noted to be low at [facility] and due to concern for worsening DVT she was sent to the hospital.      On admission 3/25/2022,  CT abdomen and pelvis showed thrombosis of the IVC at the level of the filter with marked distention of the caval bifurcation.  IR was consulted, pxt had thrombectomy 3/28/2022; she was continued on anticoagulation and as concern her filter will continue to be a nidus for infection, it was discussed that it would be re-evaluated\". .. in 6 weeks\".  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  Plan was radiation therapy and palliative chemo as outpatient.     Of note, she continued to have swelling in her lower extremity which persisted in spite of aggressive diureses, and was determined to be secondary to clot congestion in the IVC.      She presented with her family to the ED at OSH, for worsening leg swelling associated with pains in both lower extremities.       Patient states she was told to stop her Eliquis for radiation treatments, whch she did complete 10 treatments, and states she was never told to resume it; and hence has not been on it for about 4-6 weeks.      She is yet to start chemotherapy and had  placement of a right IJ port 5/24/2022 in anticipation of starting palliative chemo in several days.     She denies fevers, chills, chest pain or dyspnea.      In the ED at OSH, she was noted to be tachycardic, but afebrile. Routine labs were fairly normal apart from some anemia and thrombocytosis.          Interval HPI    C/o Right breast pain  Burning in the ears. Some left neck pain \"? From the way I slpet\"  Still leg pain, right thigh mostly  No fever  No dyspnea        Medications: Reviewed      Allergies:  Heparin      ROS: Review of Systems - Negative except as in HPI. .   All other systems reviewed and are negative. PHYSICAL EXAM:  /78   Pulse (!) 112   Temp 98.2 °F (36.8 °C) (Oral)   Resp 17   SpO2 93%     No results for input(s): POCGLU in the last 72 hours. General appearance: alert, appears stated age and cooperative  Head: Normocephalic, without obvious abnormality, atraumatic  Throat: lips, mucosa, and tongue normal; teeth and gums normal  Neck: no adenopathy, no carotid bruit, no JVD, supple, symmetrical, trachea midline and thyroid not enlarged, symmetric, no tenderness/mass/nodules  Lungs: clear to auscultation bilaterally  Breasts: Large malodorous fungating  right breast mass. Heart: Mildly Tachycardic; regular rhythm, S1, S2 normal, no murmur, click, rub or gallop  Abdomen: soft, non-tender; bowel sounds normal; no masses,  no organomegaly  Extremities: Gross bilateral LE edema involving entire LEs bilaterally, pitting, tender. Atraumatic, no cyanosis   Pulses: 2+ and symmetric  Neurologic: Grossly normal       LABS:  Recent Labs     06/06/22  0432   WBC 8.3   HGB 8.9*   HCT 26.6*   *                                                                  Recent Labs     06/06/22  0432      K 3.9      CO2 22   BUN 4*   CREATININE 0.6   GLUCOSE 104*     No results for input(s): AST, ALT, ALB, BILITOT, ALKPHOS in the last 72 hours.   No results for input(s): TROPONINI in the last 72 hours. No results for input(s): BNP in the last 72 hours. Lab Results   Component Value Date    PHART 7.466 03/29/2022    PNL5WRO 31.3 03/29/2022    PO2ART 84.9 03/29/2022     No results for input(s): INR in the last 72 hours. No results for input(s): NITRITE, COLORU, PHUR, LABCAST, WBCUA, RBCUA, MUCUS, TRICHOMONAS, YEAST, BACTERIA, CLARITYU, SPECGRAV, LEUKOCYTESUR, UROBILINOGEN, BILIRUBINUR, BLOODU, GLUCOSEU, AMORPHOUS in the last 72 hours. Invalid input(s): Meredith Radford       Assessment & Plan:     48 y.o. female with metastatic breast cancer status post craniotomy with mass resection, recent admission, recent REBECCA/DVT s/p thrombectomy who presented to ED at OSH with worsening bilateral leg swelling. Acute on chronic Bilateral lower extremity DVTs   Hx of recent IVC filter thrombus, REBECCA   - Recent admission for DVT developed in setting of malignancy, REBECCA. She could not receive therapeutic anticoagulation until POD #14 after craniotomy for brain mets, so IVC filter placed. - Was re-admitted with REBECCA and noted with severe thrombosis on LEs, including around filter S/p IR thrombectomy 3/28/2022. Was on Argatroban gtt and transitioned to Eliquis with plan to keep IVC filter in \"for 6 weeks\", to follow up with IR outpatient  - Had not been taking the Eliquis for at least 4-6 week PTA[de-identified] Pxt states she was told to suspend it for radiation txts, and never told to resume  - CT A/P to re-eval clot around her IVC filter. Report reviewed. Limited study. - US duplex: acute and chronic clot in both lower extremities  - Placed on argatroban, planned for thrombectomy 6/10/2022 with possible IVC filter revision  - To transition to Eliquis; lifelong at discharge  - Emphasized need to be compliant with [facility]  - Avoid all heparin/heparinoids.          Metastatic triple negative breast cancer with intracranial metastasis  Fungating, necrotic right breast mass  -Known brain mets S/p craniotomy on 3/12 for brain mets withseizures. -S/p radiation, completed with plans for palliative systemic chemotherapy - She had placement of a right IJ port 5/24/2022 - Oncology consulted. F/u with oncology at the [facility] office on discharge for Palliative chemo.  - CT A/P on admit: prob new metastatic lesions in the  Liver (\". .3-4 small. [place] ) and pancreas (1.6 cm )  -For her neglected right breast cancer with fungating, necrotic mass: Was seen by general surgery in the last admissions to consider palliative resection. Pxt had elected not to do so at that time, stating she was recovering from her brain surgery. Consulted Gen Surgery to re-evaluate role of palliative mastectomy: electing now to defer with need for [facility] for acute DVT; To re-eval plan if develops bleeding or infection from breast site  - On oxycodone as needed for pain  - Pall Med consulted        Seizures sec to brain met   -Continue Keppra 2 g daily, Keppra 2.5 g nightly Vimpat 200 mg. Appears may not have been taking the keppra as was intended. - Has appt with Dr. Alexandria Smith. Will see if they want to see her here this admit        Anxiety -Continue Ativan PRN         The patient and / or the family were informed of the results of any tests, a time was given to answer questions, a plan was proposed and they agreed with plan. Full Code   Pall Med consult        Disposition: in pxt pending progress. Thrombectomy planned Friday  Pxt from Home./ Likely will return Home at discharge.        Natalya Gates MD

## 2022-06-09 LAB
APTT: 70.3 SEC (ref 23–34.3)
HCT VFR BLD CALC: 25.9 % (ref 36–48)
HEMOGLOBIN: 8.4 G/DL (ref 12–16)
MCH RBC QN AUTO: 27 PG (ref 26–34)
MCHC RBC AUTO-ENTMCNC: 32.4 G/DL (ref 31–36)
MCV RBC AUTO: 83.4 FL (ref 80–100)
PDW BLD-RTO: 19.9 % (ref 12.4–15.4)
PLATELET # BLD: 570 K/UL (ref 135–450)
PMV BLD AUTO: 6.8 FL (ref 5–10.5)
RBC # BLD: 3.1 M/UL (ref 4–5.2)
WBC # BLD: 9.2 K/UL (ref 4–11)

## 2022-06-09 PROCEDURE — 6370000000 HC RX 637 (ALT 250 FOR IP): Performed by: INTERNAL MEDICINE

## 2022-06-09 PROCEDURE — 85730 THROMBOPLASTIN TIME PARTIAL: CPT

## 2022-06-09 PROCEDURE — 2060000000 HC ICU INTERMEDIATE R&B

## 2022-06-09 PROCEDURE — 2580000003 HC RX 258: Performed by: INTERNAL MEDICINE

## 2022-06-09 PROCEDURE — 85027 COMPLETE CBC AUTOMATED: CPT

## 2022-06-09 RX ADMIN — OXYCODONE 5 MG: 5 TABLET ORAL at 21:58

## 2022-06-09 RX ADMIN — OXYCODONE 5 MG: 5 TABLET ORAL at 03:50

## 2022-06-09 RX ADMIN — ARGATROBAN 0.5 MCG/KG/MIN: 50 INJECTION INTRAVENOUS at 03:50

## 2022-06-09 RX ADMIN — LEVETIRACETAM 2000 MG: 500 TABLET, FILM COATED ORAL at 09:54

## 2022-06-09 RX ADMIN — Medication 5 MG: at 21:59

## 2022-06-09 RX ADMIN — OXYCODONE 5 MG: 5 TABLET ORAL at 09:53

## 2022-06-09 RX ADMIN — ACETAMINOPHEN 650 MG: 325 TABLET ORAL at 16:30

## 2022-06-09 RX ADMIN — LACOSAMIDE 200 MG: 50 TABLET, FILM COATED ORAL at 21:58

## 2022-06-09 RX ADMIN — LEVETIRACETAM 2500 MG: 750 TABLET, FILM COATED ORAL at 21:59

## 2022-06-09 RX ADMIN — PANTOPRAZOLE SODIUM 40 MG: 40 TABLET, DELAYED RELEASE ORAL at 06:17

## 2022-06-09 RX ADMIN — METRONIDAZOLE 1000 MG: 500 TABLET ORAL at 09:52

## 2022-06-09 RX ADMIN — SODIUM CHLORIDE, PRESERVATIVE FREE 5 ML: 5 INJECTION INTRAVENOUS at 22:26

## 2022-06-09 RX ADMIN — SODIUM CHLORIDE, PRESERVATIVE FREE 10 ML: 5 INJECTION INTRAVENOUS at 09:55

## 2022-06-09 RX ADMIN — LACOSAMIDE 200 MG: 50 TABLET, FILM COATED ORAL at 09:54

## 2022-06-09 ASSESSMENT — PAIN DESCRIPTION - LOCATION
LOCATION: NECK
LOCATION: HEAD
LOCATION: BREAST
LOCATION: HEAD

## 2022-06-09 ASSESSMENT — PAIN SCALES - GENERAL
PAINLEVEL_OUTOF10: 7
PAINLEVEL_OUTOF10: 6
PAINLEVEL_OUTOF10: 0
PAINLEVEL_OUTOF10: 7
PAINLEVEL_OUTOF10: 0
PAINLEVEL_OUTOF10: 0
PAINLEVEL_OUTOF10: 8

## 2022-06-09 ASSESSMENT — PAIN DESCRIPTION - DESCRIPTORS
DESCRIPTORS: ACHING
DESCRIPTORS: BURNING
DESCRIPTORS: SORE
DESCRIPTORS: BURNING;SORE

## 2022-06-09 ASSESSMENT — PAIN DESCRIPTION - ONSET: ONSET: ON-GOING

## 2022-06-09 ASSESSMENT — PAIN DESCRIPTION - ORIENTATION
ORIENTATION: LEFT
ORIENTATION: RIGHT
ORIENTATION: LEFT;POSTERIOR

## 2022-06-09 ASSESSMENT — PAIN DESCRIPTION - PAIN TYPE: TYPE: CHRONIC PAIN

## 2022-06-09 ASSESSMENT — PAIN DESCRIPTION - FREQUENCY: FREQUENCY: INTERMITTENT

## 2022-06-09 ASSESSMENT — PAIN - FUNCTIONAL ASSESSMENT
PAIN_FUNCTIONAL_ASSESSMENT: ACTIVITIES ARE NOT PREVENTED
PAIN_FUNCTIONAL_ASSESSMENT: PREVENTS OR INTERFERES SOME ACTIVE ACTIVITIES AND ADLS

## 2022-06-09 NOTE — PLAN OF CARE
Problem: Discharge Planning  Goal: Discharge to home or other facility with appropriate resources  Outcome: Progressing  Discharge to home or other facility with appropriate resources: Identify barriers to discharge with patient and caregiver        Problem: Safety - Adult  Goal: Free from fall injury  Outcome: Progressing     Problem: Pain  Goal: Verbalizes/displays adequate comfort level or baseline comfort level  Outcome: Progressing  Flowsheets (Taken 6/9/2022 0058)  Verbalizes/displays adequate comfort level or baseline comfort level:   Encourage patient to monitor pain and request assistance   Assess pain using appropriate pain scale   Administer analgesics based on type and severity of pain and evaluate response   Implement non-pharmacological measures as appropriate and evaluate response  Note: PRN pain meds given. Will continue to monitor. Problem: ABCDS Injury Assessment  Goal: Absence of physical injury  Outcome: Progressing     Problem: Skin/Tissue Integrity - Adult  Goal: Skin integrity remains intact  6/9/2022 0058 by Harless Bence, RN  Outcome: Progressing  Flowsheets (Taken 6/9/2022 0058)  Skin Integrity Remains Intact: Monitor for areas of redness and/or skin breakdown  Note: Dressing changes to wound site daily. Will continue to monitor.       Problem: Hematologic - Adult  Goal: Maintains hematologic stability  6/9/2022 0058 by Harless Bence, RN  Outcome: Progressing  Flowsheets (Taken 6/8/2022 2003)  Maintains hematologic stability: Assess for signs and symptoms of bleeding or hemorrhage

## 2022-06-09 NOTE — PROGRESS NOTES
Progress Note    Admit Date: 6/5/2022    Patient's PCP: Dr. Wendi Devine, APRN - CNP        Chief complaints:  Leg edema        HISTORY OF PRESENT ILLNESS:    This is a very pleasant 48 y.o. female with metastatic breast cancer status post craniotomy with mass resection, recent admission, recent REBECCA/DVT  s/p thrombectomy who presented to ED at OSH with worsening bilateral leg swelling.      Was admitted 3/25/2022 - 4/10/2022 from [place] due to worsening lower extremity pain and swelling.       During a previous admission  patient was diagnosed with RLE DVT and due to recent Craniotomy, anticoagulation could not be started till POD 14 so IVC filter was placed. Her platelets levels were noted to be low at [facility] and due to concern for worsening DVT she was sent to the hospital.      On admission 3/25/2022,  CT abdomen and pelvis showed thrombosis of the IVC at the level of the filter with marked distention of the caval bifurcation.  IR was consulted, pxt had thrombectomy 3/28/2022; she was continued on anticoagulation and as concern her filter will continue to be a nidus for infection, it was discussed that it would be re-evaluated\". .. in 6 weeks\".  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  Plan was radiation therapy and palliative chemo as outpatient.     Of note, she continued to have swelling in her lower extremity which persisted in spite of aggressive diureses, and was determined to be secondary to clot congestion in the IVC.      She presented with her family to the ED at OSH, for worsening leg swelling associated with pains in both lower extremities.       Patient states she was told to stop her Eliquis for radiation treatments, whch she did complete 10 treatments, and states she was never told to resume it; and hence has not been on it for about 4-6 weeks.      She is yet to start chemotherapy and had  placement of a right IJ port 5/24/2022 in anticipation of starting palliative chemo in several days.     She denies fevers, chills, chest pain or dyspnea.      In the ED at OSH, she was noted to be tachycardic, but afebrile. Routine labs were fairly normal apart from some anemia and thrombocytosis.          Interval HPI  Right breast pain  Still leg pain, right thigh mostly  No fever  No dyspnea        Medications: Reviewed      Allergies:  Heparin      ROS: Review of Systems - Negative except as in HPI. .   All other systems reviewed and are negative. PHYSICAL EXAM:  /68   Pulse (!) 108   Temp 98.7 °F (37.1 °C) (Oral)   Resp 18   SpO2 93%     No results for input(s): POCGLU in the last 72 hours. General appearance: alert, appears stated age and cooperative  Head: Normocephalic, without obvious abnormality, atraumatic  Throat: lips, mucosa, and tongue normal; teeth and gums normal  Neck: no adenopathy, no carotid bruit, no JVD, supple, symmetrical, trachea midline and thyroid not enlarged, symmetric, no tenderness/mass/nodules  Lungs: clear to auscultation bilaterally  Breasts: Large malodorous fungating  right breast mass. Heart: Mildly Tachycardic; regular rhythm, S1, S2 normal, no murmur, click, rub or gallop  Abdomen: soft, non-tender; bowel sounds normal; no masses,  no organomegaly  Extremities: Gross bilateral LE edema involving entire LEs bilaterally, pitting, tender. Atraumatic, no cyanosis   Pulses: 2+ and symmetric  Neurologic: Grossly normal       LABS:  Recent Labs     06/09/22  0400   WBC 9.2   HGB 8.4*   HCT 25.9*   *                                                                  No results for input(s): NA, K, CL, CO2, BUN, CREATININE, GLUCOSE in the last 72 hours. Invalid input(s):  CA,  PHOS  No results for input(s): AST, ALT, ALB, BILITOT, ALKPHOS in the last 72 hours. No results for input(s): TROPONINI in the last 72 hours.   No results for input(s): BNP in the last 72 hours. Lab Results   Component Value Date    PHART 7.466 03/29/2022    ZCJ4XBL 31.3 03/29/2022    PO2ART 84.9 03/29/2022     No results for input(s): INR in the last 72 hours. No results for input(s): NITRITE, COLORU, PHUR, LABCAST, WBCUA, RBCUA, MUCUS, TRICHOMONAS, YEAST, BACTERIA, CLARITYU, SPECGRAV, LEUKOCYTESUR, UROBILINOGEN, BILIRUBINUR, BLOODU, GLUCOSEU, AMORPHOUS in the last 72 hours. Invalid input(s): Yamilet Reed       Assessment & Plan:     48 y.o. female with metastatic breast cancer status post craniotomy with mass resection, recent admission, recent REBECCA/DVT s/p thrombectomy who presented to ED at OSH with worsening bilateral leg swelling. Acute on chronic Bilateral lower extremity DVTs   Hx of recent IVC filter thrombus, REBECCA   - Recent admission for DVT developed in setting of malignancy, REBECCA. She could not receive therapeutic anticoagulation until POD #14 after craniotomy for brain mets, so IVC filter placed. - Was re-admitted with REBECCA and noted with severe thrombosis on LEs, including around filter S/p IR thrombectomy 3/28/2022. Was on Argatroban gtt and transitioned to Eliquis with plan to keep IVC filter in \"for 6 weeks\", to follow up with IR outpatient  - Had not been taking the Eliquis for at least 4-6 week PTA[de-identified] Pxt states she was told to suspend it for radiation txts, and never told to resume  - CT A/P to re-eval clot around her IVC filter. Report reviewed. Limited study. - US duplex: acute and chronic clot in both lower extremities  - Placed on argatroban, planned for thrombectomy 6/10/2022 with possible IVC filter revision  - To transition to Eliquis afer thrombectomy, when ok with IR for lifelonganticoagulation.  - Emphasized need to be compliant with [facility]  - Avoid all heparin/heparinoids.          Metastatic triple negative breast cancer with intracranial metastasis  Fungating, necrotic right breast mass  -Known brain mets S/p craniotomy on 3/12 for brain mets withseizures. -S/p radiation, completed with plans for palliative systemic chemotherapy - She had placement of a right IJ port 5/24/2022 - Oncology consulted. - CT A/P on admit: prob new metastatic lesions in the  Liver (\". .3-4 small. Mark [place] ) and pancreas (1.6 cm )  -For her neglected right breast cancer with fungating, necrotic mass: Was seen by general surgery in the last admissions to consider palliative resection. Pxt had elected not to do so at that time, stating she was recovering from her brain surgery. Consulted Gen Surgery to re-evaluate role of palliative mastectomy: consensus with oncology is to defer for now, to re-eval and consider toilet mastectomy if develops bleeding or infection from breast mass. - On oxycodone as needed for pain  - Pall Med consulted  - F/u with oncology at the [facility] office on discharge for Palliative chemo with Dr. Deepika Guadalupe. Seizures sec to brain met   -Continue Keppra 2 g daily, Keppra 2.5 g nightly Vimpat 200 mg. Appears may not have been taking the keppra as was intended. - Follow up with Dr. Nancy Dunn.         Anxiety -Continue Ativan PRN         The patient and / or the family were informed of the results of any tests, a time was given to answer questions, a plan was proposed and they agreed with plan. Full Code   Pall Med consult        Disposition: in pxt pending progress. Thrombectomy planned tomorrow  Pxt from Home./ Likely will return Home at discharge in 1-2 days.        Gracie Brittle, MD

## 2022-06-10 ENCOUNTER — HOSPITAL ENCOUNTER (INPATIENT)
Dept: INTERVENTIONAL RADIOLOGY/VASCULAR | Age: 47
Discharge: HOME | DRG: 169 | End: 2022-06-10
Attending: INTERNAL MEDICINE
Payer: MEDICAID

## 2022-06-10 ENCOUNTER — ANESTHESIA EVENT (OUTPATIENT)
Dept: INTERVENTIONAL RADIOLOGY/VASCULAR | Age: 47
DRG: 169 | End: 2022-06-10
Payer: MEDICAID

## 2022-06-10 ENCOUNTER — ANESTHESIA (OUTPATIENT)
Dept: INTERVENTIONAL RADIOLOGY/VASCULAR | Age: 47
DRG: 169 | End: 2022-06-10
Payer: MEDICAID

## 2022-06-10 VITALS
DIASTOLIC BLOOD PRESSURE: 88 MMHG | HEART RATE: 116 BPM | RESPIRATION RATE: 22 BRPM | OXYGEN SATURATION: 98 % | TEMPERATURE: 98.9 F | SYSTOLIC BLOOD PRESSURE: 122 MMHG

## 2022-06-10 DIAGNOSIS — I82.4Y3 DVT, LOWER EXTREMITY, PROXIMAL, ACUTE, BILATERAL (HCC): Primary | ICD-10-CM

## 2022-06-10 DIAGNOSIS — I82.423 ACUTE DEEP VEIN THROMBOSIS (DVT) OF ILIAC VEIN OF BOTH LOWER EXTREMITIES (HCC): ICD-10-CM

## 2022-06-10 LAB
ABO/RH: NORMAL
ANTIBODY SCREEN: NORMAL
APTT: 64.6 SEC (ref 23–34.3)
APTT: 68.9 SEC (ref 23–34.3)
HCT VFR BLD CALC: 25.6 % (ref 36–48)
HEMOGLOBIN: 8.3 G/DL (ref 12–16)
MCH RBC QN AUTO: 27 PG (ref 26–34)
MCHC RBC AUTO-ENTMCNC: 32.4 G/DL (ref 31–36)
MCV RBC AUTO: 83.3 FL (ref 80–100)
PDW BLD-RTO: 20 % (ref 12.4–15.4)
PLATELET # BLD: 550 K/UL (ref 135–450)
PMV BLD AUTO: 6.6 FL (ref 5–10.5)
POC ACT LR: 153 SEC
POC ACT LR: 299 SEC
POC ACT LR: 312 SEC
POC ACT LR: 316 SEC
POC ACT LR: 329 SEC
POC ACT LR: 339 SEC
POC ACT LR: 344 SEC
POC ACT LR: 358 SEC
POC ACT LR: 363 SEC
POC ACT LR: 372 SEC
POC ACT LR: 385 SEC
RBC # BLD: 3.07 M/UL (ref 4–5.2)
WBC # BLD: 9 K/UL (ref 4–11)

## 2022-06-10 PROCEDURE — 04CH3ZZ EXTIRPATION OF MATTER FROM RIGHT EXTERNAL ILIAC ARTERY, PERCUTANEOUS APPROACH: ICD-10-PCS | Performed by: STUDENT IN AN ORGANIZED HEALTH CARE EDUCATION/TRAINING PROGRAM

## 2022-06-10 PROCEDURE — 36299 UNLISTED PX VASCULAR NJX: CPT

## 2022-06-10 PROCEDURE — 3700000001 HC ADD 15 MINUTES (ANESTHESIA)

## 2022-06-10 PROCEDURE — 75860 VEIN X-RAY NECK: CPT

## 2022-06-10 PROCEDURE — 36005 INJECTION EXT VENOGRAPHY: CPT

## 2022-06-10 PROCEDURE — C1876 STENT, NON-COA/NON-COV W/DEL: HCPCS

## 2022-06-10 PROCEDURE — 2500000003 HC RX 250 WO HCPCS

## 2022-06-10 PROCEDURE — 86850 RBC ANTIBODY SCREEN: CPT

## 2022-06-10 PROCEDURE — 85347 COAGULATION TIME ACTIVATED: CPT

## 2022-06-10 PROCEDURE — 2060000000 HC ICU INTERMEDIATE R&B

## 2022-06-10 PROCEDURE — 2580000003 HC RX 258: Performed by: FAMILY MEDICINE

## 2022-06-10 PROCEDURE — 2580000003 HC RX 258: Performed by: INTERNAL MEDICINE

## 2022-06-10 PROCEDURE — 6360000002 HC RX W HCPCS

## 2022-06-10 PROCEDURE — 6370000000 HC RX 637 (ALT 250 FOR IP): Performed by: INTERNAL MEDICINE

## 2022-06-10 PROCEDURE — C1769 GUIDE WIRE: HCPCS

## 2022-06-10 PROCEDURE — C1725 CATH, TRANSLUMIN NON-LASER: HCPCS

## 2022-06-10 PROCEDURE — 36415 COLL VENOUS BLD VENIPUNCTURE: CPT

## 2022-06-10 PROCEDURE — 37248 TRLUML BALO ANGIOP 1ST VEIN: CPT

## 2022-06-10 PROCEDURE — 067G3DZ DILATION OF LEFT EXTERNAL ILIAC VEIN WITH INTRALUMINAL DEVICE, PERCUTANEOUS APPROACH: ICD-10-PCS | Performed by: STUDENT IN AN ORGANIZED HEALTH CARE EDUCATION/TRAINING PROGRAM

## 2022-06-10 PROCEDURE — 85027 COMPLETE CBC AUTOMATED: CPT

## 2022-06-10 PROCEDURE — 36012 PLACE CATHETER IN VEIN: CPT

## 2022-06-10 PROCEDURE — 37187 VENOUS MECH THROMBECTOMY: CPT

## 2022-06-10 PROCEDURE — C1894 INTRO/SHEATH, NON-LASER: HCPCS

## 2022-06-10 PROCEDURE — 85730 THROMBOPLASTIN TIME PARTIAL: CPT

## 2022-06-10 PROCEDURE — 76499 UNLISTED DX RADIOGRAPHIC PX: CPT

## 2022-06-10 PROCEDURE — 06CD3ZZ EXTIRPATION OF MATTER FROM LEFT COMMON ILIAC VEIN, PERCUTANEOUS APPROACH: ICD-10-PCS | Performed by: STUDENT IN AN ORGANIZED HEALTH CARE EDUCATION/TRAINING PROGRAM

## 2022-06-10 PROCEDURE — 2709999900 HC NON-CHARGEABLE SUPPLY

## 2022-06-10 PROCEDURE — 37238 OPEN/PERQ PLACE STENT SAME: CPT

## 2022-06-10 PROCEDURE — C1887 CATHETER, GUIDING: HCPCS

## 2022-06-10 PROCEDURE — C1773 RET DEV, INSERTABLE: HCPCS

## 2022-06-10 PROCEDURE — 37252 INTRVASC US NONCORONARY 1ST: CPT

## 2022-06-10 PROCEDURE — 7100000001 HC PACU RECOVERY - ADDTL 15 MIN

## 2022-06-10 PROCEDURE — 86900 BLOOD TYPING SEROLOGIC ABO: CPT

## 2022-06-10 PROCEDURE — 067C3DZ DILATION OF RIGHT COMMON ILIAC VEIN WITH INTRALUMINAL DEVICE, PERCUTANEOUS APPROACH: ICD-10-PCS | Performed by: STUDENT IN AN ORGANIZED HEALTH CARE EDUCATION/TRAINING PROGRAM

## 2022-06-10 PROCEDURE — 06C03ZZ EXTIRPATION OF MATTER FROM INFERIOR VENA CAVA, PERCUTANEOUS APPROACH: ICD-10-PCS | Performed by: STUDENT IN AN ORGANIZED HEALTH CARE EDUCATION/TRAINING PROGRAM

## 2022-06-10 PROCEDURE — B5191ZZ FLUOROSCOPY OF INFERIOR VENA CAVA USING LOW OSMOLAR CONTRAST: ICD-10-PCS | Performed by: STUDENT IN AN ORGANIZED HEALTH CARE EDUCATION/TRAINING PROGRAM

## 2022-06-10 PROCEDURE — 3700000000 HC ANESTHESIA ATTENDED CARE

## 2022-06-10 PROCEDURE — 06703DZ DILATION OF INFERIOR VENA CAVA WITH INTRALUMINAL DEVICE, PERCUTANEOUS APPROACH: ICD-10-PCS | Performed by: STUDENT IN AN ORGANIZED HEALTH CARE EDUCATION/TRAINING PROGRAM

## 2022-06-10 PROCEDURE — 6360000002 HC RX W HCPCS: Performed by: FAMILY MEDICINE

## 2022-06-10 PROCEDURE — C1753 CATH, INTRAVAS ULTRASOUND: HCPCS

## 2022-06-10 PROCEDURE — 067F3DZ DILATION OF RIGHT EXTERNAL ILIAC VEIN WITH INTRALUMINAL DEVICE, PERCUTANEOUS APPROACH: ICD-10-PCS | Performed by: STUDENT IN AN ORGANIZED HEALTH CARE EDUCATION/TRAINING PROGRAM

## 2022-06-10 PROCEDURE — 86901 BLOOD TYPING SEROLOGIC RH(D): CPT

## 2022-06-10 PROCEDURE — 067N3DZ DILATION OF LEFT FEMORAL VEIN WITH INTRALUMINAL DEVICE, PERCUTANEOUS APPROACH: ICD-10-PCS | Performed by: STUDENT IN AN ORGANIZED HEALTH CARE EDUCATION/TRAINING PROGRAM

## 2022-06-10 PROCEDURE — 7100000000 HC PACU RECOVERY - FIRST 15 MIN

## 2022-06-10 PROCEDURE — 37249 TRLUML BALO ANGIOP ADDL VEIN: CPT

## 2022-06-10 PROCEDURE — 6360000004 HC RX CONTRAST MEDICATION: Performed by: STUDENT IN AN ORGANIZED HEALTH CARE EDUCATION/TRAINING PROGRAM

## 2022-06-10 PROCEDURE — 06PY3DZ REMOVAL OF INTRALUMINAL DEVICE FROM LOWER VEIN, PERCUTANEOUS APPROACH: ICD-10-PCS | Performed by: STUDENT IN AN ORGANIZED HEALTH CARE EDUCATION/TRAINING PROGRAM

## 2022-06-10 PROCEDURE — 6360000002 HC RX W HCPCS: Performed by: INTERNAL MEDICINE

## 2022-06-10 PROCEDURE — C1757 CATH, THROMBECTOMY/EMBOLECT: HCPCS

## 2022-06-10 PROCEDURE — 37239 OPEN/PERQ PLACE STENT EA ADD: CPT

## 2022-06-10 PROCEDURE — 75822 VEIN X-RAY ARMS/LEGS: CPT

## 2022-06-10 PROCEDURE — 37193 REM ENDOVAS VENA CAVA FILTER: CPT

## 2022-06-10 PROCEDURE — 2500000003 HC RX 250 WO HCPCS: Performed by: FAMILY MEDICINE

## 2022-06-10 PROCEDURE — 067D3DZ DILATION OF LEFT COMMON ILIAC VEIN WITH INTRALUMINAL DEVICE, PERCUTANEOUS APPROACH: ICD-10-PCS | Performed by: STUDENT IN AN ORGANIZED HEALTH CARE EDUCATION/TRAINING PROGRAM

## 2022-06-10 RX ORDER — FENTANYL CITRATE 50 UG/ML
25 INJECTION, SOLUTION INTRAMUSCULAR; INTRAVENOUS EVERY 5 MIN PRN
Status: COMPLETED | OUTPATIENT
Start: 2022-06-10 | End: 2022-06-10

## 2022-06-10 RX ORDER — CLOPIDOGREL BISULFATE 75 MG/1
75 TABLET ORAL DAILY
Status: DISCONTINUED | OUTPATIENT
Start: 2022-06-11 | End: 2022-06-10

## 2022-06-10 RX ORDER — SODIUM CHLORIDE 0.9 % (FLUSH) 0.9 %
5-40 SYRINGE (ML) INJECTION EVERY 12 HOURS SCHEDULED
Status: DISCONTINUED | OUTPATIENT
Start: 2022-06-10 | End: 2022-06-11 | Stop reason: HOSPADM

## 2022-06-10 RX ORDER — ROCURONIUM BROMIDE 10 MG/ML
INJECTION, SOLUTION INTRAVENOUS PRN
Status: DISCONTINUED | OUTPATIENT
Start: 2022-06-10 | End: 2022-06-10 | Stop reason: SDUPTHER

## 2022-06-10 RX ORDER — LABETALOL HYDROCHLORIDE 5 MG/ML
10 INJECTION, SOLUTION INTRAVENOUS
Status: DISCONTINUED | OUTPATIENT
Start: 2022-06-10 | End: 2022-06-11 | Stop reason: HOSPADM

## 2022-06-10 RX ORDER — FENTANYL CITRATE 50 UG/ML
INJECTION, SOLUTION INTRAMUSCULAR; INTRAVENOUS PRN
Status: DISCONTINUED | OUTPATIENT
Start: 2022-06-10 | End: 2022-06-10 | Stop reason: SDUPTHER

## 2022-06-10 RX ORDER — SODIUM CHLORIDE 9 MG/ML
INJECTION, SOLUTION INTRAVENOUS CONTINUOUS
Status: DISCONTINUED | OUTPATIENT
Start: 2022-06-10 | End: 2022-06-11 | Stop reason: HOSPADM

## 2022-06-10 RX ORDER — CLOPIDOGREL BISULFATE 75 MG/1
75 TABLET ORAL DAILY
Status: DISCONTINUED | OUTPATIENT
Start: 2022-06-11 | End: 2022-06-14 | Stop reason: HOSPADM

## 2022-06-10 RX ORDER — ACETAMINOPHEN 325 MG/1
650 TABLET ORAL EVERY 4 HOURS PRN
Status: DISCONTINUED | OUTPATIENT
Start: 2022-06-10 | End: 2022-06-11 | Stop reason: HOSPADM

## 2022-06-10 RX ORDER — OXYCODONE HYDROCHLORIDE 5 MG/1
5 TABLET ORAL PRN
Status: ACTIVE | OUTPATIENT
Start: 2022-06-10 | End: 2022-06-10

## 2022-06-10 RX ORDER — DIPHENHYDRAMINE HYDROCHLORIDE 50 MG/ML
12.5 INJECTION INTRAMUSCULAR; INTRAVENOUS
Status: ACTIVE | OUTPATIENT
Start: 2022-06-10 | End: 2022-06-10

## 2022-06-10 RX ORDER — ONDANSETRON 2 MG/ML
INJECTION INTRAMUSCULAR; INTRAVENOUS PRN
Status: DISCONTINUED | OUTPATIENT
Start: 2022-06-10 | End: 2022-06-10 | Stop reason: SDUPTHER

## 2022-06-10 RX ORDER — LIDOCAINE HYDROCHLORIDE 10 MG/ML
INJECTION, SOLUTION EPIDURAL; INFILTRATION; INTRACAUDAL; PERINEURAL PRN
Status: DISCONTINUED | OUTPATIENT
Start: 2022-06-10 | End: 2022-06-10 | Stop reason: SDUPTHER

## 2022-06-10 RX ORDER — PROPOFOL 10 MG/ML
INJECTION, EMULSION INTRAVENOUS PRN
Status: DISCONTINUED | OUTPATIENT
Start: 2022-06-10 | End: 2022-06-10 | Stop reason: SDUPTHER

## 2022-06-10 RX ORDER — PROCHLORPERAZINE EDISYLATE 5 MG/ML
5 INJECTION INTRAMUSCULAR; INTRAVENOUS
Status: ACTIVE | OUTPATIENT
Start: 2022-06-10 | End: 2022-06-10

## 2022-06-10 RX ORDER — MIDAZOLAM HYDROCHLORIDE 1 MG/ML
INJECTION INTRAMUSCULAR; INTRAVENOUS PRN
Status: DISCONTINUED | OUTPATIENT
Start: 2022-06-10 | End: 2022-06-10 | Stop reason: SDUPTHER

## 2022-06-10 RX ORDER — SODIUM CHLORIDE 9 MG/ML
25 INJECTION, SOLUTION INTRAVENOUS PRN
Status: DISCONTINUED | OUTPATIENT
Start: 2022-06-10 | End: 2022-06-11 | Stop reason: HOSPADM

## 2022-06-10 RX ORDER — CLOPIDOGREL 300 MG/1
300 TABLET, FILM COATED ORAL ONCE
Status: DISCONTINUED | OUTPATIENT
Start: 2022-06-10 | End: 2022-06-10

## 2022-06-10 RX ORDER — SODIUM CHLORIDE 9 MG/ML
INJECTION, SOLUTION INTRAVENOUS CONTINUOUS PRN
Status: DISCONTINUED | OUTPATIENT
Start: 2022-06-10 | End: 2022-06-10 | Stop reason: SDUPTHER

## 2022-06-10 RX ORDER — GLYCOPYRROLATE 0.2 MG/ML
INJECTION INTRAMUSCULAR; INTRAVENOUS PRN
Status: DISCONTINUED | OUTPATIENT
Start: 2022-06-10 | End: 2022-06-10 | Stop reason: SDUPTHER

## 2022-06-10 RX ORDER — ONDANSETRON 2 MG/ML
4 INJECTION INTRAMUSCULAR; INTRAVENOUS
Status: ACTIVE | OUTPATIENT
Start: 2022-06-10 | End: 2022-06-10

## 2022-06-10 RX ORDER — OXYCODONE HYDROCHLORIDE 5 MG/1
10 TABLET ORAL PRN
Status: ACTIVE | OUTPATIENT
Start: 2022-06-10 | End: 2022-06-10

## 2022-06-10 RX ORDER — DEXAMETHASONE SODIUM PHOSPHATE 4 MG/ML
INJECTION, SOLUTION INTRA-ARTICULAR; INTRALESIONAL; INTRAMUSCULAR; INTRAVENOUS; SOFT TISSUE PRN
Status: DISCONTINUED | OUTPATIENT
Start: 2022-06-10 | End: 2022-06-10 | Stop reason: SDUPTHER

## 2022-06-10 RX ORDER — MEPERIDINE HYDROCHLORIDE 25 MG/ML
12.5 INJECTION INTRAMUSCULAR; INTRAVENOUS; SUBCUTANEOUS EVERY 5 MIN PRN
Status: DISCONTINUED | OUTPATIENT
Start: 2022-06-10 | End: 2022-06-11 | Stop reason: HOSPADM

## 2022-06-10 RX ORDER — LORAZEPAM 2 MG/ML
0.5 INJECTION INTRAMUSCULAR
Status: ACTIVE | OUTPATIENT
Start: 2022-06-10 | End: 2022-06-10

## 2022-06-10 RX ORDER — SODIUM CHLORIDE 0.9 % (FLUSH) 0.9 %
5-40 SYRINGE (ML) INJECTION PRN
Status: DISCONTINUED | OUTPATIENT
Start: 2022-06-10 | End: 2022-06-11 | Stop reason: HOSPADM

## 2022-06-10 RX ORDER — CLOPIDOGREL BISULFATE 75 MG/1
300 TABLET ORAL ONCE
Status: COMPLETED | OUTPATIENT
Start: 2022-06-10 | End: 2022-06-10

## 2022-06-10 RX ADMIN — HYDROMORPHONE HYDROCHLORIDE 0.5 MG: 1 INJECTION, SOLUTION INTRAMUSCULAR; INTRAVENOUS; SUBCUTANEOUS at 17:01

## 2022-06-10 RX ADMIN — OXYCODONE 5 MG: 5 TABLET ORAL at 05:42

## 2022-06-10 RX ADMIN — FENTANYL CITRATE 25 MCG: 50 INJECTION, SOLUTION INTRAMUSCULAR; INTRAVENOUS at 16:45

## 2022-06-10 RX ADMIN — ONDANSETRON 4 MG: 2 INJECTION INTRAMUSCULAR; INTRAVENOUS at 09:37

## 2022-06-10 RX ADMIN — LEVETIRACETAM 2500 MG: 750 TABLET, FILM COATED ORAL at 21:54

## 2022-06-10 RX ADMIN — ARGATROBAN 0.5 MCG/KG/MIN: 50 INJECTION INTRAVENOUS at 21:48

## 2022-06-10 RX ADMIN — GLYCOPYRROLATE 0.2 MG: 0.2 INJECTION INTRAMUSCULAR; INTRAVENOUS at 10:41

## 2022-06-10 RX ADMIN — LACOSAMIDE 200 MG: 50 TABLET, FILM COATED ORAL at 21:52

## 2022-06-10 RX ADMIN — IOHEXOL 115 ML: 350 INJECTION, SOLUTION INTRAVENOUS at 15:20

## 2022-06-10 RX ADMIN — FENTANYL CITRATE 25 MCG: 50 INJECTION, SOLUTION INTRAMUSCULAR; INTRAVENOUS at 16:50

## 2022-06-10 RX ADMIN — HYDROMORPHONE HYDROCHLORIDE 0.5 MG: 1 INJECTION, SOLUTION INTRAMUSCULAR; INTRAVENOUS; SUBCUTANEOUS at 17:10

## 2022-06-10 RX ADMIN — CLOPIDOGREL BISULFATE 300 MG: 75 TABLET ORAL at 21:52

## 2022-06-10 RX ADMIN — SODIUM CHLORIDE, PRESERVATIVE FREE 10 ML: 5 INJECTION INTRAVENOUS at 21:55

## 2022-06-10 RX ADMIN — ROCURONIUM BROMIDE 50 MG: 10 INJECTION INTRAVENOUS at 09:26

## 2022-06-10 RX ADMIN — FENTANYL CITRATE 25 MCG: 50 INJECTION, SOLUTION INTRAMUSCULAR; INTRAVENOUS at 16:55

## 2022-06-10 RX ADMIN — FENTANYL CITRATE 100 MCG: 50 INJECTION, SOLUTION INTRAMUSCULAR; INTRAVENOUS at 09:25

## 2022-06-10 RX ADMIN — FENTANYL CITRATE 25 MCG: 50 INJECTION, SOLUTION INTRAMUSCULAR; INTRAVENOUS at 16:40

## 2022-06-10 RX ADMIN — ROCURONIUM BROMIDE 50 MG: 10 INJECTION INTRAVENOUS at 11:36

## 2022-06-10 RX ADMIN — FENTANYL CITRATE 100 MCG: 50 INJECTION, SOLUTION INTRAMUSCULAR; INTRAVENOUS at 15:59

## 2022-06-10 RX ADMIN — LIDOCAINE HYDROCHLORIDE 100 MG: 10 INJECTION, SOLUTION EPIDURAL; INFILTRATION; INTRACAUDAL; PERINEURAL at 09:24

## 2022-06-10 RX ADMIN — HYDROMORPHONE HYDROCHLORIDE 0.25 MG: 1 INJECTION, SOLUTION INTRAMUSCULAR; INTRAVENOUS; SUBCUTANEOUS at 21:53

## 2022-06-10 RX ADMIN — Medication 5 MG: at 21:52

## 2022-06-10 RX ADMIN — DEXAMETHASONE SODIUM PHOSPHATE 4 MG: 4 INJECTION, SOLUTION INTRAMUSCULAR; INTRAVENOUS at 09:37

## 2022-06-10 RX ADMIN — PROPOFOL 140 MG: 10 INJECTION, EMULSION INTRAVENOUS at 09:26

## 2022-06-10 RX ADMIN — PHENYLEPHRINE HYDROCHLORIDE 50 MCG: 10 INJECTION, SOLUTION INTRAMUSCULAR; INTRAVENOUS; SUBCUTANEOUS at 09:33

## 2022-06-10 RX ADMIN — PROPOFOL 70 MG: 10 INJECTION, EMULSION INTRAVENOUS at 15:56

## 2022-06-10 RX ADMIN — GLYCOPYRROLATE 0.2 MG: 0.2 INJECTION INTRAMUSCULAR; INTRAVENOUS at 11:20

## 2022-06-10 RX ADMIN — MIDAZOLAM HYDROCHLORIDE 2 MG: 2 INJECTION, SOLUTION INTRAMUSCULAR; INTRAVENOUS at 09:21

## 2022-06-10 RX ADMIN — PANTOPRAZOLE SODIUM 40 MG: 40 TABLET, DELAYED RELEASE ORAL at 05:42

## 2022-06-10 RX ADMIN — SODIUM CHLORIDE: 9 INJECTION, SOLUTION INTRAVENOUS at 09:14

## 2022-06-10 ASSESSMENT — PAIN SCALES - GENERAL
PAINLEVEL_OUTOF10: 10
PAINLEVEL_OUTOF10: 0
PAINLEVEL_OUTOF10: 8
PAINLEVEL_OUTOF10: 10
PAINLEVEL_OUTOF10: 8
PAINLEVEL_OUTOF10: 0
PAINLEVEL_OUTOF10: 0
PAINLEVEL_OUTOF10: 5

## 2022-06-10 ASSESSMENT — PAIN - FUNCTIONAL ASSESSMENT
PAIN_FUNCTIONAL_ASSESSMENT: PREVENTS OR INTERFERES SOME ACTIVE ACTIVITIES AND ADLS

## 2022-06-10 ASSESSMENT — PAIN DESCRIPTION - LOCATION
LOCATION: LEG
LOCATION: BREAST
LOCATION: LEG
LOCATION: LEG

## 2022-06-10 ASSESSMENT — PAIN DESCRIPTION - DESCRIPTORS
DESCRIPTORS: ACHING
DESCRIPTORS: SORE

## 2022-06-10 ASSESSMENT — PAIN DESCRIPTION - FREQUENCY
FREQUENCY: CONTINUOUS

## 2022-06-10 ASSESSMENT — PAIN DESCRIPTION - ONSET
ONSET: ON-GOING

## 2022-06-10 ASSESSMENT — PAIN DESCRIPTION - PAIN TYPE
TYPE: SURGICAL PAIN
TYPE: CHRONIC PAIN
TYPE: SURGICAL PAIN
TYPE: SURGICAL PAIN

## 2022-06-10 ASSESSMENT — PAIN DESCRIPTION - ORIENTATION
ORIENTATION: RIGHT;LEFT
ORIENTATION: RIGHT

## 2022-06-10 NOTE — ANESTHESIA PRE PROCEDURE
Department of Anesthesiology  Preprocedure Note       Name:  See Adames   Age:  48 y.o.  :  1975                                          MRN:  [id number]         Date:  6/10/2022      Surgeon: * No surgeons listed *    Procedure: * No procedures listed *    Medications prior to admission:   Prior to Admission medications    Medication Sig Start Date End Date Taking? Authorizing Provider   spironolactone (ALDACTONE) 50 MG tablet Take 1 tablet by mouth daily 22   Nisha Mejia MD   apixaban (ELIQUIS) 5 MG TABS tablet Take 2 tablets by mouth 2 times daily for 3 doses 22  Angela Valera MD   apixaban (ELIQUIS) 5 MG TABS tablet Take 1 tablet by mouth 2 times daily  Patient not taking: Reported on 2022   Angela Valera MD   Multiple Vitamin (MULTIVITAMIN ADULT PO) Take 1 capsule by mouth daily    Historical Provider, MD   Calcium Carbonate-Vitamin D (OYSTER SHELL CALCIUM/D) 500-200 MG-UNIT TABS Take 1 tablet by mouth 2 times daily (with meals)    Historical Provider, MD   ibuprofen (ADVIL;MOTRIN) 800 MG tablet Take 800 mg by mouth every 8 hours as needed    Historical Provider, MD   levETIRAcetam (KEPPRA) 1000 MG tablet Take 2 tablets by mouth daily 3/21/22   Khris Yorkate, DO   levETIRAcetam (KEPPRA) 500 MG tablet Take 5 tablets by mouth nightly 3/20/22   Corinne Black, DO   melatonin 5 MG TBDP disintegrating tablet Take 1 tablet by mouth nightly 3/20/22   Corinne Black, DO   QUEtiapine (SEROQUEL) 25 MG tablet Take 0.5 tablets by mouth nightly for 3 days 3/20/22 3/23/22  Corinne Black, DO   pantoprazole (PROTONIX) 40 MG tablet Take 1 tablet by mouth every morning (before breakfast) 3/18/22   Claude Dates, MD   lacosamide (VIMPAT) 200 MG tablet Take 1 tablet by mouth 2 times daily for 30 days. 3/17/22 4/16/22  Claude Dates, MD       Current medications:    No current facility-administered medications for this encounter. No current outpatient medications on file. Facility-Administered Medications Ordered in Other Encounters   Medication Dose Route Frequency Provider Last Rate Last Admin    metroNIDAZOLE (FLAGYL) tablet 1,000 mg  1,000 mg Topical Daily Baldemar Levine MD   1,000 mg at 06/09/22 0952    argatroban infusion 50mg in 0.9% sodium chloride 50 mL (premix)  0.0625-10 mcg/kg/min IntraVENous Continuous Baldemar Levine MD 2.6 mL/hr at 06/09/22 1635 0.5 mcg/kg/min at 06/09/22 1635    sodium chloride flush 0.9 % injection 5-40 mL  5-40 mL IntraVENous 2 times per day Baldemar Levine MD   5 mL at 06/09/22 2226    sodium chloride flush 0.9 % injection 5-40 mL  5-40 mL IntraVENous PRN Baldemar Levine MD        0.9 % sodium chloride infusion   IntraVENous PRN Baldemar Levine MD        ondansetron (ZOFRAN-ODT) disintegrating tablet 4 mg  4 mg Oral Q8H PRN Baldemar Levine MD        Or    ondansetron (ZOFRAN) injection 4 mg  4 mg IntraVENous Q6H PRN Baldemar Levine MD        polyethylene glycol (GLYCOLAX) packet 17 g  17 g Oral Daily PRN Baldemar Levine MD        acetaminophen (TYLENOL) tablet 650 mg  650 mg Oral Q6H PRN Baldemar Levine MD   650 mg at 06/09/22 1630    Or    acetaminophen (TYLENOL) suppository 650 mg  650 mg Rectal Q6H PRN Baldemar Levine MD        pantoprazole (PROTONIX) tablet 40 mg  40 mg Oral QAM AC Baldemar Levine MD   40 mg at 06/10/22 0542    melatonin disintegrating tablet 5 mg  5 mg Oral Nightly Baldemar Levine MD   5 mg at 06/09/22 2159    levETIRAcetam (KEPPRA) tablet 2,500 mg  2,500 mg Oral Nightly Baldemar Levine MD   2,500 mg at 06/09/22 2159    levETIRAcetam (KEPPRA) tablet 2,000 mg  2,000 mg Oral Daily Baldemar Levine MD   2,000 mg at 06/09/22 0954    lacosamide (VIMPAT) tablet 200 mg  200 mg Oral BID Baldemar Levine MD   200 mg at 06/09/22 2158    oxyCODONE (ROXICODONE) immediate release tablet 5 mg  5 mg Oral Q4H PRN Baldemar Levine MD   5 mg at 06/10/22 0542    HYDROmorphone (DILAUDID) injection 0.25 mg  0.25 mg IntraVENous Q3H PRN Yinka Eugene MD   0.25 mg at 06/08/22 2117       Allergies: Allergies   Allergen Reactions    Heparin Other (See Comments)     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 A19    DVT, lower extremity, proximal, acute, bilateral ([facility] I82.4Y3       Past Medical History:        Diagnosis Date    Cancer ([facility]     History of blood transfusion     Hx of blood clots     Thyroid nodule        Past Surgical History:        Procedure Laterality Date    CRANIOTOMY Left 03/12/2022    LEFT TEMPORAL PARIETAL OCCIPITAL CRANIOTOMY FOR TUMOR RESECTION performed by Angelica Gutiérrez MD at [facility] IR IVC FILTER PLACEMENT W IMAGING N/A 03/16/2022    [facility]    IR IVC FILTER PLACEMENT W IMAGING  03/16/2022    IR IVC FILTER PLACEMENT W IMAGING 3/16/2022 [facility] SPECIAL PROCEDURES    IR PORT PLACEMENT EQUAL OR GREATER THAN 5 YEARS Left 05/24/2022    Power Port; [address] access; 28 cm; Dr. Malloy Prim 5 YEARS  5/24/2022    IR PORT PLACEMENT EQUAL OR GREATER THAN 5 YEARS 5/24/2022 [facility] SPECIAL PROCEDURES    [facility]  03/28/20[facility]    IR THROMB [address] 3/28/2022 [facility] SPECIAL PROCEDURES    UPPER GASTROINTESTINAL ENDOSCOPY N/A 04/02/2022    EGD DIAGNOSTIC ONLY performed by Nerissa Diamond MD at [address] History:    Social History     Tobacco Use    Smoking status: Never Smoker    Smokeless tobacco: Never Used   Substance Use Topics    Alcohol use: Never Alcohol/week: 0.0 standard drinks                                Counseling given: Not Answered      Vital Signs (Current): There were no vitals filed for this visit. BP Readings from Last 3 Encounters:   06/10/22 103/67   06/05/22 123/79   05/24/22 120/72       NPO Status:                                                                                 BMI:   Wt Readings from Last 3 Encounters:   06/05/22 189 lb 9.5 oz (86 kg)   05/24/22 197 lb (89.4 kg)   04/10/22 197 lb 5 oz (89.5 kg)     There is no height or weight on file to calculate BMI.    CBC:   Lab Results   Component Value Date    WBC 9.0 06/10/2022    RBC 3.07 06/10/2022    HGB 8.3 06/10/2022    HCT 25.6 06/10/2022    MCV 83.3 06/10/2022    RDW 20.0 06/10/2022     06/10/2022       CMP:   Lab Results   Component Value Date     06/06/2022    K 3.9 06/06/2022     06/06/2022    CO2 22 06/06/2022    BUN 4 06/06/2022    CREATININE 0.6 06/06/2022    GFRAA >60 06/06/2022    AGRATIO 1.0 03/03/2022    LABGLOM >60 06/06/2022    GLUCOSE 104 06/06/2022    PROT 6.6 06/05/2022    CALCIUM 8.7 06/06/2022    BILITOT <0.2 06/05/2022    ALKPHOS 59 06/05/2022    AST 11 06/05/2022    ALT 9 06/05/2022       POC Tests: No results for input(s): POCGLU, POCNA, POCK, POCCL, POCBUN, POCHEMO, POCHCT in the last 72 hours.     Coags:   Lab Results   Component Value Date    PROTIME 14.9 06/05/2022    INR 1.18 06/05/2022    APTT 68.9 06/10/2022       HCG (If Applicable):   Lab Results   Component Value Date    PREGTESTUR Negative 03/11/2022        ABGs:   Lab Results   Component Value Date    PHART 7.466 03/29/2022    PO2ART 84.9 03/29/2022    OGX9FTC 31.3 03/29/2022    UCI0KPJ 23 03/29/2022    BEART -1.0 03/29/2022    Y5GLQXMZ 99 03/29/2022        Type & Screen (If Applicable):  No results found for: LABABO, LABRH    Drug/Infectious Status (If Applicable):  No results found for: HIV, HEPCAB    COVID-19 Screening (If Applicable): No results found for: COVID19        Anesthesia Evaluation    Airway: Mallampati: II  TM distance: >3 FB   Neck ROM: full  Mouth opening: > = 3 FB   Dental:          Pulmonary:                              Cardiovascular:            Rhythm: regular  Rate: normal                    Neuro/Psych:   (+) seizures:,             GI/Hepatic/Renal:             Endo/Other:    (+) malignancy/cancer. Abdominal:             Vascular: Other Findings:           Anesthesia Plan      general     ASA 3       Induction: intravenous. Anesthetic plan and risks discussed with patient. Plan discussed with CRNA.                     Brittany Ellis MD   6/10/2022

## 2022-06-10 NOTE — PROGRESS NOTES
At [address] patient back to room from PACU, family at bedside, chace LE in place. Pt repositioned and linen changed. Pt able to drink a half cup of gingerale and few bites of soup. Pt sleepy and follows commands. See flowsheet for assessment.

## 2022-06-10 NOTE — PROGRESS NOTES
Patient arrived from OR to PACU # 10 s/p IR IVC FILTER RETRIEVAL per . Attached to PACU monitoring device, report received from CRNA who stated patient hemodynamically stable intraoperatively & Cath LAB RN. Arrived drowsy from anesthesia, 1+B/L PP, Tegaderm drsgs behind b/l popliteals CDI, on bedrest x 4 hours, argatroban infusing from procedure. Continue to monitor, VSS tachy.

## 2022-06-10 NOTE — BRIEF OP NOTE
Patient:  Priscila Moscoso   :   1975    The procedure including risks and benefits was discussed at length with the patient (or designated family member) and all questions were answered. Informed consent to proceed with the procedure was given. PROCEDURE : IVC filter removal, B/l iliocaval thrombectomy, b/l iliocaval stenting, b/l lower extremity thrombectomy    BLOOD LOSS : Minimal  SPECIMENS : None  COMPLICATIONS : None  CONDITION : Stable    Patient should remain supine with legs extended and head of bed up to 45 degrees for 4 hours. After 4 hours patient can ambulate. She should receive 300 mg of plavix once today (ordered)  She should start 75 mg of plavix tomorrow for 90 days (ordered while in hospital). Please order this on discharge. She needs to ambulate and walk 30 minutes daily. CT venogram in 1 month (ordered)  She can transition to oral anticoagulation tomorrow. Ok for discharge tomorrow from IR standpoint it no issue. R IJ and b/l popliteal sutures can be taken out tomorrow prior to discharge.       Cameron Magallanes MD

## 2022-06-10 NOTE — PROGRESS NOTES
Patient:  Tim Trevizo   :   1975      Relevant patient history reviewed and discussed. The procedure including risks and benefits was discussed at length with the patient (or designated family member) and all questions were answered. Informed consent to proceed with the procedure was given. Condition : stable    [facility] nurses notes reviewed and agreed. Medications reviewed. 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

## 2022-06-10 NOTE — CARE COORDINATION
Case Management Assessment           Daily Note                 Date/ Time of Note: 6/10/2022 4:42 PM         Note completed by: Devonte Lee RN    Patient Name: Antony Beltrán  YOB: 1975    Diagnosis:Acute deep vein thrombosis (DVT) of axillary vein, unspecified laterality ([facility] [X95. A19]  DVT, lower extremity, proximal, acute, bilateral ([facility] [I82.4Y3]  Patient Admission Status: Inpatient    Date of Admission:6/5/2022  1:22 PM Length of Stay: 5 GLOS:      Current Plan of Care: IR Procedure today  ________________________________________________________________________________________  PT AM-PAC:   / 24 per last evaluation on: not ordered    OT AM-PAC:   / 24 per last evaluation on: not ordered    DME Needs for discharge:   ________________________________________________________________________________________  Discharge Plan: Home with [address] [place] [address] Way: [facility]    Tentative discharge date: TBD    Current barriers to discharge: Medical stability    Referrals completed: Not Applicable    Resources/ information provided: Not indicated at this time  ________________________________________________________________________________________  Case Management Notes: CM continues to follow for DC planning and needs. Patient down for IR procedure this afternoon. Plan is for patient to return home with Keely Castro at [place]. Caesar Becerril and her family were provided with choice of provider; she and her family are in agreement with the discharge plan.     Care Transition Patient: Candice Lee RN  [facility]  Case Management Department  Ph: [phone]  Fax: [phone]

## 2022-06-10 NOTE — PROGRESS NOTES
PACU Transfer to Floor Note    * No procedures listed *    Current Allergies: Heparin    Pt meets criteria as per Eric Score and ASPAN Standards to transfer to next phase of care. No results for input(s): POCGLU in the last 72 hours. Vitals:    06/10/22 1800   BP: 122/88   Pulse: (!) 116   Resp: 22   Temp: 98.9 °F (37.2 °C)   SpO2: 98%     Vitals within 20% of pt's admission vitals as per ERIC SCORE    SpO2: 98 %    O2 Flow Rate (L/min): 2 L/min      Intake/Output Summary (Last 24 hours) at 6/10/2022 1828  Last data filed at 6/10/2022 1604  Gross per 24 hour   Intake 810 ml   Output 4630 ml   Net -3820 ml       Pain assessment:  receiving treatment    Pain Level: 5    Patient was assessed for alterations to skin integrity. There were not alterations observed. Is patient incontinent: no    Handoff report given at bedside.    No family present in [address] [place] [address] per floor nurse family in pt's room      6/10/2022 6:28 PM

## 2022-06-10 NOTE — PLAN OF CARE
Problem: Discharge Planning  Goal: Discharge to home or other facility with appropriate resources  Outcome: Progressing  Note:  following for D/C help. Problem: Safety - Adult  Goal: Free from fall injury  Outcome: Progressing  Note: No falls noted thus far this shift, bed in lowest position, alarm on, non-skid socks on, call light within reach, hourly checks, safety maintained, will continue to monitor. Problem: Pain  Goal: Verbalizes/displays adequate comfort level or baseline comfort level  Outcome: Progressing  Note: PRN pain meds given d/t pt having pain. Pt verbalized understanding. Problem: ABCDS Injury Assessment  Goal: Absence of physical injury  Outcome: Progressing     Problem: Skin/Tissue Integrity - Adult  Goal: Skin integrity remains intact  Outcome: Progressing  Note: Daily dressing changes to wound site with antibx.       Problem: Neurosensory - Adult  Goal: Absence of seizures  Outcome: Adequate for Discharge

## 2022-06-10 NOTE — PROGRESS NOTES
told to resume it; and hence has not been on it for about 4-6 weeks.      She is yet to start chemotherapy and had  placement of a right IJ port 5/24/2022 in anticipation of starting palliative chemo in several days.     She denies fevers, chills, chest pain or dyspnea.      In the ED at OSH, she was noted to be tachycardic, but afebrile. Routine labs were fairly normal apart from some anemia and thrombocytosis. \"        Medications: Reviewed      Allergies:  Heparin      ROS: Review of Systems - Negative except as in HPI. .   All other systems reviewed and are negative. PHYSICAL EXAM:  /67   Pulse (!) 108   Temp 98.8 °F (37.1 °C) (Oral)   Resp 18   SpO2 93%     No results for input(s): POCGLU in the last 72 hours. General appearance: alert, appears stated age and cooperative  Head: Normocephalic, without obvious abnormality, atraumatic  Throat: lips, mucosa, and tongue normal; teeth and gums normal  Neck: no adenopathy, no carotid bruit, no JVD, supple, symmetrical, trachea midline and thyroid not enlarged, symmetric, no tenderness/mass/nodules  Lungs: clear to auscultation bilaterally  Breasts: Large malodorous fungating  right breast mass. Heart: Mildly Tachycardic; regular rhythm, S1, S2 normal, no murmur, click, rub or gallop  Abdomen: soft, non-tender; bowel sounds normal; no masses,  no organomegaly  Extremities: Gross bilateral LE edema involving entire LEs bilaterally, pitting, tender. Atraumatic, no cyanosis   Pulses: 2+ and symmetric  Neurologic: Grossly normal       LABS:  Recent Labs     06/09/22  0400 06/10/22  0553   WBC 9.2 9.0   HGB 8.4* 8.3*   HCT 25.9* 25.6*   * 550*                                                                  No results for input(s): NA, K, CL, CO2, BUN, CREATININE, GLUCOSE in the last 72 hours. Invalid input(s):  CA,  PHOS  No results for input(s): AST, ALT, ALB, BILITOT, ALKPHOS in the last 72 hours.   No results for input(s): TROPONINI in the last 72 hours. No results for input(s): BNP in the last 72 hours. Lab Results   Component Value Date    PHART 7.466 03/29/2022    KIG6TNL 31.3 03/29/2022    PO2ART 84.9 03/29/2022     No results for input(s): INR in the last 72 hours. No results for input(s): NITRITE, COLORU, PHUR, LABCAST, WBCUA, RBCUA, MUCUS, TRICHOMONAS, YEAST, BACTERIA, CLARITYU, SPECGRAV, LEUKOCYTESUR, UROBILINOGEN, BILIRUBINUR, BLOODU, GLUCOSEU, AMORPHOUS in the last 72 hours. Invalid input(s): Pretty Sophie       Assessment & Plan:     48 y.o. female with metastatic breast cancer status post craniotomy with mass resection, recent admission, recent REBECCA/DVT s/p thrombectomy who presented to ED at OSH with worsening bilateral leg swelling. Acute on chronic Bilateral lower extremity DVTs   Hx of recent IVC filter thrombus, REBECCA   - Recent admission for DVT developed in setting of malignancy, REBECCA. She could not receive therapeutic anticoagulation until POD #14 after craniotomy for brain mets, so IVC filter placed. - Was re-admitted with REBECCA and noted with severe thrombosis on LEs, including around filter S/p IR thrombectomy 3/28/2022. Was on Argatroban gtt and transitioned to Eliquis with plan to keep IVC filter in \"for 6 weeks\", to follow up with IR outpatient  - Had not been taking the Eliquis for at least 4-6 week PTA[de-identified] Pxt states she was told to suspend it for radiation txts, and never told to resume  - CT A/P to re-eval clot around her IVC filter. Report reviewed. Limited study. - US duplex: acute and chronic clot in both lower extremities  - Placed on argatroban  -6/10 s/p IVC filter removal, B/l iliocaval thrombectomy, b/l iliocaval stenting, b/l lower extremity thrombectomy  - To transition to Eliquis after thrombectomy, when ok with IR for lifelong anticoagulation.  - tomorrow      Metastatic triple negative breast cancer with intracranial metastasis  Fungating, necrotic right breast mass  -Known brain mets S/p craniotomy on

## 2022-06-11 LAB
APTT: 51.1 SEC (ref 23–34.3)
HCT VFR BLD CALC: 24.5 % (ref 36–48)
HEMOGLOBIN: 8 G/DL (ref 12–16)
MCH RBC QN AUTO: 26.9 PG (ref 26–34)
MCHC RBC AUTO-ENTMCNC: 32.6 G/DL (ref 31–36)
MCV RBC AUTO: 82.5 FL (ref 80–100)
PDW BLD-RTO: 19.8 % (ref 12.4–15.4)
PLATELET # BLD: 430 K/UL (ref 135–450)
PMV BLD AUTO: 6.5 FL (ref 5–10.5)
RBC # BLD: 2.97 M/UL (ref 4–5.2)
WBC # BLD: 13.3 K/UL (ref 4–11)

## 2022-06-11 PROCEDURE — 2580000003 HC RX 258: Performed by: INTERNAL MEDICINE

## 2022-06-11 PROCEDURE — 2060000000 HC ICU INTERMEDIATE R&B

## 2022-06-11 PROCEDURE — 6360000002 HC RX W HCPCS: Performed by: INTERNAL MEDICINE

## 2022-06-11 PROCEDURE — 6370000000 HC RX 637 (ALT 250 FOR IP): Performed by: INTERNAL MEDICINE

## 2022-06-11 PROCEDURE — 85730 THROMBOPLASTIN TIME PARTIAL: CPT

## 2022-06-11 PROCEDURE — 85027 COMPLETE CBC AUTOMATED: CPT

## 2022-06-11 RX ORDER — SENNA AND DOCUSATE SODIUM 50; 8.6 MG/1; MG/1
2 TABLET, FILM COATED ORAL DAILY
Status: DISCONTINUED | OUTPATIENT
Start: 2022-06-11 | End: 2022-06-14 | Stop reason: HOSPADM

## 2022-06-11 RX ORDER — OXYCODONE HYDROCHLORIDE 5 MG/1
2.5 TABLET ORAL EVERY 4 HOURS PRN
Status: DISCONTINUED | OUTPATIENT
Start: 2022-06-11 | End: 2022-06-14 | Stop reason: HOSPADM

## 2022-06-11 RX ORDER — SODIUM CHLORIDE 9 MG/ML
INJECTION, SOLUTION INTRAVENOUS CONTINUOUS
Status: DISCONTINUED | OUTPATIENT
Start: 2022-06-11 | End: 2022-06-14 | Stop reason: HOSPADM

## 2022-06-11 RX ADMIN — SODIUM CHLORIDE: 9 INJECTION, SOLUTION INTRAVENOUS at 20:23

## 2022-06-11 RX ADMIN — LACOSAMIDE 200 MG: 50 TABLET, FILM COATED ORAL at 10:25

## 2022-06-11 RX ADMIN — CLOPIDOGREL BISULFATE 75 MG: 75 TABLET ORAL at 10:26

## 2022-06-11 RX ADMIN — PANTOPRAZOLE SODIUM 40 MG: 40 TABLET, DELAYED RELEASE ORAL at 06:58

## 2022-06-11 RX ADMIN — METRONIDAZOLE 1000 MG: 500 TABLET ORAL at 13:52

## 2022-06-11 RX ADMIN — HYDROMORPHONE HYDROCHLORIDE 0.25 MG: 1 INJECTION, SOLUTION INTRAMUSCULAR; INTRAVENOUS; SUBCUTANEOUS at 01:15

## 2022-06-11 RX ADMIN — LACOSAMIDE 200 MG: 50 TABLET, FILM COATED ORAL at 22:09

## 2022-06-11 RX ADMIN — SENNOSIDES AND DOCUSATE SODIUM 2 TABLET: 50; 8.6 TABLET ORAL at 20:14

## 2022-06-11 RX ADMIN — HYDROMORPHONE HYDROCHLORIDE 0.25 MG: 1 INJECTION, SOLUTION INTRAMUSCULAR; INTRAVENOUS; SUBCUTANEOUS at 20:13

## 2022-06-11 RX ADMIN — OXYCODONE 5 MG: 5 TABLET ORAL at 16:12

## 2022-06-11 RX ADMIN — APIXABAN 10 MG: 5 TABLET, FILM COATED ORAL at 22:08

## 2022-06-11 RX ADMIN — OXYCODONE 5 MG: 5 TABLET ORAL at 04:19

## 2022-06-11 RX ADMIN — LEVETIRACETAM 2500 MG: 750 TABLET, FILM COATED ORAL at 22:10

## 2022-06-11 RX ADMIN — LEVETIRACETAM 2000 MG: 500 TABLET, FILM COATED ORAL at 10:25

## 2022-06-11 RX ADMIN — POLYETHYLENE GLYCOL 3350 17 G: 17 POWDER, FOR SOLUTION ORAL at 10:33

## 2022-06-11 RX ADMIN — OXYCODONE 5 MG: 5 TABLET ORAL at 10:54

## 2022-06-11 RX ADMIN — APIXABAN 10 MG: 5 TABLET, FILM COATED ORAL at 10:26

## 2022-06-11 RX ADMIN — SODIUM CHLORIDE, PRESERVATIVE FREE 10 ML: 5 INJECTION INTRAVENOUS at 10:27

## 2022-06-11 RX ADMIN — SODIUM CHLORIDE, PRESERVATIVE FREE 10 ML: 5 INJECTION INTRAVENOUS at 20:14

## 2022-06-11 RX ADMIN — Medication 5 MG: at 22:09

## 2022-06-11 ASSESSMENT — PAIN DESCRIPTION - DESCRIPTORS
DESCRIPTORS: BURNING
DESCRIPTORS: PRESSURE
DESCRIPTORS: BURNING
DESCRIPTORS: ACHING
DESCRIPTORS: BURNING
DESCRIPTORS: BURNING
DESCRIPTORS: BURNING;ACHING

## 2022-06-11 ASSESSMENT — PAIN DESCRIPTION - ORIENTATION
ORIENTATION: RIGHT;LEFT
ORIENTATION: RIGHT
ORIENTATION: LEFT;RIGHT
ORIENTATION: RIGHT;LEFT
ORIENTATION: RIGHT;LEFT
ORIENTATION: MID
ORIENTATION: RIGHT

## 2022-06-11 ASSESSMENT — PAIN DESCRIPTION - LOCATION
LOCATION: LEG
LOCATION: BACK
LOCATION: LEG
LOCATION: LEG

## 2022-06-11 ASSESSMENT — PAIN SCALES - GENERAL
PAINLEVEL_OUTOF10: 6
PAINLEVEL_OUTOF10: 0
PAINLEVEL_OUTOF10: 0
PAINLEVEL_OUTOF10: 5
PAINLEVEL_OUTOF10: 7
PAINLEVEL_OUTOF10: 8
PAINLEVEL_OUTOF10: 6
PAINLEVEL_OUTOF10: 0
PAINLEVEL_OUTOF10: 0
PAINLEVEL_OUTOF10: 8
PAINLEVEL_OUTOF10: 8

## 2022-06-11 ASSESSMENT — ENCOUNTER SYMPTOMS
EYES NEGATIVE: 1
RESPIRATORY NEGATIVE: 1
GASTROINTESTINAL NEGATIVE: 1

## 2022-06-11 ASSESSMENT — PAIN DESCRIPTION - PAIN TYPE
TYPE: SURGICAL PAIN
TYPE: CHRONIC PAIN
TYPE: SURGICAL PAIN
TYPE: CHRONIC PAIN
TYPE: CHRONIC PAIN

## 2022-06-11 ASSESSMENT — PAIN DESCRIPTION - ONSET
ONSET: ON-GOING

## 2022-06-11 ASSESSMENT — PAIN DESCRIPTION - FREQUENCY
FREQUENCY: CONTINUOUS

## 2022-06-11 NOTE — PLAN OF CARE
Problem: Skin/Tissue Integrity - Adult  Goal: Incisions, wounds, or drain sites healing without S/S of infection  6/11/2022 1944 by Avelino Arguelles RN  Outcome: Progressing  6/11/2022 1943 by Avelino Arguelles RN  Outcome: Progressing     Problem: Skin/Tissue Integrity - Adult  Goal: Oral mucous membranes remain intact  Outcome: Progressing

## 2022-06-11 NOTE — PROGRESS NOTES
Late entry at 1700, pt ambulated in her room back and forth for 10 minutes. Pt refused to ambulate in the hallway. Tolerated ambulation. Assisted back to bed.

## 2022-06-11 NOTE — PROGRESS NOTES
Progress Note    Admit Date: 6/5/2022    Patient's PCP: Dr. Naeem Sparks, APRN - CNP  Chief complaints:  Leg edema      Interval HPI  R leg is sore. Feels very tired, has not been ambulating yet but did go to the bathroom with a walker. +Constipation. HISTORY OF PRESENT ILLNESS:    \"This is a very pleasant 48 y.o. female with metastatic breast cancer status post craniotomy with mass resection, recent admission, recent REBECCA/DVT  s/p thrombectomy who presented to ED at OSH with worsening bilateral leg swelling.      Was admitted 3/25/2022 - 4/10/2022 from [facility] due to worsening lower extremity pain and swelling.       During a previous admission  patient was diagnosed with RLE DVT and due to recent Craniotomy, anticoagulation could not be started till POD 14 so IVC filter was placed. Her platelets levels were noted to be low at [facility] and due to concern for worsening DVT she was sent to the hospital.      On admission 3/25/2022,  CT abdomen and pelvis showed thrombosis of the IVC at the level of the filter with marked distention of the caval bifurcation.  IR was consulted, pxt had thrombectomy 3/28/2022; she was continued on anticoagulation and as concern her filter will continue to be a nidus for infection, it was discussed that it would be re-evaluated\". .. in 6 weeks\".  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  Plan was radiation therapy and palliative chemo as outpatient.     Of note, she continued to have swelling in her lower extremity which persisted in spite of aggressive diureses, and was determined to be secondary to clot congestion in the IVC.      She presented with her family to the ED at OSH, for worsening leg swelling associated with pains in both lower extremities.       Patient states she was told to stop her Eliquis for radiation treatments, whch she did complete 10 treatments, and states she was never told to resume it; and hence has not been on it for about 4-6 weeks.      She is yet to start chemotherapy and had  placement of a right IJ port 5/24/2022 in anticipation of starting palliative chemo in several days.     She denies fevers, chills, chest pain or dyspnea.      In the ED at OSH, she was noted to be tachycardic, but afebrile. Routine labs were fairly normal apart from some anemia and thrombocytosis. \"        Medications: Reviewed      Allergies:  Heparin      ROS: Review of Systems - Negative except as in HPI. .   All other systems reviewed and are negative. PHYSICAL EXAM:  /78   Pulse (!) 124   Temp 98.7 °F (37.1 °C) (Oral)   Resp 18   SpO2 97%     No results for input(s): POCGLU in the last 72 hours. General appearance: alert, appears stated age and cooperative  Head: Normocephalic, without obvious abnormality, atraumatic  Throat: lips, mucosa, and tongue normal; teeth and gums normal  Neck: no adenopathy, no carotid bruit, no JVD, supple, symmetrical, trachea midline and thyroid not enlarged, symmetric, no tenderness/mass/nodules  Lungs: clear to auscultation bilaterally  Breasts:   Heart: Mildly Tachycardic; regular rhythm, S1, S2 normal, no murmur, click, rub or gallop  Abdomen: soft, non-tender; bowel sounds normal; no masses,  no organomegaly  Extremities: Gross bilateral LE edema involving entire LEs bilaterally, pitting, tender. Atraumatic, no cyanosis   Pulses: 2+ and symmetric  Neurologic: Grossly normal       LABS:  Recent Labs     06/09/22  0400 06/10/22  0553 06/11/22  0439   WBC 9.2 9.0 13.3*   HGB 8.4* 8.3* 8.0*   HCT 25.9* 25.6* 24.5*   * 550* 430                                                                  No results for input(s): NA, K, CL, CO2, BUN, CREATININE, GLUCOSE in the last 72 hours. Invalid input(s):  CA,  PHOS  No results for input(s): AST, ALT, ALB, BILITOT, ALKPHOS in the last 72 hours.   No results for input(s): TROPONINI in the last 72 hours. No results for input(s): BNP in the last 72 hours. Lab Results   Component Value Date    PHART 7.466 03/29/2022    PYG4FZB 31.3 03/29/2022    PO2ART 84.9 03/29/2022     No results for input(s): INR in the last 72 hours. No results for input(s): NITRITE, COLORU, PHUR, LABCAST, WBCUA, RBCUA, MUCUS, TRICHOMONAS, YEAST, BACTERIA, CLARITYU, SPECGRAV, LEUKOCYTESUR, UROBILINOGEN, BILIRUBINUR, BLOODU, GLUCOSEU, AMORPHOUS in the last 72 hours. Invalid input(s): Sully Baldwin       Assessment & Plan:     48 y.o. female with metastatic breast cancer status post craniotomy with mass resection, recent admission, recent REBECCA/DVT s/p thrombectomy who presented to ED at OSH with worsening bilateral leg swelling. Acute on chronic Bilateral lower extremity DVTs   Hx of recent IVC filter thrombus, REBECCA   - Recent admission for DVT developed in setting of malignancy, REBECCA. She could not receive therapeutic anticoagulation until POD #14 after craniotomy for brain mets, so IVC filter placed. - Was re-admitted with REBECCA and noted with severe thrombosis on LEs, including around filter S/p IR thrombectomy 3/28/2022. Was on Argatroban gtt and transitioned to Eliquis with plan to keep IVC filter in \"for 6 weeks\", to follow up with IR outpatient  - Had not been taking the Eliquis for at least 4-6 week PTA[de-identified] Pxt states she was told to suspend it for radiation txts, and never told to resume but it is not typically held for XRT  - US duplex: acute and chronic clot in both lower extremities  - Placed on argatroban  -6/10 s/p IVC filter removal, B/l iliocaval thrombectomy, b/l iliocaval stenting, b/l lower extremity thrombectomy  - Transitioned to Eliquis    Metastatic triple negative breast cancer with intracranial metastasis  Fungating, necrotic right breast mass  -Known brain mets S/p craniotomy on 3/12 for brain mets withseizures.    -S/p radiation, completed with plans for palliative systemic chemotherapy - She had placement of a right IJ port 5/24/2022 - Oncology consulted. - CT A/P on admit: prob new metastatic lesions in the  Liver (\". .3-4 small. Ivet Crumble ) and pancreas (1.6 cm )  -For her neglected right breast cancer with fungating, necrotic mass: Was seen by general surgery in the last admissions to consider palliative resection. Pxt had elected not to do so at that time, stating she was recovering from her brain surgery. Consulted Gen Surgery to re-evaluate role of palliative mastectomy: consensus with oncology is to defer for now, to re-eval and consider toilet mastectomy if develops bleeding or infection from breast mass. - On oxycodone as needed for pain  - Pall Med consulted  - F/u with oncology at the [facility] office on discharge for Palliative chemo with Dr. Yun Ashford. Seizures sec to brain met   -Continue Keppra 2 g daily, Keppra 2.5 g nightly Vimpat 200 mg. Appears may not have been taking the keppra as was intended. - Follow up with Dr. Libby Cox.   Anxiety -Continue Ativan PRN    Full Code   Pall Med consult    Disposition: in pxt pending progress. S/p thrombectomy, transitioned to Eliquis  PT/OT  Encourage PO intake  Pain control  Increase bowel regimen  Pxt from Home./ Likely will return Home at discharge in 1-2 days.        Markus Ray,

## 2022-06-11 NOTE — PROGRESS NOTES
[facility]  Progress Note    Subjective:     Patient remains admitted. Underwent thrombectomy and IVC filter removal yesterday. Sore after procedure this AM. Afebrile this AM.      Review of Systems:     Review of Systems   Constitutional: Negative. HENT: Negative. Eyes: Negative. Respiratory: Negative. Cardiovascular: Negative. Gastrointestinal: Negative. Genitourinary: Negative. Musculoskeletal: Negative. Skin: Positive for wound. Neurological: Negative. Hematological: Negative.         Objective:     Medications    Current Facility-Administered Medications: clopidogrel (PLAVIX) tablet 75 mg, 75 mg, Oral, Daily  metroNIDAZOLE (FLAGYL) tablet 1,000 mg, 1,000 mg, Topical, Daily  argatroban infusion 50mg in 0.9% sodium chloride 50 mL (premix), 0.0625-10 mcg/kg/min, IntraVENous, Continuous  sodium chloride flush 0.9 % injection 5-40 mL, 5-40 mL, IntraVENous, 2 times per day  sodium chloride flush 0.9 % injection 5-40 mL, 5-40 mL, IntraVENous, PRN  0.9 % sodium chloride infusion, , IntraVENous, PRN  ondansetron (ZOFRAN-ODT) disintegrating tablet 4 mg, 4 mg, Oral, Q8H PRN **OR** ondansetron (ZOFRAN) injection 4 mg, 4 mg, IntraVENous, Q6H PRN  polyethylene glycol (GLYCOLAX) packet 17 g, 17 g, Oral, Daily PRN  acetaminophen (TYLENOL) tablet 650 mg, 650 mg, Oral, Q6H PRN **OR** acetaminophen (TYLENOL) suppository 650 mg, 650 mg, Rectal, Q6H PRN  pantoprazole (PROTONIX) tablet 40 mg, 40 mg, Oral, QAM AC  melatonin disintegrating tablet 5 mg, 5 mg, Oral, Nightly  levETIRAcetam (KEPPRA) tablet 2,500 mg, 2,500 mg, Oral, Nightly  levETIRAcetam (KEPPRA) tablet 2,000 mg, 2,000 mg, Oral, Daily  lacosamide (VIMPAT) tablet 200 mg, 200 mg, Oral, BID  oxyCODONE (ROXICODONE) immediate release tablet 5 mg, 5 mg, Oral, Q4H PRN  HYDROmorphone (DILAUDID) injection 0.25 mg, 0.25 mg, IntraVENous, Q3H PRN    Allergies  Allergies   Allergen Reactions    Heparin Other (See Comments)     Developed 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 She is ill-appearing. HENT:      Nose: Nose normal.      Mouth/Throat:      Mouth: Mucous membranes are moist.   Eyes:      Extraocular Movements: Extraocular movements intact. Pupils: Pupils are equal, round, and reactive to light. Cardiovascular:      Rate and Rhythm: Normal rate and regular rhythm. Pulses: Normal pulses. Pulmonary:      Effort: Pulmonary effort is normal.   Abdominal:      Palpations: Abdomen is soft. Skin:     General: Skin is warm. Comments: Bilateral lower extremity edema   Neurological:      General: No focal deficit present. Mental Status: She is alert and oriented to person, place, and time.          Labs  Recent Labs     22  0439 06/10/22  0553 22  0400 22  0432 22  0432 22  0652 22  0652   WBC 13.3* 9.0 9.2   < > 8.3   < > 6.8   NEUTROABS  --   --   --   --  7.0  --  4.9   LYMPHOPCT  --   --   --   --  9.0  --  11.9   RBC 2.97* 3.07* 3.10*   < > 3.16*   < > 3.19*   HGB 8.0* 8.3* 8.4*   < > 8.9*   < > 8.9*   HCT 24.5* 25.6* 25.9*   < > 26.6*   < > 27.2*   MCV 82.5 83.3 83.4   < > 84.1   < > 85.3   MCH 26.9 27.0 27.0   < > 28.0   < > 27.8   MCHC 32.6 32.4 32.4   < > 33.4   < > 32.6   RDW 19.8* 20.0* 19.9*   < > 19.9*   < > 20.0*    550* 570*   < > 531*   < > 503*    < > = values in this interval not displayed. Lab Results   Component Value Date     06/06/2022    K 3.9 06/06/2022     06/06/2022    CO2 22 06/06/2022    GLUCOSE 104 (H) 06/06/2022    BUN 4 (L) 06/06/2022    CREATININE 0.6 06/06/2022    LABGLOM >60 06/06/2022    GFRAA >60 06/06/2022    CALCIUM 8.7 06/06/2022    PROT 6.6 06/05/2022    LABALBU 3.2 (L) 06/05/2022    AGRATIO 1.0 (L) 03/03/2022    BILITOT <0.2 06/05/2022    ALKPHOS 59 06/05/2022    ALT 9 (L) 06/05/2022    AST 11 (L) 06/05/2022    MG 2.00 04/07/2022       Lab Results   Component Value Date    PROT 6.6 06/05/2022    PROT 6.0 (L) 04/03/2022    PROT 5.8 (L) 04/02/2022    INR 1.18 (H) 06/05/2022    INR 1.11 05/24/2022    INR 1.22 (H) 03/25/2022     Lab Results   Component Value Date    APTT 51.1 (H) 06/11/2022    APTT 64.6 (H) 06/10/2022    APTT 68.9 (H) 06/10/2022     Radiology  CT ABDOMEN PELVIS W IV CONTRAST Additional Contrast? None    Result Date: 6/5/2022  EXAM: CT ABDOMEN AND PELVIS WITH CONTRAST INDICATION: IVC thrombosis; Eval IVC, Pain COMPARISON: 3/28/2022. TECHNIQUE: Axial CT imaging obtained from lung bases through pelvis. Axial images and multiplanar reformatted images are provided for review. Individualized dose optimization technique was used in order to meet ALARA standards for radiation dose reduction. In addition to vendor specific dose reduction algorithms, the dose reduction techniques vary based on the specific scanner utilized but frequently include automated exposure control, adjustment of the mA and/or kV according to patient size, and use of iterative reconstruction technique. IV Contrast: 100 mL Isovue-370 Oral Contrast: None. FINDINGS: Study limited by poor contrast bolus/timing. LUNG BASES: Bibasilar atelectasis. Partially visualized large necrotic mass in the right breast.. LIVER: 3-4 small hypoattenuating foci in the liver, new since 3/28/2022, largest measuring 1 cm in the inferior right hepatic lobe.  GALLBLADDER AND BILIARY 5/24/2022. HISTORY: ORDERING SYSTEM PROVIDED HISTORY: Secondary cancer of brain [facility]) TECHNOLOGIST PROVIDED HISTORY: Is the patient pregnant?->No SEDATION: 2 mgversed and 100 mcg fentanyl were titrated intravenously for moderate sedation monitored under my direction. Total intraservice time of sedation was 30 minutes. The patient's vital signs were monitored throughout the procedure and recorded in the patient's medical record by the nurse. FLUOROSCOPY DOSE AND TYPE OR TIME AND EXPOSURES: Fluoro time: 0.6 minute Cumulative air kerma: 5.86 mGy TECHNIQUE: Informed consent was obtained after a detailed explanation of the procedure including risks, benefits, and alternatives. All aspects of maximum sterile barrier technique were used including washing hands with conventional soap and water or with alcohol-based hand rubs (ABHR), skin preparation, cap, mask, sterile gown, sterile gloves, and sterile full body drape. Local anesthesia was achieved with lidocaine. A micropuncture needle was used to access the right internal jugular vein using ultrasound guidance. An ultrasound image demonstrating patency of the vein with needle tip located within it. An image was obtained and stored in PACs. A 0.035 guidewire was used to place a peel-away sheath. A chest wall incision was made and a subcutaneous pocket was created. The port reservoir was placed within the pocket and the port tubing was attached and tunneled subcutaneously to the venotomy site. The port tubing was cut to an appropriate length and advanced through the peel-away sheath under fluoroscopic guidance to the cavo-atrial junction. The chest wall incision was closed using 3-0 and 4-0 Vicryl sutures and tissue adhesive was applied to the venotomy site and chest wall incision. The port flushed easily and there was a good blood return. The port was locked with heparinized saline. The patient tolerated the procedure well and there were no immediate complications. extremity. Pathology  Department of Pathology   ADDENDUM SURGICAL PATHOLOGY REPORT   Patient Name: Óscar Rios      Accession No:  [id number]    Age Sex:   1975    46 Y / F      Location:      DIS 01   Account No:   [de-identified]                 Collected:     2022   Med Rec No:    [id number]                Received:      2022   Attend Phys:   Charlie Wei MD         Completed:     2022   Perform Phys: Rosa Colón MD       ADDENDUM:   At formal (faxed) request from [facility], the   request initiated by Elijah Horowitz M.D., a formalin-fixed,   paraffin-embedded (FFPE) tissue block (C9) was selected by a pathologist   (after review of the slides) and sent to [facility] for   molecular analysis (\"[employer]\" tumor profiling). See   that separate report, when complete, accession number [id number], for   analytic details/analytic results. Additional manual (not electronic) professional-only CPT code: Lilla Dance M.D.   (Electronic Signature)   2022       FINAL DIAGNOSIS:        A. Left temporal mass:      - Metastatic carcinoma compatible with \"triple negative\" breast        primary; see Comment.        B. Left temporal dura:      - Dural tissue negative for carcinoma, keratin AE1+3/Cam 5.2        immunostain reviewed on each block.        C. Left occipital mass:      - Metastatic carcinoma compatible with \"triple negative\" breast        primary; see Comment. COMMENT:  A, C: The carcinoma at each site shows a nearly identical   appearance, with diffuse masses of highly polymorphous cells showing a   high mitotic/karyorrhectic rate, frequent \"giant\" cells, and plentiful   geographic necrosis.  Given the patient's clinical history (see below),   immunostains were requested.  The tumor is strongly positive for CK7,   GATA3, SOX-10, and beta catenin (membranous pattern), with variable   cytoplasmic and partial perinuclear(\"Golgi\") positivity for keratin   AE1+3/Cam 5.2, a tiny minority population, including geographic foci,   positive for high molecular weight keratin (), and Ki-67   proliferation rate of 90% or more. It is negative for ER, MS, and HER-2   (no positivity for any of the three stains), and also for CK20, Napsin-A,   S100 protein, villin, TTF-1, CDX2, and CD45 (LCA). The overall findings strongly support metastatic \"triple negative\" breast   carcinoma.   BRATI/BRATI     Problem List  Patient Active Problem List   Diagnosis    Thyroid nodule    Brain metastases ([facility] [address]    Brain mass    Status epilepticus ([facility] [address]    Duct cell carcinoma (HCC)    Cerebral edema (HCC)    Seizure (HCC)    Deep venous thrombosis (HCC)    Fever    Phlebitis    S/P craniotomy    Acute deep vein thrombosis (DVT) of axillary vein (HCC)    DVT, lower extremity, proximal, acute, bilateral (HCC)       Assessment and Plan:     Metastatic triple negative breast cancer  - Neglected Right breast primary  - Intracranial metastasis  - Liver metastases  - completed brain RT   - needs to follow up with Dr. Dee Torre next week to initiate systemic therapy      Seizures  - Secondary to intracranial metastases which have been resected and treated with radiation.  - Recommend continuing Keppra and Vimpat     DVT  - s/p thrombectomy 6/10/22  - had been on argatroban secondary to HIT  - transition to Eliquis at d/c - ok to continue along with Plavix    IVC Thrombus  - S/P thrombectomy and filter removal 6/10/22  - Transitioned from argatroban to Eliquis  - see above     HIT  - Ivone positive  - TRES positive  - Received argatroban gtt w/ normalization of the plts during April hospitalization  - Transititoned to Eliquis  - Agree with argatroban and a transition to Eliquis for this hospitalization as well  - Plt count normal     Disposition  Medical oncology will follow peripherally.  Will need follow-up with her primary Oncologist, Dr Tere Gutiérrez shortly after discharge to start systemic chemotherapy.     Bethel Medina MD  6/11/2022

## 2022-06-11 NOTE — PROGRESS NOTES
This nurse noticed that plavix is overdue and was suppose to be given at 1615. This nurse notified pharmacy and pharmacy is also trying to find out if it was given as well. This nurse called PACU to see if the nurse Giovanni Oshea was still here to see if she gave the plavix but Giovanni Oshea RN has left for the day therefore PACU was going to call her and then get back to the oncoming nurse caring for the patient tonight. Oncoming nurse updated on this and will contact pharmacy when she hears back from PACU if they got a hold of Giovanni Oshea.

## 2022-06-11 NOTE — PLAN OF CARE
Problem: Safety - Adult  Goal: Free from fall injury  Outcome: Progressing     Problem: Pain  Goal: Verbalizes/displays adequate comfort level or baseline comfort level  Outcome: Progressing     Problem: ABCDS Injury Assessment  Goal: Absence of physical injury  Outcome: Progressing     Problem: Neurosensory - Adult  Goal: Absence of seizures  Outcome: Progressing     Problem: Respiratory - Adult  Goal: Achieves optimal ventilation and oxygenation  Outcome: Progressing     Problem: Skin/Tissue Integrity - Adult  Goal: Skin integrity remains intact  Outcome: Progressing  Flowsheets  Taken 6/11/2022 1400  Skin Integrity Remains Intact: Monitor for areas of redness and/or skin breakdown  Taken 6/11/2022 0914  Skin Integrity Remains Intact: Monitor for areas of redness and/or skin breakdown  Goal: Incisions, wounds, or drain sites healing without S/S of infection  Outcome: Progressing     Problem: Hematologic - Adult  Goal: Maintains hematologic stability  Outcome: Progressing

## 2022-06-12 LAB
ANION GAP SERPL CALCULATED.3IONS-SCNC: 9 MMOL/L (ref 3–16)
BUN BLDV-MCNC: 4 MG/DL (ref 7–20)
CALCIUM SERPL-MCNC: 8.2 MG/DL (ref 8.3–10.6)
CHLORIDE BLD-SCNC: 106 MMOL/L (ref 99–110)
CO2: 25 MMOL/L (ref 21–32)
CREAT SERPL-MCNC: <0.5 MG/DL (ref 0.6–1.1)
GFR AFRICAN AMERICAN: >60
GFR NON-AFRICAN AMERICAN: >60
GLUCOSE BLD-MCNC: 111 MG/DL (ref 70–99)
HCT VFR BLD CALC: 21.1 % (ref 36–48)
HEMOGLOBIN: 7.2 G/DL (ref 12–16)
LACTIC ACID: 0.9 MMOL/L (ref 0.4–2)
MAGNESIUM: 2 MG/DL (ref 1.8–2.4)
MCH RBC QN AUTO: 28.1 PG (ref 26–34)
MCHC RBC AUTO-ENTMCNC: 34.1 G/DL (ref 31–36)
MCV RBC AUTO: 82.5 FL (ref 80–100)
PDW BLD-RTO: 20.1 % (ref 12.4–15.4)
PHOSPHORUS: 3.5 MG/DL (ref 2.5–4.9)
PLATELET # BLD: 374 K/UL (ref 135–450)
PMV BLD AUTO: 6.7 FL (ref 5–10.5)
POTASSIUM SERPL-SCNC: 3.3 MMOL/L (ref 3.5–5.1)
RBC # BLD: 2.56 M/UL (ref 4–5.2)
SODIUM BLD-SCNC: 140 MMOL/L (ref 136–145)
WBC # BLD: 10.8 K/UL (ref 4–11)

## 2022-06-12 PROCEDURE — 36415 COLL VENOUS BLD VENIPUNCTURE: CPT

## 2022-06-12 PROCEDURE — 6370000000 HC RX 637 (ALT 250 FOR IP): Performed by: INTERNAL MEDICINE

## 2022-06-12 PROCEDURE — 2580000003 HC RX 258: Performed by: INTERNAL MEDICINE

## 2022-06-12 PROCEDURE — 80048 BASIC METABOLIC PNL TOTAL CA: CPT

## 2022-06-12 PROCEDURE — 84100 ASSAY OF PHOSPHORUS: CPT

## 2022-06-12 PROCEDURE — 87040 BLOOD CULTURE FOR BACTERIA: CPT

## 2022-06-12 PROCEDURE — 83605 ASSAY OF LACTIC ACID: CPT

## 2022-06-12 PROCEDURE — 85027 COMPLETE CBC AUTOMATED: CPT

## 2022-06-12 PROCEDURE — 2060000000 HC ICU INTERMEDIATE R&B

## 2022-06-12 PROCEDURE — 83735 ASSAY OF MAGNESIUM: CPT

## 2022-06-12 PROCEDURE — 6360000002 HC RX W HCPCS: Performed by: INTERNAL MEDICINE

## 2022-06-12 RX ORDER — POTASSIUM CHLORIDE 20 MEQ/1
40 TABLET, EXTENDED RELEASE ORAL ONCE
Status: COMPLETED | OUTPATIENT
Start: 2022-06-12 | End: 2022-06-12

## 2022-06-12 RX ORDER — POLYETHYLENE GLYCOL 3350 17 G/17G
17 POWDER, FOR SOLUTION ORAL DAILY
Status: DISCONTINUED | OUTPATIENT
Start: 2022-06-12 | End: 2022-06-14 | Stop reason: HOSPADM

## 2022-06-12 RX ORDER — BISACODYL 10 MG
10 SUPPOSITORY, RECTAL RECTAL DAILY PRN
Status: DISCONTINUED | OUTPATIENT
Start: 2022-06-12 | End: 2022-06-14 | Stop reason: HOSPADM

## 2022-06-12 RX ADMIN — SODIUM CHLORIDE: 9 INJECTION, SOLUTION INTRAVENOUS at 06:12

## 2022-06-12 RX ADMIN — CLOPIDOGREL BISULFATE 75 MG: 75 TABLET ORAL at 07:50

## 2022-06-12 RX ADMIN — APIXABAN 10 MG: 5 TABLET, FILM COATED ORAL at 07:48

## 2022-06-12 RX ADMIN — LEVETIRACETAM 2000 MG: 500 TABLET, FILM COATED ORAL at 07:47

## 2022-06-12 RX ADMIN — METRONIDAZOLE 1000 MG: 500 TABLET ORAL at 07:45

## 2022-06-12 RX ADMIN — SODIUM CHLORIDE, PRESERVATIVE FREE 10 ML: 5 INJECTION INTRAVENOUS at 07:55

## 2022-06-12 RX ADMIN — OXYCODONE 2.5 MG: 5 TABLET ORAL at 20:04

## 2022-06-12 RX ADMIN — LEVETIRACETAM 2500 MG: 750 TABLET, FILM COATED ORAL at 20:30

## 2022-06-12 RX ADMIN — Medication 5 MG: at 22:28

## 2022-06-12 RX ADMIN — HYDROMORPHONE HYDROCHLORIDE 0.25 MG: 1 INJECTION, SOLUTION INTRAMUSCULAR; INTRAVENOUS; SUBCUTANEOUS at 07:55

## 2022-06-12 RX ADMIN — APIXABAN 10 MG: 5 TABLET, FILM COATED ORAL at 20:04

## 2022-06-12 RX ADMIN — LACOSAMIDE 200 MG: 50 TABLET, FILM COATED ORAL at 20:04

## 2022-06-12 RX ADMIN — PANTOPRAZOLE SODIUM 40 MG: 40 TABLET, DELAYED RELEASE ORAL at 05:56

## 2022-06-12 RX ADMIN — POLYETHYLENE GLYCOL 3350 17 G: 17 POWDER, FOR SOLUTION ORAL at 20:30

## 2022-06-12 RX ADMIN — SENNOSIDES AND DOCUSATE SODIUM 2 TABLET: 50; 8.6 TABLET ORAL at 07:48

## 2022-06-12 RX ADMIN — OXYCODONE 2.5 MG: 5 TABLET ORAL at 14:58

## 2022-06-12 RX ADMIN — SODIUM CHLORIDE: 9 INJECTION, SOLUTION INTRAVENOUS at 16:53

## 2022-06-12 RX ADMIN — LACOSAMIDE 200 MG: 50 TABLET, FILM COATED ORAL at 07:49

## 2022-06-12 RX ADMIN — OXYCODONE 2.5 MG: 5 TABLET ORAL at 04:19

## 2022-06-12 RX ADMIN — POTASSIUM CHLORIDE 40 MEQ: 1500 TABLET, EXTENDED RELEASE ORAL at 07:57

## 2022-06-12 RX ADMIN — ACETAMINOPHEN 650 MG: 325 TABLET ORAL at 00:18

## 2022-06-12 ASSESSMENT — PAIN DESCRIPTION - DESCRIPTORS
DESCRIPTORS: ACHING
DESCRIPTORS: SHARP
DESCRIPTORS: ACHING
DESCRIPTORS: ITCHING;NAGGING
DESCRIPTORS: ACHING

## 2022-06-12 ASSESSMENT — PAIN SCALES - GENERAL
PAINLEVEL_OUTOF10: 3
PAINLEVEL_OUTOF10: 4
PAINLEVEL_OUTOF10: 0
PAINLEVEL_OUTOF10: 7
PAINLEVEL_OUTOF10: 4
PAINLEVEL_OUTOF10: 3
PAINLEVEL_OUTOF10: 5
PAINLEVEL_OUTOF10: 6
PAINLEVEL_OUTOF10: 8
PAINLEVEL_OUTOF10: 7
PAINLEVEL_OUTOF10: 0
PAINLEVEL_OUTOF10: 7

## 2022-06-12 ASSESSMENT — PAIN DESCRIPTION - LOCATION
LOCATION: BREAST
LOCATION: BACK
LOCATION: BACK

## 2022-06-12 ASSESSMENT — PAIN DESCRIPTION - PAIN TYPE
TYPE: CHRONIC PAIN

## 2022-06-12 ASSESSMENT — PAIN DESCRIPTION - ONSET
ONSET: ON-GOING
ONSET: ON-GOING

## 2022-06-12 ASSESSMENT — PAIN DESCRIPTION - ORIENTATION
ORIENTATION: RIGHT
ORIENTATION: LOWER
ORIENTATION: RIGHT
ORIENTATION: RIGHT
ORIENTATION: LOWER
ORIENTATION: LOWER

## 2022-06-12 ASSESSMENT — PAIN - FUNCTIONAL ASSESSMENT
PAIN_FUNCTIONAL_ASSESSMENT: ACTIVITIES ARE NOT PREVENTED

## 2022-06-12 ASSESSMENT — PAIN SCALES - WONG BAKER
WONGBAKER_NUMERICALRESPONSE: 0

## 2022-06-12 ASSESSMENT — PAIN DESCRIPTION - FREQUENCY
FREQUENCY: CONTINUOUS
FREQUENCY: CONTINUOUS

## 2022-06-12 NOTE — PROGRESS NOTES
Progress Note    Admit Date: 6/5/2022    Patient's PCP: Dr. Shavonne Lovell, APRN - CNP  Chief complaints:  Leg edema    Interval HPI  A fever was documented overnight. She has had none since. WBC elevation resolved. She has had limited mobility last 2 weeks and is worried about this. The pain she has been having has been much better. She overall is feeling much better. No cough. Main concern is constipation and poor intake. No vomiting. No dysuria or increased urinary frequency. HISTORY OF PRESENT ILLNESS:    \"This is a very pleasant 48 y.o. female with metastatic breast cancer status post craniotomy with mass resection, recent admission, recent REBECCA/DVT  s/p thrombectomy who presented to ED at OSH with worsening bilateral leg swelling.      Was admitted 3/25/2022 - 4/10/2022 from [place] due to worsening lower extremity pain and swelling.       During a previous admission  patient was diagnosed with RLE DVT and due to recent Craniotomy, anticoagulation could not be started till POD 14 so IVC filter was placed. Her platelets levels were noted to be low at [facility] and due to concern for worsening DVT she was sent to the hospital.      On admission 3/25/2022,  CT abdomen and pelvis showed thrombosis of the IVC at the level of the filter with marked distention of the caval bifurcation.  IR was consulted, pxt had thrombectomy 3/28/2022; she was continued on anticoagulation and as concern her filter will continue to be a nidus for infection, it was discussed that it would be re-evaluated\". .. in 6 weeks\". 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  Plan was radiation therapy and palliative chemo as outpatient.     Of note, she continued to have swelling in her lower extremity which persisted in spite of aggressive diureses, and was determined to be secondary to clot congestion in the IVC.    She presented with her family to the ED at OSH, for worsening leg swelling associated with pains in both lower extremities.       Patient states she was told to stop her Eliquis for radiation treatments, whch she did complete 10 treatments, and states she was never told to resume it; and hence has not been on it for about 4-6 weeks.      She is yet to start chemotherapy and had  placement of a right IJ port 5/24/2022 in anticipation of starting palliative chemo in several days.     She denies fevers, chills, chest pain or dyspnea.      In the ED at OSH, she was noted to be tachycardic, but afebrile. Routine labs were fairly normal apart from some anemia and thrombocytosis. \"      Medications: Reviewed      Allergies:  Heparin      ROS: Review of Systems - Negative except as in HPI. .   All other systems reviewed and are negative. PHYSICAL EXAM:  /84   Pulse (!) 129   Temp 99.5 °F (37.5 °C) (Oral)   Resp 18   SpO2 97%     No results for input(s): POCGLU in the last 72 hours. General appearance: alert, appears stated age and cooperative  Head: Normocephalic, without obvious abnormality, atraumatic  Throat: lips, mucosa, and tongue normal; teeth and gums normal  Neck: no adenopathy, no carotid bruit, no JVD, supple, symmetrical, trachea midline and thyroid not enlarged, symmetric, no tenderness/mass/nodules  Lungs: clear to auscultation bilaterally  Breasts:   Heart: Mildly Tachycardic; regular rhythm, S1, S2 normal, no murmur, click, rub or gallop  Abdomen: soft, non-tender; bowel sounds normal; no masses,  no organomegaly  Extremities: Gross bilateral LE edema involving entire LEs bilaterally, pitting, tender.  Atraumatic, no cyanosis   Pulses: 2+ and symmetric  Neurologic: Grossly normal       LABS:  Recent Labs     06/10/22  0553 06/11/22  0439 06/12/22  0600   WBC 9.0 13.3* 10.8   HGB 8.3* 8.0* 7.2*   HCT 25.6* 24.5* 21.1*   * [phone]

## 2022-06-13 ENCOUNTER — APPOINTMENT (OUTPATIENT)
Dept: GENERAL RADIOLOGY | Age: 47
DRG: 169 | End: 2022-06-13
Attending: INTERNAL MEDICINE
Payer: MEDICAID

## 2022-06-13 LAB
ANION GAP SERPL CALCULATED.3IONS-SCNC: 11 MMOL/L (ref 3–16)
BUN BLDV-MCNC: 5 MG/DL (ref 7–20)
CALCIUM SERPL-MCNC: 8.3 MG/DL (ref 8.3–10.6)
CHLORIDE BLD-SCNC: 107 MMOL/L (ref 99–110)
CO2: 21 MMOL/L (ref 21–32)
CREAT SERPL-MCNC: <0.5 MG/DL (ref 0.6–1.1)
GFR AFRICAN AMERICAN: >60
GFR NON-AFRICAN AMERICAN: >60
GLUCOSE BLD-MCNC: 121 MG/DL (ref 70–99)
HCT VFR BLD CALC: 21.1 % (ref 36–48)
HEMOGLOBIN: 7 G/DL (ref 12–16)
MCH RBC QN AUTO: 26.7 PG (ref 26–34)
MCHC RBC AUTO-ENTMCNC: 33.2 G/DL (ref 31–36)
MCV RBC AUTO: 80.6 FL (ref 80–100)
PDW BLD-RTO: 19.2 % (ref 12.4–15.4)
PLATELET # BLD: 399 K/UL (ref 135–450)
PMV BLD AUTO: 6.8 FL (ref 5–10.5)
POTASSIUM SERPL-SCNC: 3.5 MMOL/L (ref 3.5–5.1)
RBC # BLD: 2.61 M/UL (ref 4–5.2)
SODIUM BLD-SCNC: 139 MMOL/L (ref 136–145)
WBC # BLD: 13.3 K/UL (ref 4–11)

## 2022-06-13 PROCEDURE — 80048 BASIC METABOLIC PNL TOTAL CA: CPT

## 2022-06-13 PROCEDURE — 2060000000 HC ICU INTERMEDIATE R&B

## 2022-06-13 PROCEDURE — 71046 X-RAY EXAM CHEST 2 VIEWS: CPT

## 2022-06-13 PROCEDURE — 6370000000 HC RX 637 (ALT 250 FOR IP): Performed by: INTERNAL MEDICINE

## 2022-06-13 PROCEDURE — 97535 SELF CARE MNGMENT TRAINING: CPT

## 2022-06-13 PROCEDURE — 2580000003 HC RX 258: Performed by: INTERNAL MEDICINE

## 2022-06-13 PROCEDURE — 97530 THERAPEUTIC ACTIVITIES: CPT

## 2022-06-13 PROCEDURE — 97165 OT EVAL LOW COMPLEX 30 MIN: CPT

## 2022-06-13 PROCEDURE — 97162 PT EVAL MOD COMPLEX 30 MIN: CPT

## 2022-06-13 PROCEDURE — 97116 GAIT TRAINING THERAPY: CPT

## 2022-06-13 PROCEDURE — 85027 COMPLETE CBC AUTOMATED: CPT

## 2022-06-13 RX ADMIN — OXYCODONE 2.5 MG: 5 TABLET ORAL at 03:37

## 2022-06-13 RX ADMIN — LEVETIRACETAM 2500 MG: 750 TABLET, FILM COATED ORAL at 20:24

## 2022-06-13 RX ADMIN — CLOPIDOGREL BISULFATE 75 MG: 75 TABLET ORAL at 09:04

## 2022-06-13 RX ADMIN — SODIUM CHLORIDE, PRESERVATIVE FREE 10 ML: 5 INJECTION INTRAVENOUS at 09:07

## 2022-06-13 RX ADMIN — PANTOPRAZOLE SODIUM 40 MG: 40 TABLET, DELAYED RELEASE ORAL at 05:59

## 2022-06-13 RX ADMIN — METRONIDAZOLE 1000 MG: 500 TABLET ORAL at 09:03

## 2022-06-13 RX ADMIN — SODIUM CHLORIDE: 9 INJECTION, SOLUTION INTRAVENOUS at 03:31

## 2022-06-13 RX ADMIN — LACOSAMIDE 200 MG: 50 TABLET, FILM COATED ORAL at 09:05

## 2022-06-13 RX ADMIN — LACOSAMIDE 200 MG: 50 TABLET, FILM COATED ORAL at 20:24

## 2022-06-13 RX ADMIN — ACETAMINOPHEN 650 MG: 325 TABLET ORAL at 03:36

## 2022-06-13 RX ADMIN — SENNOSIDES AND DOCUSATE SODIUM 2 TABLET: 50; 8.6 TABLET ORAL at 09:04

## 2022-06-13 RX ADMIN — ACETAMINOPHEN 650 MG: 325 TABLET ORAL at 16:46

## 2022-06-13 RX ADMIN — APIXABAN 10 MG: 5 TABLET, FILM COATED ORAL at 09:04

## 2022-06-13 RX ADMIN — LEVETIRACETAM 2000 MG: 500 TABLET, FILM COATED ORAL at 09:05

## 2022-06-13 RX ADMIN — SODIUM CHLORIDE: 9 INJECTION, SOLUTION INTRAVENOUS at 14:51

## 2022-06-13 RX ADMIN — APIXABAN 10 MG: 5 TABLET, FILM COATED ORAL at 20:25

## 2022-06-13 RX ADMIN — OXYCODONE 2.5 MG: 5 TABLET ORAL at 13:44

## 2022-06-13 RX ADMIN — Medication 5 MG: at 20:25

## 2022-06-13 ASSESSMENT — PAIN SCALES - WONG BAKER

## 2022-06-13 ASSESSMENT — ENCOUNTER SYMPTOMS
EYES NEGATIVE: 1
RESPIRATORY NEGATIVE: 1
GASTROINTESTINAL NEGATIVE: 1

## 2022-06-13 ASSESSMENT — PAIN SCALES - GENERAL
PAINLEVEL_OUTOF10: 0
PAINLEVEL_OUTOF10: 0
PAINLEVEL_OUTOF10: 5
PAINLEVEL_OUTOF10: 0
PAINLEVEL_OUTOF10: 5
PAINLEVEL_OUTOF10: 5

## 2022-06-13 ASSESSMENT — PAIN DESCRIPTION - FREQUENCY: FREQUENCY: INTERMITTENT

## 2022-06-13 ASSESSMENT — PAIN DESCRIPTION - ORIENTATION
ORIENTATION: RIGHT
ORIENTATION: RIGHT

## 2022-06-13 ASSESSMENT — PAIN DESCRIPTION - DESCRIPTORS
DESCRIPTORS: BURNING
DESCRIPTORS: ITCHING;NAGGING

## 2022-06-13 ASSESSMENT — PAIN DESCRIPTION - ONSET: ONSET: GRADUAL

## 2022-06-13 ASSESSMENT — PAIN DESCRIPTION - PAIN TYPE: TYPE: CHRONIC PAIN

## 2022-06-13 ASSESSMENT — PAIN - FUNCTIONAL ASSESSMENT: PAIN_FUNCTIONAL_ASSESSMENT: ACTIVITIES ARE NOT PREVENTED

## 2022-06-13 ASSESSMENT — PAIN DESCRIPTION - LOCATION
LOCATION: BREAST
LOCATION: LEG

## 2022-06-13 NOTE — PLAN OF CARE
Problem: Discharge Planning  Goal: Discharge to home or other facility with appropriate resources  Outcome: Progressing  Flowsheets (Taken 6/10/2022 2015 by Katja Schreiber, RN)  Discharge to home or other facility with appropriate resources: Identify barriers to discharge with patient and caregiver  Note: Pt is planning to discharge to home, up walking with PT today in the halls with walker     Problem: Safety - Adult  Goal: Free from fall injury  Outcome: Progressing     Problem: Pain  Goal: Verbalizes/displays adequate comfort level or baseline comfort level  Outcome: Progressing  Flowsheets (Taken 6/12/2022 1938 by Zuleyma Esquivel, SILVIA)  Verbalizes/displays adequate comfort level or baseline comfort level:   Encourage patient to monitor pain and request assistance   Assess pain using appropriate pain scale   Administer analgesics based on type and severity of pain and evaluate response  Note: Pt complaining of burning sensation to right leg.  Message sent to Dr. Warden Montoya and 2.5 mg oxycodone given this shift     Problem: ABCDS Injury Assessment  Goal: Absence of physical injury  Outcome: Progressing     Problem: Neurosensory - Adult  Goal: Absence of seizures  Outcome: Progressing     Problem: Respiratory - Adult  Goal: Achieves optimal ventilation and oxygenation  Outcome: Progressing     Problem: Skin/Tissue Integrity - Adult  Goal: Skin integrity remains intact  Outcome: Progressing  Flowsheets (Taken 6/11/2022 1400 by Adolph Dowell RN)  Skin Integrity Remains Intact: Monitor for areas of redness and/or skin breakdown  Note: Pt remains clean/dry/intact  Goal: Incisions, wounds, or drain sites healing without S/S of infection  Outcome: Progressing  Goal: Oral mucous membranes remain intact  Outcome: Progressing     Problem: Hematologic - Adult  Goal: Maintains hematologic stability  Outcome: Progressing

## 2022-06-13 NOTE — PROGRESS NOTES
Physical Therapy  Facility/Department: [place] [address] [facility]  Physical Therapy Initial Assessment/discharge note      Name: Fili Mejias  : 1975  MRN: [id number]  Date of Service: 2022    Discharge Recommendations:Nadja Ramírez scored a  on the AM-PAC short mobility form. Current research shows that an AM-PAC score of 18 or greater is typically associated with a discharge to the patient's home setting. Based on the patient's AM-PAC score and their current functional mobility deficits, it is recommended that the patient have 2-3 sessions per week of Physical Therapy at d/c to increase the patient's independence. At this time, this patient demonstrates the endurance and safety to discharge home with (home  services) and a follow up treatment frequency of 2-3x/wk. Please see assessment section for further patient specific details. PT Equipment Recommendations  Equipment Needed: No      Patient Diagnosis(es): There were no encounter diagnoses. Past Medical History:  has a past medical history of Cancer ([facility] [address] History of blood transfusion, Hx of blood clots, and Thyroid nodule. Past Surgical History:  has a past surgical history that includes craniotomy (Left, 2022); IR GUIDED IVC FILTER PLACEMENT (N/A, 2022); IR GUIDED IVC FILTER PLACEMENT (2022); IR GUIDED THROMB MECH VEIN (2022); Upper gastrointestinal endoscopy (N/A, 2022); IR PORT PLACEMENT > 5 YEARS (Left, 2022); and IR PORT PLACEMENT > 5 YEARS (2022). Assessment   Assessment: 49 yo admitted 22 for acute on chronic DVT. Pt demo mobility slightly below her reported baseline of independent at home using RW recently. Pt plans to return home at discharge. If home, recommend 24 hour supervision initially, home PT, use of RW. No further acute PT needs so will sign off. Recommend nursing continue to encourage ambulation PRN and often with supervision, RW.   Treatment Diagnosis: mobility impairment due to DVT  Decision Making: Medium Complexity  Requires PT Follow-Up: No  Activity Tolerance  Activity Tolerance: Patient tolerated treatment well;Patient limited by fatigue     Plan   Safety Devices  Type of Devices: Nurse notified,Chair alarm in place,Call light within reach,Left in chair (pt reclined)     Restrictions  Position Activity Restriction  Other position/activity restrictions: up with assist     Subjective   General  Chart Reviewed: Yes  Additional Pertinent Hx: 4 y.o. female with pertinent past medical history of metastatic breast cancer, prior DVT with IVC filter placement, intracerebral hemorrhage in 4/22 after resection of brain lesion, seizure disorder who was brought to ED 6/5/22 by family for leg swelling. B LE doppler positive acute on chronic B LE DVT; 6/10 IR for removal IVC filter; CT abd positive mass liver/pancreas, necrotic breast mass. Family / Caregiver Present: No  Diagnosis: acute on chronic LE DVT  Follows Commands: Within Functional Limits  General Comment    Subjective  Subjective: Pt found supine in bed and agreeable to PT.  Pt denies any pain         Social/Functional History  Social/Functional History  Lives With:  (Mom; has 24 hour supervision)  Type of Home: House  Home Layout: Multi-level,Bed/Bath upstairs,1/2 bath on main level,Able to Live on Main level with bedroom/bathroom  Home Access:  (6 to enter with B rails; full flight to 2nd)  Bathroom Shower/Tub: Tub/Shower unit  [employer]: Standard  Home Equipment: Walker, rolling  Has the patient had two or more falls in the past year or any fall with injury in the past year?: No  Receives Help From: Family  ADL Assistance: Independent (sponge bath recently)  Homemaking Assistance:  (Mom performs)  Ambulation Assistance: Independent (with RW)  Transfer Assistance: Independent  Active : No (not since sugery)  Occupation: Full time employment  Type of Occupation:  Vision/Hearing  Vision  Vision: Impaired  Vision Exceptions: Wears glasses at all times (states R peripheral vision decreased)  Hearing  Hearing: Within functional limits      Cognition   Orientation  Overall Orientation Status: Within Functional Limits  Cognition  Overall Cognitive Status: WFL     Objective               AROM RLE (degrees)  RLE AROM: WFL  AROM LLE (degrees)  LLE AROM : WFL     Strength RLE  Strength RLE: WFL  Strength LLE  Strength LLE: WFL           Bed mobility  Supine to Sit: Independent  Scooting: Independent     Transfers  Sit to Stand: Modified independent (x3 trials)  Stand to sit: Modified independent (x3 trials)     Ambulation  Device: Rolling Walker  Assistance: Supervision  Quality of Gait: slow leelee with decreased step length/height; pt fatigued quickly; no overt LOB noted  Distance: 10 ft x2, 150 ft  Stairs/Curb  Stairs? :  (pt declined stating no concerns about getting up steps at home)              AM-PAC Score  AM-PAC Inpatient Mobility Raw Score : 22 (06/13/22 0937)  AM-PAC Inpatient T-Scale Score : 53.28 (06/13/22 0937)  Mobility Inpatient CMS 0-100% Score: 20.91 (06/13/22 5128)  Mobility Inpatient CMS G-Code Modifier : Virgilio Morganar (06/13/22 7679)          Education  Patient Education  Education Given To: Patient  Education Provided: Role of Therapy;Transfer Training  Education Method: Verbal  Barriers to Learning: Cognition  Education Outcome: Verbalized understanding;Continued education needed      Therapy Time   Individual Concurrent Group Co-treatment   Time In 0845         Time Out 0930         Minutes 45           Timed Code Treatment Minutes: 35      Total Treatment Minutes:  [address], PT

## 2022-06-13 NOTE — CARE COORDINATION
Case Management Assessment           Daily Note                 Date/ Time of Note: 6/13/2022 3:40 PM         Note completed by: Fredy Garvey RN    Patient Name: Eva Navas  YOB: 1975    Diagnosis:Acute deep vein thrombosis (DVT) of axillary vein, unspecified laterality ([facility] [W68. A19]  DVT, lower extremity, proximal, acute, bilateral ([facility] [I82.4Y3]  Patient Admission Status: Inpatient    Date of Admission:6/5/2022  1:22 PM Length of Stay: 8 GLOS:      Current Plan of Care: elevated wbc, infectious work up in progress  ________________________________________________________________________________________  PT AM-PAC: 22 / 24 per last evaluation on: 06/13/22     OT AM-PAC: 22 / 24 per last evaluation on: 06/13/22     DME Needs for discharge:   ________________________________________________________________________________________  Discharge Plan: Home with [address] [place] [address] Way: [facility]    Tentative discharge date: 06/14/22     Current barriers to discharge: medical stability    Referrals completed: Not Applicable    Resources/ information provided: Not indicated at this time  ________________________________________________________________________________________  Case Management Notes:  continues to follow for DC planning and needs. Patient with elevated wbc this AM from 10 to 13, CM discussed case with MD this AM, plan is for infectious work up and tentative plan for DC 06/14/22 pending work up results. Patient will return home at [place] and resume [facility] with [facility]. Tony Wright and her family were provided with choice of provider; she and her family are in agreement with the discharge plan.     Care Transition Patient: Candice Garvey RN  [facility]  Case Management Department  Ph: [phone]  Fax: [phone]

## 2022-06-13 NOTE — PROGRESS NOTES
Comprehensive Nutrition Assessment    Type and Reason for Visit:  Initial,Consult (poor po intake)    Nutrition Recommendations/Plan:   1. Regular diet as tolerated  2. Continue Ensure tid  3. Recommend obtaining actual weight   4. Will monitor nutritional adequacy, nutrition-related labs, weights, BMs, and clinical progress      Malnutrition Assessment:  Malnutrition Status: At risk for malnutrition (Comment) (06/13/22 8295)    Context:            Nutrition Assessment:    Patient admitted with acute DVT. Currently on a regular diet with Ensure nutrition supplements started on 6/12/22. Dietitian consult ordered due to poor po intake and appetite for 5 days or greater. Nutrition supplement intake %, meal intake variable. Patient out of room at this time. Nutrition Related Findings:    Last BM on 6/12/22 requiring laxative Wound Type: Surgical Incision (right large mass on breast)       Current Nutrition Intake & Therapies:    Average Meal Intake: 51-75%,%,26-50%  Average Supplements Intake: %  ADULT DIET; Regular  ADULT ORAL NUTRITION SUPPLEMENT; Breakfast, Lunch, Dinner; Standard High Calorie/High Protein Oral Supplement                             Nutrition Diagnosis:   · Increased nutrient needs related to increase demand for energy/nutrients as evidenced by  (recent decrease in po intake, extended hospital stay)      Nutrition Interventions:   Food and/or Nutrient Delivery: Continue Current Diet,Continue Oral Nutrition Supplement  Nutrition Education/Counseling: Education not indicated  Coordination of Nutrition Care: Continue to monitor while inpatient       Goals:     Goals: PO intake 75% or greater       Nutrition Monitoring and Evaluation:      Food/Nutrient Intake Outcomes: Food and Nutrient Intake,Supplement Intake  Physical Signs/Symptoms Outcomes: Biochemical Data,Nutrition Focused Physical Findings    Discharge Planning:     Too soon to determine     Mat Schilder, RD, KAYLA  Contact: [phone]

## 2022-06-13 NOTE — PROGRESS NOTES
Occupational Therapy  Facility/Department: [place] [address] [facility]  Occupational Therapy Initial Assessment/Treatment and Discharge    Name: Len Rasheed  : 1975  MRN: [id number]  Date of Service: 2022    Discharge Recommendations:  Len Rasheed scored a 22/24 on the AM-PAC ADL Inpatient form. At this time, no further OT is recommended upon discharge. Recommend patient returns to prior setting with prior services. OT Equipment Recommendations  Equipment Needed: No       Patient Diagnosis(es): There were no encounter diagnoses. Past Medical History:  has a past medical history of Cancer ([facility] [address] History of blood transfusion, Hx of blood clots, and Thyroid nodule. Past Surgical History:  has a past surgical history that includes craniotomy (Left, 2022); IR GUIDED IVC FILTER PLACEMENT (N/A, 2022); IR GUIDED IVC FILTER PLACEMENT (2022); IR GUIDED THROMB MECH VEIN (2022); Upper gastrointestinal endoscopy (N/A, 2022); IR PORT PLACEMENT > 5 YEARS (Left, 2022); and IR PORT PLACEMENT > 5 YEARS (2022). Assessment   Assessment: Pt presents near her functional baseline, but now requiring RW for ambulation due to bilat LE edema and pain. Pt demo safe mobility and ADLs with minimal cues for modified technique for increased safety. Encouraged pt to ambulate with staff during hospitalization. Pt has no acute OT needs, will sign off. Pt planning d/c home with resumption of assist from family and home PT  Decision Making: Low Complexity  REQUIRES OT FOLLOW-UP: No  Activity Tolerance  Activity Tolerance: Patient Tolerated treatment well        Plan   Plan  Times per Week: d/c acute OT     Restrictions  Position Activity Restriction  Other position/activity restrictions: up with assist    Subjective   General  Chart Reviewed: Yes  Additional Pertinent Hx: Pt with 3/2022 diagnosis of metastatic breast cancer with brain mets.  s/p resection of brain lesion and intracerebral hemorrhage in 4/22 after resection of brain lesion, seizure disorder. Admitted with worsening bilat LE edema. Pt with known DVT of LEs, IVC filter in place. 6/10 s/p IVC filter removal, B/l iliocaval thrombectomy, b/l iliocaval stenting, b/l lower extremity thrombectomy  CT revealed new liver and pancreatic masses  Family / Caregiver Present: No  Referring Practitioner: Dr. Bernadette Gibbs  Diagnosis: DVT axillary vein  Subjective  Subjective: Pt in bed upon entry, agreeable to OT evaluation. Reports walking hallway with RN yesterday. Reported bilat LE pain, not rated, RN aware     Social/Functional History  Social/Functional History  Lives With:  (Mom; has 24 hour supervision)  Type of Home: House  Home Layout: Multi-level,Bed/Bath upstairs,1/2 bath on main level,Able to Live on Main level with bedroom/bathroom  Home Access:  (6 to enter with B rails; full flight to 2nd)  Bathroom Shower/Tub: Tub/Shower unit  [employer]: Standard  Home Equipment: Walker, rolling  Has the patient had two or more falls in the past year or any fall with injury in the past year?: No  Receives Help From: Family  ADL Assistance: Independent (sponge bath recently)  Homemaking Assistance:  (Mom performs)  Ambulation Assistance: Independent (with RW)  Transfer Assistance: Independent  Active : No (not since sugery)  Occupation: Full time employment  Type of Occupation:        Objective   Vision: wears contacts (decreased R peripheral vision)  H[facility]: [facility]      Safety Devices  Type of Devices: Nurse notified; Chair alarm in place;Call light within reach; Left in chair (pt reclined)        Toilet Transfers  Toilet - Technique: Ambulating  Equipment Used: Standard toilet  Toilet Transfer: Supervision (transfer x2)  AROM: Within functional limits  Strength:  Within functional limits  Coordination: Within functional limits       ADL  Feeding: Independent  Grooming: Independent (brush teeth, wash face and hands)  UE Dressing: Modified independent   LE Dressing: Stand by assistance (don socks mod I- increased time while long sitting in bed; doff/don brief- pt initiated task in standing, required VC to complete sitting for increased safety given LE edema and pain)  Toileting: Supervision  Additional Comments: independent stance at sink during grooming tasks x7 minutes          Activity Tolerance  Activity Tolerance: Patient tolerated treatment well;Patient limited by fatigue     Bed mobility  Supine to Sit: Independent  Scooting: Independent     Transfers  Sit to stand: Supervision (bed, chair)  Stand to sit: Supervision (bed, chair)  Transfer Comments: grab bar     Functional Mobility  Equipment: Rolling walker  Level of Assist: supervision  Distance: to/from bathroom, hallway >100'   Comment: Noted edema bilat LE (R >L); pt utilized RW for increased stability and pain management. Demo safe management of walker, no LOB observed         Cognition  Overall Cognitive Status: [facility]               Treatment consisted of:  ADLs, transfer training, education on activity promotion and fall precautions.      AM-PAC Score        AM-PAC Inpatient Daily Activity Raw Score: 22 (06/13/22 0941)  AM-PAC Inpatient ADL T-Scale Score : 47.1 (06/13/22 0941)  ADL Inpatient CMS 0-100% Score: 25.8 (06/13/22 0941)  ADL Inpatient CMS G-Code Modifier : CJ (06/13/22 0941)      Therapy Time   Individual Concurrent Group Co-treatment   Time In 0845         Time Out 0930         Minutes 45         Timed Code Treatment Minutes: 30 Minutes  +15 min katherine Stevens, OT

## 2022-06-13 NOTE — PROGRESS NOTES
[facility]  Progress Note    Subjective:     Patient remains admitted. Underwent thrombectomy and IVC filter removal 6/10/22. Had low grade fever less than 24 hrs. Afebrile this AM.  Each. She has been up and ambulating to the bathroom. Pain is controlled. Review of Systems:     Review of Systems   Constitutional: Negative. HENT: Negative. Eyes: Negative. Respiratory: Negative. Cardiovascular: Negative. Gastrointestinal: Negative. Genitourinary: Negative. Musculoskeletal: Negative. Skin: Positive for wound. Neurological: Negative. Hematological: Negative.         Objective:     Medications    Current Facility-Administered Medications: polyethylene glycol (GLYCOLAX) packet 17 g, 17 g, Oral, Daily  bisacodyl (DULCOLAX) suppository 10 mg, 10 mg, Rectal, Daily PRN  apixaban (ELIQUIS) tablet 10 mg, 10 mg, Oral, BID **FOLLOWED BY** [START ON 6/18/2022] apixaban (ELIQUIS) tablet 5 mg, 5 mg, Oral, BID  oxyCODONE (ROXICODONE) immediate release tablet 2.5 mg, 2.5 mg, Oral, Q4H PRN  sennosides-docusate sodium (SENOKOT-S) 8.6-50 MG tablet 2 tablet, 2 tablet, Oral, Daily  0.9 % sodium chloride infusion, , IntraVENous, Continuous  clopidogrel (PLAVIX) tablet 75 mg, 75 mg, Oral, Daily  metroNIDAZOLE (FLAGYL) tablet 1,000 mg, 1,000 mg, Topical, Daily  sodium chloride flush 0.9 % injection 5-40 mL, 5-40 mL, IntraVENous, 2 times per day  sodium chloride flush 0.9 % injection 5-40 mL, 5-40 mL, IntraVENous, PRN  0.9 % sodium chloride infusion, , IntraVENous, PRN  ondansetron (ZOFRAN-ODT) disintegrating tablet 4 mg, 4 mg, Oral, Q8H PRN **OR** ondansetron (ZOFRAN) injection 4 mg, 4 mg, IntraVENous, Q6H PRN  acetaminophen (TYLENOL) tablet 650 mg, 650 mg, Oral, Q6H PRN **OR** acetaminophen (TYLENOL) suppository 650 mg, 650 mg, Rectal, Q6H PRN  pantoprazole (PROTONIX) tablet 40 mg, 40 mg, Oral, QAM AC  melatonin disintegrating tablet 5 mg, 5 mg, Oral, Nightly  levETIRAcetam (KEPPRA) tablet 2,500 mg, 2,500 mg, Oral, Nightly  levETIRAcetam (KEPPRA) tablet 2,000 mg, 2,000 mg, Oral, Daily  lacosamide (VIMPAT) tablet 200 mg, 200 mg, Oral, BID  HYDROmorphone (DILAUDID) injection 0.25 mg, 0.25 mg, IntraVENous, Q3H PRN    Allergies  Allergies   Allergen Reactions    Heparin Other (See Comments)     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 She is ill-appearing. HENT:      Nose: Nose normal.      Mouth/Throat:      Mouth: Mucous membranes are moist.   Eyes:      Extraocular Movements: Extraocular movements intact. Pupils: Pupils are equal, round, and reactive to light. Cardiovascular:      Rate and Rhythm: Normal rate and regular rhythm. Pulses: Normal pulses. Pulmonary:      Effort: Pulmonary effort is normal.   Abdominal:      Palpations: Abdomen is soft. Skin:     General: Skin is warm. Comments: Bilateral lower extremity edema   Neurological:      General: No focal deficit present. Mental Status: She is alert and oriented to person, place, and time.          Labs  Recent Labs     22  0640 22  0600 22  0439 22  0400 22  0432 22  0652 22  0652   WBC 13.3* 10.8 13.3*   < > 8.3   < > 6.8   NEUTROABS  --   --   --   --  7.0  --  4.9   LYMPHOPCT  --   --   --   --  9.0  --  11.9   RBC 2.61* 2.56* 2.97*   < > 3.16*   < > 3.19*   HGB 7.0* 7.2* 8.0*   < > 8.9*   < > 8.9*   HCT 21.1* 21.1* 24.5*   < > 26.6*   < > 27.2*   MCV 80.6 82.5 82.5   < > 84.1   < > 85.3   MCH 26.7 28.1 26.9   < > 28.0   < > 27.8   MCHC 33.2 34.1 32.6   < > 33.4   < > 32.6   RDW 19.2* 20.1* 19.8*   < > 19.9*   < > 20.0*    374 430   < > 531*   < > 503*    < > = values in this interval not displayed. Lab Results   Component Value Date     06/12/2022    K 3.3 (L) 06/12/2022     06/12/2022    CO2 25 06/12/2022    GLUCOSE 111 (H) 06/12/2022    BUN 4 (L) 06/12/2022    CREATININE <0.5 (L) 06/12/2022    LABGLOM >60 06/12/2022    GFRAA >60 06/12/2022    CALCIUM 8.2 (L) 06/12/2022    PROT 6.6 06/05/2022    LABALBU 3.2 (L) 06/05/2022    AGRATIO 1.0 (L) 03/03/2022    BILITOT <0.2 06/05/2022    ALKPHOS 59 06/05/2022    ALT 9 (L) 06/05/2022    AST 11 (L) 06/05/2022    MG 2.00 06/12/2022       Lab Results   Component Value Date    PROT 6.6 06/05/2022    PROT 6.0 (L) 04/03/2022    PROT 5.8 (L) 04/02/2022    INR 1.18 (H) 06/05/2022    INR 1.11 05/24/2022    INR 1.22 (H) 03/25/2022     Lab Results   Component Value Date    APTT 51.1 (H) 06/11/2022    APTT 64.6 (H) 06/10/2022    APTT 68.9 (H) 06/10/2022     Radiology  CT ABDOMEN PELVIS W IV CONTRAST Additional Contrast? None    Result Date: 6/5/2022  EXAM: CT ABDOMEN AND PELVIS WITH CONTRAST INDICATION: IVC thrombosis; Eval IVC, Pain COMPARISON: 3/28/2022. TECHNIQUE: Axial CT imaging obtained from lung bases through pelvis. Axial images and multiplanar reformatted images are provided for review. Individualized dose optimization technique was used in order to meet ALARA standards for radiation dose reduction. In addition to vendor specific dose reduction algorithms, the dose reduction techniques vary based on the specific scanner utilized but frequently include automated exposure control, adjustment of the mA and/or kV according to patient size, and use of iterative reconstruction technique.  IV Contrast: 100 mL Isovue-370 Oral Contrast: None. FINDINGS: Study limited by poor contrast bolus/timing. LUNG BASES: Bibasilar atelectasis. Partially visualized large necrotic mass in the right breast.. LIVER: 3-4 small hypoattenuating foci in the liver, new since 3/28/2022, largest measuring 1 cm in the inferior right hepatic lobe. GALLBLADDER AND BILIARY TREE: No calcified gallstones. No gallbladder distention. No intra- or extrahepatic biliary dilatation. PANCREAS: 1.6 cm hypoattenuating lesion in the body of the pancreas. . SPLEEN: Normal. ADRENAL GLANDS: Normal. KIDNEYS AND URETERS: No hydronephrosis. Normal enhancement. No urolithiasis. URINARY BLADDER: Partially distended. REPRODUCTIVE ORGANS: Mildly enlarged fibroid uterus. . BOWEL: Normal caliber. Normal appendix. LYMPH NODES: No abnormally enlarged nodes. PERITONEUM/RETROPERITONEUM: Trace free fluid in the pelvis. VESSELS: Aorta is normal caliber. IVC filter in place. Limited evaluation for IVC thrombosis due to inadequate opacification with contrast. ABDOMINAL WALL: No significant abnormality. BONES: No destructive process. Degenerative changes spine. 1. Large necrotic right breast mass. 2. 3-4 small hypoattenuating lesions in the liver which are new since 3/26/2022 concerning for metastasis. 3. 1.6 cm hypoattenuating lesion in the body of the pancreas, new since 3/28/2022 concerning for neoplasm/metastasis. 4. IVC filter in place. Suboptimal evaluation for residual IVC thrombosis due to inadequate opacification. XR CHEST PORTABLE    Result Date: 6/5/2022  EXAMINATION: ONE XRAY VIEW OF THE CHEST 6/5/2022 6:32 am COMPARISON: 03/27/2022 HISTORY: ORDERING SYSTEM PROVIDED HISTORY: confirm port placement FINDINGS: Tip of the left chest wall IJ approach MediPort terminates within the lower SVC. Stable caliber and configuration of the cardiomediastinal silhouette. Large soft tissue density, likely corresponding with the known right breast mass obscures portions of the right lung.   No sizable pleural effusion or pneumothorax. Tip of the left chest wall IJ approach MediPort terminates within the lower SVC. IR PORT PLACEMENT > 5 YEARS    Result Date: 5/24/2022  PROCEDURE: ULTRASOUND GUIDED VASCULAR ACCESS. FLUOROSCOPY GUIDED PLACEMENT OF A SUBCUTANEOUS PORT-A-CATH. MODERATE CONSCIOUS SEDATION 5/24/2022. HISTORY: ORDERING SYSTEM PROVIDED HISTORY: Secondary cancer of brain [facility]) TECHNOLOGIST PROVIDED HISTORY: Is the patient pregnant?->No SEDATION: 2 mgversed and 100 mcg fentanyl were titrated intravenously for moderate sedation monitored under my direction. Total intraservice time of sedation was 30 minutes. The patient's vital signs were monitored throughout the procedure and recorded in the patient's medical record by the nurse. FLUOROSCOPY DOSE AND TYPE OR TIME AND EXPOSURES: Fluoro time: 0.6 minute Cumulative air kerma: 5.86 mGy TECHNIQUE: Informed consent was obtained after a detailed explanation of the procedure including risks, benefits, and alternatives. All aspects of maximum sterile barrier technique were used including washing hands with conventional soap and water or with alcohol-based hand rubs (ABHR), skin preparation, cap, mask, sterile gown, sterile gloves, and sterile full body drape. Local anesthesia was achieved with lidocaine. A micropuncture needle was used to access the right internal jugular vein using ultrasound guidance. An ultrasound image demonstrating patency of the vein with needle tip located within it. An image was obtained and stored in PACs. A 0.035 guidewire was used to place a peel-away sheath. A chest wall incision was made and a subcutaneous pocket was created. The port reservoir was placed within the pocket and the port tubing was attached and tunneled subcutaneously to the venotomy site. The port tubing was cut to an appropriate length and advanced through the peel-away sheath under fluoroscopic guidance to the cavo-atrial junction.   The chest wall incision was closed using 3-0 and 4-0 Vicryl sutures and tissue adhesive was applied to the venotomy site and chest wall incision. The port flushed easily and there was a good blood return. The port was locked with heparinized saline. The patient tolerated the procedure well and there were no immediate complications. Estimated blood loss: Less than 5 cc FINDINGS: Fluoroscopic image demonstrates the tip of the port at the cavo-atrial junction . Successful ultrasound and fluoroscopy guided right IJ Port-A-Cath placement. VL Extremity Venous Bilateral    Result Date: 6/6/2022  Vascular Lower Extremities DVT Study Procedure -- PRELIMINARY SONOGRAPHER REPORT --   Demographics   Patient Name       Aime OSORIO   Date of Study      06/06/2022         Gender              Female   Patient Number     [id number]         Date of Birth       1975   Visit Number       [id number]          Age                 48 year(s)   Accession Number   [id number]         Room Number         4384   Corporate ID       [id number]            Sonographer         Carl Toro,                                                            BS, RVT   Ordering Physician Cecile Rg,   Interpreting        [facility]                     MD                 Physician           Readers  Procedure Type of Study:   Veins:Lower Extremities DVT Study, VASC EXTREMITY VENOUS DUPLEX BILATERAL. Tech Comments Right There is acute on chronic, totally occluding deep venous thrombosis involving the common femoral vein. Acute deep venous thrombosis is noted involving the femoral vein, popliteal vein and gastocnemious veins. Acute superficial venous thrombosis is noted in the short saphenous vein. The posterior tibial and peroneal veins were poorly visualized due to edema. Subacute thrombus is noted in one soleal vein. Left Evidence of a chronic phlebitic process is noted in the common femoral vein.  There is acute totally occluding deep venous thrombosis involving the femoral vein, popliteal vein, gastrocnemious veins, peroneal veins and soleal veins. There is acute totally occluding superficial venous thrombosis involving the short saphenous vein. There is no other evidence of deep or superficial venous thrombosis involving the left lower extremity. Pathology  Department of Pathology   ADDENDUM SURGICAL PATHOLOGY REPORT   Patient Name: Lillian Castellano      Accession No:  [id number]    Age Sex:   1975    46 Y / F      Location:      DIS 01   Account No:   [de-identified]                 Collected:     2022   Med Rec No:    [id number]                Received:      2022   Attend Phys:   Rissa Bro MD         Completed:     2022   Perform Phys: Marika Locke MD       ADDENDUM:   At formal (faxed) request from [facility], the   request initiated by Taj Reynolds M.D., a formalin-fixed,   paraffin-embedded (FFPE) tissue block (C9) was selected by a pathologist   (after review of the slides) and sent to [facility] for   molecular analysis (\"[employer]\" tumor profiling). See   that separate report, when complete, accession number [id number], for   analytic details/analytic results. Additional manual (not electronic) professional-only CPT code: Shaan Chambers M.D.   (Electronic Signature)   2022       FINAL DIAGNOSIS:        A. Left temporal mass:      - Metastatic carcinoma compatible with \"triple negative\" breast        primary; see Comment.        B. Left temporal dura:      - Dural tissue negative for carcinoma, keratin AE1+3/Cam 5.2        immunostain reviewed on each block.        C. Left occipital mass:      - Metastatic carcinoma compatible with \"triple negative\" breast        primary; see Comment.      COMMENT:  A, C: The carcinoma at each site shows a nearly identical   appearance, with diffuse masses of highly polymorphous cells showing a high mitotic/karyorrhectic rate, frequent \"giant\" cells, and plentiful   geographic necrosis.  Given the patient's clinical history (see below),   immunostains were requested. The tumor is strongly positive for CK7,   GATA3, SOX-10, and beta catenin (membranous pattern), with variable   cytoplasmic and partial perinuclear(\"Golgi\") positivity for keratin   AE1+3/Cam 5.2, a tiny minority population, including geographic foci,   positive for high molecular weight keratin (), and Ki-67   proliferation rate of 90% or more. It is negative for ER, CO, and HER-2   (no positivity for any of the three stains), and also for CK20, Napsin-A,   S100 protein, villin, TTF-1, CDX2, and CD45 (LCA). The overall findings strongly support metastatic \"triple negative\" breast   carcinoma.   BRATI/ROLAN     Problem List  Patient Active Problem List   Diagnosis    Thyroid nodule    Brain metastases ([facility] [address]    Brain mass    Status epilepticus ([facility] [address]    Duct cell carcinoma (HCC)    Cerebral edema (HCC)    Seizure (HCC)    Deep venous thrombosis (HCC)    Fever    Phlebitis    S/P craniotomy    Acute deep vein thrombosis (DVT) of axillary vein (HCC)    DVT, lower extremity, proximal, acute, bilateral (HCC)       Assessment and Plan:     Metastatic triple negative breast cancer  - Neglected Right breast primary  - Intracranial metastasis  - Liver metastases  - completed brain RT   - needs to follow up with Dr. Brian Escalona next week to initiate systemic therapy.  Hopefully home soon so we can get her in to begin Tx  -Case discussed with nursing to make sure that we can coordinate transition from inpatient to outpatient treatment      Seizures  - Secondary to intracranial metastases which have been resected and treated with radiation.  - Recommend continuing Keppra and Vimpat     DVT  - s/p thrombectomy 6/10/22  - had been on argatroban secondary to HIT  - transition to Eliquis at d/c - ok to continue along with Plavix (recommended)    IVC Thrombus  - S/P thrombectomy and filter removal 6/10/22  - Transitioned from argatroban to Eliquis/Plavix  - see above     HIT  - Ivone positive  - TRES positive  - Received argatroban gtt w/ normalization of the plts during April hospitalization  - Transititoned to Eliquis  - Agree with argatroban and a transition to Eliquis for this hospitalization as well  - Plt count normal     Disposition  Medical oncology will follow peripherally. Will need follow-up with her primary Oncologist, Dr Yonny Leal shortly after discharge to start systemic chemotherapy.     Fever  -Resolved  -WBC normal      Isabel Rob MD, MPH  6/13/2022

## 2022-06-13 NOTE — PLAN OF CARE
Problem: Safety - Adult  Goal: Free from fall injury  Bed alarm activated, low position, wheels locked. Up with assist x 1 with walker and gait belt. Patient calls for assistance appropriately. Call light and belongings within reach. Continue to monitor safety. Problem: Pain  Goal: Verbalizes/displays adequate comfort level or baseline comfort level  6/13/2022 0109 by Radhames Gandhi RN  Outcome: Progressing    C/o R breast pain, medicated with prn oxycodone. Patient satisfied with results of medication. Will monitor and medicate appropriately. Problem: Neurosensory - Adult  Goal: Absence of seizures  Outcome: Progressing   Patient taking keppra and vimpat. No s/s of seizures this shift. Will monitor. Problem: Respiratory - Adult  Goal: Achieves optimal ventilation and oxygenation  Outcome: Progressing   Denies sob. SpO2 93% RA. Lungs clear. Problem: Skin/Tissue Integrity - Adult  Goal: Skin integrity remains intact  Outcome: Progressing   Dressing to R breast wound changed per day shift. Dressing c/d/I. Tegaderm to bilateral legs remain intact. Patient repositions self. Problem: Skin/Tissue Integrity - Adult  Goal: Incisions, wounds, or drain sites healing without S/S of infection  Outcome: Progressing   AKASH R breast wound, dressing remains intact. Low grade temp 99.7, no intervention required. Problem: Hematologic - Adult  Goal: Maintains hematologic stability  Outcome: Progressing   Vitals stable. SR on tele.

## 2022-06-13 NOTE — ANESTHESIA POSTPROCEDURE EVALUATION
Department of Anesthesiology  Postprocedure Note    Patient: Yoli Vergara  MRN: [id number]  YOB: 1975  Date of evaluation: 6/13/2022  Time:  1:45 PM     Procedure Summary     Date: 06/10/22 Room / Location: [facility] Special Procedures    Anesthesia Start: 5455 Anesthesia Stop: 1618    Procedure: IR IVC FILTER RETRIEVAL Diagnosis: (ivc removal and thrombectomy)    Scheduled Providers: JUDITH Parekh - CRNA; Flor Shaikh MD Responsible Provider: Hilary Ludwig MD    Anesthesia Type: general ASA Status: 3          Anesthesia Type: No value filed. Laura Phase I: Laura Score: 9    Laura Phase II:      Last vitals: Reviewed and per EMR flowsheets.        Anesthesia Post Evaluation    Patient location during evaluation: PACU  Level of consciousness: awake  Complications: no  Multimodal analgesia pain management approach

## 2022-06-13 NOTE — PROGRESS NOTES
Progress Note    Admit Date: 6/5/2022    Patient's PCP: Dr. Shavonne Lovell, APRN - CNP  Chief complaints:  Leg edema    Interval HPI  A fever was documented overnight. She has had none since. WBC elevation resolved yesterday, back up today. She has had limited mobility last 2 weeks and is worried about this. The pain she has been having has been much better. She overall is feeling much better. No cough. Main concern is constipation and poor intake. No vomiting. No dysuria or increased urinary frequency. HISTORY OF PRESENT ILLNESS:    \"This is a very pleasant 48 y.o. female with metastatic breast cancer status post craniotomy with mass resection, recent admission, recent REBECCA/DVT  s/p thrombectomy who presented to ED at OSH with worsening bilateral leg swelling.      Was admitted 3/25/2022 - 4/10/2022 from [place] due to worsening lower extremity pain and swelling.       During a previous admission  patient was diagnosed with RLE DVT and due to recent Craniotomy, anticoagulation could not be started till POD 14 so IVC filter was placed. Her platelets levels were noted to be low at [facility] and due to concern for worsening DVT she was sent to the hospital.      On admission 3/25/2022,  CT abdomen and pelvis showed thrombosis of the IVC at the level of the filter with marked distention of the caval bifurcation.  IR was consulted, pxt had thrombectomy 3/28/2022; she was continued on anticoagulation and as concern her filter will continue to be a nidus for infection, it was discussed that it would be re-evaluated\". .. in 6 weeks\".  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  Plan was radiation therapy and palliative chemo as outpatient.     Of note, she continued to have swelling in her lower extremity which persisted in spite of aggressive diureses, and was determined to be secondary to clot congestion in the IVC.      She presented with her family to the ED at OSH, for worsening leg swelling associated with pains in both lower extremities.       Patient states she was told to stop her Eliquis for radiation treatments, whch she did complete 10 treatments, and states she was never told to resume it; and hence has not been on it for about 4-6 weeks.      She is yet to start chemotherapy and had  placement of a right IJ port 5/24/2022 in anticipation of starting palliative chemo in several days.     She denies fevers, chills, chest pain or dyspnea.      In the ED at OSH, she was noted to be tachycardic, but afebrile. Routine labs were fairly normal apart from some anemia and thrombocytosis. \"      Medications: Reviewed      Allergies:  Heparin      ROS: Review of Systems - Negative except as in HPI. .   All other systems reviewed and are negative. PHYSICAL EXAM:  /73   Pulse (!) 102   Temp 98.2 °F (36.8 °C) (Oral)   Resp 20   SpO2 98%     No results for input(s): POCGLU in the last 72 hours. General appearance: alert, appears stated age and cooperative  Head: Normocephalic, without obvious abnormality, atraumatic  Throat: lips, mucosa, and tongue normal; teeth and gums normal  Neck: no adenopathy, no carotid bruit, no JVD, supple, symmetrical, trachea midline and thyroid not enlarged, symmetric, no tenderness/mass/nodules  Lungs: clear to auscultation bilaterally  Breasts:   Heart: Mildly Tachycardic; regular rhythm, S1, S2 normal, no murmur, click, rub or gallop  Abdomen: soft, non-tender; bowel sounds normal; no masses,  no organomegaly  Extremities: Gross bilateral LE edema involving entire LEs bilaterally, pitting, tender.  Atraumatic, no cyanosis   Pulses: 2+ and symmetric  Neurologic: Grossly normal       LABS:  Recent Labs     06/11/22  0439 06/12/22  0600 06/13/22  0640   WBC 13.3* 10.8 13.3*   HGB 8.0* 7.2* 7.0*   HCT 24.5* 21.1* 21.1*    374 399 Recent Labs     06/12/22  0600 06/13/22  0640    139   K 3.3* 3.5    107   CO2 25 21   BUN 4* 5*   CREATININE <0.5* <0.5*   GLUCOSE 111* 121*     No results for input(s): AST, ALT, ALB, BILITOT, ALKPHOS in the last 72 hours. No results for input(s): TROPONINI in the last 72 hours. No results for input(s): BNP in the last 72 hours. Lab Results   Component Value Date    PHART 7.466 03/29/2022    LAE4ZEB 31.3 03/29/2022    PO2ART 84.9 03/29/2022     No results for input(s): INR in the last 72 hours. No results for input(s): NITRITE, COLORU, PHUR, LABCAST, WBCUA, RBCUA, MUCUS, TRICHOMONAS, YEAST, BACTERIA, CLARITYU, SPECGRAV, LEUKOCYTESUR, UROBILINOGEN, BILIRUBINUR, BLOODU, GLUCOSEU, AMORPHOUS in the last 72 hours. Invalid input(s): Barfield Distance       Assessment & Plan:     48 y.o. female with metastatic breast cancer status post craniotomy with mass resection, recent admission, recent REBECCA/DVT s/p thrombectomy who presented to ED at OSH with worsening bilateral leg swelling. Acute on chronic Bilateral lower extremity DVTs   Hx of recent IVC filter thrombus, REBECCA   - Recent admission for DVT developed in setting of malignancy, REBECCA. She could not receive therapeutic anticoagulation until POD #14 after craniotomy for brain mets, so IVC filter placed. - Was re-admitted with REBECCA and noted with severe thrombosis on LEs, including around filter S/p IR thrombectomy 3/28/2022.   Was on Argatroban gtt and transitioned to Eliquis with plan to keep IVC filter in \"for 6 weeks\", to follow up with IR outpatient  - Had not been taking the Eliquis for at least 4-6 week PTA[de-identified] Pxt states she was told to suspend it for radiation txts, and never told to resume but it is not typically held for XRT  - US duplex: acute and chronic clot in both lower extremities  - Placed on argatroban  -6/10 s/p IVC filter removal, B/l iliocaval thrombectomy, b/l iliocaval stenting, b/l lower extremity thrombectomy  - Transitioned to Eliquis  - PT/OT    Metastatic triple negative breast cancer with intracranial metastasis  Fungating, necrotic right breast mass  -Known brain mets S/p craniotomy on 3/12 for brain mets withseizures. -S/p radiation, completed with plans for palliative systemic chemotherapy - She had placement of a right IJ port 5/24/2022 - Oncology consulted. - CT A/P on admit: prob new metastatic lesions in the  Liver (\". .3-4 small. Pinky Angles ) and pancreas (1.6 cm )  -For her neglected right breast cancer with fungating, necrotic mass: Was seen by general surgery in the last admissions to consider palliative resection. Pxt had elected not to do so at that time, stating she was recovering from her brain surgery. Consulted Gen Surgery to re-evaluate role of palliative mastectomy: consensus with oncology is to defer for now, to re-eval and consider toilet mastectomy if develops bleeding or infection from breast mass. - On oxycodone as needed for pain  - Pall Med consulted  - F/u with oncology at the [facility] office on discharge for Palliative chemo with Dr. Allen Paredes. - Dietician consulted    Seizures sec to brain met   -Continue Keppra 2 g daily, Keppra 2.5 g nightly Vimpat 200 mg. Appears may not have been taking the keppra as was intended. - Follow up with Dr. Zavala Breath.     Anxiety -Continue Ativan PRN    Episode of fever  100.6 early this am, WBC increased to 13, feels increased SOB. Will get blood CX, UA, CXR to assess. Constipation. No nausea or vomiting  Feels uncomfortable, started senokot and miralax  Add dulcolax suppository    Hx of urinary retention  Check PVR    Full Code   Pall Med consult    Disposition: in pxt pending progress  Pxt from Home./ Likely will return Home at discharge in 1-2 days. PT/OT, will likely need HHC, may need DME.      Thang Stallworth MD

## 2022-06-14 VITALS
SYSTOLIC BLOOD PRESSURE: 124 MMHG | OXYGEN SATURATION: 97 % | HEART RATE: 122 BPM | BODY MASS INDEX: 33.49 KG/M2 | RESPIRATION RATE: 20 BRPM | TEMPERATURE: 98.9 F | HEIGHT: 63 IN | DIASTOLIC BLOOD PRESSURE: 83 MMHG | WEIGHT: 189 LBS

## 2022-06-14 LAB
ABO/RH: NORMAL
ANION GAP SERPL CALCULATED.3IONS-SCNC: 11 MMOL/L (ref 3–16)
ANTIBODY SCREEN: NORMAL
BLOOD BANK DISPENSE STATUS: NORMAL
BLOOD BANK PRODUCT CODE: NORMAL
BPU ID: NORMAL
BUN BLDV-MCNC: 5 MG/DL (ref 7–20)
CALCIUM SERPL-MCNC: 8.1 MG/DL (ref 8.3–10.6)
CHLORIDE BLD-SCNC: 106 MMOL/L (ref 99–110)
CO2: 20 MMOL/L (ref 21–32)
CREAT SERPL-MCNC: <0.5 MG/DL (ref 0.6–1.1)
DESCRIPTION BLOOD BANK: NORMAL
FERRITIN: 523.1 NG/ML (ref 15–150)
GFR AFRICAN AMERICAN: >60
GFR NON-AFRICAN AMERICAN: >60
GLUCOSE BLD-MCNC: 123 MG/DL (ref 70–99)
HCT VFR BLD CALC: 19.9 % (ref 36–48)
HCT VFR BLD CALC: 20.1 % (ref 36–48)
HCT VFR BLD CALC: 25 % (ref 36–48)
HEMOGLOBIN: 6.5 G/DL (ref 12–16)
HEMOGLOBIN: 8.1 G/DL (ref 12–16)
IMMATURE RETIC FRACT: 0.62 (ref 0.21–0.37)
IRON SATURATION: 12 % (ref 15–50)
IRON: 16 UG/DL (ref 37–145)
MCH RBC QN AUTO: 26.3 PG (ref 26–34)
MCHC RBC AUTO-ENTMCNC: 32.6 G/DL (ref 31–36)
MCV RBC AUTO: 80.8 FL (ref 80–100)
PDW BLD-RTO: 19.5 % (ref 12.4–15.4)
PLATELET # BLD: 412 K/UL (ref 135–450)
PMV BLD AUTO: 6.9 FL (ref 5–10.5)
POC ACT LR: >400 SEC
POTASSIUM SERPL-SCNC: 3.7 MMOL/L (ref 3.5–5.1)
RBC # BLD: 2.46 M/UL (ref 4–5.2)
RETICULOCYTE ABSOLUTE COUNT: 0.04 M/UL (ref 0.02–0.1)
RETICULOCYTE COUNT PCT: 1.72 % (ref 0.5–2.18)
SODIUM BLD-SCNC: 137 MMOL/L (ref 136–145)
TOTAL IRON BINDING CAPACITY: 135 UG/DL (ref 260–445)
WBC # BLD: 12.5 K/UL (ref 4–11)

## 2022-06-14 PROCEDURE — 83540 ASSAY OF IRON: CPT

## 2022-06-14 PROCEDURE — 2580000003 HC RX 258: Performed by: INTERNAL MEDICINE

## 2022-06-14 PROCEDURE — 82728 ASSAY OF FERRITIN: CPT

## 2022-06-14 PROCEDURE — 6370000000 HC RX 637 (ALT 250 FOR IP): Performed by: INTERNAL MEDICINE

## 2022-06-14 PROCEDURE — 86901 BLOOD TYPING SEROLOGIC RH(D): CPT

## 2022-06-14 PROCEDURE — 86850 RBC ANTIBODY SCREEN: CPT

## 2022-06-14 PROCEDURE — 36430 TRANSFUSION BLD/BLD COMPNT: CPT

## 2022-06-14 PROCEDURE — 86923 COMPATIBILITY TEST ELECTRIC: CPT

## 2022-06-14 PROCEDURE — P9016 RBC LEUKOCYTES REDUCED: HCPCS

## 2022-06-14 PROCEDURE — 80048 BASIC METABOLIC PNL TOTAL CA: CPT

## 2022-06-14 PROCEDURE — 85027 COMPLETE CBC AUTOMATED: CPT

## 2022-06-14 PROCEDURE — 82746 ASSAY OF FOLIC ACID SERUM: CPT

## 2022-06-14 PROCEDURE — 85018 HEMOGLOBIN: CPT

## 2022-06-14 PROCEDURE — 86900 BLOOD TYPING SEROLOGIC ABO: CPT

## 2022-06-14 PROCEDURE — 83550 IRON BINDING TEST: CPT

## 2022-06-14 PROCEDURE — 36415 COLL VENOUS BLD VENIPUNCTURE: CPT

## 2022-06-14 PROCEDURE — 85045 AUTOMATED RETICULOCYTE COUNT: CPT

## 2022-06-14 PROCEDURE — 6360000002 HC RX W HCPCS: Performed by: INTERNAL MEDICINE

## 2022-06-14 PROCEDURE — 85014 HEMATOCRIT: CPT

## 2022-06-14 RX ORDER — LEVOFLOXACIN 750 MG/1
750 TABLET ORAL DAILY
Qty: 5 TABLET | Refills: 0 | Status: SHIPPED | OUTPATIENT
Start: 2022-06-14 | End: 2022-06-19

## 2022-06-14 RX ORDER — METRONIDAZOLE 500 MG/1
1000 TABLET ORAL DAILY
Qty: 20 TABLET | Refills: 0 | Status: ON HOLD | OUTPATIENT
Start: 2022-06-14 | End: 2022-06-26 | Stop reason: HOSPADM

## 2022-06-14 RX ORDER — OXYCODONE HYDROCHLORIDE 5 MG/1
2.5 TABLET ORAL EVERY 4 HOURS PRN
Qty: 10 TABLET | Refills: 0 | Status: SHIPPED | OUTPATIENT
Start: 2022-06-14 | End: 2022-06-17

## 2022-06-14 RX ORDER — SODIUM CHLORIDE 9 MG/ML
INJECTION, SOLUTION INTRAVENOUS PRN
Status: DISCONTINUED | OUTPATIENT
Start: 2022-06-14 | End: 2022-06-14 | Stop reason: HOSPADM

## 2022-06-14 RX ORDER — SODIUM CHLORIDE 9 MG/ML
INJECTION, SOLUTION INTRAVENOUS PRN
Status: DISCONTINUED | OUTPATIENT
Start: 2022-06-14 | End: 2022-06-14

## 2022-06-14 RX ORDER — CLOPIDOGREL BISULFATE 75 MG/1
75 TABLET ORAL DAILY
Qty: 30 TABLET | Refills: 3 | Status: SHIPPED | OUTPATIENT
Start: 2022-06-14

## 2022-06-14 RX ADMIN — LEVETIRACETAM 2500 MG: 750 TABLET, FILM COATED ORAL at 20:45

## 2022-06-14 RX ADMIN — HYDROMORPHONE HYDROCHLORIDE 0.25 MG: 1 INJECTION, SOLUTION INTRAMUSCULAR; INTRAVENOUS; SUBCUTANEOUS at 02:44

## 2022-06-14 RX ADMIN — PANTOPRAZOLE SODIUM 40 MG: 40 TABLET, DELAYED RELEASE ORAL at 05:39

## 2022-06-14 RX ADMIN — POLYETHYLENE GLYCOL 3350 17 G: 17 POWDER, FOR SOLUTION ORAL at 09:53

## 2022-06-14 RX ADMIN — OXYCODONE 2.5 MG: 5 TABLET ORAL at 20:47

## 2022-06-14 RX ADMIN — APIXABAN 10 MG: 5 TABLET, FILM COATED ORAL at 20:48

## 2022-06-14 RX ADMIN — OXYCODONE 2.5 MG: 5 TABLET ORAL at 03:33

## 2022-06-14 RX ADMIN — HYDROMORPHONE HYDROCHLORIDE 0.25 MG: 1 INJECTION, SOLUTION INTRAMUSCULAR; INTRAVENOUS; SUBCUTANEOUS at 05:38

## 2022-06-14 RX ADMIN — APIXABAN 10 MG: 5 TABLET, FILM COATED ORAL at 09:53

## 2022-06-14 RX ADMIN — METRONIDAZOLE 1000 MG: 500 TABLET ORAL at 09:53

## 2022-06-14 RX ADMIN — SENNOSIDES AND DOCUSATE SODIUM 2 TABLET: 50; 8.6 TABLET ORAL at 09:53

## 2022-06-14 RX ADMIN — LACOSAMIDE 200 MG: 50 TABLET, FILM COATED ORAL at 20:48

## 2022-06-14 RX ADMIN — LEVETIRACETAM 2000 MG: 500 TABLET, FILM COATED ORAL at 09:54

## 2022-06-14 RX ADMIN — SODIUM CHLORIDE: 9 INJECTION, SOLUTION INTRAVENOUS at 02:44

## 2022-06-14 RX ADMIN — SODIUM CHLORIDE, PRESERVATIVE FREE 10 ML: 5 INJECTION INTRAVENOUS at 09:54

## 2022-06-14 RX ADMIN — LACOSAMIDE 200 MG: 50 TABLET, FILM COATED ORAL at 10:34

## 2022-06-14 RX ADMIN — SODIUM CHLORIDE: 9 INJECTION, SOLUTION INTRAVENOUS at 13:03

## 2022-06-14 RX ADMIN — CLOPIDOGREL BISULFATE 75 MG: 75 TABLET ORAL at 09:55

## 2022-06-14 ASSESSMENT — PAIN DESCRIPTION - ORIENTATION
ORIENTATION: RIGHT;LEFT
ORIENTATION: RIGHT

## 2022-06-14 ASSESSMENT — PAIN SCALES - GENERAL
PAINLEVEL_OUTOF10: 4
PAINLEVEL_OUTOF10: 0
PAINLEVEL_OUTOF10: 10
PAINLEVEL_OUTOF10: 9

## 2022-06-14 ASSESSMENT — PAIN DESCRIPTION - PAIN TYPE: TYPE: CHRONIC PAIN

## 2022-06-14 ASSESSMENT — PAIN DESCRIPTION - LOCATION
LOCATION: INCISION;LEG
LOCATION: INCISION

## 2022-06-14 ASSESSMENT — PAIN - FUNCTIONAL ASSESSMENT: PAIN_FUNCTIONAL_ASSESSMENT: ACTIVITIES ARE NOT PREVENTED

## 2022-06-14 ASSESSMENT — PAIN SCALES - WONG BAKER
WONGBAKER_NUMERICALRESPONSE: 0

## 2022-06-14 ASSESSMENT — PAIN DESCRIPTION - ONSET: ONSET: GRADUAL

## 2022-06-14 ASSESSMENT — PAIN DESCRIPTION - DESCRIPTORS: DESCRIPTORS: SORE

## 2022-06-14 ASSESSMENT — PAIN DESCRIPTION - FREQUENCY: FREQUENCY: INTERMITTENT

## 2022-06-14 NOTE — FLOWSHEET NOTE
06/13/22 2101   Assessment   Charting Type Shift assessment   Psychosocial   Psychosocial (WDL) WDL   Neurological   Neuro (WDL) X   Level of Consciousness Alert (0)   Orientation Level Oriented X4   Cognition Appropriate judgement; Appropriate safety awareness; Appropriate attention/concentration; Appropriate for developmental age; Follows commands   Speech Clear   R Hand  Moderate   L Hand  Moderate   RUE Motor Response Responds to command   LUE Motor Response Responds to command   Carole Coma Scale   Eye Opening 4   Best Verbal Response 5   Best Motor Response 6   Herscher Coma Scale Score 15   HEENT (Head, Ears, Eyes, Nose, & Throat)   HEENT (WDL) WDL   Respiratory   Respiratory (WDL) WDL   Respiratory Pattern Regular   Respiratory Depth Normal   Respiratory Quality/Effort Unlabored   Chest Assessment Chest expansion symmetrical   L Breath Sounds Clear   R Breath Sounds Clear   Cardiac   Cardiac (WDL) X   Cardiac Regularity Regular   Heart Sounds S1, S2   Cardiac Rhythm Sinus tachy   Cardiac Monitor   Alarm Audible Yes   Gastrointestinal   Abdominal (WDL) X   Pre Hospital Interventions laxative   Abdomen Inspection Soft;Rounded   RUQ Bowel Sounds Active   LUQ Bowel Sounds Active   RLQ Bowel Sounds Active   LLQ Bowel Sounds Active   GI Symptoms Constipation   Tenderness Soft;Nontender   Genitourinary   Genitourinary (WDL) WDL   Suprapubic Tenderness No   Dysuria (Pain/Burning w/Urination) No   Urine Assessment   Urine Color Yellow/straw   Urine Appearance Clear   Urine Odor No odor   Peripheral Vascular   Peripheral Vascular (WDL) X   Edema Right lower extremity; Left lower extremity   RLE Edema +1   LLE Edema +1   RUE Sensation  Full sensation   RLE Sensation  Pain   LLE Sensation  Pain   RLE Neurovascular Assessment   R Pedal Pulse +2   Capillary Refill Less than/Equal to 3 seconds   Color Appropriate for Ethnicity   Temperature Warm   LLE Neurovascular Assessment   L Pedal Pulse +2   Capillary Refill Less than/Equal to 3 seconds   Color Appropriate for Ethnicity   Temperature Warm   Skin Integumentary    Skin Integumentary (WDL) X   Musculoskeletal   Musculoskeletal (WDL) X   RUE Weakness   LUE Weakness   RL Extremity Weakness   LL Extremity Weakness   Wound 03/04/22 Breast Lower;Right Large mass   Date First Assessed/Time First Assessed: 03/04/22 0730   Present on Hospital Admission: Yes  Location: Breast  Wound Location Orientation: Lower;Right  Wound Description (Comments): Large mass   Wound Etiology Other   Dressing Status Clean;Dry; Intact   Wound Cleansed Not Cleansed   Dressing/Treatment ABD; Non adherent   Dressing Change Due 06/14/22   Incision 06/10/22 Knee Left;Posterior   Date First Assessed/Time First Assessed: 06/10/22 1618   Present on Hospital Admission: No  Location: Knee  Incision Location Orientation: Left;Posterior   Dressing Status Clean;Dry; Intact   Incision Cleansed Not Cleansed   Dressing/Treatment Tegaderm/transparent film dressing   Incision Assessment Dry   Drainage Amount None   Odor None   Incision 06/10/22 Knee Right;Posterior   Date First Assessed/Time First Assessed: 06/10/22 1618   Present on Hospital Admission: No  Location: Knee  Incision Location Orientation: Right;Posterior   Dressing Status Clean;Dry; Intact   Incision Cleansed Not Cleansed   Dressing/Treatment Tegaderm/transparent film dressing   Incision Assessment Dry   Drainage Amount None   Odor None

## 2022-06-14 NOTE — PLAN OF CARE
Problem: Discharge Planning  Goal: Discharge to home or other facility with appropriate resources  6/14/2022 1021 by Valeriano Solano RN  Outcome: Progressing  Flowsheets  Taken 6/14/2022 1021 by Valeriano Solano RN  Discharge to home or other facility with appropriate resources:   Identify barriers to discharge with patient and caregiver   Arrange for needed discharge resources and transportation as appropriate   Identify discharge learning needs (meds, wound care, etc)   Arrange for interpreters to assist at discharge as needed  Taken 6/13/2022 2242 by Jessica Stringer RN  Discharge to home or other facility with appropriate resources:   Identify barriers to discharge with patient and caregiver   Arrange for needed discharge resources and transportation as appropriate   Identify discharge learning needs (meds, wound care, etc)     Problem: Safety - Adult  Goal: Free from fall injury  Outcome: Progressing  Flowsheets  Taken 6/14/2022 1021  Free From Fall Injury: Instruct family/caregiver on patient safety  Taken 6/14/2022 0735  Free From Fall Injury: Instruct family/caregiver on patient safety     Problem: Pain  Goal: Verbalizes/displays adequate comfort level or baseline comfort level  Outcome: Progressing  Flowsheets (Taken 6/14/2022 1021)  Verbalizes/displays adequate comfort level or baseline comfort level:   Encourage patient to monitor pain and request assistance   Assess pain using appropriate pain scale   Administer analgesics based on type and severity of pain and evaluate response   Implement non-pharmacological measures as appropriate and evaluate response     Problem: ABCDS Injury Assessment  Goal: Absence of physical injury  Outcome: Progressing  Flowsheets  Taken 6/14/2022 1021  Absence of Physical Injury: Implement safety measures based on patient assessment  Taken 6/14/2022 0735  Absence of Physical Injury: Implement safety measures based on patient assessment     Problem: Neurosensory -

## 2022-06-14 NOTE — PLAN OF CARE
Problem: Discharge Planning  Goal: Discharge to home or other facility with appropriate resources  6/13/2022 2105 by Jory Yi RN  Outcome: Progressing

## 2022-06-14 NOTE — CARE COORDINATION
CTN contacted Andrew Villatoro with [facility]. [phone]. [facility]. able to accept this patient.  Andrew Villatoro will pull referral from epic for [address] by 6/16  Electronically signed by Raj Rangel LPN on 9/46/5905 at 65:33 AM

## 2022-06-14 NOTE — PROGRESS NOTES
ONCOLOGY HEMATOLOGY CARE  PROGRESS NOTE    SUBJECTIVE:       Hct down to 16 today, ordered from [facility]. Pt is anxious to go home. Reports no bruising from thrombolytic therapy, no epistaxis, hematuria, black stools. PHYSICAL EXAM:       /75   Pulse (!) 110   Temp 98.7 °F (37.1 °C) (Oral)   Resp 16   Ht 5' 3\" (1.6 m)   Wt 189 lb (85.7 kg)   SpO2 98%   BMI 33.48 kg/m²     General appearance: Alert and cooperative. Appears stated age. Comfortable. Head: Normocephalic, without obvious abnormality, atraumatic  EENT:  No icterus. No mucositis. Lungs: Clear to auscultation bilaterally, no audible rales, wheezes or crackles  Heart: Regular rate and rhythm, S1, S2 normal  Abdomen: Soft, non-tender; bowel sounds normal; no masses,  No hepatosplenomegaly, distention. Extremities: +edema  Skin: No jaundice, purpura or petechiae  Musculoskeletal:  No joint swelling, deformities, tenderness, erythema. Neuro:  Alert and oriented. Speech is normal.Cranial nerves are intact. Our conversation was appropriate.   Psychiatric:  Normal    MEDS:     Current Facility-Administered Medications   Medication Dose Route Frequency Provider Last Rate Last Admin    0.9 % sodium chloride infusion   IntraVENous PRN Hillary Hu MD        polyethylene glycol [facility]) packet 17 g  17 g Oral Daily Corinne Black, DO   17 g at 06/12/22 2030    bisacodyl (DULCOLAX) suppository 10 mg  10 mg Rectal Daily PRN Lovina Emms, DO        apixaban (ELIQUIS) tablet 10 mg  10 mg Oral BID Corinne Black, DO   10 mg at 06/13/22 2025    Followed by   Sanjay Jean ON 6/18/2022] apixaban (ELIQUIS) tablet 5 mg  5 mg Oral BID Lovina Emms, DO        oxyCODONE (ROXICODONE) immediate release tablet 2.5 mg  2.5 mg Oral Q4H PRN Lovina Emms, DO   2.5 mg at 06/14/22 0333    sennosides-docusate sodium (SENOKOT-S) 8.6-50 MG tablet 2 tablet  2 tablet Oral Daily Corinne Black, DO   2 tablet at 06/13/22 0904    0.9 % sodium chloride infusion   IntraVENous Continuous Corinne Black,  mL/hr at 06/14/22 0244 New Bag at 06/14/22 0244    clopidogrel (PLAVIX) tablet 75 mg  75 mg Oral Daily [facility], MD   75 mg at 06/13/22 8323    metroNIDAZOLE (FLAGYL) tablet 1,000 mg  1,000 mg Topical Daily Seth French MD   1,000 mg at 06/13/22 0903    sodium chloride flush 0.9 % injection 5-40 mL  5-40 mL IntraVENous 2 times per day Seth French MD   10 mL at 06/13/22 0907    sodium chloride flush 0.9 % injection 5-40 mL  5-40 mL IntraVENous PRN Seth French MD        0.9 % sodium chloride infusion   IntraVENous PRN Seth French MD        ondansetron (ZOFRAN-ODT) disintegrating tablet 4 mg  4 mg Oral Q8H PRN Seth French MD        Or    ondansetron (ZOFRAN) injection 4 mg  4 mg IntraVENous Q6H PRN Seth French MD        acetaminophen (TYLENOL) tablet 650 mg  650 mg Oral Q6H PRN Seth French MD   650 mg at 06/13/22 1646    Or    acetaminophen (TYLENOL) suppository 650 mg  650 mg Rectal Q6H PRN Seth French MD        pantoprazole (PROTONIX) tablet 40 mg  40 mg Oral QAM AC Seth French MD   40 mg at 06/14/22 0539    melatonin disintegrating tablet 5 mg  5 mg Oral Nightly Seth French MD   5 mg at 06/13/22 2025    levETIRAcetam (KEPPRA) tablet 2,500 mg  2,500 mg Oral Nightly Seth French MD   2,500 mg at 06/13/22 2024    levETIRAcetam (KEPPRA) tablet 2,000 mg  2,000 mg Oral Daily Seth French MD   2,000 mg at 06/13/22 0905    lacosamide (VIMPAT) tablet 200 mg  200 mg Oral BID Seth French MD   200 mg at 06/13/22 2024    HYDROmorphone (DILAUDID) injection 0.25 mg  0.25 mg IntraVENous Q3H PRN Seth French MD   0.25 mg at 06/14/22 0538       LABS:     CBC:   Lab Results   Component Value Date    WBC 12.5 (H) 06/14/2022    HGB 6.5 (LL) 06/14/2022    HCT 19.9 (LL) 06/14/2022    MCV 80.8 06/14/2022     06/14/2022    LYMPHOPCT 9.0 06/06/2022    RBC 2.46 (L) 06/14/2022    MCH 26.3 06/14/2022    MCHC 32.6 06/14/2022    RDW 19.5 (H) 06/14/2022    NEUTOPHILPCT 82.0 06/06/2022    MONOPCT 6.0 06/06/2022    BASOPCT 1.0 06/06/2022    NEUTROABS 7.0 06/06/2022    LYMPHSABS 0.7 (L) 06/06/2022    MONOSABS 0.5 06/06/2022    EOSABS 0.0 06/06/2022    BASOSABS 0.1 06/06/2022       CMP:   Lab Results   Component Value Date     06/14/2022    K 3.7 06/14/2022    K 3.9 06/06/2022     06/14/2022    CO2 20 06/14/2022    BUN 5 06/14/2022    CREATININE <0.5 06/14/2022    GLUCOSE 123 06/14/2022    CALCIUM 8.1 06/14/2022    PROT 6.6 06/05/2022    LABALBU 3.2 06/05/2022    BILITOT <0.2 06/05/2022    ALKPHOS 59 06/05/2022    AST 11 06/05/2022    ALT 9 06/05/2022            IMAGING:     None in past 24 hrs    ASSESSMENT:         Patient Active Problem List   Diagnosis Code    Thyroid nodule E04.1    Brain metastases (HCC) C79.31    Brain mass G93.89    Status epilepticus (HCC) G40.901    Duct cell carcinoma (HCC) C80.1    Cerebral edema (HCC) G93.6    Seizure (HCC) R56.9    Deep venous thrombosis (HCC) I82.409    Fever R50.9    Phlebitis I80.9    S/P craniotomy Z98.890    Acute deep vein thrombosis (DVT) of axillary vein (HCC) I82. A19    DVT, lower extremity, proximal, acute, bilateral ([address] I82.4Y3     Anemia just slightly worse than her baseline-    Results for Katt Kline (MRN [id number]) as of 6/14/2022 07:26   Ref. Range 4/4/2022 05:52 4/5/2022 05:19 4/7/2022 04:48 4/9/2022 05:21 5/24/2022 10:19 6/5/2022 06:52 6/6/2022 04:32 6/9/2022 04:00   Hemoglobin Quant Latest Ref Range: 12.0 - 16.0 g/dL 8.2 (L) 7.7 (L) 7.7 (L) 7.6 (L)  8.9 (L) 8.9 (L) 8.4 (L)             PLAN:       Ordered anemia labs, but likely will show only anemia of chronic disease. Pt is anxious to go home. Agree with [facility] transfusion, recheck CBC.   Told her to call office and make appointment to see Dr. Johnathon Silver MD

## 2022-06-14 NOTE — PROGRESS NOTES
Call was placed to obtain consent for blood, however received answering machine. Will try again later.

## 2022-06-14 NOTE — DISCHARGE SUMMARY
Hospital Discharge Summary    Patient's PCP: JUDITH James - CNP  Admit Date: 6/5/2022   Discharge Date: 6/14/2022    Admitting Physician: Gladys Azevedo MD  Discharge Physician: Ari Bray MD         Discharge Diagnoses:     Acute on chronic Bilateral lower extremity DVTs   Hx of recent IVC filter thrombus, REBECCA   - Recent admission for DVT developed in setting of malignancy, REBECCA. She could not receive therapeutic anticoagulation until POD #14 after craniotomy for brain mets, so IVC filter placed. - Was re-admitted with REBECCA and noted with severe thrombosis on LEs, including around filter S/p IR thrombectomy 3/28/2022. Was on Argatroban gtt and transitioned to Eliquis with plan to keep IVC filter in \"for 6 weeks\", to follow up with IR outpatient  - Had not been taking the Eliquis for at least 4-6 week PTA[de-identified] Pxt states she was told to suspend it for radiation txts, and never told to resume but it is not typically held for XRT  - US duplex: acute and chronic clot in both lower extremities  - Placed on argatroban  -6/10 s/p IVC filter removal, B/l iliocaval thrombectomy, b/l iliocaval stenting, b/l lower extremity thrombectomy  - Transitioned to Eliquis, continue on dc.      Metastatic triple negative breast cancer with intracranial metastasis  Fungating, necrotic right breast mass  -Known brain mets S/p craniotomy on 3/12 for brain mets withseizures. -S/p radiation, completed with plans for palliative systemic chemotherapy - She had placement of a right IJ port 5/24/2022 - Oncology consulted. - CT A/P on admit: prob new metastatic lesions in the  Liver (\". .3-4 small. Frannie Reardon ) and pancreas (1.6 cm )  -For her neglected right breast cancer with fungating, necrotic mass: Was seen by general surgery in the last admissions to consider palliative resection. Pxt had elected not to do so at that time, stating she was recovering from her brain surgery.  Consulted Gen Surgery to re-evaluate role of palliative mastectomy: consensus with oncology is to defer for now, to re-eval and consider toilet mastectomy if develops bleeding or infection from breast mass. - On oxycodone as needed for pain  - Pall Med consulted  - F/u with oncology at the [facility] office on discharge for Palliative chemo with Dr. Serene Sheffield.        Seizures sec to brain met   -Continue Keppra 2 g daily, Keppra 2.5 g nightly Vimpat 200 mg. Appears may not have been taking the keppra as was intended. - Follow up with Dr. Say Hickey.      Anxiety -Continue Ativan PRN     Episode of fever  100.6 early this am, WBC increased to 13, feels increased SOB. Feels better today, no SOB. CXR shows questionable infiltrate vs atelectasis. Will dc home with levaquin po for 5 days. Constipation. No nausea or vomiting  Feels uncomfortable, started senokot and miralax  Add dulcolax suppository     Hx of urinary retention  Check PVR         Patient Active Problem List   Diagnosis    Thyroid nodule    Brain metastases (HCC)    Brain mass    Status epilepticus (HCC)    Duct cell carcinoma (HCC)    Cerebral edema (HCC)    Seizure (HCC)    Deep venous thrombosis (HCC)    Fever    Phlebitis    S/P craniotomy    Acute deep vein thrombosis (DVT) of axillary vein (HCC)    DVT, lower extremity, proximal, acute, bilateral (HCC)       Physical Exam: /84   Pulse (!) 111   Temp 98.3 °F (36.8 °C) (Oral)   Resp 17   Ht 5' 3\" (1.6 m)   Wt 189 lb (85.7 kg)   SpO2 97%   BMI 33.48 kg/m²   No results for input(s): POCGLU in the last 72 hours. No results for input(s): POCGLU in the last 72 hours.   General appearance: alert, appears stated age and cooperative  Head: Normocephalic, without obvious abnormality, atraumatic  Throat: lips, mucosa, and tongue normal; teeth and gums normal  Neck: no adenopathy, no carotid bruit, no JVD, supple, symmetrical, trachea midline and thyroid not enlarged, symmetric, no tenderness/mass/nodules  Lungs: clear to auscultation bilaterally  Breasts:   Heart: Mildly Tachycardic; regular rhythm, S1, S2 normal, no murmur, click, rub or gallop  Abdomen: soft, non-tender; bowel sounds normal; no masses,  no organomegaly  Extremities: Gross bilateral LE edema involving entire LEs bilaterally, pitting, tender. Atraumatic, no cyanosis   Pulses: 2+ and symmetric  Neurologic: Grossly normal       LABS:  Recent Labs     06/14/22  0544   WBC 12.5*   HGB 6.5*         Recent Labs     06/14/22  0544      K 3.7      CO2 20*   BUN 5*   CREATININE <0.5*   GLUCOSE 123*     Discharge Medications:     Medication List      START taking these medications    clopidogrel 75 MG tablet  Commonly known as: PLAVIX  Take 1 tablet by mouth daily     levoFLOXacin 750 MG tablet  Commonly known as: Levaquin  Take 1 tablet by mouth daily for 5 days     metroNIDAZOLE 500 MG tablet  Commonly known as: FLAGYL  Apply 2 tablets topically daily for 10 days     oxyCODONE 5 MG immediate release tablet  Commonly known as: ROXICODONE  Take 0.5 tablets by mouth every 4 hours as needed for Pain for up to 10 doses. CHANGE how you take these medications    * apixaban 5 MG Tabs tablet  Commonly known as: ELIQUIS  Take 2 tablets by mouth 2 times daily for 8 doses  What changed: Another medication with the same name was changed. Make sure you understand how and when to take each. * apixaban 5 MG Tabs tablet  Commonly known as: ELIQUIS  Take 1 tablet by mouth 2 times daily  Start taking on: June 18, 2022  What changed: These instructions start on June 18, 2022. If you are unsure what to do until then, ask your doctor or other care provider. * This list has 2 medication(s) that are the same as other medications prescribed for you. Read the directions carefully, and ask your doctor or other care provider to review them with you.             CONTINUE taking these medications    ibuprofen 800 MG tablet  Commonly known as: ADVIL;MOTRIN     lacosamide 200 MG tablet  Commonly known as: VIMPAT  Take 1 tablet by mouth 2 times daily for 30 days. * levETIRAcetam 500 MG tablet  Commonly known as: KEPPRA  Take 5 tablets by mouth nightly     * levETIRAcetam 1000 MG tablet  Commonly known as: KEPPRA  Take 2 tablets by mouth daily     melatonin 5 MG Tbdp disintegrating tablet  Take 1 tablet by mouth nightly     MULTIVITAMIN ADULT PO     Oyster Shell Calcium/D 500-200 MG-UNIT Tabs     pantoprazole 40 MG tablet  Commonly known as: PROTONIX  Take 1 tablet by mouth every morning (before breakfast)     QUEtiapine 25 MG tablet  Commonly known as: SEROquel  Take 0.5 tablets by mouth nightly for 3 days     spironolactone 50 MG tablet  Commonly known as: ALDACTONE  Take 1 tablet by mouth daily         * This list has 2 medication(s) that are the same as other medications prescribed for you. Read the directions carefully, and ask your doctor or other care provider to review them with you. Where to Get Your Medications      You can get these medications from any pharmacy    Bring a paper prescription for each of these medications  · apixaban 5 MG Tabs tablet  · apixaban 5 MG Tabs tablet  · clopidogrel 75 MG tablet  · levoFLOXacin 750 MG tablet  · metroNIDAZOLE 500 MG tablet  · oxyCODONE 5 MG immediate release tablet        Activity: activity as tolerated  Diet: regular diet  Wound Care: *Non adherent,ABD (crushed Flagyl)   Recommendation: R Breast - cleanse with NS, if available crushed Flagyl sprinkled over R Breast (odor control), cover with Adaptic, abd pads, medipore tape, change daily    Disposition: home  Discharged Condition: Stable  Follow Up: Primary Care Physician in one week    Total time spent on discharge, finalizing medications, referrals and arranging outpatient follow up was more than 45 minutes    Thank you Dr. Wendi Devine, APRN - CNP for the opportunity to be involved in this patients care.  If you have any questions or concerns please feel free to contact me at [phone].

## 2022-06-14 NOTE — CARE COORDINATION
Case Management Assessment            Discharge Note                    Date / Time of Note: 6/14/2022 10:56 AM                  Discharge Note Completed by: Missy Gallego RN    Patient Name: Priscila Cuenca   YOB: 1975  Diagnosis: Acute deep vein thrombosis (DVT) of axillary vein, unspecified laterality ([facility] [address] [I49. A19]  DVT, lower extremity, proximal, acute, bilateral ([facility] [address] [facility] [I82.4Y3]   Date / Time: 6/5/2022  1:22 PM    Current PCP: SHAHEED ZAMARRIPA, JUDITH - CNP  Clinic patient: No    Hospitalization in the last 30 days: Yes    Advance Directives:  Code Status: Full Code  [address] DNR form completed and on chart: Not Indicated    Financial:  Payor: Emmett Adams / Plan: Emmett Adams / Product Type: *No Product type* /      Pharmacy:    [address] -  [phone]  [address] [place] [id number]  Phone: [phone] Fax: [phone]      Assistance purchasing medications?: Potential Assistance Purchasing Medications: No  Assistance provided by Case Management: None at this time    Does patient want to participate in local refill/ meds to beds program?:      Meds To Beds General Rules:  1. Can ONLY be done Monday- Friday between 8:30am-5pm  2. Prescription(s) must be in pharmacy by 3pm to be filled same day  3. Copy of patient's insurance/ prescription drug card and patient face sheet must be sent along with the prescription(s)  4. Cost of Rx cannot be added to hospital bill. If financial assistance is needed, please contact unit  or ;  or  CANNOT provide pharmacy voucher for patients co-pays  5.  Patients can then  the prescription on their way out of the hospital at discharge, or pharmacy can deliver to the bedside if staff is available. (payment due at time of pick-up or delivery - cash, check, or card accepted)     Able to afford home medications/ co-pay costs: Yes    ADLS:  Current PT AM-PAC Score: 22 /24  Current OT AM-PAC Score: 22 /24      DISCHARGE Disposition: Home with [address]: [facility]     LOC at discharge: Not Applicable  STEPHANIE Completed: Not Indicated    Notification completed in HENS/PAS?:  Not Applicable    IMM Completed:   Not Indicated    Transportation:  Transportation PLAN for discharge: family   Mode of Transport: Private Car  Reason for medical transport: Not Applicable  Name of Transport Company: Not Applicable      Transport form completed: Not Indicated    Home Care:  [address] ordered at discharge: Yes  [address]: [facility]  Orders faxed: Yes    Durable Medical Equipment:  DME Provider: n/a  Equipment obtained during hospitalization: none     Home Oxygen and Respiratory Equipment:  Oxygen needed at discharge?: Not [address]: Not Applicable  Portable tank available for discharge?: Not Indicated    Dialysis:  Dialysis patient: No    Dialysis Center:  Not Applicable    Hospice Services:  Location: Not Applicable  Agency: Not Applicable    Consents signed: Not Indicated    Referrals made at [facility] for outpatient continued care:  Not Applicable    Additional CM Notes: Pt to discharge home with resumption of care orders for [facility]. The Plan for Transition of Care is related to the following treatment goals of Acute deep vein thrombosis (DVT) of axillary vein, unspecified laterality ([address] [employer] A19]  DVT, lower extremity, proximal, acute, bilateral ([address] [I82.4Y3]    The Patient and/or patient representative Brielle Santoyo and her family were provided with a choice of provider and agrees with the discharge plan Yes    Freedom of choice list was provided with basic dialogue that supports the patient's individualized plan of care/goals and shares the quality data associated with the providers.  Yes    Care Transitions patient: No    Jo Barksdale RN  [facility]  Case Management Department  Ph: [phone]  Fax: [phone]

## 2022-06-14 NOTE — PROGRESS NOTES
Patient is stating that she might not have a ride home this evening. Spoke with charge nurse who stated the nursing supervisor will be able to give a voucher for patient to get a lyft.

## 2022-06-15 LAB — FOLATE: 10.23 NG/ML (ref 4.78–24.2)

## 2022-06-15 NOTE — PROGRESS NOTES
PT discharged to home with mother. Belongings in wheelchair with her. Pt transportedd home via car. Line removed, education and care plan complete. Pt denies any questions regarding discharge at this time.     Electronically signed by Elijah Garrido RN on 2/64/8017 at 9:06 PM

## 2022-06-16 LAB
BLOOD CULTURE, ROUTINE: NORMAL
CULTURE, BLOOD 2: NORMAL
Lab: NORMAL
REPORT: NORMAL
THIS TEST SENT TO: NORMAL

## 2022-06-17 NOTE — PROGRESS NOTES
Physician Progress Note      Maribel OBRIEN #:                  [id number]  :                       1975  ADMIT DATE:       2022 1:22 PM  [address] DATE:        2022 9:12 PM  RESPONDING  PROVIDER #:        Dominik Robbins MD          QUERY TEXT:    Pt admitted with Acute DVT. 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 If possible, please document in progress notes and discharge summary the   relationship, if any, between the following: The medical record reflects the following:  Risk Factors:  thrombosis of the IVC at the level of the filter with marked   distention of the caval bifurcation. Clinical Indicators: Per d/c summary, \"Acute on chronic Bilateral lower   extremity DVTs? Hx of recent IVC filter thrombus, REBECCA  - Recent admission for DVT developed in setting of malignancy, REBECCA. She could   not receive therapeutic anticoagulation until POD #14 after craniotomy for   brain mets, so IVC filter placed. - Was re-admitted with REBECCA and noted with severe thrombosis on LEs, including   around filter S/p IR thrombectomy 3/28/2022. ?Was on Argatroban gtt and   transitioned to Eliquis with plan to keep IVC filter in \"for 6 weeks\", to   follow up with IR outpatient- Had not been taking the Eliquis for at least 4-6   week PTA[de-identified] Pxt states she was told to suspend it for radiation txts, and   never told to resume but it is not typically held for XRT- US duplex:   acute?and chronic?clot in?both?lower extremities-?Placed on argatroban. \"  Treatment:  IVC filter removal, B/l iliocaval thrombectomy, b/l iliocaval   stenting, b/l lower extremity thrombectomy, Eliquis on d/c  Options provided:  -- Acute DVT due to complication IVC Filter  -- Acute DVT due to complication of IVC Filter and HIT/Malignancy  -- Acute DVT due to HIT/Malignancy  -- Other - I will add my own diagnosis  -- Disagree - Not applicable / Not valid  -- Disagree - Clinically unable to determine / Unknown  -- Refer to Clinical Documentation Reviewer    PROVIDER RESPONSE TEXT:    This patient has Acute DVT due to complication of IVC Filter and HIT   Malignancy.     Query created by: Oli Haddad on 6/15/2022 2:07 PM      Electronically signed by:  Tasia Underwood MD 6/17/2022 12:43 PM

## 2022-06-20 ENCOUNTER — APPOINTMENT (OUTPATIENT)
Dept: CT IMAGING | Age: 47
DRG: 382 | End: 2022-06-20
Payer: MEDICAID

## 2022-06-20 ENCOUNTER — HOSPITAL ENCOUNTER (INPATIENT)
Age: 47
LOS: 6 days | Discharge: HOME HEALTH | DRG: 382 | End: 2022-06-26
Attending: EMERGENCY MEDICINE
Payer: MEDICAID

## 2022-06-20 DIAGNOSIS — N64.4 BREAST PAIN, RIGHT: Primary | ICD-10-CM

## 2022-06-20 DIAGNOSIS — C79.9 METASTATIC MALIGNANT NEOPLASM, UNSPECIFIED SITE (HCC): ICD-10-CM

## 2022-06-20 DIAGNOSIS — D64.9 ANEMIA, UNSPECIFIED TYPE: ICD-10-CM

## 2022-06-20 DIAGNOSIS — R00.0 TACHYCARDIA: ICD-10-CM

## 2022-06-20 DIAGNOSIS — N64.59 BLEEDING FROM BREAST: ICD-10-CM

## 2022-06-20 DIAGNOSIS — G93.6 VASOGENIC EDEMA (HCC): ICD-10-CM

## 2022-06-20 DIAGNOSIS — Z85.3 HISTORY OF BREAST CANCER: ICD-10-CM

## 2022-06-20 PROBLEM — C50.911 BREAST CANCER, STAGE 4, RIGHT (HCC): Status: ACTIVE | Noted: 2022-06-20

## 2022-06-20 LAB
ANION GAP SERPL CALCULATED.3IONS-SCNC: 14 MMOL/L (ref 3–16)
APTT: 36.6 SEC (ref 23–34.3)
BASOPHILS ABSOLUTE: 0.1 K/UL (ref 0–0.2)
BASOPHILS RELATIVE PERCENT: 0.5 %
BUN BLDV-MCNC: 6 MG/DL (ref 7–20)
CALCIUM SERPL-MCNC: 8.6 MG/DL (ref 8.3–10.6)
CHLORIDE BLD-SCNC: 105 MMOL/L (ref 99–110)
CO2: 20 MMOL/L (ref 21–32)
CREAT SERPL-MCNC: 0.6 MG/DL (ref 0.6–1.1)
EOSINOPHILS ABSOLUTE: 0 K/UL (ref 0–0.6)
EOSINOPHILS RELATIVE PERCENT: 0.1 %
GFR AFRICAN AMERICAN: >60
GFR NON-AFRICAN AMERICAN: >60
GLUCOSE BLD-MCNC: 112 MG/DL (ref 70–99)
HCT VFR BLD CALC: 22.5 % (ref 36–48)
HEMOGLOBIN: 7.4 G/DL (ref 12–16)
INR BLD: 1.57 (ref 0.87–1.14)
LYMPHOCYTES ABSOLUTE: 0.8 K/UL (ref 1–5.1)
LYMPHOCYTES RELATIVE PERCENT: 5.7 %
MCH RBC QN AUTO: 26.9 PG (ref 26–34)
MCHC RBC AUTO-ENTMCNC: 32.9 G/DL (ref 31–36)
MCV RBC AUTO: 81.7 FL (ref 80–100)
MONOCYTES ABSOLUTE: 1.1 K/UL (ref 0–1.3)
MONOCYTES RELATIVE PERCENT: 8 %
NEUTROPHILS ABSOLUTE: 12 K/UL (ref 1.7–7.7)
NEUTROPHILS RELATIVE PERCENT: 85.7 %
PDW BLD-RTO: 19.6 % (ref 12.4–15.4)
PLATELET # BLD: 693 K/UL (ref 135–450)
PMV BLD AUTO: 6.7 FL (ref 5–10.5)
POTASSIUM REFLEX MAGNESIUM: 3.7 MMOL/L (ref 3.5–5.1)
PROTHROMBIN TIME: 18.7 SEC (ref 11.7–14.5)
RBC # BLD: 2.75 M/UL (ref 4–5.2)
SODIUM BLD-SCNC: 139 MMOL/L (ref 136–145)
WBC # BLD: 14 K/UL (ref 4–11)

## 2022-06-20 PROCEDURE — 6360000002 HC RX W HCPCS: Performed by: INTERNAL MEDICINE

## 2022-06-20 PROCEDURE — 96375 TX/PRO/DX INJ NEW DRUG ADDON: CPT

## 2022-06-20 PROCEDURE — 85025 COMPLETE CBC W/AUTO DIFF WBC: CPT

## 2022-06-20 PROCEDURE — 6370000000 HC RX 637 (ALT 250 FOR IP): Performed by: EMERGENCY MEDICINE

## 2022-06-20 PROCEDURE — 80048 BASIC METABOLIC PNL TOTAL CA: CPT

## 2022-06-20 PROCEDURE — 99254 IP/OBS CNSLTJ NEW/EST MOD 60: CPT | Performed by: SURGERY

## 2022-06-20 PROCEDURE — 96374 THER/PROPH/DIAG INJ IV PUSH: CPT

## 2022-06-20 PROCEDURE — 99285 EMERGENCY DEPT VISIT HI MDM: CPT

## 2022-06-20 PROCEDURE — 6370000000 HC RX 637 (ALT 250 FOR IP): Performed by: NURSE PRACTITIONER

## 2022-06-20 PROCEDURE — 85730 THROMBOPLASTIN TIME PARTIAL: CPT

## 2022-06-20 PROCEDURE — 2060000000 HC ICU INTERMEDIATE R&B

## 2022-06-20 PROCEDURE — 70450 CT HEAD/BRAIN W/O DYE: CPT

## 2022-06-20 PROCEDURE — 2580000003 HC RX 258: Performed by: INTERNAL MEDICINE

## 2022-06-20 PROCEDURE — 94760 N-INVAS EAR/PLS OXIMETRY 1: CPT

## 2022-06-20 PROCEDURE — 85610 PROTHROMBIN TIME: CPT

## 2022-06-20 PROCEDURE — 6370000000 HC RX 637 (ALT 250 FOR IP): Performed by: INTERNAL MEDICINE

## 2022-06-20 PROCEDURE — 6360000002 HC RX W HCPCS: Performed by: EMERGENCY MEDICINE

## 2022-06-20 RX ORDER — OXYCODONE HYDROCHLORIDE 10 MG/1
10 TABLET ORAL EVERY 4 HOURS PRN
Status: DISCONTINUED | OUTPATIENT
Start: 2022-06-20 | End: 2022-06-22

## 2022-06-20 RX ORDER — ACETAMINOPHEN 325 MG/1
650 TABLET ORAL EVERY 6 HOURS PRN
Status: DISCONTINUED | OUTPATIENT
Start: 2022-06-20 | End: 2022-06-26 | Stop reason: HOSPADM

## 2022-06-20 RX ORDER — DEXAMETHASONE SODIUM PHOSPHATE 4 MG/ML
4 INJECTION, SOLUTION INTRA-ARTICULAR; INTRALESIONAL; INTRAMUSCULAR; INTRAVENOUS; SOFT TISSUE EVERY 6 HOURS
Status: DISCONTINUED | OUTPATIENT
Start: 2022-06-20 | End: 2022-06-20

## 2022-06-20 RX ORDER — SODIUM CHLORIDE 0.9 % (FLUSH) 0.9 %
5-40 SYRINGE (ML) INJECTION PRN
Status: DISCONTINUED | OUTPATIENT
Start: 2022-06-20 | End: 2022-06-26 | Stop reason: HOSPADM

## 2022-06-20 RX ORDER — METRONIDAZOLE 500 MG/1
500 TABLET ORAL DAILY
Status: DISCONTINUED | OUTPATIENT
Start: 2022-06-20 | End: 2022-06-26 | Stop reason: HOSPADM

## 2022-06-20 RX ORDER — ONDANSETRON 2 MG/ML
4 INJECTION INTRAMUSCULAR; INTRAVENOUS EVERY 6 HOURS PRN
Status: DISCONTINUED | OUTPATIENT
Start: 2022-06-20 | End: 2022-06-26 | Stop reason: HOSPADM

## 2022-06-20 RX ORDER — ACETAMINOPHEN 650 MG/1
650 SUPPOSITORY RECTAL EVERY 6 HOURS PRN
Status: DISCONTINUED | OUTPATIENT
Start: 2022-06-20 | End: 2022-06-26 | Stop reason: HOSPADM

## 2022-06-20 RX ORDER — OXYCODONE HYDROCHLORIDE 5 MG/1
5 TABLET ORAL EVERY 4 HOURS PRN
Status: DISCONTINUED | OUTPATIENT
Start: 2022-06-20 | End: 2022-06-22

## 2022-06-20 RX ORDER — POLYETHYLENE GLYCOL 3350 17 G/17G
17 POWDER, FOR SOLUTION ORAL DAILY PRN
Status: DISCONTINUED | OUTPATIENT
Start: 2022-06-20 | End: 2022-06-26 | Stop reason: HOSPADM

## 2022-06-20 RX ORDER — ONDANSETRON 4 MG/1
4 TABLET, ORALLY DISINTEGRATING ORAL EVERY 8 HOURS PRN
Status: DISCONTINUED | OUTPATIENT
Start: 2022-06-20 | End: 2022-06-26 | Stop reason: HOSPADM

## 2022-06-20 RX ORDER — DEXAMETHASONE SODIUM PHOSPHATE 4 MG/ML
10 INJECTION, SOLUTION INTRA-ARTICULAR; INTRALESIONAL; INTRAMUSCULAR; INTRAVENOUS; SOFT TISSUE ONCE
Status: COMPLETED | OUTPATIENT
Start: 2022-06-20 | End: 2022-06-20

## 2022-06-20 RX ORDER — PANTOPRAZOLE SODIUM 40 MG/1
40 TABLET, DELAYED RELEASE ORAL
Status: DISCONTINUED | OUTPATIENT
Start: 2022-06-21 | End: 2022-06-26 | Stop reason: HOSPADM

## 2022-06-20 RX ORDER — OXYCODONE HYDROCHLORIDE 5 MG/1
5 TABLET ORAL ONCE
Status: COMPLETED | OUTPATIENT
Start: 2022-06-20 | End: 2022-06-20

## 2022-06-20 RX ORDER — DEXAMETHASONE SODIUM PHOSPHATE 4 MG/ML
4 INJECTION, SOLUTION INTRA-ARTICULAR; INTRALESIONAL; INTRAMUSCULAR; INTRAVENOUS; SOFT TISSUE EVERY 6 HOURS
Status: DISCONTINUED | OUTPATIENT
Start: 2022-06-20 | End: 2022-06-26 | Stop reason: HOSPADM

## 2022-06-20 RX ORDER — METRONIDAZOLE 500 MG/1
500 TABLET ORAL EVERY 8 HOURS SCHEDULED
Status: DISCONTINUED | OUTPATIENT
Start: 2022-06-20 | End: 2022-06-20

## 2022-06-20 RX ORDER — SODIUM CHLORIDE 9 MG/ML
INJECTION, SOLUTION INTRAVENOUS PRN
Status: DISCONTINUED | OUTPATIENT
Start: 2022-06-20 | End: 2022-06-26 | Stop reason: HOSPADM

## 2022-06-20 RX ORDER — LEVETIRACETAM 500 MG/1
2000 TABLET ORAL DAILY
Status: DISCONTINUED | OUTPATIENT
Start: 2022-06-21 | End: 2022-06-26 | Stop reason: HOSPADM

## 2022-06-20 RX ORDER — SODIUM CHLORIDE 0.9 % (FLUSH) 0.9 %
5-40 SYRINGE (ML) INJECTION EVERY 12 HOURS SCHEDULED
Status: DISCONTINUED | OUTPATIENT
Start: 2022-06-20 | End: 2022-06-26 | Stop reason: HOSPADM

## 2022-06-20 RX ADMIN — OXYCODONE 5 MG: 5 TABLET ORAL at 21:39

## 2022-06-20 RX ADMIN — DEXAMETHASONE SODIUM PHOSPHATE 4 MG: 4 INJECTION, SOLUTION INTRAMUSCULAR; INTRAVENOUS at 21:23

## 2022-06-20 RX ADMIN — DEXAMETHASONE SODIUM PHOSPHATE 10 MG: 4 INJECTION, SOLUTION INTRAMUSCULAR; INTRAVENOUS at 14:38

## 2022-06-20 RX ADMIN — HYDROMORPHONE HYDROCHLORIDE 1 MG: 1 INJECTION, SOLUTION INTRAMUSCULAR; INTRAVENOUS; SUBCUTANEOUS at 10:48

## 2022-06-20 RX ADMIN — OXYCODONE 5 MG: 5 TABLET ORAL at 10:48

## 2022-06-20 RX ADMIN — SODIUM CHLORIDE, PRESERVATIVE FREE 10 ML: 5 INJECTION INTRAVENOUS at 21:17

## 2022-06-20 RX ADMIN — LEVETIRACETAM 2500 MG: 500 TABLET, FILM COATED ORAL at 21:41

## 2022-06-20 ASSESSMENT — PAIN DESCRIPTION - PAIN TYPE
TYPE: CHRONIC PAIN
TYPE: CHRONIC PAIN

## 2022-06-20 ASSESSMENT — PAIN DESCRIPTION - ORIENTATION
ORIENTATION: RIGHT

## 2022-06-20 ASSESSMENT — PAIN DESCRIPTION - LOCATION
LOCATION: BREAST

## 2022-06-20 ASSESSMENT — PAIN DESCRIPTION - FREQUENCY
FREQUENCY: CONTINUOUS
FREQUENCY: CONTINUOUS

## 2022-06-20 ASSESSMENT — PAIN - FUNCTIONAL ASSESSMENT
PAIN_FUNCTIONAL_ASSESSMENT: PREVENTS OR INTERFERES SOME ACTIVE ACTIVITIES AND ADLS
PAIN_FUNCTIONAL_ASSESSMENT: PREVENTS OR INTERFERES SOME ACTIVE ACTIVITIES AND ADLS
PAIN_FUNCTIONAL_ASSESSMENT: PREVENTS OR INTERFERES WITH ALL ACTIVE AND SOME PASSIVE ACTIVITIES

## 2022-06-20 ASSESSMENT — PAIN SCALES - GENERAL
PAINLEVEL_OUTOF10: 3
PAINLEVEL_OUTOF10: 7
PAINLEVEL_OUTOF10: 9

## 2022-06-20 ASSESSMENT — PAIN DESCRIPTION - DESCRIPTORS
DESCRIPTORS: ACHING

## 2022-06-20 ASSESSMENT — ENCOUNTER SYMPTOMS
EYES NEGATIVE: 1
ABDOMINAL PAIN: 0
SHORTNESS OF BREATH: 1

## 2022-06-20 ASSESSMENT — PAIN DESCRIPTION - ONSET
ONSET: ON-GOING
ONSET: ON-GOING

## 2022-06-20 NOTE — CONSULTS
Oncology Consult Addendum--see Chichi Bentley ANALILIA note from earlier today. A/P:  1. Breast cancer. Dr. Zeferino Love was planning on starting chemotherapy this week. Dr. Prince Majano saw the patient and does not feel that surgery at this time would be an ideal plan. We may elect to start chemotherapy as an inpatient. This may help with the bleeding. She already received radiation therapy to the breast.  She does have possible progression on her brain. An MRI has been ordered. 2.  History of recurrent DVT\bleeding. Her Eliquis has been held. Her bleeding seems to be slowing. I will not give an Eliquis since it is barely using. She may need another IVC filter placed. I will consult [facility]. Thank you for the consultation. We will follow closely.     Araseli Saavedra MD

## 2022-06-20 NOTE — ED PROVIDER NOTES
[address]      Pt Name: Yoli Vergara  MRN: [id number]  [place] 1975  Date of evaluation: 2022  Provider: Sandra Grigsby MD    CHIEF COMPLAINT     It keeps bleeding   HISTORY OF PRESENT ILLNESS  (Location/Symptom, Timing/Onset,Context/Setting, Quality, Duration, Modifying Factors, Severity). Note limiting factors. Chief Complaint   Patient presents with    Breast Mass     Patient arrives via walk in with c/o breast pain and frequent falls. Breast CA. R sided mass. Yoli Vergara is a 48 y.o. female who presents to the emergency department secondary to concern for breast mass bleeding. Reports she was diagnosed with breast cancer and then in March found out she had cancer in her brain and had that removed but it came back and then had ten rounds of radiation and was supposed to follow up this coming Wednesday to see if that was better. Notes recently over the last two days she has been falling more but the main reason she is here is because the pain in her right breast has had increased and started bleeding and she can't get it to stop bleeding. She is on eliquis currently due to recent history of blood clots. Unfortunately the patient's older sister was diagnosed and  from breast cancer and she reports to me that her younger sister was recently also diagnosed with breast cancer. Past medical history noted below, notable for breast cancer. Denies smoking. Aside from what is stated above denies any other symptoms or modifying factors. Nursing Notes reviewed. REVIEW OF SYSTEMS  (2-9 systems for level 4, 10 or more for level 5)   Review of Systems   Constitutional: Positive for fatigue. HENT: Negative for congestion. Eyes: Negative. Respiratory: Positive for shortness of breath. Cardiovascular: Positive for chest pain (right breast). Gastrointestinal: Negative for abdominal pain. Endocrine: Negative. spironolactone (ALDACTONE) 50 MG tablet Take 1 tablet by mouth daily  Qty: 30 tablet, Refills: 3      Multiple Vitamin (MULTIVITAMIN ADULT PO) Take 1 capsule by mouth daily      Calcium Carbonate-Vitamin D (OYSTER SHELL CALCIUM/D) 500-200 MG-UNIT TABS Take 1 tablet by mouth 2 times daily (with meals)      ibuprofen (ADVIL;MOTRIN) 800 MG tablet Take 800 mg by mouth every 8 hours as needed      !! levETIRAcetam (KEPPRA) 1000 MG tablet Take 2 tablets by mouth daily  Qty: 60 tablet, Refills: 3      !! levETIRAcetam (KEPPRA) 500 MG tablet Take 5 tablets by mouth nightly  Qty: 60 tablet, Refills: 3      melatonin 5 MG TBDP disintegrating tablet Take 1 tablet by mouth nightly      pantoprazole (PROTONIX) 40 MG tablet Take 1 tablet by mouth every morning (before breakfast)  Qty: 30 tablet, Refills: 3      lacosamide (VIMPAT) 200 MG tablet Take 1 tablet by mouth 2 times daily for 30 days. Qty: 60 tablet, Refills: 0    Associated Diagnoses: Brain metastases ([facility] [address]       ! ! - Potential duplicate medications found. Please discuss with provider.          ALLERGIES     Heparin  FAMILY HISTORY       Family History   Problem Relation Age of Onset    Cancer Father      SOCIAL HISTORY       Social History     Socioeconomic History    Marital status: Single     Spouse name: None    Number of children: None    Years of education: None    Highest education level: None   Occupational History    None   Tobacco Use    Smoking status: Never Smoker    Smokeless tobacco: Never Used   Substance and Sexual Activity    Alcohol use: Never     Alcohol/week: 0.0 standard drinks    Drug use: Never    Sexual activity: None   Other Topics Concern    None   Social History Narrative    None     Social Determinants of Health     Financial Resource Strain:     Difficulty of Paying Living Expenses: Not on file   Food Insecurity:     Worried About Running Out of Food in the Last Year: Not on file    Rosa of Food in the Last Year: Not on file   Transportation Needs:     Lack of Transportation (Medical): Not on file    Lack of Transportation (Non-Medical): Not on file   Physical Activity:     Days of Exercise per Week: Not on file    Minutes of Exercise per Session: Not on file   Stress:     Feeling of Stress : Not on file   Social Connections:     Frequency of Communication with Friends and Family: Not on file    Frequency of Social Gatherings with Friends and Family: Not on file    Attends Catholic Services: Not on file    Active Member of [address] [place] [address] [employer] or Organizations: Not on file    Attends Club or Organization Meetings: Not on file    Marital Status: Not on file   Intimate Partner Violence:     Fear of Current or Ex-Partner: Not on file    Emotionally Abused: Not on file    Physically Abused: Not on file    Sexually Abused: Not on file   Housing Stability:     Unable to Pay for Housing in the Last Year: Not on file    Number of [place] in the Last Year: Not on file    Unstable Housing in the Last Year: Not on file     SCREENINGS    Schoenchen Coma Scale  Eye Opening: (P) Spontaneous  Best Verbal Response: (P) Confused  Best Motor Response: (P) Obeys commands  Scho[place]hen Coma Scale Score: (P) 14    PHYSICAL EXAM  (up to 7 for level 4, 8 or more for level 5)   INITIAL VITALS: BP: 114/71, Temp: 97.1 °F (36.2 °C), Heart Rate: (!) 118, Resp: 18, SpO2: 100 %   Physical Exam  Vitals reviewed. Constitutional:       General: She is not in acute distress. Appearance: She is ill-appearing (Chronic). She is not toxic-appearing or diaphoretic. HENT:      Head: Normocephalic and atraumatic. Right Ear: External ear normal.      Left Ear: External ear normal.      Nose: Nose normal.   Eyes:      General: No scleral icterus. Right eye: No discharge. Left eye: No discharge. Extraocular Movements: Extraocular movements intact. Pupils: Pupils are equal, round, and reactive to light.    Cardiovascular:      Rate and Rhythm: Tachycardia present. Pulses: Normal pulses. Pulmonary:      Effort: Pulmonary effort is normal.   Abdominal:      Tenderness: There is no abdominal tenderness. There is no guarding or rebound. Musculoskeletal:      Cervical back: Normal range of motion. No rigidity. Skin:     General: Skin is dry. Capillary Refill: Capillary refill takes 2 to 3 seconds. Comments: Fungating mass to the right breast, see image below   Neurological:      General: No focal deficit present. Mental Status: She is alert and oriented to person, place, and time. Psychiatric:         Behavior: Behavior normal.                     DIAGNOSTIC RESULTS     RADIOLOGY:   Interpretation per Radiologist below, if available at the time of this note:  CT HEAD WO CONTRAST   Final Result   1. Increase in the vasogenic edema involving the left temporal and parietal   lobes with mass effect and new left-to-right midline shift measuring 7.3 mm.   2. Question vasogenic edema in the high right parietal lobe which could be   secondary to a new metastasis. These findings are concerning for progression of intracranial metastases. An   MRI brain with contrast is recommended for further evaluation.          MRI BRAIN W WO CONTRAST    (Results Pending)     LABS:  Labs Reviewed   CBC WITH AUTO DIFFERENTIAL - Abnormal; Notable for the following components:       Result Value    WBC 14.0 (*)     RBC 2.75 (*)     Hemoglobin 7.4 (*)     Hematocrit 22.5 (*)     RDW 19.6 (*)     Platelets 679 (*)     Neutrophils Absolute 12.0 (*)     Lymphocytes Absolute 0.8 (*)     All other components within normal limits   BASIC METABOLIC PANEL W/ REFLEX TO MG FOR LOW K - Abnormal; Notable for the following components:    CO2 20 (*)     Glucose 112 (*)     BUN 6 (*)     All other components within normal limits   PROTIME-INR - Abnormal; Notable for the following components:    Protime 18.7 (*)     INR 1.57 (*)     All other components within normal limits   APTT - Abnormal; Notable for the following components:    aPTT 36.6 (*)     All other components within normal limits       EMERGENCY DEPARTMENT COURSE and DIFFERENTIAL DIAGNOSIS/MDM:   Patient was given the following medications:  Orders Placed This Encounter   Medications    HYDROmorphone (DILAUDID) injection 1 mg    oxyCODONE (ROXICODONE) immediate release tablet 5 mg    Dexamethasone Sodium Phosphate injection 10 mg     CONSULTS:  IP CONSULT TO GENERAL SURGERY  IP CONSULT TO NEUROSURGERY  IP CONSULT TO CARDIOLOGY    INITIAL VITALS: BP: 114/71, Temp: 97.1 °F (36.2 °C), Heart Rate: (!) 118, Resp: 18, SpO2: 100 %   RECENT VITALS:  BP: 111/71,Temp: 98.6 °F (37 °C), Heart Rate: (!) 119, Resp: 20, SpO2: 97 %     Is this patient to be included in the SEP-1 Core Measure due to severe sepsis or septic shock? No   Exclusion criteria - the patient is NOT to be included for SEP-1 Core Measure due to:  2+ SIRS criteria are not met    Butch Rm is a 48 y.o. female who presents to the emergency department secondary to concern for symptoms as noted in HPI. On arrival she is awake and alert and oriented. Her vitals are notable for elevated heart rate. She is otherwise saturating well on room air. Her godmother is present at the bedside and does provide significant collateral history as the patient herself is a poor historian. She has had some fragmented care due to being at rehab and being sent to [facility] when she had a syncopal event at which point she had radiation treatments at [facility].  She has had surgery with [facility]. She is currently on Keppra prophylaxis as well as Eliquis which was restarted recently (it was stopped while she was getting the radiation therapy). On review of medical record it appears she had an IVC filter at one time though it appears this was removed on [address] at [facility]. for unclear reasons. The patient herself is not sure.     Her physical exam is notable for the fungating mass as noted in the images above. Apart from this it is largely reassuring. Given the reported falls from the godmother (the patient's main concern is the bleeding) I did order a CT head scan which did show signs of potential new cancer and increased vasogenic edema. I consulted neurosurgery, spoke with Claudette Snyder, who was recommending starting Decadron which was ordered. Initial thought was potentially to transfer patient to [facility] however given her recent radiation therapy and as she may not be a surgical candidate (as reported by both the patient and her godmother) the decision was made to keep the patient here for now and to have neurosurgery evaluate and potentially be transfer at a later time or she may need to go back to follow-up with radiation oncology at [facility]. This was discussed with the patient and with her mother who were also in agreement. Her hemoglobin here today has decreased to 7.4 from 8.1 six days ago. This is likely related to the continual bleeding she has had from her breast recently. I also consulted both oncology, Dr. Curtis Rivera (was on-call for Dr. Stephanie Martinez who is the patient's oncologist and she reports she was post to start chemo on Thursday) as well as general surgery who referred me to breast surgery. Dr. Iyer President did come down and evaluate the patient at the bedside as did Dr. Curtis Rivera. I discussed with the patient her goals of care here before admission. She states she would want both CPR and intubation if needed. Discussed recommendation is for admission which she is in agreement with. I then consulted the hospitalist, Dr. Carlotta Bolton, who kindly excepted the patient for admission. CRITICAL CARE TIME   Due to the immediate potential for life-threatening deterioration due to worsening cancer, vasogenic edema, need to stop anticoagulant medication and potential surgery intervention, I spent 35 minutes providing critical care. There was a high probability of clinically significant/life threatening deterioration in the patient's condition which required my urgent intervention. This time excludes time spent performing procedures but includes time spent on direct patient care, history retrieval, review of the chart, and discussions with patient, family, and consultant(s). FINAL IMPRESSION      1. Breast pain, right    2. Bleeding from breast    3. History of breast cancer    4. Metastatic malignant neoplasm, unspecified site ([facility] [address]    5. Vasogenic edema (HCC)    6. Tachycardia    7.  Anemia, unspecified type        DISPOSITION/PLAN   DISPOSITION Admitted 06/20/2022 02:42:12 PM           (Please note that portions of this note were completed with a voice recognition program. Efforts were made to edit the dictations but occasionally words are mis-transcribed.)    Bridgette Aceves MD (electronically signed)  Attending Emergency Physician        Bridgette Aceves MD  06/20/22 6407

## 2022-06-20 NOTE — ED NOTES
Pt has large tumor on Rt breast that is bleeding and seeping a white creamy discharge. Breast was redressed with clean dressing. Pt tolerated well. Complains of pain 7/10 to Rt breast and states \" I just want them to take it off\". Family is at the bedside.       Madison Arzate RN  06/20/22 4771

## 2022-06-20 NOTE — PROGRESS NOTES
Medication Reconciliation    List of medications patient is currently taking is complete. Source of information: 1. Conversation with patient at bedside                                      2. EPIC records      Allergies  Heparin     Notes regarding home medications:   1. Patient did not take any of her home medications prior to arrival to the emergency department today.   2. Eliquis indication: Recent DVT    Elroy Lozada [facility], PharmD, BCPS  6/20/2022 12:39 PM

## 2022-06-20 NOTE — CARE COORDINATION
INITIAL CASE MANAGEMENT ASSESSMENT    Reviewed chart, met with patient  and her God Mother to assess possible discharge needs. Explained Case Management role/services. Living Situation: Lives alone but her mother is staying with her to help her. Medications: [address]    PCP: Megan Hilario       PLAN/COMMENTS: Return home and resume [facility]  Provided home care and SNF list.    SW/CM provided contact information for patient or family to call with any questions. SW/CM will follow and assist as needed. 06/20/22 7110   Service Assessment   Patient Orientation Alert and Oriented   Cognition Alert   History Provided By Patient; Other (see comment)  (God Mother)   Primary Caregiver Self   Accompanied By/Relationship God Mother   Support Systems Parent; Family Members   Patient's [address] is: Legal Next of Kin   Social/Functional History   Type of [address] to enter with rails   Entrance Stairs - Number of Steps 5   Entrance Stairs - Rails Left   Active  Yes   Mode of Transportation Car   Discharge Planning   Patient expects to be discharged to: [address] Discharge   Transition of Care Consult (CM Consult) [address] [facility] [address] Yes  ([facility])   Confirm Follow Up Transport Family   Condition of Participation: Discharge Planning   The Plan for Transition of Care is related to the following treatment goals: return home   The Patient and/or Patient Representative was provided with a Choice of Provider? Patient   The Patient and/Or Patient Representative agree with the Discharge Plan? Yes   Freedom of Choice list was provided with basic dialogue that supports the patient's individualized plan of care/goals, treatment preferences, and shares the quality data associated with the providers?   Yes

## 2022-06-20 NOTE — ED TRIAGE NOTES
Pt reports that she has breast CA with mets to the brain. Pt recently has radiation to help shrink the tumors in her brain. She started falling a couple days ago and reports that her Rt breast has been bleeding and she cant get it to stop.

## 2022-06-20 NOTE — ACP (ADVANCE CARE PLANNING)
Advance Care Planning     Advance Care Planning Activator (Inpatient)  Conversation Note      Date of ACP Conversation: 6/20/2022     Conversation Conducted with: Patient with Decision Making Capacity    ACP Activator: Abhishe[address] Decision Maker:     Current Designated Health Care Decision Maker:     Primary Decision Maker (Active): Hailee Ramírez - [facility] - [phone]    Today we documented Decision Maker(s) consistent with Legal Next of Kin hierarchy. Care Preferences    Ventilation: \"If you were in your present state of health and suddenly became very ill and were unable to breathe on your own, what would your preference be about the use of a ventilator (breathing machine) if it were available to you? \"      Would the patient desire the use of ventilator (breathing machine)?: yes    \"If your health worsens and it becomes clear that your chance of recovery is unlikely, what would your preference be about the use of a ventilator (breathing machine) if it were available to you? \"     Would the patient desire the use of ventilator (breathing machine)?: Yes      Resuscitation  \"CPR works best to restart the heart when there is a sudden event, like a heart attack, in someone who is otherwise healthy. Unfortunately, CPR does not typically restart the heart for people who have serious health conditions or who are very sick. \"    \"In the event your heart stopped as a result of an underlying serious health condition, would you want attempts to be made to restart your heart (answer \"yes\" for attempt to resuscitate) or would you prefer a natural death (answer \"no\" for do not attempt to resuscitate)? \" yes       [x] Yes   [] No   Educated Patient / Prem Grace regarding differences between Advance Directives and portable DNR orders.     Length of ACP Conversation in minutes:  10    Conversation Outcomes:  [x] ACP discussion completed  [] Existing advance directive reviewed with patient; no changes to patient's previously recorded wishes  [] New Advance Directive completed  [] Portable Do Not Rescitate prepared for Provider review and signature  [] POLST/POST/MOLST/MOST prepared for Provider review and signature      Follow-up plan:    [] Schedule follow-up conversation to continue planning  [] Referred individual to Provider for additional questions/concerns   [] Advised patient/agent/surrogate to review completed ACP document and update if needed with changes in condition, patient preferences or care setting    [x] This note routed to one or more involved healthcare providers

## 2022-06-20 NOTE — CONSULTS
Breast Surgery Consult Note      Martin Benoit   : 1975 MRN: [id number]  Date of Admission: 2022  Admitting Vivienne Churchill MD  Primary Care Physician: JUDITH Longoria - CNP    Reason for Consult: right breast cancer    Diagnosis Present on Admission:   Breast cancer, stage 4, right ([facility] [address]      History of Present Illness  Martin Benoit is a 48 y.o. female admitted on 2022 for bleeding from her right breast cancer. Patient was initially diagnosed with right breast cancer 3/2021 at [facility]. She had felt a mass in her right breast 5 months prior to presentation. Imaging showed a 4.3 cm right breast mass, biopsy proven invasive ductal carcinoma, triple negative. Node biopsy at that time was negative. She was referred to oncology for neoadjuvant chemotherapy. Her initial presentation previously was complicated by refractory status epilepticus requiring triple antiepileptic therapy and intubation who subsequently underwent craniotomy with tumor resection. She is s/p left temporal and left parieto-occipital craniotomy on 3/12/2022 for stereotactic resection of the masses which were pathologically confirmed to be metastatic lesions. She was evaluated by the surgical oncology team at [facility] on , but at that time was not interested in discussing surgical intervention given her recent neurosurgery. She subsequently developed right common femoral, deep femoral, and soleal DVTs for which she underwent placement of an IVC filter with IR. She was previously re-admitted for progressive leg swelling found to be caused by thrombosis of her filter and is s/p aspiration thrombectomy on . She is on Eliquis, and the IVC filter was removed. She is scheduled to start chemotherapy later this week. However, her breast tumor started bleeding and causing more pain, and she presented to the ER. Her sister had breast cancer at age 46, and her father had prostate cancer.      Past Medical History:   Diagnosis Date    Cancer [facility])     History of blood transfusion     Hx of blood clots     Thyroid nodule      Past Surgical History:   Procedure Laterality Date    CRANIOTOMY Left 03/12/2022    LEFT TEMPORAL PARIETAL OCCIPITAL CRANIOTOMY FOR TUMOR RESECTION performed by Zhane Goncalves MD at [address] IR IVC FILTER PLACEMENT W IMAGING N/A 03/16/2022    [facility], argon option elite    IR IVC FILTER PLACEMENT W IMAGING  03/16/2022    IR IVC FILTER PLACEMENT W IMAGING 3/16/2022 [facility] SPECIAL PROCEDURES    IR IVC FILTER RETRIEVAL  6/10/2022    IR IVC FILTER RETRIEVAL 6/10/2022 [id number] SPECIAL PROCEDURES    IR PORT PLACEMENT EQUAL OR GREATER THAN 5 YEARS Left 05/24/2022    Power Port; Deanna access; 28 cm; Dr. Wilburt Councilman 5 YEARS  5/24/2022    IR PORT PLACEMENT EQUAL OR GREATER THAN 5 YEARS 5/24/2022 [facility] SPECIAL PROCEDURES    IR THROMB DAUTERIVE HOSPITAL VEIN  03/28/2022    IR THROMB [address] 3/28/2022 [facility] SPECIAL PROCEDURES    UPPER GASTROINTESTINAL ENDOSCOPY N/A 04/02/2022    EGD DIAGNOSTIC ONLY performed by Orly Colmenares MD at [facility] ENDOSCOPY     Family History   Problem Relation Age of Onset    Cancer Father      Social History     Socioeconomic History    Marital status: Single     Spouse name: Not on file    Number of children: Not on file    Years of education: Not on file    Highest education level: Not on file   Occupational History    Not on file   Tobacco Use    Smoking status: Never Smoker    Smokeless tobacco: Never Used   Substance and Sexual Activity    Alcohol use: Never     Alcohol/week: 0.0 standard drinks    Drug use: Never    Sexual activity: Not on file   Other Topics Concern    Not on file   Social History Narrative    Not on file     Social Determinants of Health     Financial Resource Strain:     Difficulty of Paying Living Expenses: Not on file   Food Insecurity:     Worried About Running Out of Food in the Last Year: Not on file    [address] in the Last Year: Not on file   Transportation Needs:     Lack of Transportation (Medical): Not on file    Lack of Transportation (Non-Medical): Not on file   Physical Activity:     Days of Exercise per Week: Not on file    Minutes of Exercise per Session: Not on file   Stress:     Feeling of Stress : Not on file   Social Connections:     Frequency of Communication with Friends and Family: Not on file    Frequency of Social Gatherings with Friends and Family: Not on file    Attends Voodoo Services: Not on file    Active Member of [address] [place] [address] [employer] or Organizations: Not on file    Attends Club or Organization Meetings: Not on file    Marital Status: Not on file   Intimate Partner Violence:     Fear of Current or Ex-Partner: Not on file    Emotionally Abused: Not on file    Physically Abused: Not on file    Sexually Abused: Not on file   Housing Stability:     Unable to Pay for Housing in the Last Year: Not on file    Number of [place] in the Last Year: Not on file    Unstable Housing in the Last Year: Not on file     Allergies   Allergen Reactions    Heparin Other (See Comments)     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 +     Prior to Admission medications    Medication Sig Start Date End Date Taking?  Authorizing Provider   apixaban (ELIQUIS) 5 MG TABS tablet Take 1 tablet by mouth 2 times daily 6/18/22   Kym Grigsby MD   metroNIDAZOLE (FLAGYL) 500 MG tablet Apply 2 tablets topically daily for 10 days 6/14/22 6/24/22  Kym Grigsby MD   clopidogrel (PLAVIX) 75 MG tablet Take 1 tablet by mouth daily 6/14/22   Kym Grigsby MD   spironolactone (ALDACTONE) 50 MG tablet Take 1 tablet by mouth daily 4/9/22   Avi Rock MD   Multiple Vitamin (MULTIVITAMIN ADULT PO) Take 1 capsule by mouth daily    Historical Provider, MD   Calcium Carbonate-Vitamin D (OYSTER SHELL CALCIUM/D) 500-200 MG-UNIT TABS Take 1 tablet by mouth 2 times daily (with meals)    Historical Provider, MD   ibuprofen (ADVIL;MOTRIN) 800 MG tablet Take 800 mg by mouth every 8 hours as needed    Historical Provider, MD   levETIRAcetam (KEPPRA) 1000 MG tablet Take 2 tablets by mouth daily 3/21/22   Naeem Plata,    levETIRAcetam (KEPPRA) 500 MG tablet Take 5 tablets by mouth nightly 3/20/22   Corinne Black, DO   melatonin 5 MG TBDP disintegrating tablet Take 1 tablet by mouth nightly 3/20/22   Corinne Black, DO   pantoprazole (PROTONIX) 40 MG tablet Take 1 tablet by mouth every morning (before breakfast) 3/18/22   Curtis Sewell MD   lacosamide (VIMPAT) 200 MG tablet Take 1 tablet by mouth 2 times daily for 30 days. 3/17/22 4/16/22  Curtis Sewell MD       Review of Systems  As per HPI, otherwise negative    Physical Exam  /69   Pulse (!) 120   Temp 97.1 °F (36.2 °C)   Resp 20   Ht 5' 3\" (1.6 m)   SpO2 96%   BMI 33.48 kg/m²     No intake or output data in the 24 hours ending 06/20/22 1546    Body mass index is 33.48 kg/m². General/Appearance: WDWN female, alert, oriented, NAD  Right breast with large fungating mass with fresh blood from inferior portion of wound. See picture in chart. Labs  Recent Labs     06/20/22  1004   WBC 14.0*   HGB 7.4*   HCT 22.5*   *   APTT 36.6*   INR 1.57*     Recent Labs     06/20/22  1004      K 3.7      CO2 20*   BUN 6*   GFRAA >60     No results for input(s): TP, ALB, GLOB, AST, ALT in the last 72 hours. Invalid input(s): DBIL, TBIL, AGRAT, GPT, RONAL, LIP  No results for input(s): UOSM in the last 72 hours. Invalid input(s): UAOR, [facility], [facility], [place], [facility], [place], [place], [facility]    Imaging  CT HEAD WO CONTRAST   Final Result   1. Increase in the vasogenic edema involving the left temporal and parietal   lobes with mass effect and new left-to-right midline shift measuring 7.3 mm.   2. Question vasogenic edema in the high right parietal lobe which could be   secondary to a new metastasis.    These findings are concerning for progression of intracranial metastases. An   MRI brain with contrast is recommended for further evaluation. MRI BRAIN W WO CONTRAST    (Results Pending)          Assessment/Plan:    See Adames is a 48 y.o. female admitted for metastatic triple negative right breast cancer with large necrotic locally advanced tumor in her breast. The tumor has been bleeding. I spoke with oncology, Dr. Halley Messer, and we agreed to hold her Eliquis to see if the bleeding would subside. Will then discuss possible neoadjuvant chemotherapy to help shrink the tumor prior to mastectomy. If bleeding persists, may need to consider a toilet mastectomy. On this date 06/20/22 I have spent 80 minutes reviewing previous notes, test results, and face to face with the patient discussing the diagnosis and importance of compliance with the treatment plan as well as documenting on the day of the visit.      Electronically signed by Julia Marcum MD on 6/20/22 at 3:46 PM EDT

## 2022-06-20 NOTE — PLAN OF CARE
Eliecer Joe [facility] [address]  [id number]   1975 6/20/2022    ED physician: Jc Schmidt MD    Reason for call: Brain mets with worsening cerebral edema    History of present illness: Patient is a 48 y.o. female that  has a past medical history of Cancer ([address] History of blood transfusion, blood clots, and Thyroid nodule. Patient s/p LEFT TEMPORAL PARIETAL OCCIPITAL CRANIOTOMY FOR TUMOR RESECTION by Dr. Kenzie Odell on 3/12/2022, and whole brain radiation (10 treatments) in May 2022. Patient presented on 6/20/2022 to [facility] c/o breast pain and frequent falls. According to Dr. Karuna Miranda, the patient over the last two days has been falling more but the main reason she is here is because the pain in her right breast has increased and started bleeding and she can't get it to stop bleeding. She is on Eliquis currently due to recent history of blood clots. Physical Exam:     /78   Pulse (!) 118   Temp 97.1 °F (36.2 °C)   Resp 18   Ht 5' 3\" (1.6 m)   SpO2 99%   BMI 33.48 kg/m²   GCS per ED physician:  4 - Opens eyes on own  5 - Alert and oriented  6 - Follows simple motor commands    Radiological Findings:  CT HEAD WO ONTRAST  Result Date: 6/20/2022  1. Increase in the vasogenic edema involving the left temporal and parietal lobes with mass effect and new left-to-right midline shift measuring 7.3 mm.  2. Question vasogenic edema in the high right parietal lobe which could be secondary to a new metastasis. These findings are concerning for progression of intracranial metastases. An MRI brain with contrast is recommended for further evaluation. Assessment:  Patient is a 48 y.o. y/o female w/known breast cancer and mets to the brain. Patient had LEFT TEMPORAL PARIETAL OCCIPITAL CRANIOTOMY FOR TUMOR RESECTION by Dr. Kenzie Odell on 3/12/2022. Patient also completed whole brain radiation (10 treatments) in May 2022.  No neurosurgical intervention at this time 2/2 recent radiation. Recommendations:  1. Neurologic exams frequency:  - Floor: Q4H  2. For change in exam MUST contact neurosurgery team  3. MRI Brain w wo to evaluate etiology of edema, new mets vs radiation necrosis  4. Cerebral edema: Decadron 10 mg loading dose then 4 mg Q6H  5. PPI and SSI while on Decadron  6. Seizure prophylaxis: Continue home dose of Keppra  7. Recommend consulting Oncology, Radiation Oncology, and Palliative Care    DISPO: OK to stay at [place] unless neuro exam worsens and patient requires Neuro ICU level of care.      Electronically signed by: JUDITH Pedersen - AMA, JUDITH-CNP, 6/20/2022 2:36 PM  [phone]

## 2022-06-20 NOTE — H&P
Hospital Medicine History & Physical      PCP: SHAHEED ZAMARRIPA, APRN - CNP    Date of Admission: 6/20/2022    Date of Service: Pt seen/examined on 6/20/2022   and Admitted to Inpatient with expected LOS greater than two midnights due to medical therapy. Chief Complaint:  R breast bleeding    History Of Present Illness:      48 y.o. female who presented to [facility] \[facility]"" AT [place] with bleeding from R breast cancer site. Pt has hx of known metastatic breast cancer fungating dx in 3/2022. She has complicated hx including mets to brain for which she received craniectomy with bx and radiation to the breast tumor at [facility]. Course further complicated by venous thromboembolism for which she required ivc filter and subsequent removal due to thrombosis and now was on eliquis at [facility]. She has had extensive bleeding oozing from the site and came to ED for eval.  Plan was to start chemo soon. Denies fevers chills or dizziness.   Past Medical History:          Diagnosis Date    Cancer ([address]     History of blood transfusion     Hx of blood clots     Thyroid nodule        Past Surgical History:          Procedure Laterality Date    CRANIOTOMY Left 03/12/2022    LEFT TEMPORAL PARIETAL OCCIPITAL CRANIOTOMY FOR TUMOR RESECTION performed by Yana Adams MD at [address] [facility] IR IVC FILTER PLACEMENT W IMAGING N/A 03/16/2022    [facility], argon option elite    IR IVC FILTER PLACEMENT W IMAGING  03/16/2022    IR IVC FILTER PLACEMENT W IMAGING 3/16/2022 [facility] SPECIAL PROCEDURES    IR IVC FILTER RETRIEVAL  6/10/2022    IR IVC FILTER RETRIEVAL 6/10/2022 [facility] SPECIAL PROCEDURES    IR PORT PLACEMENT EQUAL OR GREATER THAN 5 YEARS Left 05/24/2022    Power Port; Deanna access; 28 cm; Dr. Izabel Rivera 5 YEARS  5/24/2022    IR PORT PLACEMENT EQUAL OR GREATER THAN 5 YEARS 5/24/2022 [id number] SPECIAL PROCEDURES    IR THROMB DAUTERIVE HOSPITAL VEIN  03/28/2022    IR THROMB [address] 3/28/2022 [facility] SPECIAL PROCEDURES  UPPER GASTROINTESTINAL ENDOSCOPY N/A 04/02/2022    EGD DIAGNOSTIC ONLY performed by Karen Eaton MD at [address] ENDOSCOPY       Medications Prior to Admission:      Prior to Admission medications    Medication Sig Start Date End Date Taking? Authorizing Provider   apixaban (ELIQUIS) 5 MG TABS tablet Take 1 tablet by mouth 2 times daily 6/18/22   Robert Chambers MD   metroNIDAZOLE (FLAGYL) 500 MG tablet Apply 2 tablets topically daily for 10 days 6/14/22 6/24/22  Robert Chambers MD   clopidogrel (PLAVIX) 75 MG tablet Take 1 tablet by mouth daily 6/14/22   Robert Chambers MD   spironolactone (ALDACTONE) 50 MG tablet Take 1 tablet by mouth daily 4/9/22   Cely Farias MD   Multiple Vitamin (MULTIVITAMIN ADULT PO) Take 1 capsule by mouth daily    Historical Provider, MD   Calcium Carbonate-Vitamin D (OYSTER SHELL CALCIUM/D) 500-200 MG-UNIT TABS Take 1 tablet by mouth 2 times daily (with meals)    Historical Provider, MD   ibuprofen (ADVIL;MOTRIN) 800 MG tablet Take 800 mg by mouth every 8 hours as needed    Historical Provider, MD   levETIRAcetam (KEPPRA) 1000 MG tablet Take 2 tablets by mouth daily 3/21/22   Naeem Plata,    levETIRAcetam (KEPPRA) 500 MG tablet Take 5 tablets by mouth nightly 3/20/22   Corinne Fitzgerald, DO   melatonin 5 MG TBDP disintegrating tablet Take 1 tablet by mouth nightly 3/20/22   Corinne Fitzgerald, DO   pantoprazole (PROTONIX) 40 MG tablet Take 1 tablet by mouth every morning (before breakfast) 3/18/22   Curtis Sewell MD   lacosamide (VIMPAT) 200 MG tablet Take 1 tablet by mouth 2 times daily for 30 days. 3/17/22 4/16/22  Curtis Sewell MD       Allergies:  Heparin    Social History:      The patient currently lives w family    TOBACCO:   reports that she has never smoked. She has never used smokeless tobacco.  ETOH:   reports no history of alcohol use.   E-Cigarettes/Vaping Use     Questions Responses    E-Cigarette/Vaping Use     Start Date     Passive Exposure     Quit Date     Counseling Given     Comments             Family History:      Reviewed in detail and negative for DM, CAD, Cancer, CVA. Positive as follows:        Problem Relation Age of Onset    Cancer Father        REVIEW OF SYSTEMS COMPLETED:   Pertinent positives as noted in the HPI. All other systems reviewed and negative. PHYSICAL EXAM PERFORMED:    /71   Pulse (!) 119   Temp 98.6 °F (37 °C) (Oral)   Resp 20   Ht 5' 3\" (1.6 m)   SpO2 97%   BMI 33.48 kg/m²     General appearance:  No apparent distress, appears stated age and cooperative. HEENT:  Normal cephalic, atraumatic without obvious deformity. Pupils equal, round, and reactive to light. Extra ocular muscles intact. Conjunctivae/corneas clear. Neck: Supple, with full range of motion. No jugular venous distention. Trachea midline. Respiratory:  Normal respiratory effort. Clear to auscultation, bilaterally without Rales/Wheezes/Rhonchi. Cardiovascular:  Regular rate and rhythm with normal S1/S2 without murmurs, rubs or gallops. Abdomen: Soft, non-tender, non-distended with normal bowel sounds. Musculoskeletal:  No clubbing, cyanosis or edema bilaterally. Full range of motion without deformity. Skin: Skin color, texture, turgor normal.  No rashes or lesions. Neurologic:  Neurovascularly intact without any focal sensory/motor deficits. Cranial nerves: II-XII intact, grossly non-focal.  Psychiatric:  Alert and oriented, thought content appropriate, normal insight  Capillary Refill: Brisk,3 seconds, normal  Peripheral Pulses: +2 palpable, equal bilaterally   Breast: fungating bleeding necrotic breast mass with odor    Labs:     Recent Labs     06/20/22  1004   WBC 14.0*   HGB 7.4*   HCT 22.5*   *     Recent Labs     06/20/22  1004      K 3.7      CO2 20*   BUN 6*   CREATININE 0.6   CALCIUM 8.6     No results for input(s): AST, ALT, BILIDIR, BILITOT, ALKPHOS in the last 72 hours.   Recent Labs     06/20/22  1004   INR 1.57*     No results for input(s): Ahsan Hicks in the last 72 hours. Urinalysis:      Lab Results   Component Value Date    NITRU Negative 03/27/2022    WBCUA 0-2 03/27/2022    BACTERIA 3+ 03/27/2022    RBCUA 0-2 03/27/2022    BLOODU Negative 03/27/2022    SPECGRAV >=1.030 03/27/2022    GLUCOSEU Negative 03/27/2022       Radiology:     CXR: I have reviewed the CXR with the following interpretation:    CT HEAD WO CONTRAST   Final Result   1. Increase in the vasogenic edema involving the left temporal and parietal   lobes with mass effect and new left-to-right midline shift measuring 7.3 mm.   2. Question vasogenic edema in the high right parietal lobe which could be   secondary to a new metastasis. These findings are concerning for progression of intracranial metastases. An   MRI brain with contrast is recommended for further evaluation. MRI BRAIN W WO CONTRAST    (Results Pending)       Consults:    IP CONSULT TO GENERAL SURGERY  IP CONSULT TO NEUROSURGERY  IP CONSULT TO CARDIOLOGY    ASSESSMENT:    Advanced Stage 4 breast cancer  Metastatic brain lesions with midline shift vasogenic edema  Bleeding breast mass  Hx DVT/PEs   On oral AC  Hx of IVC filter s/p recent removal    PLAN:    Hold eliquis and plavix  Serial h and h  Transfuse for hgb<7  Breast surgeon consult  hemonc consult  May need replacement of IVC filter as seems to not be tolerating oral AC  Poss initiation of chemo  Neurosurgery consulted for brain lesions-they are aware of CT and OK with her staying at [facility] for now  On decadron iv  keppra for sz prevention    DVT Prophylaxis: scds  Diet: Diet NPO  ADULT DIET; Regular  Code Status: Full Code        Dispo - 4-5 d       Rafy Ramsay MD    Thank you SHAHEED ZAMARRIPA, APRN - AMA for the opportunity to be involved in this patient's care. If you have any questions or concerns please feel free to contact me at [phone].

## 2022-06-20 NOTE — CONSULTS
Oncology Hematology Care   Consult Note    Admission Date/Time: 6/20/2022  9:20 AM  Today's Date: 6/20/2022    Reason for Consult: Bleeding breast mass/ known patient to [facility]. Requesting Physician:  Erna Platt COMPLAINT:  Continued bleeding from right breast necrotic mass. History Obtained From: Patient    HISTORY OF PRESENT ILLNESS: Thea Chanel is a pleasant 48year old woman who has stage IV breast cancer with brain metastasis. She presents to the ER today with bleeding from her right breast mass. The patient was diagnosed with breast cancer in March of 2021 at [facility]. She saw surgery/med onc at that time after she had noted a palpable mass on self exam-she had noted it in 2020. She had not had a mammo since 2018 before presenting. She had a 3 x 2.7 x 2.9cm mass that was irregular of the right breast at that time, with a satellite mass and two abnormal nodes. She had refused treatment at that time. She then presented to [facility], [facility]  with neurological symptoms and was found to have a brain mass. She then had a  left temporal and left parieto-occipital craniotomy on 3/12/2022 for stereotactic resection of the masses which were pathologically confirmed to be metastatic lesions. She was evaluated by the surgical oncology team at [facility] on 03/27, but at that time was not interested in discussing surgical intervention given her recent neurosurgery. She subsequently developed right common femoral, deep femoral, and soleal DVTs for which she underwent placement of an IVC filter with IR. She was previously re-admitted for progressive leg swelling found to be caused by thrombosis of her filter and is s/p aspiration thrombectomy on 03/28. She is on Eliquis, and the IVC filter was removed. She is scheduled to start chemotherapy later this week. However, her breast tumor started bleeding and causing more pain, and she presented to the ER.    Past Medical History:     has a past medical history of Cancer [facility]), History of blood transfusion, Hx of blood clots, and Thyroid nodule. Past Surgical History:    Past Surgical History:   Procedure Laterality Date    CRANIOTOMY Left 03/12/2022    LEFT TEMPORAL PARIETAL OCCIPITAL CRANIOTOMY FOR TUMOR RESECTION performed by Whitney Dixon MD at [address] IR IVC FILTER PLACEMENT W IMAGING N/A 03/16/2022    [facility]    IR IVC FILTER PLACEMENT W IMAGING  03/16/2022    IR IVC FILTER PLACEMENT W IMAGING 3/16/2022 [facility] PROCEDURES    IR IVC FILTER RETRIEVAL  6/10/2022    IR IVC FILTER RETRIEVAL 6/10/2022 [facility] PROCED[facility]    IR PORT PLACEMENT EQUAL OR GREATER THAN 5 YEARS Left 05/24/2022    Power Port; [place] access; 28 cm; Dr. Sridevi Garrido    IR PORT PLACEMENT EQUAL OR GREATER THAN 5 YEARS  5/24/2022    IR PORT PLACEMENT EQUAL OR GREATER THAN 5 YEARS 5/24/2022 [facility] PROCED[facility]    IR THROMB [facility]  03/28/2022    IR THROMB [address] 3/28/2022 [facility] SPECIAL PROCEDURES    UPPER GASTROINTESTINAL ENDOSCOPY N/A 04/02/2022    EGD DIAGNOSTIC ONLY performed by Raffi Lopez MD at [facility] ENDOSCOPY      Current Medications:     dexamethasone  4 mg IntraVENous Q6H     Allergies: Allergies   Allergen Reactions    Heparin Other (See Comments)     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 +      Social History:    reports that she has never smoked. She has never used smokeless tobacco. She reports that she does not drink alcohol and does not use drugs. Family History:     family history includes Cancer in her father.      REVIEW OF SYSTEMS:    CONSTITUTIONAL: Negative for changes in weight, weakness, fatigue, or fevers  EYES:  Negative for  eye discharge, changes in vision, redness and icterus  HEENT:  Negative for  nasal congestion, sore mouth and sore throat  RESPIRATORY: Negative for cough, shortness of breath, or pleuritic pain  CARDIOVASCULAR:  Negative for  chest pain, palpitations, or heaviness  GASTROINTESTINAL:  Negative for changes in appetite, nausea, vomiting, dysphagia, constipation, or diarrhea  GENITOURINARY:  Negative for dysuria, hematuria, frequency, or hesitency  INTEGUMENT/BREAST:  Negative for rash, dryness, pruritis, or skin lesions   HEMATOLOGIC:  Negative for abnormal bleeding or bruising  LYMPHATIC: Negative for lymphadenopathy  ENDOCRINE:  Negative for excessive thirst/hunger/urination. MUSCULOSKELETAL:  Negative for muscle or joint pain, joint stiffness, or weakness  NEUROLOGICAL:  Negative for syncope, seizures, numbness/tingling, or tremors  Vitals:  Vitals:    06/20/22 1351   BP:    Pulse: (!) 118   Resp:    Temp:    SpO2:       CONSTITUTIONAL:  Awake, alert, cooperative  EYES:  Pupils equal, round and reactive to light, sclera clear and conjunctiva normal.  ENT:  Normocepalic, without obvious abnormality, atraumatic. NECK:  Supple, symmetrical, no jugular venous distension, no thyromegaly. HEMATOLOGIC/LYMPHATICS:  No cervical,supraclavicular or axillary lymphadenopathy. LUNGS:  No increased work of breathing and clear to auscultation. CARDIOVASCULAR: Regular rate and rhythm, normal S1 and S2, no murmur noted. ABDOMEN:  Normal bowel sounds x 4, soft, non-distended, non-tender, no masses, ascites, or organomegaly noted. MUSCULOSKELETAL:  Full range of motion noted, tone is normal  EXTREMITIES: No lower extremity edema  NEUROLOGIC:  Awake, alert, oriented to name, place and time. Motor skills grossly intact. SKIN:  Normal skin color, texture, turgor and no jaundice. Appears intact. BREAST- Large, necrotic breast mass right- some bleeding.    DATA:  General Labs:    CBC:   Recent Labs     06/20/22  1004   WBC 14.0*   HGB 7.4*   HCT 22.5*   MCV 81.7   *     BMP:   Recent Labs     06/20/22  1004      K 3.7      CO2 20*   BUN 6*   CREATININE 0.6     LIVER PROFILE: No results for input(s): AST, ALT, LIPASE, BILIDIR, BILITOT, ALKPHOS in the last 72 hours.    Invalid input(s): AMYLASE,  ALB  PT/INR:    Lab Results   Component Value Date    PROTIME 18.7 06/20/2022    PROTIME 14.9 06/05/2022    PROTIME 12.6 05/24/2022    INR 1.57 06/20/2022    INR 1.18 06/05/2022    INR 1.11 05/24/2022     PTT:    Lab Results   Component Value Date    APTT 36.6 06/20/2022    APTT 51.1 06/11/2022    APTT 64.6 06/10/2022     Magnesium:    Lab Results   Component Value Date    MG 2.00 06/12/2022    MG 2.00 04/07/2022    MG 2.20 03/29/2022     Assessment & Plan:    1. Metastatic triple negative breast cancer  -Triple negative neglected Right breast primary 3/2021   - Intracranial metastasis  - craniotomy for brain mets- completed brain RT 1/8051  -Complicated course related to blood clots, thrombectomy, REBECCA and removal of IVC filter.   -She was seen in conjunction with Dr. Delvis Lay. Dr. Delvis Lay did reach out to Dr. Jose Eduardo Mancini to discuss potential need for palliative surgical removal of mass versus start of neoadjuvant chemotherapy. I have discussed the above stated plan with the patient and they verbalized understanding and agreed with the plan. Thank you for allowing us to participate in this patient's care.     Jennifer Valle, JUDITH - CNP, CNP 6/20/2022, 3:20 PM

## 2022-06-20 NOTE — PROGRESS NOTES
Admission assessment completed. See flow sheets. Patient's vitals are stable, heart rhythm sinus tachy. Neuro check is negative except for noticeable aphasia, trouble finding words. Patient also says she feels unsteady when standing so the [place] [facility] was used to transport patient to the bathroom. MRI checklist was completed. Reviewed plan of care with patient and family at bedside. Call light in reach. No further needs at this time. Will monitor.

## 2022-06-21 ENCOUNTER — APPOINTMENT (OUTPATIENT)
Dept: MRI IMAGING | Age: 47
DRG: 382 | End: 2022-06-21
Payer: MEDICAID

## 2022-06-21 LAB
A/G RATIO: 1.2 (ref 1.1–2.2)
ALBUMIN SERPL-MCNC: 3.3 G/DL (ref 3.4–5)
ALP BLD-CCNC: 78 U/L (ref 40–129)
ALT SERPL-CCNC: 13 U/L (ref 10–40)
ANION GAP SERPL CALCULATED.3IONS-SCNC: 11 MMOL/L (ref 3–16)
AST SERPL-CCNC: 12 U/L (ref 15–37)
BASOPHILS ABSOLUTE: 0 K/UL (ref 0–0.2)
BASOPHILS RELATIVE PERCENT: 0.1 %
BILIRUB SERPL-MCNC: <0.2 MG/DL (ref 0–1)
BUN BLDV-MCNC: 7 MG/DL (ref 7–20)
CALCIUM SERPL-MCNC: 9.1 MG/DL (ref 8.3–10.6)
CHLORIDE BLD-SCNC: 103 MMOL/L (ref 99–110)
CO2: 23 MMOL/L (ref 21–32)
CREAT SERPL-MCNC: 0.5 MG/DL (ref 0.6–1.1)
EOSINOPHILS ABSOLUTE: 0 K/UL (ref 0–0.6)
EOSINOPHILS RELATIVE PERCENT: 0 %
GFR AFRICAN AMERICAN: >60
GFR NON-AFRICAN AMERICAN: >60
GLUCOSE BLD-MCNC: 144 MG/DL (ref 70–99)
HCT VFR BLD CALC: 21.2 % (ref 36–48)
HEMOGLOBIN: 7 G/DL (ref 12–16)
LYMPHOCYTES ABSOLUTE: 0.8 K/UL (ref 1–5.1)
LYMPHOCYTES RELATIVE PERCENT: 5.1 %
MCH RBC QN AUTO: 27.2 PG (ref 26–34)
MCHC RBC AUTO-ENTMCNC: 33 G/DL (ref 31–36)
MCV RBC AUTO: 82.4 FL (ref 80–100)
MONOCYTES ABSOLUTE: 0.5 K/UL (ref 0–1.3)
MONOCYTES RELATIVE PERCENT: 3.3 %
NEUTROPHILS ABSOLUTE: 15.1 K/UL (ref 1.7–7.7)
NEUTROPHILS RELATIVE PERCENT: 91.5 %
PDW BLD-RTO: 19.7 % (ref 12.4–15.4)
PLATELET # BLD: 694 K/UL (ref 135–450)
PMV BLD AUTO: 6.8 FL (ref 5–10.5)
POTASSIUM SERPL-SCNC: 4.8 MMOL/L (ref 3.5–5.1)
RBC # BLD: 2.57 M/UL (ref 4–5.2)
SODIUM BLD-SCNC: 137 MMOL/L (ref 136–145)
TOTAL PROTEIN: 6 G/DL (ref 6.4–8.2)
WBC # BLD: 16.5 K/UL (ref 4–11)

## 2022-06-21 PROCEDURE — 6360000002 HC RX W HCPCS: Performed by: INTERNAL MEDICINE

## 2022-06-21 PROCEDURE — 6360000004 HC RX CONTRAST MEDICATION: Performed by: NURSE PRACTITIONER

## 2022-06-21 PROCEDURE — 2580000003 HC RX 258: Performed by: INTERNAL MEDICINE

## 2022-06-21 PROCEDURE — A9577 INJ MULTIHANCE: HCPCS | Performed by: NURSE PRACTITIONER

## 2022-06-21 PROCEDURE — 70553 MRI BRAIN STEM W/O & W/DYE: CPT

## 2022-06-21 PROCEDURE — 6370000000 HC RX 637 (ALT 250 FOR IP): Performed by: INTERNAL MEDICINE

## 2022-06-21 PROCEDURE — 85025 COMPLETE CBC W/AUTO DIFF WBC: CPT

## 2022-06-21 PROCEDURE — 2060000000 HC ICU INTERMEDIATE R&B

## 2022-06-21 PROCEDURE — 80053 COMPREHEN METABOLIC PANEL: CPT

## 2022-06-21 PROCEDURE — 6370000000 HC RX 637 (ALT 250 FOR IP): Performed by: NURSE PRACTITIONER

## 2022-06-21 PROCEDURE — 99231 SBSQ HOSP IP/OBS SF/LOW 25: CPT | Performed by: SURGERY

## 2022-06-21 PROCEDURE — 94760 N-INVAS EAR/PLS OXIMETRY 1: CPT

## 2022-06-21 PROCEDURE — 99222 1ST HOSP IP/OBS MODERATE 55: CPT | Performed by: INTERNAL MEDICINE

## 2022-06-21 RX ORDER — MORPHINE SULFATE 2 MG/ML
2 INJECTION, SOLUTION INTRAMUSCULAR; INTRAVENOUS EVERY 4 HOURS PRN
Status: DISCONTINUED | OUTPATIENT
Start: 2022-06-21 | End: 2022-06-26 | Stop reason: HOSPADM

## 2022-06-21 RX ADMIN — MORPHINE SULFATE 2 MG: 2 INJECTION, SOLUTION INTRAMUSCULAR; INTRAVENOUS at 05:58

## 2022-06-21 RX ADMIN — SODIUM CHLORIDE, PRESERVATIVE FREE 10 ML: 5 INJECTION INTRAVENOUS at 21:49

## 2022-06-21 RX ADMIN — DEXAMETHASONE SODIUM PHOSPHATE 4 MG: 4 INJECTION, SOLUTION INTRAMUSCULAR; INTRAVENOUS at 09:19

## 2022-06-21 RX ADMIN — IRON SUCROSE 300 MG: 20 INJECTION, SOLUTION INTRAVENOUS at 09:55

## 2022-06-21 RX ADMIN — GADOBENATE DIMEGLUMINE 16 ML: 529 INJECTION, SOLUTION INTRAVENOUS at 08:36

## 2022-06-21 RX ADMIN — OXYCODONE HYDROCHLORIDE 10 MG: 10 TABLET ORAL at 17:26

## 2022-06-21 RX ADMIN — OXYCODONE HYDROCHLORIDE 10 MG: 10 TABLET ORAL at 21:39

## 2022-06-21 RX ADMIN — LEVETIRACETAM 2500 MG: 500 TABLET, FILM COATED ORAL at 21:49

## 2022-06-21 RX ADMIN — DEXAMETHASONE SODIUM PHOSPHATE 4 MG: 4 INJECTION, SOLUTION INTRAMUSCULAR; INTRAVENOUS at 21:40

## 2022-06-21 RX ADMIN — LEVETIRACETAM 2000 MG: 500 TABLET, FILM COATED ORAL at 09:20

## 2022-06-21 RX ADMIN — OXYCODONE HYDROCHLORIDE 10 MG: 10 TABLET ORAL at 02:00

## 2022-06-21 RX ADMIN — DEXAMETHASONE SODIUM PHOSPHATE 4 MG: 4 INJECTION, SOLUTION INTRAMUSCULAR; INTRAVENOUS at 02:02

## 2022-06-21 RX ADMIN — METRONIDAZOLE 500 MG: 500 TABLET ORAL at 05:57

## 2022-06-21 RX ADMIN — DEXAMETHASONE SODIUM PHOSPHATE 4 MG: 4 INJECTION, SOLUTION INTRAMUSCULAR; INTRAVENOUS at 15:51

## 2022-06-21 RX ADMIN — SODIUM CHLORIDE, PRESERVATIVE FREE 10 ML: 5 INJECTION INTRAVENOUS at 16:00

## 2022-06-21 RX ADMIN — OXYCODONE HYDROCHLORIDE 10 MG: 10 TABLET ORAL at 06:49

## 2022-06-21 RX ADMIN — PANTOPRAZOLE SODIUM 40 MG: 40 TABLET, DELAYED RELEASE ORAL at 05:57

## 2022-06-21 ASSESSMENT — PAIN DESCRIPTION - LOCATION
LOCATION: BREAST

## 2022-06-21 ASSESSMENT — ENCOUNTER SYMPTOMS
GASTROINTESTINAL NEGATIVE: 1
RESPIRATORY NEGATIVE: 1
EYES NEGATIVE: 1

## 2022-06-21 ASSESSMENT — PAIN SCALES - GENERAL
PAINLEVEL_OUTOF10: 8
PAINLEVEL_OUTOF10: 6
PAINLEVEL_OUTOF10: 9
PAINLEVEL_OUTOF10: 7
PAINLEVEL_OUTOF10: 9
PAINLEVEL_OUTOF10: 8
PAINLEVEL_OUTOF10: 9

## 2022-06-21 ASSESSMENT — PAIN DESCRIPTION - ORIENTATION
ORIENTATION: RIGHT

## 2022-06-21 ASSESSMENT — PAIN DESCRIPTION - DESCRIPTORS
DESCRIPTORS: ACHING
DESCRIPTORS: ACHING
DESCRIPTORS: BURNING;ITCHING
DESCRIPTORS: BURNING

## 2022-06-21 NOTE — PLAN OF CARE
Problem: Discharge Planning  Goal: Discharge to home or other facility with appropriate resources  Outcome: Progressing  Flowsheets (Taken 6/20/2022 1847 by Karina Hagen, RN)  Discharge to home or other facility with appropriate resources: Identify barriers to discharge with patient and caregiver     Problem: Pain  Goal: Verbalizes/displays adequate comfort level or baseline comfort level  Outcome: Progressing     Problem: Safety - Adult  Goal: Free from fall injury  Outcome: Progressing     Problem: ABCDS Injury Assessment  Goal: Absence of physical injury  Outcome: Progressing  Flowsheets (Taken 6/20/2022 1844 by Karina Hagen, RN)  Absence of Physical Injury: Implement safety measures based on patient assessment

## 2022-06-21 NOTE — CONSULTS
Neurosurgery Consult:    Patient Name: Wendi Alicea YOB: 1975   Sex: Female Age: 48 yrs     Medical Record Number: [id number] Acct Number: [de-identified]   Room Number: [address] Day: Hospital Day: 2     Requesting physician: Anthony Spear MD    Reason for consultation: brain mets    History of present illness: Patient is a 48 y.o. female  has a past medical history of Cancer [facility]), History of blood transfusion, Hx of blood clots, and Thyroid nodule. And stage IV breast CA with brain metastases s/p resection and WBRT and radiation to her breast.  She was supposed to start chemo this week, but then had bleeding from her breast and presented to ER. Reports feeling a bit dizzy, but no other new symptoms. MRI done which showed recurrence in brain with new R parietal lesion as well. Per all notes, patient wants to continue aggressive care. Did have prior blood clots with IVC filter placed and then removed. Seen by interventional cards/vascular who feels she is extremely high risk of clotting off prior stents if new IVC filter placed and recommend restarting anticoagulation. ROS:   As above    VITAL SIGNS   /79   Pulse (!) 113   Temp 98.3 °F (36.8 °C) (Oral)   Resp 22   Ht 5' 3\" (1.6 m)   Wt 173 lb 8 oz (78.7 kg)   SpO2 93%   BMI 30.73 kg/m²    Height Height: 5' 3\" (160 cm)   Weight Weight: 173 lb 8 oz (78.7 kg)        Allergies Allergies   Allergen Reactions    Heparin Other (See Comments)     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 +      NPO Status ADULT DIET;  Regular  ADULT ORAL NUTRITION SUPPLEMENT; Breakfast, Dinner; Standard High Calorie/High Protein Oral Supplement   Isolation No active isolations     MEDICAL HISTORY   Past Medical History       Diagnosis Date    Cancer ([facility] [address]     History of blood transfusion     Hx of blood clots     Thyroid nodule       Surgical History    has a past surgical history that includes craniotomy (Left, 03/12/2022); IR GUIDED IVC FILTER PLACEMENT (N/A, 03/16/2022); IR GUIDED IVC FILTER PLACEMENT (03/16/2022); IR GUIDED THROMB MECH VEIN (03/28/2022); Upper gastrointestinal endoscopy (N/A, 04/02/2022); IR PORT PLACEMENT > 5 YEARS (Left, 05/24/2022); IR PORT PLACEMENT > 5 YEARS (5/24/2022); and IR GUIDED IVC FILTER RETRIEVAL (6/10/2022). Social History   Social History     Occupational History    Not on file   Tobacco Use    Smoking status: Never Smoker    Smokeless tobacco: Never Used   Substance and Sexual Activity    Alcohol use: Never     Alcohol/week: 0.0 standard drinks    Drug use: Never    Sexual activity: Not on file        The medical history was obtained from the patient & the medical records. The nursing notes, primary physician's notes, and old charts (if applicable) were reviewed.     LABS   Basic Metabolic Profile Recent Labs     06/20/22  1004 06/21/22  0600    137    103   CO2 20* 23   BUN 6* 7   CREATININE 0.6 0.5*   GLUCOSE 112* 144*      Complete Blood Count Recent Labs     06/20/22  1004 06/21/22  0600   WBC 14.0* 16.5*   RBC 2.75* 2.57*      Coagulation Studies Recent Labs     06/20/22  1004   INR 1.57*        MEDICATIONS   Inpatient Medications   iron sucrose, 300 mg, IntraVENous, Q24H    levETIRAcetam, 2,000 mg, Oral, Daily    levETIRAcetam, 2,500 mg, Oral, Nightly    pantoprazole, 40 mg, Oral, QAM AC    sodium chloride flush, 5-40 mL, IntraVENous, 2 times per day    dexamethasone, 4 mg, IntraVENous, Q6H    metroNIDAZOLE, 500 mg, Topical, Daily   Infusions    sodium chloride        PRN   morphine, 2 mg, IntraVENous, Q4H PRN  sodium chloride flush, 5-40 mL, IntraVENous, PRN  sodium chloride, , IntraVENous, PRN  ondansetron, 4 mg, Oral, Q8H PRN   Or  ondansetron, 4 mg, IntraVENous, Q6H PRN  polyethylene glycol, 17 g, Oral, Daily PRN  acetaminophen, 650 mg, Oral, Q6H PRN   Or  acetaminophen, 650 mg, Rectal, Q6H PRN  oxyCODONE, 5 mg, Oral, Q4H PRN Or  oxyCODONE, 10 mg, Oral, Q4H PRN       Antibiotics   Recent Abx Admin                     metroNIDAZOLE (FLAGYL) tablet 500 mg (mg) 500 mg Given 06/21/22 0557                       PHYSICAL EXAMINATION     GENERAL: NAD, alert, appears stated age, and cooperative  HEENT: Normocephalic, PERRL, EOMI, neck supple, trachea midline, lips without lesions  RESP: Easy WOB, symmetric chest rise  EXTREMITIES: Extremities WWP  SKIN: Warm and dry  NEURO: A&Ox4, FC - appendicular   CN II-XII grossly intact: no facial droop, EOMI, tongue midline, voice wnl,  hearing intact   MONTEJO spontaneously, FROM,  no drift   Sensation intact to light touch throughout   Gait exam deferred 2/2 patient condition    IMAGING   I personally reviewed the patient's imaging which consists of MRI brain w/wo contrast which shows recurrence of 2 left sided mets and new R parietal met with associated edema. MLS 6mm. ASSESSMENT & PLAN   46yo F with stage IV breast CA with recurrence/new brain mets and bleeding from breast with extensive h/o blood clots. Plan:  1. Do not recommend surgery for brain as control of primary has not occurred and no surgery planned for that. In addition, high risk of bleeding on anticoagulation. Has already had WBRT (reported by family) so no further XRT recommended either. 2. Recommend medical therapy as family desires vs. Palliative care. Can continue steroids and seizure prophylaxis. 3. Please call back if additional questions. Thank you for the consultation.     sEther Rivas. [address]

## 2022-06-21 NOTE — CARE COORDINATION
Discharge Planning:   Oncology, Neuorosurgery and Cardiology consulted. Palliative Care consult acknowledged. PLAN: return home with mother and resume [facility]. Follow for Palliative Care recommendations.    ISAIAH Chaparro, SAMEER, Social Work/Case Management   [phone]  Electronically signed by ISAIAH Chaparro LSW on 6/21/2022 at 12:11 PM

## 2022-06-21 NOTE — PROGRESS NOTES
Patient is alert and oriented. She is forgetful at times. Resting in bed. Medicated for complaints of R breast pain. Up to BR with stedy. Tolerated well. Educated on safety precautions. Call light in reach.

## 2022-06-21 NOTE — FLOWSHEET NOTE
provided emotional support, ministerial presence, and prayer with patient, her [de-identified] and sister. Patient and family report receiving very distressing information from doctors which they Troy Ghazala not claiming. \"  Family believes God will heal the patient despite poor prognosis from the doctors.

## 2022-06-21 NOTE — CARE COORDINATION
06/21/22 1212   Readmission Assessment   Number of Days since last admission? 8-30 days   Previous Disposition Home with Home Health  ([address])   Who is being Interviewed   (Per chart review)   What was the patient's/caregiver's perception as to why they think they needed to return back to the hospital? Other (Comment)  (R breast mass bleeding)   Did you visit your Primary Care Physician after you left the hospital, before you returned this time? No   Why weren't you able to visit your PCP? Did not have an appointment   Did you see a specialist, such as Cardiac, Pulmonary, Orthopedic Physician, etc. after you left the hospital? Yes  (Radiologist, radiation oncologist)   Who advised the patient to return to the hospital? Self-referral   Does the patient report anything that got in the way of taking their medications? No   In our efforts to provide the best possible care to you and others like you, can you think of anything that we could have done to help you after you left the hospital the first time, so that you might not have needed to return so soon?  Other (Comment)  (n/a)

## 2022-06-21 NOTE — PROGRESS NOTES
Breast Surgery Progress Note    Patient is comfortable, conversant. Bleeding has slowed from right breast since anticoagulants were held. Hemoglobin 7.0. Dressing is intact, no drainage noted on dressing. A/P - locally advanced metastatic right breast cancer. Discussed with oncology. She is too high of a risk for surgery right now. Considering possible chemotherapy. Will follow along.     Electronically signed by Jaun Beth MD on 6/21/2022 at 1:41 PM

## 2022-06-21 NOTE — CONSULTS
PALLIATIVE MEDICINE CONSULTATION     Patient name:Nadja Ramírez   AST:[id number]    :1975  Room/Bed:Z6X-1568/5266-01   LOS: 1 day         Date of consult:2022    Consult Information    Inpatient consult to Palliative Care  Consult performed by: JUDITH Yoon CNP  Consult ordered by: Jenna Cruz MD    Inpatient consult to Palliative Care  Consult performed by: JUDITH Yoon CNP  Consult ordered by: Martin Vasquez MD         Reason for Consult: Goals of Care, Code Status, 30 day readmit    ASSESSMENT/RECOMMENDATIONS     48 y.o. female with debility and breast pain. Symptom Management:  Debility: Patient lethargic and exhibited some generalized weakness today. Patient remains mostly independent for ADL's. Patient with large necrotic locally advanced tumor in her breast.  Breast Pain: Patient taking Oxycodone PRN and Morphine PRN for relief. Goals of Care: Met patient at her bedside. Patient was oriented x 4 during my visit (however, I did have to repeat several medical topics during my conversation to verify patient's understanding; conversation was lengthy with patient today). Patient was able to make her wishes known and did appear to be decisional during my visit today. I also spoke with patient's mother and primary medical contact Hailee today and reviewed my visit with the patient. Reviewed patient's current health condition (poor health status), goals of care and code status. Lengthy conversation was had in reviewing patient's current health status and medical team's (oncology, cardiology and general surgery) recommendations for care. Outpatient palliative care and hospice services were reviewed. Patient is not interested in hospice services. Patient would like to pursue all aggressive care/treatment options at this time. The patient would also like to meet with [facility] post her hospital stay for help in home care setting. Code status was reviewed and the patient would like to remain a Full Code. Patient/Family Goals of Care :    6/21 - Met patient at her bedside. Patient was oriented x 4 during my visit (however, I did have to repeat several medical topics during my conversation to verify patient's understanding; conversation was lengthy with patient today). Patient was able to make her wishes known and did appear to be decisional during my visit today. I also spoke with patient's mother and primary medical contact Hailee today and reviewed my visit with the patient. Reviewed patient's current health condition (poor health status), goals of care and code status. Lengthy conversation was had in reviewing patient's current health status and medical team's (oncology, cardiology and general surgery) recommendations for care. Outpatient palliative care and hospice services were reviewed. Patient is not interested in hospice services. Patient would like to pursue all aggressive care/treatment options at this time. The patient would also like to meet with [facility] post her hospital stay for help in home care setting. Code status was reviewed and the patient would like to remain a Full Code. Disposition/Discharge Plan:   An outpatient [facility] referral was ordered for post-discharge to followup with the patient once they return home. Advance Directives:  Surrogate Decision Maker: Hailee, patient's mother. Code status:  Full Code    Case discussed with: patient, floor RN, Hailee (mother)  Thank you for allowing us to participate in the care of this patient. HISTORY     CC: Debility. HPI: The patient is a 48 y.o. female with a past medical history of breast cancer with mets to brain (she received craniectomy with bx and radiation to the breast tumor at [facility]) and venous thromboembolism who presented to [facility] with right breast bleeding.   Patient was admitted with breast pain, bleeding from breast, breast cancer and anemia. Palliative Medicine SymptomScreening/ROS:    Review of Systems   Constitutional: Positive for fatigue. HENT: Negative. Eyes: Negative. Respiratory: Negative. Cardiovascular: Positive for leg swelling. Gastrointestinal: Negative. Musculoskeletal:        Breast discomfort   Skin: Positive for wound (breast). Neurological: Negative. Psychiatric/Behavioral: Negative. All other systems reviewed and are negative. A complete 10 count ROS was obtained. Pertinent positives mentioned above in HPI/ROS. All others if not mentioned are negative. Palliative Performance Scale:     [] 60%  Amb reduced; Sig dz. Can't do hobbies/housework; Intake normal or reduced, Occasional assist; LOC full/confusion   [x] 50%  Mainly sit/lie; Extensive disease. Mainly assist, Intake normal or reduced; Occasional assist; LOC full/confusion   [] 40%  Mainly in bed; Extensive disease; Mainly assist; Intake normal or reduced; Occasional assist; LOC full/confusion   [] 30%  Bed bound, Extensive disease; Total care; Intake reduced; LOC full/confusion   [] 20%  Bed bound; Extensive disease; Total care; Intake minimal; Drowsy/coma   [] 10%  Bed bound; Extensive disease; Total care; Mouth care only; Drowsy/coma   []  0%   Death     Home med list and hospital medications reviewed in chart as of 6/21/2022     EXAM     Vitals:    06/21/22 1120   BP: 109/72   Pulse:    Resp: 20   Temp: 97.8 °F (36.6 °C)   SpO2: 97%       Physical Exam  Vitals reviewed. Constitutional:       Appearance: She is ill-appearing. HENT:      Head: Normocephalic and atraumatic. Nose: Nose normal.   Eyes:      Extraocular Movements: Extraocular movements intact. Pupils: Pupils are equal, round, and reactive to light. Cardiovascular:      Rate and Rhythm: Normal rate. Pulses: Normal pulses. Pulmonary:      Effort: Pulmonary effort is normal. No respiratory distress.       Breath sounds: Normal breath sounds. Abdominal:      General: Bowel sounds are normal.      Palpations: Abdomen is soft. Musculoskeletal:      Cervical back: Neck supple. Right lower leg: Edema (trace) present. Left lower leg: Edema (trace) present. Skin:     General: Skin is warm and dry.    Neurological:      Comments: Can be forgetful at times but was Oriented x 4    Psychiatric:      Comments: lethargic          Current labs in the epic chart reviewed as of 6/21/2022   Review of previous notes, admits, labs, radiology and testing relevant to this consult done in this chart today 6/21/2022      Total time: 90 minutes  >50% of time spent counseling patient at bedside or POA/family member if applicable , reviewing information and discussing care, coordinating with care team  Signed By: Electronically signed by JUDITH Peña CNP on 6/21/2022 at 2:06 PM  Palliative Medicine   [phone]    June 21, 2022

## 2022-06-21 NOTE — PROGRESS NOTES
Evaluation requested for DVT and IVC filter placement    [facility] currently on hold   Long discussion with IR Dr. Arian Constantino from [facility], We both feel an IVC filter would be more harmful; as its risk of thrombosis is almost 100%  Full consult in AM     Joceline Gonzalez MD [address] [facility], Interventional Cardiology, and Peripheral Vascular [address] [facility]   (O): [phone]  (F): [phone]

## 2022-06-21 NOTE — PROGRESS NOTES
ONCOLOGY HEMATOLOGY CARE  PROGRESS NOTE    SUBJECTIVE:       The patient's hemoglobin today has dropped to 7.0. She states that the oozing is slowed down considerably in the right breast since she stopped her Eliquis. She anticipates an MRI of the brain. She is still not processing her condition fully. She is still not understanding why they can simply operate on the right breast.  She was seen by surgical oncology yesterday. She has still been unable to begin her planned chemotherapy. Previously it had been recommended to her that she consider best supportive care as an alternative as she is at very high risk of significant complications. Oncologic history:    : Azul Corrigan is a pleasant 48year old woman who has stage IV breast cancer with brain metastasis. She presents to the ER today with bleeding from her right breast mass. The patient was diagnosed with breast cancer in March of 2021 at [facility]. She saw surgery/med onc at that time after she had noted a palpable mass on self exam-she had noted it in 2020. She had not had a mammo since 2018 before presenting. She had a 3 x 2.7 x 2.9cm mass that was irregular of the right breast at that time, with a satellite mass and two abnormal nodes. She had refused treatment at that time. She then presented to [facility].  with neurological symptoms and was found to have a brain mass. She then had a  left temporal and left parieto-occipital craniotomy on 3/12/2022 for stereotactic resection of the masses which were pathologically confirmed to be metastatic lesions. She was evaluated by the surgical oncology team at [facility] on 03/27, but at that time was not interested in discussing surgical intervention given her recent neurosurgery. She subsequently developed right common femoral, deep femoral, and soleal DVTs for which she underwent placement of an IVC filter with IR.  She was previously re-admitted for progressive leg swelling found to be caused by thrombosis of her filter and is s/p aspiration thrombectomy on 03/28. She is on Eliquis, and the IVC filter was removed. She is scheduled to start chemotherapy later this week. However, her breast tumor started bleeding and causing more pain, and she presented to the ER.     PHYSICAL EXAM:       /72   Pulse 99   Temp 97.8 °F (36.6 °C) (Oral)   Resp 20   Ht 5' 3\" (1.6 m)   Wt 173 lb 8 oz (78.7 kg)   SpO2 97%   BMI 30.73 kg/m²       CONSTITUTIONAL:  Awake, alert, cooperative  EYES:  Pupils equal, round and reactive to light, sclera clear and conjunctiva normal.  ENT:  Normocepalic, without obvious abnormality, atraumatic. NECK:  Supple, symmetrical, no jugular venous distension, no thyromegaly. LUNGS:  No increased work of breathing and clear to auscultation. CARDIOVASCULAR: Regular rate and rhythm, normal S1 and S2, no murmur noted. ABDOMEN: soft, non-distended, non-tender  MUSCULOSKELETAL:  Full range of motion noted, tone is normal  EXTREMITIES: No lower extremity edema  NEUROLOGIC:  Awake, alert, oriented to name, place and time. Motor skills grossly intact. SKIN:  Normal skin color, texture, turgor and no jaundice. Appears intact. BREAST- Large, necrotic breast mass right- some minimal bleeding.      MEDS:     Current Facility-Administered Medications   Medication Dose Route Frequency Provider Last Rate Last Admin    morphine (PF) injection 2 mg  2 mg IntraVENous Q4H PRN Neftali Dickerson MD   2 mg at 06/21/22 0558    iron sucrose (VENOFER) 300 mg in sodium chloride 0.9 % 250 mL IVPB  300 mg IntraVENous Q24H Chon Bills .7 mL/hr at 06/21/22 0955 300 mg at 06/21/22 0955    levETIRAcetam (KEPPRA) tablet 2,000 mg  2,000 mg Oral Daily Jorge Carias MD   2,000 mg at 06/21/22 0920    levETIRAcetam (KEPPRA) tablet 2,500 mg  2,500 mg Oral Nightly Jorge Carias MD   2,500 mg at 06/20/22 7071    pantoprazole (PROTONIX) tablet 40 mg  40 mg Oral QAM AC Jorge Carias MD   40 mg at 06/21/22 5176  sodium chloride flush 0.9 % injection 5-40 mL  5-40 mL IntraVENous 2 times per day Rafy Ramsay MD   10 mL at 06/20/22 2117    sodium chloride flush 0.9 % injection 5-40 mL  5-40 mL IntraVENous PRN Rafy Ramsay MD        0.9 % sodium chloride infusion   IntraVENous PRN Rafy Ramsay MD        ondansetron (ZOFRAN-ODT) disintegrating tablet 4 mg  4 mg Oral Q8H PRN Rafy Ramsay MD        Or    ondansetron [facility]) injection 4 mg  4 mg IntraVENous Q6H PRN Rafy Ramsay MD        polyethylene glycol (GLYCOLAX) packet 17 g  17 g Oral Daily PRN Rafy Ramsay MD        acetaminophen (TYLENOL) tablet 650 mg  650 mg Oral Q6H PRN Rafy Ramsay MD        Or   Pop acetaminophen (TYLENOL) suppository 650 mg  650 mg Rectal Q6H PRN Rafy Ramsay MD        dexamethasone (DECADRON) injection 4 mg  4 mg IntraVENous Q6H Rafy Ramsay MD   4 mg at 06/21/22 0919    metroNIDAZOLE (FLAGYL) tablet 500 mg  500 mg Topical Daily Rafy Ramsay MD   500 mg at 06/21/22 0557    oxyCODONE (ROXICODONE) immediate release tablet 5 mg  5 mg Oral Q4H PRN JUDITH Conley - CNP   5 mg at 06/20/22 2139    Or    oxyCODONE HCl (OXY-IR) immediate release tablet 10 mg  10 mg Oral Q4H PRN JUDITH Conley - CNP   10 mg at 06/21/22 0649       LABS:     CBC:   Lab Results   Component Value Date    WBC 16.5 (H) 06/21/2022    HGB 7.0 (L) 06/21/2022    HCT 21.2 (L) 06/21/2022    MCV 82.4 06/21/2022     (H) 06/21/2022    LYMPHOPCT 5.1 06/21/2022    RBC 2.57 (L) 06/21/2022    MCH 27.2 06/21/2022    MCHC 33.0 06/21/2022    RDW 19.7 (H) 06/21/2022    NEUTOPHILPCT 91.5 06/21/2022    MONOPCT 3.3 06/21/2022    BASOPCT 0.1 06/21/2022    NEUTROABS 15.1 (H) 06/21/2022    LYMPHSABS 0.8 (L) 06/21/2022    MONOSABS 0.5 06/21/2022    EOSABS 0.0 06/21/2022    BASOSABS 0.0 06/21/2022       CMP:   Lab Results   Component Value Date     06/21/2022    K 4.8 06/21/2022    K 3.7 06/20/2022     06/21/2022    CO2 23 06/21/2022    BUN 7 06/21/2022    CREATININE 0.5 06/21/2022    GLUCOSE 144 06/21/2022    CALCIUM 9.1 06/21/2022    PROT 6.0 06/21/2022    LABALBU 3.3 06/21/2022    BILITOT <0.2 06/21/2022    ALKPHOS 78 06/21/2022    AST 12 06/21/2022    ALT 13 06/21/2022            IMAGING:     MRI of the brain 6/21/2022: Pend    CT of the head without contrast 6/20/2022:    BRAIN/VENTRICLES: There has been increase in the from like low-attenuation   involving the left temporal and parietal lobes with mass effect.  There is   effacement of the left lateral ventricle with 7.3 mm of left-to-right midline   shift.  No areas of hemorrhage are seen. Lucetta Record is also from like   low-attenuation in the high right parietal lobe.  No acute areas of   infarction are seen.       ORBITS: The visualized portion of the orbits demonstrate no acute abnormality.       SINUSES: The visualized paranasal sinuses and mastoid air cells demonstrate   no acute abnormality.       SOFT TISSUES/SKULL: Lucetta Record has been prior left craniotomy.           Impression   1. Increase in the vasogenic edema involving the left temporal and parietal   lobes with mass effect and new left-to-right midline shift measuring 7.3 mm.   2. Question vasogenic edema in the high right parietal lobe which could be   secondary to a new metastasis. These findings are concerning for progression of intracranial metastases.  An   MRI brain with contrast is recommended for further evaluation. ASSESSMENT:         Patient Active Problem List   Diagnosis Code    Thyroid nodule E04.1    Brain metastases (HCC) C79.31    Brain mass G93.89    Status epilepticus (HCC) G40.901    Duct cell carcinoma (HCC) C80.1    Cerebral edema (HCC) G93.6    Seizure (HCC) R56.9    Deep venous thrombosis (HCC) I82.409    Fever R50.9    Phlebitis I80.9    S/P craniotomy Z98.890    Acute deep vein thrombosis (DVT) of axillary vein (HCC) I82. A19    DVT, lower extremity, proximal, acute, bilateral (HCC) I82.4Y3    Breast cancer, stage 4, right ([address] C50.911     Anemia just slightly worse than her baseline-    Results for Jackie Gordon (MRN [id number]) as of 6/14/2022 07:26   Ref. Range 4/4/2022 05:52 4/5/2022 05:19 4/7/2022 04:48 4/9/2022 05:21 5/24/2022 10:19 6/5/2022 06:52 6/6/2022 04:32 6/9/2022 04:00   Hemoglobin Quant Latest Ref Range: 12.0 - 16.0 g/dL 8.2 (L) 7.7 (L) 7.7 (L) 7.6 (L)  8.9 (L) 8.9 (L) 8.4 (L)             PLAN:       The plan had been to initiate chemotherapy as an outpatient. The patient was not stable enough to be able to be treated and it landed back in the hospital with bleeding from the right breast.  Her hemoglobin is 7. I would transfuse for hemoglobin less than 7. We will start her on Venna for for iron deficiency. Today I tried to talk to the patient again about the situation that she faces. It is not clear that she would be able to tolerate aggressive chemotherapy and yet she still shows very little insight into the serious nature of her disease. Patient has a history of prior brain mets which were resected and then she received radiation therapy. There is now concerned that she is progressing within the brain even before she has had the opportunity to be treated. Obviously this would again be rather catastrophic. An MRI is pending. The patient is already taking Keppra for previous seizures and is on IV Decadron every 6    Cardiology is seeing the patient as is general surgery. The note from Dr. Hermilo Rodrigez yesterday suggests that they recommend against putting in another IVC filter as they believe the risk of clotting is almost 100%. The patient has seen palliative care before while she was at [facility] [employer]  I am again going to [facility] and that she be seen by palliative care for assistance in discussing her CODE STATUS, goals going forward, and symptom management.   It is this oncologist personal opinion that the patient is not a good candidate for systemic treatment and that palliative care would be her best option. I am not sure that she was able to process that recommendation today. I have only seen her once previously when she was at [facility] [employer] [employer]  She did not remember that encounter nor did she recognize me.       Aisha Treviño MD, MPH

## 2022-06-22 LAB
A/G RATIO: 1.1 (ref 1.1–2.2)
ALBUMIN SERPL-MCNC: 3.5 G/DL (ref 3.4–5)
ALP BLD-CCNC: 77 U/L (ref 40–129)
ALT SERPL-CCNC: 15 U/L (ref 10–40)
ANION GAP SERPL CALCULATED.3IONS-SCNC: 12 MMOL/L (ref 3–16)
ANISOCYTOSIS: ABNORMAL
AST SERPL-CCNC: 16 U/L (ref 15–37)
BANDED NEUTROPHILS RELATIVE PERCENT: 2 % (ref 0–7)
BASOPHILS ABSOLUTE: 0 K/UL (ref 0–0.2)
BASOPHILS RELATIVE PERCENT: 0 %
BILIRUB SERPL-MCNC: <0.2 MG/DL (ref 0–1)
BUN BLDV-MCNC: 10 MG/DL (ref 7–20)
CALCIUM SERPL-MCNC: 9.3 MG/DL (ref 8.3–10.6)
CHLORIDE BLD-SCNC: 104 MMOL/L (ref 99–110)
CO2: 23 MMOL/L (ref 21–32)
CREAT SERPL-MCNC: <0.5 MG/DL (ref 0.6–1.1)
CRENATED RBC'S: ABNORMAL
EOSINOPHILS ABSOLUTE: 0.5 K/UL (ref 0–0.6)
EOSINOPHILS RELATIVE PERCENT: 2 %
GFR AFRICAN AMERICAN: >60
GFR NON-AFRICAN AMERICAN: >60
GLUCOSE BLD-MCNC: 133 MG/DL (ref 70–99)
HCT VFR BLD CALC: 20.5 % (ref 36–48)
HEMOGLOBIN: 6.8 G/DL (ref 12–16)
HYPOCHROMIA: ABNORMAL
LYMPHOCYTES ABSOLUTE: 0.7 K/UL (ref 1–5.1)
LYMPHOCYTES RELATIVE PERCENT: 3 %
MCH RBC QN AUTO: 27.2 PG (ref 26–34)
MCHC RBC AUTO-ENTMCNC: 33.1 G/DL (ref 31–36)
MCV RBC AUTO: 82.2 FL (ref 80–100)
METAMYELOCYTES RELATIVE PERCENT: 1 %
MONOCYTES ABSOLUTE: 0.2 K/UL (ref 0–1.3)
MONOCYTES RELATIVE PERCENT: 1 %
NEUTROPHILS ABSOLUTE: 21.4 K/UL (ref 1.7–7.7)
NEUTROPHILS RELATIVE PERCENT: 91 %
OVALOCYTES: ABNORMAL
PDW BLD-RTO: 19.2 % (ref 12.4–15.4)
PLATELET # BLD: 784 K/UL (ref 135–450)
PMV BLD AUTO: 6.6 FL (ref 5–10.5)
POLYCHROMASIA: ABNORMAL
POTASSIUM SERPL-SCNC: 4.2 MMOL/L (ref 3.5–5.1)
RBC # BLD: 2.5 M/UL (ref 4–5.2)
SLIDE REVIEW: ABNORMAL
SODIUM BLD-SCNC: 139 MMOL/L (ref 136–145)
TOTAL PROTEIN: 6.6 G/DL (ref 6.4–8.2)
WBC # BLD: 22.8 K/UL (ref 4–11)

## 2022-06-22 PROCEDURE — 6360000002 HC RX W HCPCS: Performed by: INTERNAL MEDICINE

## 2022-06-22 PROCEDURE — 6370000000 HC RX 637 (ALT 250 FOR IP): Performed by: INTERNAL MEDICINE

## 2022-06-22 PROCEDURE — 6370000000 HC RX 637 (ALT 250 FOR IP): Performed by: NURSE PRACTITIONER

## 2022-06-22 PROCEDURE — 85025 COMPLETE CBC W/AUTO DIFF WBC: CPT

## 2022-06-22 PROCEDURE — 2580000003 HC RX 258: Performed by: INTERNAL MEDICINE

## 2022-06-22 PROCEDURE — 99231 SBSQ HOSP IP/OBS SF/LOW 25: CPT | Performed by: SURGERY

## 2022-06-22 PROCEDURE — 2060000000 HC ICU INTERMEDIATE R&B

## 2022-06-22 PROCEDURE — 80053 COMPREHEN METABOLIC PANEL: CPT

## 2022-06-22 RX ORDER — HYDROXYZINE HYDROCHLORIDE 25 MG/1
25 TABLET, FILM COATED ORAL 3 TIMES DAILY PRN
Status: DISCONTINUED | OUTPATIENT
Start: 2022-06-22 | End: 2022-06-26 | Stop reason: HOSPADM

## 2022-06-22 RX ORDER — OXYCODONE HYDROCHLORIDE AND ACETAMINOPHEN 5; 325 MG/1; MG/1
2 TABLET ORAL EVERY 6 HOURS PRN
Status: DISCONTINUED | OUTPATIENT
Start: 2022-06-22 | End: 2022-06-26 | Stop reason: HOSPADM

## 2022-06-22 RX ORDER — OXYCODONE HYDROCHLORIDE AND ACETAMINOPHEN 5; 325 MG/1; MG/1
1 TABLET ORAL EVERY 6 HOURS PRN
Status: DISCONTINUED | OUTPATIENT
Start: 2022-06-22 | End: 2022-06-26 | Stop reason: HOSPADM

## 2022-06-22 RX ADMIN — DEXAMETHASONE SODIUM PHOSPHATE 4 MG: 4 INJECTION, SOLUTION INTRAMUSCULAR; INTRAVENOUS at 08:56

## 2022-06-22 RX ADMIN — MORPHINE SULFATE 2 MG: 2 INJECTION, SOLUTION INTRAMUSCULAR; INTRAVENOUS at 08:57

## 2022-06-22 RX ADMIN — IRON SUCROSE 300 MG: 20 INJECTION, SOLUTION INTRAVENOUS at 18:36

## 2022-06-22 RX ADMIN — LEVETIRACETAM 2500 MG: 500 TABLET, FILM COATED ORAL at 21:04

## 2022-06-22 RX ADMIN — PANTOPRAZOLE SODIUM 40 MG: 40 TABLET, DELAYED RELEASE ORAL at 06:42

## 2022-06-22 RX ADMIN — SODIUM CHLORIDE, PRESERVATIVE FREE 10 ML: 5 INJECTION INTRAVENOUS at 08:54

## 2022-06-22 RX ADMIN — METRONIDAZOLE 500 MG: 500 TABLET ORAL at 06:42

## 2022-06-22 RX ADMIN — OXYCODONE AND ACETAMINOPHEN 2 TABLET: 5; 325 TABLET ORAL at 19:46

## 2022-06-22 RX ADMIN — HYDROXYZINE HYDROCHLORIDE 25 MG: 25 TABLET, FILM COATED ORAL at 14:10

## 2022-06-22 RX ADMIN — DEXAMETHASONE SODIUM PHOSPHATE 4 MG: 4 INJECTION, SOLUTION INTRAMUSCULAR; INTRAVENOUS at 21:04

## 2022-06-22 RX ADMIN — LEVETIRACETAM 2000 MG: 500 TABLET, FILM COATED ORAL at 08:55

## 2022-06-22 RX ADMIN — MORPHINE SULFATE 2 MG: 2 INJECTION, SOLUTION INTRAMUSCULAR; INTRAVENOUS at 00:05

## 2022-06-22 RX ADMIN — APIXABAN 2.5 MG: 2.5 TABLET, FILM COATED ORAL at 21:04

## 2022-06-22 RX ADMIN — SODIUM CHLORIDE, PRESERVATIVE FREE 10 ML: 5 INJECTION INTRAVENOUS at 21:05

## 2022-06-22 RX ADMIN — OXYCODONE HYDROCHLORIDE 10 MG: 10 TABLET ORAL at 03:19

## 2022-06-22 RX ADMIN — APIXABAN 2.5 MG: 2.5 TABLET, FILM COATED ORAL at 11:48

## 2022-06-22 RX ADMIN — DEXAMETHASONE SODIUM PHOSPHATE 4 MG: 4 INJECTION, SOLUTION INTRAMUSCULAR; INTRAVENOUS at 02:55

## 2022-06-22 RX ADMIN — ONDANSETRON 4 MG: 4 TABLET, ORALLY DISINTEGRATING ORAL at 08:56

## 2022-06-22 RX ADMIN — DEXAMETHASONE SODIUM PHOSPHATE 4 MG: 4 INJECTION, SOLUTION INTRAMUSCULAR; INTRAVENOUS at 14:11

## 2022-06-22 ASSESSMENT — PAIN DESCRIPTION - LOCATION
LOCATION: BREAST

## 2022-06-22 ASSESSMENT — PAIN DESCRIPTION - ONSET
ONSET: ON-GOING

## 2022-06-22 ASSESSMENT — PAIN SCALES - GENERAL
PAINLEVEL_OUTOF10: 0
PAINLEVEL_OUTOF10: 5
PAINLEVEL_OUTOF10: 0
PAINLEVEL_OUTOF10: 5
PAINLEVEL_OUTOF10: 3
PAINLEVEL_OUTOF10: 5
PAINLEVEL_OUTOF10: 7

## 2022-06-22 ASSESSMENT — PAIN DESCRIPTION - PAIN TYPE
TYPE: CHRONIC PAIN
TYPE: CHRONIC PAIN

## 2022-06-22 ASSESSMENT — PAIN - FUNCTIONAL ASSESSMENT
PAIN_FUNCTIONAL_ASSESSMENT: PREVENTS OR INTERFERES SOME ACTIVE ACTIVITIES AND ADLS

## 2022-06-22 ASSESSMENT — PAIN SCALES - WONG BAKER
WONGBAKER_NUMERICALRESPONSE: 0

## 2022-06-22 ASSESSMENT — PAIN DESCRIPTION - ORIENTATION
ORIENTATION: RIGHT

## 2022-06-22 ASSESSMENT — PAIN DESCRIPTION - DESCRIPTORS
DESCRIPTORS: ACHING;DISCOMFORT
DESCRIPTORS: ACHING

## 2022-06-22 ASSESSMENT — PAIN DESCRIPTION - FREQUENCY
FREQUENCY: CONTINUOUS

## 2022-06-22 NOTE — PROGRESS NOTES
Hematology Oncology Daily Progress Note    Admit Date: 6/20/2022  Hospital day several    Subjective:     Patient has complaints of improved oozing from right breast--denies sob/cp. Medication side effects: none    Scheduled Meds:   iron sucrose  300 mg IntraVENous Q24H    levETIRAcetam  2,000 mg Oral Daily    levETIRAcetam  2,500 mg Oral Nightly    pantoprazole  40 mg Oral QAM AC    sodium chloride flush  5-40 mL IntraVENous 2 times per day    dexamethasone  4 mg IntraVENous Q6H    metroNIDAZOLE  500 mg Topical Daily     Continuous Infusions:   sodium chloride       PRN Meds:morphine, sodium chloride flush, sodium chloride, ondansetron **OR** ondansetron, polyethylene glycol, acetaminophen **OR** acetaminophen, oxyCODONE **OR** oxyCODONE    Review of Systems  Pertinent items are noted in HPI. REVIEW OF SYSTEMS:         · Constitutional: Denies fever, sweats, weight loss     · Eyes: No visual changes or diplopia. No scleral icterus. · ENT: No Headaches, hearing loss or vertigo. No mouth sores or sore throat. · Cardiovascular: No chest pain, dyspnea on exertion, palpitations or loss of consciousness. · Respiratory: No cough or wheezing, no sputum production. No hemoptysis. .    · Gastrointestinal: No abdominal pain, appetite loss, blood in stools. No change in bowel habits. · Genitourinary: No dysuria, trouble voiding, or hematuria. · Musculoskeletal:  Generalized weakness. No joint complaints. · Integumentary: No rash or pruritis. · Neurological: No headache, diplopia. No change in gait, balance, or coordination. No paresthesias. · Endocrine: No temperature intolerance. No excessive thirst, fluid intake, or urination. · Hematologic/Lymphatic: No abnormal bruising or ecchymoses, blood clots or swollen lymph nodes. · Allergic/Immunologic: No nasal congestion or hives.    ·     Objective:     Patient Vitals for the past 8 hrs:   BP Temp Temp src Pulse Resp SpO2 Weight   06/22/22 0605 -- -- -- -- -- -- 174 lb 2.6 oz (79 kg)   06/22/22 0349 -- -- -- -- 20 -- --   06/22/22 0309 110/77 98.1 °F (36.7 °C) Oral (!) 107 20 94 % --   06/22/22 0035 -- -- -- -- 22 -- --     I/O last 3 completed shifts: In: 445 [P.O.:180; IV Piggyback:265]  Out: -   No intake/output data recorded.     /77   Pulse (!) 107   Temp 98.1 °F (36.7 °C) (Oral)   Resp 20   Ht 5' 3\" (1.6 m)   Wt 174 lb 2.6 oz (79 kg)   SpO2 94%   BMI 30.85 kg/m²     General Appearance:    Alert, cooperative, no distress, appears stated age   Head:    Normocephalic, without obvious abnormality, atraumatic   Eyes:    PERRL, conjunctiva/corneas clear, EOM's intact, fundi     benign, both eyes        Ears:    Normal TM's and external ear canals, both ears   Nose:   Nares normal, septum midline, mucosa normal, no drainage    or sinus tenderness   Throat:   Lips, mucosa, and tongue normal; teeth and gums normal   Neck:   Supple, symmetrical, trachea midline, no adenopathy;        thyroid:  No enlargement/tenderness/nodules; no carotid    bruit or JVD   Back:     Symmetric, no curvature, ROM normal, no CVA tenderness   Lungs:     Clear to auscultation bilaterally, respirations unlabored   Chest wall:    No tenderness or deformity   Heart:    Regular rate and rhythm, S1 and S2 normal, no murmur, rub   or gallop   Abdomen:     Soft, non-tender, bowel sounds active all four quadrants,     no masses, no organomegaly           Extremities:   Extremities normal, atraumatic, no cyanosis or edema   Pulses:   2+ and symmetric all extremities   Skin:   Skin color, texture, turgor normal, no rashes or lesions   Lymph nodes:   Cervical, supraclavicular, and axillary nodes normal   Neurologic:   Stable         Data Review  CBC:   Lab Results   Component Value Date    WBC 16.5 06/21/2022    RBC 2.57 06/21/2022       Assessment:     Principal Problem:    Breast cancer, stage 4, right (HCC)  Active Problems:    Breast pain, right  Resolved Problems:    * No resolved hospital problems. *      Plan:     1. Metastatic breast cancer. Unfortunately, her brain lesions have worsened. Neurosurgery does not see any role for surgical intervention. I will consult radiation oncology. The patient is refusing hospice. 2.  Breast wound\DVT. The oozing has improved. I spoke with Dr. Angelina Phelps who is not anxious to put in another IVC filter since it will likely clot. The plan is to restart Eliquis at 2.5 mg twice a day to see what happens.         Electronically signed by Hunter Davis MD on 6/22/2022 at 7:21 AM

## 2022-06-22 NOTE — PROGRESS NOTES
Hospitalist Progress Note      PCP: SHAHEED ZAMARRIPA, APRN - CNP    Date of Admission: 6/20/2022    Subjective: c/o dizziness, feels like everyone is negative with her    Medications:  Reviewed    Infusion Medications    sodium chloride       Scheduled Medications    iron sucrose  300 mg IntraVENous Q24H    levETIRAcetam  2,000 mg Oral Daily    levETIRAcetam  2,500 mg Oral Nightly    pantoprazole  40 mg Oral QAM AC    sodium chloride flush  5-40 mL IntraVENous 2 times per day    dexamethasone  4 mg IntraVENous Q6H    metroNIDAZOLE  500 mg Topical Daily     PRN Meds: morphine, sodium chloride flush, sodium chloride, ondansetron **OR** ondansetron, polyethylene glycol, acetaminophen **OR** acetaminophen, oxyCODONE **OR** oxyCODONE      Intake/Output Summary (Last 24 hours) at 6/22/2022 0155  Last data filed at 6/21/2022 2300  Gross per 24 hour   Intake 325 ml   Output --   Net 325 ml       Physical Exam Performed:    /75   Pulse (!) 104   Temp 98.3 °F (36.8 °C) (Oral)   Resp 22   Ht 5' 3\" (1.6 m)   Wt 173 lb 8 oz (78.7 kg)   SpO2 94%   BMI 30.73 kg/m²        General appearance:  No apparent distress, appears stated age and cooperative. HEENT:  Normal cephalic, atraumatic without obvious deformity. Pupils equal, round, and reactive to light. Extra ocular muscles intact. Conjunctivae/corneas clear. Neck: Supple, with full range of motion. No jugular venous distention. Trachea midline. Respiratory:  Normal respiratory effort. Clear to auscultation, bilaterally without Rales/Wheezes/Rhonchi. Cardiovascular:  Regular rate and rhythm with normal S1/S2 without murmurs, rubs or gallops. Abdomen: Soft, non-tender, non-distended with normal bowel sounds. Musculoskeletal:  No clubbing, cyanosis or edema bilaterally. Full range of motion without deformity. Skin: Skin color, texture, turgor normal.  No rashes or lesions.   Neurologic:  Neurovascularly intact without any focal sensory/motor deficits. Cranial nerves: II-XII intact, grossly non-focal.  Psychiatric:  Alert and oriented, thought content appropriate, normal insight  Capillary Refill: Brisk,3 seconds, normal  Peripheral Pulses: +2 palpable, equal bilaterally   Breast: fungating bleeding necrotic breast mass with odor       Labs:   Recent Labs     06/20/22  1004 06/21/22  0600   WBC 14.0* 16.5*   HGB 7.4* 7.0*   HCT 22.5* 21.2*   * 694*     Recent Labs     06/20/22  1004 06/21/22  0600    137   K 3.7 4.8    103   CO2 20* 23   BUN 6* 7   CREATININE 0.6 0.5*   CALCIUM 8.6 9.1     Recent Labs     06/21/22  0600   AST 12*   ALT 13   BILITOT <0.2   ALKPHOS 78     Recent Labs     06/20/22  1004   INR 1.57*     No results for input(s): CKTOTAL, TROPONINI in the last 72 hours. Urinalysis:      Lab Results   Component Value Date    NITRU Negative 03/27/2022    WBCUA 0-2 03/27/2022    BACTERIA 3+ 03/27/2022    RBCUA 0-2 03/27/2022    BLOODU Negative 03/27/2022    SPECGRAV >=1.030 03/27/2022    GLUCOSEU Negative 03/27/2022       Radiology:  MRI BRAIN W WO CONTRAST   Final Result   Worsening recurrent metastasis involving the left frontoparietal lobe, left   parietal lobe and left occipital lobe. Severe surrounding vasogenic edema is   identified. Mild to moderate midline shift measures 6 mm previously   measuring 9 mm. New right parietal lobe metastasis measuring 1.3 cm with surrounding   vasogenic edema. Left frontal parietal and left occipital craniotomies are noted from prior   metastasis resection. RECOMMENDATIONS:   Unavailable         CT HEAD WO CONTRAST   Final Result   1. Increase in the vasogenic edema involving the left temporal and parietal   lobes with mass effect and new left-to-right midline shift measuring 7.3 mm.   2. Question vasogenic edema in the high right parietal lobe which could be   secondary to a new metastasis. These findings are concerning for progression of intracranial metastases.   An MRI brain with contrast is recommended for further evaluation. Assessment/Plan:    Active Hospital Problems    Diagnosis     Breast pain, right [N64.4]      Priority: Medium    Breast cancer, stage 4, right (HCC) [C50.911]      Priority: Medium           Advanced Stage 4 breast cancer  Metastatic brain lesions with midline shift vasogenic edema  Bleeding breast mass  Hx DVT/PEs   On oral AC  Hx of IVC filter s/p recent removal     PLAN:     Held eliquis and plavix  Serial h and h  Transfuse for hgb<7  Breast surgeon consulted  hemonc consulted  May need replacement of IVC filter as seems to not be tolerating oral AC - risk outweighs benefit per Dr Jimmie Hernandez initiation of chemo  Neurosurgery consulted for brain lesions-they are aware of CT and OK with her staying at [facility] for now  On decadron iv  keppra for sz prevention       DVT Prophylaxis: scds  Diet: ADULT DIET;  Regular  ADULT ORAL NUTRITION SUPPLEMENT; Breakfast, Dinner; Standard High Calorie/High Protein Oral Supplement  Code Status: Full Code    PT/OT Eval Status: ordered    Erendira Alfonso MD

## 2022-06-22 NOTE — CONSULTS
Department of Radiation Oncology  Attending Consult Note      Reason for Consult:  Recurrent/progressive brain metastases  Requesting Physician:  Serafin Lion MD    CHIEF COMPLAINT:  Dizziness    History Obtained From:  Patient and chart    HISTORY OF PRESENT ILLNESS:      The patient is a 48 y.o. with significant past medical history of Metastatic triple negative breast cancer who presents with New onset dizziness. She was initially diagnosed with breast cancer in March 2021 at [facility]. She apparently refused treatment according to chart notes. Disease grew and she represented in early 2022 with more advanced disease. She had neurologic symptoms for gas and underwent brain MRI 3/5/2022 showing 2 large hemorrhagic partially necrotic heterogeneously enhancing masses in the left parietotemporal and left parieto-occipital region with 1 cm of midline shift. She was taken for surgery with left temporal mass and left occipital mass showing metastatic carcinoma compatible with triple negative breast cancer. She was discharged to nursing home and received whole brain radiation to a dose of 30 Rexene Snipe in 10 fractions as well as palliative radiation to the primary right breast completed 5/9/2022 with Dr. Kenyetta Patrick at [address] [place] [address]. The patient had seen Dr. Bernabe Stanley oncology and was set up to initiate systemic therapy with pembrolizumab and Abraxane. Before she was able to initiate treatment she was admitted to the hospital for this admission. MRI of the brain on 6/21/2022 shows worsening recurrent metastases involving the left frontoparietal lobe, left parietal lobe, and left occipital lobe there was severe vasogenic edema. There was a new right parietal lobe metastasis measuring 1.3 cm. Our service was consulted for consideration of radiation treatment. She notes that the bleeding and oozing from her breast mass have decreased since palliative radiation.   Her anticoagulation is being managed by Dr. Jovan story in the hospital.      Past Medical History:    Past Medical History:   Diagnosis Date    Cancer [facility])     History of blood transfusion     Hx of blood clots     Thyroid nodule        Past Surgical History:    Past Surgical History:   Procedure Laterality Date    CRANIOTOMY Left 03/12/2022    LEFT TEMPORAL PARIETAL OCCIPITAL CRANIOTOMY FOR TUMOR RESECTION performed by Mae Simon MD at [address] IR IVC FILTER PLACEMENT W IMAGING N/A 03/16/2022    [facility]    IR IVC FILTER PLACEMENT W IMAGING  03/16/2022    IR IVC FILTER PLACEMENT W IMAGING 3/16/2022 [facility] SPECIAL PROCEDURES    IR IVC FILTER RETRIEVAL  6/10/2022    IR IVC FILTER RETRIEVAL 6/10/2022 [facility] PROCEDURES    IR PORT PLACEMENT EQUAL OR GREATER THAN 5 YEARS Left 05/24/2022    Power Port; [place] access; 28 cm; Dr. Swapnil Johansen 5 YEARS  5/24/2022    IR PORT PLACEMENT EQUAL OR GREATER THAN 5 YEARS 5/24/2022 [facility] PROCEDURES    IR THROMB DAUTERIVE HOSPITAL VEIN  03/28/2022    IR THROMB [address] 3/28/2022 [facility] PROCEDURES    UPPER GASTROINTESTINAL ENDOSCOPY N/A 04/02/2022    EGD DIAGNOSTIC ONLY performed by Sherie Becerra MD at [facility] ENDOSCOPY       Current Medications:      Current Facility-Administered Medications:     apixaban (ELIQUIS) tablet 2.5 mg, 2.5 mg, Oral, BID, Michael Jenkins MD, 2.5 mg at 06/22/22 1148    hydrOXYzine HCl (ATARAX) tablet 25 mg, 25 mg, Oral, TID PRN, Montse Garduno MD, 25 mg at 06/22/22 1410    oxyCODONE-acetaminophen (PERCOCET) 5-325 MG per tablet 1 tablet, 1 tablet, Oral, Q6H PRN, Montse Garduno MD    oxyCODONE-acetaminophen (PERCOCET) 5-325 MG per tablet 2 tablet, 2 tablet, Oral, Q6H PRN, Montse Garduno MD    morphine (PF) injection 2 mg, 2 mg, IntraVENous, Q4H PRN, Azucena Pope MD, 2 mg at 06/22/22 0857    iron sucrose (VENOFER) 300 mg in sodium chloride 0.9 % 250 mL IVPB, 300 mg, History Narrative    Not on file     Social Determinants of Health     Financial Resource Strain:     Difficulty of Paying Living Expenses: Not on file   Food Insecurity:     Worried About Running Out of Food in the Last Year: Not on file    Rosa of Food in the Last Year: Not on file   Transportation Needs:     Lack of Transportation (Medical): Not on file    Lack of Transportation (Non-Medical): Not on file   Physical Activity:     Days of Exercise per Week: Not on file    Minutes of Exercise per Session: Not on file   Stress:     Feeling of Stress : Not on file   Social Connections:     Frequency of Communication with Friends and Family: Not on file    Frequency of Social Gatherings with Friends and Family: Not on file    Attends [employer] Services: Not on file    Active Member of [address] [place] [address] [employer] or Organizations: Not on file    Attends Club or Organization Meetings: Not on file    Marital Status: Not on file   Intimate Partner Violence:     Fear of Current or Ex-Partner: Not on file    Emotionally Abused: Not on file    Physically Abused: Not on file    Sexually Abused: Not on file   Housing Stability:     Unable to Pay for Housing in the Last Year: Not on file    Number of [place] in the Last Year: Not on file    Unstable Housing in the Last Year: Not on file       Family History:   Family History   Problem Relation Age of Onset    Cancer Father        REVIEW OF SYSTEMS:    As outlined in HPI and chart review, otherwise review of general, eyes, nose, throat, pulmonary, cardiac, GI, , musculoskeletal, neurological, and integumentary systems is negative.     PHYSICAL EXAM:      Vitals:    Vitals:    06/22/22 1115   BP: 101/67   Pulse:    Resp: 20   Temp:    SpO2: 95%     ECOG Performance Status (ECOG Scale 0-4):  2  GENERAL: Awake, alert, and oriented, no apparent distress  EYES: Sclera clear, conjunctiva normal  ENT: Normocephalic, atraumatic  NECK: Symmetrical, no visualized masses  LUNGS: No increased work of breathing, no audible wheezing  ABDOMEN: Non-distended, symmetrical  MUSCULOSKELETAL: normal range of motion, ambulation not tested  NEUROLOGIC: alert, gross motor skills intact  SKIN: no visible jaundice, rash, or bleeding  EXTREMETIES: No visible clubbing or edema    DATA:      I personally viewed the patient's imaging including prior and current brain MRI showing worsening disease as described above      IMPRESSION/RECOMMENDATIONS:    Azul Corrigan Is a 53-year-old with metastatic triple negative breast cancer and progressive brain involvement status post prior craniotomy with resection of tumor and whole brain radiation completed in May 2022.    1.  Breast cancer with brain metastases: The patient completed whole brain radiation roughly 6 weeks ago. Unfortunately, she has imaging evidence of recurrent and progressive disease. This is quite a negative prognostic factor. I do not believe that further radiation would be beneficial or feasible at this time. I did review imaging with a member of the gamma knife service who concurred. It would technically be feasible to perform SRS to the new small lesion, but radiosurgery would not be practical for the entirety of her intracranial disease. As such, I did not recommend any radiation at this time. I discussed the case with Dr. Sammy Browning. Neurosurgery has also declined operative intervention. Systemic therapy may be the only option versus consideration of hospice. She will continue to work with medical oncology. .      Thank you for inviting me to participate in Mrs Ramírez's care.     Tory Nobles MD  [facility] Radiation Oncology  [phone]

## 2022-06-22 NOTE — PROGRESS NOTES
Alert and oriented. Forgetful at times. Trouble with word finding at times. Medicated for complaints of pain. She was tearful this evening regarding current medical situation. Sat with patient 1:1 Assist of 1 to BR with aidan. Tolerated well. Call light in reach.

## 2022-06-22 NOTE — PROGRESS NOTES
Breast Surgery Progress Note     Patient is comfortable, conversant. Bleeding has slowed from right breast since anticoagulants were held. Hemoglobin 6.8. Dressing is intact, no drainage noted on dressing.      A/P - locally advanced metastatic right breast cancer.     Discussed with oncology. She is too high of a risk for surgery right now. Considering possible chemotherapy. Eliquis was re-started at lower dosage for DVT. Will follow along.     Electronically signed by Caesar Cole MD on 6/22/2022 at 3:58 PM

## 2022-06-22 NOTE — CONSULTS
Cardiology Consultation     Gautam Paul  1975    PCP: JUDITH Bradford - CNP  Referring Physician: Dr. Frandy Ruiz  Reason for Referral: DVT/IVC filter   Chief Complaint:  Chief Complaint   Patient presents with    Breast Mass     Patient arrives via walk in with c/o breast pain and frequent falls. Breast CA. R sided mass. Subjective:     History of Present Illness: The patient is 48 y.o. female with a past medical history significant for stage 4 breast cancer, with brain mets who presents with the above complaint. Recent IVC filter placement, followed by removal at [facility]. Bilateral iliac thrombectomy and common iliac/IVC venous stenting. Discharged on [address] therapy. Presents with significant bleeding from her breast mass. [address] has been held and IC consulted for IVC filter placement. Concern for worsening brain lesions and continued bleeding. She currently has no complaints.      Past Medical History:   Diagnosis Date    Cancer [facility])     History of blood transfusion     Hx of blood clots     Thyroid nodule      Past Surgical History:   Procedure Laterality Date    CRANIOTOMY Left 03/12/2022    LEFT TEMPORAL PARIETAL OCCIPITAL CRANIOTOMY FOR TUMOR RESECTION performed by Xavier Beard MD at [address] [facility] IR IVC FILTER PLACEMENT W IMAGING N/A 03/16/2022    [facility], argon option elite    IR IVC FILTER PLACEMENT W IMAGING  03/16/2022    IR IVC FILTER PLACEMENT W IMAGING 3/16/2022 [facility] SPECIAL PROCEDURES    IR IVC FILTER RETRIEVAL  6/10/2022    IR IVC FILTER RETRIEVAL 6/10/2022 [facility] PROCEDURES    IR PORT PLACEMENT EQUAL OR GREATER THAN 5 YEARS Left 05/24/2022    [facility]; Dr. Jaimie Peralta 5 YEARS  5/24/2022    IR PORT PLACEMENT EQUAL OR GREATER THAN 5 YEARS 5/24/2022 [facility] PROCEDURES    IR THROMB DAUTERIVE HOSPITAL VEIN  03/28/2022    IR THROMB [address] 3/28/2022 [facility] SPECIAL PROCEDURES    UPPER GASTROINTESTINAL ENDOSCOPY N/A 04/02/2022    EGD DIAGNOSTIC ONLY performed by Radha Quevedo MD at [address] ENDOSCOPY     Family History   Problem Relation Age of Onset    Cancer Father      Social History     Tobacco Use    Smoking status: Never Smoker    Smokeless tobacco: Never Used   Substance Use Topics    Alcohol use: Never     Alcohol/week: 0.0 standard drinks    Drug use: Never       Allergies   Allergen Reactions    Heparin Other (See Comments)     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 +     Current Facility-Administered Medications   Medication Dose Route Frequency Provider Last Rate Last Admin    morphine (PF) injection 2 mg  2 mg IntraVENous Q4H PRN Ivania Hernandez MD   2 mg at 06/21/22 0558    iron sucrose (VENOFER) 300 mg in sodium chloride 0.9 % 250 mL IVPB  300 mg IntraVENous Q24H Earl Mendoza MD   Paused at 06/21/22 1144    levETIRAcetam (KEPPRA) tablet 2,000 mg  2,000 mg Oral Daily Nika Candelario MD   2,000 mg at 06/21/22 0920    levETIRAcetam (KEPPRA) tablet 2,500 mg  2,500 mg Oral Nightly Nika Candelario MD   2,500 mg at 06/20/22 2141    pantoprazole (PROTONIX) tablet 40 mg  40 mg Oral QAM AC Nika Candelario MD   40 mg at 06/21/22 0557    sodium chloride flush 0.9 % injection 5-40 mL  5-40 mL IntraVENous 2 times per day Nika Candelario MD   10 mL at 06/21/22 1600    sodium chloride flush 0.9 % injection 5-40 mL  5-40 mL IntraVENous PRN Nika Candelario MD        0.9 % sodium chloride infusion   IntraVENous PRN Nika Candelario MD        ondansetron (ZOFRAN-ODT) disintegrating tablet 4 mg  4 mg Oral Q8H PRN Nika Candelario MD        Or    ondansetron [facility]) injection 4 mg  4 mg IntraVENous Q6H PRN Nika Candelario MD        polyethylene glycol (GLYCOLAX) packet 17 g  17 g Oral Daily PRN Nika Candelario MD        acetaminophen (TYLENOL) tablet 650 mg  650 mg Oral Q6H PRN Nika Candelario MD        Or    acetaminophen (TYLENOL) suppository 650 mg  650 mg Rectal Q6H PRN Nika Candelario MD        dexamethasone (DECADRON) injection 4 mg  4 mg IntraVENous Q6H Nika Candelario MD   4 mg at 06/21/22 1551    metroNIDAZOLE (FLAGYL) tablet 500 mg  500 mg Topical Daily Nika Candelario MD   500 mg at 06/21/22 0557    oxyCODONE (ROXICODONE) immediate release tablet 5 mg  5 mg Oral Q4H PRN Juju Alegria, APRN - CNP   5 mg at 06/20/22 2139    Or    oxyCODONE HCl (OXY-IR) immediate release tablet 10 mg  10 mg Oral Q4H PRN Jujuyaakov Alegria, APRN - CNP   10 mg at 06/21/22 1726       Review of Systems:  · Constitutional: No unanticipated weight loss. There's been no change in energy level, sleep pattern, or activity level. No fevers, chills. · Eyes: No visual changes or diplopia. No scleral icterus. · ENT: No Headaches, hearing loss or vertigo. No mouth sores or sore throat. · Cardiovascular: as reviewed in HPI  · Respiratory: No cough or wheezing, no sputum production. No hemoptysis. · Gastrointestinal: No abdominal pain, appetite loss, blood in stools. No change in bowel or bladder habits. · Genitourinary: No dysuria, trouble voiding, or hematuria. · Musculoskeletal:  No gait disturbance, no joint complaints. · Integumentary: No rash or pruritis. · Neurological: No headache, diplopia, change in muscle strength, numbness or tingling. · Psychiatric: No anxiety or depression. · Endocrine: No temperature intolerance. No excessive thirst, fluid intake, or urination. No tremor. · Hematologic/Lymphatic: No abnormal bruising or bleeding, blood clots or swollen lymph nodes. · Allergic/Immunologic: No nasal congestion or hives. Physical Exam:   /79   Pulse (!) 113   Temp 98.3 °F (36.8 °C) (Oral)   Resp 22   Ht 5' 3\" (1.6 m)   Wt 173 lb 8 oz (78.7 kg)   SpO2 93%   BMI 30.73 kg/m²   Wt Readings from Last 3 Encounters:   06/21/22 173 lb 8 oz (78.7 kg)   06/13/22 189 lb (85.7 kg)   06/05/22 189 lb 9.5 oz (86 kg)     Constitutional: She is oriented to person, place, and time.  She appears well-developed and well-nourished. In no acute distress. Head: Normocephalic and atraumatic. Pupils equal and round. Neck: Neck supple. No JVP or carotid bruit appreciated. No mass and no thyromegaly present. No lymphadenopathy present. Cardiovascular: Normal rate. Normal heart sounds. Exam reveals no gallop and no friction rub. No murmur heard. Pulmonary/Chest: Effort normal and breath sounds normal. No respiratory distress. She has no wheezes, rhonchi or rales. Abdominal: Soft, non-tender. Bowel sounds are normal. She exhibits no organomegaly, mass or bruit. Extremities: No edema. No cyanosis or clubbing. Pulses are 2+ radial/carotid bilaterally. Neurological: No gross cranial nerve deficit. Coordination normal.   Skin: Skin is warm and dry. There is no rash or diaphoresis. Psychiatric: She has a normal mood and affect. Her speech is normal and behavior is normal.     Lab Review:   FLP:  No results found for: TRIG, HDL, LDLCALC, LDLDIRECT, LABVLDL  BUN/Creatinine:    Lab Results   Component Value Date    BUN 7 06/21/2022    CREATININE 0.5 06/21/2022     PT/INR, TNI, HGB A1C:   Lab Results   Component Value Date/Time    TROPONINI <0.01 03/03/2022 07:41 PM      No results found for: CBCAUTODIF      Echo:     Stress Test:     Cath:     CT:    Doppler:     All above diagnostic testing and laboratory data was independently visualized and reviewed by me (not simply review of report)       Assessment and Plan   1) DVT   - significant iliac/IVC stenting that would require likely suprarenal IVC filter, I feel strongly that her thrombotic risk is very high as she is hypercoagulable from her malignancy and recent HIT diagnosis, thus placing another foreign body is only going to increase the chances of thrombosing her stents and her renal veins, I additionally explained to the family that [address] therapy does raise the risk of a cerebral bleed. There is really no good answer for this very unfortunate situation.  Case was discussed with Dr. Pamela Musa /Dr. Tad Lazo. Plan will be to restart her eliquis at 2.5 mg po bid and if no significant bleeding will increase to 5mg po bid     Overall, the problems requiring hospitalization are high in severity     Thank you very much for allowing me to participate in the care of your patient. Please do not hesitate to contact me if you have any questions.       Jody Martinez MD [address] [facility], Interventional Cardiology, and Peripheral Vascular Disease   [facility] in [place]   Ph: [phone]  Fax: [phone]

## 2022-06-23 LAB
A/G RATIO: 1 (ref 1.1–2.2)
ABO/RH: NORMAL
ABO/RH: NORMAL
ALBUMIN SERPL-MCNC: 3.2 G/DL (ref 3.4–5)
ALP BLD-CCNC: 78 U/L (ref 40–129)
ALT SERPL-CCNC: 15 U/L (ref 10–40)
ANION GAP SERPL CALCULATED.3IONS-SCNC: 11 MMOL/L (ref 3–16)
ANISOCYTOSIS: ABNORMAL
ANTIBODY SCREEN: NORMAL
AST SERPL-CCNC: 16 U/L (ref 15–37)
BANDED NEUTROPHILS RELATIVE PERCENT: 1 % (ref 0–7)
BASOPHILS ABSOLUTE: 0 K/UL (ref 0–0.2)
BASOPHILS RELATIVE PERCENT: 0 %
BILIRUB SERPL-MCNC: <0.2 MG/DL (ref 0–1)
BUN BLDV-MCNC: 12 MG/DL (ref 7–20)
CALCIUM SERPL-MCNC: 9.2 MG/DL (ref 8.3–10.6)
CHLORIDE BLD-SCNC: 105 MMOL/L (ref 99–110)
CO2: 23 MMOL/L (ref 21–32)
CREAT SERPL-MCNC: <0.5 MG/DL (ref 0.6–1.1)
EOSINOPHILS ABSOLUTE: 0 K/UL (ref 0–0.6)
EOSINOPHILS RELATIVE PERCENT: 0 %
GFR AFRICAN AMERICAN: >60
GFR NON-AFRICAN AMERICAN: >60
GLUCOSE BLD-MCNC: 122 MG/DL (ref 70–99)
HCT VFR BLD CALC: 20.5 % (ref 36–48)
HCT VFR BLD CALC: 21.6 % (ref 36–48)
HEMOGLOBIN: 6.7 G/DL (ref 12–16)
HEMOGLOBIN: 7 G/DL (ref 12–16)
LYMPHOCYTES ABSOLUTE: 0.7 K/UL (ref 1–5.1)
LYMPHOCYTES RELATIVE PERCENT: 3 %
MCH RBC QN AUTO: 27.3 PG (ref 26–34)
MCHC RBC AUTO-ENTMCNC: 32.5 G/DL (ref 31–36)
MCV RBC AUTO: 84 FL (ref 80–100)
METAMYELOCYTES RELATIVE PERCENT: 1 %
MONOCYTES ABSOLUTE: 0.9 K/UL (ref 0–1.3)
MONOCYTES RELATIVE PERCENT: 4 %
MONONUCLEAR UNIDENTIFIED CELLS: 0 %
NEUTROPHILS ABSOLUTE: 21.9 K/UL (ref 1.7–7.7)
NEUTROPHILS RELATIVE PERCENT: 91 %
NUCLEATED RED BLOOD CELLS: 3 /100 WBC
PDW BLD-RTO: 19.7 % (ref 12.4–15.4)
PLATELET # BLD: 827 K/UL (ref 135–450)
PLATELET SLIDE REVIEW: ABNORMAL
PMV BLD AUTO: 6.4 FL (ref 5–10.5)
POLYCHROMASIA: ABNORMAL
POTASSIUM SERPL-SCNC: 4.4 MMOL/L (ref 3.5–5.1)
RBC # BLD: 2.44 M/UL (ref 4–5.2)
SLIDE REVIEW: ABNORMAL
SODIUM BLD-SCNC: 139 MMOL/L (ref 136–145)
TOTAL PROTEIN: 6.4 G/DL (ref 6.4–8.2)
WBC # BLD: 23.6 K/UL (ref 4–11)

## 2022-06-23 PROCEDURE — 2580000003 HC RX 258: Performed by: INTERNAL MEDICINE

## 2022-06-23 PROCEDURE — 36591 DRAW BLOOD OFF VENOUS DEVICE: CPT

## 2022-06-23 PROCEDURE — 86923 COMPATIBILITY TEST ELECTRIC: CPT

## 2022-06-23 PROCEDURE — 85018 HEMOGLOBIN: CPT

## 2022-06-23 PROCEDURE — 6370000000 HC RX 637 (ALT 250 FOR IP): Performed by: INTERNAL MEDICINE

## 2022-06-23 PROCEDURE — 99231 SBSQ HOSP IP/OBS SF/LOW 25: CPT | Performed by: SURGERY

## 2022-06-23 PROCEDURE — 6360000002 HC RX W HCPCS: Performed by: INTERNAL MEDICINE

## 2022-06-23 PROCEDURE — 85014 HEMATOCRIT: CPT

## 2022-06-23 PROCEDURE — 86900 BLOOD TYPING SEROLOGIC ABO: CPT

## 2022-06-23 PROCEDURE — 6370000000 HC RX 637 (ALT 250 FOR IP): Performed by: NURSE PRACTITIONER

## 2022-06-23 PROCEDURE — 86901 BLOOD TYPING SEROLOGIC RH(D): CPT

## 2022-06-23 PROCEDURE — 2060000000 HC ICU INTERMEDIATE R&B

## 2022-06-23 PROCEDURE — 85025 COMPLETE CBC W/AUTO DIFF WBC: CPT

## 2022-06-23 PROCEDURE — 80053 COMPREHEN METABOLIC PANEL: CPT

## 2022-06-23 PROCEDURE — 94760 N-INVAS EAR/PLS OXIMETRY 1: CPT

## 2022-06-23 PROCEDURE — P9016 RBC LEUKOCYTES REDUCED: HCPCS

## 2022-06-23 PROCEDURE — 86850 RBC ANTIBODY SCREEN: CPT

## 2022-06-23 RX ADMIN — OXYCODONE AND ACETAMINOPHEN 2 TABLET: 5; 325 TABLET ORAL at 21:36

## 2022-06-23 RX ADMIN — DEXAMETHASONE SODIUM PHOSPHATE 4 MG: 4 INJECTION, SOLUTION INTRAMUSCULAR; INTRAVENOUS at 21:36

## 2022-06-23 RX ADMIN — DEXAMETHASONE SODIUM PHOSPHATE 4 MG: 4 INJECTION, SOLUTION INTRAMUSCULAR; INTRAVENOUS at 02:16

## 2022-06-23 RX ADMIN — DEXAMETHASONE SODIUM PHOSPHATE 4 MG: 4 INJECTION, SOLUTION INTRAMUSCULAR; INTRAVENOUS at 09:07

## 2022-06-23 RX ADMIN — OXYCODONE AND ACETAMINOPHEN 2 TABLET: 5; 325 TABLET ORAL at 15:33

## 2022-06-23 RX ADMIN — PANTOPRAZOLE SODIUM 40 MG: 40 TABLET, DELAYED RELEASE ORAL at 06:16

## 2022-06-23 RX ADMIN — SODIUM CHLORIDE, PRESERVATIVE FREE 10 ML: 5 INJECTION INTRAVENOUS at 21:36

## 2022-06-23 RX ADMIN — METRONIDAZOLE 500 MG: 500 TABLET ORAL at 06:16

## 2022-06-23 RX ADMIN — DICLOFENAC 2 G: 10 GEL TOPICAL at 11:59

## 2022-06-23 RX ADMIN — DICLOFENAC 2 G: 10 GEL TOPICAL at 20:33

## 2022-06-23 RX ADMIN — APIXABAN 2.5 MG: 2.5 TABLET, FILM COATED ORAL at 09:07

## 2022-06-23 RX ADMIN — DEXAMETHASONE SODIUM PHOSPHATE 4 MG: 4 INJECTION, SOLUTION INTRAMUSCULAR; INTRAVENOUS at 14:57

## 2022-06-23 RX ADMIN — IRON SUCROSE 300 MG: 20 INJECTION, SOLUTION INTRAVENOUS at 09:14

## 2022-06-23 RX ADMIN — SODIUM CHLORIDE, PRESERVATIVE FREE 10 ML: 5 INJECTION INTRAVENOUS at 09:09

## 2022-06-23 RX ADMIN — OXYCODONE AND ACETAMINOPHEN 2 TABLET: 5; 325 TABLET ORAL at 02:16

## 2022-06-23 RX ADMIN — LEVETIRACETAM 2000 MG: 500 TABLET, FILM COATED ORAL at 09:07

## 2022-06-23 RX ADMIN — DICLOFENAC 2 G: 10 GEL TOPICAL at 00:36

## 2022-06-23 RX ADMIN — APIXABAN 2.5 MG: 2.5 TABLET, FILM COATED ORAL at 21:36

## 2022-06-23 RX ADMIN — LEVETIRACETAM 2500 MG: 500 TABLET, FILM COATED ORAL at 21:36

## 2022-06-23 ASSESSMENT — PAIN DESCRIPTION - LOCATION
LOCATION: BREAST

## 2022-06-23 ASSESSMENT — PAIN DESCRIPTION - PAIN TYPE
TYPE: CHRONIC PAIN
TYPE: CHRONIC PAIN

## 2022-06-23 ASSESSMENT — PAIN DESCRIPTION - ONSET
ONSET: ON-GOING
ONSET: ON-GOING

## 2022-06-23 ASSESSMENT — PAIN DESCRIPTION - FREQUENCY
FREQUENCY: CONTINUOUS
FREQUENCY: CONTINUOUS

## 2022-06-23 ASSESSMENT — PAIN DESCRIPTION - DESCRIPTORS
DESCRIPTORS: ACHING;DISCOMFORT
DESCRIPTORS: DISCOMFORT;ACHING
DESCRIPTORS: DISCOMFORT;SHARP

## 2022-06-23 ASSESSMENT — PAIN - FUNCTIONAL ASSESSMENT: PAIN_FUNCTIONAL_ASSESSMENT: PREVENTS OR INTERFERES SOME ACTIVE ACTIVITIES AND ADLS

## 2022-06-23 ASSESSMENT — PAIN SCALES - GENERAL
PAINLEVEL_OUTOF10: 9
PAINLEVEL_OUTOF10: 10
PAINLEVEL_OUTOF10: 7

## 2022-06-23 ASSESSMENT — PAIN DESCRIPTION - ORIENTATION
ORIENTATION: RIGHT

## 2022-06-23 NOTE — PLAN OF CARE
Problem: Pain  Goal: Verbalizes/displays adequate comfort level or baseline comfort level  Outcome: Progressing   Pain/discomfort being managed with PRN analgesics per MD orders. Pt able to express presence and absence of pain and rate pain appropriately using numerical scale. Problem: Safety - Adult  Goal: Free from fall injury  Outcome: Progressing   Fall risk assessment completed per unit protocol. Patient's bed is in the lowest position, call light is within reach and the patient's room is free of clutter. The patient has been instructed to call for assistance before getting out of bed or the chair.

## 2022-06-23 NOTE — PROGRESS NOTES
Dressing to R breast changed. Small amount of bloody drainage noted. Pt tolerated well. Pt denies any needs at this time, will continue to monitor.      Electronically signed by Abi Salinas RN on 6/23/2022 at 6:50 AM

## 2022-06-23 NOTE — PROGRESS NOTES
ONCOLOGY HEMATOLOGY CARE  PROGRESS NOTE    SUBJECTIVE:       Only a small of bloody discharge on breast wound dressing. Yesterday started Eliquis at reduced dose of 2.5 mg bid. Seen by XRT on6/22/22 for CNS progression. Had completed whole brain XRT only 6 weeks ago prior. They did not recommend further tx. NS also refuses further intervention. Palliative care has met with pt and they also recommended hospice which was refused. Cardiology has not recommended putting another IVC filter. She has still been unable to begin her planned chemotherapy. Previously it had been recommended to her that she consider best supportive care as an alternative as she is at very high risk of significant complications. Today she seems a bit confused. She states she gets very dizzy when she stands up. She cannot recall having had the MRI or that she was told that she had worsening brain mets or that radiation and seeing her. She denies any paresthesias anesthesias or weakness. She cannot recall if she has been incontinent or not (she is wearing an adult diaper)    Oncologic history:    Jaspreet Cunningham is a pleasant 48year old woman who has stage IV breast cancer with brain metastasis. She presents to the ER today with bleeding from her right breast mass. The patient was diagnosed with breast cancer in March of 2021 at [facility]. She saw surgery/med onc at that time after she had noted a palpable mass on self exam-she had noted it in 2020. She had not had a mammo since 2018 before presenting. She had a 3 x 2.7 x 2.9cm mass that was irregular of the right breast at that time, with a satellite mass and two abnormal nodes. She had refused treatment at that time. She then presented to [facility].  with neurological symptoms and was found to have a brain mass.   She then had a  left temporal and left parieto-occipital craniotomy on 3/12/2022 for stereotactic resection of the masses which were pathologically confirmed to be metastatic lesions. She was evaluated by the surgical oncology team at [facility] on 03/27, but at that time was not interested in discussing surgical intervention given her recent neurosurgery. She subsequently developed right common femoral, deep femoral, and soleal DVTs for which she underwent placement of an IVC filter with IR. She was previously re-admitted for progressive leg swelling found to be caused by thrombosis of her filter and is s/p aspiration thrombectomy on 03/28. She is on Eliquis, and the IVC filter was removed. She is scheduled to start chemotherapy later this week. However, her breast tumor started bleeding and causing more pain, and she presented to the ER.      MRI of the brain on 6/21/2022 shows worsening recurrent metastases involving the left frontoparietal lobe, left parietal lobe, and left occipital lobe there was severe vasogenic edema. There was a new right parietal lobe metastasis measuring 1.3 cm. She has been refusing hospice services    PHYSICAL EXAM:       /83   Pulse 93   Temp 98.4 °F (36.9 °C) (Oral)   Resp 20   Ht 5' 3\" (1.6 m)   Wt 175 lb 7.8 oz (79.6 kg)   SpO2 94%   BMI 31.09 kg/m²       CONSTITUTIONAL:  Awake, alert, cooperative. Confused. Weak. EYES:  Pupils equal, round and reactive to light, sclera clear and conjunctiva normal.  ENT:  Normocepalic, without obvious abnormality, atraumatic. NECK:  Supple, symmetrical, no jugular venous distension, no thyromegaly. LUNGS:  No increased work of breathing and clear to auscultation. CARDIOVASCULAR: Regular rate and rhythm, normal S1 and S2, no murmur noted. ABDOMEN: soft, non-distended, non-tender. Wearing adult diaper  MUSCULOSKELETAL:  Full range of motion noted, tone is normal  EXTREMITIES: Left greater than right lower extremity edema  NEUROLOGIC:  Awake, alert, oriented to name, place and Month. Very unsteady with movement  SKIN:  Normal skin color, texture, turgor and no jaundice. Appears intact.   BREAST- Large, necrotic breast mass right- some minimal bleeding.      MEDS:     Current Facility-Administered Medications   Medication Dose Route Frequency Provider Last Rate Last Admin    apixaban (ELIQUIS) tablet 2.5 mg  2.5 mg Oral BID Earnest Chavez MD   2.5 mg at 06/22/22 2104    hydrOXYzine HCl (ATARAX) tablet 25 mg  25 mg Oral TID PRN Craig Bentley MD   25 mg at 06/22/22 1410    oxyCODONE-acetaminophen (PERCOCET) 5-325 MG per tablet 1 tablet  1 tablet Oral Q6H PRN Craig Bentley MD        oxyCODONE-acetaminophen (PERCOCET) 5-325 MG per tablet 2 tablet  2 tablet Oral Q6H PRN Craig Bentley MD   2 tablet at 06/23/22 0216    diclofenac sodium (VOLTAREN) 1 % gel 2 g  2 g Topical Q6H PRN VernonJUDITH Espinal - CNP   2 g at 06/23/22 0036    morphine (PF) injection 2 mg  2 mg IntraVENous Q4H PRN Horacio Herring MD   2 mg at 06/22/22 0857    iron sucrose (VENOFER) 300 mg in sodium chloride 0.9 % 250 mL IVPB  300 mg IntraVENous Q24H Derek Luna MD   Stopped at 06/22/22 2031    levETIRAcetam (KEPPRA) tablet 2,000 mg  2,000 mg Oral Daily Sarabjit Valdez MD   2,000 mg at 06/22/22 0855    levETIRAcetam (KEPPRA) tablet 2,500 mg  2,500 mg Oral Nightly Sarabjit Valdez MD   2,500 mg at 06/22/22 2104    pantoprazole (PROTONIX) tablet 40 mg  40 mg Oral QAM AC Sarabjit Valdez MD   40 mg at 06/23/22 0616    sodium chloride flush 0.9 % injection 5-40 mL  5-40 mL IntraVENous 2 times per day Sarabjit Valdez MD   10 mL at 06/22/22 2105    sodium chloride flush 0.9 % injection 5-40 mL  5-40 mL IntraVENous PRN Sarabjit Valdez MD        0.9 % sodium chloride infusion   IntraVENous PRN Sarabjit Valdez MD        ondansetron (ZOFRAN-ODT) disintegrating tablet 4 mg  4 mg Oral Q8H PRN Sarabjit Valdez MD   4 mg at 06/22/22 6035    Or    ondansetron (ZOFRAN) injection 4 mg  4 mg IntraVENous Q6H PRN Sarabjit Valdez MD        polyethylene glycol (GLYCOLAX) packet 17 g  17 g Oral Daily PRN Sarabjit Valdez MD        acetaminophen (TYLENOL) tablet 650 mg  650 mg Oral Q6H PRN Joy Mixon MD        Or   [place] [facility] acetaminophen (TYLENOL) suppository 650 mg  650 mg Rectal Q6H PRN Joy Mixon MD        dexamethasone (DECADRON) injection 4 mg  4 mg IntraVENous Q6H Joy Mixon MD   4 mg at 06/23/22 0216    metroNIDAZOLE (FLAGYL) tablet 500 mg  500 mg Topical Daily Joy Mixon MD   500 mg at 06/23/22 6007       LABS:     CBC:   Lab Results   Component Value Date    WBC 23.6 (H) 06/23/2022    HGB 6.7 (LL) 06/23/2022    HCT 20.5 (LL) 06/23/2022    MCV 84.0 06/23/2022     (H) 06/23/2022    LYMPHOPCT 3.0 06/22/2022    RBC 2.44 (L) 06/23/2022    MCH 27.3 06/23/2022    MCHC 32.5 06/23/2022    RDW 19.7 (H) 06/23/2022    NEUTOPHILPCT 91.0 06/22/2022    MONOPCT 1.0 06/22/2022    BASOPCT 0.0 06/22/2022    NEUTROABS 21.4 (H) 06/22/2022    LYMPHSABS 0.7 (L) 06/22/2022    MONOSABS 0.2 06/22/2022    EOSABS 0.5 06/22/2022    BASOSABS 0.0 06/22/2022       CMP:   Lab Results   Component Value Date     06/23/2022    K 4.4 06/23/2022    K 3.7 06/20/2022     06/23/2022    CO2 23 06/23/2022    BUN 12 06/23/2022    CREATININE <0.5 06/23/2022    GLUCOSE 122 06/23/2022    CALCIUM 9.2 06/23/2022    PROT 6.4 06/23/2022    LABALBU 3.2 06/23/2022    BILITOT <0.2 06/23/2022    ALKPHOS 78 06/23/2022    AST 16 06/23/2022    ALT 15 06/23/2022            IMAGING:     MRI of the brain 6/21/2022: [facility]    CT of the head without contrast 6/20/2022:    BRAIN/VENTRICLES: There has been increase in the from like low-attenuation   involving the left temporal and parietal lobes with mass effect.  There is   effacement of the left lateral ventricle with 7.3 mm of left-to-right midline   shift.  No areas of hemorrhage are seen. Leo Lozano is also from like   low-attenuation in the high right parietal lobe.  No acute areas of   infarction are seen.       ORBITS: The visualized portion of the orbits demonstrate no acute abnormality.       SINUSES: The visualized paranasal sinuses and mastoid air cells demonstrate   no acute abnormality.       SOFT TISSUES/SKULL: Svitlana Mckeon has been prior left craniotomy.           Impression   1. Increase in the vasogenic edema involving the left temporal and parietal   lobes with mass effect and new left-to-right midline shift measuring 7.3 mm.   2. Question vasogenic edema in the high right parietal lobe which could be   secondary to a new metastasis. These findings are concerning for progression of intracranial metastases.  An   MRI brain with contrast is recommended for further evaluation. ASSESSMENT:         Patient Active Problem List   Diagnosis Code    Thyroid nodule E04.1    Brain metastases (HCC) C79.31    Brain mass G93.89    Status epilepticus (HCC) G40.901    Duct cell carcinoma (HCC) C80.1    Cerebral edema (HCC) G93.6    Seizure (HCC) R56.9    Deep venous thrombosis (HCC) I82.409    Fever R50.9    Phlebitis I80.9    S/P craniotomy Z98.890    Acute deep vein thrombosis (DVT) of axillary vein (HCC) I82. A19    DVT, lower extremity, proximal, acute, bilateral ([address] I82.4Y3    Breast cancer, stage 4, right ([address] C50.911    Breast pain, right N64.4     Anemia just slightly worse than her baseline-    Results for Sahara Alvarez (MRN [id number]) as of 6/14/2022 07:26   Ref. Range 4/4/2022 05:52 4/5/2022 05:19 4/7/2022 04:48 4/9/2022 05:21 5/24/2022 10:19 6/5/2022 06:52 6/6/2022 04:32 6/9/2022 04:00   Hemoglobin Quant Latest Ref Range: 12.0 - 16.0 g/dL 8.2 (L) 7.7 (L) 7.7 (L) 7.6 (L)  8.9 (L) 8.9 (L) 8.4 (L)             PLAN:       The plan had been to initiate chemotherapy as an outpatient. The patient was not stable enough to be able to be treated and it landed back in the hospital with bleeding from the right breast.  Her hemoglobin is still only 6.7. I would for now  transfuse for hemoglobin less than 7. She is also getting Venofer    Today I tried to talk to the patient again about the situation that she faces.   It is not clear that she would be able to tolerate aggressive chemotherapy and yet she still shows very little insight into the serious nature of her disease. Patient has a history of prior brain mets which were resected and then she received radiation therapy. This was followed by whole brain XRT and then she relapsed in only 6 weeks. No further SRT or surgery planned. The patient is already taking Keppra for previous seizures and is on IV Decadron every 6    Cardiology is seeing the patient as is general surgery. The note from Dr. Steffanie Corley yesterday suggests that they recommend against putting in another IVC filter as they believe the risk of clotting is almost 100%. The patient does not appear to be decisional.  I talked to her nurse today who agreed. This led to a conversation with patient's mother who is listed as her power of . I told her that the patient's status continues to deteriorate and that I would strongly recommend against systemic chemotherapy. At best it might get a brief response but that the response is not likely to be durable nor necessarily translate into improved quality of life. My great fear would be that it would not only not improve the quality of her life but probably detract from it if not shorten her life span. The alternative is hospice. The mother then stated that she had a very negative experience with hospice. Overall she has another daughter who  of metastatic breast cancer and that she was given \"medicines to help her on her journey home. \" This led to a discussion of the role of hospice in the terminal care of patients and the boundaries but which they are obligated to stay within. I suspect it is more likely that the patient was given medications to control pain and other symptoms. But, the patient did pass quickly at home. That all being said I still recommended that if her goal is to be at home the best option is to have hospice support.   We then talked about her CODE STATUS. The patient currently is a full code. When I talked to her about resuscitation and went through the logistics she stated that she thought her daughter would not want to have her ribs broken or when she went to be put on a respirator. She elected to make the patient DNR. She is coming in to see the patient tonight. I made it clear that I would not be recommending chemotherapy today in the clinical setting. We talked about the fact that other doctors who have been involved in her care occluding Mora Davis, and Marisel agreed that the patient would likely not benefit from chemotherapy. I have personally had conversations about this patient with each of those doctors. Dr. Frandy Gomez is out of town and not available.       Frida Gregg MD, MPH

## 2022-06-23 NOTE — CARE COORDINATION
Patient has intermittent confusion per RN. Hem/Onc is not recommending treatment and recs hospice. Patient's mother coming up to hospital tonight and going to decide on further plan. Case Management will continue to follow for discharge needs.   Electronically signed by Avi Maria RN Case Management [phone] on 6/23/2022 at 2:16 PM

## 2022-06-23 NOTE — PROGRESS NOTES
Hospitalist Progress Note      PCP: SHAHEED ZAMARRIPA, APRN - CNP    Date of Admission: 6/20/2022    Subjective: bleeding controlled, dizziness improved    Medications:  Reviewed    Infusion Medications    sodium chloride       Scheduled Medications    apixaban  2.5 mg Oral BID    levETIRAcetam  2,000 mg Oral Daily    levETIRAcetam  2,500 mg Oral Nightly    pantoprazole  40 mg Oral QAM AC    sodium chloride flush  5-40 mL IntraVENous 2 times per day    dexamethasone  4 mg IntraVENous Q6H    metroNIDAZOLE  500 mg Topical Daily     PRN Meds: hydrOXYzine HCl, oxyCODONE-acetaminophen, oxyCODONE-acetaminophen, diclofenac sodium, morphine, sodium chloride flush, sodium chloride, ondansetron **OR** ondansetron, polyethylene glycol, acetaminophen **OR** acetaminophen      Intake/Output Summary (Last 24 hours) at 6/23/2022 1147  Last data filed at 6/23/2022 1134  Gross per 24 hour   Intake 540 ml   Output 625 ml   Net -85 ml       Physical Exam Performed:    /66   Pulse 96   Temp 98.5 °F (36.9 °C) (Oral)   Resp 19   Ht 5' 3\" (1.6 m)   Wt 175 lb 7.8 oz (79.6 kg)   SpO2 93%   BMI 31.09 kg/m²        General appearance:  No apparent distress, appears stated age and cooperative. HEENT:  Normal cephalic, atraumatic without obvious deformity. Pupils equal, round, and reactive to light.  Extra ocular muscles intact. Conjunctivae/corneas clear. Neck: Supple, with full range of motion. No jugular venous distention. Trachea midline. Respiratory:  Normal respiratory effort. Clear to auscultation, bilaterally without Rales/Wheezes/Rhonchi. Cardiovascular:  Regular rate and rhythm with normal S1/S2 without murmurs, rubs or gallops. Abdomen: Soft, non-tender, non-distended with normal bowel sounds. Musculoskeletal:  No clubbing, cyanosis or edema bilaterally.  Full range of motion without deformity. Skin: Skin color, texture, turgor normal.  No rashes or lesions.   Neurologic:  Neurovascularly intact without any focal sensory/motor deficits. Cranial nerves: II-XII intact, grossly non-focal.  Psychiatric:  Alert and oriented, thought content appropriate, normal insight  Capillary Refill: Brisk,3 seconds, normal  Peripheral Pulses: +2 palpable, equal bilaterally   Breast: fungating bleeding necrotic breast mass with odor       Labs:   Recent Labs     06/21/22  0600 06/22/22  0625 06/23/22  0610   WBC 16.5* 22.8* 23.6*   HGB 7.0* 6.8* 6.7*   HCT 21.2* 20.5* 20.5*   * 784* 827*     Recent Labs     06/21/22  0600 06/22/22  0625 06/23/22  0610    139 139   K 4.8 4.2 4.4    104 105   CO2 23 23 23   BUN 7 10 12   CREATININE 0.5* <0.5* <0.5*   CALCIUM 9.1 9.3 9.2     Recent Labs     06/21/22 0600 06/22/22  0625 06/23/22  0610   AST 12* 16 16   ALT 13 15 15   BILITOT <0.2 <0.2 <0.2   ALKPHOS 78 77 78     No results for input(s): INR in the last 72 hours. No results for input(s): Lalit Hug in the last 72 hours. Urinalysis:      Lab Results   Component Value Date    NITRU Negative 03/27/2022    WBCUA 0-2 03/27/2022    BACTERIA 3+ 03/27/2022    RBCUA 0-2 03/27/2022    BLOODU Negative 03/27/2022    SPECGRAV >=1.030 03/27/2022    GLUCOSEU Negative 03/27/2022       Radiology:  MRI BRAIN W WO CONTRAST   Final Result   Worsening recurrent metastasis involving the left frontoparietal lobe, left   parietal lobe and left occipital lobe. Severe surrounding vasogenic edema is   identified. Mild to moderate midline shift measures 6 mm previously   measuring 9 mm. New right parietal lobe metastasis measuring 1.3 cm with surrounding   vasogenic edema. Left frontal parietal and left occipital craniotomies are noted from prior   metastasis resection. RECOMMENDATIONS:   Unavailable         CT HEAD WO CONTRAST   Final Result   1.  Increase in the vasogenic edema involving the left temporal and parietal   lobes with mass effect and new left-to-right midline shift measuring 7.3 mm.   2. Question vasogenic edema in the high right parietal lobe which could be   secondary to a new metastasis. These findings are concerning for progression of intracranial metastases. An   MRI brain with contrast is recommended for further evaluation. Assessment/Plan:    Active Hospital Problems    Diagnosis     Breast pain, right [N64.4]      Priority: Medium    Breast cancer, stage 4, right (HCC) [C50.911]      Priority: Medium         Advanced Stage 4 breast cancer  Metastatic brain lesions with midline shift vasogenic edema  Bleeding breast mass  Hx DVT/PEs   On oral AC  Hx of IVC filter s/p recent removal     PLAN:     Held eliquis and plavix  Serial h and h  Transfuse for hgb<7  Breast surgeon consulted  hemonc consulted  ? replacement of IVC filter as seems to not be tolerating oral AC - risk outweighs benefit per Dr Blanca Mayorga plans for IVC filter placement  Poss initiation of chemo  Neurosurgery consulted for brain lesions-they are aware of CT and [facility] with [facility] - consulted radiation onc - recently completed brain radiation with recurrence - no further ratiotion tx  On decadron iv  keppra for sz prevention        DVT Prophylaxis: scds  Diet: ADULT DIET;  Regular  ADULT ORAL NUTRITION SUPPLEMENT; Breakfast, Dinner; Standard High Calorie/High Protein Oral Supplement  Code Status: Full Code    PT/OT Eval Status: ordered    Jemma Boyle MD

## 2022-06-23 NOTE — PROGRESS NOTES
RN larisa H&H per Dr Graham Poisson request to recheck since hgb was low but patient not really wanting blood transfusion. Recheck was 7.0. RN informed MD via perfect serve.

## 2022-06-23 NOTE — PROGRESS NOTES
Breast Surgery Progress Note     Patient is comfortable, conversant. Bleeding has slowed from right breast since anticoagulants were held. Hemoglobin 6.7  Dressing is intact, no drainage noted on dressing.      A/P - locally advanced metastatic right breast cancer.     She is too high of a risk for surgery. Agree with palliative care. Will follow along peripherally. Please call if needed.     Electronically signed by Mini Ceballos MD on 6/23/2022 at 1:26 PM

## 2022-06-24 LAB
A/G RATIO: 0.9 (ref 1.1–2.2)
ALBUMIN SERPL-MCNC: 2.9 G/DL (ref 3.4–5)
ALP BLD-CCNC: 68 U/L (ref 40–129)
ALT SERPL-CCNC: 13 U/L (ref 10–40)
ANION GAP SERPL CALCULATED.3IONS-SCNC: 13 MMOL/L (ref 3–16)
AST SERPL-CCNC: 17 U/L (ref 15–37)
BASOPHILS ABSOLUTE: 0 K/UL (ref 0–0.2)
BASOPHILS RELATIVE PERCENT: 0.1 %
BILIRUB SERPL-MCNC: <0.2 MG/DL (ref 0–1)
BLOOD BANK DISPENSE STATUS: NORMAL
BLOOD BANK PRODUCT CODE: NORMAL
BPU ID: NORMAL
BUN BLDV-MCNC: 10 MG/DL (ref 7–20)
CALCIUM SERPL-MCNC: 9.1 MG/DL (ref 8.3–10.6)
CHLORIDE BLD-SCNC: 103 MMOL/L (ref 99–110)
CO2: 22 MMOL/L (ref 21–32)
CREAT SERPL-MCNC: 0.5 MG/DL (ref 0.6–1.1)
DESCRIPTION BLOOD BANK: NORMAL
EOSINOPHILS ABSOLUTE: 0 K/UL (ref 0–0.6)
EOSINOPHILS RELATIVE PERCENT: 0 %
GFR AFRICAN AMERICAN: >60
GFR NON-AFRICAN AMERICAN: >60
GLUCOSE BLD-MCNC: 122 MG/DL (ref 70–99)
HCT VFR BLD CALC: 21.6 % (ref 36–48)
HEMOGLOBIN: 6.9 G/DL (ref 12–16)
LYMPHOCYTES ABSOLUTE: 1.5 K/UL (ref 1–5.1)
LYMPHOCYTES RELATIVE PERCENT: 6.4 %
MCH RBC QN AUTO: 26.9 PG (ref 26–34)
MCHC RBC AUTO-ENTMCNC: 32 G/DL (ref 31–36)
MCV RBC AUTO: 84.1 FL (ref 80–100)
MONOCYTES ABSOLUTE: 1.5 K/UL (ref 0–1.3)
MONOCYTES RELATIVE PERCENT: 6.5 %
NEUTROPHILS ABSOLUTE: 19.9 K/UL (ref 1.7–7.7)
NEUTROPHILS RELATIVE PERCENT: 87 %
PDW BLD-RTO: 20.2 % (ref 12.4–15.4)
PLATELET # BLD: 831 K/UL (ref 135–450)
PMV BLD AUTO: 6.4 FL (ref 5–10.5)
POTASSIUM SERPL-SCNC: 4.5 MMOL/L (ref 3.5–5.1)
RBC # BLD: 2.56 M/UL (ref 4–5.2)
SODIUM BLD-SCNC: 138 MMOL/L (ref 136–145)
TOTAL PROTEIN: 6.2 G/DL (ref 6.4–8.2)
WBC # BLD: 22.9 K/UL (ref 4–11)

## 2022-06-24 PROCEDURE — 2060000000 HC ICU INTERMEDIATE R&B

## 2022-06-24 PROCEDURE — 6370000000 HC RX 637 (ALT 250 FOR IP): Performed by: INTERNAL MEDICINE

## 2022-06-24 PROCEDURE — 6360000002 HC RX W HCPCS: Performed by: INTERNAL MEDICINE

## 2022-06-24 PROCEDURE — 94760 N-INVAS EAR/PLS OXIMETRY 1: CPT

## 2022-06-24 PROCEDURE — 36430 TRANSFUSION BLD/BLD COMPNT: CPT

## 2022-06-24 PROCEDURE — 97165 OT EVAL LOW COMPLEX 30 MIN: CPT

## 2022-06-24 PROCEDURE — 97530 THERAPEUTIC ACTIVITIES: CPT

## 2022-06-24 PROCEDURE — 80053 COMPREHEN METABOLIC PANEL: CPT

## 2022-06-24 PROCEDURE — 85025 COMPLETE CBC W/AUTO DIFF WBC: CPT

## 2022-06-24 PROCEDURE — 97162 PT EVAL MOD COMPLEX 30 MIN: CPT

## 2022-06-24 PROCEDURE — 2580000003 HC RX 258: Performed by: INTERNAL MEDICINE

## 2022-06-24 RX ORDER — SODIUM CHLORIDE 9 MG/ML
INJECTION, SOLUTION INTRAVENOUS PRN
Status: DISCONTINUED | OUTPATIENT
Start: 2022-06-24 | End: 2022-06-26 | Stop reason: HOSPADM

## 2022-06-24 RX ADMIN — DEXAMETHASONE SODIUM PHOSPHATE 4 MG: 4 INJECTION, SOLUTION INTRAMUSCULAR; INTRAVENOUS at 22:19

## 2022-06-24 RX ADMIN — METRONIDAZOLE 500 MG: 500 TABLET ORAL at 06:33

## 2022-06-24 RX ADMIN — SODIUM CHLORIDE, PRESERVATIVE FREE 10 ML: 5 INJECTION INTRAVENOUS at 10:09

## 2022-06-24 RX ADMIN — OXYCODONE AND ACETAMINOPHEN 2 TABLET: 5; 325 TABLET ORAL at 14:04

## 2022-06-24 RX ADMIN — LEVETIRACETAM 2000 MG: 500 TABLET, FILM COATED ORAL at 10:08

## 2022-06-24 RX ADMIN — APIXABAN 2.5 MG: 2.5 TABLET, FILM COATED ORAL at 20:19

## 2022-06-24 RX ADMIN — OXYCODONE AND ACETAMINOPHEN 2 TABLET: 5; 325 TABLET ORAL at 20:19

## 2022-06-24 RX ADMIN — SODIUM CHLORIDE, PRESERVATIVE FREE 10 ML: 5 INJECTION INTRAVENOUS at 20:21

## 2022-06-24 RX ADMIN — LEVETIRACETAM 2500 MG: 500 TABLET, FILM COATED ORAL at 20:19

## 2022-06-24 RX ADMIN — DICLOFENAC 2 G: 10 GEL TOPICAL at 10:09

## 2022-06-24 RX ADMIN — DEXAMETHASONE SODIUM PHOSPHATE 4 MG: 4 INJECTION, SOLUTION INTRAMUSCULAR; INTRAVENOUS at 02:35

## 2022-06-24 RX ADMIN — APIXABAN 2.5 MG: 2.5 TABLET, FILM COATED ORAL at 10:08

## 2022-06-24 RX ADMIN — PANTOPRAZOLE SODIUM 40 MG: 40 TABLET, DELAYED RELEASE ORAL at 06:33

## 2022-06-24 RX ADMIN — DEXAMETHASONE SODIUM PHOSPHATE 4 MG: 4 INJECTION, SOLUTION INTRAMUSCULAR; INTRAVENOUS at 14:06

## 2022-06-24 RX ADMIN — DEXAMETHASONE SODIUM PHOSPHATE 4 MG: 4 INJECTION, SOLUTION INTRAMUSCULAR; INTRAVENOUS at 10:08

## 2022-06-24 RX ADMIN — OXYCODONE AND ACETAMINOPHEN 2 TABLET: 5; 325 TABLET ORAL at 03:48

## 2022-06-24 ASSESSMENT — PAIN DESCRIPTION - DESCRIPTORS
DESCRIPTORS: ACHING;DISCOMFORT
DESCRIPTORS: DISCOMFORT
DESCRIPTORS: DISCOMFORT
DESCRIPTORS: ACHING

## 2022-06-24 ASSESSMENT — PAIN SCALES - GENERAL
PAINLEVEL_OUTOF10: 7
PAINLEVEL_OUTOF10: 6
PAINLEVEL_OUTOF10: 0
PAINLEVEL_OUTOF10: 7
PAINLEVEL_OUTOF10: 7
PAINLEVEL_OUTOF10: 8

## 2022-06-24 ASSESSMENT — PAIN SCALES - WONG BAKER
WONGBAKER_NUMERICALRESPONSE: 0
WONGBAKER_NUMERICALRESPONSE: 0

## 2022-06-24 ASSESSMENT — ENCOUNTER SYMPTOMS
GASTROINTESTINAL NEGATIVE: 1
EYES NEGATIVE: 1
RESPIRATORY NEGATIVE: 1

## 2022-06-24 ASSESSMENT — PAIN DESCRIPTION - ORIENTATION
ORIENTATION: RIGHT

## 2022-06-24 ASSESSMENT — PAIN DESCRIPTION - LOCATION
LOCATION: BREAST
LOCATION: BREAST
LOCATION: CHEST
LOCATION: CHEST
LOCATION: BREAST

## 2022-06-24 ASSESSMENT — PAIN - FUNCTIONAL ASSESSMENT
PAIN_FUNCTIONAL_ASSESSMENT: PREVENTS OR INTERFERES SOME ACTIVE ACTIVITIES AND ADLS
PAIN_FUNCTIONAL_ASSESSMENT: PREVENTS OR INTERFERES SOME ACTIVE ACTIVITIES AND ADLS

## 2022-06-24 ASSESSMENT — PAIN DESCRIPTION - ONSET
ONSET: ON-GOING

## 2022-06-24 ASSESSMENT — PAIN DESCRIPTION - FREQUENCY
FREQUENCY: CONTINUOUS

## 2022-06-24 ASSESSMENT — PAIN DESCRIPTION - PAIN TYPE
TYPE: CHRONIC PAIN

## 2022-06-24 NOTE — PROGRESS NOTES
Hematology Oncology Daily Progress Note    Admit Date: 6/20/2022  Hospital day several    Subjective:     Patient has complaints of mild to mod fatigue--bleeding from breast tumor minimal--denies sob/cp. Medication side effects: none    Scheduled Meds:   apixaban  2.5 mg Oral BID    levETIRAcetam  2,000 mg Oral Daily    levETIRAcetam  2,500 mg Oral Nightly    pantoprazole  40 mg Oral QAM AC    sodium chloride flush  5-40 mL IntraVENous 2 times per day    dexamethasone  4 mg IntraVENous Q6H    metroNIDAZOLE  500 mg Topical Daily     Continuous Infusions:   sodium chloride      sodium chloride       PRN Meds:sodium chloride, hydrOXYzine HCl, oxyCODONE-acetaminophen, oxyCODONE-acetaminophen, diclofenac sodium, morphine, sodium chloride flush, sodium chloride, ondansetron **OR** ondansetron, polyethylene glycol, acetaminophen **OR** acetaminophen    Review of Systems  Pertinent items are noted in HPI. REVIEW OF SYSTEMS:         · Constitutional: Denies fever, sweats, weight loss     · Eyes: No visual changes or diplopia. No scleral icterus. · ENT: No Headaches, hearing loss or vertigo. No mouth sores or sore throat. · Cardiovascular: No chest pain, dyspnea on exertion, palpitations or loss of consciousness. · Respiratory: No cough or wheezing, no sputum production. No hemoptysis. .    · Gastrointestinal: No abdominal pain, appetite loss, blood in stools. No change in bowel habits. · Genitourinary: No dysuria, trouble voiding, or hematuria. · Musculoskeletal:  Generalized weakness. No joint complaints. · Integumentary: No rash or pruritis. · Neurological: No headache, diplopia. No change in gait, balance, or coordination. No paresthesias. · Endocrine: No temperature intolerance. No excessive thirst, fluid intake, or urination. · Hematologic/Lymphatic: No abnormal bruising or ecchymoses, blood clots or swollen lymph nodes. · Allergic/Immunologic: No nasal congestion or hives.    ·     Objective: Patient Vitals for the past 8 hrs:   BP Temp Temp src Pulse Resp SpO2 Weight   06/24/22 0345 124/81 97.6 °F (36.4 °C) Oral 99 15 94 % 175 lb 8 oz (79.6 kg)   06/23/22 2358 108/75 98.5 °F (36.9 °C) Oral 100 22 95 % --     I/O last 3 completed shifts: In: 780 [P.O.:780]  Out: 1325 [Urine:1325]  No intake/output data recorded.     /81   Pulse 99   Temp 97.6 °F (36.4 °C) (Oral)   Resp 15   Ht 5' 3\" (1.6 m)   Wt 175 lb 8 oz (79.6 kg)   SpO2 94%   BMI 31.09 kg/m²     General Appearance:    Alert, cooperative, no distress, appears stated age   Head:    Normocephalic, without obvious abnormality, atraumatic   Eyes:    PERRL, conjunctiva/corneas clear, EOM's intact, fundi     benign, both eyes        Ears:    Normal TM's and external ear canals, both ears   Nose:   Nares normal, septum midline, mucosa normal, no drainage    or sinus tenderness   Throat:   Lips, mucosa, and tongue normal; teeth and gums normal   Neck:   Supple, symmetrical, trachea midline, no adenopathy;        thyroid:  No enlargement/tenderness/nodules; no carotid    bruit or JVD   Back:     Symmetric, no curvature, ROM normal, no CVA tenderness   Lungs:     Clear to auscultation bilaterally, respirations unlabored   Chest wall:    No tenderness or deformity   Heart:    Regular rate and rhythm, S1 and S2 normal, no murmur, rub   or gallop   Abdomen:     Soft, non-tender, bowel sounds active all four quadrants,     no masses, no organomegaly           Extremities:   Extremities normal, atraumatic, no cyanosis or edema   Pulses:   2+ and symmetric all extremities   Skin:   Skin color, texture, turgor normal, no rashes or lesions   Lymph nodes:   Cervical, supraclavicular, and axillary nodes normal   Neurologic:   Stable         Data Review  CBC:   Lab Results   Component Value Date    WBC 22.9 06/24/2022    RBC 2.56 06/24/2022       Assessment:     Principal Problem:    Breast cancer, stage 4, right (HCC)  Active Problems:    Breast pain, right  Resolved Problems:    * No resolved hospital problems. *      Plan:     1. Metastatic breast cancer. Her brain disease is worsening. She recently completed radiation roughly 6 weeks ago. Radiation oncology does not want to do anything different right now. Neurosurgery has no options. Continue steroids. She could be discharged on 4 mg decadron PO 3 times when she is discharged. Dr. Cassie Agee had a very long discussion with the patient's mother yesterday. It is not clear how well the patient is processing her diagnosis. Her mother seems to understand that her situation is dire. We are all strongly recommending hospice. The patient seems against that herself. I strongly encouraged her to get a second opinion if desired. She could potentially be discharged soon to follow-up with Dr. Jeferson Mcknight next week since the bleeding is minimal.    2.  History of DVT. She seems to be doing well on lower dose Eliquis. Cardiology does not recommend replacing her IVC filter since it will likely clot and recent stents were placed at hospitals [facility] is minimal.    3.  Anemia. I will order a transfusion if the patient is agreeable. I explained that it may make her stronger and be able to get her home sooner. She will think about it.         Electronically signed by Chandler Morales MD on 6/24/2022 at 7:55 AM

## 2022-06-24 NOTE — PROGRESS NOTES
PALLIATIVE MEDICINE PROGRESS NOTE     Patient name:Nadja Ramírez    ERR:[id number] :1975  Room/Bed:B4T-0794/5266-01    LOS: 4 days        ASSESSMENT/RECOMMENDATIONS     48 y.o. female with debility, AMS and breast pain.         Symptom Management:  Debility: Patient was again lethargic today. She again exhibited some head to toe generalized weakness today. Patient requires some assistance with her ADL's and transfers from the bed. Breast Pain: Patient now taking Percocet PRN or relief. AMS: Patient can be forgetful and was oriented x3 (person, place and time) today. Patient did not appear to be completely oriented to her situation today. Goals of Care: Again met the patient at her bedside. Patient can be forgetful and was oriented x3 (person, place and time) today. Patient did not appear to be completely oriented to her situation today. I attempted to phone the patient's mother Hailee (she is also the patient's primary medical contact) to review the patient's current health condition, course of treatment, goals and code status but was only able to leave a voicemail requesting a return call to the palliative care team at her earliest convenience. I did attempt to review the patient's current health condition, goals and code status with the patient today. I attempted to again review the patient's medical condition and the medical team's recommendations for care going forward at this time. The patient is not interested in hospice services. The patient indicated she is not yet ready to have a blood transfusion. The patient also indicated she would like to pursue a second opinion regarding her diagnosis and treatment options. Patient again indicated she would like to pursue any available aggressive treatment options. Code status remains DNR-CC at this time.          Patient/Family Goals of Care :     - Again met the patient at her bedside.   Patient can be forgetful and was oriented x3 (person, place and time) today. Patient did not appear to be completely oriented to her situation today. I attempted to phone the patient's mother Hailee (she is also the patient's primary medical contact) to review the patient's current health condition, course of treatment, goals and code status but was only able to leave a voicemail requesting a return call to the palliative care team at her earliest convenience. I did attempt to review the patient's current health condition, goals and code status with the patient today. I attempted to again review the patient's medical condition and the medical team's recommendations for care going forward at this time. The patient is not interested in hospice services. The patient indicated she is not yet ready to have a blood transfusion. The patient also indicated she would like to pursue a second opinion regarding her diagnosis and treatment options. Patient again indicated she would like to pursue any available aggressive treatment options. Code status remains DNR-CC at this time. 6/21 - Met patient at her bedside. Patient was oriented x 4 during my visit (however, I did have to repeat several medical topics during my conversation to verify patient's understanding; conversation was lengthy with patient today). Patient was able to make her wishes known and did appear to be decisional during my visit today. I also spoke with patient's mother and primary medical contact Hailee today and reviewed my visit with the patient. Reviewed patient's current health condition (poor health status), goals of care and code status. Lengthy conversation was had in reviewing patient's current health status and medical team's (oncology, cardiology and general surgery) recommendations for care. Outpatient palliative care and hospice services were reviewed. Patient is not interested in hospice services.   Patient would like to pursue all aggressive care/treatment options at this time. The patient would also like to meet with [facility] post her hospital stay for help in home care setting. Code status was reviewed and the patient would like to remain a Full Code.        Disposition/Discharge Plan:   An outpatient [facility] referral was ordered for post-discharge to followup with the patient once they return home.     Advance Directives:  Surrogate Decision Maker: Hailee, patient's mother. Code status:  DNR-CC     Case discussed with: patient, medical staff, left voicemail for Hailee (mother)  Thank you for allowing us to participate in the care of this patient. SUBJECTIVE     Chief Complaint: Debility    Last 24 hours:   Patient can be forgetful and was oriented x3 (person, place and time) today. Patient did not appear to be completely oriented to her situation today. I attempted to phone the patient's mother Hailee (she is also the patient's primary medical contact) to review the patient's current health condition, course of treatment, goals and code status but was only able to leave a voicemail requesting a return call to the palliative care team at her earliest convenience. I did attempt to review the patient's current health condition, goals and code status with the patient today. I attempted to again review the patient's medical condition and the medical team's recommendations for care going forward at this time. The patient is not interested in hospice services. The patient indicated she is not yet ready to have a blood transfusion. The patient also indicated she would like to pursue a second opinion regarding her diagnosis and treatment options. Patient again indicated she would like to pursue any available aggressive treatment options. Code status remains DNR-CC at this time. ROS:    Review of Systems   Constitutional: Positive for fatigue. HENT: Negative. Eyes: Negative. Respiratory: Negative. Cardiovascular: Positive for leg swelling. Gastrointestinal: Negative. Musculoskeletal:        Breast discomfort noted   Skin: Positive for wound (breast). Neurological: Positive for weakness. Psychiatric/Behavioral: Positive for confusion (did not appear to be completely orineted to her situation today. ). Negative for agitation and behavioral problems. All other systems reviewed and are negative. A complete 10 count ROS was obtained. Pertinent positives mentioned above in HPI/ROS. All others if not mentioned are negative. OBJECTIVE   /73   Pulse (!) 105   Temp 98.4 °F (36.9 °C) (Oral)   Resp 16   Ht 5' 3\" (1.6 m)   Wt 175 lb 8 oz (79.6 kg)   SpO2 98%   BMI 31.09 kg/m²   I/O last 3 completed shifts: In: 780 [P.O.:780]  Out: 1325 [Urine:1325]  I/O this shift:  In: 240 [P.O.:240]  Out: -       Physical Exam  Vitals reviewed. Constitutional:       Appearance: She is ill-appearing. HENT:      Head: Normocephalic. Nose: Nose normal.   Eyes:      Pupils: Pupils are equal, round, and reactive to light. Cardiovascular:      Rate and Rhythm: Tachycardia present. Pulses: Normal pulses. Pulmonary:      Effort: Pulmonary effort is normal. No respiratory distress. Abdominal:      General: Bowel sounds are normal.      Palpations: Abdomen is soft. Musculoskeletal:      Cervical back: Neck supple. Right lower leg: Edema (trace) present. Left lower leg: Edema (trace) present. Skin:     General: Skin is warm and dry. Neurological:      Comments: Patient again was forgetful at times. She was oriented x3 (person, place and time). Patient did not appear to be completely oriented to her situation today.    Psychiatric:      Comments: Again lethargic          Total time: 40 minutes  >50% of time spent counseling patient at bedside or POA/family member if applicable , reviewing information and discussing care, coordinating with care team       Signed By: Electronically signed by JUDITH Huerta CNP on 6/24/2022 at 1300 Afshin Narvaez    June 24, 2022

## 2022-06-24 NOTE — PROGRESS NOTES
Occupational Therapy  Facility/Department: [address] PROGRESSIVE CARE  Occupational Therapy Initial Assessment and Tentative D/C      Name: Rishi Brown  : 1975  MRN: [id number]  Date of Service: 2022    Discharge Recommendations: Rishi Brown scored a  on the AM-PAC ADL Inpatient form. Current research shows that an AM-PAC score of 18 or greater is typically associated with a discharge to the patient's home setting. Based on the patient's AM-PAC score, and their current ADL deficits, it is recommended that the patient have 2-3 sessions per week of Occupational Therapy at d/c to increase the patient's independence. At this time, this patient demonstrates the endurance and safety to discharge home with [facility] and a follow up treatment frequency of 2-3x/wk. Please see assessment section for further patient specific details. If patient discharges prior to next session this note will serve as a discharge summary. Please see below for the latest assessment towards goals. Continue to assess pending progress,24 hour supervision or assist,2-3 sessions per week,Patient would benefit from continued therapy after discharge  OT Equipment Recommendations  Equipment Needed: No       Patient Diagnosis(es): The primary encounter diagnosis was Breast pain, right. Diagnoses of Bleeding from breast, History of breast cancer, Metastatic malignant neoplasm, unspecified site ([address] Vasogenic edema ([address] Tachycardia, and Anemia, unspecified type were also pertinent to this visit. Past Medical History:  has a past medical history of Cancer ([address] History of blood transfusion, Hx of blood clots, and Thyroid nodule. Past Surgical History:  has a past surgical history that includes craniotomy (Left, 2022); IR GUIDED IVC FILTER PLACEMENT (N/A, 2022); IR GUIDED IVC FILTER PLACEMENT (2022); IR GUIDED THROMB MECH VEIN (2022); Upper gastrointestinal endoscopy (N/A, 2022);  IR Risk    Subjective   General  Chart Reviewed: Yes  Patient assessed for rehabilitation services?: Yes  Additional Pertinent Hx: per ED note, Dulce Bethea is a 48 y.o. female who presents to the emergency department secondary to concern for breast mass bleeding. Reports she was diagnosed with breast cancer and then in March found out she had cancer in her brain and had that removed but it came back and then had ten rounds of radiation and was supposed to follow up this coming Wednesday to see if that was better. Notes recently over the last two days she has been falling more but the main reason she is here is because the pain in her right breast has had increased and started bleeding and she can't get it to stop bleeding. She is on eliquis currently due to recent history of blood clots. Family / Caregiver Present: No  Referring Practitioner: Deedee Gómez MD  Subjective  Subjective: Pt agreeable to OT evaluation. Pt reports 6/10 pain in R breast.  General Comment  Comments: okay for therapy per RN.      Social/Functional History  Social/Functional History  Lives With: Parent (Mom; has 24 hour supervision)  Type of Home: House  Home Layout: Multi-level,Bed/Bath upstairs,1/2 bath on main level,Able to Live on Main level with bedroom/bathroom  Home Access: Stairs to enter with rails  Entrance Stairs - Number of Steps: 6 to enter with B rails; full flight to 2nd  Entrance Stairs - Rails: Left  Bathroom Shower/Tub: Tub/Shower unit  Bathroom Toilet: Standard  Home Equipment: Giovanni Raeford, rolling  Receives Help From: Family  ADL Assistance: Independent (sponge bath recently)  Homemaking Assistance:  (Mom performs)  Ambulation Assistance: Independent (with RW)  Transfer Assistance: Independent  Active : Yes (not since sugery)  Mode of Transportation: Car  Occupation: Full time employment  Type of Occupation:   Additional Comments: Reports 1 fall ~1 week ago due to balance       Objective   Heart Rate: (!) 101  Heart Rate Source: Monitor  BP: 124/81  BP Location: Left upper arm  BP Method: Automatic  MAP (Calculated): 95.33  Resp: 16  SpO2: 97 %  O2 Device: None (Room air)  Vision Exceptions:  (wears contacts (decreased R peripheral vision))  Hearing: Within functional limits          Safety Devices  Type of Devices: Call light within reach;Nurse notified;Gait belt;Left in chair;Chair alarm in place  Restraints  Restraints Initially in Place: No  Bed Mobility Training  Bed Mobility Training: Yes  Overall Level of Assistance: Supervision  Supine to Sit: Supervision  Scooting: Supervision  Balance  Sitting: Intact  Standing: Impaired (RW)  Standing - Static: Good  Standing - Dynamic: Fair  Transfer Training  Transfer Training: Yes  Overall Level of Assistance: Contact-guard assistance (RW)  Sit to Stand: Contact-guard assistance (RW)  Stand to Sit: Contact-guard assistance  Bed to Chair: Contact-guard assistance (RW)  Gait  Overall Level of Assistance: Contact-guard assistance (no overt LOB; pt reports slight light headedness; increased time)  Distance (ft): 20 Feet (20ft, 250ft)  Assistive Device: Walker, rolling     AROM: Within functional limits  PROM: Within functional limits  Strength: Within functional limits  Coordination: Within functional limits  Tone: Normal  ADL  Feeding: Independent  Additional Comments: Anticipate pt needing up to Min A for ADLs based on ROM, strength, and balance     Activity Tolerance  Activity Tolerance: Patient tolerated evaluation without incident           Cognition  Overall Cognitive Status: Exceptions  Arousal/Alertness: Delayed responses to stimuli  Following Commands:  Follows one step commands with increased time  Cognition Comment: Slight delayed prcessing                  Education Given To: Patient  Education Provided: Role of Therapy;Plan of Care;Transfer Training  Education Method: Demonstration;Verbal  Barriers to Learning: Cognition  Education Outcome: Verbalized understanding;Demonstrated understanding          AM-PAC Score        AM-PAC Inpatient Daily Activity Raw Score: 21 (06/24/22 0856)  AM-PAC Inpatient ADL T-Scale Score : 44.27 (06/24/22 0856)  ADL Inpatient CMS 0-100% Score: 32.79 (06/24/22 0856)  ADL Inpatient CMS G-Code Modifier : CJ (06/24/22 2719)    Goals  Short Term Goals  Time Frame for Short term goals: prior to D/C  Short Term Goal 1: complete functional mobility and transfers Mod Ind  Short Term Goal 2: complete bathing and dressing Mod Ind  Short Term Goal 3: complete toileting Mod Ind  Short Term Goal 4: complete grooming in stance at sink [address] Term Goals  Time Frame for Long term goals : STG=LTG  Patient Goals   Patient goals : return home       Therapy Time   Individual Concurrent Group Co-treatment   Time In 0817         Time Out 0855         Minutes 38         Timed Code Treatment Minutes: 23 Minutes (15 minute eval)       SALVADOR Giron/L

## 2022-06-24 NOTE — PROGRESS NOTES
Hospitalist Progress Note      PCP: SHAHEED ZAMARRIPA, APRN - CNP    Date of Admission: 6/20/2022    Subjective: flat affect, wants to avoid blood transfusion    Medications:  Reviewed    Infusion Medications    sodium chloride       Scheduled Medications    apixaban  2.5 mg Oral BID    levETIRAcetam  2,000 mg Oral Daily    levETIRAcetam  2,500 mg Oral Nightly    pantoprazole  40 mg Oral QAM AC    sodium chloride flush  5-40 mL IntraVENous 2 times per day    dexamethasone  4 mg IntraVENous Q6H    metroNIDAZOLE  500 mg Topical Daily     PRN Meds: hydrOXYzine HCl, oxyCODONE-acetaminophen, oxyCODONE-acetaminophen, diclofenac sodium, morphine, sodium chloride flush, sodium chloride, ondansetron **OR** ondansetron, polyethylene glycol, acetaminophen **OR** acetaminophen      Intake/Output Summary (Last 24 hours) at 6/23/2022 2317  Last data filed at 6/23/2022 2212  Gross per 24 hour   Intake 660 ml   Output 975 ml   Net -315 ml       Physical Exam Performed:    /71   Pulse (!) 111   Temp 97.5 °F (36.4 °C) (Oral)   Resp 20   Ht 5' 3\" (1.6 m)   Wt 175 lb 7.8 oz (79.6 kg)   SpO2 96%   BMI 31.09 kg/m²     General appearance:  No apparent distress, appears stated age and cooperative. HEENT:  Normal cephalic, atraumatic without obvious deformity. Pupils equal, round, and reactive to light.  Extra ocular muscles intact. Conjunctivae/corneas clear. Neck: Supple, with full range of motion. No jugular venous distention. Trachea midline. Respiratory:  Normal respiratory effort. Clear to auscultation, bilaterally without Rales/Wheezes/Rhonchi. Cardiovascular:  Regular rate and rhythm with normal S1/S2 without murmurs, rubs or gallops. Abdomen: Soft, non-tender, non-distended with normal bowel sounds. Musculoskeletal:  No clubbing, cyanosis or edema bilaterally.  Full range of motion without deformity. Skin: Skin color, texture, turgor normal.  No rashes or lesions.   Neurologic:  Neurovascularly intact without any focal sensory/motor deficits. Cranial nerves: II-XII intact, grossly non-focal.  Psychiatric:  Alert and oriented, thought content appropriate, normal insight  Capillary Refill: Brisk,3 seconds, normal  Peripheral Pulses: +2 palpable, equal bilaterally   Breast: fungating bleeding necrotic breast mass with odor       Labs:   Recent Labs     06/21/22  0600 06/21/22  0600 06/22/22  0625 06/23/22  0610 06/23/22  1740   WBC 16.5*  --  22.8* 23.6*  --    HGB 7.0*   < > 6.8* 6.7* 7.0*   HCT 21.2*   < > 20.5* 20.5* 21.6*   *  --  784* 827*  --     < > = values in this interval not displayed. Recent Labs     06/21/22  0600 06/22/22  0625 06/23/22  0610    139 139   K 4.8 4.2 4.4    104 105   CO2 23 23 23   BUN 7 10 12   CREATININE 0.5* <0.5* <0.5*   CALCIUM 9.1 9.3 9.2     Recent Labs     06/21/22  0600 06/22/22  0625 06/23/22  0610   AST 12* 16 16   ALT 13 15 15   BILITOT <0.2 <0.2 <0.2   ALKPHOS 78 77 78     No results for input(s): INR in the last 72 hours. No results for input(s): Belén Donahue in the last 72 hours. Urinalysis:      Lab Results   Component Value Date    NITRU Negative 03/27/2022    WBCUA 0-2 03/27/2022    BACTERIA 3+ 03/27/2022    RBCUA 0-2 03/27/2022    BLOODU Negative 03/27/2022    SPECGRAV >=1.030 03/27/2022    GLUCOSEU Negative 03/27/2022       Radiology:  MRI BRAIN W WO CONTRAST   Final Result   Worsening recurrent metastasis involving the left frontoparietal lobe, left   parietal lobe and left occipital lobe. Severe surrounding vasogenic edema is   identified. Mild to moderate midline shift measures 6 mm previously   measuring 9 mm. New right parietal lobe metastasis measuring 1.3 cm with surrounding   vasogenic edema. Left frontal parietal and left occipital craniotomies are noted from prior   metastasis resection. RECOMMENDATIONS:   Unavailable         CT HEAD WO CONTRAST   Final Result   1.  Increase in the vasogenic edema involving the left temporal and parietal   lobes with mass effect and new left-to-right midline shift measuring 7.3 mm.   2. Question vasogenic edema in the high right parietal lobe which could be   secondary to a new metastasis. These findings are concerning for progression of intracranial metastases. An   MRI brain with contrast is recommended for further evaluation. Assessment/Plan:    Active Hospital Problems    Diagnosis     Breast pain, right [N64.4]      Priority: Medium    Breast cancer, stage 4, right (HCC) [C50.911]      Priority: Medium           Advanced Stage 4 breast cancer  Metastatic brain lesions with midline shift vasogenic edema  Bleeding breast mass  Hx DVT/PEs   On oral AC  Hx of IVC filter s/p recent removal     PLAN:     Held eliquis and plavix  Serial h and h  Transfuse for hgb<7  Breast surgeon consulted  hemonc consulted  ? replacement of IVC filter as seems to not be tolerating oral AC - risk outweighs benefit per Dr Ina Marina plans for IVC filter placement  Poss initiation of chemo  Neurosurgery consulted for brain lesions-they are aware of CT and OK with her staying at [facility] for now - consulted radiation onc - recently completed brain radiation with recurrence - no further ratiotion tx  On decadron iv  keppra for sz prevention        DVT Prophylaxis: scds  Diet: ADULT DIET;  Regular  ADULT ORAL NUTRITION SUPPLEMENT; Breakfast, Dinner; Standard High Calorie/High Protein Oral Supplement  Code Status: DNR-CC    PT/OT Eval Status: ordered    Moncho Vogel MD

## 2022-06-24 NOTE — PROGRESS NOTES
Hospitalist Progress Note      PCP: SHAHEED ZAMARRIPA, APRN - CNP    Date of Admission: 6/20/2022    Subjective: denies any complaints, agreed to take blood transfusion today    Medications:  Reviewed    Infusion Medications    sodium chloride      sodium chloride      sodium chloride       Scheduled Medications    apixaban  2.5 mg Oral BID    levETIRAcetam  2,000 mg Oral Daily    levETIRAcetam  2,500 mg Oral Nightly    pantoprazole  40 mg Oral QAM AC    sodium chloride flush  5-40 mL IntraVENous 2 times per day    dexamethasone  4 mg IntraVENous Q6H    metroNIDAZOLE  500 mg Topical Daily     PRN Meds: sodium chloride, sodium chloride, hydrOXYzine HCl, oxyCODONE-acetaminophen, oxyCODONE-acetaminophen, diclofenac sodium, morphine, sodium chloride flush, sodium chloride, ondansetron **OR** ondansetron, polyethylene glycol, acetaminophen **OR** acetaminophen      Intake/Output Summary (Last 24 hours) at 6/24/2022 1758  Last data filed at 6/24/2022 1309  Gross per 24 hour   Intake 600 ml   Output 350 ml   Net 250 ml       Physical Exam Performed:    /79   Pulse (!) 105   Temp 98.1 °F (36.7 °C) (Oral)   Resp 16   Ht 5' 3\" (1.6 m)   Wt 175 lb 8 oz (79.6 kg)   SpO2 97%   BMI 31.09 kg/m²     General appearance:  No apparent distress, appears stated age and cooperative. HEENT:  Normal cephalic, atraumatic without obvious deformity. Pupils equal, round, and reactive to light.  Extra ocular muscles intact. Conjunctivae/corneas clear. Neck: Supple, with full range of motion. No jugular venous distention. Trachea midline. Respiratory:  Normal respiratory effort. Clear to auscultation, bilaterally without Rales/Wheezes/Rhonchi. Cardiovascular:  Regular rate and rhythm with normal S1/S2 without murmurs, rubs or gallops. Abdomen: Soft, non-tender, non-distended with normal bowel sounds.   Musculoskeletal:  No clubbing, cyanosis or edema bilaterally.  Full range of motion without RECOMMENDATIONS:   Unavailable         CT HEAD WO CONTRAST   Final Result   1. Increase in the vasogenic edema involving the left temporal and parietal   lobes with mass effect and new left-to-right midline shift measuring 7.3 mm.   2. Question vasogenic edema in the high right parietal lobe which could be   secondary to a new metastasis. These findings are concerning for progression of intracranial metastases. An   MRI brain with contrast is recommended for further evaluation. Assessment/Plan:    Active Hospital Problems    Diagnosis     Breast pain, right [N64.4]      Priority: Medium    Breast cancer, stage 4, right (HCC) [C50.911]      Priority: Medium         Advanced Stage 4 breast cancer  Metastatic brain lesions with midline shift vasogenic edema  Bleeding breast mass  Hx DVT/PEs   On oral AC  Hx of IVC filter s/p recent removal  Acute blood loss anemia     PLAN:     Held eliquis and plavix  Serial h and h  Transfuse for hgb<7  Breast surgeon consulted  hemonc consulted  ? replacement of IVC filter as seems to not be tolerating oral AC - risk outweighs benefit per Dr Ina Marina plans for IVC filter placement  Poss initiation of chemo - outpt  Neurosurgery consulted for brain lesions-they are aware of CT and OK with her staying at [facility] for now - consulted radiation onc - recently completed brain radiation with recurrence - no further radiation tx  On decadron iv  keppra for sz prevention  Agreed to blood transfusion today        DVT Prophylaxis: eliquis  Diet: ADULT DIET;  Regular  ADULT ORAL NUTRITION SUPPLEMENT; Breakfast, Dinner; Standard High Calorie/High Protein Oral Supplement  Code Status: DNR-CC    PT/OT Eval Status: ordered    Dispo - blood transfusion today, poss dc in am    Melisa Salazar MD

## 2022-06-24 NOTE — PROGRESS NOTES
Physical Therapy  Facility/Department: [facility] 5N PROGRESSIVE CARE  Physical Therapy Initial Assessment    Name: Lilly Burton  : 1975  MRN: [id number]  Date of Service: 2022    Discharge Recommendations:  Continue to assess pending progress,24 hour supervision or assist,Patient would benefit from continued therapy after discharge   PT Equipment Recommendations  Equipment Needed: Candice Ramírez scored a 19/24 on the AM-PAC short mobility form. Current research shows that an AM-PAC score of 18 or greater is typically associated with a discharge to the patient's home setting. Based on the patient's AM-PAC score and their current functional mobility deficits, it is recommended that the patient have 2-3 sessions per week of Physical Therapy at d/c to increase the patient's independence. At this time, this patient demonstrates the endurance and safety to discharge home with home services and a follow up treatment frequency of 2-3x/wk. Please see assessment section for further patient specific details. If patient discharges prior to next session this note will serve as a discharge summary. Please see below for the latest assessment towards goals. Patient Diagnosis(es): The primary encounter diagnosis was Breast pain, right. Diagnoses of Bleeding from breast, History of breast cancer, Metastatic malignant neoplasm, unspecified site ([address] Vasogenic edema ([address] Tachycardia, and Anemia, unspecified type were also pertinent to this visit. Past Medical History:  has a past medical history of Cancer ([address] History of blood transfusion, Hx of blood clots, and Thyroid nodule. Past Surgical History:  has a past surgical history that includes craniotomy (Left, 2022); IR GUIDED IVC FILTER PLACEMENT (N/A, 2022); IR GUIDED IVC FILTER PLACEMENT (2022); IR GUIDED THROMB MECH VEIN (2022); Upper gastrointestinal endoscopy (N/A, 2022);  IR PORT PLACEMENT > 5 YEARS (Left, 05/24/2022); IR PORT PLACEMENT > 5 YEARS (5/24/2022); and IR GUIDED IVC FILTER RETRIEVAL (6/10/2022). Assessment   Body Structures, Functions, Activity Limitations Requiring Skilled Therapeutic Intervention: Decreased functional mobility ; Decreased tolerance to work activity; Decreased strength;Decreased endurance;Decreased cognition;Decreased balance  Assessment: Patient is a 48 y.o. female has a past medical history of Cancer, blood clots, and Thyroid nodule. And stage IV breast CA with brain metastases s/p resection and WBRT and radiation to her breast. She was supposed to start chemo this week, but then had bleeding from her breast and presented to ER. Reports feeling a bit dizzy, but no other new symptoms. MRI done which showed recurrence in brain with new R parietal lesion as well. Pt with a stay at [facility] last week due to a DVT. Pt lives with her mother in a home with 6 JULIAN and a flight to her bedroom. Pt is ind with functional mobililty using a RW and Prn assist for ADLs. Today, 6/24, pt presenting below her baseline function. Completed bed mobility with SBA, transfers with CGA, and community distance amb with RW CGA. At this time, recommend pt has initial 24 hr supervision and home PT to address LE strength, balance, and functional mobility training. WIll cont to assess.   Treatment Diagnosis: Decreased balance, decreased endurance, generalized weakness  Therapy Prognosis: Guarded;Good  Decision Making: Medium Complexity  History: See below  Exam: See below  Clinical Presentation: Evolving  Requires PT Follow-Up: Yes  Activity Tolerance  Activity Tolerance: Patient tolerated evaluation without incident     Plan   Plan  Plan: 3-5 times per week  Current Treatment Recommendations: Strengthening,Balance training,Functional mobility training,Transfer training,Endurance training,Gait training,Stair training,Neuromuscular re-education,Home exercise program,Safety education & training,Patient/Caregiver education & training  Safety Devices  Type of Devices: Call light within reach,Nurse notified,Gait belt,Left in chair,Chair alarm in place  Restraints  Restraints Initially in Place: No     Restrictions  Restrictions/Precautions  Restrictions/Precautions: Fall Risk     Subjective   General  Chart Reviewed: Yes  Patient assessed for rehabilitation services?: Yes  Additional Pertinent Hx: Per H&P \"Patient is a 48 y.o. female  has a past medical history of Cancer ([address] History of blood transfusion, Hx of blood clots, and Thyroid nodule. And stage IV breast CA with brain metastases s/p resection and WBRT and radiation to her breast.  She was supposed to start chemo this week, but then had bleeding from her breast and presented to ER. Reports feeling a bit dizzy, but no other new symptoms. MRI done which showed recurrence in brain with new R parietal lesion as well. Per all notes, patient wants to continue aggressive care. Did have prior blood clots with IVC filter placed and then removed. Seen by interventional cards/vascular who feels she is extremely high risk of clotting off prior stents if new IVC filter placed and recommend restarting anticoagulation. \"  Response To Previous Treatment: Not applicable  Family / Caregiver Present: No  Referring Practitioner: Nayely Jo MD  Referral Date : 06/23/22  Diagnosis: Right breast bleeding  Follows Commands: Within Functional Limits  Subjective  Subjective: Pt supine in bed upon arrival. Pt reporting 6/10 right breast pain. Pleasant and agreeable to therapy evaluation.          Social/Functional History  Social/Functional History  Lives With: Parent (Mom; has 24 hour supervision)  Type of Home: House  Home Layout: Multi-level,Bed/Bath upstairs,1/2 bath on main level,Able to Live on Main level with bedroom/bathroom  Home Access: Stairs to enter with rails  Entrance Stairs - Number of Steps: 6 to enter with B rails; full flight to 2nd  Entrance Stairs - Rails: Left  Bathroom Shower/Tub: Tub/Shower unit  Bathroom Toilet: Standard  Home Equipment: kenroy Kwok  Receives Help From: Family  ADL Assistance: Independent (sponge bath recently)  Homemaking Assistance:  (Mom performs)  Ambulation Assistance: Independent (with RW)  Transfer Assistance: Independent  Active : Yes (not since sugery)  Mode of Transportation: Car  Occupation: Full time employment  Type of Occupation:   Additional Comments: Reports 1 fall ~1 week ago due to balance     Vision/Hearing  Vision  Vision: Impaired  Vision Exceptions:  (wears contacts (decreased R peripheral vision))  Hearing  Hearing: Within functional limits      Cognition   Orientation  Orientation Level: Disoriented to time;Oriented to person  Cognition  Overall Cognitive Status: Exceptions  Cognition Comment: Slight delayed prcessing     Objective   Gross Assessment  AROM: Within functional limits  Strength:  Within functional limits  Coordination: Within functional limits  Tone: Normal  Sensation: Intact (Denies N/T)      Bed Mobility Training  Bed Mobility Training: Yes  Overall Level of Assistance: Supervision  Supine to Sit: Supervision  Scooting: Supervision  Balance  Sitting: Intact  Standing: Impaired (RW)  Standing - Static: Good  Standing - Dynamic: Fair  Transfer Training  Transfer Training: Yes  Overall Level of Assistance: Contact-guard assistance (RW)  Sit to Stand: Contact-guard assistance (RW)  Stand to Sit: Contact-guard assistance  Bed to Chair: Contact-guard assistance (RW)  Gait  Overall Level of Assistance: Contact-guard assistance (no overt LOB; pt reports slight light headedness; increased time)  Distance (ft): 20 Feet (20ft, 250ft)  Assistive Device: Walker, rolling  Bed mobility  Supine to Sit: Stand by assistance (elevated HOB)  Scooting: Stand by assistance  Transfers  Sit to Stand: Contact guard assistance (Cued for hand placement)  Stand to sit: Contact guard assistance  Ambulation  Surface: level tile  Device: Rolling Walker  Assistance: Contact guard assistance  Quality of Gait: Small short steps, steady on feet, mild dizziness at first but decreased, decreased pace  Gait Deviations: Slow Ana;Decreased step length;Decreased step height  Distance: 20', 250'  Comments: no LOB throughuot    AM-PAC Score  AM-PAC Inpatient Mobility Raw Score : 19 (06/24/22 0901)  AM-PAC Inpatient T-Scale Score : 45.44 (06/24/22 0901)  Mobility Inpatient CMS 0-100% Score: 41.77 (06/24/22 0901)  Mobility Inpatient CMS G-Code Modifier : CK (06/24/22 0901)          Goals  Short Term Goals  Time Frame for Short term goals: While in acute care  Short term goal 1: Bed mobility Lara  Short term goal 2: transfers Lara  Short term goal 3: amb 200' with supervision  Short term goal 4: initiate stair training  Patient Goals   Patient goals :  To return home       Education  Patient Education  Education Given To: Patient  Education Provided: Role of Therapy;Plan of Care;Orientation;Transfer Training  Education Provided Comments: use of call light to get up  Education Method: Demonstration;Verbal  Barriers to Learning: None  Education Outcome: Verbalized understanding      Therapy Time   Individual Concurrent Group Co-treatment   Time In [phone]         Time Out 0856         Minutes 39               Electronically signed by Jordan Miranda on 6/24/2022 at 9:02 AM

## 2022-06-25 LAB
A/G RATIO: 1 (ref 1.1–2.2)
ALBUMIN SERPL-MCNC: 3.2 G/DL (ref 3.4–5)
ALP BLD-CCNC: 80 U/L (ref 40–129)
ALT SERPL-CCNC: 21 U/L (ref 10–40)
ANION GAP SERPL CALCULATED.3IONS-SCNC: 12 MMOL/L (ref 3–16)
ANISOCYTOSIS: ABNORMAL
AST SERPL-CCNC: 38 U/L (ref 15–37)
BANDED NEUTROPHILS RELATIVE PERCENT: 2 % (ref 0–7)
BASOPHILS ABSOLUTE: 0 K/UL (ref 0–0.2)
BASOPHILS RELATIVE PERCENT: 0 %
BILIRUB SERPL-MCNC: <0.2 MG/DL (ref 0–1)
BUN BLDV-MCNC: 14 MG/DL (ref 7–20)
CALCIUM SERPL-MCNC: 9 MG/DL (ref 8.3–10.6)
CHLORIDE BLD-SCNC: 99 MMOL/L (ref 99–110)
CO2: 23 MMOL/L (ref 21–32)
CREAT SERPL-MCNC: <0.5 MG/DL (ref 0.6–1.1)
EOSINOPHILS ABSOLUTE: 0 K/UL (ref 0–0.6)
EOSINOPHILS RELATIVE PERCENT: 0 %
GFR AFRICAN AMERICAN: >60
GFR NON-AFRICAN AMERICAN: >60
GLUCOSE BLD-MCNC: 135 MG/DL (ref 70–99)
HCT VFR BLD CALC: 26.2 % (ref 36–48)
HEMOGLOBIN: 8.8 G/DL (ref 12–16)
LYMPHOCYTES ABSOLUTE: 0.7 K/UL (ref 1–5.1)
LYMPHOCYTES RELATIVE PERCENT: 3 %
MACROCYTES: ABNORMAL
MCH RBC QN AUTO: 28.8 PG (ref 26–34)
MCHC RBC AUTO-ENTMCNC: 33.6 G/DL (ref 31–36)
MCV RBC AUTO: 85.7 FL (ref 80–100)
METAMYELOCYTES RELATIVE PERCENT: 1 %
MONOCYTES ABSOLUTE: 1.5 K/UL (ref 0–1.3)
MONOCYTES RELATIVE PERCENT: 7 %
MYELOCYTE PERCENT: 1 %
NEUTROPHILS ABSOLUTE: 19.6 K/UL (ref 1.7–7.7)
NEUTROPHILS RELATIVE PERCENT: 86 %
NUCLEATED RED BLOOD CELLS: 4 /100 WBC
OVALOCYTES: ABNORMAL
PDW BLD-RTO: 18.8 % (ref 12.4–15.4)
PLATELET # BLD: 732 K/UL (ref 135–450)
PMV BLD AUTO: 6.3 FL (ref 5–10.5)
POLYCHROMASIA: ABNORMAL
POTASSIUM SERPL-SCNC: 5.6 MMOL/L (ref 3.5–5.1)
RBC # BLD: 3.05 M/UL (ref 4–5.2)
REASON FOR REJECTION: NORMAL
REJECTED TEST: NORMAL
SLIDE REVIEW: ABNORMAL
SODIUM BLD-SCNC: 134 MMOL/L (ref 136–145)
TOTAL PROTEIN: 6.5 G/DL (ref 6.4–8.2)
WBC # BLD: 21.8 K/UL (ref 4–11)

## 2022-06-25 PROCEDURE — 2580000003 HC RX 258: Performed by: INTERNAL MEDICINE

## 2022-06-25 PROCEDURE — 6370000000 HC RX 637 (ALT 250 FOR IP): Performed by: INTERNAL MEDICINE

## 2022-06-25 PROCEDURE — 2060000000 HC ICU INTERMEDIATE R&B

## 2022-06-25 PROCEDURE — 94760 N-INVAS EAR/PLS OXIMETRY 1: CPT

## 2022-06-25 PROCEDURE — 6360000002 HC RX W HCPCS: Performed by: INTERNAL MEDICINE

## 2022-06-25 PROCEDURE — 36415 COLL VENOUS BLD VENIPUNCTURE: CPT

## 2022-06-25 PROCEDURE — 85025 COMPLETE CBC W/AUTO DIFF WBC: CPT

## 2022-06-25 PROCEDURE — 80053 COMPREHEN METABOLIC PANEL: CPT

## 2022-06-25 RX ADMIN — SODIUM CHLORIDE, PRESERVATIVE FREE 10 ML: 5 INJECTION INTRAVENOUS at 21:05

## 2022-06-25 RX ADMIN — DICLOFENAC 2 G: 10 GEL TOPICAL at 21:10

## 2022-06-25 RX ADMIN — APIXABAN 2.5 MG: 2.5 TABLET, FILM COATED ORAL at 09:42

## 2022-06-25 RX ADMIN — PANTOPRAZOLE SODIUM 40 MG: 40 TABLET, DELAYED RELEASE ORAL at 06:47

## 2022-06-25 RX ADMIN — LEVETIRACETAM 2000 MG: 500 TABLET, FILM COATED ORAL at 09:41

## 2022-06-25 RX ADMIN — DEXAMETHASONE SODIUM PHOSPHATE 4 MG: 4 INJECTION, SOLUTION INTRAMUSCULAR; INTRAVENOUS at 09:42

## 2022-06-25 RX ADMIN — DEXAMETHASONE SODIUM PHOSPHATE 4 MG: 4 INJECTION, SOLUTION INTRAMUSCULAR; INTRAVENOUS at 02:52

## 2022-06-25 RX ADMIN — OXYCODONE AND ACETAMINOPHEN 2 TABLET: 5; 325 TABLET ORAL at 02:52

## 2022-06-25 RX ADMIN — LEVETIRACETAM 2500 MG: 500 TABLET, FILM COATED ORAL at 21:05

## 2022-06-25 RX ADMIN — APIXABAN 2.5 MG: 2.5 TABLET, FILM COATED ORAL at 21:04

## 2022-06-25 RX ADMIN — DEXAMETHASONE SODIUM PHOSPHATE 4 MG: 4 INJECTION, SOLUTION INTRAMUSCULAR; INTRAVENOUS at 21:05

## 2022-06-25 RX ADMIN — OXYCODONE AND ACETAMINOPHEN 1 TABLET: 5; 325 TABLET ORAL at 13:09

## 2022-06-25 RX ADMIN — SODIUM CHLORIDE, PRESERVATIVE FREE 10 ML: 5 INJECTION INTRAVENOUS at 09:00

## 2022-06-25 RX ADMIN — DEXAMETHASONE SODIUM PHOSPHATE 4 MG: 4 INJECTION, SOLUTION INTRAMUSCULAR; INTRAVENOUS at 14:40

## 2022-06-25 RX ADMIN — OXYCODONE AND ACETAMINOPHEN 2 TABLET: 5; 325 TABLET ORAL at 21:04

## 2022-06-25 RX ADMIN — METRONIDAZOLE 500 MG: 500 TABLET ORAL at 06:47

## 2022-06-25 ASSESSMENT — PAIN SCALES - GENERAL
PAINLEVEL_OUTOF10: 5
PAINLEVEL_OUTOF10: 5
PAINLEVEL_OUTOF10: 7
PAINLEVEL_OUTOF10: 8
PAINLEVEL_OUTOF10: 2
PAINLEVEL_OUTOF10: 8
PAINLEVEL_OUTOF10: 2

## 2022-06-25 ASSESSMENT — PAIN DESCRIPTION - PAIN TYPE
TYPE: CHRONIC PAIN
TYPE: CHRONIC PAIN

## 2022-06-25 ASSESSMENT — PAIN DESCRIPTION - ONSET
ONSET: ON-GOING
ONSET: ON-GOING

## 2022-06-25 ASSESSMENT — PAIN DESCRIPTION - ORIENTATION
ORIENTATION: RIGHT

## 2022-06-25 ASSESSMENT — PAIN DESCRIPTION - FREQUENCY
FREQUENCY: CONTINUOUS
FREQUENCY: CONTINUOUS

## 2022-06-25 ASSESSMENT — PAIN DESCRIPTION - DESCRIPTORS
DESCRIPTORS: ACHING;DISCOMFORT
DESCRIPTORS: ACHING
DESCRIPTORS: ACHING

## 2022-06-25 ASSESSMENT — PAIN DESCRIPTION - LOCATION
LOCATION: BREAST

## 2022-06-25 NOTE — PROGRESS NOTES
Hospitalist Progress Note      PCP: SHAHEED ZAMARRIPA, APRN - CNP    Date of Admission: 6/20/2022    Subjective: feeling tired today, mother at bedside    Medications:  Reviewed    Infusion Medications    sodium chloride      sodium chloride      sodium chloride       Scheduled Medications    apixaban  2.5 mg Oral BID    levETIRAcetam  2,000 mg Oral Daily    levETIRAcetam  2,500 mg Oral Nightly    pantoprazole  40 mg Oral QAM AC    sodium chloride flush  5-40 mL IntraVENous 2 times per day    dexamethasone  4 mg IntraVENous Q6H    metroNIDAZOLE  500 mg Topical Daily     PRN Meds: sodium chloride, sodium chloride, hydrOXYzine HCl, oxyCODONE-acetaminophen, oxyCODONE-acetaminophen, diclofenac sodium, morphine, sodium chloride flush, sodium chloride, ondansetron **OR** ondansetron, polyethylene glycol, acetaminophen **OR** acetaminophen      Intake/Output Summary (Last 24 hours) at 6/25/2022 1907  Last data filed at 6/25/2022 0434  Gross per 24 hour   Intake 802.5 ml   Output 100 ml   Net 702.5 ml       Physical Exam Performed:    /87   Pulse (!) 103   Temp 97.3 °F (36.3 °C) (Oral)   Resp 18   Ht 5' 3\" (1.6 m)   Wt 177 lb 11.1 oz (80.6 kg)   SpO2 98%   BMI 31.48 kg/m²     General appearance:  No apparent distress, appears stated age and cooperative. HEENT:  Normal cephalic, atraumatic without obvious deformity. Pupils equal, round, and reactive to light.  Extra ocular muscles intact. Conjunctivae/corneas clear. Neck: Supple, with full range of motion. No jugular venous distention. Trachea midline. Respiratory:  Normal respiratory effort. Clear to auscultation, bilaterally without Rales/Wheezes/Rhonchi. Cardiovascular:  Regular rate and rhythm with normal S1/S2 without murmurs, rubs or gallops. Abdomen: Soft, non-tender, non-distended with normal bowel sounds. Musculoskeletal:  No clubbing, cyanosis or edema bilaterally.  Full range of motion without deformity.   Skin: Skin color, texture, turgor normal.  No rashes or lesions. Neurologic:  Neurovascularly intact without any focal sensory/motor deficits. Cranial nerves: II-XII intact, grossly non-focal.  Psychiatric:  Alert and oriented, thought content appropriate, normal insight  Capillary Refill: Brisk,3 seconds, normal  Peripheral Pulses: +2 palpable, equal bilaterally   Breast: fungating bleeding necrotic breast mass with odor       Labs:   Recent Labs     06/23/22  0610 06/23/22  0610 06/23/22  1740 06/24/22  0700 06/25/22  0910   WBC 23.6*  --   --  22.9* 21.8*   HGB 6.7*   < > 7.0* 6.9* 8.8*   HCT 20.5*   < > 21.6* 21.6* 26.2*   *  --   --  831* 732*    < > = values in this interval not displayed. Recent Labs     06/23/22  0610 06/24/22  0700 06/25/22  0954    138 134*   K 4.4 4.5 5.6*    103 99   CO2 23 22 23   BUN 12 10 14   CREATININE <0.5* 0.5* <0.5*   CALCIUM 9.2 9.1 9.0     Recent Labs     06/23/22  0610 06/24/22  0700 06/25/22  0954   AST 16 17 38*   ALT 15 13 21   BILITOT <0.2 <0.2 <0.2   ALKPHOS 78 68 80     No results for input(s): INR in the last 72 hours. No results for input(s): Hardik Breeze in the last 72 hours. Urinalysis:      Lab Results   Component Value Date    NITRU Negative 03/27/2022    WBCUA 0-2 03/27/2022    BACTERIA 3+ 03/27/2022    RBCUA 0-2 03/27/2022    BLOODU Negative 03/27/2022    SPECGRAV >=1.030 03/27/2022    GLUCOSEU Negative 03/27/2022       Radiology:  MRI BRAIN W WO CONTRAST   Final Result   Worsening recurrent metastasis involving the left frontoparietal lobe, left   parietal lobe and left occipital lobe. Severe surrounding vasogenic edema is   identified. Mild to moderate midline shift measures 6 mm previously   measuring 9 mm. New right parietal lobe metastasis measuring 1.3 cm with surrounding   vasogenic edema. Left frontal parietal and left occipital craniotomies are noted from prior   metastasis resection.       RECOMMENDATIONS:   Unavailable CT HEAD WO CONTRAST   Final Result   1. Increase in the vasogenic edema involving the left temporal and parietal   lobes with mass effect and new left-to-right midline shift measuring 7.3 mm.   2. Question vasogenic edema in the high right parietal lobe which could be   secondary to a new metastasis. These findings are concerning for progression of intracranial metastases. An   MRI brain with contrast is recommended for further evaluation. Assessment/Plan:    Active Hospital Problems    Diagnosis     Breast pain, right [N64.4]      Priority: Medium    Breast cancer, stage 4, right (HCC) [C50.911]      Priority: Medium         Advanced Stage 4 breast cancer  Metastatic brain lesions with midline shift vasogenic edema  Bleeding breast mass  Hx DVT/PEs   On oral AC  Hx of IVC filter s/p recent removal  Acute blood loss anemia     PLAN:     Held eliquis and plavix  Serial h and h  Transfuse for hgb<7  Breast surgeon consulted  hemonc consulted  ? replacement of IVC filter as seems to not be tolerating oral AC - risk outweighs benefit per Dr Kristie Hannah plans for IVC filter placement  Poss initiation of chemo - outpt  Neurosurgery consulted for brain lesions-they are aware of CT and OK with her staying at [facility] for now - consulted radiation onc - recently completed brain radiation with recurrence - no further radiation tx  On decadron iv  keppra for sz prevention  Agreed to blood transfusion hb improved to >8        DVT Prophylaxis: eliquis  Diet: ADULT DIET; Regular  ADULT ORAL NUTRITION SUPPLEMENT; Breakfast, Dinner; Standard High Calorie/High Protein Oral Supplement  Code Status: DNR-CC    PT/OT Eval Status: ordered    Dispo - cont care.  Pt doesn't feel comfortable for dc today, dc in am    Corina Bolton MD

## 2022-06-25 NOTE — PROGRESS NOTES
Blood transfusion completed. VSS. No signs of transfusion reaction noted. Pt denies any needs. Will continue to monitor.      Electronically signed by Tres Montalvo RN on 6/25/2022 at 12:44 AM

## 2022-06-25 NOTE — PROGRESS NOTES
Hematology Oncology Daily Progress Note    Admit Date: 6/20/2022  Hospital day several    Subjective:     Still complaining pain, 7 out of 10. Denies chest pain or shortness of breath. No active bleeding at present. Scheduled Meds:   apixaban  2.5 mg Oral BID    levETIRAcetam  2,000 mg Oral Daily    levETIRAcetam  2,500 mg Oral Nightly    pantoprazole  40 mg Oral QAM AC    sodium chloride flush  5-40 mL IntraVENous 2 times per day    dexamethasone  4 mg IntraVENous Q6H    metroNIDAZOLE  500 mg Topical Daily     Continuous Infusions:   sodium chloride      sodium chloride      sodium chloride       PRN Meds:sodium chloride, sodium chloride, hydrOXYzine HCl, oxyCODONE-acetaminophen, oxyCODONE-acetaminophen, diclofenac sodium, morphine, sodium chloride flush, sodium chloride, ondansetron **OR** ondansetron, polyethylene glycol, acetaminophen **OR** acetaminophen    Review of Systems  Pertinent items are noted in HPI. REVIEW OF SYSTEMS:         · Constitutional: Denies fever, sweats, weight loss     · Eyes: No visual changes or diplopia. No scleral icterus. · ENT: No Headaches, hearing loss or vertigo. No mouth sores or sore throat. · Cardiovascular: No chest pain, dyspnea on exertion, palpitations or loss of consciousness. · Respiratory: No cough or wheezing, no sputum production. No hemoptysis. .    · Gastrointestinal: No abdominal pain, appetite loss, blood in stools. No change in bowel habits. · Genitourinary: No dysuria, trouble voiding, or hematuria. · Musculoskeletal:  Generalized weakness. No joint complaints. · Integumentary: No rash or pruritis. · Neurological: No headache, diplopia. No change in gait, balance, or coordination. No paresthesias. · Endocrine: No temperature intolerance. No excessive thirst, fluid intake, or urination. · Hematologic/Lymphatic: No abnormal bruising or ecchymoses, blood clots or swollen lymph nodes. · Allergic/Immunologic: No nasal congestion or hives. ·     Objective:     No data found. I/O last 3 completed shifts: In: 1282.5 [P.O.:700; Blood:582.5]  Out: 450 [Urine:450]  No intake/output data recorded.     /87   Pulse (!) 103   Temp 97.3 °F (36.3 °C) (Oral)   Resp 18   Ht 5' 3\" (1.6 m)   Wt 177 lb 11.1 oz (80.6 kg)   SpO2 98%   BMI 31.48 kg/m²     General Appearance:    Alert, cooperative, no distress, appears stated age   Head:    Normocephalic, without obvious abnormality, atraumatic   Eyes:    PERRL, conjunctiva/corneas clear, EOM's intact, fundi     benign, both eyes        Ears:    Normal TM's and external ear canals, both ears   Nose:   Nares normal, septum midline, mucosa normal, no drainage    or sinus tenderness   Throat:   Lips, mucosa, and tongue normal; teeth and gums normal   Neck:   Supple, symmetrical, trachea midline, no adenopathy;        thyroid:  No enlargement/tenderness/nodules; no carotid    bruit or JVD   Back:     Symmetric, no curvature, ROM normal, no CVA tenderness   Lungs:     Clear to auscultation bilaterally, respirations unlabored   Chest wall:    No tenderness or deformity   Heart:    Regular rate and rhythm, S1 and S2 normal, no murmur, rub   or gallop   Abdomen:     Soft, non-tender, bowel sounds active all four quadrants,     no masses, no organomegaly           Extremities:   Extremities normal, atraumatic, no cyanosis or edema   Pulses:   2+ and symmetric all extremities   Skin:   Skin color, texture, turgor normal, no rashes or lesions   Lymph nodes:   Cervical, supraclavicular, and axillary nodes normal   Neurologic:   Stable         Data Review  Lab Results   Component Value Date    WBC 21.8 (H) 06/25/2022    HGB 8.8 (L) 06/25/2022    HCT 26.2 (L) 06/25/2022    MCV 85.7 06/25/2022     (H) 06/25/2022    LYMPHOPCT 3.0 06/25/2022    RBC 3.05 (L) 06/25/2022    MCH 28.8 06/25/2022    MCHC 33.6 06/25/2022    RDW 18.8 (H) 06/25/2022       Lab Results   Component Value Date     (L) 06/25/2022    K 5.6 (H) 06/25/2022    CL 99 06/25/2022    CO2 23 06/25/2022    BUN 14 06/25/2022    CREATININE <0.5 (L) 06/25/2022    GLUCOSE 135 (H) 06/25/2022    CALCIUM 9.0 06/25/2022    PROT 6.5 06/25/2022    LABALBU 3.2 (L) 06/25/2022    BILITOT <0.2 06/25/2022    ALKPHOS 80 06/25/2022    AST 38 (H) 06/25/2022    ALT 21 06/25/2022    LABGLOM >60 06/25/2022    GFRAA >60 06/25/2022    AGRATIO 1.0 (L) 06/25/2022         Assessment:     Principal Problem:    Breast cancer, stage 4, right (HCC)  Active Problems:    Breast pain, right  Resolved Problems:    * No resolved hospital problems. *      Plan:     1. Metastatic breast cancer. Her brain disease is worsening. She recently completed radiation roughly 6 weeks ago. Radiation oncology does not want to do anything different right now. Neurosurgery has no options. Continue steroids. She could be discharged on 4 mg decadron PO 3 times when she is discharged. Dr. Jazmyne Chinchilla had a very long discussion with the patient's mother yesterday. It is not clear how well the patient is processing her diagnosis. Her mother seems to understand that her situation is dire. We are all strongly recommending hospice. The patient seems against that herself. I strongly encouraged her to get a second opinion if desired. She could potentially be discharged soon to follow-up with Dr. Marquez Escobedo next week since the bleeding is minimal.    2.  History of DVT. She seems to be doing well on lower dose Eliquis. Cardiology does not recommend replacing her IVC filter since it will likely clot and recent stents were placed at [facility] is minimal.    3.  Anemia. I will order a transfusion if the patient is agreeable. I explained that it may make her stronger and be able to get her home sooner. She will think about it. Reviewed her labs. No signs of active bleeding. Hemoglobin went up after transfusion. Currently on short acting narcotics. May consider long-acting.   She and her family are refusing hospice. Okay to discharge from oncology perspective. Follow-up with Dr. Cheyenne Rosales.     Electronically signed by Rosanne Hodge MD on 6/25/2022 at 5:31 PM

## 2022-06-26 VITALS
BODY MASS INDEX: 30.78 KG/M2 | TEMPERATURE: 98.8 F | RESPIRATION RATE: 18 BRPM | SYSTOLIC BLOOD PRESSURE: 121 MMHG | HEIGHT: 63 IN | DIASTOLIC BLOOD PRESSURE: 74 MMHG | OXYGEN SATURATION: 96 % | HEART RATE: 103 BPM | WEIGHT: 173.72 LBS

## 2022-06-26 LAB
A/G RATIO: 1 (ref 1.1–2.2)
ACANTHOCYTES: ABNORMAL
ALBUMIN SERPL-MCNC: 3.2 G/DL (ref 3.4–5)
ALP BLD-CCNC: 67 U/L (ref 40–129)
ALT SERPL-CCNC: 27 U/L (ref 10–40)
ANION GAP SERPL CALCULATED.3IONS-SCNC: 14 MMOL/L (ref 3–16)
ANISOCYTOSIS: ABNORMAL
AST SERPL-CCNC: 30 U/L (ref 15–37)
BANDED NEUTROPHILS RELATIVE PERCENT: 7 % (ref 0–7)
BASOPHILS ABSOLUTE: 0 K/UL (ref 0–0.2)
BASOPHILS RELATIVE PERCENT: 0 %
BILIRUB SERPL-MCNC: <0.2 MG/DL (ref 0–1)
BUN BLDV-MCNC: 16 MG/DL (ref 7–20)
CALCIUM SERPL-MCNC: 9.2 MG/DL (ref 8.3–10.6)
CHLORIDE BLD-SCNC: 98 MMOL/L (ref 99–110)
CO2: 22 MMOL/L (ref 21–32)
CREAT SERPL-MCNC: <0.5 MG/DL (ref 0.6–1.1)
CRENATED RBC'S: ABNORMAL
EOSINOPHILS ABSOLUTE: 0 K/UL (ref 0–0.6)
EOSINOPHILS RELATIVE PERCENT: 0 %
GFR AFRICAN AMERICAN: >60
GFR NON-AFRICAN AMERICAN: >60
GLUCOSE BLD-MCNC: 117 MG/DL (ref 70–99)
HCT VFR BLD CALC: 26.7 % (ref 36–48)
HEMOGLOBIN: 9.1 G/DL (ref 12–16)
HYPOCHROMIA: ABNORMAL
LYMPHOCYTES ABSOLUTE: 1 K/UL (ref 1–5.1)
LYMPHOCYTES RELATIVE PERCENT: 5 %
MCH RBC QN AUTO: 29.4 PG (ref 26–34)
MCHC RBC AUTO-ENTMCNC: 33.9 G/DL (ref 31–36)
MCV RBC AUTO: 86.7 FL (ref 80–100)
METAMYELOCYTES RELATIVE PERCENT: 1 %
MONOCYTES ABSOLUTE: 1.4 K/UL (ref 0–1.3)
MONOCYTES RELATIVE PERCENT: 7 %
NEUTROPHILS ABSOLUTE: 17.9 K/UL (ref 1.7–7.7)
NEUTROPHILS RELATIVE PERCENT: 80 %
NUCLEATED RED BLOOD CELLS: 2 /100 WBC
OVALOCYTES: ABNORMAL
PDW BLD-RTO: 19.4 % (ref 12.4–15.4)
PLATELET # BLD: 712 K/UL (ref 135–450)
PLATELET SLIDE REVIEW: ABNORMAL
PMV BLD AUTO: 6.5 FL (ref 5–10.5)
POIKILOCYTES: ABNORMAL
POLYCHROMASIA: ABNORMAL
POTASSIUM SERPL-SCNC: 4.4 MMOL/L (ref 3.5–5.1)
RBC # BLD: 3.08 M/UL (ref 4–5.2)
SLIDE REVIEW: ABNORMAL
SODIUM BLD-SCNC: 134 MMOL/L (ref 136–145)
TEAR DROP CELLS: ABNORMAL
TOTAL PROTEIN: 6.5 G/DL (ref 6.4–8.2)
WBC # BLD: 20.3 K/UL (ref 4–11)

## 2022-06-26 PROCEDURE — 6370000000 HC RX 637 (ALT 250 FOR IP): Performed by: INTERNAL MEDICINE

## 2022-06-26 PROCEDURE — 6360000002 HC RX W HCPCS: Performed by: INTERNAL MEDICINE

## 2022-06-26 PROCEDURE — 2580000003 HC RX 258: Performed by: INTERNAL MEDICINE

## 2022-06-26 PROCEDURE — 85025 COMPLETE CBC W/AUTO DIFF WBC: CPT

## 2022-06-26 PROCEDURE — 80053 COMPREHEN METABOLIC PANEL: CPT

## 2022-06-26 RX ORDER — METHYLPREDNISOLONE 4 MG/1
TABLET ORAL
Qty: 1 KIT | Refills: 0 | Status: SHIPPED | OUTPATIENT
Start: 2022-06-26 | End: 2022-07-02

## 2022-06-26 RX ADMIN — DEXAMETHASONE SODIUM PHOSPHATE 4 MG: 4 INJECTION, SOLUTION INTRAMUSCULAR; INTRAVENOUS at 09:28

## 2022-06-26 RX ADMIN — DEXAMETHASONE SODIUM PHOSPHATE 4 MG: 4 INJECTION, SOLUTION INTRAMUSCULAR; INTRAVENOUS at 02:22

## 2022-06-26 RX ADMIN — METRONIDAZOLE 500 MG: 500 TABLET ORAL at 06:05

## 2022-06-26 RX ADMIN — SODIUM CHLORIDE, PRESERVATIVE FREE 10 ML: 5 INJECTION INTRAVENOUS at 09:29

## 2022-06-26 RX ADMIN — OXYCODONE AND ACETAMINOPHEN 1 TABLET: 5; 325 TABLET ORAL at 12:15

## 2022-06-26 RX ADMIN — PANTOPRAZOLE SODIUM 40 MG: 40 TABLET, DELAYED RELEASE ORAL at 06:05

## 2022-06-26 RX ADMIN — OXYCODONE AND ACETAMINOPHEN 2 TABLET: 5; 325 TABLET ORAL at 06:09

## 2022-06-26 RX ADMIN — LEVETIRACETAM 2000 MG: 500 TABLET, FILM COATED ORAL at 09:28

## 2022-06-26 RX ADMIN — APIXABAN 2.5 MG: 2.5 TABLET, FILM COATED ORAL at 09:28

## 2022-06-26 ASSESSMENT — PAIN SCALES - GENERAL
PAINLEVEL_OUTOF10: 0
PAINLEVEL_OUTOF10: 3
PAINLEVEL_OUTOF10: 6
PAINLEVEL_OUTOF10: 8

## 2022-06-26 ASSESSMENT — PAIN DESCRIPTION - LOCATION: LOCATION: BREAST

## 2022-06-26 ASSESSMENT — PAIN DESCRIPTION - ORIENTATION: ORIENTATION: RIGHT

## 2022-06-26 ASSESSMENT — PAIN DESCRIPTION - DESCRIPTORS: DESCRIPTORS: ACHING

## 2022-06-26 ASSESSMENT — PAIN - FUNCTIONAL ASSESSMENT: PAIN_FUNCTIONAL_ASSESSMENT: PREVENTS OR INTERFERES SOME ACTIVE ACTIVITIES AND ADLS

## 2022-06-26 NOTE — CARE COORDINATION
CASE MANAGEMENT DISCHARGE SUMMARY:    DISCHARGE DATE: 6/26/22     DISCHARGED TO: home with resumption of home care through [facility]. Discharging to Facility/ Agency   · Name: [facility]  · Address:   [address] [place], [place] [address]Quin Ph[address]s [address]  · Phone:  [phone]  · Fax:  [phone]                  TRANSPORTATION: patients mother will provide transportation              TIME: will arrange with bedside RN once seen by MD    PREFERRED PHARMACY: [address] [place] [address]. Dat Davis with [facility]. [phone]. [facility]. able to accept this patient. Liu Salazar will pull referral from [facility] once [address] and orders are completed. No other needs are indicated at this time.   Electronically signed by Arian Gordon on 6/26/2022 at 1:18  [phone]

## 2022-06-26 NOTE — DISCHARGE INSTR - COC
Continuity of Care Form    Patient Name: Dulce Bethea   :  1975  MRN:  [id number]    Admit date:  2022  Discharge date:  22    Code Status Order: DNR-CC   Advance Directives:      Admitting Physician:  No admitting provider for patient encounter. PCP: JUDITH Rodrigues CNP    Discharging Nurse: Kaylin Alvarez Unit/Room#: H5O-4395/6302-31  Discharging Unit Phone Number: [phone]    Emergency Contact:   Extended Emergency Contact Information  Primary Emergency Contact: Hailee Ramírez  Address: [address] [facility], [address] [place] [address] Phone: [phone]  Relation: Parent    Past Surgical History:  Past Surgical History:   Procedure Laterality Date    CRANIOTOMY Left 2022    LEFT TEMPORAL PARIETAL OCCIPITAL CRANIOTOMY FOR TUMOR RESECTION performed by Whitney Dixon MD at [address]    IR IVC FILTER PLACEMENT W IMAGING N/A 2022    [facility]    IR IVC FILTER PLACEMENT W IMAGING  2022    IR IVC FILTER PLACEMENT W IMAGING 3/16/2022 [address] PROCEDURES    IR IVC FILTER RETRIEVAL  6/10/2022    IR IVC FILTER RETRIEVAL 6/10/2022 [facility] SPECIAL PROCEDURES    IR PORT PLACEMENT EQUAL OR GREATER THAN 5 YEARS Left 2022    Power Port; Marindia access; 28 cm; Dr. Sridevi Garrido    IR Clayton Thelma 5 YEARS  2022    IR PORT PLACEMENT EQUAL OR GREATER THAN 5 YEARS 2022 [facility] SPECIAL PROCEDURES    IR THROMB [address]  2022    IR THROMB [address] 3/28/2022 [address] SPECIAL PROCEDURES    UPPER GASTROINTESTINAL ENDOSCOPY N/A 2022    EGD DIAGNOSTIC ONLY performed by Raffi Lopez MD at [address] ENDOSCOPY       Immunization History: There is no immunization history on file for this patient.     Active Problems:  Patient Active Problem List   Diagnosis Code    Thyroid nodule E04.1    Brain metastases (HCC) C79.31    Brain mass G93.89    Status epilepticus ([address] G40.901 Dressing/Treatment Tegaderm/transparent film dressing 06/25/22 1949   Incision Assessment Dry 06/24/22 0800   Number of days: 15       Incision 03/12/22 Head Upper;Left (Active)   Number of days: 106        Elimination:  Continence: Bowel: Yes  Bladder: Yes  Urinary Catheter: None   Colostomy/Ileostomy/Ileal Conduit: No       Date of Last BM: 6/26/22    Intake/Output Summary (Last 24 hours) at 6/26/2022 1254  Last data filed at 6/26/2022 0618  Gross per 24 hour   Intake 240 ml   Output --   Net 240 ml     I/O last 3 completed shifts: In: 1042.5 [P.O.:460; Blood:582.5]  Out: 100 [Urine:100]    Safety Concerns: At Risk for Falls and History of Seizures    Impairments/Disabilities:      None    Nutrition Therapy:  Current Nutrition Therapy:   - Oral Diet:  General    Routes of Feeding: Oral  Liquids: Thin Liquids  Daily Fluid Restriction: no  Last Modified Barium Swallow with Video (Video Swallowing Test): not done    Treatments at the Time of Hospital Discharge:   Respiratory Treatments:  Oxygen Therapy:  is not on home oxygen therapy.   Ventilator:    - No ventilator support    Rehab Therapies: Physical Therapy and Occupational Therapy  Weight Bearing Status/Restrictions: No weight bearing restrictions  Other Medical Equipment (for information only, NOT a DME order):  {EQUIPMENT:[id number]}  Other Treatments: ***    Patient's personal belongings (please select all that are sent with patient):  None    RN SIGNATURE:  Electronically signed by Nisreen Dykes RN on 6/26/22 at 1:49 PM EDT    CASE MANAGEMENT/SOCIAL WORK SECTION    Inpatient Status Date: 6/26/22    Readmission Risk Assessment Score:  Readmission Risk              Risk of Unplanned Readmission:  38           Discharging to Facility/ Agency   Name: [facility]  Address:   [address] [place] [address]  Phone:  [phone]  Fax:  [phone]    / signature: Electronically signed by Joselin Sin on 6/26/22 at 12:55 PM -[phone]    PHYSICIAN SECTION    Prognosis: Fair    Condition at Discharge: Stable    Rehab Potential (if transferring to Rehab): Fair    Recommended Labs or Other Treatments After Discharge: NA    Physician Certification: I certify the above information and transfer of Corey Chavarria  is necessary for the continuing treatment of the diagnosis listed and that she requires Home Care for less 30 days.      Update Admission H&P: No change in H&P    PHYSICIAN SIGNATURE:  Electronically signed by Montse Garduno MD on 6/26/22 at 1:25 PM EDT

## 2022-06-26 NOTE — PLAN OF CARE
Problem: Discharge Planning  Goal: Discharge to home or other facility with appropriate resources  Outcome: Progressing   Alert and oriented x4 Resp. Easy and even Sats. WNL on RA LCTA Medicated x1 for c/o breast pain with fair effect. Cont.  Per bathroom with walker and SBA Free from fall/injury Turns and repositions self with encouragement Dressing to right breast intact

## 2022-07-18 NOTE — DISCHARGE SUMMARY
Hospital Medicine Discharge Summary    Patient ID: Marium Burden      Patient's PCP: Frankie Boo, JUDITH - CNP    Admit Date: 6/20/2022     Discharge Date: 6/26/2022      Admitting Provider: No admitting provider for patient encounter. Discharge Provider: Faisal Marshall MD     Discharge Diagnoses: Active Hospital Problems    Diagnosis     Breast pain, right [N64.4]      Priority: Medium    Breast cancer, stage 4, right ([facility] [C50.911]      Priority: Medium       The patient was seen and examined on day of discharge and this discharge summary is in conjunction with any daily progress note from day of discharge. Hospital Course:   48 y.o. female who presented to [facility] [place] with bleeding from R breast cancer site. Pt has hx of known metastatic breast cancer fungating dx in 3/2022. She has complicated hx including mets to brain for which she received craniectomy with bx and radiation to the breast tumor at [facility]. Course further complicated by venous thromboembolism for which she required ivc filter and subsequent removal due to thrombosis and now was on eliquis at [facility]. She has had extensive bleeding oozing from the site and came to ED for eval.  Plan was to start chemo soon. Advanced Stage 4 breast cancer  Metastatic brain lesions with midline shift vasogenic edema  Bleeding breast mass  Hx DVT/PEs  On oral AC  Hx of IVC filter s/p recent removal  Acute blood loss anemia     PLAN:     Held eliquis and plavix  Serial h and h  Transfuse for hgb<7  Breast surgeon consulted  hemonc consulted  ?  replacement of IVC filter as seems to not be tolerating oral AC - risk outweighs benefit per Dr Jewell Villalba plans for IVC filter placement  Poss initiation of chemo - outpt  Neurosurgery consulted for brain lesions-they are aware of CT and OK with her staying at [facility] for now - consulted radiation onc - recently completed brain radiation with recurrence - no further radiation tx  On decadron iv  keppra for sz prevention  Agreed to blood transfusion hb improved to >8  Hospice offered and discussed, but pt and family want to cont to be fully aggressive       Physical Exam Performed:     /74   Pulse (!) 103   Temp 98.8 °F (37.1 °C) (Oral)   Resp 18   Ht 5' 3\" (1.6 m)   Wt 173 lb 11.6 oz (78.8 kg)   SpO2 96%   BMI 30.77 kg/m²       General appearance:  No apparent distress, appears stated age and cooperative. HEENT:  Normal cephalic, atraumatic without obvious deformity. Pupils equal, round, and reactive to light. Extra ocular muscles intact. Conjunctivae/corneas clear. Neck: Supple, with full range of motion. No jugular venous distention. Trachea midline. Respiratory:  Normal respiratory effort. Clear to auscultation, bilaterally without Rales/Wheezes/Rhonchi. Cardiovascular:  Regular rate and rhythm with normal S1/S2 without murmurs, rubs or gallops. Abdomen: Soft, non-tender, non-distended with normal bowel sounds. Musculoskeletal:  No clubbing, cyanosis or edema bilaterally. Full range of motion without deformity. Skin: Skin color, texture, turgor normal.  No rashes or lesions. Neurologic:  Neurovascularly intact without any focal sensory/motor deficits. Cranial nerves: II-XII intact, grossly non-focal.  Psychiatric:  Alert and oriented, thought content appropriate, normal insight  Capillary Refill: Brisk,3 seconds, normal  Peripheral Pulses: +2 palpable, equal bilaterally   Breast: fungating bleeding necrotic breast mass with odor       Labs:  For convenience and continuity at follow-up the following most recent labs are provided:      CBC:    Lab Results   Component Value Date/Time    WBC 20.3 06/26/2022 06:25 AM    HGB 9.1 06/26/2022 06:25 AM    HCT 26.7 06/26/2022 06:25 AM     06/26/2022 06:25 AM       Renal:    Lab Results   Component Value Date/Time     06/26/2022 06:25 AM    K 4.4 06/26/2022 06:25 AM    K 3.7 06/20/2022 10:04 AM    CL 98 06/26/2022 06:25 AM    CO2 22 06/26/2022 06:25 AM    BUN 16 06/26/2022 06:25 AM    CREATININE <0.5 06/26/2022 06:25 AM    CALCIUM 9.2 06/26/2022 06:25 AM    PHOS 3.5 06/12/2022 06:00 AM         Significant Diagnostic Studies    Radiology:   MRI BRAIN W WO CONTRAST   Final Result   Worsening recurrent metastasis involving the left frontoparietal lobe, left   parietal lobe and left occipital lobe. Severe surrounding vasogenic edema is   identified. Mild to moderate midline shift measures 6 mm previously   measuring 9 mm. New right parietal lobe metastasis measuring 1.3 cm with surrounding   vasogenic edema. Left frontal parietal and left occipital craniotomies are noted from prior   metastasis resection. RECOMMENDATIONS:   Unavailable         CT HEAD WO CONTRAST   Final Result   1. Increase in the vasogenic edema involving the left temporal and parietal   lobes with mass effect and new left-to-right midline shift measuring 7.3 mm.   2. Question vasogenic edema in the high right parietal lobe which could be   secondary to a new metastasis. These findings are concerning for progression of intracranial metastases. An   MRI brain with contrast is recommended for further evaluation.                 Consults:     IP CONSULT TO GENERAL SURGERY  IP CONSULT TO CARDIOLOGY  IP CONSULT TO SOCIAL WORK  IP CONSULT TO PALLIATIVE CARE  IP CONSULT TO NEUROSURGERY  IP CONSULT TO HOME CARE NEEDS    Disposition:  PCP in 1 week     Condition at Discharge: Stable    Discharge Instructions/Follow-up:  pcp in 1 week    Code Status:  Prior     Activity: activity as tolerated    Diet: regular diet      Discharge Medications:     Discharge Medication List as of 6/26/2022  1:59 PM             Details   methylPREDNISolone (MEDROL DOSEPACK) 4 MG tablet Take by mouth., Disp-1 kit, R-0Normal                Details   apixaban (ELIQUIS) 2.5 MG TABS tablet Take 1 tablet by mouth 2 times daily, Disp-60 tablet, R-2Normal                Details clopidogrel (PLAVIX) 75 MG tablet Take 1 tablet by mouth daily, Disp-30 tablet, R-3Print      spironolactone (ALDACTONE) 50 MG tablet Take 1 tablet by mouth daily, Disp-30 tablet, R-3Normal      Multiple Vitamin (MULTIVITAMIN ADULT PO) Take 1 capsule by mouth dailyHistorical Med      Calcium Carbonate-Vitamin D (OYSTER SHELL CALCIUM/D) 500-200 MG-UNIT TABS Take 1 tablet by mouth 2 times daily (with meals)Historical Med      !! levETIRAcetam (KEPPRA) 1000 MG tablet Take 2 tablets by mouth daily, Disp-60 tablet, R-3NO PRINT      !! levETIRAcetam (KEPPRA) 500 MG tablet Take 5 tablets by mouth nightly, Disp-60 tablet, R-3NO PRINT      melatonin 5 MG TBDP disintegrating tablet Take 1 tablet by mouth nightlyOTC      pantoprazole (PROTONIX) 40 MG tablet Take 1 tablet by mouth every morning (before breakfast), Disp-30 tablet, R-3Normal      lacosamide (VIMPAT) 200 MG tablet Take 1 tablet by mouth 2 times daily for 30 days. , Disp-60 tablet, R-0Normal       !! - Potential duplicate medications found. Please discuss with provider. Time Spent on discharge is more than 30 minutes in the examination, evaluation, counseling and review of medications and discharge plan. Signed:    Deedee Gómez MD   7/18/2022      Thank you JUDITH KIMBROUGH - AMA for the opportunity to be involved in this patient's care. If you have any questions or concerns, please feel free to contact me at [phone].

## (undated) DEVICE — SUTURE NRLN SZ 4-0 L18IN NONABSORBABLE BLK L13MM TF 1/2 CIR C584D

## (undated) DEVICE — TOOL F2/8TA23 LEGEND 8CM 2.3MM TAPER: Brand: MIDAS REX™

## (undated) DEVICE — DRAPE MICSCP W54XL150IN W/ 4 BINOC GLS LENS LEICA

## (undated) DEVICE — TOWEL,STOP FLAG GOLD N-W: Brand: MEDLINE

## (undated) DEVICE — BLADE CLIPPER SURG SENSICLIP

## (undated) DEVICE — HOOK RETRCT 12MM S STL BLNT E STAY LONE STAR

## (undated) DEVICE — NEURO SPONGES: Brand: DEROYAL

## (undated) DEVICE — GLOVE SURG SZ 75 L12IN FNGR THK94MIL TRNSLUC YEL LTX

## (undated) DEVICE — SPONGE,LAP,18"X18",DLX,XR,ST,5/PK,40/PK: Brand: MEDLINE

## (undated) DEVICE — COVER LT HNDL CAM BLU DISP W/ SURG CTRL

## (undated) DEVICE — CODMAN® SURGICAL PATTIES 3/4" X 3/4" (1.91CM X 1.91CM): Brand: CODMAN®

## (undated) DEVICE — BANDAGE,GAUZE,BULKEE II,4.5"X4.1YD,STRL: Brand: MEDLINE

## (undated) DEVICE — DRESSING FOAM DISK DIA1IN H 7MM HYDRPHLC CHG IMPREG IN SL

## (undated) DEVICE — SYRINGE MED 3ML CLR PLAS STD N CTRL LUERLOCK TIP DISP

## (undated) DEVICE — SPONGE,NEURO,0.5"X3",XR,STRL,LF,10/PK: Brand: MEDLINE

## (undated) DEVICE — GLOVE SURG SZ 65 THK91MIL LTX FREE SYN POLYISOPRENE

## (undated) DEVICE — CODMAN® SURGICAL PATTIES 1/4" X 1/4" (0.64CM X 0.64CM): Brand: CODMAN®

## (undated) DEVICE — SOLUTION IV 500ML 0.9% SOD CHL PH 5 INJ USP VIAFLX PLAS

## (undated) DEVICE — SUTURE VCRL SZ 2-0 L18IN ABSRB UD CT-1 L36MM 1/2 CIR J839D

## (undated) DEVICE — COTTON BALLS, DOUBLE STRUNG: Brand: DEROYAL

## (undated) DEVICE — SSC BONE WAX: Brand: SSC BONE WAX

## (undated) DEVICE — TELFA 1" X 3": Brand: TELFA

## (undated) DEVICE — UNDERGLOVE SURG SZ 8 BLU LTX FREE SYN POLYISOPRENE POLYMER

## (undated) DEVICE — KIT CATHETER 20GA L12CM ART CUST

## (undated) DEVICE — SEALANT SURG 13 YR DURA AUTOSPRAY ADHERUS NUS106] SURGICAL ONE]

## (undated) DEVICE — DRESSING GERM DIA1IN CNTR H DIA7MM BLU CHG ANTIMIC PROTCT

## (undated) DEVICE — SHEET,DRAPE,53X77,STERILE: Brand: MEDLINE

## (undated) DEVICE — 3M™ TEGADERM™ TRANSPARENT FILM DRESSING FRAME STYLE, 1624W, 2-3/8 IN X 2-3/4 IN (6 CM X 7 CM), 100/CT 4CT/CASE: Brand: 3M™ TEGADERM™

## (undated) DEVICE — GLOVE SURG SZ 7 L12IN FNGR THK79MIL GRN LTX FREE

## (undated) DEVICE — TOOL 9BA60 LEGEND 9CM 6MM BA: Brand: MIDAS REX

## (undated) DEVICE — PRESSURE MONITORING SET: Brand: TRUWAVE, VAMP PLUS

## (undated) DEVICE — MARKER REFLECTIVE REFLECTIVE TWST ON SPHERZ 5PK

## (undated) DEVICE — SOLUTION IV 1000ML 0.9% SOD CHL

## (undated) DEVICE — SPONGE 300501 MEROCEL 100PK 1.3 X 1.3CM: Brand: MEROCEL®

## (undated) DEVICE — CRANI: Brand: MEDLINE INDUSTRIES, INC.

## (undated) DEVICE — CANNULA SAMP CO2 AD GRN 7FT CO2 AND 7FT O2 TBNG UNIV CONN

## (undated) DEVICE — ANES EXTENSION SET 90IN-LF: Brand: MEDLINE INDUSTRIES, INC.

## (undated) DEVICE — AGENT HEMSTAT W2XL14IN OXIDIZED REGENERATED CELOS ABSRB FOR

## (undated) DEVICE — ADAPTER LAB INTMED LUER 17GA

## (undated) DEVICE — 3L THIN WALL CAN: Brand: CRD